# Patient Record
Sex: FEMALE | Race: WHITE | NOT HISPANIC OR LATINO | Employment: OTHER | URBAN - NONMETROPOLITAN AREA
[De-identification: names, ages, dates, MRNs, and addresses within clinical notes are randomized per-mention and may not be internally consistent; named-entity substitution may affect disease eponyms.]

---

## 2017-03-29 ENCOUNTER — APPOINTMENT (OUTPATIENT)
Dept: GENERAL RADIOLOGY | Facility: HOSPITAL | Age: 67
End: 2017-03-29

## 2017-03-29 ENCOUNTER — HOSPITAL ENCOUNTER (INPATIENT)
Facility: HOSPITAL | Age: 67
LOS: 9 days | Discharge: HOME | End: 2017-04-07
Attending: INTERNAL MEDICINE | Admitting: FAMILY MEDICINE

## 2017-03-29 ENCOUNTER — APPOINTMENT (OUTPATIENT)
Dept: CT IMAGING | Facility: HOSPITAL | Age: 67
End: 2017-03-29

## 2017-03-29 DIAGNOSIS — L02.91 ABSCESS: ICD-10-CM

## 2017-03-29 PROBLEM — IMO0002 ABDOMINAL ABSCESS: Status: ACTIVE | Noted: 2017-03-29

## 2017-03-29 LAB — CRP SERPL-MCNC: 14.42 MG/DL (ref 0–0.99)

## 2017-03-29 PROCEDURE — 74178 CT ABD&PLV WO CNTR FLWD CNTR: CPT

## 2017-03-29 PROCEDURE — 94799 UNLISTED PULMONARY SVC/PX: CPT

## 2017-03-29 PROCEDURE — 0 IOHEXOL 300 MG/ML SOLUTION: Performed by: SPECIALIST

## 2017-03-29 PROCEDURE — 02HV33Z INSERTION OF INFUSION DEVICE INTO SUPERIOR VENA CAVA, PERCUTANEOUS APPROACH: ICD-10-PCS | Performed by: SPECIALIST

## 2017-03-29 PROCEDURE — 0 IOPAMIDOL PER 1 ML: Performed by: INTERNAL MEDICINE

## 2017-03-29 PROCEDURE — 25010000002 MEROPENEM: Performed by: INTERNAL MEDICINE

## 2017-03-29 PROCEDURE — 71020 HC CHEST PA AND LATERAL: CPT

## 2017-03-29 PROCEDURE — 74000 HC ABDOMEN KUB: CPT

## 2017-03-29 PROCEDURE — 86140 C-REACTIVE PROTEIN: CPT | Performed by: SPECIALIST

## 2017-03-29 PROCEDURE — 25010000002 ENOXAPARIN PER 10 MG: Performed by: INTERNAL MEDICINE

## 2017-03-29 RX ORDER — SODIUM CHLORIDE 0.9 % (FLUSH) 0.9 %
1-10 SYRINGE (ML) INJECTION AS NEEDED
Status: DISCONTINUED | OUTPATIENT
Start: 2017-03-29 | End: 2017-04-07 | Stop reason: HOSPADM

## 2017-03-29 RX ORDER — SUCRALFATE ORAL 1 G/10ML
1 SUSPENSION ORAL EVERY 6 HOURS SCHEDULED
Status: DISCONTINUED | OUTPATIENT
Start: 2017-03-29 | End: 2017-04-07 | Stop reason: HOSPADM

## 2017-03-29 RX ORDER — NICOTINE 21 MG/24HR
1 PATCH, TRANSDERMAL 24 HOURS TRANSDERMAL EVERY 24 HOURS
Status: DISCONTINUED | OUTPATIENT
Start: 2017-03-29 | End: 2017-04-07 | Stop reason: HOSPADM

## 2017-03-29 RX ORDER — PANTOPRAZOLE SODIUM 40 MG/10ML
40 INJECTION, POWDER, LYOPHILIZED, FOR SOLUTION INTRAVENOUS
Status: DISCONTINUED | OUTPATIENT
Start: 2017-03-30 | End: 2017-04-05

## 2017-03-29 RX ORDER — LEVOTHYROXINE SODIUM 0.07 MG/1
75 TABLET ORAL DAILY
COMMUNITY

## 2017-03-29 RX ORDER — DOCUSATE SODIUM 100 MG/1
100 CAPSULE, LIQUID FILLED ORAL DAILY PRN
Status: ON HOLD | COMMUNITY
End: 2020-02-14

## 2017-03-29 RX ORDER — BUDESONIDE AND FORMOTEROL FUMARATE DIHYDRATE 160; 4.5 UG/1; UG/1
2 AEROSOL RESPIRATORY (INHALATION)
Status: ON HOLD | COMMUNITY
End: 2021-03-10

## 2017-03-29 RX ORDER — MECLIZINE HYDROCHLORIDE 25 MG/1
25 TABLET ORAL 2 TIMES DAILY PRN
COMMUNITY
End: 2020-02-19 | Stop reason: HOSPADM

## 2017-03-29 RX ORDER — IPRATROPIUM BROMIDE AND ALBUTEROL SULFATE 2.5; .5 MG/3ML; MG/3ML
3 SOLUTION RESPIRATORY (INHALATION)
Status: DISCONTINUED | OUTPATIENT
Start: 2017-03-29 | End: 2017-04-07 | Stop reason: HOSPADM

## 2017-03-29 RX ORDER — ALBUTEROL SULFATE 90 UG/1
2 AEROSOL, METERED RESPIRATORY (INHALATION) 3 TIMES DAILY PRN
COMMUNITY
End: 2018-04-13 | Stop reason: HOSPADM

## 2017-03-29 RX ORDER — POTASSIUM CHLORIDE 750 MG/1
10 TABLET, EXTENDED RELEASE ORAL 2 TIMES DAILY
COMMUNITY
End: 2020-02-19 | Stop reason: HOSPADM

## 2017-03-29 RX ORDER — ONDANSETRON 2 MG/ML
4 INJECTION INTRAMUSCULAR; INTRAVENOUS EVERY 6 HOURS PRN
Status: DISCONTINUED | OUTPATIENT
Start: 2017-03-29 | End: 2017-04-07 | Stop reason: HOSPADM

## 2017-03-29 RX ORDER — PANTOPRAZOLE SODIUM 40 MG/10ML
40 INJECTION, POWDER, LYOPHILIZED, FOR SOLUTION INTRAVENOUS
Status: DISCONTINUED | OUTPATIENT
Start: 2017-03-29 | End: 2017-03-29 | Stop reason: SDUPTHER

## 2017-03-29 RX ORDER — IPRATROPIUM BROMIDE AND ALBUTEROL SULFATE 2.5; .5 MG/3ML; MG/3ML
3 SOLUTION RESPIRATORY (INHALATION) EVERY 4 HOURS PRN
Status: DISCONTINUED | OUTPATIENT
Start: 2017-03-29 | End: 2017-03-29

## 2017-03-29 RX ORDER — FERROUS SULFATE TAB EC 324 MG (65 MG FE EQUIVALENT) 324 (65 FE) MG
324 TABLET DELAYED RESPONSE ORAL 2 TIMES DAILY WITH MEALS
Status: ON HOLD | COMMUNITY
End: 2017-04-07

## 2017-03-29 RX ORDER — HYDROCHLOROTHIAZIDE 25 MG/1
25 TABLET ORAL DAILY
COMMUNITY
End: 2017-04-21 | Stop reason: HOSPADM

## 2017-03-29 RX ADMIN — IOHEXOL 50 ML: 300 INJECTION, SOLUTION INTRAVENOUS at 10:02

## 2017-03-29 RX ADMIN — MEROPENEM 1 G: 1 INJECTION, POWDER, FOR SOLUTION INTRAVENOUS at 14:34

## 2017-03-29 RX ADMIN — MEROPENEM 1 G: 1 INJECTION, POWDER, FOR SOLUTION INTRAVENOUS at 21:10

## 2017-03-29 RX ADMIN — IPRATROPIUM BROMIDE AND ALBUTEROL SULFATE 3 ML: 2.5; .5 SOLUTION RESPIRATORY (INHALATION) at 14:36

## 2017-03-29 RX ADMIN — ENOXAPARIN SODIUM 40 MG: 40 INJECTION SUBCUTANEOUS at 10:05

## 2017-03-29 RX ADMIN — IOPAMIDOL 100 ML: 755 INJECTION, SOLUTION INTRAVENOUS at 12:45

## 2017-03-29 RX ADMIN — PANTOPRAZOLE SODIUM 40 MG: 40 INJECTION, POWDER, FOR SOLUTION INTRAVENOUS at 06:19

## 2017-03-29 RX ADMIN — MEROPENEM 1 G: 1 INJECTION, POWDER, FOR SOLUTION INTRAVENOUS at 06:13

## 2017-03-29 RX ADMIN — IPRATROPIUM BROMIDE AND ALBUTEROL SULFATE 3 ML: 2.5; .5 SOLUTION RESPIRATORY (INHALATION) at 19:20

## 2017-03-30 ENCOUNTER — APPOINTMENT (OUTPATIENT)
Dept: CT IMAGING | Facility: HOSPITAL | Age: 67
End: 2017-03-30

## 2017-03-30 LAB
ALBUMIN SERPL-MCNC: 2.8 G/DL (ref 3.5–5)
ALBUMIN/GLOB SERPL: 1 G/DL (ref 1.1–2.5)
ALP SERPL-CCNC: 89 U/L (ref 24–120)
ALT SERPL W P-5'-P-CCNC: 68 U/L (ref 0–54)
ANION GAP SERPL CALCULATED.3IONS-SCNC: 11 MMOL/L (ref 4–13)
APPEARANCE FLD: ABNORMAL
APPEARANCE FLD: ABNORMAL
APTT PPP: 21.3 SECONDS (ref 24.1–34.8)
AST SERPL-CCNC: 38 U/L (ref 7–45)
BILIRUB SERPL-MCNC: 0.4 MG/DL (ref 0.1–1)
BUN BLD-MCNC: 17 MG/DL (ref 5–21)
BUN/CREAT SERPL: 21.5 (ref 7–25)
CALCIUM SPEC-SCNC: 8.8 MG/DL (ref 8.4–10.4)
CHLORIDE SERPL-SCNC: 97 MMOL/L (ref 98–110)
CO2 SERPL-SCNC: 28 MMOL/L (ref 24–31)
COLOR FLD: ABNORMAL
COLOR FLD: ABNORMAL
CREAT BLD-MCNC: 0.79 MG/DL (ref 0.5–1.4)
CRP SERPL-MCNC: 14.8 MG/DL (ref 0–0.99)
DEPRECATED RDW RBC AUTO: 51.1 FL (ref 40–54)
ERYTHROCYTE [DISTWIDTH] IN BLOOD BY AUTOMATED COUNT: 16.4 % (ref 12–15)
ERYTHROCYTE [SEDIMENTATION RATE] IN BLOOD: 113 MM/HR (ref 0–20)
GFR SERPL CREATININE-BSD FRML MDRD: 73 ML/MIN/1.73
GLOBULIN UR ELPH-MCNC: 2.8 GM/DL
GLUCOSE BLD-MCNC: 83 MG/DL (ref 70–100)
HCT VFR BLD AUTO: 30 % (ref 37–47)
HGB BLD-MCNC: 9.5 G/DL (ref 12–16)
INR PPP: 1.21 (ref 0.91–1.09)
MCH RBC QN AUTO: 27.1 PG (ref 28–32)
MCHC RBC AUTO-ENTMCNC: 31.7 G/DL (ref 33–36)
MCV RBC AUTO: 85.7 FL (ref 82–98)
NUC CELL # FLD: ABNORMAL /MM3
NUC CELL # FLD: ABNORMAL /MM3
PLATELET # BLD AUTO: 333 10*3/MM3 (ref 130–400)
PMV BLD AUTO: 10 FL (ref 6–12)
POTASSIUM BLD-SCNC: 3.5 MMOL/L (ref 3.5–5.3)
PROT SERPL-MCNC: 5.6 G/DL (ref 6.3–8.7)
PROTHROMBIN TIME: 15.7 SECONDS (ref 11.9–14.6)
RBC # BLD AUTO: 3.5 10*6/MM3 (ref 4.2–5.4)
RBC # FLD AUTO: ABNORMAL /MM3
RBC # FLD AUTO: ABNORMAL /MM3
SODIUM BLD-SCNC: 136 MMOL/L (ref 135–145)
WBC NRBC COR # BLD: 15.19 10*3/MM3 (ref 4.8–10.8)

## 2017-03-30 PROCEDURE — C1751 CATH, INF, PER/CENT/MIDLINE: HCPCS

## 2017-03-30 PROCEDURE — 87205 SMEAR GRAM STAIN: CPT | Performed by: INTERNAL MEDICINE

## 2017-03-30 PROCEDURE — 85610 PROTHROMBIN TIME: CPT | Performed by: SPECIALIST

## 2017-03-30 PROCEDURE — 87102 FUNGUS ISOLATION CULTURE: CPT | Performed by: SPECIALIST

## 2017-03-30 PROCEDURE — 87186 SC STD MICRODIL/AGAR DIL: CPT | Performed by: SPECIALIST

## 2017-03-30 PROCEDURE — 85651 RBC SED RATE NONAUTOMATED: CPT | Performed by: SPECIALIST

## 2017-03-30 PROCEDURE — 87070 CULTURE OTHR SPECIMN AEROBIC: CPT | Performed by: SPECIALIST

## 2017-03-30 PROCEDURE — 87102 FUNGUS ISOLATION CULTURE: CPT | Performed by: INTERNAL MEDICINE

## 2017-03-30 PROCEDURE — 87185 SC STD ENZYME DETCJ PER NZM: CPT | Performed by: SPECIALIST

## 2017-03-30 PROCEDURE — 94799 UNLISTED PULMONARY SVC/PX: CPT

## 2017-03-30 PROCEDURE — 87075 CULTR BACTERIA EXCEPT BLOOD: CPT | Performed by: SPECIALIST

## 2017-03-30 PROCEDURE — 75989 ABSCESS DRAINAGE UNDER X-RAY: CPT

## 2017-03-30 PROCEDURE — 87116 MYCOBACTERIA CULTURE: CPT | Performed by: SPECIALIST

## 2017-03-30 PROCEDURE — 25010000002 MIDAZOLAM PER 1 MG: Performed by: RADIOLOGY

## 2017-03-30 PROCEDURE — 0W9F30Z DRAINAGE OF ABDOMINAL WALL WITH DRAINAGE DEVICE, PERCUTANEOUS APPROACH: ICD-10-PCS | Performed by: RADIOLOGY

## 2017-03-30 PROCEDURE — 87076 CULTURE ANAEROBE IDENT EACH: CPT | Performed by: SPECIALIST

## 2017-03-30 PROCEDURE — 87070 CULTURE OTHR SPECIMN AEROBIC: CPT | Performed by: INTERNAL MEDICINE

## 2017-03-30 PROCEDURE — 25010000002 FENTANYL CITRATE (PF) 100 MCG/2ML SOLUTION: Performed by: RADIOLOGY

## 2017-03-30 PROCEDURE — 85730 THROMBOPLASTIN TIME PARTIAL: CPT | Performed by: SPECIALIST

## 2017-03-30 PROCEDURE — 87206 SMEAR FLUORESCENT/ACID STAI: CPT | Performed by: SPECIALIST

## 2017-03-30 PROCEDURE — 80053 COMPREHEN METABOLIC PANEL: CPT | Performed by: SPECIALIST

## 2017-03-30 PROCEDURE — 25010000002 LEVOFLOXACIN PER 250 MG: Performed by: INTERNAL MEDICINE

## 2017-03-30 PROCEDURE — 86140 C-REACTIVE PROTEIN: CPT | Performed by: SPECIALIST

## 2017-03-30 PROCEDURE — 25010000002 MEROPENEM: Performed by: INTERNAL MEDICINE

## 2017-03-30 PROCEDURE — 87147 CULTURE TYPE IMMUNOLOGIC: CPT | Performed by: SPECIALIST

## 2017-03-30 PROCEDURE — 89050 BODY FLUID CELL COUNT: CPT | Performed by: SPECIALIST

## 2017-03-30 PROCEDURE — 87205 SMEAR GRAM STAIN: CPT | Performed by: SPECIALIST

## 2017-03-30 PROCEDURE — 85027 COMPLETE CBC AUTOMATED: CPT | Performed by: SPECIALIST

## 2017-03-30 PROCEDURE — 87015 SPECIMEN INFECT AGNT CONCNTJ: CPT | Performed by: SPECIALIST

## 2017-03-30 RX ORDER — MIDAZOLAM HYDROCHLORIDE 1 MG/ML
INJECTION INTRAMUSCULAR; INTRAVENOUS
Status: COMPLETED | OUTPATIENT
Start: 2017-03-30 | End: 2017-03-30

## 2017-03-30 RX ORDER — LEVOFLOXACIN 5 MG/ML
500 INJECTION, SOLUTION INTRAVENOUS EVERY 24 HOURS
Status: DISCONTINUED | OUTPATIENT
Start: 2017-03-30 | End: 2017-04-07 | Stop reason: HOSPADM

## 2017-03-30 RX ORDER — LIDOCAINE HYDROCHLORIDE 10 MG/ML
INJECTION, SOLUTION INFILTRATION; PERINEURAL
Status: COMPLETED | OUTPATIENT
Start: 2017-03-30 | End: 2017-03-30

## 2017-03-30 RX ORDER — LIDOCAINE HYDROCHLORIDE 10 MG/ML
2 INJECTION, SOLUTION INFILTRATION; PERINEURAL ONCE
Status: COMPLETED | OUTPATIENT
Start: 2017-03-30 | End: 2017-03-30

## 2017-03-30 RX ORDER — FENTANYL CITRATE 50 UG/ML
INJECTION, SOLUTION INTRAMUSCULAR; INTRAVENOUS
Status: COMPLETED | OUTPATIENT
Start: 2017-03-30 | End: 2017-03-30

## 2017-03-30 RX ADMIN — LIDOCAINE HYDROCHLORIDE 2 ML: 10 INJECTION, SOLUTION EPIDURAL; INFILTRATION; INTRACAUDAL; PERINEURAL at 09:47

## 2017-03-30 RX ADMIN — IPRATROPIUM BROMIDE AND ALBUTEROL SULFATE 3 ML: 2.5; .5 SOLUTION RESPIRATORY (INHALATION) at 19:33

## 2017-03-30 RX ADMIN — MEROPENEM 1 G: 1 INJECTION, POWDER, FOR SOLUTION INTRAVENOUS at 14:15

## 2017-03-30 RX ADMIN — IPRATROPIUM BROMIDE AND ALBUTEROL SULFATE 3 ML: 2.5; .5 SOLUTION RESPIRATORY (INHALATION) at 11:53

## 2017-03-30 RX ADMIN — IPRATROPIUM BROMIDE AND ALBUTEROL SULFATE 3 ML: 2.5; .5 SOLUTION RESPIRATORY (INHALATION) at 07:04

## 2017-03-30 RX ADMIN — LIDOCAINE HYDROCHLORIDE 15 ML: 10 INJECTION, SOLUTION INFILTRATION; PERINEURAL at 10:22

## 2017-03-30 RX ADMIN — PANTOPRAZOLE SODIUM 40 MG: 40 INJECTION, POWDER, FOR SOLUTION INTRAVENOUS at 05:27

## 2017-03-30 RX ADMIN — SUCRALFATE 1 G: 1 SUSPENSION ORAL at 12:14

## 2017-03-30 RX ADMIN — MIDAZOLAM 1 MG: 1 INJECTION INTRAMUSCULAR; INTRAVENOUS at 10:22

## 2017-03-30 RX ADMIN — LEVOFLOXACIN 500 MG: 500 INJECTION, SOLUTION INTRAVENOUS at 11:46

## 2017-03-30 RX ADMIN — IPRATROPIUM BROMIDE AND ALBUTEROL SULFATE 3 ML: 2.5; .5 SOLUTION RESPIRATORY (INHALATION) at 00:00

## 2017-03-30 RX ADMIN — FENTANYL CITRATE 25 MCG: 50 INJECTION, SOLUTION INTRAMUSCULAR; INTRAVENOUS at 10:21

## 2017-03-30 RX ADMIN — MEROPENEM 1 G: 1 INJECTION, POWDER, FOR SOLUTION INTRAVENOUS at 05:47

## 2017-03-30 RX ADMIN — MEROPENEM 1 G: 1 INJECTION, POWDER, FOR SOLUTION INTRAVENOUS at 22:13

## 2017-03-30 RX ADMIN — SUCRALFATE 1 G: 1 SUSPENSION ORAL at 17:58

## 2017-03-30 RX ADMIN — SUCRALFATE 1 G: 1 SUSPENSION ORAL at 23:25

## 2017-03-31 LAB
BASOPHILS # BLD AUTO: 0.02 10*3/MM3 (ref 0–0.2)
BASOPHILS NFR BLD AUTO: 0.2 % (ref 0–2)
DEPRECATED RDW RBC AUTO: 52.2 FL (ref 40–54)
EOSINOPHIL # BLD AUTO: 0.22 10*3/MM3 (ref 0–0.7)
EOSINOPHIL NFR BLD AUTO: 1.9 % (ref 0–4)
ERYTHROCYTE [DISTWIDTH] IN BLOOD BY AUTOMATED COUNT: 16.5 % (ref 12–15)
ERYTHROCYTE [SEDIMENTATION RATE] IN BLOOD: 72 MM/HR (ref 0–20)
HCT VFR BLD AUTO: 28.2 % (ref 37–47)
HGB BLD-MCNC: 8.9 G/DL (ref 12–16)
IMM GRANULOCYTES # BLD: 0.04 10*3/MM3 (ref 0–0.03)
IMM GRANULOCYTES NFR BLD: 0.3 % (ref 0–5)
LYMPHOCYTES # BLD AUTO: 1.79 10*3/MM3 (ref 0.72–4.86)
LYMPHOCYTES NFR BLD AUTO: 15.2 % (ref 15–45)
MCH RBC QN AUTO: 27.1 PG (ref 28–32)
MCHC RBC AUTO-ENTMCNC: 31.6 G/DL (ref 33–36)
MCV RBC AUTO: 86 FL (ref 82–98)
MONOCYTES # BLD AUTO: 0.63 10*3/MM3 (ref 0.19–1.3)
MONOCYTES NFR BLD AUTO: 5.4 % (ref 4–12)
NEUTROPHILS # BLD AUTO: 9.04 10*3/MM3 (ref 1.87–8.4)
NEUTROPHILS NFR BLD AUTO: 77 % (ref 39–78)
NRBC BLD MANUAL-RTO: 0 /100 WBC (ref 0–0)
PLATELET # BLD AUTO: 314 10*3/MM3 (ref 130–400)
PMV BLD AUTO: 9.9 FL (ref 6–12)
RBC # BLD AUTO: 3.28 10*6/MM3 (ref 4.2–5.4)
WBC NRBC COR # BLD: 11.74 10*3/MM3 (ref 4.8–10.8)

## 2017-03-31 PROCEDURE — 25010000002 LEVOFLOXACIN PER 250 MG: Performed by: INTERNAL MEDICINE

## 2017-03-31 PROCEDURE — 25010000002 MEROPENEM: Performed by: INTERNAL MEDICINE

## 2017-03-31 PROCEDURE — 85025 COMPLETE CBC W/AUTO DIFF WBC: CPT | Performed by: INTERNAL MEDICINE

## 2017-03-31 PROCEDURE — 94760 N-INVAS EAR/PLS OXIMETRY 1: CPT

## 2017-03-31 PROCEDURE — 94799 UNLISTED PULMONARY SVC/PX: CPT

## 2017-03-31 PROCEDURE — 85651 RBC SED RATE NONAUTOMATED: CPT | Performed by: SPECIALIST

## 2017-03-31 RX ADMIN — IPRATROPIUM BROMIDE AND ALBUTEROL SULFATE 3 ML: 2.5; .5 SOLUTION RESPIRATORY (INHALATION) at 19:26

## 2017-03-31 RX ADMIN — MEROPENEM 1 G: 1 INJECTION, POWDER, FOR SOLUTION INTRAVENOUS at 21:06

## 2017-03-31 RX ADMIN — MEROPENEM 1 G: 1 INJECTION, POWDER, FOR SOLUTION INTRAVENOUS at 12:31

## 2017-03-31 RX ADMIN — SUCRALFATE 1 G: 1 SUSPENSION ORAL at 17:18

## 2017-03-31 RX ADMIN — IPRATROPIUM BROMIDE AND ALBUTEROL SULFATE 3 ML: 2.5; .5 SOLUTION RESPIRATORY (INHALATION) at 00:02

## 2017-03-31 RX ADMIN — IPRATROPIUM BROMIDE AND ALBUTEROL SULFATE 3 ML: 2.5; .5 SOLUTION RESPIRATORY (INHALATION) at 12:04

## 2017-03-31 RX ADMIN — PANTOPRAZOLE SODIUM 40 MG: 40 INJECTION, POWDER, FOR SOLUTION INTRAVENOUS at 05:13

## 2017-03-31 RX ADMIN — SUCRALFATE 1 G: 1 SUSPENSION ORAL at 12:31

## 2017-03-31 RX ADMIN — IPRATROPIUM BROMIDE AND ALBUTEROL SULFATE 3 ML: 2.5; .5 SOLUTION RESPIRATORY (INHALATION) at 06:39

## 2017-03-31 RX ADMIN — LEVOFLOXACIN 500 MG: 500 INJECTION, SOLUTION INTRAVENOUS at 09:45

## 2017-03-31 RX ADMIN — MEROPENEM 1 G: 1 INJECTION, POWDER, FOR SOLUTION INTRAVENOUS at 05:13

## 2017-03-31 RX ADMIN — SUCRALFATE 1 G: 1 SUSPENSION ORAL at 05:13

## 2017-04-01 LAB
ALBUMIN SERPL-MCNC: 3 G/DL (ref 3.5–5)
ALBUMIN/GLOB SERPL: 0.9 G/DL (ref 1.1–2.5)
ALP SERPL-CCNC: 83 U/L (ref 24–120)
ALT SERPL W P-5'-P-CCNC: 60 U/L (ref 0–54)
ANION GAP SERPL CALCULATED.3IONS-SCNC: 8 MMOL/L (ref 4–13)
AST SERPL-CCNC: 52 U/L (ref 7–45)
BACTERIA SPEC RESP CULT: NORMAL
BACTERIA UR QL AUTO: ABNORMAL /HPF
BASOPHILS # BLD AUTO: 0.02 10*3/MM3 (ref 0–0.2)
BASOPHILS NFR BLD AUTO: 0.2 % (ref 0–2)
BILIRUB SERPL-MCNC: 0.5 MG/DL (ref 0.1–1)
BILIRUB UR QL STRIP: ABNORMAL
BUN BLD-MCNC: 11 MG/DL (ref 5–21)
BUN/CREAT SERPL: 13.9 (ref 7–25)
CALCIUM SPEC-SCNC: 8.5 MG/DL (ref 8.4–10.4)
CHLORIDE SERPL-SCNC: 97 MMOL/L (ref 98–110)
CLARITY UR: ABNORMAL
CO2 SERPL-SCNC: 31 MMOL/L (ref 24–31)
COLOR UR: ABNORMAL
CREAT BLD-MCNC: 0.79 MG/DL (ref 0.5–1.4)
DEPRECATED RDW RBC AUTO: 51.3 FL (ref 40–54)
EOSINOPHIL # BLD AUTO: 0.52 10*3/MM3 (ref 0–0.7)
EOSINOPHIL NFR BLD AUTO: 4.3 % (ref 0–4)
ERYTHROCYTE [DISTWIDTH] IN BLOOD BY AUTOMATED COUNT: 16.2 % (ref 12–15)
GFR SERPL CREATININE-BSD FRML MDRD: 73 ML/MIN/1.73
GLOBULIN UR ELPH-MCNC: 3.4 GM/DL
GLUCOSE BLD-MCNC: 99 MG/DL (ref 70–100)
GLUCOSE UR STRIP-MCNC: NEGATIVE MG/DL
GRAM STN SPEC: NORMAL
GRAN CASTS URNS QL MICRO: ABNORMAL /LPF
HCT VFR BLD AUTO: 30.7 % (ref 37–47)
HGB BLD-MCNC: 9.6 G/DL (ref 12–16)
HGB UR QL STRIP.AUTO: ABNORMAL
HYALINE CASTS UR QL AUTO: ABNORMAL /LPF
IMM GRANULOCYTES # BLD: 0.08 10*3/MM3 (ref 0–0.03)
IMM GRANULOCYTES NFR BLD: 0.7 % (ref 0–5)
KETONES UR QL STRIP: ABNORMAL
LEUKOCYTE ESTERASE UR QL STRIP.AUTO: ABNORMAL
LYMPHOCYTES # BLD AUTO: 2.3 10*3/MM3 (ref 0.72–4.86)
LYMPHOCYTES NFR BLD AUTO: 19.2 % (ref 15–45)
MCH RBC QN AUTO: 26.8 PG (ref 28–32)
MCHC RBC AUTO-ENTMCNC: 31.3 G/DL (ref 33–36)
MCV RBC AUTO: 85.8 FL (ref 82–98)
MONOCYTES # BLD AUTO: 0.69 10*3/MM3 (ref 0.19–1.3)
MONOCYTES NFR BLD AUTO: 5.7 % (ref 4–12)
MUCOUS THREADS URNS QL MICRO: ABNORMAL /HPF
NEUTROPHILS # BLD AUTO: 8.4 10*3/MM3 (ref 1.87–8.4)
NEUTROPHILS NFR BLD AUTO: 69.9 % (ref 39–78)
NITRITE UR QL STRIP: NEGATIVE
PH UR STRIP.AUTO: 5.5 [PH] (ref 5–8)
PLATELET # BLD AUTO: 330 10*3/MM3 (ref 130–400)
PMV BLD AUTO: 9.7 FL (ref 6–12)
POTASSIUM BLD-SCNC: 3.4 MMOL/L (ref 3.5–5.3)
PROT SERPL-MCNC: 6.4 G/DL (ref 6.3–8.7)
PROT UR QL STRIP: ABNORMAL
RBC # BLD AUTO: 3.58 10*6/MM3 (ref 4.2–5.4)
RBC # UR: ABNORMAL /HPF
REF LAB TEST METHOD: ABNORMAL
RENAL EPI CELLS #/AREA URNS HPF: ABNORMAL /HPF
SODIUM BLD-SCNC: 136 MMOL/L (ref 135–145)
SP GR UR STRIP: 1.02 (ref 1–1.03)
SQUAMOUS #/AREA URNS HPF: ABNORMAL /HPF
UROBILINOGEN UR QL STRIP: ABNORMAL
WBC NRBC COR # BLD: 12.01 10*3/MM3 (ref 4.8–10.8)
WBC UR QL AUTO: ABNORMAL /HPF

## 2017-04-01 PROCEDURE — 25010000002 LEVOFLOXACIN PER 250 MG: Performed by: INTERNAL MEDICINE

## 2017-04-01 PROCEDURE — 80053 COMPREHEN METABOLIC PANEL: CPT | Performed by: NURSE PRACTITIONER

## 2017-04-01 PROCEDURE — 94799 UNLISTED PULMONARY SVC/PX: CPT

## 2017-04-01 PROCEDURE — 25010000002 MEROPENEM: Performed by: INTERNAL MEDICINE

## 2017-04-01 PROCEDURE — 94760 N-INVAS EAR/PLS OXIMETRY 1: CPT

## 2017-04-01 PROCEDURE — 85025 COMPLETE CBC W/AUTO DIFF WBC: CPT | Performed by: NURSE PRACTITIONER

## 2017-04-01 PROCEDURE — 81001 URINALYSIS AUTO W/SCOPE: CPT | Performed by: SPECIALIST

## 2017-04-01 RX ORDER — POTASSIUM CHLORIDE 750 MG/1
40 CAPSULE, EXTENDED RELEASE ORAL ONCE
Status: COMPLETED | OUTPATIENT
Start: 2017-04-01 | End: 2017-04-01

## 2017-04-01 RX ADMIN — SUCRALFATE 1 G: 1 SUSPENSION ORAL at 00:30

## 2017-04-01 RX ADMIN — SUCRALFATE 1 G: 1 SUSPENSION ORAL at 05:39

## 2017-04-01 RX ADMIN — SUCRALFATE 1 G: 1 SUSPENSION ORAL at 17:18

## 2017-04-01 RX ADMIN — IPRATROPIUM BROMIDE AND ALBUTEROL SULFATE 3 ML: 2.5; .5 SOLUTION RESPIRATORY (INHALATION) at 06:31

## 2017-04-01 RX ADMIN — IPRATROPIUM BROMIDE AND ALBUTEROL SULFATE 3 ML: 2.5; .5 SOLUTION RESPIRATORY (INHALATION) at 00:12

## 2017-04-01 RX ADMIN — LEVOFLOXACIN 500 MG: 500 INJECTION, SOLUTION INTRAVENOUS at 10:38

## 2017-04-01 RX ADMIN — PANTOPRAZOLE SODIUM 40 MG: 40 INJECTION, POWDER, FOR SOLUTION INTRAVENOUS at 05:39

## 2017-04-01 RX ADMIN — MEROPENEM 1 G: 1 INJECTION, POWDER, FOR SOLUTION INTRAVENOUS at 21:23

## 2017-04-01 RX ADMIN — SUCRALFATE 1 G: 1 SUSPENSION ORAL at 12:28

## 2017-04-01 RX ADMIN — IPRATROPIUM BROMIDE AND ALBUTEROL SULFATE 3 ML: 2.5; .5 SOLUTION RESPIRATORY (INHALATION) at 12:20

## 2017-04-01 RX ADMIN — MEROPENEM 1 G: 1 INJECTION, POWDER, FOR SOLUTION INTRAVENOUS at 13:22

## 2017-04-01 RX ADMIN — IPRATROPIUM BROMIDE AND ALBUTEROL SULFATE 3 ML: 2.5; .5 SOLUTION RESPIRATORY (INHALATION) at 21:03

## 2017-04-01 RX ADMIN — POTASSIUM CHLORIDE 40 MEQ: 750 CAPSULE, EXTENDED RELEASE ORAL at 13:25

## 2017-04-01 RX ADMIN — MEROPENEM 1 G: 1 INJECTION, POWDER, FOR SOLUTION INTRAVENOUS at 05:39

## 2017-04-01 NOTE — PLAN OF CARE
Problem: Patient Care Overview (Adult)  Goal: Plan of Care Review  Outcome: Ongoing (interventions implemented as appropriate)    04/01/17 0402   Coping/Psychosocial Response Interventions   Plan Of Care Reviewed With patient   Patient Care Overview   Progress improving   Outcome Evaluation   Outcome Summary/Follow up Plan Patient tolerating clears; up with assist; small bm; no complaints of pain or nausea       Goal: Adult Individualization and Mutuality  Outcome: Ongoing (interventions implemented as appropriate)  Goal: Discharge Needs Assessment  Outcome: Ongoing (interventions implemented as appropriate)    Problem: Pneumonia (Adult)  Goal: Signs and Symptoms of Listed Potential Problems Will be Absent or Manageable (Pneumonia)  Outcome: Ongoing (interventions implemented as appropriate)    Problem: Pain, Acute (Adult)  Goal: Acceptable Pain Control/Comfort Level  Outcome: Ongoing (interventions implemented as appropriate)

## 2017-04-01 NOTE — PROGRESS NOTES
Lani Camarillo MD Progress Note     LOS: 3 days   Patient Care Team:  Tomas Palomino MD as PCP - General (Internal Medicine)        Subjective     Tolerating clears.  Bowels moving.    Objective     Vital Signs  Temp:  [97.6 °F (36.4 °C)-98.6 °F (37 °C)] 98.5 °F (36.9 °C)  Heart Rate:  [] 78  Resp:  [16-20] 20  BP: (104-124)/(50-61) 109/58    Intake & Output (last 3 days)       03/29 0701 - 03/30 0700 03/30 0701 - 03/31 0700 03/31 0701 - 04/01 0700 04/01 0701 - 04/02 0700    P.O.   360 120    IV Piggyback 100 100  100    Total Intake(mL/kg) 100 (1) 100 (1.1) 360 (3.9) 220 (2.4)    Urine (mL/kg/hr) 100 (0) 850 (0.4) 400 (0.2)     Other  90 (0)      Stool   0 (0)     Total Output 100 940 400      Net 0 -840 -40 +220            Unmeasured Urine Occurrence 1 x  1 x 1 x    Unmeasured Stool Occurrence   5 x 1 x          Physical Exam:     General Appearance:    Alert, cooperative, in no acute distress   Lungs:     respirations regular, even and unlabored    Heart:    Regular rhythm and normal rate, normal S1 and S2, no            murmur, no gallop, no rub   Chest Wall:    No abnormalities observed   Abdomen:      obese.  Minimal out of percutaneous drain.  Looks like old infected hematoma    Extremities: No edema, + SCDs   Results Review:     I reviewed the patient's new clinical results.    Lab Results (last 72 hours)     Procedure Component Value Units Date/Time    C-reactive Protein [[id number]]  (Abnormal) Collected:  03/29/17 1051    Specimen:  Blood Updated:  03/29/17 1217     C-Reactive Protein 14.42 (H) mg/dL     CBC (No Diff) [[id number]]  (Abnormal) Collected:  03/30/17 0722    Specimen:  Blood Updated:  03/30/17 0744     WBC 15.19 (H) 10*3/mm3      RBC 3.50 (L) 10*6/mm3      Hemoglobin 9.5 (L) g/dL      Hematocrit 30.0 (L) %      MCV 85.7 fL      MCH 27.1 (L) pg      MCHC 31.7 (L) g/dL      RDW 16.4 (H) %      RDW-SD 51.1 fl      MPV 10.0 fL      Platelets 333 10*3/mm3     Protime-INR [[id number]]  (Abnormal)  Collected:  03/30/17 0722    Specimen:  Blood Updated:  03/30/17 0754     Protime 15.7 (H) Seconds      INR 1.21 (H)    Sedimentation Rate [[id number]]  (Abnormal) Collected:  03/30/17 0722    Specimen:  Blood Updated:  03/30/17 0757     Sed Rate 113 (H) mm/hr     Comprehensive Metabolic Panel [[id number]]  (Abnormal) Collected:  03/30/17 0722    Specimen:  Blood Updated:  03/30/17 0802     Glucose 83 mg/dL      BUN 17 mg/dL      Creatinine 0.79 mg/dL      Sodium 136 mmol/L      Potassium 3.5 mmol/L      Chloride 97 (L) mmol/L      CO2 28.0 mmol/L      Calcium 8.8 mg/dL      Total Protein 5.6 (L) g/dL      Albumin 2.80 (L) g/dL      ALT (SGPT) 68 (H) U/L      AST (SGOT) 38 U/L      Alkaline Phosphatase 89 U/L      Total Bilirubin 0.4 mg/dL      eGFR Non African Amer 73 mL/min/1.73      Globulin 2.8 gm/dL      A/G Ratio 1.0 (L) g/dL      BUN/Creatinine Ratio 21.5     Anion Gap 11.0 mmol/L     C-reactive Protein [[id number]]  (Abnormal) Collected:  03/30/17 0722    Specimen:  Blood Updated:  03/30/17 0849     C-Reactive Protein 14.80 (H) mg/dL     aPTT [[id number]]  (Abnormal) Collected:  03/30/17 0722    Specimen:  Blood Updated:  03/30/17 0926     PTT 21.3 (L) seconds     Fungus Culture [[id number]] Collected:  03/30/17 1059    Specimen:  Body Fluid from Peritoneum Updated:  03/30/17 1126    Fungus Culture [[id number]] Collected:  03/30/17 1146    Specimen:  Body Fluid from Peritoneum Updated:  03/30/17 1147    Body Fluid Cell Count With Differential [[id number]] Collected:  03/30/17 1103    Specimen:  Body Fluid from Peritoneum Updated:  03/30/17 1307    Narrative:       The following orders were created for panel order Body Fluid Cell Count With Differential.  Procedure                               Abnormality         Status                     ---------                               -----------         ------                     Body fluid cell count[[id number]]         Abnormal            Final result                 Please  view results for these tests on the individual orders.    Body fluid cell count [[id number]]  (Abnormal) Collected:  03/30/17 1103    Specimen:  Body Fluid from Peritoneum Updated:  03/30/17 1307     Color, Fluid Brown     Appearance, Fluid Other (A)      Unable to perform count due to solidified specimen        RBC, Fluid -- /mm3       Unable to perform count due to solidified specimen.  RBC are present        Nucleated Cells, Fluid -- /mm3       Unable to perform count due to solidified specimen.  TNTC WBC present       Body Fluid Cell Count With Differential [[id number]] Collected:  03/30/17 1059    Specimen:  Body Fluid from Peritoneum Updated:  03/30/17 1308    Narrative:       The following orders were created for panel order Body Fluid Cell Count With Differential.  Procedure                               Abnormality         Status                     ---------                               -----------         ------                     Body fluid cell count[[id number]]         Abnormal            Final result                 Please view results for these tests on the individual orders.    Body fluid cell count [[id number]]  (Abnormal) Collected:  03/30/17 1059    Specimen:  Body Fluid from Peritoneum Updated:  03/30/17 1308     Color, Fluid Brown     Appearance, Fluid Other (A)      Unable to perform count due to solidified specimen        RBC, Fluid -- /mm3       Unable to perform count due to solidified specimen        Nucleated Cells, Fluid -- /mm3       Unable to perform count due to solidified specimen       Sedimentation Rate [[id number]]  (Abnormal) Collected:  03/31/17 0616    Specimen:  Blood Updated:  03/31/17 0642     Sed Rate 72 (H) mm/hr     CBC & Differential [[id number]] Collected:  03/31/17 0616    Specimen:  Blood Updated:  03/31/17 0708    Narrative:       The following orders were created for panel order CBC & Differential.  Procedure                               Abnormality         Status                      ---------                               -----------         ------                     CBC Auto Differential[[id number]]         Abnormal            Final result                 Please view results for these tests on the individual orders.    CBC Auto Differential [[id number]]  (Abnormal) Collected:  03/31/17 0616    Specimen:  Blood Updated:  03/31/17 0708     WBC 11.74 (H) 10*3/mm3      RBC 3.28 (L) 10*6/mm3      Hemoglobin 8.9 (L) g/dL      Hematocrit 28.2 (L) %      MCV 86.0 fL      MCH 27.1 (L) pg      MCHC 31.6 (L) g/dL      RDW 16.5 (H) %      RDW-SD 52.2 fl      MPV 9.9 fL      Platelets 314 10*3/mm3      Neutrophil % 77.0 %      Lymphocyte % 15.2 %      Monocyte % 5.4 %      Eosinophil % 1.9 %      Basophil % 0.2 %      Immature Grans % 0.3 %      Neutrophils, Absolute 9.04 (H) 10*3/mm3      Lymphocytes, Absolute 1.79 10*3/mm3      Monocytes, Absolute 0.63 10*3/mm3      Eosinophils, Absolute 0.22 10*3/mm3      Basophils, Absolute 0.02 10*3/mm3      Immature Grans, Absolute 0.04 (H) 10*3/mm3      nRBC 0.0 /100 WBC     AFB Culture [[id number]] Collected:  03/30/17 1059    Specimen:  Body Fluid from Peritoneum Updated:  03/31/17 1457     AFB Stain No acid fast bacilli seen on direct smear      No acid fast bacilli seen on concentrated smear    AFB Culture [[id number]] Collected:  03/30/17 1103    Specimen:  Body Fluid from Peritoneum Updated:  03/31/17 1458     AFB Stain No acid fast bacilli seen on direct smear      No acid fast bacilli seen on concentrated smear    Respiratory Culture [[id number]] Collected:  03/30/17 1219    Specimen:  Sputum from Cough Updated:  04/01/17 0704     Respiratory Culture Light growth (2+) Normal Respiratory Renny     Gram Stain Result Greater than 25 WBCs per low power field      Rare (1+) Epithelial cells per low power field      Few (2+) Mixed gram positive renny    CBC & Differential [[id number]] Collected:  04/01/17 0707    Specimen:  Blood Updated:  04/01/17 0754     Narrative:       The following orders were created for panel order CBC & Differential.  Procedure                               Abnormality         Status                     ---------                               -----------         ------                     CBC Auto Differential[[id number]]         Abnormal            Final result                 Please view results for these tests on the individual orders.    CBC Auto Differential [[id number]]  (Abnormal) Collected:  04/01/17 0707    Specimen:  Blood Updated:  04/01/17 0724     WBC 12.01 (H) 10*3/mm3      RBC 3.58 (L) 10*6/mm3      Hemoglobin 9.6 (L) g/dL      Hematocrit 30.7 (L) %      MCV 85.8 fL      MCH 26.8 (L) pg      MCHC 31.3 (L) g/dL      RDW 16.2 (H) %      RDW-SD 51.3 fl      MPV 9.7 fL      Platelets 330 10*3/mm3      Neutrophil % 69.9 %      Lymphocyte % 19.2 %      Monocyte % 5.7 %      Eosinophil % 4.3 (H) %      Basophil % 0.2 %      Immature Grans % 0.7 %      Neutrophils, Absolute 8.40 10*3/mm3      Lymphocytes, Absolute 2.30 10*3/mm3      Monocytes, Absolute 0.69 10*3/mm3      Eosinophils, Absolute 0.52 10*3/mm3      Basophils, Absolute 0.02 10*3/mm3      Immature Grans, Absolute 0.08 (H) 10*3/mm3     Body Fluid Culture [[id number]]  (Normal) Collected:  03/30/17 1103    Specimen:  Body Fluid from Peritoneum Updated:  04/01/17 0732     BF Culture No growth at 2 days     Gram Stain Result Many (4+) WBCs seen      Many (4+) Gram positive cocci      Moderate (3+) Gram positive bacilli    Comprehensive Metabolic Panel [[id number]]  (Abnormal) Collected:  04/01/17 0707    Specimen:  Blood Updated:  04/01/17 0735     Glucose 99 mg/dL      BUN 11 mg/dL      Creatinine 0.79 mg/dL      Sodium 136 mmol/L      Potassium 3.4 (L) mmol/L      Chloride 97 (L) mmol/L      CO2 31.0 mmol/L      Calcium 8.5 mg/dL      Total Protein 6.4 g/dL      Albumin 3.00 (L) g/dL      ALT (SGPT) 60 (H) U/L      AST (SGOT) 52 (H) U/L      Alkaline Phosphatase 83 U/L      Total  Bilirubin 0.5 mg/dL      eGFR Non African Amer 73 mL/min/1.73      Globulin 3.4 gm/dL      A/G Ratio 0.9 (L) g/dL      BUN/Creatinine Ratio 13.9     Anion Gap 8.0 mmol/L     Body Fluid Culture [[id number]]  (Abnormal) Collected:  03/30/17 1059    Specimen:  Body Fluid from Peritoneum Updated:  04/01/17 0918     BF Culture Scant growth (1+) Staphylococcus species (A)     Gram Stain Result Many (4+) WBCs seen      Many (4+) Gram positive bacilli      Many (4+) Gram positive cocci      Few (2+) Gram negative bacilli    Anaerobic Culture [[id number]]  (Normal) Collected:  03/30/17 1059    Specimen:  Body Fluid from Peritoneum Updated:  04/01/17 1044     Culture No anaerobes isolated at 2 days    Anaerobic Culture [[id number]]  (Normal) Collected:  03/30/17 1103    Specimen:  Body Fluid from Peritoneum Updated:  04/01/17 1044     Culture No anaerobes isolated at 2 days        Imaging Results (last 72 hours)     Procedure Component Value Units Date/Time    XR Abdomen KUB [[id number]] Collected:  03/29/17 1206     Updated:  03/29/17 1501    Narrative:       HISTORY: Abdominal pain.     Single view.     Supine position radiograph of the abdomen was obtained.     There is contrast material identified in the bowel as well as in the  urinary bladder, question recent CT scan.     There are no dilated bowel loops to indicate obstruction.     There is no organomegaly.     Phleboliths likely in the pelvis.     The bony structures are intact.       Impression:       1. Imaging contrast, suspect recent CT scan.  2. Otherwise unremarkable radiography of the abdomen.  This report was finalized on 03/29/2017 14:58 by Dr. Sahil Craig MD.    XR Chest PA & Lateral [[id number]] Collected:  03/29/17 1205     Updated:  03/29/17 1501    Narrative:       TWO-VIEW CHEST:      HISTORY: COPD      Frontal and lateral projection chest radiograph obtained.     COMPARISON:  None      FINDINGS:  There are patchy airspace opacities identified in the right  middle lobe  worrisome for acute infiltration/pneumonia. Correlate with patient  presentation.     Lungs are otherwise clear.     The heart is normal in size, without heart failure.     The bony structures are intact.                                                                                                                      Impression:       Patchy right middle lobe airspace opacities, acute infiltrate/pneumonia  considered. Correlate with patient presentation.        This report was finalized on 03/29/2017 14:58 by Dr. Sahil Craig MD.    CT Abdomen Pelvis With & Without Contrast [[id number]] Collected:  03/29/17 1508     Updated:  03/29/17 1659    Narrative:       HISTORY: Abdominal abscess.     CT evaluation of the abdomen and pelvis was performed with and without  intravenous contrast. Oral contrast was ingested. 5 mm sequential  transaxial images were obtained. Radiation dose equals DLP 1471 mGy-cm.   Automated exposure control dose reduction technique was implemented.     There are no pertinent imaging studies for comparison.     There is a moderate sized air containing fluid collection identified in  the anterior abdominal wall inferiorly below the umbilicus, suprapubic  region. An air-fluid level appreciated within the collection. There is  peripheral enhancement involving the wall compatible with an abscess.  The fluid collection is appreciated just to the left of midline  appreciated within the rectus sheath. The rectus sheath abscess measures  approximately 6.8 x 3.3 x 8.8 cm in dimension.     There is additional abscess/fluid with extraluminal gas within the  peritoneal cavity adjacent to the anterior abdominal wall collection, to  the left of midline just anterior to the urinary bladder. This  collection measures approximately 3 cm in dimension.     There is extensive diverticulosis of the colon with CT findings  compatible with acute diverticulitis in the sigmoid colon likely related  to the  aforementioned abscesses in the rectus sheath/abdominal wall and  adjacent intraperitoneal space.     There are diverticula throughout the colon. There is no CT evidence of  additional acute diverticulitis. There is no obstruction of bowel.     The small bowel is normal in caliber. The duodenal sweep imaged  appropriately. The stomach is not distended.     There is patchy airspace opacities in the right middle lobe, near the  heart, question atelectasis. Similar changes noted in the right lung  base and to a lesser degree the left lung base, again likely  atelectasis.     Calcified granuloma in the right lower lobe. The liver is homogeneous  without focal lesions. There is no CT evidence of gallstones. The  hepatic venous and portal venous structures imaged normally. There is no  biliary ductal dilatation.     There are several low-attenuation foci identified in the spleen. These  are nonspecific with differential considerations to include multiple  hemangiomas or cysts. Neoplasm such as lymphoma or leukemia can present  with splenic lesions. Splenic abscesses also considered. Follow-up  suggested.     There are no adrenal masses. There are no pancreatic lesions.     In the lower pole of the right kidney there are two approximately 15 mm  low-attenuation lesions anteriorly and posteriorly, suspect cyst.  Ultrasound could likely confirm.     There are no urinary tract calculi or evidence of obstruction.     Urinary bladder is minimally distended without focal bladder wall  thickening.     The GYN structures are unremarkable without adnexal masses.     There are small para-aortic and mesenteric lymph nodes. There are no  pathologically enlarged nodes.     There is mild atherosclerotic aortoiliac calcifications.     Spondylosis changes noted diffusely in the thoracolumbar spine with disc  degeneration.     These findings were reviewed with Dr. Frederick Tee.       Impression:       1. CT findings compatible with acute  diverticulitis involving the  proximal sigmoid colon with associated rectus sheath abscess and  adjacent intraperitoneal abscess, described above in detail.        This report was finalized on 03/29/2017 16:56 by Dr. Sahil Craig MD.    CT Guided Abscess Drain [[id number]] Collected:  03/30/17 1425     Updated:  03/30/17 1529    Narrative:       CT-GUIDED PERCUTANEOUS DRAINAGE CATHETER PLACEMENT FOR ABSCESS.     HISTORY: Anterior abdominal wall abscess.     Radiation dose equals DLP 1695 mGy-cm.     PROCEDURE:     The risk and procedure were explained and informed consent was obtained.     A timeout was executed per departmental policy.     Versed and fentanyl were used for conscious sedation.     Under sterile conditions and CT guidance, a needle was successfully  placed into the fluid collection in the anterior abdominal wall, rectus  sheath region, to the left of midline.     Pus was aspirated.     A guidewire was subsequently placed into the collection followed by skin  dilators, 6 [place], 8 [place], 10 [place], and 14 [place].     A 14 [place] pigtail all-purpose drainage catheter was then placed into  the fluid collection and observed in good position with aspiration of  the pus.     A second collection appreciated just deep to the anterior abdominal  wall, anterior to the urinary bladder, was localized with CT guidance,  with a 19-gauge needle placed into the collection and approximately 6 mL  of purulent material aspirated.     A total of approximately 60 mL of purulent material was removed.     Minimal blood loss observed, less than 1 mL.     The patient tolerated the procedure well without immediate  complications.       Impression:       1. Percutaneous placement of a 14 [place] all-purpose drain catheter into  the anterior abdominal wall abscess without immediate complications.  2. CT-guided aspiration of a second abscess in the peritoneal cavity,  adjacent to the abdominal wall. Approximately 6 mL of purulent  material  aspirated from this small focal collection.  This report was finalized on 03/30/2017 15:26 by Dr. Sahil Craig MD.              Assessment/Plan     Active Problems:    Abdominal abscess      Continue current treatment.  Eventual need repeat CT scan.      Lani Camarillo MD  04/01/17  11:58 AM

## 2017-04-01 NOTE — PLAN OF CARE
Problem: Patient Care Overview (Adult)  Goal: Plan of Care Review  Outcome: Ongoing (interventions implemented as appropriate)    04/01/17 1431   Coping/Psychosocial Response Interventions   Plan Of Care Reviewed With patient   Patient Care Overview   Progress progress toward functional goals as expected   Outcome Evaluation   Outcome Summary/Follow up Plan DIET ADVANCED TO REGULAR DIET TODAY. PT. TOLERATED WELL.       Goal: Adult Individualization and Mutuality  Outcome: Ongoing (interventions implemented as appropriate)  Goal: Discharge Needs Assessment  Outcome: Ongoing (interventions implemented as appropriate)    Problem: Pneumonia (Adult)  Goal: Signs and Symptoms of Listed Potential Problems Will be Absent or Manageable (Pneumonia)  Outcome: Ongoing (interventions implemented as appropriate)    Problem: Pain, Acute (Adult)  Goal: Acceptable Pain Control/Comfort Level  Outcome: Ongoing (interventions implemented as appropriate)

## 2017-04-01 NOTE — PROGRESS NOTES
PULMONARY AND CRITICAL CARE PROGRESS NOTE - [facility]    Patient: Emy Bermudez  1950   MR# [id number]   Acct# [id number]  04/01/17   9:05 AM  Referring Provider: Silvestre Montelongo DO    Patient Active Problem List   Diagnosis   • Abdominal abscess      Chief Complaint: Dyspnea    Interval history: Pt reports some post-operative soreness that seems appropriate and is actually improved some today. She is up on the bedside commode attempting to have a bowel movement. Doing well on her home O2 dose and reports no respiratory related issues. No other aggravating, alleviating factors or associated symptoms.      HPI  Meds:    ipratropium-albuterol 3 mL Nebulization Q6H - RT   levoFLOXacin 500 mg Intravenous Q24H   meropenem 1 g Intravenous Q8H   nicotine 1 patch Transdermal Q24H   pantoprazole 40 mg Intravenous Q AM   sucralfate 1 g Oral Q6H        Review of Systems:   Review of Systems:    Constitutional: Negative for appetite change, diaphoresis and fatigue.   HENT: Negative for congestion and trouble swallowing.   Eyes: Negative for visual disturbance.   Respiratory: Positive for cough and shortness of breath. Negative for choking, chest tightness and stridor.   Cardiovascular: Negative for leg swelling.   Gastrointestinal: Positive for abdominal pain. Negative for constipation, diarrhea, nausea and vomiting.   Endocrine: Negative for heat intolerance.   Genitourinary: Negative for hematuria.   Musculoskeletal: Negative for joint swelling.   Neurological: Negative for dizziness, seizures and syncope.   Hematological: Negative for adenopathy.   Psychiatric/Behavioral: Negative for agitation.     Physical Exam:  SpO2 Readings from Last 3 Encounters:   04/01/17 96%     Temp:  [97.6 °F (36.4 °C)-98.6 °F (37 °C)] 98 °F (36.7 °C)  Heart Rate:  [] 72  Resp:  [16-20] 18  BP: (104-124)/(50-61) 115/58    Intake/Output Summary (Last 24 hours) at 04/01/17 0905  Last data filed at 04/01/17 0822   Gross per  24 hour   Intake              480 ml   Output              400 ml   Net               80 ml     Physical Exam:    Constitutional: She is oriented to person, place, and time. She appears well-developed. No distress. She is not intubated. NC in place  HENT:   Head: Normocephalic and atraumatic.   Eyes: EOM are normal. No scleral icterus.   Neck: No JVD present. No tracheal deviation present.   Cardiovascular: Normal rate and regular rhythm.   Pulmonary/Chest: No accessory muscle usage. No bradypnea. She is not intubated. No respiratory distress. She has no wheezes. She has no rales.   Abdominal: Soft. There is generalized tenderness. There is no guarding.   Musculoskeletal: She exhibits no edema, tenderness or deformity.   Neurological: She is alert and oriented to person, place, and time. She exhibits normal muscle tone. Coordination normal.   Skin: Skin is warm and dry. She is not diaphoretic.   Psychiatric: She has a normal mood and affect. Her behavior is normal.       Results from last 7 days  Lab Units 04/01/17  0707 03/31/17  0616 03/30/17  0722   WBC 10*3/mm3 12.01* 11.74* 15.19*   HEMOGLOBIN g/dL 9.6* 8.9* 9.5*   PLATELETS 10*3/mm3 330 314 333       Results from last 7 days  Lab Units 04/01/17  0707 03/30/17  0722   SODIUM mmol/L 136 136   POTASSIUM mmol/L 3.4* 3.5   BUN mg/dL 11 17   CREATININE mg/dL 0.79 0.79   INR   --  1.21*            Recent films:  Imaging Results (last 24 hours)     Procedure Component Value Units Date/Time    CT Guided Abscess Drain [[id number]] Collected:  03/30/17 1425     Updated:  03/30/17 1529    Narrative:       CT-GUIDED PERCUTANEOUS DRAINAGE CATHETER PLACEMENT FOR ABSCESS.     HISTORY: Anterior abdominal wall abscess.     Radiation dose equals DLP 1695 mGy-cm.     PROCEDURE:     The risk and procedure were explained and informed consent was obtained.     A timeout was executed per departmental policy.     Versed and fentanyl were used for conscious sedation.     Under sterile  conditions and CT guidance, a needle was successfully  placed into the fluid collection in the anterior abdominal wall, rectus  sheath region, to the left of midline.     Pus was aspirated.     A guidewire was subsequently placed into the collection followed by skin  dilators, 6 [place], 8 [place], 10 [place], and 14 [place].     A 14 [place] pigtail all-purpose drainage catheter was then placed into  the fluid collection and observed in good position with aspiration of  the pus.     A second collection appreciated just deep to the anterior abdominal  wall, anterior to the urinary bladder, was localized with CT guidance,  with a 19-gauge needle placed into the collection and approximately 6 mL  of purulent material aspirated.     A total of approximately 60 mL of purulent material was removed.     Minimal blood loss observed, less than 1 mL.     The patient tolerated the procedure well without immediate  complications.       Impression:       1. Percutaneous placement of a 14 [place] all-purpose drain catheter into  the anterior abdominal wall abscess without immediate complications.  2. CT-guided aspiration of a second abscess in the peritoneal cavity,  adjacent to the abdominal wall. Approximately 6 mL of purulent material  aspirated from this small focal collection.  This report was finalized on 03/30/2017 15:26 by Dr. Sahil Craig MD.          Pulmonary Assessment:    1. Chronic obstructive pulmonary disease-baseline  2. Right middle lobe infiltrate and possible pneumonia-stable  3. Abdominal abscess of rectus sheath and peritoneum, requiring instrumentation-tolerated well  4. Chronic respiratory failure with hypoxia, stable with home oxygen 3 liters per minute - high risk  5. Diverticulitis, worsening with complications, requiring surgery, high risk for complications given underlying severe lung disease  6. Anemia, unsure whether stable or unstable, not requiring transfusion    Recommend:     · Patient needs to  remain on her home O2 dose of 3L   · Continue home bronchodilators as ordered  · Encouraged incentive spirometry use and mobilization as much as able  · Repeat CXR on Sunday  · Defer antibiotic coverage to surgery  · Signing off. Baseline from a pulmonary standpoint  · Call back if needed. F/U PRN    Electronically signed by JOAQUÍN Lynn, 04/01/17, 9:05 AM     Physician substantive contribution:  Pertinent symptoms/interval history include: She is less dyspneic today  Respiratory exam shows pertinent findings of reasonable air movement on exam  Plan includes: Continue pulmonary toilet measures.  We will sign off.  I have seen and examined patient personally, performing a face-to-face diagnostic evaluation with plan of care reviewed and developed with APRN and nursing staff. I have addended and/or modified the above history of present illness, physical examination, and assessment and plan to reflect my findings and impressions. Essential elements of the care plan were discussed with APRN above.  Agree with findings and assessment/plan as documented above.    Electronically signed by Konstantin Abreu MD, on 4/1/2017, 3:13 PM

## 2017-04-01 NOTE — PROGRESS NOTES
"    [facility] Medicine Services  INPATIENT PROGRESS NOTE    Length of Stay: 3  Date of Admission: 3/29/2017  Primary Care Physician: Tomas Palomino MD    Subjective   Chief Complaint: \"hungry\"  HPI   Pt states she is feeling better. No sputum production. Breathing is at baseline. Bowels are moving. Wants something better than clear liquids. No chest pain. Abdominal pain is improving.     Review of Systems   All pertinent negatives and positives are as above. All other systems have been reviewed and are negative unless otherwise stated.     Objective    Temp:  [97.6 °F (36.4 °C)-98.6 °F (37 °C)] 98.5 °F (36.9 °C)  Heart Rate:  [] 78  Resp:  [16-20] 20  BP: (104-124)/(50-61) 109/58  Physical Exam  Constitutional: She is oriented to person, place, and time. She appears well-developed and well-nourished.   HENT:   Head: Normocephalic and atraumatic.   Eyes: EOM are normal. Pupils are equal, round, and reactive to light.   Neck: Normal range of motion. Neck supple. No tracheal deviation present.   Cardiovascular: Normal rate, regular rhythm and normal heart sounds. Exam reveals no gallop and no friction rub.   No murmur heard.  Pulmonary/Chest: Effort normal. No respiratory distress. She has no wheezes. She has no rales.   Oxygen in place at 3L  Abdominal: Soft. Bowel sounds are normal. She exhibits no distension. There is tenderness (mild left abdominal tenderness).   Drain in place left abdomen, dark bloody drainage   Musculoskeletal: She exhibits no edema. ;chronic venous stasis changes.   Neurological: She is alert and oriented to person, place, and time.   Skin: Skin is warm and dry.   Psychiatric: She has a normal mood and affect.     Results Review:  I have reviewed the labs, radiology results, and diagnostic studies.    Laboratory Data:     Results from last 7 days  Lab Units 04/01/17  0707 03/31/17  0616 03/30/17  0722   WBC 10*3/mm3 12.01* 11.74* 15.19*   HEMOGLOBIN g/dL " 9.6* 8.9* 9.5*   HEMATOCRIT % 30.7* 28.2* 30.0*   PLATELETS 10*3/mm3 330 314 333       Results from last 7 days  Lab Units 04/01/17  0707 03/30/17  0722   SODIUM mmol/L 136 136   POTASSIUM mmol/L 3.4* 3.5   CHLORIDE mmol/L 97* 97*   TOTAL CO2 mmol/L 31.0 28.0   BUN mg/dL 11 17   CREATININE mg/dL 0.79 0.79   CALCIUM mg/dL 8.5 8.8   BILIRUBIN mg/dL 0.5 0.4   ALK PHOS U/L 83 89   ALT (SGPT) U/L 60* 68*   AST (SGOT) U/L 52* 38   GLUCOSE mg/dL 99 83     Culture Data:   Respiratory Culture   Date Value Ref Range Status   03/30/2017 Light growth (2+) Normal Respiratory Gaby  Final     Radiology Data:   Imaging Results (last 24 hours)     ** No results found for the last 24 hours. **        I have reviewed the patient current medications.     Assessment/Plan   Assessment:  1. Diverticular Abscess status post percutaneous drain placement-  2. Abdominal pain  3. Leukocytosis  4. Right middle lobe infiltrate/pneumonia  5. Chronic respiratory failure-oxygen at 3L at home.   6. Anemia, normocytic  7. Staph on body fluid culture  8. Hypokalemia    Plan:  1. Levaquin IV day 3, Merrem day 4  2. Full liquids per Dr. Camarillo  3. Encouraged activity.   4. Replace potassium.     Discharge Planning: I expect patient to be discharged to home in ? days.    JOAQUÍN Hannon   04/01/17   11:33 AM     Case discussed with JOAQUÍN Abdi and agree with the assessment, treatment plan, and disposition of the patient as recorded by her.     Regular diet ordered by Dr. Camarillo.     She will evenutally need a repeat CT.     I doubt very seriously that Staph is the actual pathogen with regards to her abdominal process. Follow culture and continue present anti-biotics.     Silvestre Montelongo,   04/01/17  4:08 PM

## 2017-04-02 ENCOUNTER — APPOINTMENT (OUTPATIENT)
Dept: GENERAL RADIOLOGY | Facility: HOSPITAL | Age: 67
End: 2017-04-02

## 2017-04-02 LAB
BACTERIA FLD CULT: ABNORMAL
BACTERIA FLD CULT: ABNORMAL
GRAM STN SPEC: ABNORMAL

## 2017-04-02 PROCEDURE — 25010000002 LEVOFLOXACIN PER 250 MG: Performed by: INTERNAL MEDICINE

## 2017-04-02 PROCEDURE — 94799 UNLISTED PULMONARY SVC/PX: CPT

## 2017-04-02 PROCEDURE — 94760 N-INVAS EAR/PLS OXIMETRY 1: CPT

## 2017-04-02 PROCEDURE — 25010000002 MEROPENEM: Performed by: INTERNAL MEDICINE

## 2017-04-02 PROCEDURE — 25010000002 FUROSEMIDE PER 20 MG: Performed by: NURSE PRACTITIONER

## 2017-04-02 PROCEDURE — 71020 HC CHEST PA AND LATERAL: CPT

## 2017-04-02 RX ORDER — POTASSIUM CHLORIDE 750 MG/1
40 CAPSULE, EXTENDED RELEASE ORAL ONCE
Status: COMPLETED | OUTPATIENT
Start: 2017-04-02 | End: 2017-04-02

## 2017-04-02 RX ORDER — FUROSEMIDE 10 MG/ML
40 INJECTION INTRAMUSCULAR; INTRAVENOUS ONCE
Status: COMPLETED | OUTPATIENT
Start: 2017-04-02 | End: 2017-04-02

## 2017-04-02 RX ADMIN — POTASSIUM CHLORIDE 40 MEQ: 750 CAPSULE, EXTENDED RELEASE ORAL at 17:27

## 2017-04-02 RX ADMIN — LEVOFLOXACIN 500 MG: 500 INJECTION, SOLUTION INTRAVENOUS at 10:55

## 2017-04-02 RX ADMIN — FUROSEMIDE 40 MG: 10 INJECTION, SOLUTION INTRAMUSCULAR; INTRAVENOUS at 14:35

## 2017-04-02 RX ADMIN — SUCRALFATE 1 G: 1 SUSPENSION ORAL at 10:55

## 2017-04-02 RX ADMIN — SUCRALFATE 1 G: 1 SUSPENSION ORAL at 05:43

## 2017-04-02 RX ADMIN — IPRATROPIUM BROMIDE AND ALBUTEROL SULFATE 3 ML: 2.5; .5 SOLUTION RESPIRATORY (INHALATION) at 19:18

## 2017-04-02 RX ADMIN — IPRATROPIUM BROMIDE AND ALBUTEROL SULFATE 3 ML: 2.5; .5 SOLUTION RESPIRATORY (INHALATION) at 00:09

## 2017-04-02 RX ADMIN — IPRATROPIUM BROMIDE AND ALBUTEROL SULFATE 3 ML: 2.5; .5 SOLUTION RESPIRATORY (INHALATION) at 14:18

## 2017-04-02 RX ADMIN — SUCRALFATE 1 G: 1 SUSPENSION ORAL at 17:27

## 2017-04-02 RX ADMIN — MEROPENEM 1 G: 1 INJECTION, POWDER, FOR SOLUTION INTRAVENOUS at 13:47

## 2017-04-02 RX ADMIN — PANTOPRAZOLE SODIUM 40 MG: 40 INJECTION, POWDER, FOR SOLUTION INTRAVENOUS at 05:43

## 2017-04-02 RX ADMIN — MEROPENEM 1 G: 1 INJECTION, POWDER, FOR SOLUTION INTRAVENOUS at 22:40

## 2017-04-02 RX ADMIN — MEROPENEM 1 G: 1 INJECTION, POWDER, FOR SOLUTION INTRAVENOUS at 05:43

## 2017-04-02 NOTE — PLAN OF CARE
Problem: Patient Care Overview (Adult)  Goal: Plan of Care Review  Outcome: Ongoing (interventions implemented as appropriate)    04/02/17 0319   Coping/Psychosocial Response Interventions   Plan Of Care Reviewed With patient   Patient Care Overview   Progress improving   Outcome Evaluation   Outcome Summary/Follow up Plan No c/o pain or nausea, tolerating regular diet. Left perc drain patent with dark brown output noted. O2@3L NC, NSR on telemetry, voiding per BSC. IV abx continues per orders. Will continue to monitor.        Goal: Adult Individualization and Mutuality  Outcome: Ongoing (interventions implemented as appropriate)    03/30/17 1514 04/02/17 0319   Individualization   Patient Specific Preferences Likes the door to be left open because she feels claustrophobic with it closed. --    Patient Specific Goals The patient would like to get better and be able to go home. --    Patient Specific Interventions --  Up in chair as tolerated, O2 @ 3L, IV abx per orders   Mutuality/Individual Preferences   What Anxieties, Fears or Concerns Do You Have About Your Health or Care? None at this time. --    What Questions Do You Have About Your Health or Care? None at this time. --    What Information Would Help Us Give You More Personalized Care? None at this time. --        Goal: Discharge Needs Assessment  Outcome: Ongoing (interventions implemented as appropriate)    03/29/17 0328 03/29/17 0424 04/01/17 0402   Discharge Needs Assessment   Concerns To Be Addressed --  --  --    Readmission Within The Last 30 Days --  --  --    Equipment Needed After Discharge --  none --    Discharge Facility/Level Of Care Needs --  --  --    Current Discharge Risk --  --  --    Discharge Disposition --  --  still a patient   Discharge Planning Comments --  --  --    Current Health   Outpatient/Agency/Support Group Needs --  --  --    Anticipated Changes Related to Illness --  none --    Living Environment   Transportation Available --   car;family or friend will provide --    Self-Care   Equipment Currently Used at Home oxygen --  --      04/01/17 1431 04/02/17 0319   Discharge Needs Assessment   Concerns To Be Addressed denies needs/concerns at this time --    Readmission Within The Last 30 Days no previous admission in last 30 days --    Equipment Needed After Discharge --  --    Discharge Facility/Level Of Care Needs --  (N/A)   Current Discharge Risk --  other (see comments)  (N/A)   Discharge Disposition --  --    Discharge Planning Comments --  Discharge planning continues.   Current Health   Outpatient/Agency/Support Group Needs --  (N/A)   Anticipated Changes Related to Illness --  --    Living Environment   Transportation Available --  --    Self-Care   Equipment Currently Used at Home --  --          Problem: Pneumonia (Adult)  Goal: Signs and Symptoms of Listed Potential Problems Will be Absent or Manageable (Pneumonia)  Outcome: Ongoing (interventions implemented as appropriate)    04/02/17 0319   Pneumonia   Problems Assessed (Pneumonia) all   Problems Present (Pneumonia) none         Problem: Pain, Acute (Adult)  Goal: Acceptable Pain Control/Comfort Level  Outcome: Ongoing (interventions implemented as appropriate)    04/02/17 0319   Pain, Acute (Adult)   Acceptable Pain Control/Comfort Level making progress toward outcome

## 2017-04-02 NOTE — PROGRESS NOTES
Lani Camarillo MD Progress Note     LOS: 4 days   Patient Care Team:  Tomas Palomino MD as PCP - General (Internal Medicine)        Subjective     Tolerating diet feeling better.    Objective     Vital Signs  Temp:  [97.5 °F (36.4 °C)-98.6 °F (37 °C)] 98.6 °F (37 °C)  Heart Rate:  [74-80] 76  Resp:  [18-20] 20  BP: (103-113)/(41-68) 103/41    Intake & Output (last 3 days)       03/30 0701 - 03/31 0700 03/31 0701 - 04/01 0700 04/01 0701 - 04/02 0700 04/02 0701 - 04/03 0700    P.O.  360 720     IV Piggyback 100  200     Total Intake(mL/kg) 100 (1.1) 360 (3.9) 920 (9.8)     Urine (mL/kg/hr) 850 (0.4) 400 (0.2) 500 (0.2) 100 (0.3)    Other 90 (0)  30 (0)     Stool  0 (0) 0 (0) 0 (0)    Total Output 940 400 530 100    Net -840 -40 +390 -100            Unmeasured Urine Occurrence  1 x 4 x     Unmeasured Stool Occurrence  5 x 4 x 1 x          Physical Exam:     General Appearance:    Alert, cooperative, in no acute distress   Lungs:     respirations regular, even and unlabored    Heart:    Regular rhythm and normal rate, normal S1 and S2, no            murmur, no gallop, no rub   Chest Wall:    No abnormalities observed   Abdomen:      obese.  Drain still with old bloody-appearing fluid.     Extremities: No edema, + SCDs   Results Review:     I reviewed the patient's new clinical results.    Lab Results (last 72 hours)     Procedure Component Value Units Date/Time    Fungus Culture [[id number]] Collected:  03/30/17 1059    Specimen:  Body Fluid from Peritoneum Updated:  03/30/17 1126    Fungus Culture [[id number]] Collected:  03/30/17 1146    Specimen:  Body Fluid from Peritoneum Updated:  03/30/17 1147    Body Fluid Cell Count With Differential [[id number]] Collected:  03/30/17 1103    Specimen:  Body Fluid from Peritoneum Updated:  03/30/17 1307    Narrative:       The following orders were created for panel order Body Fluid Cell Count With Differential.  Procedure                               Abnormality         Status                      ---------                               -----------         ------                     Body fluid cell count[[id number]]         Abnormal            Final result                 Please view results for these tests on the individual orders.    Body fluid cell count [[id number]]  (Abnormal) Collected:  03/30/17 1103    Specimen:  Body Fluid from Peritoneum Updated:  03/30/17 1307     Color, Fluid Brown     Appearance, Fluid Other (A)      Unable to perform count due to solidified specimen        RBC, Fluid -- /mm3       Unable to perform count due to solidified specimen.  RBC are present        Nucleated Cells, Fluid -- /mm3       Unable to perform count due to solidified specimen.  TNTC WBC present       Body Fluid Cell Count With Differential [[id number]] Collected:  03/30/17 1059    Specimen:  Body Fluid from Peritoneum Updated:  03/30/17 1308    Narrative:       The following orders were created for panel order Body Fluid Cell Count With Differential.  Procedure                               Abnormality         Status                     ---------                               -----------         ------                     Body fluid cell count[[id number]]         Abnormal            Final result                 Please view results for these tests on the individual orders.    Body fluid cell count [[id number]]  (Abnormal) Collected:  03/30/17 1059    Specimen:  Body Fluid from Peritoneum Updated:  03/30/17 1308     Color, Fluid Brown     Appearance, Fluid Other (A)      Unable to perform count due to solidified specimen        RBC, Fluid -- /mm3       Unable to perform count due to solidified specimen        Nucleated Cells, Fluid -- /mm3       Unable to perform count due to solidified specimen       Sedimentation Rate [[id number]]  (Abnormal) Collected:  03/31/17 0616    Specimen:  Blood Updated:  03/31/17 0642     Sed Rate 72 (H) mm/hr     CBC & Differential [[id number]] Collected:  03/31/17 0616    Specimen:   Blood Updated:  03/31/17 0708    Narrative:       The following orders were created for panel order CBC & Differential.  Procedure                               Abnormality         Status                     ---------                               -----------         ------                     CBC Auto Differential[[id number]]         Abnormal            Final result                 Please view results for these tests on the individual orders.    CBC Auto Differential [[id number]]  (Abnormal) Collected:  03/31/17 0616    Specimen:  Blood Updated:  03/31/17 0708     WBC 11.74 (H) 10*3/mm3      RBC 3.28 (L) 10*6/mm3      Hemoglobin 8.9 (L) g/dL      Hematocrit 28.2 (L) %      MCV 86.0 fL      MCH 27.1 (L) pg      MCHC 31.6 (L) g/dL      RDW 16.5 (H) %      RDW-SD 52.2 fl      MPV 9.9 fL      Platelets 314 10*3/mm3      Neutrophil % 77.0 %      Lymphocyte % 15.2 %      Monocyte % 5.4 %      Eosinophil % 1.9 %      Basophil % 0.2 %      Immature Grans % 0.3 %      Neutrophils, Absolute 9.04 (H) 10*3/mm3      Lymphocytes, Absolute 1.79 10*3/mm3      Monocytes, Absolute 0.63 10*3/mm3      Eosinophils, Absolute 0.22 10*3/mm3      Basophils, Absolute 0.02 10*3/mm3      Immature Grans, Absolute 0.04 (H) 10*3/mm3      nRBC 0.0 /100 WBC     AFB Culture [[id number]] Collected:  03/30/17 1103    Specimen:  Body Fluid from Peritoneum Updated:  03/31/17 1458     AFB Stain No acid fast bacilli seen on direct smear      No acid fast bacilli seen on concentrated smear    Respiratory Culture [[id number]] Collected:  03/30/17 1219    Specimen:  Sputum from Cough Updated:  04/01/17 0704     Respiratory Culture Light growth (2+) Normal Respiratory Renny     Gram Stain Result Greater than 25 WBCs per low power field      Rare (1+) Epithelial cells per low power field      Few (2+) Mixed gram positive renny    CBC & Differential [[id number]] Collected:  04/01/17 0707    Specimen:  Blood Updated:  04/01/17 0724    Narrative:       The following  orders were created for panel order CBC & Differential.  Procedure                               Abnormality         Status                     ---------                               -----------         ------                     CBC Auto Differential[[id number]]         Abnormal            Final result                 Please view results for these tests on the individual orders.    CBC Auto Differential [[id number]]  (Abnormal) Collected:  04/01/17 0707    Specimen:  Blood Updated:  04/01/17 0724     WBC 12.01 (H) 10*3/mm3      RBC 3.58 (L) 10*6/mm3      Hemoglobin 9.6 (L) g/dL      Hematocrit 30.7 (L) %      MCV 85.8 fL      MCH 26.8 (L) pg      MCHC 31.3 (L) g/dL      RDW 16.2 (H) %      RDW-SD 51.3 fl      MPV 9.7 fL      Platelets 330 10*3/mm3      Neutrophil % 69.9 %      Lymphocyte % 19.2 %      Monocyte % 5.7 %      Eosinophil % 4.3 (H) %      Basophil % 0.2 %      Immature Grans % 0.7 %      Neutrophils, Absolute 8.40 10*3/mm3      Lymphocytes, Absolute 2.30 10*3/mm3      Monocytes, Absolute 0.69 10*3/mm3      Eosinophils, Absolute 0.52 10*3/mm3      Basophils, Absolute 0.02 10*3/mm3      Immature Grans, Absolute 0.08 (H) 10*3/mm3     Comprehensive Metabolic Panel [[id number]]  (Abnormal) Collected:  04/01/17 0707    Specimen:  Blood Updated:  04/01/17 0735     Glucose 99 mg/dL      BUN 11 mg/dL      Creatinine 0.79 mg/dL      Sodium 136 mmol/L      Potassium 3.4 (L) mmol/L      Chloride 97 (L) mmol/L      CO2 31.0 mmol/L      Calcium 8.5 mg/dL      Total Protein 6.4 g/dL      Albumin 3.00 (L) g/dL      ALT (SGPT) 60 (H) U/L      AST (SGOT) 52 (H) U/L      Alkaline Phosphatase 83 U/L      Total Bilirubin 0.5 mg/dL      eGFR Non African Amer 73 mL/min/1.73      Globulin 3.4 gm/dL      A/G Ratio 0.9 (L) g/dL      BUN/Creatinine Ratio 13.9     Anion Gap 8.0 mmol/L     Urinalysis With / Microscopic If Indicated [[id number]]  (Abnormal) Collected:  04/01/17 2030    Specimen:  Urine from Urine, Clean Catch Updated:   04/01/17 2057     Color, UA Dark Yellow (A)     Appearance, UA Cloudy (A)     pH, UA 5.5     Specific Gravity, UA 1.025     Glucose, UA Negative     Ketones, UA Trace (A)     Bilirubin, UA Small (1+) (A)     Blood, UA Moderate (2+) (A)     Protein, UA Trace (A)     Leuk Esterase, UA Moderate (2+) (A)     Nitrite, UA Negative     Urobilinogen, UA 1.0 E.U./dL    Urinalysis, Microscopic Only [[id number]]  (Abnormal) Collected:  04/01/17 2030    Specimen:  Urine from Urine, Clean Catch Updated:  04/01/17 2140     RBC, UA 13-20 (A) /HPF      WBC, UA 13-20 (A) /HPF      Bacteria, UA 1+ (A) /HPF      Squamous Epithelial Cells, UA 7-12 (A) /HPF      Renal Epithelial Cells, UA 3-6 (A) /HPF      Hyaline Casts, UA 7-12 /LPF      Granular Casts, UA 3-6 /LPF      Mucus, UA Small/1+ (A) /HPF      Methodology Manual Light Microscopy    AFB Culture [[id number]] Collected:  03/30/17 1059    Specimen:  Body Fluid from Peritoneum Updated:  04/02/17 0621     AFB Stain No acid fast bacilli seen on direct smear      No acid fast bacilli seen on concentrated smear    Body Fluid Culture [[id number]]  (Abnormal)  (Susceptibility) Collected:  03/30/17 1059    Specimen:  Body Fluid from Peritoneum Updated:  04/02/17 0630     BF Culture Light growth (2+) Staphylococcus, coagulase negative (A)      Scant growth (1+) Mixed Gram Positive Gaby (A)      Probable Contaminant          Gram Stain Result Many (4+) WBCs seen      Many (4+) Gram positive bacilli      Many (4+) Gram positive cocci      Few (2+) Gram negative bacilli    Susceptibility      Staphylococcus, coagulase negative     NINA     Clindamycin <=0.25 ug/ml Susceptible     Erythromycin 0.5 ug/ml Susceptible     Gentamicin <=0.5 ug/ml Susceptible     Inducible Clindamycin Resistance NEG  Negative     Levofloxacin <=0.12 ug/ml Susceptible  [1]      Oxacillin <=0.25 ug/ml Susceptible     Penicillin G <=0.03 ug/ml Resistant     Tetracycline <=1 ug/ml Susceptible     Vancomycin <=0.5 ug/ml  Susceptible            [1]   Staphylococcus species may develop resistance during prolonged therapy with quinolones. Isolates that are initially susceptible may become resistant within three to four days after initiation of therapy. Testing of repeat isolates may be warranted.              Susceptibility Comments     Staphylococcus, coagulase negative      This isolate does not demonstrate inducible clindamycin resistance in vitro.    This isolate does not demonstrate inducible clindamycin resistance in vitro.               Body Fluid Culture [[id number]]  (Normal) Collected:  03/30/17 1103    Specimen:  Body Fluid from Peritoneum Updated:  04/02/17 0905     BF Culture No growth at 3 days     Gram Stain Result Many (4+) WBCs seen      Many (4+) Gram positive cocci      Moderate (3+) Gram positive bacilli    Anaerobic Culture [[id number]]  (Abnormal) Collected:  03/30/17 1059    Specimen:  Body Fluid from Peritoneum Updated:  04/02/17 1026     Culture Heavy growth (4+) Anaerobic Gram Negative Jeremy (A)      Presumptive        BETA LACTAMASE Positive    Anaerobic Culture [[id number]]  (Abnormal) Collected:  03/30/17 1103    Specimen:  Body Fluid from Peritoneum Updated:  04/02/17 1028     Culture Heavy growth (4+) Anaerobic Gram Negative Jeremy (A)        Imaging Results (last 72 hours)     Procedure Component Value Units Date/Time    CT Guided Abscess Drain [[id number]] Collected:  03/30/17 1425     Updated:  03/30/17 1529    Narrative:       CT-GUIDED PERCUTANEOUS DRAINAGE CATHETER PLACEMENT FOR ABSCESS.     HISTORY: Anterior abdominal wall abscess.     Radiation dose equals DLP 1695 mGy-cm.     PROCEDURE:     The risk and procedure were explained and informed consent was obtained.     A timeout was executed per departmental policy.     Versed and fentanyl were used for conscious sedation.     Under sterile conditions and CT guidance, a needle was successfully  placed into the fluid collection in the anterior abdominal wall,  rectus  sheath region, to the left of midline.     Pus was aspirated.     A guidewire was subsequently placed into the collection followed by skin  dilators, 6 Wallisian, 8 Wallisian, 10 Wallisian, and 14 Wallisian.     A 14 Wallisian pigtail all-purpose drainage catheter was then placed into  the fluid collection and observed in good position with aspiration of  the pus.     A second collection appreciated just deep to the anterior abdominal  wall, anterior to the urinary bladder, was localized with CT guidance,  with a 19-gauge needle placed into the collection and approximately 6 mL  of purulent material aspirated.     A total of approximately 60 mL of purulent material was removed.     Minimal blood loss observed, less than 1 mL.     The patient tolerated the procedure well without immediate  complications.       Impression:       1. Percutaneous placement of a 14 Wallisian all-purpose drain catheter into  the anterior abdominal wall abscess without immediate complications.  2. CT-guided aspiration of a second abscess in the peritoneal cavity,  adjacent to the abdominal wall. Approximately 6 mL of purulent material  aspirated from this small focal collection.  This report was finalized on 03/30/2017 15:26 by Dr. Sahil Craig MD.    XR Chest PA & Lateral [[id number]] Collected:  04/02/17 0752     Updated:  04/02/17 1026    Narrative:       TWO-VIEW CHEST:      HISTORY: Follow-up right middle lobe infiltrate      Frontal and lateral projection chest radiograph obtained.     COMPARISON:  03/29/2017      FINDINGS:     Airspace disease with consolidation again identified in the right middle  lobe with differential considerations to include pneumonia. Slight  increase in consolidation suspected particularly on the lateral view.     The left lung is grossly clear with some mild atelectasis likely in the  lung base.     The heart is normal in size without heart failure.     Right-sided PICC line observed.                                                                                                                       Impression:       Persistent right middle lobe infiltrate/pneumonia.        This report was finalized on 04/02/2017 10:23 by Dr. Sahil Craig MD.              Assessment/Plan     Active Problems:    Abdominal abscess      Continue drainage and antibiotics.  We will schedule repeat CT scan to evaluate abscess on Monday or Tuesday.      Lani Camarillo MD  04/02/17  10:33 AM

## 2017-04-02 NOTE — PLAN OF CARE
Problem: Patient Care Overview (Adult)  Goal: Plan of Care Review  Outcome: Ongoing (interventions implemented as appropriate)  Pt tolerating regular diet this shift no complaints of pain or nausea. Drain tube continues to drain dark green drainage. Lower extremities edematous encouraged to keep legs elevated  Goal: Adult Individualization and Mutuality  Outcome: Ongoing (interventions implemented as appropriate)  Goal: Discharge Needs Assessment  Outcome: Ongoing (interventions implemented as appropriate)    Problem: Pneumonia (Adult)  Goal: Signs and Symptoms of Listed Potential Problems Will be Absent or Manageable (Pneumonia)  Outcome: Ongoing (interventions implemented as appropriate)

## 2017-04-02 NOTE — PROGRESS NOTES
[facility] Medicine Services  INPATIENT PROGRESS NOTE    Length of Stay: 4  Date of Admission: 3/29/2017  Primary Care Physician: Tomas Palomino MD    Subjective   Chief Complaint: follow up abdominal abscess  HPI   Pt has been advanced to a regular diet. Tolerated well. States breathing is at baseline. Tells me that she has minimal activity at baseline because of her breathing. Has been sitting with her legs dependent. States her legs are always edematous, however, they are worse at present.     Review of Systems   All pertinent negatives and positives are as above. All other systems have been reviewed and are negative unless otherwise stated.     Objective    Temp:  [97.5 °F (36.4 °C)-98.6 °F (37 °C)] 98.3 °F (36.8 °C)  Heart Rate:  [74-80] 78  Resp:  [18-20] 20  BP: (103-142)/(41-93) 142/93  Physical Exam   Constitutional: She is oriented to person, place, and time. She appears well-developed and well-nourished.   HENT:   Head: Normocephalic and atraumatic.   Eyes: Conjunctivae and EOM are normal. Pupils are equal, round, and reactive to light.   Neck: Neck supple. No JVD present. No thyromegaly present.   Cardiovascular: Normal rate, regular rhythm, normal heart sounds and intact distal pulses.  Exam reveals no gallop and no friction rub.    No murmur heard.  Sinus 70-80   Pulmonary/Chest: Effort normal and breath sounds normal. No respiratory distress. She has no wheezes. She has no rales. She exhibits no tenderness.   Abdominal: Soft. Bowel sounds are normal. She exhibits no distension. There is no tenderness. There is no rebound and no guarding.   Musculoskeletal: Normal range of motion. She exhibits edema (2-3 + bilateral lower extremity. ). She exhibits no tenderness or deformity.   Lymphadenopathy:     She has no cervical adenopathy.   Neurological: She is alert and oriented to person, place, and time. She displays normal reflexes. No cranial nerve deficit. She exhibits  normal muscle tone.   Skin: Skin is warm and dry. No rash noted.   Psychiatric: She has a normal mood and affect. Her behavior is normal. Judgment and thought content normal.     Results Review:  I have reviewed the labs, radiology results, and diagnostic studies.    Laboratory Data:     Results from last 7 days  Lab Units 04/01/17  0707 03/31/17  0616 03/30/17  0722   WBC 10*3/mm3 12.01* 11.74* 15.19*   HEMOGLOBIN g/dL 9.6* 8.9* 9.5*   HEMATOCRIT % 30.7* 28.2* 30.0*   PLATELETS 10*3/mm3 330 314 333       Results from last 7 days  Lab Units 04/01/17  0707 03/30/17  0722   SODIUM mmol/L 136 136   POTASSIUM mmol/L 3.4* 3.5   CHLORIDE mmol/L 97* 97*   TOTAL CO2 mmol/L 31.0 28.0   BUN mg/dL 11 17   CREATININE mg/dL 0.79 0.79   CALCIUM mg/dL 8.5 8.8   BILIRUBIN mg/dL 0.5 0.4   ALK PHOS U/L 83 89   ALT (SGPT) U/L 60* 68*   AST (SGOT) U/L 52* 38   GLUCOSE mg/dL 99 83     Culture Data:   Respiratory Culture   Date Value Ref Range Status   03/30/2017 Light growth (2+) Normal Respiratory Gaby  Final     Radiology Data:   Imaging Results (last 24 hours)     Procedure Component Value Units Date/Time    XR Chest PA & Lateral [[id number]] Collected:  04/02/17 0752     Updated:  04/02/17 1026    Narrative:       TWO-VIEW CHEST:      HISTORY: Follow-up right middle lobe infiltrate      Frontal and lateral projection chest radiograph obtained.     COMPARISON:  03/29/2017      FINDINGS:     Airspace disease with consolidation again identified in the right middle  lobe with differential considerations to include pneumonia. Slight  increase in consolidation suspected particularly on the lateral view.     The left lung is grossly clear with some mild atelectasis likely in the  lung base.     The heart is normal in size without heart failure.     Right-sided PICC line observed.                                                                                                                      Impression:       Persistent right middle lobe  infiltrate/pneumonia.        This report was finalized on 04/02/2017 10:23 by Dr. Sahil Craig MD.        I have reviewed the patient current medications.     Assessment/Plan   Assessment:  1. Diverticular Abscess status post percutaneous drain placement-  2. Abdominal pain  3. Leukocytosis  4. Right middle lobe infiltrate/pneumonia  5. Chronic respiratory failure-oxygen at 3L at home.   6. Anemia, normocytic  7. Anaerobic gram negative ambar  on body fluid culture-staph was contaminant.   8. Hypokalemia    Plan:  1. Levaquin day 3, Merrem day 5.   2. Regular diet.  3. Await fluid cultures.  4. Anticipate repeat CT scan Monday or Tuesday per Dr. Camarillo.  5. CBC, BMP in am.   6. Discontinue telemetry.     Discharge Planning: I expect patient to be discharged to home in ? days.    JOAQUÍN Hannon   04/02/17   12:30 PM     I personally evaluated and examined the patient in conjunction with JOAQUÍN Abdi and agree with the assessment, treatment plan, and disposition of the patient as recorded by her. My history, exam, and further recommendations are:     She is up in a chair.  Playing games on her phone.  No distress.  Nontoxic.  Abdomen soft.  The effluent in her drainage tube appears unchanged in character and color.    Tolerating a regular diet.  Hemodynamically stable.  No fevers today.  Lab holiday today.    She remains on Merrem and Levaquin.  The anaerobic fluid cultures are growing gram-negative rods.  One of the aorta aerobic fluid cultures shows coagulase-negative Staphylococcus, which I do not believe to be a true pathogen.  It would also be susceptible to Levaquin.  I am not concerned about inducing resistance to this strain because again I do not believe it to be a significant pathogen with regards to her diverticular abscess.    Dr. Camarillo is going to repeat a CT scan of her abdomen and pelvis either tomorrow or Tuesday.  Hopefully, this area will be significantly improved and we can  discontinue the drain and allow her to go home with close outpatient follow-up.    Meghna gave her a dose of Lasix. I will replace again as she is getting the diuretic and had a potassium of 3.4 yesterday. BMP/CBC in the morning.     Silvestre Montelongo,   04/02/17  3:53 PM

## 2017-04-03 LAB
ANION GAP SERPL CALCULATED.3IONS-SCNC: 9 MMOL/L (ref 4–13)
B-LACTAMASE USUAL SUSC ISLT: POSITIVE
B-LACTAMASE USUAL SUSC ISLT: POSITIVE
BACTERIA SPEC ANAEROBE CULT: ABNORMAL
BACTERIA SPEC ANAEROBE CULT: ABNORMAL
BASOPHILS # BLD AUTO: 0.02 10*3/MM3 (ref 0–0.2)
BASOPHILS NFR BLD AUTO: 0.2 % (ref 0–2)
BUN BLD-MCNC: 13 MG/DL (ref 5–21)
BUN/CREAT SERPL: 16 (ref 7–25)
CALCIUM SPEC-SCNC: 8.2 MG/DL (ref 8.4–10.4)
CHLORIDE SERPL-SCNC: 95 MMOL/L (ref 98–110)
CO2 SERPL-SCNC: 32 MMOL/L (ref 24–31)
CREAT BLD-MCNC: 0.81 MG/DL (ref 0.5–1.4)
DEPRECATED RDW RBC AUTO: 52 FL (ref 40–54)
EOSINOPHIL # BLD AUTO: 0.72 10*3/MM3 (ref 0–0.7)
EOSINOPHIL NFR BLD AUTO: 6.1 % (ref 0–4)
ERYTHROCYTE [DISTWIDTH] IN BLOOD BY AUTOMATED COUNT: 16.1 % (ref 12–15)
GFR SERPL CREATININE-BSD FRML MDRD: 71 ML/MIN/1.73
GLUCOSE BLD-MCNC: 102 MG/DL (ref 70–100)
HCT VFR BLD AUTO: 31 % (ref 37–47)
HGB BLD-MCNC: 9.5 G/DL (ref 12–16)
IMM GRANULOCYTES # BLD: 0.1 10*3/MM3 (ref 0–0.03)
IMM GRANULOCYTES NFR BLD: 0.8 % (ref 0–5)
LYMPHOCYTES # BLD AUTO: 2.43 10*3/MM3 (ref 0.72–4.86)
LYMPHOCYTES NFR BLD AUTO: 20.5 % (ref 15–45)
MCH RBC QN AUTO: 26.6 PG (ref 28–32)
MCHC RBC AUTO-ENTMCNC: 30.6 G/DL (ref 33–36)
MCV RBC AUTO: 86.8 FL (ref 82–98)
MONOCYTES # BLD AUTO: 0.91 10*3/MM3 (ref 0.19–1.3)
MONOCYTES NFR BLD AUTO: 7.7 % (ref 4–12)
NEUTROPHILS # BLD AUTO: 7.7 10*3/MM3 (ref 1.87–8.4)
NEUTROPHILS NFR BLD AUTO: 64.7 % (ref 39–78)
PLATELET # BLD AUTO: 332 10*3/MM3 (ref 130–400)
PMV BLD AUTO: 9.8 FL (ref 6–12)
POTASSIUM BLD-SCNC: 3.9 MMOL/L (ref 3.5–5.3)
RBC # BLD AUTO: 3.57 10*6/MM3 (ref 4.2–5.4)
SODIUM BLD-SCNC: 136 MMOL/L (ref 135–145)
WBC NRBC COR # BLD: 11.88 10*3/MM3 (ref 4.8–10.8)

## 2017-04-03 PROCEDURE — 94799 UNLISTED PULMONARY SVC/PX: CPT

## 2017-04-03 PROCEDURE — 25010000002 LEVOFLOXACIN PER 250 MG: Performed by: INTERNAL MEDICINE

## 2017-04-03 PROCEDURE — 80048 BASIC METABOLIC PNL TOTAL CA: CPT | Performed by: NURSE PRACTITIONER

## 2017-04-03 PROCEDURE — 94640 AIRWAY INHALATION TREATMENT: CPT

## 2017-04-03 PROCEDURE — 25010000002 FUROSEMIDE PER 20 MG: Performed by: NURSE PRACTITIONER

## 2017-04-03 PROCEDURE — 25010000002 MEROPENEM: Performed by: INTERNAL MEDICINE

## 2017-04-03 PROCEDURE — 85025 COMPLETE CBC W/AUTO DIFF WBC: CPT | Performed by: NURSE PRACTITIONER

## 2017-04-03 RX ORDER — FUROSEMIDE 10 MG/ML
40 INJECTION INTRAMUSCULAR; INTRAVENOUS ONCE
Status: COMPLETED | OUTPATIENT
Start: 2017-04-03 | End: 2017-04-03

## 2017-04-03 RX ADMIN — MEROPENEM 1 G: 1 INJECTION, POWDER, FOR SOLUTION INTRAVENOUS at 21:42

## 2017-04-03 RX ADMIN — MEROPENEM 1 G: 1 INJECTION, POWDER, FOR SOLUTION INTRAVENOUS at 14:16

## 2017-04-03 RX ADMIN — PANTOPRAZOLE SODIUM 40 MG: 40 INJECTION, POWDER, FOR SOLUTION INTRAVENOUS at 06:07

## 2017-04-03 RX ADMIN — IPRATROPIUM BROMIDE AND ALBUTEROL SULFATE 3 ML: 2.5; .5 SOLUTION RESPIRATORY (INHALATION) at 12:18

## 2017-04-03 RX ADMIN — SUCRALFATE 1 G: 1 SUSPENSION ORAL at 11:00

## 2017-04-03 RX ADMIN — NICOTINE 1 PATCH: 14 PATCH, EXTENDED RELEASE TRANSDERMAL at 15:10

## 2017-04-03 RX ADMIN — LEVOFLOXACIN 500 MG: 500 INJECTION, SOLUTION INTRAVENOUS at 11:00

## 2017-04-03 RX ADMIN — IPRATROPIUM BROMIDE AND ALBUTEROL SULFATE 3 ML: 2.5; .5 SOLUTION RESPIRATORY (INHALATION) at 07:56

## 2017-04-03 RX ADMIN — SUCRALFATE 1 G: 1 SUSPENSION ORAL at 06:11

## 2017-04-03 RX ADMIN — SUCRALFATE 1 G: 1 SUSPENSION ORAL at 17:09

## 2017-04-03 RX ADMIN — FUROSEMIDE 40 MG: 10 INJECTION, SOLUTION INTRAMUSCULAR; INTRAVENOUS at 11:06

## 2017-04-03 RX ADMIN — MEROPENEM 1 G: 1 INJECTION, POWDER, FOR SOLUTION INTRAVENOUS at 06:12

## 2017-04-03 NOTE — PLAN OF CARE
Problem: Patient Care Overview (Adult)  Goal: Plan of Care Review  Outcome: Ongoing (interventions implemented as appropriate)    04/03/17 0325   Coping/Psychosocial Response Interventions   Plan Of Care Reviewed With patient   Patient Care Overview   Progress no change   Outcome Evaluation   Outcome Summary/Follow up Plan No complaints of pain or nausea as of 0327. Abdominal drain with dark brown, thin output. IV antibiotics continued. Will continue to monitor.        Goal: Adult Individualization and Mutuality  Outcome: Ongoing (interventions implemented as appropriate)    04/03/17 0325   Individualization   Patient Specific Preferences No specific preferences at this time.    Patient Specific Goals No specific goals at this time.    Patient Specific Interventions No specific interventions at this time.    Mutuality/Individual Preferences   What Anxieties, Fears or Concerns Do You Have About Your Health or Care? None at this time.    What Questions Do You Have About Your Health or Care? None at this time.    What Information Would Help Us Give You More Personalized Care? None at this time.        Goal: Discharge Needs Assessment  Outcome: Ongoing (interventions implemented as appropriate)    Problem: Pneumonia (Adult)  Goal: Signs and Symptoms of Listed Potential Problems Will be Absent or Manageable (Pneumonia)  Outcome: Ongoing (interventions implemented as appropriate)    04/03/17 0325   Pneumonia   Problems Assessed (Pneumonia) all   Problems Present (Pneumonia) none         Problem: Pain, Acute (Adult)  Goal: Acceptable Pain Control/Comfort Level  Outcome: Ongoing (interventions implemented as appropriate)    04/03/17 0325   Pain, Acute (Adult)   Acceptable Pain Control/Comfort Level making progress toward outcome

## 2017-04-03 NOTE — PLAN OF CARE
Problem: Patient Care Overview (Adult)  Goal: Plan of Care Review  Outcome: Ongoing (interventions implemented as appropriate)    04/03/17 0325 04/03/17 1540   Coping/Psychosocial Response Interventions   Plan Of Care Reviewed With patient --    Patient Care Overview   Progress --  progress toward functional goals as expected   Outcome Evaluation   Outcome Summary/Follow up Plan --  Pt has had no c/o pain, WBC 11.8, gave 40mg lasix IV, nicotine patch applied to L shoulder, dark brown output from abd drain, abd dsg changed, IV abx cont, IID otherwise, will cont current care.       Goal: Adult Individualization and Mutuality  Outcome: Ongoing (interventions implemented as appropriate)    04/03/17 0325   Individualization   Patient Specific Preferences No specific preferences at this time.    Patient Specific Goals No specific goals at this time.    Patient Specific Interventions No specific interventions at this time.    Mutuality/Individual Preferences   What Anxieties, Fears or Concerns Do You Have About Your Health or Care? None at this time.    What Questions Do You Have About Your Health or Care? None at this time.    What Information Would Help Us Give You More Personalized Care? None at this time.        Goal: Discharge Needs Assessment  Outcome: Ongoing (interventions implemented as appropriate)    04/02/17 0319 04/03/17 1025   Discharge Needs Assessment   Concerns To Be Addressed --  denies needs/concerns at this time   Readmission Within The Last 30 Days --  no previous admission in last 30 days   Equipment Needed After Discharge --  none   Discharge Facility/Level Of Care Needs (N/A) --    Current Discharge Risk other (see comments)  (N/A) --    Discharge Disposition --  home or self-care   Discharge Planning Comments --  PT LIVES WITH DAUGHTER AND [place]. PT HAS OXYGEN, WC AND WALKER AT HOME (DOES NOT USE WALKER OR WC). PT RECEIVES HOME SERVICES THRU HELP AT HOME TWICE A WEEK. PT REFUS[place]  HEALTH [place].    Current Health   Outpatient/Agency/Support Group Needs --  other (see comments)  (HELP AT HOME)   Anticipated Changes Related to Illness --  none   Living Environment   Transportation Available --  family or friend will provide   Self-Care   Equipment Currently Used at Home --  walker, standard;wheelchair;oxygen         Problem: Pneumonia (Adult)  Goal: Signs and Symptoms of Listed Potential Problems Will be Absent or Manageable (Pneumonia)  Outcome: Ongoing (interventions implemented as appropriate)    04/03/17 0325   Pneumonia   Problems Assessed (Pneumonia) all   Problems Present (Pneumonia) none         Problem: Pain, Acute (Adult)  Goal: Acceptable Pain Control/Comfort Level  Outcome: Ongoing (interventions implemented as appropriate)    04/03/17 1540   Pain, Acute (Adult)   Acceptable Pain Control/Comfort Level making progress toward outcome

## 2017-04-03 NOTE — PROGRESS NOTES
Discharge Planning Assessment   [place]     Patient Name: Emy Bermudez  MRN: [id number]  Today's Date: 4/3/2017    Admit Date: 3/29/2017          Discharge Needs Assessment       04/03/17 1025    Living Environment    Lives With child(irene), adult   DAUGHTER    Living Arrangements house    Provides Primary Care For no one    Quality Of Family Relationships supportive    Able to Return to Prior Living Arrangements yes    Discharge Needs Assessment    Concerns To Be Addressed denies needs/concerns at this time    Readmission Within The Last 30 Days no previous admission in last 30 days    Outpatient/Agency/Support Group Needs other (see comments)   HELP AT HOME    Anticipated Changes Related to Illness none    Equipment Currently Used at Home walker, standard;wheelchair;oxygen    Equipment Needed After Discharge none    Transportation Available family or friend will provide    Discharge Disposition home or self-care    Discharge Planning Comments PT LIVES WITH DAUGHTER AND [place]. PT HAS OXYGEN, WC AND WALKER AT HOME (DOES NOT USE WALKER OR WC). PT RECEIVES HOME SERVICES THRU HELP AT HOME TWICE A WEEK. PT REFUS[place] HEALTH [place].             Discharge Plan       04/03/17 1028    Case Management/Social Work Plan    Plan HOME WITH DAUGHTER        Discharge Placement     No information found        Expected Discharge Date and Time     Expected Discharge Date Expected Discharge Time    Apr 1, 2017               Demographic Summary     None            Functional Status     None            Psychosocial     None            Abuse/Neglect     None            Legal     None            Substance Abuse     None            Patient Forms     None          DEANDRE Pickard

## 2017-04-03 NOTE — PROGRESS NOTES
[facility] Medicine Services  INPATIENT PROGRESS NOTE    Length of Stay: 5  Date of Admission: 3/29/2017  Primary Care Physician: Tomas Palomino MD    Subjective   Chief Complaint: follow up abscess  HPI   Pt sitting up on side of bed with feet dependent. States she diuresed well with lasix. Her breathing is improved. Had some blood in her bowels yesterday which states is from her hemorrhoids. Milky brown drainage from percutaneous tube. Non-productive cough.     Review of Systems   All pertinent negatives and positives are as above. All other systems have been reviewed and are negative unless otherwise stated.     Objective    Temp:  [97.7 °F (36.5 °C)-98.8 °F (37.1 °C)] 98.6 °F (37 °C)  Heart Rate:  [74-98] 74  Resp:  [17-20] 18  BP: (101-142)/(60-93) 101/60  Physical Exam   Constitutional: She is oriented to person, place, and time. She appears well-developed and well-nourished.   HENT:   Head: Normocephalic and atraumatic.   Eyes: Conjunctivae and EOM are normal. Pupils are equal, round, and reactive to light.   Neck: Neck supple. No JVD present. No thyromegaly present.   Cardiovascular: Normal rate, regular rhythm, normal heart sounds and intact distal pulses.  Exam reveals no gallop and no friction rub.    No murmur heard.  Pulmonary/Chest: Effort normal. No respiratory distress. She has no wheezes. She has no rales. She exhibits no tenderness.   Diminished in the bases bilaterally. Less coarse today.    Abdominal: Soft. Bowel sounds are normal. She exhibits no distension. There is no tenderness. There is no rebound and no guarding.   Musculoskeletal: Normal range of motion. She exhibits edema (some wrinkling of skin in lower extremities. remains 1-2+ edema. ). She exhibits no tenderness or deformity.   Lymphadenopathy:     She has no cervical adenopathy.   Neurological: She is alert and oriented to person, place, and time. She displays normal reflexes. No cranial nerve  deficit. She exhibits normal muscle tone.   Skin: Skin is warm and dry. No rash noted.   Psychiatric: She has a normal mood and affect. Her behavior is normal. Judgment and thought content normal.       Results Review:  I have reviewed the labs, radiology results, and diagnostic studies.    Laboratory Data:     Results from last 7 days  Lab Units 04/03/17  0440 04/01/17  0707 03/31/17  0616   WBC 10*3/mm3 11.88* 12.01* 11.74*   HEMOGLOBIN g/dL 9.5* 9.6* 8.9*   HEMATOCRIT % 31.0* 30.7* 28.2*   PLATELETS 10*3/mm3 332 330 314          Results from last 7 days  Lab Units 04/03/17  0440 04/01/17  0707 03/30/17  0722   SODIUM mmol/L 136 136 136   POTASSIUM mmol/L 3.9 3.4* 3.5   CHLORIDE mmol/L 95* 97* 97*   TOTAL CO2 mmol/L 32.0* 31.0 28.0   BUN mg/dL 13 11 17   CREATININE mg/dL 0.81 0.79 0.79   CALCIUM mg/dL 8.2* 8.5 8.8   BILIRUBIN mg/dL  --  0.5 0.4   ALK PHOS U/L  --  83 89   ALT (SGPT) U/L  --  60* 68*   AST (SGOT) U/L  --  52* 38   GLUCOSE mg/dL 102* 99 83       Culture Data:   Respiratory Culture   Date Value Ref Range Status   03/30/2017 Light growth (2+) Normal Respiratory Gaby  Final       Radiology Data:   Imaging Results (last 24 hours)     Procedure Component Value Units Date/Time    XR Chest PA & Lateral [[id number]] Collected:  04/02/17 0752     Updated:  04/02/17 1026    Narrative:       TWO-VIEW CHEST:      HISTORY: Follow-up right middle lobe infiltrate      Frontal and lateral projection chest radiograph obtained.     COMPARISON:  03/29/2017      FINDINGS:     Airspace disease with consolidation again identified in the right middle  lobe with differential considerations to include pneumonia. Slight  increase in consolidation suspected particularly on the lateral view.     The left lung is grossly clear with some mild atelectasis likely in the  lung base.     The heart is normal in size without heart failure.     Right-sided PICC line observed.                                                                                                                       Impression:       Persistent right middle lobe infiltrate/pneumonia.        This report was finalized on 04/02/2017 10:23 by Dr. Sahil Craig MD.          I have reviewed the patient current medications.     Assessment/Plan     Hospital Problem List     Abdominal abscess      Assessment:  1. Diverticular Abscess status post percutaneous drain placement-  2. Abdominal pain  3. Leukocytosis-improving  4. Right middle lobe infiltrate/pneumonia  5. Chronic respiratory failure-oxygen at 3L at home.   6. Anemia, normocytic  7. Anaerobic gram negative ambar  on body fluid culture-staph was contaminant.   8. Hypokalemia    Plan:  1. Levaquin day 4, Merrem Day 6.   2. Await final cultures  3. ? Repeat CT scan-per surgery  4. Give additional lasix today.   5. Encouraged activity and ambulation again today. Pt states she is going to lie down now and will get up later.     Discharge Planning: I expect patient to be discharged to home in ?days.    Tonya Saini, JOAQUÍN   04/03/17   10:07 AM

## 2017-04-03 NOTE — PROGRESS NOTES
Lani Camarillo MD Progress Note     LOS: 5 days   Patient Care Team:  Tomas Palomino MD as PCP - General (Internal Medicine)        Subjective     No significant changes drain still serosanguineous and old blood in appearance    Objective     Vital Signs  Temp:  [97.7 °F (36.5 °C)-98.8 °F (37.1 °C)] 98.6 °F (37 °C)  Heart Rate:  [74-98] 74  Resp:  [17-20] 18  BP: (101-123)/(60-75) 101/60    Intake & Output (last 3 days)       03/31 0701 - 04/01 0700 04/01 0701 - 04/02 0700 04/02 0701 - 04/03 0700 04/03 0701 - 04/04 0700    P.O. 360 720 240 360    IV Piggyback  200  200    Total Intake(mL/kg) 360 (3.9) 920 (9.8) 240 (2.6) 560 (6)    Urine (mL/kg/hr) 400 (0.2) 500 (0.2) 1700 (0.8) 800 (1.6)    Other  30 (0)  70 (0.1)    Stool 0 (0) 0 (0) 0 (0)     Total Output  870    Net -40 +390 -1460 -310            Unmeasured Urine Occurrence 1 x 4 x 3 x     Unmeasured Stool Occurrence 5 x 4 x 5 x           Physical Exam:     General Appearance:    Alert, cooperative, in no acute distress   Lungs:     respirations regular, even and unlabored    Heart:    Regular rhythm and normal rate, normal S1 and S2, no            murmur, no gallop, no rub   Chest Wall:    No abnormalities observed   Abdomen:      obese    Extremities: No edema, + SCDs   Results Review:     I reviewed the patient's new clinical results.    Lab Results (last 72 hours)     Procedure Component Value Units Date/Time    AFB Culture [[id number]] Collected:  03/30/17 1103    Specimen:  Body Fluid from Peritoneum Updated:  03/31/17 1458     AFB Stain No acid fast bacilli seen on direct smear      No acid fast bacilli seen on concentrated smear    Respiratory Culture [[id number]] Collected:  03/30/17 1219    Specimen:  Sputum from Cough Updated:  04/01/17 0704     Respiratory Culture Light growth (2+) Normal Respiratory Gaby     Gram Stain Result Greater than 25 WBCs per low power field      Rare (1+) Epithelial cells per low power field      Few (2+) Mixed gram  positive renny    CBC & Differential [[id number]] Collected:  04/01/17 0707    Specimen:  Blood Updated:  04/01/17 0724    Narrative:       The following orders were created for panel order CBC & Differential.  Procedure                               Abnormality         Status                     ---------                               -----------         ------                     CBC Auto Differential[[id number]]         Abnormal            Final result                 Please view results for these tests on the individual orders.    CBC Auto Differential [[id number]]  (Abnormal) Collected:  04/01/17 0707    Specimen:  Blood Updated:  04/01/17 0724     WBC 12.01 (H) 10*3/mm3      RBC 3.58 (L) 10*6/mm3      Hemoglobin 9.6 (L) g/dL      Hematocrit 30.7 (L) %      MCV 85.8 fL      MCH 26.8 (L) pg      MCHC 31.3 (L) g/dL      RDW 16.2 (H) %      RDW-SD 51.3 fl      MPV 9.7 fL      Platelets 330 10*3/mm3      Neutrophil % 69.9 %      Lymphocyte % 19.2 %      Monocyte % 5.7 %      Eosinophil % 4.3 (H) %      Basophil % 0.2 %      Immature Grans % 0.7 %      Neutrophils, Absolute 8.40 10*3/mm3      Lymphocytes, Absolute 2.30 10*3/mm3      Monocytes, Absolute 0.69 10*3/mm3      Eosinophils, Absolute 0.52 10*3/mm3      Basophils, Absolute 0.02 10*3/mm3      Immature Grans, Absolute 0.08 (H) 10*3/mm3     Comprehensive Metabolic Panel [[id number]]  (Abnormal) Collected:  04/01/17 0707    Specimen:  Blood Updated:  04/01/17 0735     Glucose 99 mg/dL      BUN 11 mg/dL      Creatinine 0.79 mg/dL      Sodium 136 mmol/L      Potassium 3.4 (L) mmol/L      Chloride 97 (L) mmol/L      CO2 31.0 mmol/L      Calcium 8.5 mg/dL      Total Protein 6.4 g/dL      Albumin 3.00 (L) g/dL      ALT (SGPT) 60 (H) U/L      AST (SGOT) 52 (H) U/L      Alkaline Phosphatase 83 U/L      Total Bilirubin 0.5 mg/dL      eGFR Non African Amer 73 mL/min/1.73      Globulin 3.4 gm/dL      A/G Ratio 0.9 (L) g/dL      BUN/Creatinine Ratio 13.9     Anion Gap 8.0 mmol/L      Urinalysis With / Microscopic If Indicated [[id number]]  (Abnormal) Collected:  04/01/17 2030    Specimen:  Urine from Urine, Clean Catch Updated:  04/01/17 2057     Color, UA Dark Yellow (A)     Appearance, UA Cloudy (A)     pH, UA 5.5     Specific Gravity, UA 1.025     Glucose, UA Negative     Ketones, UA Trace (A)     Bilirubin, UA Small (1+) (A)     Blood, UA Moderate (2+) (A)     Protein, UA Trace (A)     Leuk Esterase, UA Moderate (2+) (A)     Nitrite, UA Negative     Urobilinogen, UA 1.0 E.U./dL    Urinalysis, Microscopic Only [[id number]]  (Abnormal) Collected:  04/01/17 2030    Specimen:  Urine from Urine, Clean Catch Updated:  04/01/17 2140     RBC, UA 13-20 (A) /HPF      WBC, UA 13-20 (A) /HPF      Bacteria, UA 1+ (A) /HPF      Squamous Epithelial Cells, UA 7-12 (A) /HPF      Renal Epithelial Cells, UA 3-6 (A) /HPF      Hyaline Casts, UA 7-12 /LPF      Granular Casts, UA 3-6 /LPF      Mucus, UA Small/1+ (A) /HPF      Methodology Manual Light Microscopy    AFB Culture [[id number]] Collected:  03/30/17 1059    Specimen:  Body Fluid from Peritoneum Updated:  04/02/17 0621     AFB Stain No acid fast bacilli seen on direct smear      No acid fast bacilli seen on concentrated smear    Body Fluid Culture [[id number]]  (Abnormal)  (Susceptibility) Collected:  03/30/17 1059    Specimen:  Body Fluid from Peritoneum Updated:  04/02/17 0630     BF Culture Light growth (2+) Staphylococcus, coagulase negative (A)      Scant growth (1+) Mixed Gram Positive Gaby (A)      Probable Contaminant          Gram Stain Result Many (4+) WBCs seen      Many (4+) Gram positive bacilli      Many (4+) Gram positive cocci      Few (2+) Gram negative bacilli    Susceptibility      Staphylococcus, coagulase negative     NINA     Clindamycin <=0.25 ug/ml Susceptible     Erythromycin 0.5 ug/ml Susceptible     Gentamicin <=0.5 ug/ml Susceptible     Inducible Clindamycin Resistance NEG  Negative     Levofloxacin <=0.12 ug/ml Susceptible  [1]       Oxacillin <=0.25 ug/ml Susceptible     Penicillin G <=0.03 ug/ml Resistant     Tetracycline <=1 ug/ml Susceptible     Vancomycin <=0.5 ug/ml Susceptible            [1]   Staphylococcus species may develop resistance during prolonged therapy with quinolones. Isolates that are initially susceptible may become resistant within three to four days after initiation of therapy. Testing of repeat isolates may be warranted.              Susceptibility Comments     Staphylococcus, coagulase negative      This isolate does not demonstrate inducible clindamycin resistance in vitro.    This isolate does not demonstrate inducible clindamycin resistance in vitro.               Anaerobic Culture [[id number]]  (Abnormal) Collected:  03/30/17 1059    Specimen:  Body Fluid from Peritoneum Updated:  04/02/17 1026     Culture Heavy growth (4+) Anaerobic Gram Negative Jeremy (A)      Presumptive        BETA LACTAMASE Positive    Anaerobic Culture [[id number]]  (Abnormal) Collected:  03/30/17 1103    Specimen:  Body Fluid from Peritoneum Updated:  04/02/17 1028     Culture Heavy growth (4+) Anaerobic Gram Negative Jeremy (A)    CBC & Differential [[id number]] Collected:  04/03/17 0440    Specimen:  Blood Updated:  04/03/17 0511    Narrative:       The following orders were created for panel order CBC & Differential.  Procedure                               Abnormality         Status                     ---------                               -----------         ------                     CBC Auto Differential[[id number]]         Abnormal            Final result                 Please view results for these tests on the individual orders.    CBC Auto Differential [[id number]]  (Abnormal) Collected:  04/03/17 0440    Specimen:  Blood Updated:  04/03/17 0511     WBC 11.88 (H) 10*3/mm3      RBC 3.57 (L) 10*6/mm3      Hemoglobin 9.5 (L) g/dL      Hematocrit 31.0 (L) %      MCV 86.8 fL      MCH 26.6 (L) pg      MCHC 30.6 (L) g/dL      RDW 16.1 (H) %       RDW-SD 52.0 fl      MPV 9.8 fL      Platelets 332 10*3/mm3      Neutrophil % 64.7 %      Lymphocyte % 20.5 %      Monocyte % 7.7 %      Eosinophil % 6.1 (H) %      Basophil % 0.2 %      Immature Grans % 0.8 %      Neutrophils, Absolute 7.70 10*3/mm3      Lymphocytes, Absolute 2.43 10*3/mm3      Monocytes, Absolute 0.91 10*3/mm3      Eosinophils, Absolute 0.72 (H) 10*3/mm3      Basophils, Absolute 0.02 10*3/mm3      Immature Grans, Absolute 0.10 (H) 10*3/mm3     Basic Metabolic Panel [[id number]]  (Abnormal) Collected:  04/03/17 0440    Specimen:  Blood Updated:  04/03/17 0521     Glucose 102 (H) mg/dL      BUN 13 mg/dL      Creatinine 0.81 mg/dL      Sodium 136 mmol/L      Potassium 3.9 mmol/L      Chloride 95 (L) mmol/L      CO2 32.0 (H) mmol/L      Calcium 8.2 (L) mg/dL      eGFR Non African Amer 71 mL/min/1.73      BUN/Creatinine Ratio 16.0     Anion Gap 9.0 mmol/L     Narrative:       GFR Normal >60  Chronic Kidney Disease <60  Kidney Failure <15    Body Fluid Culture [[id number]]  (Normal) Collected:  03/30/17 1103    Specimen:  Body Fluid from Peritoneum Updated:  04/03/17 1014     BF Culture Growth present, too young to evaluate     Gram Stain Result Many (4+) WBCs seen      Many (4+) Gram positive cocci      Moderate (3+) Gram positive bacilli        Imaging Results (last 72 hours)     Procedure Component Value Units Date/Time    XR Chest PA & Lateral [[id number]] Collected:  04/02/17 0752     Updated:  04/02/17 1026    Narrative:       TWO-VIEW CHEST:      HISTORY: Follow-up right middle lobe infiltrate      Frontal and lateral projection chest radiograph obtained.     COMPARISON:  03/29/2017      FINDINGS:     Airspace disease with consolidation again identified in the right middle  lobe with differential considerations to include pneumonia. Slight  increase in consolidation suspected particularly on the lateral view.     The left lung is grossly clear with some mild atelectasis likely in the  lung base.     The  heart is normal in size without heart failure.     Right-sided PICC line observed.                                                                                                                      Impression:       Persistent right middle lobe infiltrate/pneumonia.        This report was finalized on 04/02/2017 10:23 by Dr. Sahil Craig MD.              Assessment/Plan     Active Problems:    Abdominal abscess      Will repeat CT scan tomorrow      Lani Camarillo MD  04/03/17  12:30 PM

## 2017-04-04 ENCOUNTER — APPOINTMENT (OUTPATIENT)
Dept: CT IMAGING | Facility: HOSPITAL | Age: 67
End: 2017-04-04

## 2017-04-04 PROCEDURE — 94799 UNLISTED PULMONARY SVC/PX: CPT

## 2017-04-04 PROCEDURE — 0 IOPAMIDOL PER 1 ML: Performed by: FAMILY MEDICINE

## 2017-04-04 PROCEDURE — 25010000002 LEVOFLOXACIN PER 250 MG: Performed by: INTERNAL MEDICINE

## 2017-04-04 PROCEDURE — 25010000002 MEROPENEM: Performed by: INTERNAL MEDICINE

## 2017-04-04 PROCEDURE — 0 IOHEXOL 300 MG/ML SOLUTION: Performed by: FAMILY MEDICINE

## 2017-04-04 RX ADMIN — IPRATROPIUM BROMIDE AND ALBUTEROL SULFATE 3 ML: 2.5; .5 SOLUTION RESPIRATORY (INHALATION) at 11:07

## 2017-04-04 RX ADMIN — MEROPENEM 1 G: 1 INJECTION, POWDER, FOR SOLUTION INTRAVENOUS at 12:54

## 2017-04-04 RX ADMIN — MEROPENEM 1 G: 1 INJECTION, POWDER, FOR SOLUTION INTRAVENOUS at 06:29

## 2017-04-04 RX ADMIN — IPRATROPIUM BROMIDE AND ALBUTEROL SULFATE 3 ML: 2.5; .5 SOLUTION RESPIRATORY (INHALATION) at 00:18

## 2017-04-04 RX ADMIN — IOPAMIDOL 100 ML: 755 INJECTION, SOLUTION INTRAVENOUS at 09:45

## 2017-04-04 RX ADMIN — SUCRALFATE 1 G: 1 SUSPENSION ORAL at 17:33

## 2017-04-04 RX ADMIN — LEVOFLOXACIN 500 MG: 500 INJECTION, SOLUTION INTRAVENOUS at 10:39

## 2017-04-04 RX ADMIN — MEROPENEM 1 G: 1 INJECTION, POWDER, FOR SOLUTION INTRAVENOUS at 21:02

## 2017-04-04 RX ADMIN — SUCRALFATE 1 G: 1 SUSPENSION ORAL at 11:43

## 2017-04-04 RX ADMIN — SUCRALFATE 1 G: 1 SUSPENSION ORAL at 06:29

## 2017-04-04 RX ADMIN — IPRATROPIUM BROMIDE AND ALBUTEROL SULFATE 3 ML: 2.5; .5 SOLUTION RESPIRATORY (INHALATION) at 19:09

## 2017-04-04 RX ADMIN — NICOTINE 1 PATCH: 14 PATCH, EXTENDED RELEASE TRANSDERMAL at 15:33

## 2017-04-04 RX ADMIN — PANTOPRAZOLE SODIUM 40 MG: 40 INJECTION, POWDER, FOR SOLUTION INTRAVENOUS at 06:42

## 2017-04-04 RX ADMIN — IOHEXOL 50 ML: 300 INJECTION, SOLUTION INTRAVENOUS at 06:29

## 2017-04-04 RX ADMIN — IPRATROPIUM BROMIDE AND ALBUTEROL SULFATE 3 ML: 2.5; .5 SOLUTION RESPIRATORY (INHALATION) at 07:27

## 2017-04-04 NOTE — PROGRESS NOTES
Lani Camarillo MD Progress Note     LOS: 6 days   Patient Care Team:  Tomas Palomino MD as PCP - General (Internal Medicine)        Subjective     Still having bloody purulent drainage    Objective     Vital Signs  Temp:  [98.2 °F (36.8 °C)-98.4 °F (36.9 °C)] 98.4 °F (36.9 °C)  Heart Rate:  [78-90] 78  Resp:  [20] 20  BP: (105-121)/(48-66) 105/48    Intake & Output (last 3 days)       04/01 0701 - 04/02 0700 04/02 0701 - 04/03 0700 04/03 0701 - 04/04 0700 04/04 0701 - 04/05 0700    P.O. 720 240 840     IV Piggyback 200  200 200    Total Intake(mL/kg) 920 (9.8) 240 (2.6) 1040 (11.1) 200 (2.1)    Urine (mL/kg/hr) 500 (0.2) 1700 (0.8) 1850 (0.8) 0 (0)    Other 30 (0)  70 (0) 10 (0)    Stool 0 (0) 0 (0) 0 (0) 0 (0)    Total Output 530 1700 1920 10    Net +390 -1460 -880 +190            Unmeasured Urine Occurrence 4 x 3 x  2 x    Unmeasured Stool Occurrence 4 x 5 x 1 x 2 x          Physical Exam:     General Appearance:    Alert, cooperative, in no acute distress   Lungs:     respirations regular, even and unlabored    Heart:    Regular rhythm and normal rate, normal S1 and S2, no            murmur, no gallop, no rub   Chest Wall:    No abnormalities observed   Abdomen:      obese no significant tenderness other than around the drain puncture site    Extremities: No edema, + SCDs   Results Review:     I reviewed the patient's new clinical results.    Lab Results (last 72 hours)     Procedure Component Value Units Date/Time    Urinalysis With / Microscopic If Indicated [[id number]]  (Abnormal) Collected:  04/01/17 2030    Specimen:  Urine from Urine, Clean Catch Updated:  04/01/17 2057     Color, UA Dark Yellow (A)     Appearance, UA Cloudy (A)     pH, UA 5.5     Specific Gravity, UA 1.025     Glucose, UA Negative     Ketones, UA Trace (A)     Bilirubin, UA Small (1+) (A)     Blood, UA Moderate (2+) (A)     Protein, UA Trace (A)     Leuk Esterase, UA Moderate (2+) (A)     Nitrite, UA Negative     Urobilinogen, UA 1.0 E.U./dL     Urinalysis, Microscopic Only [[id number]]  (Abnormal) Collected:  04/01/17 2030    Specimen:  Urine from Urine, Clean Catch Updated:  04/01/17 2140     RBC, UA 13-20 (A) /HPF      WBC, UA 13-20 (A) /HPF      Bacteria, UA 1+ (A) /HPF      Squamous Epithelial Cells, UA 7-12 (A) /HPF      Renal Epithelial Cells, UA 3-6 (A) /HPF      Hyaline Casts, UA 7-12 /LPF      Granular Casts, UA 3-6 /LPF      Mucus, UA Small/1+ (A) /HPF      Methodology Manual Light Microscopy    Body Fluid Culture [[id number]]  (Abnormal)  (Susceptibility) Collected:  03/30/17 1059    Specimen:  Body Fluid from Peritoneum Updated:  04/02/17 0630     BF Culture Light growth (2+) Staphylococcus, coagulase negative (A)      Scant growth (1+) Mixed Gram Positive Gaby (A)      Probable Contaminant          Gram Stain Result Many (4+) WBCs seen      Many (4+) Gram positive bacilli      Many (4+) Gram positive cocci      Few (2+) Gram negative bacilli    Susceptibility      Staphylococcus, coagulase negative     NINA     Clindamycin <=0.25 ug/ml Susceptible     Erythromycin 0.5 ug/ml Susceptible     Gentamicin <=0.5 ug/ml Susceptible     Inducible Clindamycin Resistance NEG  Negative     Levofloxacin <=0.12 ug/ml Susceptible  [1]      Oxacillin <=0.25 ug/ml Susceptible     Penicillin G <=0.03 ug/ml Resistant     Tetracycline <=1 ug/ml Susceptible     Vancomycin <=0.5 ug/ml Susceptible            [1]   Staphylococcus species may develop resistance during prolonged therapy with quinolones. Isolates that are initially susceptible may become resistant within three to four days after initiation of therapy. Testing of repeat isolates may be warranted.              Susceptibility Comments     Staphylococcus, coagulase negative      This isolate does not demonstrate inducible clindamycin resistance in vitro.    This isolate does not demonstrate inducible clindamycin resistance in vitro.               CBC & Differential [[id number]] Collected:  04/03/17 0440     Specimen:  Blood Updated:  04/03/17 0511    Narrative:       The following orders were created for panel order CBC & Differential.  Procedure                               Abnormality         Status                     ---------                               -----------         ------                     CBC Auto Differential[[id number]]         Abnormal            Final result                 Please view results for these tests on the individual orders.    CBC Auto Differential [[id number]]  (Abnormal) Collected:  04/03/17 0440    Specimen:  Blood Updated:  04/03/17 0511     WBC 11.88 (H) 10*3/mm3      RBC 3.57 (L) 10*6/mm3      Hemoglobin 9.5 (L) g/dL      Hematocrit 31.0 (L) %      MCV 86.8 fL      MCH 26.6 (L) pg      MCHC 30.6 (L) g/dL      RDW 16.1 (H) %      RDW-SD 52.0 fl      MPV 9.8 fL      Platelets 332 10*3/mm3      Neutrophil % 64.7 %      Lymphocyte % 20.5 %      Monocyte % 7.7 %      Eosinophil % 6.1 (H) %      Basophil % 0.2 %      Immature Grans % 0.8 %      Neutrophils, Absolute 7.70 10*3/mm3      Lymphocytes, Absolute 2.43 10*3/mm3      Monocytes, Absolute 0.91 10*3/mm3      Eosinophils, Absolute 0.72 (H) 10*3/mm3      Basophils, Absolute 0.02 10*3/mm3      Immature Grans, Absolute 0.10 (H) 10*3/mm3     Basic Metabolic Panel [[id number]]  (Abnormal) Collected:  04/03/17 0440    Specimen:  Blood Updated:  04/03/17 0521     Glucose 102 (H) mg/dL      BUN 13 mg/dL      Creatinine 0.81 mg/dL      Sodium 136 mmol/L      Potassium 3.9 mmol/L      Chloride 95 (L) mmol/L      CO2 32.0 (H) mmol/L      Calcium 8.2 (L) mg/dL      eGFR Non African Amer 71 mL/min/1.73      BUN/Creatinine Ratio 16.0     Anion Gap 9.0 mmol/L     Narrative:       GFR Normal >60  Chronic Kidney Disease <60  Kidney Failure <15    Anaerobic Culture [[id number]]  (Abnormal) Collected:  03/30/17 1059    Specimen:  Body Fluid from Peritoneum Updated:  04/03/17 1238     Culture Heavy growth (4+) Bacteroides fragilis (A)     BETA LACTAMASE  Positive    Anaerobic Culture [[id number]]  (Abnormal) Collected:  03/30/17 1103    Specimen:  Body Fluid from Peritoneum Updated:  04/03/17 1241     Culture Heavy growth (4+) Bacteroides fragilis (A)     BETA LACTAMASE Positive    Body Fluid Culture [[id number]]  (Normal) Collected:  03/30/17 1103    Specimen:  Body Fluid from Peritoneum Updated:  04/04/17 0927     BF Culture Culture in progress     Gram Stain Result Many (4+) WBCs seen      Many (4+) Gram positive cocci      Moderate (3+) Gram positive bacilli    AFB Culture [[id number]]  (Normal) Collected:  03/30/17 1103    Specimen:  Body Fluid from Peritoneum Updated:  04/04/17 1201     AFB Culture No AFB isolated at less than 1 week     AFB Stain No acid fast bacilli seen on direct smear      No acid fast bacilli seen on concentrated smear    Fungus Culture [[id number]] Collected:  03/30/17 1059    Specimen:  Body Fluid from Peritoneum Updated:  04/04/17 1201     Fungus Culture No fungus isolated at less than 1 week    AFB Culture [[id number]]  (Normal) Collected:  03/30/17 1059    Specimen:  Body Fluid from Peritoneum Updated:  04/04/17 1201     AFB Culture No AFB isolated at less than 1 week     AFB Stain No acid fast bacilli seen on direct smear      No acid fast bacilli seen on concentrated smear    Fungus Culture [[id number]] Collected:  03/30/17 1146    Specimen:  Body Fluid from Peritoneum Updated:  04/04/17 1201     Fungus Culture No fungus isolated at less than 1 week        Imaging Results (last 72 hours)     Procedure Component Value Units Date/Time    XR Chest PA & Lateral [[id number]] Collected:  04/02/17 0752     Updated:  04/02/17 1026    Narrative:       TWO-VIEW CHEST:      HISTORY: Follow-up right middle lobe infiltrate      Frontal and lateral projection chest radiograph obtained.     COMPARISON:  03/29/2017      FINDINGS:     Airspace disease with consolidation again identified in the right middle  lobe with differential considerations to include  pneumonia. Slight  increase in consolidation suspected particularly on the lateral view.     The left lung is grossly clear with some mild atelectasis likely in the  lung base.     The heart is normal in size without heart failure.     Right-sided PICC line observed.                                                                                                                      Impression:       Persistent right middle lobe infiltrate/pneumonia.        This report was finalized on 04/02/2017 10:23 by Dr. Sahil Craig MD.    CT Abdomen Pelvis With Contrast [[id number]] Collected:  04/04/17 0949     Updated:  04/04/17 1154    Narrative:       EXAMINATION:  CT ABDOMEN PELVIS W CONTRAST-  4/4/2017 10:06 AM EDT     HISTORY: Follow-up on percutaneous drained abscess; L02.91-Cutaneous  abscess, unspecified.     TECHNIQUE: Spiral CT was performed of the abdomen and pelvis with  contrast. Multiplanar images were reconstructed.     DLP: 1124 mGy-cm. Automated dosage control was utilized.     COMPARISON: 03/29/2017.      LUNG BASES: There is mild atelectasis in both lung bases.     LIVER AND SPLEEN: The visualized liver and spleen are unremarkable. No  obvious gallstones.     PANCREAS: No pancreatic mass or inflammatory change.     KIDNEYS AND ADRENALS: The adrenal glands are unremarkable. There is a  1.5 cm cyst in the lower pole right kidney. There is another 1.4 cm  hypodense area within the mid to lower pole on the right that is  probably also a cyst but not as well-defined. The left kidney is  unremarkable. There is mild thickening of the anterior wall of the  urinary bladder. An abscess collection previously seen just above the  bladder in this area is smaller in size. It now measures about 1.5 cm in  diameter and previously measured about 3.3 cm.     BOWEL: There is continued wall thickening of the midsigmoid colon with  stranding of the adjacent fat. The fat stranding is definitely improved  compared to the prior  study. A small peritoneal abscess along the  anterior wall of the urinary bladder is smaller measuring 1.5 cm in  diameter and previously measuring 3.3 cm. An abscess within the anterior  abdominal wall is nearly completely resolved. A drainage catheter  extends down to this location. Small bowel loops are nondilated. There  is moderate stool in the colon.     OTHER: There is atheromatous disease of the aortoiliac vessels. A less  than 1 cm rounded density in the stomach is probably a food particle. A  gastric polyp is felt to be less likely.       Impression:       1. Mild atelectasis in both lung bases.  2. There is a 1.5 cm cyst in the lower pole right kidney. There is  another 1.4 cm hypodense area within the mid to lower pole right kidney  that is probably also a cyst but is not as well-defined. Ultrasound  would probably not be useful given the patient's body habitus. Consider  a follow-up study in 6 months.  3. There is continued diverticulitis involving the sigmoid colon.  However, inflammatory changes are improving. An abscess along the  anterior wall of the urinary bladder is smaller in size with size  measurements listed above. An abscess within the anterior abdominal wall  with a drainage catheter is nearly completely resolved.  4. Thickening of the wall of the bladder anteriorly consistent with  inflammation. The abscess directly adjacent to this is smaller in size.  5. A less than 1 cm rounded density in the stomach is probably a food  particle. A small gastric polyp is less likely.  This report was finalized on 04/04/2017 11:51 by Dr. Tunde Christopher MD.              Assessment/Plan     Active Problems:    Abdominal abscess      Repeat CT scan shows abdominal wall fluid collection completely collapsed.  Still some thickened colon appearance and inflammatory changes intraperitoneal.  Cultures growing Bacteroides.  We will continue Merrem for the time being.  Discussed with hospitalist service      Lani  KAT Camarillo MD  04/04/17  4:40 PM

## 2017-04-04 NOTE — PROGRESS NOTES
[facility] Medicine Services  INPATIENT PROGRESS NOTE    Length of Stay: 6  Date of Admission: 3/29/2017  Primary Care Physician: Tomas Palomino MD    Subjective   Chief Complaint: weakness  HPI   Pt on bedside commode. Legs overall look much less edematous. Has been off the floor large portion of the afternoon. Bowels are moving. No bloody stools. Eating without difficulty.     Review of Systems   All pertinent negatives and positives are as above. All other systems have been reviewed and are negative unless otherwise stated.     Objective    Temp:  [98.2 °F (36.8 °C)-98.4 °F (36.9 °C)] 98.4 °F (36.9 °C)  Heart Rate:  [78-90] 78  Resp:  [20] 20  BP: (105-121)/(48-66) 105/48  Physical Exam   Constitutional: She is oriented to person, place, and time. She appears well-developed and well-nourished.   HENT:   Head: Normocephalic and atraumatic.   Eyes: Conjunctivae and EOM are normal. Pupils are equal, round, and reactive to light.   Neck: Neck supple. No JVD present. No thyromegaly present.   Cardiovascular: Normal rate, regular rhythm, normal heart sounds and intact distal pulses. Exam reveals no gallop and no friction rub.   No murmur heard.  Pulmonary/Chest: Effort normal. No respiratory distress. She has no wheezes. She has no rales. She exhibits no tenderness.   Diminished in the bases bilaterally. Less coarse today.    Abdominal: Soft. Bowel sounds are normal. She exhibits no distension. There is no tenderness. There is no rebound and no guarding.   Musculoskeletal: Normal range of motion. She exhibits edema (some wrinkling of skin in lower extremities. remains 1-2+ edema. ). She exhibits no tenderness or deformity.   Lymphadenopathy:   She has no cervical adenopathy.   Neurological: She is alert and oriented to person, place, and time. She displays normal reflexes. No cranial nerve deficit. She exhibits normal muscle tone.   Skin: Skin is warm and dry. No rash noted.    Psychiatric: She has a normal mood and affect. Her behavior is normal. Judgment and thought content normal    Results Review:  I have reviewed the labs, radiology results, and diagnostic studies.    Laboratory Data:     Results from last 7 days  Lab Units 04/03/17  0440 04/01/17  0707 03/31/17  0616   WBC 10*3/mm3 11.88* 12.01* 11.74*   HEMOGLOBIN g/dL 9.5* 9.6* 8.9*   HEMATOCRIT % 31.0* 30.7* 28.2*   PLATELETS 10*3/mm3 332 330 314          Results from last 7 days  Lab Units 04/03/17  0440 04/01/17  0707 03/30/17  0722   SODIUM mmol/L 136 136 136   POTASSIUM mmol/L 3.9 3.4* 3.5   CHLORIDE mmol/L 95* 97* 97*   TOTAL CO2 mmol/L 32.0* 31.0 28.0   BUN mg/dL 13 11 17   CREATININE mg/dL 0.81 0.79 0.79   CALCIUM mg/dL 8.2* 8.5 8.8   BILIRUBIN mg/dL  --  0.5 0.4   ALK PHOS U/L  --  83 89   ALT (SGPT) U/L  --  60* 68*   AST (SGOT) U/L  --  52* 38   GLUCOSE mg/dL 102* 99 83       Culture Data:   Respiratory Culture   Date Value Ref Range Status   03/30/2017 Light growth (2+) Normal Respiratory Gaby  Final       Radiology Data:   Imaging Results (last 24 hours)     Procedure Component Value Units Date/Time    CT Abdomen Pelvis With Contrast [[id number]] Collected:  04/04/17 0949     Updated:  04/04/17 1154    Narrative:       EXAMINATION:  CT ABDOMEN PELVIS W CONTRAST-  4/4/2017 10:06 AM EDT     HISTORY: Follow-up on percutaneous drained abscess; L02.91-Cutaneous  abscess, unspecified.     TECHNIQUE: Spiral CT was performed of the abdomen and pelvis with  contrast. Multiplanar images were reconstructed.     DLP: 1124 mGy-cm. Automated dosage control was utilized.     COMPARISON: 03/29/2017.      LUNG BASES: There is mild atelectasis in both lung bases.     LIVER AND SPLEEN: The visualized liver and spleen are unremarkable. No  obvious gallstones.     PANCREAS: No pancreatic mass or inflammatory change.     KIDNEYS AND ADRENALS: The adrenal glands are unremarkable. There is a  1.5 cm cyst in the lower pole right kidney. There  is another 1.4 cm  hypodense area within the mid to lower pole on the right that is  probably also a cyst but not as well-defined. The left kidney is  unremarkable. There is mild thickening of the anterior wall of the  urinary bladder. An abscess collection previously seen just above the  bladder in this area is smaller in size. It now measures about 1.5 cm in  diameter and previously measured about 3.3 cm.     BOWEL: There is continued wall thickening of the midsigmoid colon with  stranding of the adjacent fat. The fat stranding is definitely improved  compared to the prior study. A small peritoneal abscess along the  anterior wall of the urinary bladder is smaller measuring 1.5 cm in  diameter and previously measuring 3.3 cm. An abscess within the anterior  abdominal wall is nearly completely resolved. A drainage catheter  extends down to this location. Small bowel loops are nondilated. There  is moderate stool in the colon.     OTHER: There is atheromatous disease of the aortoiliac vessels. A less  than 1 cm rounded density in the stomach is probably a food particle. A  gastric polyp is felt to be less likely.       Impression:       1. Mild atelectasis in both lung bases.  2. There is a 1.5 cm cyst in the lower pole right kidney. There is  another 1.4 cm hypodense area within the mid to lower pole right kidney  that is probably also a cyst but is not as well-defined. Ultrasound  would probably not be useful given the patient's body habitus. Consider  a follow-up study in 6 months.  3. There is continued diverticulitis involving the sigmoid colon.  However, inflammatory changes are improving. An abscess along the  anterior wall of the urinary bladder is smaller in size with size  measurements listed above. An abscess within the anterior abdominal wall  with a drainage catheter is nearly completely resolved.  4. Thickening of the wall of the bladder anteriorly consistent with  inflammation. The abscess directly  adjacent to this is smaller in size.  5. A less than 1 cm rounded density in the stomach is probably a food  particle. A small gastric polyp is less likely.  This report was finalized on 04/04/2017 11:51 by Dr. Tunde Christopher MD.          I have reviewed the patient current medications.     Assessment/Plan     Hospital Problem List     Abdominal abscess      Assessment:  1. Diverticular Abscess status post percutaneous drain placement-  2. Abdominal pain  3. Leukocytosis-improving  4. Right middle lobe infiltrate/pneumonia  5. Chronic respiratory failure-oxygen at 3L at home.   6. Anemia, normocytic  7. Anaerobic gram negative ambar on body fluid culture-staph was contaminant.-Bacteroides fragilis  8. Hypokalemia    Plan:  1. Levaquin day 5, Merrem day 7  2. Anticipate discharge Thursday or Friday  3. Will transition to oral flagyl at discharge as well as oral levaquin.     Discharge Planning: I expect patient to be discharged to [facility] in 2-3 days.    Tonya Saini, JOAQUÍN   04/04/17   4:43 PM

## 2017-04-04 NOTE — PLAN OF CARE
Problem: Patient Care Overview (Adult)  Goal: Plan of Care Review  Outcome: Ongoing (interventions implemented as appropriate)    04/04/17 0254   Coping/Psychosocial Response Interventions   Plan Of Care Reviewed With patient   Patient Care Overview   Progress progress toward functional goals as expected       Goal: Adult Individualization and Mutuality  Outcome: Ongoing (interventions implemented as appropriate)  Goal: Discharge Needs Assessment  Outcome: Ongoing (interventions implemented as appropriate)    Problem: Pneumonia (Adult)  Goal: Signs and Symptoms of Listed Potential Problems Will be Absent or Manageable (Pneumonia)  Outcome: Ongoing (interventions implemented as appropriate)    Problem: Pain, Acute (Adult)  Goal: Acceptable Pain Control/Comfort Level  Outcome: Ongoing (interventions implemented as appropriate)

## 2017-04-04 NOTE — PLAN OF CARE
Problem: Patient Care Overview (Adult)  Goal: Plan of Care Review  Outcome: Ongoing (interventions implemented as appropriate)    04/04/17 0254 04/04/17 1345   Coping/Psychosocial Response Interventions   Plan Of Care Reviewed With patient --    Patient Care Overview   Progress progress toward functional goals as expected --    Outcome Evaluation   Outcome Summary/Follow up Plan --  Pt had CT this morning, had BM, cont IV abx, 10 mL from drain, VSS.       Goal: Adult Individualization and Mutuality  Outcome: Ongoing (interventions implemented as appropriate)    04/04/17 0254   Individualization   Patient Specific Preferences None    Patient Specific Goals None   Patient Specific Interventions None   Mutuality/Individual Preferences   What Anxieties, Fears or Concerns Do You Have About Your Health or Care? None   What Questions Do You Have About Your Health or Care? None    What Information Would Help Us Give You More Personalized Care? None       Goal: Discharge Needs Assessment  Outcome: Ongoing (interventions implemented as appropriate)    04/02/17 0319 04/03/17 1025 04/04/17 0254   Discharge Needs Assessment   Concerns To Be Addressed --  --  denies needs/concerns at this time   Readmission Within The Last 30 Days --  no previous admission in last 30 days --    Equipment Needed After Discharge --  none --    Discharge Facility/Level Of Care Needs (N/A) --  --    Current Discharge Risk other (see comments)  (N/A) --  --    Discharge Disposition --  home or self-care --    Discharge Planning Comments --  PT LIVES WITH DAUGHTER AND [place] HOME [place]. PT HAS OXYGEN, WC AND WALKER AT HOME (DOES NOT USE WALKER OR WC). PT RECEIVES HOME SERVICES THRU [facility] AT HOME TWICE A WEEK. PT REFUSES HOME HEALTH AT [place].  --    Current Health   Outpatient/Agency/Support Group Needs --  other (see comments)  (HELP AT HOME) --    Anticipated Changes Related to Illness --  none --    Living Environment   Transportation Available --  family  or friend will provide --    Self-Care   Equipment Currently Used at Home --  walker, standard;wheelchair;oxygen --          Problem: Pneumonia (Adult)  Goal: Signs and Symptoms of Listed Potential Problems Will be Absent or Manageable (Pneumonia)  Outcome: Ongoing (interventions implemented as appropriate)    04/04/17 0254   Pneumonia   Problems Assessed (Pneumonia) all   Problems Present (Pneumonia) none         Problem: Pain, Acute (Adult)  Goal: Acceptable Pain Control/Comfort Level  Outcome: Ongoing (interventions implemented as appropriate)    04/04/17 1345   Pain, Acute (Adult)   Acceptable Pain Control/Comfort Level making progress toward outcome

## 2017-04-05 LAB
BACTERIA FLD CULT: ABNORMAL
BASOPHILS # BLD AUTO: 0.03 10*3/MM3 (ref 0–0.2)
BASOPHILS NFR BLD AUTO: 0.3 % (ref 0–2)
DEPRECATED RDW RBC AUTO: 52.6 FL (ref 40–54)
EOSINOPHIL # BLD AUTO: 0.82 10*3/MM3 (ref 0–0.7)
EOSINOPHIL NFR BLD AUTO: 7 % (ref 0–4)
ERYTHROCYTE [DISTWIDTH] IN BLOOD BY AUTOMATED COUNT: 16.5 % (ref 12–15)
GRAM STN SPEC: ABNORMAL
HCT VFR BLD AUTO: 32.1 % (ref 37–47)
HGB BLD-MCNC: 9.8 G/DL (ref 12–16)
IMM GRANULOCYTES # BLD: 0.07 10*3/MM3 (ref 0–0.03)
IMM GRANULOCYTES NFR BLD: 0.6 % (ref 0–5)
LYMPHOCYTES # BLD AUTO: 2.53 10*3/MM3 (ref 0.72–4.86)
LYMPHOCYTES NFR BLD AUTO: 21.5 % (ref 15–45)
MCH RBC QN AUTO: 26.6 PG (ref 28–32)
MCHC RBC AUTO-ENTMCNC: 30.5 G/DL (ref 33–36)
MCV RBC AUTO: 87.2 FL (ref 82–98)
MONOCYTES # BLD AUTO: 0.58 10*3/MM3 (ref 0.19–1.3)
MONOCYTES NFR BLD AUTO: 4.9 % (ref 4–12)
NEUTROPHILS # BLD AUTO: 7.75 10*3/MM3 (ref 1.87–8.4)
NEUTROPHILS NFR BLD AUTO: 65.7 % (ref 39–78)
PLATELET # BLD AUTO: 385 10*3/MM3 (ref 130–400)
PMV BLD AUTO: 9.7 FL (ref 6–12)
RBC # BLD AUTO: 3.68 10*6/MM3 (ref 4.2–5.4)
WBC NRBC COR # BLD: 11.78 10*3/MM3 (ref 4.8–10.8)

## 2017-04-05 PROCEDURE — 85025 COMPLETE CBC W/AUTO DIFF WBC: CPT | Performed by: NURSE PRACTITIONER

## 2017-04-05 PROCEDURE — 94799 UNLISTED PULMONARY SVC/PX: CPT

## 2017-04-05 PROCEDURE — 25010000002 MEROPENEM: Performed by: INTERNAL MEDICINE

## 2017-04-05 PROCEDURE — 25010000002 LEVOFLOXACIN PER 250 MG: Performed by: INTERNAL MEDICINE

## 2017-04-05 PROCEDURE — 94760 N-INVAS EAR/PLS OXIMETRY 1: CPT

## 2017-04-05 RX ORDER — PANTOPRAZOLE SODIUM 40 MG/1
40 TABLET, DELAYED RELEASE ORAL
Status: DISCONTINUED | OUTPATIENT
Start: 2017-04-05 | End: 2017-04-07 | Stop reason: HOSPADM

## 2017-04-05 RX ADMIN — IPRATROPIUM BROMIDE AND ALBUTEROL SULFATE 3 ML: 2.5; .5 SOLUTION RESPIRATORY (INHALATION) at 12:07

## 2017-04-05 RX ADMIN — PANTOPRAZOLE SODIUM 40 MG: 40 TABLET, DELAYED RELEASE ORAL at 05:08

## 2017-04-05 RX ADMIN — IPRATROPIUM BROMIDE AND ALBUTEROL SULFATE 3 ML: 2.5; .5 SOLUTION RESPIRATORY (INHALATION) at 00:00

## 2017-04-05 RX ADMIN — METRONIDAZOLE 500 MG: 500 INJECTION, SOLUTION INTRAVENOUS at 15:19

## 2017-04-05 RX ADMIN — SUCRALFATE 1 G: 1 SUSPENSION ORAL at 05:08

## 2017-04-05 RX ADMIN — MEROPENEM 1 G: 1 INJECTION, POWDER, FOR SOLUTION INTRAVENOUS at 13:16

## 2017-04-05 RX ADMIN — NICOTINE 1 PATCH: 14 PATCH, EXTENDED RELEASE TRANSDERMAL at 15:19

## 2017-04-05 RX ADMIN — SUCRALFATE 1 G: 1 SUSPENSION ORAL at 11:09

## 2017-04-05 RX ADMIN — IPRATROPIUM BROMIDE AND ALBUTEROL SULFATE 3 ML: 2.5; .5 SOLUTION RESPIRATORY (INHALATION) at 07:15

## 2017-04-05 RX ADMIN — SUCRALFATE 1 G: 1 SUSPENSION ORAL at 18:17

## 2017-04-05 RX ADMIN — MEROPENEM 1 G: 1 INJECTION, POWDER, FOR SOLUTION INTRAVENOUS at 05:08

## 2017-04-05 RX ADMIN — LEVOFLOXACIN 500 MG: 500 INJECTION, SOLUTION INTRAVENOUS at 11:07

## 2017-04-05 RX ADMIN — SUCRALFATE 1 G: 1 SUSPENSION ORAL at 00:05

## 2017-04-05 RX ADMIN — IPRATROPIUM BROMIDE AND ALBUTEROL SULFATE 3 ML: 2.5; .5 SOLUTION RESPIRATORY (INHALATION) at 21:08

## 2017-04-05 NOTE — PROGRESS NOTES
Lani Camarillo MD Progress Note     LOS: 7 days   Patient Care Team:  Tomas Palomino MD as PCP - General (Internal Medicine)        Subjective     Feeling good    Objective     Vital Signs  Temp:  [98.1 °F (36.7 °C)-98.9 °F (37.2 °C)] 98.5 °F (36.9 °C)  Heart Rate:  [77-95] 77  Resp:  [18-24] 24  BP: (111-130)/(56-68) 111/56    Intake & Output (last 3 days)       04/02 0701 - 04/03 0700 04/03 0701 - 04/04 0700 04/04 0701 - 04/05 0700 04/05 0701 - 04/06 0700    P.O. 240 840  600    IV Piggyback  200 400     Total Intake(mL/kg) 240 (2.6) 1040 (11.1) 400 (4.3) 600 (6.4)    Urine (mL/kg/hr) 1700 (0.8) 1850 (0.8) 175 (0.1) 250 (0.6)    Other  70 (0) 20 (0)     Stool 0 (0) 0 (0) 0 (0) 0 (0)    Total Output 1700 1920 195 250    Net -1460 -880 +205 +350            Unmeasured Urine Occurrence 3 x  4 x 1 x    Unmeasured Stool Occurrence 5 x 1 x 4 x 1 x          Physical Exam:     General Appearance:    Alert, cooperative, in no acute distress   Lungs:     respirations regular, even and unlabored    Heart:    Regular rhythm and normal rate, normal S1 and S2, no            murmur, no gallop, no rub   Chest Wall:    No abnormalities observed   Abdomen:      drain approximately 20 mL yesterday.     Extremities: No edema, + SCDs   Results Review:     I reviewed the patient's new clinical results.    Lab Results (last 72 hours)     Procedure Component Value Units Date/Time    CBC & Differential [[id number]] Collected:  04/03/17 0440    Specimen:  Blood Updated:  04/03/17 0511    Narrative:       The following orders were created for panel order CBC & Differential.  Procedure                               Abnormality         Status                     ---------                               -----------         ------                     CBC Auto Differential[[id number]]         Abnormal            Final result                 Please view results for these tests on the individual orders.    CBC Auto Differential [[id number]]  (Abnormal)  Collected:  04/03/17 0440    Specimen:  Blood Updated:  04/03/17 0511     WBC 11.88 (H) 10*3/mm3      RBC 3.57 (L) 10*6/mm3      Hemoglobin 9.5 (L) g/dL      Hematocrit 31.0 (L) %      MCV 86.8 fL      MCH 26.6 (L) pg      MCHC 30.6 (L) g/dL      RDW 16.1 (H) %      RDW-SD 52.0 fl      MPV 9.8 fL      Platelets 332 10*3/mm3      Neutrophil % 64.7 %      Lymphocyte % 20.5 %      Monocyte % 7.7 %      Eosinophil % 6.1 (H) %      Basophil % 0.2 %      Immature Grans % 0.8 %      Neutrophils, Absolute 7.70 10*3/mm3      Lymphocytes, Absolute 2.43 10*3/mm3      Monocytes, Absolute 0.91 10*3/mm3      Eosinophils, Absolute 0.72 (H) 10*3/mm3      Basophils, Absolute 0.02 10*3/mm3      Immature Grans, Absolute 0.10 (H) 10*3/mm3     Basic Metabolic Panel [[id number]]  (Abnormal) Collected:  04/03/17 0440    Specimen:  Blood Updated:  04/03/17 0521     Glucose 102 (H) mg/dL      BUN 13 mg/dL      Creatinine 0.81 mg/dL      Sodium 136 mmol/L      Potassium 3.9 mmol/L      Chloride 95 (L) mmol/L      CO2 32.0 (H) mmol/L      Calcium 8.2 (L) mg/dL      eGFR Non African Amer 71 mL/min/1.73      BUN/Creatinine Ratio 16.0     Anion Gap 9.0 mmol/L     Narrative:       GFR Normal >60  Chronic Kidney Disease <60  Kidney Failure <15    Anaerobic Culture [[id number]]  (Abnormal) Collected:  03/30/17 1059    Specimen:  Body Fluid from Peritoneum Updated:  04/03/17 1238     Culture Heavy growth (4+) Bacteroides fragilis (A)     BETA LACTAMASE Positive    Anaerobic Culture [[id number]]  (Abnormal) Collected:  03/30/17 1103    Specimen:  Body Fluid from Peritoneum Updated:  04/03/17 1241     Culture Heavy growth (4+) Bacteroides fragilis (A)     BETA LACTAMASE Positive    AFB Culture [[id number]]  (Normal) Collected:  03/30/17 1103    Specimen:  Body Fluid from Peritoneum Updated:  04/04/17 1201     AFB Culture No AFB isolated at less than 1 week     AFB Stain No acid fast bacilli seen on direct smear      No acid fast bacilli seen on concentrated  smear    Fungus Culture [[id number]] Collected:  03/30/17 1059    Specimen:  Body Fluid from Peritoneum Updated:  04/04/17 1201     Fungus Culture No fungus isolated at less than 1 week    AFB Culture [[id number]]  (Normal) Collected:  03/30/17 1059    Specimen:  Body Fluid from Peritoneum Updated:  04/04/17 1201     AFB Culture No AFB isolated at less than 1 week     AFB Stain No acid fast bacilli seen on direct smear      No acid fast bacilli seen on concentrated smear    Fungus Culture [[id number]] Collected:  03/30/17 1146    Specimen:  Body Fluid from Peritoneum Updated:  04/04/17 1201     Fungus Culture No fungus isolated at less than 1 week    CBC & Differential [[id number]] Collected:  04/05/17 0514    Specimen:  Blood Updated:  04/05/17 0533    Narrative:       The following orders were created for panel order CBC & Differential.  Procedure                               Abnormality         Status                     ---------                               -----------         ------                     CBC Auto Differential[[id number]]         Abnormal            Final result                 Please view results for these tests on the individual orders.    CBC Auto Differential [[id number]]  (Abnormal) Collected:  04/05/17 0514    Specimen:  Blood Updated:  04/05/17 0533     WBC 11.78 (H) 10*3/mm3      RBC 3.68 (L) 10*6/mm3      Hemoglobin 9.8 (L) g/dL      Hematocrit 32.1 (L) %      MCV 87.2 fL      MCH 26.6 (L) pg      MCHC 30.5 (L) g/dL      RDW 16.5 (H) %      RDW-SD 52.6 fl      MPV 9.7 fL      Platelets 385 10*3/mm3      Neutrophil % 65.7 %      Lymphocyte % 21.5 %      Monocyte % 4.9 %      Eosinophil % 7.0 (H) %      Basophil % 0.3 %      Immature Grans % 0.6 %      Neutrophils, Absolute 7.75 10*3/mm3      Lymphocytes, Absolute 2.53 10*3/mm3      Monocytes, Absolute 0.58 10*3/mm3      Eosinophils, Absolute 0.82 (H) 10*3/mm3      Basophils, Absolute 0.03 10*3/mm3      Immature Grans, Absolute 0.07 (H) 10*3/mm3      Body Fluid Culture [[id number]]  (Abnormal) Collected:  03/30/17 1103    Specimen:  Body Fluid from Peritoneum Updated:  04/05/17 1019     BF Culture Light growth (2+) Mixed Gram Positive Gaby (A)     Gram Stain Result Many (4+) WBCs seen      Many (4+) Gram positive cocci      Moderate (3+) Gram positive bacilli    Narrative:       Probable Contaminant        Imaging Results (last 72 hours)     Procedure Component Value Units Date/Time    CT Abdomen Pelvis With Contrast [[id number]] Collected:  04/04/17 0949     Updated:  04/04/17 1154    Narrative:       EXAMINATION:  CT ABDOMEN PELVIS W CONTRAST-  4/4/2017 10:06 AM EDT     HISTORY: Follow-up on percutaneous drained abscess; L02.91-Cutaneous  abscess, unspecified.     TECHNIQUE: Spiral CT was performed of the abdomen and pelvis with  contrast. Multiplanar images were reconstructed.     DLP: 1124 mGy-cm. Automated dosage control was utilized.     COMPARISON: 03/29/2017.      LUNG BASES: There is mild atelectasis in both lung bases.     LIVER AND SPLEEN: The visualized liver and spleen are unremarkable. No  obvious gallstones.     PANCREAS: No pancreatic mass or inflammatory change.     KIDNEYS AND ADRENALS: The adrenal glands are unremarkable. There is a  1.5 cm cyst in the lower pole right kidney. There is another 1.4 cm  hypodense area within the mid to lower pole on the right that is  probably also a cyst but not as well-defined. The left kidney is  unremarkable. There is mild thickening of the anterior wall of the  urinary bladder. An abscess collection previously seen just above the  bladder in this area is smaller in size. It now measures about 1.5 cm in  diameter and previously measured about 3.3 cm.     BOWEL: There is continued wall thickening of the midsigmoid colon with  stranding of the adjacent fat. The fat stranding is definitely improved  compared to the prior study. A small peritoneal abscess along the  anterior wall of the urinary bladder is smaller  measuring 1.5 cm in  diameter and previously measuring 3.3 cm. An abscess within the anterior  abdominal wall is nearly completely resolved. A drainage catheter  extends down to this location. Small bowel loops are nondilated. There  is moderate stool in the colon.     OTHER: There is atheromatous disease of the aortoiliac vessels. A less  than 1 cm rounded density in the stomach is probably a food particle. A  gastric polyp is felt to be less likely.       Impression:       1. Mild atelectasis in both lung bases.  2. There is a 1.5 cm cyst in the lower pole right kidney. There is  another 1.4 cm hypodense area within the mid to lower pole right kidney  that is probably also a cyst but is not as well-defined. Ultrasound  would probably not be useful given the patient's body habitus. Consider  a follow-up study in 6 months.  3. There is continued diverticulitis involving the sigmoid colon.  However, inflammatory changes are improving. An abscess along the  anterior wall of the urinary bladder is smaller in size with size  measurements listed above. An abscess within the anterior abdominal wall  with a drainage catheter is nearly completely resolved.  4. Thickening of the wall of the bladder anteriorly consistent with  inflammation. The abscess directly adjacent to this is smaller in size.  5. A less than 1 cm rounded density in the stomach is probably a food  particle. A small gastric polyp is less likely.  This report was finalized on 04/04/2017 11:51 by Dr. Tunde Christopher MD.              Assessment/Plan     Active Problems:    Abdominal abscess      Hopefully we can get this out tomorrow or the next day and then DC home on oral antibiotics to be followed up with Dr. Tee in the office.      Lani Camarillo MD  04/05/17  11:43 AM

## 2017-04-05 NOTE — PLAN OF CARE
Problem: Patient Care Overview (Adult)  Goal: Plan of Care Review  Outcome: Ongoing (interventions implemented as appropriate)    04/05/17 035   Outcome Evaluation   Outcome Summary/Follow up Plan No noted drainage this shift, patient is hopeful for drain removal today; no c/o pain; up to BSC with standby assistance frequently; IV abx continue.       Goal: Adult Individualization and Mutuality  Outcome: Ongoing (interventions implemented as appropriate)  Goal: Discharge Needs Assessment  Outcome: Ongoing (interventions implemented as appropriate)    Problem: Pneumonia (Adult)  Goal: Signs and Symptoms of Listed Potential Problems Will be Absent or Manageable (Pneumonia)  Outcome: Ongoing (interventions implemented as appropriate)    Problem: Pain, Acute (Adult)  Goal: Acceptable Pain Control/Comfort Level  Outcome: Ongoing (interventions implemented as appropriate)

## 2017-04-05 NOTE — PLAN OF CARE
Problem: Patient Care Overview (Adult)  Goal: Plan of Care Review  Outcome: Ongoing (interventions implemented as appropriate)    04/05/17 1423   Coping/Psychosocial Response Interventions   Plan Of Care Reviewed With patient   Patient Care Overview   Progress progress towards functional goals is fair   Pt currently denies any pain. IV ABX continues. Pt has had multiple BMs today.   Goal: Adult Individualization and Mutuality  Outcome: Ongoing (interventions implemented as appropriate)  Goal: Discharge Needs Assessment  Outcome: Ongoing (interventions implemented as appropriate)    Problem: Pneumonia (Adult)  Goal: Signs and Symptoms of Listed Potential Problems Will be Absent or Manageable (Pneumonia)  Outcome: Ongoing (interventions implemented as appropriate)    04/05/17 1423   Pneumonia   Problems Assessed (Pneumonia) all   Problems Present (Pneumonia) none         Problem: Pain, Acute (Adult)  Goal: Acceptable Pain Control/Comfort Level  Outcome: Ongoing (interventions implemented as appropriate)    04/05/17 1423   Pain, Acute (Adult)   Acceptable Pain Control/Comfort Level making progress toward outcome

## 2017-04-05 NOTE — PROGRESS NOTES
"    [facility]  INPATIENT PROGRESS NOTE    Length of Stay: 7  Date of Admission: 3/29/2017  Primary Care Physician: Tomas Palomino MD    Subjective   Chief Complaint: \"I feel good\"     HPI   Patient setting up in chair, states she is feeling really well today.  She also was hoping for drain to be removed and is disappointed Dr. Mullen did not discontinue it as she had hoped.  She has no complaints of shortness of breathing, abdominal pain, changes in bowel or bladder habits.  Her appetite is excellent.    Review of Systems   All pertinent negatives and positives are as above. All other systems have been reviewed and are negative unless otherwise stated.     Objective    Temp:  [98.1 °F (36.7 °C)-99 °F (37.2 °C)] 99 °F (37.2 °C)  Heart Rate:  [77-95] 85  Resp:  [18-24] 22  BP: (111-130)/(56-68) 121/64    Physical Exam   Constitutional: She is oriented to person, place, and time. She appears well-developed and well-nourished. No distress.   Grossly obese   HENT:   Head: Normocephalic and atraumatic.   Eyes: Conjunctivae and EOM are normal. Pupils are equal, round, and reactive to light. No scleral icterus.   Neck: Normal range of motion. Neck supple. No JVD present. No tracheal deviation present.   Cardiovascular: Normal rate, regular rhythm, normal heart sounds and intact distal pulses.  Exam reveals no gallop.    No murmur heard.  Pulmonary/Chest: Effort normal. No respiratory distress. She has wheezes (faint expiratory). She has no rales.   Abdominal: Soft. Bowel sounds are normal. She exhibits no distension. There is no tenderness. There is no guarding.   Musculoskeletal: Normal range of motion. She exhibits no edema.   Neurological: She is alert and oriented to person, place, and time.   No obvious deficits noted.   Skin: Skin is warm and dry. No rash noted. She is not diaphoretic. No erythema. No pallor.   Chronic venous stasis bilateral lower extremities "   Psychiatric: She has a normal mood and affect. Her behavior is normal.   Vitals reviewed.      Results Review:  Recent Results (from the past 12 hour(s))   CBC Auto Differential    Collection Time: 04/05/17  5:14 AM   Result Value Ref Range    WBC 11.78 (H) 4.80 - 10.80 10*3/mm3    RBC 3.68 (L) 4.20 - 5.40 10*6/mm3    Hemoglobin 9.8 (L) 12.0 - 16.0 g/dL    Hematocrit 32.1 (L) 37.0 - 47.0 %    MCV 87.2 82.0 - 98.0 fL    MCH 26.6 (L) 28.0 - 32.0 pg    MCHC 30.5 (L) 33.0 - 36.0 g/dL    RDW 16.5 (H) 12.0 - 15.0 %    RDW-SD 52.6 40.0 - 54.0 fl    MPV 9.7 6.0 - 12.0 fL    Platelets 385 130 - 400 10*3/mm3    Neutrophil % 65.7 39.0 - 78.0 %    Lymphocyte % 21.5 15.0 - 45.0 %    Monocyte % 4.9 4.0 - 12.0 %    Eosinophil % 7.0 (H) 0.0 - 4.0 %    Basophil % 0.3 0.0 - 2.0 %    Immature Grans % 0.6 0.0 - 5.0 %    Neutrophils, Absolute 7.75 1.87 - 8.40 10*3/mm3    Lymphocytes, Absolute 2.53 0.72 - 4.86 10*3/mm3    Monocytes, Absolute 0.58 0.19 - 1.30 10*3/mm3    Eosinophils, Absolute 0.82 (H) 0.00 - 0.70 10*3/mm3    Basophils, Absolute 0.03 0.00 - 0.20 10*3/mm3    Immature Grans, Absolute 0.07 (H) 0.00 - 0.03 10*3/mm3       Cultures:  Respiratory Culture   Date Value Ref Range Status   03/30/2017 Light growth (2+) Normal Respiratory Gaby  Final       Radiology Data:    Imaging Results (last 24 hours)     ** No results found for the last 24 hours. **            Intake/Output Summary (Last 24 hours) at 04/05/17 1429  Last data filed at 04/05/17 1250   Gross per 24 hour   Intake             1260 ml   Output              445 ml   Net              815 ml       No Known Allergies    Scheduled meds:     ipratropium-albuterol 3 mL Nebulization Q6H - RT   levoFLOXacin 500 mg Intravenous Q24H   meropenem 1 g Intravenous Q8H   nicotine 1 patch Transdermal Q24H   pantoprazole 40 mg Oral Q AM   sucralfate 1 g Oral Q6H       PRN meds:  •  albuterol  •  HYDROmorphone  •  ondansetron  •  pneumococcal polysaccharide 23-valent  •  sodium  chloride    Assessment/Plan     Active Problems:    Abdominal abscess    Assessment:  1. Diverticular abscess status post percutaneous drain placement-  2. Abdominal pain  3. Leukocytosis-improving  4. Right middle lobe infiltrate/pneumonia  5. Chronic respiratory failure-oxygen at 3L at home.   6. Anemia, normocytic  7. Anaerobic gram negative ambar on body fluid culture-staph was contaminant.-Bacteroides fragilis  8. Hypokalemia - resolved     Plan:  1. Levaquin day 7, Merrem day 8 - d/c, start Flagyl   2. Anticipate discharge Thursday or Friday  3. CBC and CMP in am  4. Will transition to oral flagyl at discharge as well as oral levaquin.      Discharge Planning: I expect patient to be discharged to [facility] in 1-2 days.       Lavinia Chou, JOAQUÍN   04/05/17   2:29 PM

## 2017-04-06 LAB
ALBUMIN SERPL-MCNC: 3 G/DL (ref 3.5–5)
ALBUMIN/GLOB SERPL: 0.9 G/DL (ref 1.1–2.5)
ALP SERPL-CCNC: 82 U/L (ref 24–120)
ALT SERPL W P-5'-P-CCNC: 36 U/L (ref 0–54)
ANION GAP SERPL CALCULATED.3IONS-SCNC: 10 MMOL/L (ref 4–13)
AST SERPL-CCNC: 39 U/L (ref 7–45)
BILIRUB SERPL-MCNC: 0.2 MG/DL (ref 0.1–1)
BUN BLD-MCNC: 16 MG/DL (ref 5–21)
BUN/CREAT SERPL: 21.1 (ref 7–25)
CALCIUM SPEC-SCNC: 9.2 MG/DL (ref 8.4–10.4)
CHLORIDE SERPL-SCNC: 96 MMOL/L (ref 98–110)
CO2 SERPL-SCNC: 33 MMOL/L (ref 24–31)
CREAT BLD-MCNC: 0.76 MG/DL (ref 0.5–1.4)
DEPRECATED RDW RBC AUTO: 53.5 FL (ref 40–54)
ERYTHROCYTE [DISTWIDTH] IN BLOOD BY AUTOMATED COUNT: 16.6 % (ref 12–15)
GFR SERPL CREATININE-BSD FRML MDRD: 76 ML/MIN/1.73
GLOBULIN UR ELPH-MCNC: 3.2 GM/DL
GLUCOSE BLD-MCNC: 119 MG/DL (ref 70–100)
HCT VFR BLD AUTO: 31.3 % (ref 37–47)
HGB BLD-MCNC: 9.6 G/DL (ref 12–16)
MCH RBC QN AUTO: 27 PG (ref 28–32)
MCHC RBC AUTO-ENTMCNC: 30.7 G/DL (ref 33–36)
MCV RBC AUTO: 87.9 FL (ref 82–98)
PLATELET # BLD AUTO: 383 10*3/MM3 (ref 130–400)
PMV BLD AUTO: 9.7 FL (ref 6–12)
POTASSIUM BLD-SCNC: 4 MMOL/L (ref 3.5–5.3)
PROT SERPL-MCNC: 6.2 G/DL (ref 6.3–8.7)
RBC # BLD AUTO: 3.56 10*6/MM3 (ref 4.2–5.4)
SODIUM BLD-SCNC: 139 MMOL/L (ref 135–145)
WBC NRBC COR # BLD: 9.23 10*3/MM3 (ref 4.8–10.8)

## 2017-04-06 PROCEDURE — 94760 N-INVAS EAR/PLS OXIMETRY 1: CPT

## 2017-04-06 PROCEDURE — 25010000002 LEVOFLOXACIN PER 250 MG: Performed by: INTERNAL MEDICINE

## 2017-04-06 PROCEDURE — 94799 UNLISTED PULMONARY SVC/PX: CPT

## 2017-04-06 PROCEDURE — 85027 COMPLETE CBC AUTOMATED: CPT | Performed by: NURSE PRACTITIONER

## 2017-04-06 PROCEDURE — 80053 COMPREHEN METABOLIC PANEL: CPT | Performed by: NURSE PRACTITIONER

## 2017-04-06 RX ADMIN — LEVOFLOXACIN 500 MG: 500 INJECTION, SOLUTION INTRAVENOUS at 10:36

## 2017-04-06 RX ADMIN — PANTOPRAZOLE SODIUM 40 MG: 40 TABLET, DELAYED RELEASE ORAL at 05:30

## 2017-04-06 RX ADMIN — IPRATROPIUM BROMIDE AND ALBUTEROL SULFATE 3 ML: 2.5; .5 SOLUTION RESPIRATORY (INHALATION) at 07:04

## 2017-04-06 RX ADMIN — METRONIDAZOLE 500 MG: 500 INJECTION, SOLUTION INTRAVENOUS at 08:55

## 2017-04-06 RX ADMIN — SUCRALFATE 1 G: 1 SUSPENSION ORAL at 00:14

## 2017-04-06 RX ADMIN — SUCRALFATE 1 G: 1 SUSPENSION ORAL at 18:12

## 2017-04-06 RX ADMIN — METRONIDAZOLE 500 MG: 500 INJECTION, SOLUTION INTRAVENOUS at 00:14

## 2017-04-06 RX ADMIN — SUCRALFATE 1 G: 1 SUSPENSION ORAL at 05:30

## 2017-04-06 RX ADMIN — METRONIDAZOLE 500 MG: 500 INJECTION, SOLUTION INTRAVENOUS at 14:22

## 2017-04-06 RX ADMIN — METRONIDAZOLE 500 MG: 500 INJECTION, SOLUTION INTRAVENOUS at 22:53

## 2017-04-06 RX ADMIN — IPRATROPIUM BROMIDE AND ALBUTEROL SULFATE 3 ML: 2.5; .5 SOLUTION RESPIRATORY (INHALATION) at 00:00

## 2017-04-06 RX ADMIN — SUCRALFATE 1 G: 1 SUSPENSION ORAL at 12:42

## 2017-04-06 RX ADMIN — IPRATROPIUM BROMIDE AND ALBUTEROL SULFATE 3 ML: 2.5; .5 SOLUTION RESPIRATORY (INHALATION) at 19:50

## 2017-04-06 RX ADMIN — NICOTINE 1 PATCH: 14 PATCH, EXTENDED RELEASE TRANSDERMAL at 14:22

## 2017-04-06 RX ADMIN — IPRATROPIUM BROMIDE AND ALBUTEROL SULFATE 3 ML: 2.5; .5 SOLUTION RESPIRATORY (INHALATION) at 13:09

## 2017-04-06 NOTE — PROGRESS NOTES
Continued Stay Note  FLASH Dao     Patient Name: Emy Bermudez  MRN: [id number]  Today's Date: 4/6/2017    Admit Date: 3/29/2017          Discharge Plan       04/06/17 0922    Case Management/Social Work Plan    Plan HOME WITH DAUGHTER    Patient/Family In Agreement With Plan yes    Additional Comments REVIEWED CHART; NOTED PT'S IMPROVEMENT AND ANTICIPATED D/C IN 1-2 DAYS.  WILL CONT. TO FOLLOW FOR ANY D/C PLANNING NEEDS THAT MAY ARISE.  PLEASE ADVISE.  THANKS.               Discharge Codes     None        Expected Discharge Date and Time     Expected Discharge Date Expected Discharge Time    Apr 1, 2017             ALANA Emmanuel

## 2017-04-06 NOTE — PROGRESS NOTES
Lani Camarillo MD Progress Note     LOS: 8 days   Patient Care Team:  Tomas Palomino MD as PCP - General (Internal Medicine)        Subjective     Percutaneous drain had about 10 mL last shift.  It is still with the appearance of an infected hematoma.  White count yesterday had normalized.  Today is pending    Objective     Vital Signs  Temp:  [98.4 °F (36.9 °C)-99 °F (37.2 °C)] 98.6 °F (37 °C)  Heart Rate:  [73-85] 75  Resp:  [16-22] 16  BP: (108-132)/(52-73) 118/62    Intake & Output (last 3 days)       04/03 0701 - 04/04 0700 04/04 0701 - 04/05 0700 04/05 0701 - 04/06 0700 04/06 0701 - 04/07 0700    P.O. 840  960     IV Piggyback 200 400      Total Intake(mL/kg) 1040 (11.1) 400 (4.3) 960 (10.3)     Urine (mL/kg/hr) 1850 (0.8) 175 (0.1) 250 (0.1)     Other 70 (0) 20 (0) 10 (0)     Stool 0 (0) 0 (0) 0 (0)     Total Output 1920 195 260      Net -880 +205 +700              Unmeasured Urine Occurrence  4 x 2 x     Unmeasured Stool Occurrence 1 x 4 x 2 x           Physical Exam:     General Appearance:    Alert, cooperative, in no acute distress   Lungs:     respirations regular, even and unlabored    Heart:    Regular rhythm and normal rate, normal S1 and S2, no            murmur, no gallop, no rub   Chest Wall:    No abnormalities observed   Abdomen:      obese no tenderness    Extremities: No edema, + SCDs   Results Review:     I reviewed the patient's new clinical results.    Lab Results (last 72 hours)     Procedure Component Value Units Date/Time    Anaerobic Culture [[id number]]  (Abnormal) Collected:  03/30/17 1059    Specimen:  Body Fluid from Peritoneum Updated:  04/03/17 1238     Culture Heavy growth (4+) Bacteroides fragilis (A)     BETA LACTAMASE Positive    Anaerobic Culture [[id number]]  (Abnormal) Collected:  03/30/17 1103    Specimen:  Body Fluid from Peritoneum Updated:  04/03/17 1241     Culture Heavy growth (4+) Bacteroides fragilis (A)     BETA LACTAMASE Positive    AFB Culture [[id number]]  (Normal)  Collected:  03/30/17 1103    Specimen:  Body Fluid from Peritoneum Updated:  04/04/17 1201     AFB Culture No AFB isolated at less than 1 week     AFB Stain No acid fast bacilli seen on direct smear      No acid fast bacilli seen on concentrated smear    Fungus Culture [[id number]] Collected:  03/30/17 1059    Specimen:  Body Fluid from Peritoneum Updated:  04/04/17 1201     Fungus Culture No fungus isolated at less than 1 week    AFB Culture [[id number]]  (Normal) Collected:  03/30/17 1059    Specimen:  Body Fluid from Peritoneum Updated:  04/04/17 1201     AFB Culture No AFB isolated at less than 1 week     AFB Stain No acid fast bacilli seen on direct smear      No acid fast bacilli seen on concentrated smear    Fungus Culture [[id number]] Collected:  03/30/17 1146    Specimen:  Body Fluid from Peritoneum Updated:  04/04/17 1201     Fungus Culture No fungus isolated at less than 1 week    CBC & Differential [[id number]] Collected:  04/05/17 0514    Specimen:  Blood Updated:  04/05/17 0533    Narrative:       The following orders were created for panel order CBC & Differential.  Procedure                               Abnormality         Status                     ---------                               -----------         ------                     CBC Auto Differential[[id number]]         Abnormal            Final result                 Please view results for these tests on the individual orders.    CBC Auto Differential [[id number]]  (Abnormal) Collected:  04/05/17 0514    Specimen:  Blood Updated:  04/05/17 0533     WBC 11.78 (H) 10*3/mm3      RBC 3.68 (L) 10*6/mm3      Hemoglobin 9.8 (L) g/dL      Hematocrit 32.1 (L) %      MCV 87.2 fL      MCH 26.6 (L) pg      MCHC 30.5 (L) g/dL      RDW 16.5 (H) %      RDW-SD 52.6 fl      MPV 9.7 fL      Platelets 385 10*3/mm3      Neutrophil % 65.7 %      Lymphocyte % 21.5 %      Monocyte % 4.9 %      Eosinophil % 7.0 (H) %      Basophil % 0.3 %      Immature Grans % 0.6 %       Neutrophils, Absolute 7.75 10*3/mm3      Lymphocytes, Absolute 2.53 10*3/mm3      Monocytes, Absolute 0.58 10*3/mm3      Eosinophils, Absolute 0.82 (H) 10*3/mm3      Basophils, Absolute 0.03 10*3/mm3      Immature Grans, Absolute 0.07 (H) 10*3/mm3     Body Fluid Culture [[id number]]  (Abnormal) Collected:  03/30/17 1103    Specimen:  Body Fluid from Peritoneum Updated:  04/05/17 1019     BF Culture Light growth (2+) Mixed Gram Positive Gaby (A)     Gram Stain Result Many (4+) WBCs seen      Many (4+) Gram positive cocci      Moderate (3+) Gram positive bacilli    Narrative:       Probable Contaminant    CBC (No Diff) [[id number]]  (Abnormal) Collected:  04/06/17 0616    Specimen:  Blood Updated:  04/06/17 0658     WBC 9.23 10*3/mm3      RBC 3.56 (L) 10*6/mm3      Hemoglobin 9.6 (L) g/dL      Hematocrit 31.3 (L) %      MCV 87.9 fL      MCH 27.0 (L) pg      MCHC 30.7 (L) g/dL      RDW 16.6 (H) %      RDW-SD 53.5 fl      MPV 9.7 fL      Platelets 383 10*3/mm3     Comprehensive Metabolic Panel [[id number]]  (Abnormal) Collected:  04/06/17 0616    Specimen:  Blood Updated:  04/06/17 0706     Glucose 119 (H) mg/dL      BUN 16 mg/dL      Creatinine 0.76 mg/dL      Sodium 139 mmol/L      Potassium 4.0 mmol/L      Chloride 96 (L) mmol/L      CO2 33.0 (H) mmol/L      Calcium 9.2 mg/dL      Total Protein 6.2 (L) g/dL      Albumin 3.00 (L) g/dL      ALT (SGPT) 36 U/L      AST (SGOT) 39 U/L      Alkaline Phosphatase 82 U/L      Total Bilirubin 0.2 mg/dL      eGFR Non African Amer 76 mL/min/1.73      Globulin 3.2 gm/dL      A/G Ratio 0.9 (L) g/dL      BUN/Creatinine Ratio 21.1     Anion Gap 10.0 mmol/L         Imaging Results (last 72 hours)     Procedure Component Value Units Date/Time    CT Abdomen Pelvis With Contrast [[id number]] Collected:  04/04/17 0949     Updated:  04/04/17 1154    Narrative:       EXAMINATION:  CT ABDOMEN PELVIS W CONTRAST-  4/4/2017 10:06 AM EDT     HISTORY: Follow-up on percutaneous drained abscess;  L02.91-Cutaneous  abscess, unspecified.     TECHNIQUE: Spiral CT was performed of the abdomen and pelvis with  contrast. Multiplanar images were reconstructed.     DLP: 1124 mGy-cm. Automated dosage control was utilized.     COMPARISON: 03/29/2017.      LUNG BASES: There is mild atelectasis in both lung bases.     LIVER AND SPLEEN: The visualized liver and spleen are unremarkable. No  obvious gallstones.     PANCREAS: No pancreatic mass or inflammatory change.     KIDNEYS AND ADRENALS: The adrenal glands are unremarkable. There is a  1.5 cm cyst in the lower pole right kidney. There is another 1.4 cm  hypodense area within the mid to lower pole on the right that is  probably also a cyst but not as well-defined. The left kidney is  unremarkable. There is mild thickening of the anterior wall of the  urinary bladder. An abscess collection previously seen just above the  bladder in this area is smaller in size. It now measures about 1.5 cm in  diameter and previously measured about 3.3 cm.     BOWEL: There is continued wall thickening of the midsigmoid colon with  stranding of the adjacent fat. The fat stranding is definitely improved  compared to the prior study. A small peritoneal abscess along the  anterior wall of the urinary bladder is smaller measuring 1.5 cm in  diameter and previously measuring 3.3 cm. An abscess within the anterior  abdominal wall is nearly completely resolved. A drainage catheter  extends down to this location. Small bowel loops are nondilated. There  is moderate stool in the colon.     OTHER: There is atheromatous disease of the aortoiliac vessels. A less  than 1 cm rounded density in the stomach is probably a food particle. A  gastric polyp is felt to be less likely.       Impression:       1. Mild atelectasis in both lung bases.  2. There is a 1.5 cm cyst in the lower pole right kidney. There is  another 1.4 cm hypodense area within the mid to lower pole right kidney  that is probably also  a cyst but is not as well-defined. Ultrasound  would probably not be useful given the patient's body habitus. Consider  a follow-up study in 6 months.  3. There is continued diverticulitis involving the sigmoid colon.  However, inflammatory changes are improving. An abscess along the  anterior wall of the urinary bladder is smaller in size with size  measurements listed above. An abscess within the anterior abdominal wall  with a drainage catheter is nearly completely resolved.  4. Thickening of the wall of the bladder anteriorly consistent with  inflammation. The abscess directly adjacent to this is smaller in size.  5. A less than 1 cm rounded density in the stomach is probably a food  particle. A small gastric polyp is less likely.  This report was finalized on 04/04/2017 11:51 by Dr. Tunde Christopher MD.              Assessment/Plan     Active Problems:    Abdominal abscess      We will plan to keep the drain in today with hopeful removal tomorrow and discharge home on oral antibiotics      Lani Camarillo MD  04/06/17  8:00 AM

## 2017-04-06 NOTE — PLAN OF CARE
Problem: Patient Care Overview (Adult)  Goal: Plan of Care Review  Outcome: Ongoing (interventions implemented as appropriate)    04/06/17 0330   Coping/Psychosocial Response Interventions   Plan Of Care Reviewed With patient   Patient Care Overview   Progress no change   Outcome Evaluation   Outcome Summary/Follow up Plan Patient has had no complaints of pain as of 0331. Drain with light brown output. IV abx. continued. Will continue to monitor.        Goal: Adult Individualization and Mutuality  Outcome: Ongoing (interventions implemented as appropriate)    04/06/17 0330   Individualization   Patient Specific Preferences No specific preferences at this time.    Patient Specific Goals No specific goals at this time.    Patient Specific Interventions No specific interventions at this time.    Mutuality/Individual Preferences   What Anxieties, Fears or Concerns Do You Have About Your Health or Care? None at this time.    What Questions Do You Have About Your Health or Care? None at this time.    What Information Would Help Us Give You More Personalized Care? None at this time.        Goal: Discharge Needs Assessment  Outcome: Outcome(s) achieved Date Met:  04/06/17    Problem: Pneumonia (Adult)  Goal: Signs and Symptoms of Listed Potential Problems Will be Absent or Manageable (Pneumonia)  Outcome: Ongoing (interventions implemented as appropriate)    04/06/17 0330   Pneumonia   Problems Assessed (Pneumonia) all   Problems Present (Pneumonia) none         Problem: Pain, Acute (Adult)  Goal: Acceptable Pain Control/Comfort Level  Outcome: Ongoing (interventions implemented as appropriate)    04/06/17 0330   Pain, Acute (Adult)   Acceptable Pain Control/Comfort Level making progress toward outcome

## 2017-04-06 NOTE — CONSULTS
"Vascular Access-This morning, pt wanted to wait on dressing change for PICC line because she thought she may be discharged. After two notes from physicians stating discharge would not be today, she requested to put off dressing change until after visiting with family. After that, she respectfully refused to allow dressing change because \"it will come out tomorrow.\" Dressing currently well adhered and clean, will recheck in the morning.  "

## 2017-04-06 NOTE — PROGRESS NOTES
[facility] Medicine Services  INPATIENT PROGRESS NOTE    Length of Stay: 8  Date of Admission: 3/29/2017  Primary Care Physician: Tomas Palomino MD    Subjective   Chief Complaint: Disappointed, wanted to be discharged home today    HPI   Sitting up in bedside chair with feet elevated.  She is distressed that she has not going to be discharged today.  She states that drain is not draining anything and is unsure of why it has not been removed.  I told her Dr. Camarillo hopes to remove tomorrow and possibly discharge on oral antibiotics.  She is agreeable to stay stating she has no other choice.  She has no complaints of chest pain, abdominal pain, shortness of breathing.  No surgical site pain.  She is ambulating minimally in the room.  She does have chronic lower extremity edema.    Review of Systems   Cardiovascular: Positive for leg swelling (chronic).   Musculoskeletal: Positive for gait problem.        All pertinent negatives and positives are as above. All other systems have been reviewed and are negative unless otherwise stated.     Objective    Temp:  [98 °F (36.7 °C)-99 °F (37.2 °C)] 98 °F (36.7 °C)  Heart Rate:  [73-85] 75  Resp:  [16-22] 16  BP: (108-132)/(52-73) 112/57    Physical Exam   Constitutional: She is oriented to person, place, and time. She appears well-developed and well-nourished. No distress.   Grossly obese   HENT:   Head: Normocephalic and atraumatic.   Eyes: Conjunctivae and EOM are normal. Pupils are equal, round, and reactive to light. No scleral icterus.   Neck: Normal range of motion. Neck supple. No JVD present. No tracheal deviation present.   Cardiovascular: Normal rate, regular rhythm, normal heart sounds and intact distal pulses.  Exam reveals no gallop.    No murmur heard.  Pulmonary/Chest: Effort normal and breath sounds normal. No respiratory distress. She has no wheezes. She has no rales.   Abdominal: Soft. Bowel sounds are normal. She  exhibits no distension. There is no tenderness. There is no guarding.   Musculoskeletal: She exhibits edema (bilateral lower extremity lymphedema with symptoms of chronic venous stasis).   Neurological: She is alert and oriented to person, place, and time.   No obvious deficits noted.   Skin: Skin is warm and dry. No rash noted. She is not diaphoretic. No erythema. No pallor.   Psychiatric: She has a normal mood and affect. Her behavior is normal.   Vitals reviewed.      Results Review:  Recent Results (from the past 12 hour(s))   CBC (No Diff)    Collection Time: 04/06/17  6:16 AM   Result Value Ref Range    WBC 9.23 4.80 - 10.80 10*3/mm3    RBC 3.56 (L) 4.20 - 5.40 10*6/mm3    Hemoglobin 9.6 (L) 12.0 - 16.0 g/dL    Hematocrit 31.3 (L) 37.0 - 47.0 %    MCV 87.9 82.0 - 98.0 fL    MCH 27.0 (L) 28.0 - 32.0 pg    MCHC 30.7 (L) 33.0 - 36.0 g/dL    RDW 16.6 (H) 12.0 - 15.0 %    RDW-SD 53.5 40.0 - 54.0 fl    MPV 9.7 6.0 - 12.0 fL    Platelets 383 130 - 400 10*3/mm3   Comprehensive Metabolic Panel    Collection Time: 04/06/17  6:16 AM   Result Value Ref Range    Glucose 119 (H) 70 - 100 mg/dL    BUN 16 5 - 21 mg/dL    Creatinine 0.76 0.50 - 1.40 mg/dL    Sodium 139 135 - 145 mmol/L    Potassium 4.0 3.5 - 5.3 mmol/L    Chloride 96 (L) 98 - 110 mmol/L    CO2 33.0 (H) 24.0 - 31.0 mmol/L    Calcium 9.2 8.4 - 10.4 mg/dL    Total Protein 6.2 (L) 6.3 - 8.7 g/dL    Albumin 3.00 (L) 3.50 - 5.00 g/dL    ALT (SGPT) 36 0 - 54 U/L    AST (SGOT) 39 7 - 45 U/L    Alkaline Phosphatase 82 24 - 120 U/L    Total Bilirubin 0.2 0.1 - 1.0 mg/dL    eGFR Non African Amer 76 >60 mL/min/1.73    Globulin 3.2 gm/dL    A/G Ratio 0.9 (L) 1.1 - 2.5 g/dL    BUN/Creatinine Ratio 21.1 7.0 - 25.0    Anion Gap 10.0 4.0 - 13.0 mmol/L       Cultures:  Respiratory Culture   Date Value Ref Range Status   03/30/2017 Light growth (2+) Normal Respiratory Gaby  Final       Radiology Data:    Imaging Results (last 24 hours)     ** No results found for the last 24  hours. **            Intake/Output Summary (Last 24 hours) at 04/06/17 0903  Last data filed at 04/06/17 0834   Gross per 24 hour   Intake              720 ml   Output              210 ml   Net              510 ml       No Known Allergies    Scheduled meds:     ipratropium-albuterol 3 mL Nebulization Q6H - RT   levoFLOXacin 500 mg Intravenous Q24H   metroNIDAZOLE 500 mg Intravenous Q8H   nicotine 1 patch Transdermal Q24H   pantoprazole 40 mg Oral Q AM   sucralfate 1 g Oral Q6H       PRN meds:  •  albuterol  •  HYDROmorphone  •  ondansetron  •  pneumococcal polysaccharide 23-valent  •  sodium chloride    Assessment/Plan     Active Problems:    Abdominal abscess    Assessment:  1. Diverticular abscess status post percutaneous drain placement-  2. Abdominal pain  3. Leukocytosis-improving  4. Right middle lobe infiltrate/pneumonia  5. Chronic respiratory failure-oxygen at 3L at home.   6. Anemia, normocytic  7. Anaerobic gram negative ambar on body fluid culture-staph was contaminant.-Bacteroides fragilis  8. Hypokalemia - resolved      Plan:  1. Levaquin day 8, Merrem day 8 - d/c, Day 2 Flagyl   2. Per Dr Mullen, possibly d/c [facility] tomorrow and d/c home on oral medications  3. Lab holiday tomorrow  4. Will transition to oral flagyl at discharge as well as oral levaquin.       Discharge Planning: I expect patient to be discharged to [facility] in 1-2 days.    Lavinia Chou, APRN   04/06/17   9:03 AM

## 2017-04-06 NOTE — PLAN OF CARE
Problem: Patient Care Overview (Adult)  Goal: Plan of Care Review  Outcome: Ongoing (interventions implemented as appropriate)    04/06/17 1534   Coping/Psychosocial Response Interventions   Plan Of Care Reviewed With patient   Patient Care Overview   Progress no change   Outcome Evaluation   Outcome Summary/Follow up Plan No c/o pain. Up with assist x 1. Drain in place with scant amount of light brown output. IV antibiotics continue. Dressing dry and intact.       Goal: Adult Individualization and Mutuality  Outcome: Ongoing (interventions implemented as appropriate)  Goal: Discharge Needs Assessment  Outcome: Ongoing (interventions implemented as appropriate)    Problem: Pneumonia (Adult)  Goal: Signs and Symptoms of Listed Potential Problems Will be Absent or Manageable (Pneumonia)  Outcome: Ongoing (interventions implemented as appropriate)    Problem: Pain, Acute (Adult)  Goal: Acceptable Pain Control/Comfort Level  Outcome: Ongoing (interventions implemented as appropriate)

## 2017-04-07 VITALS
DIASTOLIC BLOOD PRESSURE: 71 MMHG | TEMPERATURE: 97.9 F | SYSTOLIC BLOOD PRESSURE: 142 MMHG | RESPIRATION RATE: 20 BRPM | WEIGHT: 221.8 LBS | HEIGHT: 63 IN | OXYGEN SATURATION: 96 % | BODY MASS INDEX: 39.3 KG/M2 | HEART RATE: 86 BPM

## 2017-04-07 PROCEDURE — 94799 UNLISTED PULMONARY SVC/PX: CPT

## 2017-04-07 PROCEDURE — 25010000002 LEVOFLOXACIN PER 250 MG: Performed by: INTERNAL MEDICINE

## 2017-04-07 RX ORDER — FERROUS SULFATE TAB EC 324 MG (65 MG FE EQUIVALENT) 324 (65 FE) MG
324 TABLET DELAYED RESPONSE ORAL 2 TIMES DAILY WITH MEALS
Qty: 30 TABLET | Status: ON HOLD
Start: 2017-04-07 | End: 2017-04-19 | Stop reason: DRUGHIGH

## 2017-04-07 RX ORDER — METRONIDAZOLE 500 MG/1
500 TABLET ORAL 3 TIMES DAILY
Qty: 17 TABLET | Refills: 0 | Status: SHIPPED | OUTPATIENT
Start: 2017-04-07 | End: 2017-04-13

## 2017-04-07 RX ORDER — PANTOPRAZOLE SODIUM 40 MG/1
40 TABLET, DELAYED RELEASE ORAL DAILY
Qty: 30 TABLET | Refills: 0 | Status: SHIPPED | OUTPATIENT
Start: 2017-04-07 | End: 2017-04-21 | Stop reason: HOSPADM

## 2017-04-07 RX ORDER — FUROSEMIDE 40 MG/1
40 TABLET ORAL ONCE
Status: COMPLETED | OUTPATIENT
Start: 2017-04-07 | End: 2017-04-07

## 2017-04-07 RX ORDER — LEVOFLOXACIN 500 MG/1
500 TABLET, FILM COATED ORAL DAILY
Qty: 5 TABLET | Refills: 0 | Status: SHIPPED | OUTPATIENT
Start: 2017-04-08 | End: 2017-04-13

## 2017-04-07 RX ADMIN — SUCRALFATE 1 G: 1 SUSPENSION ORAL at 00:00

## 2017-04-07 RX ADMIN — FUROSEMIDE 40 MG: 40 TABLET ORAL at 13:02

## 2017-04-07 RX ADMIN — IPRATROPIUM BROMIDE AND ALBUTEROL SULFATE 3 ML: 2.5; .5 SOLUTION RESPIRATORY (INHALATION) at 07:52

## 2017-04-07 RX ADMIN — PANTOPRAZOLE SODIUM 40 MG: 40 TABLET, DELAYED RELEASE ORAL at 06:56

## 2017-04-07 RX ADMIN — IPRATROPIUM BROMIDE AND ALBUTEROL SULFATE 3 ML: 2.5; .5 SOLUTION RESPIRATORY (INHALATION) at 11:33

## 2017-04-07 RX ADMIN — METRONIDAZOLE 500 MG: 500 INJECTION, SOLUTION INTRAVENOUS at 06:58

## 2017-04-07 RX ADMIN — IPRATROPIUM BROMIDE AND ALBUTEROL SULFATE 3 ML: 2.5; .5 SOLUTION RESPIRATORY (INHALATION) at 00:12

## 2017-04-07 RX ADMIN — SUCRALFATE 1 G: 1 SUSPENSION ORAL at 06:56

## 2017-04-07 RX ADMIN — LEVOFLOXACIN 500 MG: 500 INJECTION, SOLUTION INTRAVENOUS at 10:54

## 2017-04-07 NOTE — DISCHARGE SUMMARY
[facility]  DISCHARGE SUMMARY       Date of Admission: 3/29/2017  Date of Discharge:  4/7/2017  Primary Care Physician: Tomas Palomino MD    Discharge Diagnoses:  Hospital Problem List     Abdominal abscess      Assessment:  1. Diverticular abscess status post percutaneous drain placement-  2. Abdominal pain  3. Leukocytosis-improving  4. Right middle lobe infiltrate/pneumonia  5. Chronic respiratory failure-oxygen at 3L at home.   6. Anemia, normocytic  7. Anaerobic gram negative ambar on body fluid culture-staph was contaminant.-Bacteroides fragilis  8. Hypokalemia - resolved        Procedures Performed: CT scan directed aspiration and drain placement of this rectus sheath abscess and hematoma 3/29/2017    Pertinent Test Results:   Lab Results (all)     Procedure Component Value Units Date/Time    C-reactive Protein [[id number]]  (Abnormal) Collected:  03/29/17 1051    Specimen:  Blood Updated:  03/29/17 1217     C-Reactive Protein 14.42 (H) mg/dL     CBC (No Diff) [[id number]]  (Abnormal) Collected:  03/30/17 0722    Specimen:  Blood Updated:  03/30/17 0744     WBC 15.19 (H) 10*3/mm3      RBC 3.50 (L) 10*6/mm3      Hemoglobin 9.5 (L) g/dL      Hematocrit 30.0 (L) %      MCV 85.7 fL      MCH 27.1 (L) pg      MCHC 31.7 (L) g/dL      RDW 16.4 (H) %      RDW-SD 51.1 fl      MPV 10.0 fL      Platelets 333 10*3/mm3     Protime-INR [[id number]]  (Abnormal) Collected:  03/30/17 0722    Specimen:  Blood Updated:  03/30/17 0754     Protime 15.7 (H) Seconds      INR 1.21 (H)    Sedimentation Rate [[id number]]  (Abnormal) Collected:  03/30/17 0722    Specimen:  Blood Updated:  03/30/17 0757     Sed Rate 113 (H) mm/hr     Comprehensive Metabolic Panel [[id number]]  (Abnormal) Collected:  03/30/17 0722    Specimen:  Blood Updated:  03/30/17 0802     Glucose 83 mg/dL      BUN 17 mg/dL      Creatinine 0.79 mg/dL      Sodium 136 mmol/L      Potassium 3.5 mmol/L      Chloride 97 (L) mmol/L       CO2 28.0 mmol/L      Calcium 8.8 mg/dL      Total Protein 5.6 (L) g/dL      Albumin 2.80 (L) g/dL      ALT (SGPT) 68 (H) U/L      AST (SGOT) 38 U/L      Alkaline Phosphatase 89 U/L      Total Bilirubin 0.4 mg/dL      eGFR Non African Amer 73 mL/min/1.73      Globulin 2.8 gm/dL      A/G Ratio 1.0 (L) g/dL      BUN/Creatinine Ratio 21.5     Anion Gap 11.0 mmol/L     C-reactive Protein [[id number]]  (Abnormal) Collected:  03/30/17 0722    Specimen:  Blood Updated:  03/30/17 0849     C-Reactive Protein 14.80 (H) mg/dL     aPTT [[id number]]  (Abnormal) Collected:  03/30/17 0722    Specimen:  Blood Updated:  03/30/17 0926     PTT 21.3 (L) seconds     Body Fluid Cell Count With Differential [[id number]] Collected:  03/30/17 1103    Specimen:  Body Fluid from Peritoneum Updated:  03/30/17 1307    Narrative:       The following orders were created for panel order Body Fluid Cell Count With Differential.  Procedure                               Abnormality         Status                     ---------                               -----------         ------                     Body fluid cell count[[id number]]         Abnormal            Final result                 Please view results for these tests on the individual orders.    Body fluid cell count [[id number]]  (Abnormal) Collected:  03/30/17 1103    Specimen:  Body Fluid from Peritoneum Updated:  03/30/17 1307     Color, Fluid Brown     Appearance, Fluid Other (A)      Unable to perform count due to solidified specimen        RBC, Fluid -- /mm3       Unable to perform count due to solidified specimen.  RBC are present        Nucleated Cells, Fluid -- /mm3       Unable to perform count due to solidified specimen.  TNTC WBC present       Body fluid cell count [[id number]]  (Abnormal) Collected:  03/30/17 1059    Specimen:  Body Fluid from Peritoneum Updated:  03/30/17 1308     Color, Fluid Brown     Appearance, Fluid Other (A)      Unable to perform count due to solidified  specimen        RBC, Fluid -- /mm3       Unable to perform count due to solidified specimen        Nucleated Cells, Fluid -- /mm3       Unable to perform count due to solidified specimen       Sedimentation Rate [[id number]]  (Abnormal) Collected:  03/31/17 0616    Specimen:  Blood Updated:  03/31/17 0642     Sed Rate 72 (H) mm/hr     Respiratory Culture [[id number]] Collected:  03/30/17 1219    Specimen:  Sputum from Cough Updated:  04/01/17 0704     Respiratory Culture Light growth (2+) Normal Respiratory Renny     Gram Stain Result Greater than 25 WBCs per low power field      Rare (1+) Epithelial cells per low power field      Few (2+) Mixed gram positive renny    Urinalysis With / Microscopic If Indicated [[id number]]  (Abnormal) Collected:  04/01/17 2030    Specimen:  Urine from Urine, Clean Catch Updated:  04/01/17 2057     Color, UA Dark Yellow (A)     Appearance, UA Cloudy (A)     pH, UA 5.5     Specific Gravity, UA 1.025     Glucose, UA Negative     Ketones, UA Trace (A)     Bilirubin, UA Small (1+) (A)     Blood, UA Moderate (2+) (A)     Protein, UA Trace (A)     Leuk Esterase, UA Moderate (2+) (A)     Nitrite, UA Negative     Urobilinogen, UA 1.0 E.U./dL    Urinalysis, Microscopic Only [[id number]]  (Abnormal) Collected:  04/01/17 2030    Specimen:  Urine from Urine, Clean Catch Updated:  04/01/17 2140     RBC, UA 13-20 (A) /HPF      WBC, UA 13-20 (A) /HPF      Bacteria, UA 1+ (A) /HPF      Squamous Epithelial Cells, UA 7-12 (A) /HPF      Renal Epithelial Cells, UA 3-6 (A) /HPF      Hyaline Casts, UA 7-12 /LPF      Granular Casts, UA 3-6 /LPF      Mucus, UA Small/1+ (A) /HPF      Methodology Manual Light Microscopy    Body Fluid Culture [[id number]]  (Abnormal)  (Susceptibility) Collected:  03/30/17 1059    Specimen:  Body Fluid from Peritoneum Updated:  04/02/17 0630     BF Culture Light growth (2+) Staphylococcus, coagulase negative (A)      Scant growth (1+) Mixed Gram Positive Renny (A)      Probable  Contaminant          Gram Stain Result Many (4+) WBCs seen      Many (4+) Gram positive bacilli      Many (4+) Gram positive cocci      Few (2+) Gram negative bacilli    Susceptibility      Staphylococcus, coagulase negative     NINA     Clindamycin <=0.25 ug/ml Susceptible     Erythromycin 0.5 ug/ml Susceptible     Gentamicin <=0.5 ug/ml Susceptible     Inducible Clindamycin Resistance NEG  Negative     Levofloxacin <=0.12 ug/ml Susceptible  [1]      Oxacillin <=0.25 ug/ml Susceptible     Penicillin G <=0.03 ug/ml Resistant     Tetracycline <=1 ug/ml Susceptible     Vancomycin <=0.5 ug/ml Susceptible            [1]   Staphylococcus species may develop resistance during prolonged therapy with quinolones. Isolates that are initially susceptible may become resistant within three to four days after initiation of therapy. Testing of repeat isolates may be warranted.              Susceptibility Comments     Staphylococcus, coagulase negative      This isolate does not demonstrate inducible clindamycin resistance in vitro.    This isolate does not demonstrate inducible clindamycin resistance in vitro.               Anaerobic Culture [[id number]]  (Abnormal) Collected:  03/30/17 1059    Specimen:  Body Fluid from Peritoneum Updated:  04/03/17 1238     Culture Heavy growth (4+) Bacteroides fragilis (A)     BETA LACTAMASE Positive    Anaerobic Culture [[id number]]  (Abnormal) Collected:  03/30/17 1103    Specimen:  Body Fluid from Peritoneum Updated:  04/03/17 1241     Culture Heavy growth (4+) Bacteroides fragilis (A)     BETA LACTAMASE Positive    Body Fluid Culture [[id number]]  (Abnormal) Collected:  03/30/17 1103    Specimen:  Body Fluid from Peritoneum Updated:  04/05/17 1019     BF Culture Light growth (2+) Mixed Gram Positive Gaby (A)     Gram Stain Result Many (4+) WBCs seen      Many (4+) Gram positive cocci      Moderate (3+) Gram positive bacilli    Narrative:       Probable Contaminant    CBC (No Diff) [[id number]]   (Abnormal) Collected:  04/06/17 0616    Specimen:  Blood Updated:  04/06/17 0658     WBC 9.23 10*3/mm3      RBC 3.56 (L) 10*6/mm3      Hemoglobin 9.6 (L) g/dL      Hematocrit 31.3 (L) %      MCV 87.9 fL      MCH 27.0 (L) pg      MCHC 30.7 (L) g/dL      RDW 16.6 (H) %      RDW-SD 53.5 fl      MPV 9.7 fL      Platelets 383 10*3/mm3     Comprehensive Metabolic Panel [[id number]]  (Abnormal) Collected:  04/06/17 0616    Specimen:  Blood Updated:  04/06/17 0706     Glucose 119 (H) mg/dL      BUN 16 mg/dL      Creatinine 0.76 mg/dL      Sodium 139 mmol/L      Potassium 4.0 mmol/L      Chloride 96 (L) mmol/L      CO2 33.0 (H) mmol/L      Calcium 9.2 mg/dL      Total Protein 6.2 (L) g/dL      Albumin 3.00 (L) g/dL      ALT (SGPT) 36 U/L      AST (SGOT) 39 U/L      Alkaline Phosphatase 82 U/L      Total Bilirubin 0.2 mg/dL      eGFR Non African Amer 76 mL/min/1.73      Globulin 3.2 gm/dL      A/G Ratio 0.9 (L) g/dL      BUN/Creatinine Ratio 21.1     Anion Gap 10.0 mmol/L     Fungus Culture [[id number]] Collected:  03/30/17 1059    Specimen:  Body Fluid from Peritoneum Updated:  04/06/17 1201     Fungus Culture No fungus isolated at 1 week    AFB Culture [[id number]]  (Normal) Collected:  03/30/17 1059    Specimen:  Body Fluid from Peritoneum Updated:  04/06/17 1201     AFB Culture No AFB isolated at 1 week     AFB Stain No acid fast bacilli seen on direct smear      No acid fast bacilli seen on concentrated smear    AFB Culture [[id number]]  (Normal) Collected:  03/30/17 1103    Specimen:  Body Fluid from Peritoneum Updated:  04/06/17 1201     AFB Culture No AFB isolated at 1 week     AFB Stain No acid fast bacilli seen on direct smear      No acid fast bacilli seen on concentrated smear    Fungus Culture [[id number]] Collected:  03/30/17 1146    Specimen:  Body Fluid from Peritoneum Updated:  04/06/17 1201     Fungus Culture No fungus isolated at 1 week          Consults: Frederick Tee M.D. general surgeon                   [place]  "[facility]    Chief Complaint on Day of Discharge: She is to go home    Hospital Course  Patient is a 66 y.o. female presented in transfer from [facility] with complaints of vague abdominal pain for which a CT study of the abdomen and pelvis revealed 7.9 x 3.7 fluid and gas filled collection, diverticula are abscess.  Patient had initially been hospitalized at that facility for exacerbation of COPD.  Gen. surgery was consulted and a drain was placed on date of admission and she has been undergoing IV antibiotic therapy for positive culture Bacteroides fragilis.  Patient has progressed well.  COPD is stable and she will be discharged on her previous home meds.  She does have significant lower extremity edema which is chronic however is somewhat worse at this time, her high diarrhea was never resumed during her stay but she has been receiving intermittent Lasix and will have a another dose 40 mg prior to discharge today.  Urinalysis on 42 2017 was positive for RBCs and WBCs however had 7-12 epithelial cells therefore after completion of antibiotic regimen she will repeat urinalysis prior to appointment with her primary care provider.  She has been chronically anemic but stable.  [place] drain was removed this morning by Dr. Bang and she has been cleared by surgery for discharge.  Therefore, she will be discharged home via private vehicle in stable condition.  She will follow-up with both Dr. Tee and her primary care provider next week.    Condition on Discharge:  Stable    Physical Exam on Discharge:  /71 (BP Location: Left arm, Patient Position: Lying)  Pulse 86  Temp 97.9 °F (36.6 °C) (Oral)   Resp 20  Ht 63\" (160 cm)  Wt 221 lb 12.8 oz (101 kg) Comment: bed zero'd  SpO2 96%  BMI 39.29 kg/m2     Physical Exam  Constitutional: She is oriented to person, place, and time. She appears well-developed and well-nourished. No distress.   Grossly obese   HENT:   Head: " Normocephalic and atraumatic.   Eyes: Conjunctivae and EOM are normal. Pupils are equal, round, and reactive to light. No scleral icterus.   Neck: Normal range of motion. Neck supple. No JVD present. No tracheal deviation present.   Cardiovascular: Normal rate, regular rhythm, normal heart sounds and intact distal pulses. Exam reveals no gallop.   No murmur heard.  Pulmonary/Chest: Effort normal and breath sounds normal. No respiratory distress. She has no wheezes. She has no rales.   Abdominal: Soft. Bowel sounds are normal. She exhibits no distension. There is no tenderness. There is no guarding.   Musculoskeletal: She exhibits edema (bilateral lower extremity lymphedema with symptoms of chronic venous stasis).   Neurological: She is alert and oriented to person, place, and time.   No obvious deficits noted.   Skin: Skin is warm and dry. No rash noted. She is not diaphoretic. No erythema. No pallor.   Psychiatric: She has a normal mood and affect. Her behavior is normal.   Vitals reviewed.    Discharge Disposition:  Home or Self Care    Discharge Medications:   Emy Bermudez   Home Medication Instructions TIMOTHY:[id number]    Printed on:04/07/17 3382   Medication Information                      albuterol (PROVENTIL HFA;VENTOLIN HFA) 108 (90 BASE) MCG/ACT inhaler  Inhale 2 puffs Every 4 (Four) Hours As Needed for Wheezing.             budesonide-formoterol (SYMBICORT) 160-4.5 MCG/ACT inhaler  Inhale 2 puffs 2 (Two) Times a Day.             docusate sodium (COLACE) 100 MG capsule  Take 300 mg by mouth Daily As Needed for Constipation.             ferrous sulfate 324 (65 FE) MG tablet delayed-release EC tablet  Take 1 tablet by mouth 2 (Two) Times a Day With Meals.             hydrochlorothiazide (HYDRODIURIL) 25 MG tablet  Take 25 mg by mouth Daily.             levoFLOXacin (LEVAQUIN) 500 MG tablet  Take 1 tablet by mouth Daily for 5 days.             levothyroxine (SYNTHROID, LEVOTHROID) 75 MCG tablet  Take 75 mcg by  mouth Daily.             meclizine (ANTIVERT) 25 MG tablet  Take 25 mg by mouth 2 (Two) Times a Day As Needed for dizziness.             metroNIDAZOLE (FLAGYL) 500 MG tablet  Take 1 tablet by mouth 3 (Three) Times a Day for 17 doses.             pantoprazole (PROTONIX) 40 MG EC tablet  Take 1 tablet by mouth Daily.             pneumococcal polysaccharide 23-valent (PNEUMOVAX-23) 25 MCG/0.5ML vaccine  Inject 0.5 mL into the shoulder, thigh, or buttocks During Hospitalization (if appropriate) for up to 1 dose.             potassium chloride (K-DUR,KLOR-CON) 10 MEQ CR tablet  Take 10 mEq by mouth 2 (Two) Times a Day.                 Discharge Diet:   Diet Instructions     Diet: Regular, Specialty Diet; Thin Liquids, No Restrictions; Low Sodium       Discharge Diet:   Regular  Specialty Diet      Fluid Consistency:  Thin Liquids, No Restrictions   Specialty Diets:  Low Sodium                 Discharge Care Plan / Instructions:   1.  Unless minimal drainage noted at which point apply dry dressing.  If drainage increases notify surgeon.  2.  Repeat urinalysis with culture if needed prior to appointment with primary care provider    Activity at Discharge:   Activity Instructions     Activity as Tolerated                     Follow-up Appointments: 1 week with general surgeon, Dr. Frederick Tee      Next week with primary care provider    Test Results Pending at Discharge: None     Plan discussed with Dr. Eleno Tolentino.     Time spent in face-to-face evaluation, chart review, planning and education 35 minutes.    Lavinia Chou, JOAQUÍN  04/07/17  11:18 AM

## 2017-04-07 NOTE — PROGRESS NOTES
Lani Camarillo MD Progress Note     LOS: 9 days   Patient Care Team:  Tomas Palomino MD as PCP - General (Internal Medicine)        Subjective     Doing well.  Minimal out of drain    Objective     Vital Signs  Temp:  [97.8 °F (36.6 °C)-98.3 °F (36.8 °C)] 97.9 °F (36.6 °C)  Heart Rate:  [70-86] 86  Resp:  [16-20] 20  BP: ()/(49-71) 142/71    Intake & Output (last 3 days)       04/04 0701 - 04/05 0700 04/05 0701 - 04/06 0700 04/06 0701 - 04/07 0700 04/07 0701 - 04/08 0700    P.O.  960 840     IV Piggyback 400       Total Intake(mL/kg) 400 (4.3) 960 (10.3) 840 (8.3)     Urine (mL/kg/hr) 175 (0.1) 450 (0.2) 875 (0.4)     Other 20 (0) 10 (0) 20 (0)     Stool 0 (0) 0 (0) 0 (0)     Total Output 195 460 895      Net +205 +500 -55              Unmeasured Urine Occurrence 4 x 2 x 2 x     Unmeasured Stool Occurrence 4 x 2 x 3 x           Physical Exam:     General Appearance:    Alert, cooperative, in no acute distress   Lungs:     respirations regular, even and unlabored    Heart:    Regular rhythm and normal rate, normal S1 and S2, no            murmur, no gallop, no rub   Chest Wall:    No abnormalities observed   Abdomen:      unchanged    Extremities: No edema, + SCDs   Results Review:     I reviewed the patient's new clinical results.    Lab Results (last 72 hours)     Procedure Component Value Units Date/Time    CBC & Differential [[id number]] Collected:  04/05/17 0514    Specimen:  Blood Updated:  04/05/17 0533    Narrative:       The following orders were created for panel order CBC & Differential.  Procedure                               Abnormality         Status                     ---------                               -----------         ------                     CBC Auto Differential[[id number]]         Abnormal            Final result                 Please view results for these tests on the individual orders.    CBC Auto Differential [[id number]]  (Abnormal) Collected:  04/05/17 0514    Specimen:  Blood  Updated:  04/05/17 0533     WBC 11.78 (H) 10*3/mm3      RBC 3.68 (L) 10*6/mm3      Hemoglobin 9.8 (L) g/dL      Hematocrit 32.1 (L) %      MCV 87.2 fL      MCH 26.6 (L) pg      MCHC 30.5 (L) g/dL      RDW 16.5 (H) %      RDW-SD 52.6 fl      MPV 9.7 fL      Platelets 385 10*3/mm3      Neutrophil % 65.7 %      Lymphocyte % 21.5 %      Monocyte % 4.9 %      Eosinophil % 7.0 (H) %      Basophil % 0.3 %      Immature Grans % 0.6 %      Neutrophils, Absolute 7.75 10*3/mm3      Lymphocytes, Absolute 2.53 10*3/mm3      Monocytes, Absolute 0.58 10*3/mm3      Eosinophils, Absolute 0.82 (H) 10*3/mm3      Basophils, Absolute 0.03 10*3/mm3      Immature Grans, Absolute 0.07 (H) 10*3/mm3     Body Fluid Culture [[id number]]  (Abnormal) Collected:  03/30/17 1103    Specimen:  Body Fluid from Peritoneum Updated:  04/05/17 1019     BF Culture Light growth (2+) Mixed Gram Positive Gaby (A)     Gram Stain Result Many (4+) WBCs seen      Many (4+) Gram positive cocci      Moderate (3+) Gram positive bacilli    Narrative:       Probable Contaminant    CBC (No Diff) [[id number]]  (Abnormal) Collected:  04/06/17 0616    Specimen:  Blood Updated:  04/06/17 0658     WBC 9.23 10*3/mm3      RBC 3.56 (L) 10*6/mm3      Hemoglobin 9.6 (L) g/dL      Hematocrit 31.3 (L) %      MCV 87.9 fL      MCH 27.0 (L) pg      MCHC 30.7 (L) g/dL      RDW 16.6 (H) %      RDW-SD 53.5 fl      MPV 9.7 fL      Platelets 383 10*3/mm3     Comprehensive Metabolic Panel [[id number]]  (Abnormal) Collected:  04/06/17 0616    Specimen:  Blood Updated:  04/06/17 0706     Glucose 119 (H) mg/dL      BUN 16 mg/dL      Creatinine 0.76 mg/dL      Sodium 139 mmol/L      Potassium 4.0 mmol/L      Chloride 96 (L) mmol/L      CO2 33.0 (H) mmol/L      Calcium 9.2 mg/dL      Total Protein 6.2 (L) g/dL      Albumin 3.00 (L) g/dL      ALT (SGPT) 36 U/L      AST (SGOT) 39 U/L      Alkaline Phosphatase 82 U/L      Total Bilirubin 0.2 mg/dL      eGFR Non African Amer 76 mL/min/1.73       Globulin 3.2 gm/dL      A/G Ratio 0.9 (L) g/dL      BUN/Creatinine Ratio 21.1     Anion Gap 10.0 mmol/L     Fungus Culture [[id number]] Collected:  03/30/17 1059    Specimen:  Body Fluid from Peritoneum Updated:  04/06/17 1201     Fungus Culture No fungus isolated at 1 week    AFB Culture [[id number]]  (Normal) Collected:  03/30/17 1059    Specimen:  Body Fluid from Peritoneum Updated:  04/06/17 1201     AFB Culture No AFB isolated at 1 week     AFB Stain No acid fast bacilli seen on direct smear      No acid fast bacilli seen on concentrated smear    AFB Culture [[id number]]  (Normal) Collected:  03/30/17 1103    Specimen:  Body Fluid from Peritoneum Updated:  04/06/17 1201     AFB Culture No AFB isolated at 1 week     AFB Stain No acid fast bacilli seen on direct smear      No acid fast bacilli seen on concentrated smear    Fungus Culture [[id number]] Collected:  03/30/17 1146    Specimen:  Body Fluid from Peritoneum Updated:  04/06/17 1201     Fungus Culture No fungus isolated at 1 week        Imaging Results (last 72 hours)     Procedure Component Value Units Date/Time    CT Abdomen Pelvis With Contrast [[id number]] Collected:  04/04/17 0949     Updated:  04/04/17 1154    Narrative:       EXAMINATION:  CT ABDOMEN PELVIS W CONTRAST-  4/4/2017 10:06 AM EDT     HISTORY: Follow-up on percutaneous drained abscess; L02.91-Cutaneous  abscess, unspecified.     TECHNIQUE: Spiral CT was performed of the abdomen and pelvis with  contrast. Multiplanar images were reconstructed.     DLP: 1124 mGy-cm. Automated dosage control was utilized.     COMPARISON: 03/29/2017.      LUNG BASES: There is mild atelectasis in both lung bases.     LIVER AND SPLEEN: The visualized liver and spleen are unremarkable. No  obvious gallstones.     PANCREAS: No pancreatic mass or inflammatory change.     KIDNEYS AND ADRENALS: The adrenal glands are unremarkable. There is a  1.5 cm cyst in the lower pole right kidney. There is another 1.4 cm  hypodense area  within the mid to lower pole on the right that is  probably also a cyst but not as well-defined. The left kidney is  unremarkable. There is mild thickening of the anterior wall of the  urinary bladder. An abscess collection previously seen just above the  bladder in this area is smaller in size. It now measures about 1.5 cm in  diameter and previously measured about 3.3 cm.     BOWEL: There is continued wall thickening of the midsigmoid colon with  stranding of the adjacent fat. The fat stranding is definitely improved  compared to the prior study. A small peritoneal abscess along the  anterior wall of the urinary bladder is smaller measuring 1.5 cm in  diameter and previously measuring 3.3 cm. An abscess within the anterior  abdominal wall is nearly completely resolved. A drainage catheter  extends down to this location. Small bowel loops are nondilated. There  is moderate stool in the colon.     OTHER: There is atheromatous disease of the aortoiliac vessels. A less  than 1 cm rounded density in the stomach is probably a food particle. A  gastric polyp is felt to be less likely.       Impression:       1. Mild atelectasis in both lung bases.  2. There is a 1.5 cm cyst in the lower pole right kidney. There is  another 1.4 cm hypodense area within the mid to lower pole right kidney  that is probably also a cyst but is not as well-defined. Ultrasound  would probably not be useful given the patient's body habitus. Consider  a follow-up study in 6 months.  3. There is continued diverticulitis involving the sigmoid colon.  However, inflammatory changes are improving. An abscess along the  anterior wall of the urinary bladder is smaller in size with size  measurements listed above. An abscess within the anterior abdominal wall  with a drainage catheter is nearly completely resolved.  4. Thickening of the wall of the bladder anteriorly consistent with  inflammation. The abscess directly adjacent to this is smaller in  size.  5. A less than 1 cm rounded density in the stomach is probably a food  particle. A small gastric polyp is less likely.  This report was finalized on 04/04/2017 11:51 by Dr. Tunde Christopher MD.              Assessment/Plan     Active Problems:    Abdominal abscess      DC drain.  Okay to DC home on oral antibiotics from my standpoint.  Follow up with Dr. Tee in 1 week      Lani Camarillo MD  04/07/17  7:28 AM

## 2017-04-07 NOTE — PLAN OF CARE
Problem: Patient Care Overview (Adult)  Goal: Plan of Care Review  Outcome: Ongoing (interventions implemented as appropriate)  Iv abx cont. o2 per nc at 3liters as used at home. No resp distress. Edema noted bilat lower legs with dry/ scaly skin. Drain noted lower abd- scant drainage, drk tea color. + bm - soft / brn. kevin po intake.     04/07/17 0240   Coping/Psychosocial Response Interventions   Plan Of Care Reviewed With patient   Patient Care Overview   Progress no change       Goal: Adult Individualization and Mutuality  Outcome: Ongoing (interventions implemented as appropriate)  Goal: Discharge Needs Assessment  Outcome: Ongoing (interventions implemented as appropriate)    Problem: Pneumonia (Adult)  Goal: Signs and Symptoms of Listed Potential Problems Will be Absent or Manageable (Pneumonia)  Outcome: Ongoing (interventions implemented as appropriate)    Problem: Pain, Acute (Adult)  Goal: Acceptable Pain Control/Comfort Level  Outcome: Ongoing (interventions implemented as appropriate)

## 2017-04-19 ENCOUNTER — HOSPITAL ENCOUNTER (INPATIENT)
Facility: HOSPITAL | Age: 67
LOS: 2 days | Discharge: ANOTHER HOSPITAL | End: 2017-04-21
Attending: INTERNAL MEDICINE | Admitting: FAMILY MEDICINE

## 2017-04-19 ENCOUNTER — APPOINTMENT (OUTPATIENT)
Dept: CT IMAGING | Facility: HOSPITAL | Age: 67
End: 2017-04-19

## 2017-04-19 DIAGNOSIS — L02.211 ABSCESS OF ABDOMINAL WALL: ICD-10-CM

## 2017-04-19 DIAGNOSIS — Z74.09 IMPAIRED FUNCTIONAL MOBILITY, BALANCE, GAIT, AND ENDURANCE: ICD-10-CM

## 2017-04-19 LAB — CRP SERPL-MCNC: 31.87 MG/DL (ref 0–0.99)

## 2017-04-19 PROCEDURE — 74178 CT ABD&PLV WO CNTR FLWD CNTR: CPT

## 2017-04-19 PROCEDURE — 25010000002 ONDANSETRON PER 1 MG: Performed by: INTERNAL MEDICINE

## 2017-04-19 PROCEDURE — 86140 C-REACTIVE PROTEIN: CPT | Performed by: SPECIALIST

## 2017-04-19 PROCEDURE — 0 IOHEXOL 300 MG/ML SOLUTION: Performed by: SPECIALIST

## 2017-04-19 PROCEDURE — 25010000002 MEROPENEM: Performed by: INTERNAL MEDICINE

## 2017-04-19 PROCEDURE — 02HV33Z INSERTION OF INFUSION DEVICE INTO SUPERIOR VENA CAVA, PERCUTANEOUS APPROACH: ICD-10-PCS | Performed by: FAMILY MEDICINE

## 2017-04-19 PROCEDURE — 0 IOPAMIDOL 61 % SOLUTION: Performed by: FAMILY MEDICINE

## 2017-04-19 PROCEDURE — 25010000002 ENOXAPARIN PER 10 MG: Performed by: INTERNAL MEDICINE

## 2017-04-19 PROCEDURE — 94799 UNLISTED PULMONARY SVC/PX: CPT

## 2017-04-19 PROCEDURE — C1751 CATH, INF, PER/CENT/MIDLINE: HCPCS

## 2017-04-19 PROCEDURE — 25010000002 MORPHINE SULFATE (PF) 2 MG/ML SOLUTION: Performed by: INTERNAL MEDICINE

## 2017-04-19 RX ORDER — LEVOTHYROXINE SODIUM ANHYDROUS 100 UG/5ML
50 INJECTION, POWDER, LYOPHILIZED, FOR SOLUTION INTRAVENOUS
Status: DISCONTINUED | OUTPATIENT
Start: 2017-04-19 | End: 2017-04-20

## 2017-04-19 RX ORDER — DEXTROSE, SODIUM CHLORIDE, AND POTASSIUM CHLORIDE 5; .45; .15 G/100ML; G/100ML; G/100ML
20 INJECTION INTRAVENOUS CONTINUOUS
Status: DISCONTINUED | OUTPATIENT
Start: 2017-04-19 | End: 2017-04-21 | Stop reason: HOSPADM

## 2017-04-19 RX ORDER — FERROUS SULFATE 325(65) MG
650 TABLET ORAL
COMMUNITY
End: 2017-04-21 | Stop reason: HOSPADM

## 2017-04-19 RX ORDER — MORPHINE SULFATE 2 MG/ML
2 INJECTION, SOLUTION INTRAMUSCULAR; INTRAVENOUS EVERY 4 HOURS PRN
Status: DISCONTINUED | OUTPATIENT
Start: 2017-04-19 | End: 2017-04-21 | Stop reason: HOSPADM

## 2017-04-19 RX ORDER — ONDANSETRON 2 MG/ML
4 INJECTION INTRAMUSCULAR; INTRAVENOUS EVERY 6 HOURS PRN
Status: DISCONTINUED | OUTPATIENT
Start: 2017-04-19 | End: 2017-04-21 | Stop reason: HOSPADM

## 2017-04-19 RX ORDER — SODIUM CHLORIDE 0.9 % (FLUSH) 0.9 %
1-10 SYRINGE (ML) INJECTION AS NEEDED
Status: DISCONTINUED | OUTPATIENT
Start: 2017-04-19 | End: 2017-04-21 | Stop reason: HOSPADM

## 2017-04-19 RX ORDER — IPRATROPIUM BROMIDE AND ALBUTEROL SULFATE 2.5; .5 MG/3ML; MG/3ML
3 SOLUTION RESPIRATORY (INHALATION) EVERY 4 HOURS PRN
Status: DISCONTINUED | OUTPATIENT
Start: 2017-04-19 | End: 2017-04-21 | Stop reason: HOSPADM

## 2017-04-19 RX ORDER — LIDOCAINE HYDROCHLORIDE 10 MG/ML
1 INJECTION, SOLUTION INFILTRATION; PERINEURAL ONCE
Status: COMPLETED | OUTPATIENT
Start: 2017-04-19 | End: 2017-04-19

## 2017-04-19 RX ORDER — PANTOPRAZOLE SODIUM 40 MG/10ML
40 INJECTION, POWDER, LYOPHILIZED, FOR SOLUTION INTRAVENOUS
Status: DISCONTINUED | OUTPATIENT
Start: 2017-04-19 | End: 2017-04-21 | Stop reason: HOSPADM

## 2017-04-19 RX ORDER — SODIUM CHLORIDE 9 MG/ML
100 INJECTION, SOLUTION INTRAVENOUS CONTINUOUS
Status: DISCONTINUED | OUTPATIENT
Start: 2017-04-19 | End: 2017-04-19

## 2017-04-19 RX ADMIN — ONDANSETRON 4 MG: 2 INJECTION INTRAMUSCULAR; INTRAVENOUS at 08:41

## 2017-04-19 RX ADMIN — ENOXAPARIN SODIUM 40 MG: 40 INJECTION SUBCUTANEOUS at 08:41

## 2017-04-19 RX ADMIN — MORPHINE SULFATE 2 MG: 2 INJECTION, SOLUTION INTRAMUSCULAR; INTRAVENOUS at 08:26

## 2017-04-19 RX ADMIN — MORPHINE SULFATE 2 MG: 2 INJECTION, SOLUTION INTRAMUSCULAR; INTRAVENOUS at 21:28

## 2017-04-19 RX ADMIN — LIDOCAINE HYDROCHLORIDE 1 ML: 10 INJECTION, SOLUTION INFILTRATION; PERINEURAL at 19:18

## 2017-04-19 RX ADMIN — IOPAMIDOL 100 ML: 612 INJECTION, SOLUTION INTRAVENOUS at 22:30

## 2017-04-19 RX ADMIN — SODIUM CHLORIDE 100 ML/HR: 9 INJECTION, SOLUTION INTRAVENOUS at 15:39

## 2017-04-19 RX ADMIN — IOHEXOL 50 ML: 300 INJECTION, SOLUTION INTRAVENOUS at 19:46

## 2017-04-19 RX ADMIN — MEROPENEM 1 G: 1 INJECTION, POWDER, FOR SOLUTION INTRAVENOUS at 08:41

## 2017-04-19 RX ADMIN — MEROPENEM 1 G: 1 INJECTION, POWDER, FOR SOLUTION INTRAVENOUS at 23:53

## 2017-04-19 RX ADMIN — MORPHINE SULFATE 2 MG: 2 INJECTION, SOLUTION INTRAMUSCULAR; INTRAVENOUS at 13:21

## 2017-04-19 RX ADMIN — PANTOPRAZOLE SODIUM 40 MG: 40 INJECTION, POWDER, FOR SOLUTION INTRAVENOUS at 08:33

## 2017-04-19 RX ADMIN — MEROPENEM 1 G: 1 INJECTION, POWDER, FOR SOLUTION INTRAVENOUS at 15:33

## 2017-04-19 RX ADMIN — LEVOTHYROXINE SODIUM ANHYDROUS 50 MCG: 100 INJECTION, POWDER, LYOPHILIZED, FOR SOLUTION INTRAVENOUS at 10:23

## 2017-04-20 ENCOUNTER — APPOINTMENT (OUTPATIENT)
Dept: CT IMAGING | Facility: HOSPITAL | Age: 67
End: 2017-04-20

## 2017-04-20 LAB
ALBUMIN SERPL-MCNC: 3 G/DL (ref 3.5–5)
ALBUMIN/GLOB SERPL: 0.9 G/DL (ref 1.1–2.5)
ALP SERPL-CCNC: 83 U/L (ref 24–120)
ALT SERPL W P-5'-P-CCNC: 16 U/L (ref 0–54)
ANION GAP SERPL CALCULATED.3IONS-SCNC: 11 MMOL/L (ref 4–13)
APTT PPP: 37.4 SECONDS (ref 24.1–34.8)
AST SERPL-CCNC: 25 U/L (ref 7–45)
BASOPHILS # BLD AUTO: 0.02 10*3/MM3 (ref 0–0.2)
BASOPHILS NFR BLD AUTO: 0.2 % (ref 0–2)
BILIRUB SERPL-MCNC: 0.7 MG/DL (ref 0.1–1)
BUN BLD-MCNC: 11 MG/DL (ref 5–21)
BUN/CREAT SERPL: 12.8 (ref 7–25)
CALCIUM SPEC-SCNC: 8.5 MG/DL (ref 8.4–10.4)
CHLORIDE SERPL-SCNC: 94 MMOL/L (ref 98–110)
CO2 SERPL-SCNC: 29 MMOL/L (ref 24–31)
CREAT BLD-MCNC: 0.86 MG/DL (ref 0.5–1.4)
CRP SERPL-MCNC: 36.06 MG/DL (ref 0–0.99)
DEPRECATED RDW RBC AUTO: 54.3 FL (ref 40–54)
EOSINOPHIL # BLD AUTO: 0.18 10*3/MM3 (ref 0–0.7)
EOSINOPHIL NFR BLD AUTO: 1.7 % (ref 0–4)
ERYTHROCYTE [DISTWIDTH] IN BLOOD BY AUTOMATED COUNT: 17.1 % (ref 12–15)
ERYTHROCYTE [SEDIMENTATION RATE] IN BLOOD: 70 MM/HR (ref 0–20)
GFR SERPL CREATININE-BSD FRML MDRD: 66 ML/MIN/1.73
GLOBULIN UR ELPH-MCNC: 3.3 GM/DL
GLUCOSE BLD-MCNC: 93 MG/DL (ref 70–100)
HCT VFR BLD AUTO: 27.8 % (ref 37–47)
HGB BLD-MCNC: 8.7 G/DL (ref 12–16)
IMM GRANULOCYTES # BLD: 0.04 10*3/MM3 (ref 0–0.03)
IMM GRANULOCYTES NFR BLD: 0.4 % (ref 0–5)
INR PPP: 1.24 (ref 0.91–1.09)
LYMPHOCYTES # BLD AUTO: 0.91 10*3/MM3 (ref 0.72–4.86)
LYMPHOCYTES NFR BLD AUTO: 8.5 % (ref 15–45)
MCH RBC QN AUTO: 27.2 PG (ref 28–32)
MCHC RBC AUTO-ENTMCNC: 31.3 G/DL (ref 33–36)
MCV RBC AUTO: 86.9 FL (ref 82–98)
MONOCYTES # BLD AUTO: 1.39 10*3/MM3 (ref 0.19–1.3)
MONOCYTES NFR BLD AUTO: 13.1 % (ref 4–12)
NEUTROPHILS # BLD AUTO: 8.11 10*3/MM3 (ref 1.87–8.4)
NEUTROPHILS NFR BLD AUTO: 76.1 % (ref 39–78)
PLATELET # BLD AUTO: 282 10*3/MM3 (ref 130–400)
PMV BLD AUTO: 9.6 FL (ref 6–12)
POTASSIUM BLD-SCNC: 3.6 MMOL/L (ref 3.5–5.3)
PROT SERPL-MCNC: 6.3 G/DL (ref 6.3–8.7)
PROTHROMBIN TIME: 16 SECONDS (ref 11.9–14.6)
RBC # BLD AUTO: 3.2 10*6/MM3 (ref 4.2–5.4)
SODIUM BLD-SCNC: 134 MMOL/L (ref 135–145)
WBC NRBC COR # BLD: 10.65 10*3/MM3 (ref 4.8–10.8)

## 2017-04-20 PROCEDURE — 94799 UNLISTED PULMONARY SVC/PX: CPT

## 2017-04-20 PROCEDURE — 85025 COMPLETE CBC W/AUTO DIFF WBC: CPT | Performed by: NURSE PRACTITIONER

## 2017-04-20 PROCEDURE — 25010000002 MORPHINE SULFATE (PF) 2 MG/ML SOLUTION: Performed by: INTERNAL MEDICINE

## 2017-04-20 PROCEDURE — 97161 PT EVAL LOW COMPLEX 20 MIN: CPT

## 2017-04-20 PROCEDURE — 25010000002 MEROPENEM: Performed by: INTERNAL MEDICINE

## 2017-04-20 PROCEDURE — 25010000002 ENOXAPARIN PER 10 MG: Performed by: INTERNAL MEDICINE

## 2017-04-20 PROCEDURE — 80053 COMPREHEN METABOLIC PANEL: CPT | Performed by: SPECIALIST

## 2017-04-20 PROCEDURE — 94760 N-INVAS EAR/PLS OXIMETRY 1: CPT

## 2017-04-20 PROCEDURE — 0W9G3ZX DRAINAGE OF PERITONEAL CAVITY, PERCUTANEOUS APPROACH, DIAGNOSTIC: ICD-10-PCS | Performed by: RADIOLOGY

## 2017-04-20 PROCEDURE — 85610 PROTHROMBIN TIME: CPT | Performed by: FAMILY MEDICINE

## 2017-04-20 PROCEDURE — 86140 C-REACTIVE PROTEIN: CPT | Performed by: NURSE PRACTITIONER

## 2017-04-20 PROCEDURE — G8978 MOBILITY CURRENT STATUS: HCPCS

## 2017-04-20 PROCEDURE — 25010000002 MEROPENEM: Performed by: NURSE PRACTITIONER

## 2017-04-20 PROCEDURE — G8979 MOBILITY GOAL STATUS: HCPCS

## 2017-04-20 PROCEDURE — 85651 RBC SED RATE NONAUTOMATED: CPT | Performed by: SPECIALIST

## 2017-04-20 PROCEDURE — 85730 THROMBOPLASTIN TIME PARTIAL: CPT | Performed by: FAMILY MEDICINE

## 2017-04-20 RX ORDER — LEVOTHYROXINE SODIUM 0.07 MG/1
75 TABLET ORAL
Status: DISCONTINUED | OUTPATIENT
Start: 2017-04-21 | End: 2017-04-21 | Stop reason: HOSPADM

## 2017-04-20 RX ORDER — BUDESONIDE AND FORMOTEROL FUMARATE DIHYDRATE 160; 4.5 UG/1; UG/1
2 AEROSOL RESPIRATORY (INHALATION)
Status: DISCONTINUED | OUTPATIENT
Start: 2017-04-20 | End: 2017-04-21 | Stop reason: HOSPADM

## 2017-04-20 RX ORDER — SODIUM CHLORIDE 0.9 % (FLUSH) 0.9 %
1-10 SYRINGE (ML) INJECTION AS NEEDED
Status: DISCONTINUED | OUTPATIENT
Start: 2017-04-20 | End: 2017-04-21

## 2017-04-20 RX ORDER — LEVOTHYROXINE SODIUM 0.07 MG/1
75 TABLET ORAL
Status: DISCONTINUED | OUTPATIENT
Start: 2017-04-20 | End: 2017-04-20 | Stop reason: SDUPTHER

## 2017-04-20 RX ADMIN — ENOXAPARIN SODIUM 40 MG: 40 INJECTION SUBCUTANEOUS at 08:46

## 2017-04-20 RX ADMIN — LEVOTHYROXINE SODIUM ANHYDROUS 50 MCG: 100 INJECTION, POWDER, LYOPHILIZED, FOR SOLUTION INTRAVENOUS at 11:20

## 2017-04-20 RX ADMIN — MEROPENEM 1 G: 1 INJECTION, POWDER, FOR SOLUTION INTRAVENOUS at 15:34

## 2017-04-20 RX ADMIN — PANTOPRAZOLE SODIUM 40 MG: 40 INJECTION, POWDER, FOR SOLUTION INTRAVENOUS at 05:59

## 2017-04-20 RX ADMIN — MEROPENEM 1 G: 1 INJECTION, POWDER, FOR SOLUTION INTRAVENOUS at 06:32

## 2017-04-20 RX ADMIN — BUDESONIDE AND FORMOTEROL FUMARATE DIHYDRATE 2 PUFF: 160; 4.5 AEROSOL RESPIRATORY (INHALATION) at 21:25

## 2017-04-20 RX ADMIN — DEXTROSE MONOHYDRATE, SODIUM CHLORIDE, AND POTASSIUM CHLORIDE 125 ML/HR: 50; 4.5; 1.49 INJECTION, SOLUTION INTRAVENOUS at 14:05

## 2017-04-20 RX ADMIN — MORPHINE SULFATE 2 MG: 2 INJECTION, SOLUTION INTRAMUSCULAR; INTRAVENOUS at 20:33

## 2017-04-20 RX ADMIN — MEROPENEM 1 G: 1 INJECTION, POWDER, FOR SOLUTION INTRAVENOUS at 22:49

## 2017-04-20 RX ADMIN — MORPHINE SULFATE 2 MG: 2 INJECTION, SOLUTION INTRAMUSCULAR; INTRAVENOUS at 14:05

## 2017-04-20 RX ADMIN — DEXTROSE MONOHYDRATE, SODIUM CHLORIDE, AND POTASSIUM CHLORIDE 125 ML/HR: 50; 4.5; 1.49 INJECTION, SOLUTION INTRAVENOUS at 05:59

## 2017-04-21 ENCOUNTER — APPOINTMENT (OUTPATIENT)
Dept: CT IMAGING | Facility: HOSPITAL | Age: 67
End: 2017-04-21

## 2017-04-21 ENCOUNTER — APPOINTMENT (OUTPATIENT)
Dept: GENERAL RADIOLOGY | Facility: HOSPITAL | Age: 67
End: 2017-04-21

## 2017-04-21 VITALS
HEIGHT: 63 IN | OXYGEN SATURATION: 98 % | DIASTOLIC BLOOD PRESSURE: 58 MMHG | TEMPERATURE: 98.9 F | HEART RATE: 97 BPM | BODY MASS INDEX: 37.86 KG/M2 | RESPIRATION RATE: 16 BRPM | SYSTOLIC BLOOD PRESSURE: 114 MMHG | WEIGHT: 213.7 LBS

## 2017-04-21 LAB
ALBUMIN SERPL-MCNC: 2.9 G/DL (ref 3.5–5)
ALBUMIN/GLOB SERPL: 0.9 G/DL (ref 1.1–2.5)
ALP SERPL-CCNC: 91 U/L (ref 24–120)
ALT SERPL W P-5'-P-CCNC: 24 U/L (ref 0–54)
ANION GAP SERPL CALCULATED.3IONS-SCNC: 10 MMOL/L (ref 4–13)
APTT PPP: 35.7 SECONDS (ref 24.1–34.8)
AST SERPL-CCNC: 43 U/L (ref 7–45)
BILIRUB SERPL-MCNC: 0.3 MG/DL (ref 0.1–1)
BUN BLD-MCNC: 10 MG/DL (ref 5–21)
BUN/CREAT SERPL: 12.5 (ref 7–25)
CALCIUM SPEC-SCNC: 8.3 MG/DL (ref 8.4–10.4)
CHLORIDE SERPL-SCNC: 95 MMOL/L (ref 98–110)
CO2 SERPL-SCNC: 29 MMOL/L (ref 24–31)
CREAT BLD-MCNC: 0.8 MG/DL (ref 0.5–1.4)
CRP SERPL-MCNC: 29.79 MG/DL (ref 0–0.99)
DEPRECATED RDW RBC AUTO: 54.5 FL (ref 40–54)
ERYTHROCYTE [DISTWIDTH] IN BLOOD BY AUTOMATED COUNT: 17.2 % (ref 12–15)
ERYTHROCYTE [SEDIMENTATION RATE] IN BLOOD: 85 MM/HR (ref 0–20)
GFR SERPL CREATININE-BSD FRML MDRD: 72 ML/MIN/1.73
GLOBULIN UR ELPH-MCNC: 3.3 GM/DL
GLUCOSE BLD-MCNC: 126 MG/DL (ref 70–100)
HCT VFR BLD AUTO: 27.1 % (ref 37–47)
HGB BLD-MCNC: 8.5 G/DL (ref 12–16)
INR PPP: 1.16 (ref 0.91–1.09)
KOH PREP NAIL: NORMAL
MCH RBC QN AUTO: 27.1 PG (ref 28–32)
MCHC RBC AUTO-ENTMCNC: 31.4 G/DL (ref 33–36)
MCV RBC AUTO: 86.3 FL (ref 82–98)
PLATELET # BLD AUTO: 282 10*3/MM3 (ref 130–400)
PMV BLD AUTO: 9.6 FL (ref 6–12)
POTASSIUM BLD-SCNC: 4 MMOL/L (ref 3.5–5.3)
PROT SERPL-MCNC: 6.2 G/DL (ref 6.3–8.7)
PROTHROMBIN TIME: 15.2 SECONDS (ref 11.9–14.6)
RBC # BLD AUTO: 3.14 10*6/MM3 (ref 4.2–5.4)
SODIUM BLD-SCNC: 134 MMOL/L (ref 135–145)
WBC NRBC COR # BLD: 11.91 10*3/MM3 (ref 4.8–10.8)

## 2017-04-21 PROCEDURE — 87070 CULTURE OTHR SPECIMN AEROBIC: CPT | Performed by: SPECIALIST

## 2017-04-21 PROCEDURE — 94799 UNLISTED PULMONARY SVC/PX: CPT

## 2017-04-21 PROCEDURE — 25010000002 FENTANYL CITRATE (PF) 100 MCG/2ML SOLUTION: Performed by: RADIOLOGY

## 2017-04-21 PROCEDURE — 71020 HC CHEST PA AND LATERAL: CPT

## 2017-04-21 PROCEDURE — 85730 THROMBOPLASTIN TIME PARTIAL: CPT | Performed by: NURSE PRACTITIONER

## 2017-04-21 PROCEDURE — 25010000002 MORPHINE SULFATE (PF) 2 MG/ML SOLUTION: Performed by: INTERNAL MEDICINE

## 2017-04-21 PROCEDURE — 87076 CULTURE ANAEROBE IDENT EACH: CPT | Performed by: SPECIALIST

## 2017-04-21 PROCEDURE — 85651 RBC SED RATE NONAUTOMATED: CPT | Performed by: SPECIALIST

## 2017-04-21 PROCEDURE — 87206 SMEAR FLUORESCENT/ACID STAI: CPT | Performed by: SPECIALIST

## 2017-04-21 PROCEDURE — 87077 CULTURE AEROBIC IDENTIFY: CPT | Performed by: SPECIALIST

## 2017-04-21 PROCEDURE — 85610 PROTHROMBIN TIME: CPT | Performed by: NURSE PRACTITIONER

## 2017-04-21 PROCEDURE — 80053 COMPREHEN METABOLIC PANEL: CPT | Performed by: SPECIALIST

## 2017-04-21 PROCEDURE — 25010000002 ENOXAPARIN PER 10 MG: Performed by: NURSE PRACTITIONER

## 2017-04-21 PROCEDURE — 87186 SC STD MICRODIL/AGAR DIL: CPT | Performed by: SPECIALIST

## 2017-04-21 PROCEDURE — 87116 MYCOBACTERIA CULTURE: CPT | Performed by: SPECIALIST

## 2017-04-21 PROCEDURE — 87106 FUNGI IDENTIFICATION YEAST: CPT | Performed by: SPECIALIST

## 2017-04-21 PROCEDURE — 75989 ABSCESS DRAINAGE UNDER X-RAY: CPT

## 2017-04-21 PROCEDURE — 87102 FUNGUS ISOLATION CULTURE: CPT | Performed by: SPECIALIST

## 2017-04-21 PROCEDURE — 25010000002 MIDAZOLAM PER 1 MG: Performed by: RADIOLOGY

## 2017-04-21 PROCEDURE — 25010000002 MEROPENEM: Performed by: NURSE PRACTITIONER

## 2017-04-21 PROCEDURE — 86140 C-REACTIVE PROTEIN: CPT | Performed by: SPECIALIST

## 2017-04-21 PROCEDURE — 87220 TISSUE EXAM FOR FUNGI: CPT | Performed by: FAMILY MEDICINE

## 2017-04-21 PROCEDURE — 87075 CULTR BACTERIA EXCEPT BLOOD: CPT | Performed by: SPECIALIST

## 2017-04-21 PROCEDURE — 87205 SMEAR GRAM STAIN: CPT | Performed by: SPECIALIST

## 2017-04-21 PROCEDURE — 87015 SPECIMEN INFECT AGNT CONCNTJ: CPT | Performed by: SPECIALIST

## 2017-04-21 PROCEDURE — 85027 COMPLETE CBC AUTOMATED: CPT | Performed by: SPECIALIST

## 2017-04-21 PROCEDURE — 87185 SC STD ENZYME DETCJ PER NZM: CPT | Performed by: SPECIALIST

## 2017-04-21 RX ORDER — ONDANSETRON 2 MG/ML
4 INJECTION INTRAMUSCULAR; INTRAVENOUS EVERY 6 HOURS PRN
Status: ON HOLD
Start: 2017-04-21 | End: 2018-04-04

## 2017-04-21 RX ORDER — MIDAZOLAM HYDROCHLORIDE 1 MG/ML
INJECTION INTRAMUSCULAR; INTRAVENOUS
Status: COMPLETED | OUTPATIENT
Start: 2017-04-21 | End: 2017-04-21

## 2017-04-21 RX ORDER — PANTOPRAZOLE SODIUM 40 MG/10ML
40 INJECTION, POWDER, LYOPHILIZED, FOR SOLUTION INTRAVENOUS
Status: ON HOLD
Start: 2017-04-21 | End: 2018-04-04

## 2017-04-21 RX ORDER — FENTANYL CITRATE 50 UG/ML
INJECTION, SOLUTION INTRAMUSCULAR; INTRAVENOUS
Status: COMPLETED | OUTPATIENT
Start: 2017-04-21 | End: 2017-04-21

## 2017-04-21 RX ADMIN — MEROPENEM 1 G: 1 INJECTION, POWDER, FOR SOLUTION INTRAVENOUS at 16:03

## 2017-04-21 RX ADMIN — MORPHINE SULFATE 2 MG: 2 INJECTION, SOLUTION INTRAMUSCULAR; INTRAVENOUS at 04:23

## 2017-04-21 RX ADMIN — FENTANYL CITRATE 25 MCG: 50 INJECTION, SOLUTION INTRAMUSCULAR; INTRAVENOUS at 09:37

## 2017-04-21 RX ADMIN — MIDAZOLAM 1 MG: 1 INJECTION INTRAMUSCULAR; INTRAVENOUS at 09:37

## 2017-04-21 RX ADMIN — MEROPENEM 1 G: 1 INJECTION, POWDER, FOR SOLUTION INTRAVENOUS at 08:10

## 2017-04-21 RX ADMIN — PANTOPRAZOLE SODIUM 40 MG: 40 INJECTION, POWDER, FOR SOLUTION INTRAVENOUS at 06:11

## 2017-04-21 RX ADMIN — BUDESONIDE AND FORMOTEROL FUMARATE DIHYDRATE 2 PUFF: 160; 4.5 AEROSOL RESPIRATORY (INHALATION) at 08:01

## 2017-04-21 RX ADMIN — ENOXAPARIN SODIUM 40 MG: 40 INJECTION SUBCUTANEOUS at 12:26

## 2017-04-21 RX ADMIN — MORPHINE SULFATE 2 MG: 2 INJECTION, SOLUTION INTRAMUSCULAR; INTRAVENOUS at 00:39

## 2017-04-24 LAB
BACTERIA FLD CULT: ABNORMAL
BACTERIA FLD CULT: ABNORMAL
GRAM STN SPEC: ABNORMAL

## 2017-04-28 LAB
B-LACTAMASE USUAL SUSC ISLT: POSITIVE
BACTERIA SPEC ANAEROBE CULT: ABNORMAL

## 2017-05-10 ENCOUNTER — HOSPITAL ENCOUNTER (EMERGENCY)
Facility: HOSPITAL | Age: 67
Discharge: HOME | End: 2017-05-10
Attending: EMERGENCY MEDICINE | Admitting: EMERGENCY MEDICINE

## 2017-05-10 VITALS
BODY MASS INDEX: 38.27 KG/M2 | WEIGHT: 216 LBS | DIASTOLIC BLOOD PRESSURE: 70 MMHG | HEIGHT: 63 IN | TEMPERATURE: 98.1 F | HEART RATE: 86 BPM | RESPIRATION RATE: 18 BRPM | SYSTOLIC BLOOD PRESSURE: 140 MMHG | OXYGEN SATURATION: 100 %

## 2017-05-10 DIAGNOSIS — Z48.03 ENCOUNTER FOR CHANGE OR REMOVAL OF DRAINS: Primary | ICD-10-CM

## 2017-05-10 PROCEDURE — 99284 EMERGENCY DEPT VISIT MOD MDM: CPT

## 2017-05-10 RX ORDER — HYDROCHLOROTHIAZIDE 25 MG/1
25 TABLET ORAL NIGHTLY
COMMUNITY
End: 2018-04-13 | Stop reason: HOSPADM

## 2017-05-11 LAB
FUNGUS WND CULT: NORMAL
FUNGUS WND CULT: NORMAL
MYCOBACTERIUM SPEC CULT: NORMAL
MYCOBACTERIUM SPEC CULT: NORMAL
NIGHT BLUE STAIN TISS: NORMAL

## 2017-06-02 LAB
FUNGUS WND CULT: ABNORMAL
MYCOBACTERIUM SPEC CULT: NORMAL
NIGHT BLUE STAIN TISS: NORMAL
NIGHT BLUE STAIN TISS: NORMAL

## 2018-04-03 ENCOUNTER — HOSPITAL ENCOUNTER (INPATIENT)
Facility: HOSPITAL | Age: 68
LOS: 10 days | Discharge: NURSING, REHAB OR LONG-TERM CARE | End: 2018-04-13
Attending: FAMILY MEDICINE | Admitting: INTERNAL MEDICINE

## 2018-04-03 ENCOUNTER — APPOINTMENT (OUTPATIENT)
Dept: CT IMAGING | Facility: HOSPITAL | Age: 68
End: 2018-04-03

## 2018-04-03 ENCOUNTER — APPOINTMENT (OUTPATIENT)
Dept: GENERAL RADIOLOGY | Facility: HOSPITAL | Age: 68
End: 2018-04-03

## 2018-04-03 DIAGNOSIS — Z74.09 IMPAIRED FUNCTIONAL MOBILITY, BALANCE, GAIT, AND ENDURANCE: ICD-10-CM

## 2018-04-03 DIAGNOSIS — K56.609 COLONIC OBSTRUCTION (HCC): Primary | ICD-10-CM

## 2018-04-03 DIAGNOSIS — IMO0002 ABDOMINAL ABSCESS: ICD-10-CM

## 2018-04-03 LAB
ABO GROUP BLD: NORMAL
ALBUMIN SERPL-MCNC: 3.2 G/DL (ref 3.5–5)
ALBUMIN/GLOB SERPL: 1 G/DL (ref 1.1–2.5)
ALP SERPL-CCNC: 91 U/L (ref 24–120)
ALT SERPL W P-5'-P-CCNC: 30 U/L (ref 0–54)
ANION GAP SERPL CALCULATED.3IONS-SCNC: 5 MMOL/L (ref 4–13)
AST SERPL-CCNC: 29 U/L (ref 7–45)
BASOPHILS # BLD AUTO: 0.03 10*3/MM3 (ref 0–0.2)
BASOPHILS NFR BLD AUTO: 0.3 % (ref 0–2)
BILIRUB SERPL-MCNC: 0.3 MG/DL (ref 0.1–1)
BLD GP AB SCN SERPL QL: NEGATIVE
BUN BLD-MCNC: 11 MG/DL (ref 5–21)
BUN/CREAT SERPL: 14.5 (ref 7–25)
CALCIUM SPEC-SCNC: 9.2 MG/DL (ref 8.4–10.4)
CHLORIDE SERPL-SCNC: 94 MMOL/L (ref 98–110)
CO2 SERPL-SCNC: 35 MMOL/L (ref 24–31)
CREAT BLD-MCNC: 0.76 MG/DL (ref 0.5–1.4)
DEPRECATED RDW RBC AUTO: 49.5 FL (ref 40–54)
EOSINOPHIL # BLD AUTO: 0.2 10*3/MM3 (ref 0–0.7)
EOSINOPHIL NFR BLD AUTO: 1.8 % (ref 0–4)
ERYTHROCYTE [DISTWIDTH] IN BLOOD BY AUTOMATED COUNT: 14.7 % (ref 12–15)
GFR SERPL CREATININE-BSD FRML MDRD: 76 ML/MIN/1.73
GLOBULIN UR ELPH-MCNC: 3.3 GM/DL
GLUCOSE BLD-MCNC: 91 MG/DL (ref 70–100)
HCT VFR BLD AUTO: 32.3 % (ref 37–47)
HGB BLD-MCNC: 10.7 G/DL (ref 12–16)
IMM GRANULOCYTES # BLD: 0.07 10*3/MM3 (ref 0–0.03)
IMM GRANULOCYTES NFR BLD: 0.6 % (ref 0–5)
LYMPHOCYTES # BLD AUTO: 1.85 10*3/MM3 (ref 0.72–4.86)
LYMPHOCYTES NFR BLD AUTO: 16.6 % (ref 15–45)
MCH RBC QN AUTO: 30.1 PG (ref 28–32)
MCHC RBC AUTO-ENTMCNC: 33.1 G/DL (ref 33–36)
MCV RBC AUTO: 91 FL (ref 82–98)
MONOCYTES # BLD AUTO: 0.73 10*3/MM3 (ref 0.19–1.3)
MONOCYTES NFR BLD AUTO: 6.5 % (ref 4–12)
NEUTROPHILS # BLD AUTO: 8.27 10*3/MM3 (ref 1.87–8.4)
NEUTROPHILS NFR BLD AUTO: 74.2 % (ref 39–78)
NRBC BLD MANUAL-RTO: 0 /100 WBC (ref 0–0)
PLATELET # BLD AUTO: 356 10*3/MM3 (ref 130–400)
PMV BLD AUTO: 8.7 FL (ref 6–12)
POTASSIUM BLD-SCNC: 3.5 MMOL/L (ref 3.5–5.3)
PROT SERPL-MCNC: 6.5 G/DL (ref 6.3–8.7)
RBC # BLD AUTO: 3.55 10*6/MM3 (ref 4.2–5.4)
RH BLD: POSITIVE
SODIUM BLD-SCNC: 134 MMOL/L (ref 135–145)
T&S EXPIRATION DATE: NORMAL
TSH SERPL DL<=0.05 MIU/L-ACNC: 1.41 MIU/ML (ref 0.47–4.68)
WBC NRBC COR # BLD: 11.15 10*3/MM3 (ref 4.8–10.8)

## 2018-04-03 PROCEDURE — 86900 BLOOD TYPING SEROLOGIC ABO: CPT | Performed by: SPECIALIST

## 2018-04-03 PROCEDURE — 71045 X-RAY EXAM CHEST 1 VIEW: CPT

## 2018-04-03 PROCEDURE — 86920 COMPATIBILITY TEST SPIN: CPT

## 2018-04-03 PROCEDURE — 86901 BLOOD TYPING SEROLOGIC RH(D): CPT | Performed by: SPECIALIST

## 2018-04-03 PROCEDURE — 25010000002 MEROPENEM PER 100 MG: Performed by: SPECIALIST

## 2018-04-03 PROCEDURE — 84443 ASSAY THYROID STIM HORMONE: CPT | Performed by: FAMILY MEDICINE

## 2018-04-03 PROCEDURE — 94799 UNLISTED PULMONARY SVC/PX: CPT

## 2018-04-03 PROCEDURE — 85025 COMPLETE CBC W/AUTO DIFF WBC: CPT | Performed by: FAMILY MEDICINE

## 2018-04-03 PROCEDURE — 80053 COMPREHEN METABOLIC PANEL: CPT | Performed by: FAMILY MEDICINE

## 2018-04-03 PROCEDURE — 86850 RBC ANTIBODY SCREEN: CPT | Performed by: SPECIALIST

## 2018-04-03 PROCEDURE — 25010000002 MEROPENEM PER 100 MG: Performed by: FAMILY MEDICINE

## 2018-04-03 PROCEDURE — 25010000002 ONDANSETRON PER 1 MG: Performed by: FAMILY MEDICINE

## 2018-04-03 PROCEDURE — 74176 CT ABD & PELVIS W/O CONTRAST: CPT

## 2018-04-03 RX ORDER — ALBUTEROL SULFATE 2.5 MG/3ML
2.5 SOLUTION RESPIRATORY (INHALATION) EVERY 6 HOURS PRN
Status: DISCONTINUED | OUTPATIENT
Start: 2018-04-03 | End: 2018-04-13 | Stop reason: HOSPADM

## 2018-04-03 RX ORDER — SODIUM CHLORIDE 9 MG/ML
100 INJECTION, SOLUTION INTRAVENOUS CONTINUOUS
Status: DISCONTINUED | OUTPATIENT
Start: 2018-04-03 | End: 2018-04-04

## 2018-04-03 RX ORDER — ALUMINA, MAGNESIA, AND SIMETHICONE 2400; 2400; 240 MG/30ML; MG/30ML; MG/30ML
15 SUSPENSION ORAL EVERY 6 HOURS PRN
Status: DISCONTINUED | OUTPATIENT
Start: 2018-04-03 | End: 2018-04-03

## 2018-04-03 RX ORDER — LEVOTHYROXINE SODIUM 0.07 MG/1
75 TABLET ORAL
Status: DISCONTINUED | OUTPATIENT
Start: 2018-04-03 | End: 2018-04-04

## 2018-04-03 RX ORDER — LACTULOSE 20 G/30ML
20 SOLUTION ORAL DAILY
Status: DISCONTINUED | OUTPATIENT
Start: 2018-04-03 | End: 2018-04-03

## 2018-04-03 RX ORDER — ONDANSETRON 2 MG/ML
4 INJECTION INTRAMUSCULAR; INTRAVENOUS EVERY 6 HOURS PRN
Status: DISCONTINUED | OUTPATIENT
Start: 2018-04-03 | End: 2018-04-04

## 2018-04-03 RX ORDER — SODIUM CHLORIDE 0.9 % (FLUSH) 0.9 %
1-10 SYRINGE (ML) INJECTION AS NEEDED
Status: DISCONTINUED | OUTPATIENT
Start: 2018-04-03 | End: 2018-04-04

## 2018-04-03 RX ORDER — BUDESONIDE AND FORMOTEROL FUMARATE DIHYDRATE 160; 4.5 UG/1; UG/1
2 AEROSOL RESPIRATORY (INHALATION)
Status: DISCONTINUED | OUTPATIENT
Start: 2018-04-03 | End: 2018-04-13 | Stop reason: HOSPADM

## 2018-04-03 RX ORDER — DOCUSATE SODIUM 100 MG/1
300 CAPSULE, LIQUID FILLED ORAL DAILY PRN
Status: DISCONTINUED | OUTPATIENT
Start: 2018-04-03 | End: 2018-04-03

## 2018-04-03 RX ORDER — POTASSIUM CHLORIDE 750 MG/1
10 CAPSULE, EXTENDED RELEASE ORAL 2 TIMES DAILY WITH MEALS
Status: DISCONTINUED | OUTPATIENT
Start: 2018-04-03 | End: 2018-04-04

## 2018-04-03 RX ORDER — ONDANSETRON 2 MG/ML
4 INJECTION INTRAMUSCULAR; INTRAVENOUS EVERY 6 HOURS PRN
Status: DISCONTINUED | OUTPATIENT
Start: 2018-04-03 | End: 2018-04-03 | Stop reason: SDUPTHER

## 2018-04-03 RX ADMIN — LACTULOSE 20 G: 20 SOLUTION ORAL at 14:05

## 2018-04-03 RX ADMIN — POTASSIUM CHLORIDE 10 MEQ: 750 CAPSULE, EXTENDED RELEASE ORAL at 18:01

## 2018-04-03 RX ADMIN — LEVOTHYROXINE SODIUM 75 MCG: 75 TABLET ORAL at 14:26

## 2018-04-03 RX ADMIN — ONDANSETRON 4 MG: 2 INJECTION INTRAMUSCULAR; INTRAVENOUS at 19:26

## 2018-04-03 RX ADMIN — MEROPENEM 1 G: 1 INJECTION, POWDER, FOR SOLUTION INTRAVENOUS at 22:43

## 2018-04-03 RX ADMIN — BUDESONIDE AND FORMOTEROL FUMARATE DIHYDRATE 2 PUFF: 160; 4.5 AEROSOL RESPIRATORY (INHALATION) at 22:21

## 2018-04-03 RX ADMIN — MEROPENEM 1 G: 1 INJECTION, POWDER, FOR SOLUTION INTRAVENOUS at 14:05

## 2018-04-03 RX ADMIN — SODIUM CHLORIDE 100 ML/HR: 9 INJECTION, SOLUTION INTRAVENOUS at 16:15

## 2018-04-04 ENCOUNTER — APPOINTMENT (OUTPATIENT)
Dept: GENERAL RADIOLOGY | Facility: HOSPITAL | Age: 68
End: 2018-04-04

## 2018-04-04 ENCOUNTER — ANESTHESIA EVENT (OUTPATIENT)
Dept: PERIOP | Facility: HOSPITAL | Age: 68
End: 2018-04-04

## 2018-04-04 ENCOUNTER — ANESTHESIA (OUTPATIENT)
Dept: PERIOP | Facility: HOSPITAL | Age: 68
End: 2018-04-04

## 2018-04-04 PROBLEM — E03.9 HYPOTHYROID: Status: ACTIVE | Noted: 2018-04-04

## 2018-04-04 PROBLEM — J44.9 COPD (CHRONIC OBSTRUCTIVE PULMONARY DISEASE) (HCC): Status: ACTIVE | Noted: 2018-04-04

## 2018-04-04 PROBLEM — K63.2 ABDOMINAL WALL FISTULA: Status: ACTIVE | Noted: 2018-04-04

## 2018-04-04 LAB
ANION GAP SERPL CALCULATED.3IONS-SCNC: 3 MMOL/L (ref 4–13)
ANION GAP SERPL CALCULATED.3IONS-SCNC: 6 MMOL/L (ref 4–13)
ARTERIAL PATENCY WRIST A: ABNORMAL
ATMOSPHERIC PRESS: 757 MMHG
BASE EXCESS BLDA CALC-SCNC: 6.4 MMOL/L (ref 0–2)
BASOPHILS # BLD AUTO: 0.03 10*3/MM3 (ref 0–0.2)
BASOPHILS NFR BLD AUTO: 0.3 % (ref 0–2)
BDY SITE: ABNORMAL
BILIRUB UR QL STRIP: NEGATIVE
BODY TEMPERATURE: 37 C
BUN BLD-MCNC: 11 MG/DL (ref 5–21)
BUN BLD-MCNC: 13 MG/DL (ref 5–21)
BUN/CREAT SERPL: 16.9 (ref 7–25)
BUN/CREAT SERPL: 17.1 (ref 7–25)
CALCIUM SPEC-SCNC: 8.1 MG/DL (ref 8.4–10.4)
CALCIUM SPEC-SCNC: 8.7 MG/DL (ref 8.4–10.4)
CHLORIDE SERPL-SCNC: 98 MMOL/L (ref 98–110)
CHLORIDE SERPL-SCNC: 99 MMOL/L (ref 98–110)
CLARITY UR: ABNORMAL
CO2 SERPL-SCNC: 32 MMOL/L (ref 24–31)
CO2 SERPL-SCNC: 32 MMOL/L (ref 24–31)
COLOR UR: YELLOW
CREAT BLD-MCNC: 0.65 MG/DL (ref 0.5–1.4)
CREAT BLD-MCNC: 0.76 MG/DL (ref 0.5–1.4)
DEPRECATED RDW RBC AUTO: 48.4 FL (ref 40–54)
EOSINOPHIL # BLD AUTO: 0.06 10*3/MM3 (ref 0–0.7)
EOSINOPHIL NFR BLD AUTO: 0.5 % (ref 0–4)
ERYTHROCYTE [DISTWIDTH] IN BLOOD BY AUTOMATED COUNT: 14.6 % (ref 12–15)
GFR SERPL CREATININE-BSD FRML MDRD: 76 ML/MIN/1.73
GFR SERPL CREATININE-BSD FRML MDRD: 91 ML/MIN/1.73
GLUCOSE BLD-MCNC: 113 MG/DL (ref 70–100)
GLUCOSE BLD-MCNC: 132 MG/DL (ref 70–100)
GLUCOSE UR STRIP-MCNC: NEGATIVE MG/DL
HCO3 BLDA-SCNC: 30.9 MMOL/L (ref 20–26)
HCT VFR BLD AUTO: 30.2 % (ref 37–47)
HGB BLD-MCNC: 9.9 G/DL (ref 12–16)
HGB UR QL STRIP.AUTO: NEGATIVE
IMM GRANULOCYTES # BLD: 0.07 10*3/MM3 (ref 0–0.03)
IMM GRANULOCYTES NFR BLD: 0.6 % (ref 0–5)
INR PPP: 1.07 (ref 0.91–1.09)
KETONES UR QL STRIP: ABNORMAL
LEUKOCYTE ESTERASE UR QL STRIP.AUTO: NEGATIVE
LYMPHOCYTES # BLD AUTO: 1.31 10*3/MM3 (ref 0.72–4.86)
LYMPHOCYTES NFR BLD AUTO: 11.4 % (ref 15–45)
MCH RBC QN AUTO: 29.6 PG (ref 28–32)
MCHC RBC AUTO-ENTMCNC: 32.8 G/DL (ref 33–36)
MCV RBC AUTO: 90.4 FL (ref 82–98)
MODALITY: ABNORMAL
MONOCYTES # BLD AUTO: 0.64 10*3/MM3 (ref 0.19–1.3)
MONOCYTES NFR BLD AUTO: 5.6 % (ref 4–12)
NEUTROPHILS # BLD AUTO: 9.38 10*3/MM3 (ref 1.87–8.4)
NEUTROPHILS NFR BLD AUTO: 81.6 % (ref 39–78)
NITRITE UR QL STRIP: NEGATIVE
NRBC BLD MANUAL-RTO: 0 /100 WBC (ref 0–0)
PCO2 BLDA: 43.6 MM HG (ref 35–45)
PH BLDA: 7.46 PH UNITS (ref 7.35–7.45)
PH UR STRIP.AUTO: 7.5 [PH] (ref 5–8)
PLATELET # BLD AUTO: 356 10*3/MM3 (ref 130–400)
PMV BLD AUTO: 9 FL (ref 6–12)
PO2 BLDA: 81.5 MM HG (ref 83–108)
POTASSIUM BLD-SCNC: 3 MMOL/L (ref 3.5–5.3)
POTASSIUM BLD-SCNC: 4.3 MMOL/L (ref 3.5–5.3)
PROT UR QL STRIP: NEGATIVE
PROTHROMBIN TIME: 14.2 SECONDS (ref 11.9–14.6)
RBC # BLD AUTO: 3.34 10*6/MM3 (ref 4.2–5.4)
SAO2 % BLDCOA: 96.6 % (ref 94–99)
SODIUM BLD-SCNC: 134 MMOL/L (ref 135–145)
SODIUM BLD-SCNC: 136 MMOL/L (ref 135–145)
SP GR UR STRIP: 1.02 (ref 1–1.03)
UROBILINOGEN UR QL STRIP: ABNORMAL
VENTILATOR MODE: ABNORMAL
WBC NRBC COR # BLD: 11.49 10*3/MM3 (ref 4.8–10.8)

## 2018-04-04 PROCEDURE — 71045 X-RAY EXAM CHEST 1 VIEW: CPT

## 2018-04-04 PROCEDURE — 80048 BASIC METABOLIC PNL TOTAL CA: CPT | Performed by: FAMILY MEDICINE

## 2018-04-04 PROCEDURE — C1751 CATH, INF, PER/CENT/MIDLINE: HCPCS | Performed by: NURSE ANESTHETIST, CERTIFIED REGISTERED

## 2018-04-04 PROCEDURE — 82375 ASSAY CARBOXYHB QUANT: CPT | Performed by: ANESTHESIOLOGY

## 2018-04-04 PROCEDURE — 25010000002 ALBUMIN HUMAN 5% PER 50 ML: Performed by: NURSE ANESTHETIST, CERTIFIED REGISTERED

## 2018-04-04 PROCEDURE — 25010000002 ERTAPENEM: Performed by: SPECIALIST

## 2018-04-04 PROCEDURE — 0DTL0ZZ RESECTION OF TRANSVERSE COLON, OPEN APPROACH: ICD-10-PCS | Performed by: SPECIALIST

## 2018-04-04 PROCEDURE — 82803 BLOOD GASES ANY COMBINATION: CPT

## 2018-04-04 PROCEDURE — 88304 TISSUE EXAM BY PATHOLOGIST: CPT | Performed by: SPECIALIST

## 2018-04-04 PROCEDURE — 85610 PROTHROMBIN TIME: CPT | Performed by: SPECIALIST

## 2018-04-04 PROCEDURE — 25010000002 CEFOXITIN PER 1 G: Performed by: SPECIALIST

## 2018-04-04 PROCEDURE — 25010000002 MEROPENEM PER 100 MG: Performed by: FAMILY MEDICINE

## 2018-04-04 PROCEDURE — 25010000002 SUCCINYLCHOLINE PER 20 MG: Performed by: NURSE ANESTHETIST, CERTIFIED REGISTERED

## 2018-04-04 PROCEDURE — 25010000002 ONDANSETRON PER 1 MG: Performed by: NURSE ANESTHETIST, CERTIFIED REGISTERED

## 2018-04-04 PROCEDURE — 81003 URINALYSIS AUTO W/O SCOPE: CPT | Performed by: NURSE PRACTITIONER

## 2018-04-04 PROCEDURE — 25010000002 MEROPENEM PER 100 MG: Performed by: SPECIALIST

## 2018-04-04 PROCEDURE — 25010000002 PROPOFOL 10 MG/ML EMULSION: Performed by: NURSE ANESTHETIST, CERTIFIED REGISTERED

## 2018-04-04 PROCEDURE — P9041 ALBUMIN (HUMAN),5%, 50ML: HCPCS | Performed by: NURSE ANESTHETIST, CERTIFIED REGISTERED

## 2018-04-04 PROCEDURE — 25010000002 FENTANYL CITRATE (PF) 100 MCG/2ML SOLUTION: Performed by: NURSE ANESTHETIST, CERTIFIED REGISTERED

## 2018-04-04 PROCEDURE — 85025 COMPLETE CBC W/AUTO DIFF WBC: CPT | Performed by: FAMILY MEDICINE

## 2018-04-04 PROCEDURE — 0D1L0Z4 BYPASS TRANSVERSE COLON TO CUTANEOUS, OPEN APPROACH: ICD-10-PCS | Performed by: SPECIALIST

## 2018-04-04 PROCEDURE — 94799 UNLISTED PULMONARY SVC/PX: CPT

## 2018-04-04 PROCEDURE — 83050 HGB METHEMOGLOBIN QUAN: CPT | Performed by: ANESTHESIOLOGY

## 2018-04-04 PROCEDURE — 25010000002 DEXAMETHASONE PER 1 MG: Performed by: NURSE ANESTHETIST, CERTIFIED REGISTERED

## 2018-04-04 PROCEDURE — 82805 BLOOD GASES W/O2 SATURATION: CPT | Performed by: ANESTHESIOLOGY

## 2018-04-04 PROCEDURE — 88307 TISSUE EXAM BY PATHOLOGIST: CPT | Performed by: SPECIALIST

## 2018-04-04 PROCEDURE — 25010000002 FENTANYL CITRATE (PF) 250 MCG/5ML SOLUTION: Performed by: NURSE ANESTHETIST, CERTIFIED REGISTERED

## 2018-04-04 PROCEDURE — 36600 WITHDRAWAL OF ARTERIAL BLOOD: CPT | Performed by: ANESTHESIOLOGY

## 2018-04-04 PROCEDURE — 25010000002 HYDROMORPHONE PER 4 MG: Performed by: NURSE ANESTHETIST, CERTIFIED REGISTERED

## 2018-04-04 PROCEDURE — 25010000002 ONDANSETRON PER 1 MG: Performed by: FAMILY MEDICINE

## 2018-04-04 PROCEDURE — C1765 ADHESION BARRIER: HCPCS | Performed by: SPECIALIST

## 2018-04-04 PROCEDURE — 25010000002 HYDROMORPHONE PER 4 MG: Performed by: SPECIALIST

## 2018-04-04 RX ORDER — HYDROMORPHONE HCL IN 0.9% NACL 50 MG/50ML
PATIENT CONTROLLED ANALGESIA SYRINGE INTRAVENOUS CONTINUOUS
Status: DISCONTINUED | OUTPATIENT
Start: 2018-04-04 | End: 2018-04-11

## 2018-04-04 RX ORDER — VASOPRESSIN 20 U/ML
INJECTION PARENTERAL AS NEEDED
Status: DISCONTINUED | OUTPATIENT
Start: 2018-04-04 | End: 2018-04-04 | Stop reason: SURG

## 2018-04-04 RX ORDER — SODIUM CHLORIDE, SODIUM LACTATE, POTASSIUM CHLORIDE, CALCIUM CHLORIDE 600; 310; 30; 20 MG/100ML; MG/100ML; MG/100ML; MG/100ML
100 INJECTION, SOLUTION INTRAVENOUS CONTINUOUS
Status: DISCONTINUED | OUTPATIENT
Start: 2018-04-04 | End: 2018-04-04

## 2018-04-04 RX ORDER — SUCCINYLCHOLINE CHLORIDE 20 MG/ML
INJECTION INTRAMUSCULAR; INTRAVENOUS AS NEEDED
Status: DISCONTINUED | OUTPATIENT
Start: 2018-04-04 | End: 2018-04-04 | Stop reason: SURG

## 2018-04-04 RX ORDER — HYDROMORPHONE HCL 110MG/55ML
PATIENT CONTROLLED ANALGESIA SYRINGE INTRAVENOUS AS NEEDED
Status: DISCONTINUED | OUTPATIENT
Start: 2018-04-04 | End: 2018-04-04 | Stop reason: SURG

## 2018-04-04 RX ORDER — ONDANSETRON 2 MG/ML
INJECTION INTRAMUSCULAR; INTRAVENOUS AS NEEDED
Status: DISCONTINUED | OUTPATIENT
Start: 2018-04-04 | End: 2018-04-04 | Stop reason: SURG

## 2018-04-04 RX ORDER — IPRATROPIUM BROMIDE AND ALBUTEROL SULFATE 2.5; .5 MG/3ML; MG/3ML
3 SOLUTION RESPIRATORY (INHALATION) ONCE
Status: COMPLETED | OUTPATIENT
Start: 2018-04-04 | End: 2018-04-04

## 2018-04-04 RX ORDER — HYDRALAZINE HYDROCHLORIDE 20 MG/ML
5 INJECTION INTRAMUSCULAR; INTRAVENOUS
Status: DISCONTINUED | OUTPATIENT
Start: 2018-04-04 | End: 2018-04-04 | Stop reason: HOSPADM

## 2018-04-04 RX ORDER — LORAZEPAM 0.5 MG/1
0.5 TABLET ORAL 4 TIMES DAILY PRN
Status: DISCONTINUED | OUTPATIENT
Start: 2018-04-04 | End: 2018-04-05

## 2018-04-04 RX ORDER — DEXAMETHASONE SODIUM PHOSPHATE 4 MG/ML
INJECTION, SOLUTION INTRA-ARTICULAR; INTRALESIONAL; INTRAMUSCULAR; INTRAVENOUS; SOFT TISSUE AS NEEDED
Status: DISCONTINUED | OUTPATIENT
Start: 2018-04-04 | End: 2018-04-04 | Stop reason: SURG

## 2018-04-04 RX ORDER — LORAZEPAM 0.5 MG/1
0.5 TABLET ORAL 4 TIMES DAILY PRN
Status: ON HOLD | COMMUNITY
End: 2018-04-13

## 2018-04-04 RX ORDER — MORPHINE SULFATE 2 MG/ML
2 INJECTION, SOLUTION INTRAMUSCULAR; INTRAVENOUS AS NEEDED
Status: DISCONTINUED | OUTPATIENT
Start: 2018-04-04 | End: 2018-04-04 | Stop reason: HOSPADM

## 2018-04-04 RX ORDER — SODIUM CHLORIDE, SODIUM LACTATE, POTASSIUM CHLORIDE, CALCIUM CHLORIDE 600; 310; 30; 20 MG/100ML; MG/100ML; MG/100ML; MG/100ML
INJECTION, SOLUTION INTRAVENOUS CONTINUOUS PRN
Status: DISCONTINUED | OUTPATIENT
Start: 2018-04-04 | End: 2018-04-04 | Stop reason: SURG

## 2018-04-04 RX ORDER — LIDOCAINE HYDROCHLORIDE 20 MG/ML
INJECTION, SOLUTION INFILTRATION; PERINEURAL AS NEEDED
Status: DISCONTINUED | OUTPATIENT
Start: 2018-04-04 | End: 2018-04-04 | Stop reason: SURG

## 2018-04-04 RX ORDER — FLUMAZENIL 0.1 MG/ML
0.2 INJECTION INTRAVENOUS AS NEEDED
Status: DISCONTINUED | OUTPATIENT
Start: 2018-04-04 | End: 2018-04-04 | Stop reason: HOSPADM

## 2018-04-04 RX ORDER — SODIUM CHLORIDE 0.9 % (FLUSH) 0.9 %
1-10 SYRINGE (ML) INJECTION AS NEEDED
Status: DISCONTINUED | OUTPATIENT
Start: 2018-04-04 | End: 2018-04-04 | Stop reason: HOSPADM

## 2018-04-04 RX ORDER — POTASSIUM CHLORIDE 14.9 MG/ML
20 INJECTION INTRAVENOUS
Status: DISCONTINUED | OUTPATIENT
Start: 2018-04-04 | End: 2018-04-04 | Stop reason: SDUPTHER

## 2018-04-04 RX ORDER — ALBUTEROL SULFATE 2.5 MG/3ML
2.5 SOLUTION RESPIRATORY (INHALATION) EVERY 4 HOURS PRN
Status: ON HOLD | COMMUNITY
End: 2020-02-14

## 2018-04-04 RX ORDER — ONDANSETRON 2 MG/ML
4 INJECTION INTRAMUSCULAR; INTRAVENOUS AS NEEDED
Status: DISCONTINUED | OUTPATIENT
Start: 2018-04-04 | End: 2018-04-04 | Stop reason: HOSPADM

## 2018-04-04 RX ORDER — ONDANSETRON 2 MG/ML
4 INJECTION INTRAMUSCULAR; INTRAVENOUS EVERY 4 HOURS PRN
Status: DISCONTINUED | OUTPATIENT
Start: 2018-04-04 | End: 2018-04-13 | Stop reason: HOSPADM

## 2018-04-04 RX ORDER — FAMOTIDINE 10 MG/ML
20 INJECTION, SOLUTION INTRAVENOUS EVERY 12 HOURS SCHEDULED
Status: DISCONTINUED | OUTPATIENT
Start: 2018-04-04 | End: 2018-04-11

## 2018-04-04 RX ORDER — LABETALOL HYDROCHLORIDE 5 MG/ML
5 INJECTION, SOLUTION INTRAVENOUS
Status: DISCONTINUED | OUTPATIENT
Start: 2018-04-04 | End: 2018-04-04 | Stop reason: HOSPADM

## 2018-04-04 RX ORDER — IPRATROPIUM BROMIDE AND ALBUTEROL SULFATE 2.5; .5 MG/3ML; MG/3ML
3 SOLUTION RESPIRATORY (INHALATION) ONCE AS NEEDED
Status: DISCONTINUED | OUTPATIENT
Start: 2018-04-04 | End: 2018-04-04 | Stop reason: HOSPADM

## 2018-04-04 RX ORDER — MAGNESIUM HYDROXIDE 1200 MG/15ML
LIQUID ORAL AS NEEDED
Status: DISCONTINUED | OUTPATIENT
Start: 2018-04-04 | End: 2018-04-04 | Stop reason: HOSPADM

## 2018-04-04 RX ORDER — ALBUMIN, HUMAN INJ 5% 5 %
SOLUTION INTRAVENOUS CONTINUOUS PRN
Status: DISCONTINUED | OUTPATIENT
Start: 2018-04-04 | End: 2018-04-04 | Stop reason: SURG

## 2018-04-04 RX ORDER — PROPOFOL 10 MG/ML
VIAL (ML) INTRAVENOUS AS NEEDED
Status: DISCONTINUED | OUTPATIENT
Start: 2018-04-04 | End: 2018-04-04 | Stop reason: SURG

## 2018-04-04 RX ORDER — LIDOCAINE HYDROCHLORIDE 40 MG/ML
SOLUTION TOPICAL AS NEEDED
Status: DISCONTINUED | OUTPATIENT
Start: 2018-04-04 | End: 2018-04-04 | Stop reason: SURG

## 2018-04-04 RX ORDER — FENTANYL CITRATE 50 UG/ML
INJECTION, SOLUTION INTRAMUSCULAR; INTRAVENOUS AS NEEDED
Status: DISCONTINUED | OUTPATIENT
Start: 2018-04-04 | End: 2018-04-04 | Stop reason: SURG

## 2018-04-04 RX ORDER — POTASSIUM CHLORIDE 14.9 MG/ML
20 INJECTION INTRAVENOUS
Status: ACTIVE | OUTPATIENT
Start: 2018-04-04 | End: 2018-04-04

## 2018-04-04 RX ORDER — HYDROMORPHONE HCL 110MG/55ML
0.5 PATIENT CONTROLLED ANALGESIA SYRINGE INTRAVENOUS EVERY 4 HOURS PRN
Status: DISCONTINUED | OUTPATIENT
Start: 2018-04-04 | End: 2018-04-12

## 2018-04-04 RX ORDER — SODIUM CHLORIDE, SODIUM LACTATE, POTASSIUM CHLORIDE, CALCIUM CHLORIDE 600; 310; 30; 20 MG/100ML; MG/100ML; MG/100ML; MG/100ML
125 INJECTION, SOLUTION INTRAVENOUS CONTINUOUS
Status: DISCONTINUED | OUTPATIENT
Start: 2018-04-04 | End: 2018-04-09

## 2018-04-04 RX ORDER — NALOXONE HCL 0.4 MG/ML
0.1 VIAL (ML) INJECTION
Status: DISCONTINUED | OUTPATIENT
Start: 2018-04-04 | End: 2018-04-13 | Stop reason: HOSPADM

## 2018-04-04 RX ORDER — ESCITALOPRAM OXALATE 20 MG/1
10 TABLET ORAL DAILY
Status: ON HOLD | COMMUNITY
End: 2022-10-06

## 2018-04-04 RX ORDER — NALOXONE HCL 0.4 MG/ML
0.04 VIAL (ML) INJECTION AS NEEDED
Status: DISCONTINUED | OUTPATIENT
Start: 2018-04-04 | End: 2018-04-04 | Stop reason: HOSPADM

## 2018-04-04 RX ORDER — ROCURONIUM BROMIDE 10 MG/ML
INJECTION, SOLUTION INTRAVENOUS AS NEEDED
Status: DISCONTINUED | OUTPATIENT
Start: 2018-04-04 | End: 2018-04-04 | Stop reason: SURG

## 2018-04-04 RX ORDER — PHENYLEPHRINE HCL IN 0.9% NACL 0.8MG/10ML
SYRINGE (ML) INTRAVENOUS AS NEEDED
Status: DISCONTINUED | OUTPATIENT
Start: 2018-04-04 | End: 2018-04-04 | Stop reason: SURG

## 2018-04-04 RX ORDER — METOCLOPRAMIDE HYDROCHLORIDE 5 MG/ML
5 INJECTION INTRAMUSCULAR; INTRAVENOUS
Status: DISCONTINUED | OUTPATIENT
Start: 2018-04-04 | End: 2018-04-04 | Stop reason: HOSPADM

## 2018-04-04 RX ORDER — MEPERIDINE HYDROCHLORIDE 25 MG/ML
12.5 INJECTION INTRAMUSCULAR; INTRAVENOUS; SUBCUTANEOUS
Status: DISCONTINUED | OUTPATIENT
Start: 2018-04-04 | End: 2018-04-04 | Stop reason: HOSPADM

## 2018-04-04 RX ORDER — ESCITALOPRAM OXALATE 10 MG/1
10 TABLET ORAL DAILY
Status: DISCONTINUED | OUTPATIENT
Start: 2018-04-04 | End: 2018-04-13 | Stop reason: HOSPADM

## 2018-04-04 RX ADMIN — SUCCINYLCHOLINE CHLORIDE 140 MG: 20 INJECTION, SOLUTION INTRAMUSCULAR; INTRAVENOUS at 09:50

## 2018-04-04 RX ADMIN — VASOPRESSIN 0.5 UNITS: 20 INJECTION INTRAVENOUS at 11:07

## 2018-04-04 RX ADMIN — SODIUM CHLORIDE, POTASSIUM CHLORIDE, SODIUM LACTATE AND CALCIUM CHLORIDE: 600; 310; 30; 20 INJECTION, SOLUTION INTRAVENOUS at 12:32

## 2018-04-04 RX ADMIN — ONDANSETRON HYDROCHLORIDE 4 MG: 2 SOLUTION INTRAMUSCULAR; INTRAVENOUS at 12:02

## 2018-04-04 RX ADMIN — Medication 80 MCG: at 09:53

## 2018-04-04 RX ADMIN — VASOPRESSIN 0.25 UNITS: 20 INJECTION INTRAVENOUS at 10:18

## 2018-04-04 RX ADMIN — Medication 160 MCG: at 09:56

## 2018-04-04 RX ADMIN — SUGAMMADEX 200 MG: 100 INJECTION, SOLUTION INTRAVENOUS at 12:25

## 2018-04-04 RX ADMIN — MEROPENEM 1 G: 1 INJECTION, POWDER, FOR SOLUTION INTRAVENOUS at 06:57

## 2018-04-04 RX ADMIN — ROCURONIUM BROMIDE 5 MG: 10 INJECTION INTRAVENOUS at 09:50

## 2018-04-04 RX ADMIN — SODIUM CHLORIDE, POTASSIUM CHLORIDE, SODIUM LACTATE AND CALCIUM CHLORIDE 1000 ML: 600; 310; 30; 20 INJECTION, SOLUTION INTRAVENOUS at 18:13

## 2018-04-04 RX ADMIN — MEROPENEM 1 G: 1 INJECTION, POWDER, FOR SOLUTION INTRAVENOUS at 22:02

## 2018-04-04 RX ADMIN — HYDROMORPHONE HYDROCHLORIDE 0.5 MG: 2 INJECTION, SOLUTION INTRAMUSCULAR; INTRAVENOUS; SUBCUTANEOUS at 22:02

## 2018-04-04 RX ADMIN — SODIUM CHLORIDE, POTASSIUM CHLORIDE, SODIUM LACTATE AND CALCIUM CHLORIDE 100 ML/HR: 600; 310; 30; 20 INJECTION, SOLUTION INTRAVENOUS at 08:48

## 2018-04-04 RX ADMIN — LIDOCAINE HYDROCHLORIDE 1 EACH: 40 SOLUTION TOPICAL at 09:50

## 2018-04-04 RX ADMIN — Medication 80 MCG: at 10:43

## 2018-04-04 RX ADMIN — BUDESONIDE AND FORMOTEROL FUMARATE DIHYDRATE 2 PUFF: 160; 4.5 AEROSOL RESPIRATORY (INHALATION) at 22:56

## 2018-04-04 RX ADMIN — VASOPRESSIN 0.25 UNITS: 20 INJECTION INTRAVENOUS at 10:11

## 2018-04-04 RX ADMIN — HYDROMORPHONE HYDROCHLORIDE 0.5 MG: 2 INJECTION, SOLUTION INTRAMUSCULAR; INTRAVENOUS; SUBCUTANEOUS at 17:48

## 2018-04-04 RX ADMIN — LIDOCAINE HYDROCHLORIDE 80 MG: 20 INJECTION, SOLUTION INFILTRATION; PERINEURAL at 09:50

## 2018-04-04 RX ADMIN — DEXAMETHASONE SODIUM PHOSPHATE 4 MG: 4 INJECTION, SOLUTION INTRAMUSCULAR; INTRAVENOUS at 10:30

## 2018-04-04 RX ADMIN — SODIUM CHLORIDE, POTASSIUM CHLORIDE, SODIUM LACTATE AND CALCIUM CHLORIDE: 600; 310; 30; 20 INJECTION, SOLUTION INTRAVENOUS at 10:21

## 2018-04-04 RX ADMIN — Medication 160 MCG: at 11:48

## 2018-04-04 RX ADMIN — FAMOTIDINE 20 MG: 10 INJECTION INTRAVENOUS at 20:06

## 2018-04-04 RX ADMIN — MEROPENEM 1 G: 1 INJECTION, POWDER, FOR SOLUTION INTRAVENOUS at 14:22

## 2018-04-04 RX ADMIN — FENTANYL CITRATE 100 MCG: 50 INJECTION INTRAMUSCULAR; INTRAVENOUS at 10:26

## 2018-04-04 RX ADMIN — ROCURONIUM BROMIDE 15 MG: 10 INJECTION INTRAVENOUS at 10:45

## 2018-04-04 RX ADMIN — SODIUM CHLORIDE, POTASSIUM CHLORIDE, SODIUM LACTATE AND CALCIUM CHLORIDE 125 ML/HR: 600; 310; 30; 20 INJECTION, SOLUTION INTRAVENOUS at 14:25

## 2018-04-04 RX ADMIN — ONDANSETRON 4 MG: 2 INJECTION INTRAMUSCULAR; INTRAVENOUS at 03:14

## 2018-04-04 RX ADMIN — IPRATROPIUM BROMIDE AND ALBUTEROL SULFATE 3 ML: 2.5; .5 SOLUTION RESPIRATORY (INHALATION) at 08:58

## 2018-04-04 RX ADMIN — ROCURONIUM BROMIDE 15 MG: 10 INJECTION INTRAVENOUS at 11:46

## 2018-04-04 RX ADMIN — ROCURONIUM BROMIDE 10 MG: 10 INJECTION INTRAVENOUS at 10:32

## 2018-04-04 RX ADMIN — Medication 160 MCG: at 10:02

## 2018-04-04 RX ADMIN — SODIUM CHLORIDE 1000 ML: 9 INJECTION, SOLUTION INTRAVENOUS at 19:30

## 2018-04-04 RX ADMIN — FENTANYL CITRATE 100 MCG: 50 INJECTION, SOLUTION INTRAMUSCULAR; INTRAVENOUS at 09:50

## 2018-04-04 RX ADMIN — FAMOTIDINE 20 MG: 10 INJECTION INTRAVENOUS at 14:22

## 2018-04-04 RX ADMIN — ALBUMIN (HUMAN): 2.5 SOLUTION INTRAVENOUS at 11:03

## 2018-04-04 RX ADMIN — Medication 160 MCG: at 11:03

## 2018-04-04 RX ADMIN — SODIUM CHLORIDE, POTASSIUM CHLORIDE, SODIUM LACTATE AND CALCIUM CHLORIDE 125 ML/HR: 600; 310; 30; 20 INJECTION, SOLUTION INTRAVENOUS at 22:36

## 2018-04-04 RX ADMIN — PROPOFOL 150 MG: 10 INJECTION, EMULSION INTRAVENOUS at 09:50

## 2018-04-04 RX ADMIN — VASOPRESSIN 0.25 UNITS: 20 INJECTION INTRAVENOUS at 10:05

## 2018-04-04 RX ADMIN — ERTAPENEM SODIUM 1 G: 1 INJECTION, POWDER, LYOPHILIZED, FOR SOLUTION INTRAMUSCULAR; INTRAVENOUS at 10:00

## 2018-04-04 RX ADMIN — Medication 80 MCG: at 10:22

## 2018-04-04 RX ADMIN — VASOPRESSIN 0.5 UNITS: 20 INJECTION INTRAVENOUS at 11:56

## 2018-04-04 RX ADMIN — Medication 40 MCG: at 10:33

## 2018-04-04 RX ADMIN — Medication 160 MCG: at 11:01

## 2018-04-04 RX ADMIN — ROCURONIUM BROMIDE 45 MG: 10 INJECTION INTRAVENOUS at 10:02

## 2018-04-04 RX ADMIN — BUDESONIDE AND FORMOTEROL FUMARATE DIHYDRATE 2 PUFF: 160; 4.5 AEROSOL RESPIRATORY (INHALATION) at 06:29

## 2018-04-04 RX ADMIN — VASOPRESSIN 0.5 UNITS: 20 INJECTION INTRAVENOUS at 11:04

## 2018-04-04 RX ADMIN — HYDROMORPHONE HYDROCHLORIDE 1 MG: 2 INJECTION, SOLUTION INTRAMUSCULAR; INTRAVENOUS; SUBCUTANEOUS at 12:27

## 2018-04-04 RX ADMIN — SODIUM CHLORIDE 100 ML/HR: 9 INJECTION, SOLUTION INTRAVENOUS at 02:30

## 2018-04-04 RX ADMIN — FENTANYL CITRATE 100 MCG: 50 INJECTION, SOLUTION INTRAMUSCULAR; INTRAVENOUS at 10:20

## 2018-04-04 RX ADMIN — ROCURONIUM BROMIDE 10 MG: 10 INJECTION INTRAVENOUS at 11:08

## 2018-04-04 RX ADMIN — HYDROMORPHONE HYDROCHLORIDE 1 MG: 2 INJECTION, SOLUTION INTRAMUSCULAR; INTRAVENOUS; SUBCUTANEOUS at 12:38

## 2018-04-04 NOTE — ANESTHESIA PROCEDURE NOTES
Central Line    Patient location during procedure: OR  Start time: 4/4/2018 9:59 AM  Stop Time:4/4/2018 10:07 AM  Indications: MD/Surgeon request  Staff  Anesthesiologist: ELKIN CEE  Preanesthetic Checklist  Completed: patient identified, site marked, surgical consent, pre-op evaluation, timeout performed, IV checked, risks and benefits discussed and monitors and equipment checked  Central Line Prep  Sterile Tech:gloves  Prep: chloraprep  Patient monitoring: continuous pulse oximetry, blood pressure monitoring and EKG  Central Line Procedure  Laterality:right  Location:internal jugular  Catheter Type:triple lumen  Guidance:ultrasound guided  PROCEDURE NOTE/ULTRASOUND INTERPRETATION.  Using ultrasound guidance the potential vascular sites for insertion of the catheter were visualized to determine the patency of the vessel to be used for vascular access.  After selecting the appropriate site for insertion, the needle was visualized under ultrasound being inserted into the internal jugular vein, followed by ultrasound confirmation of wire and catheter placement. There were no abnormalities seen on ultrasound; an image was taken; and the patient tolerated the procedure with no complications.   Assessment  Post procedure:biopatch applied, line sutured and secured with tape  Assessement:blood return through all ports and free fluid flow (Wire visualized in the Internal Jugular vein)  Complications:no  Patient Tolerance:patient tolerated the procedure well with no apparent complications

## 2018-04-04 NOTE — ANESTHESIA PROCEDURE NOTES
Airway  Urgency: elective    Date/Time: 4/4/2018 9:52 AM  Airway not difficult    General Information and Staff    Patient location during procedure: OR  CRNA: HOSEA SIMONS    Indications and Patient Condition  Indications for airway management: airway protection    Preoxygenated: yes  Mask difficulty assessment: 1 - vent by mask    Final Airway Details  Final airway type: endotracheal airway      Successful airway: ETT    Successful intubation technique: awake intubation  Endotracheal tube insertion site: oral  Blade: Rosalva  Blade size: #3  ETT size: 7.5 mm  Placement verified by: chest auscultation and capnometry   Measured from: lips  ETT to lips (cm): 22  Number of attempts at approach: 1

## 2018-04-04 NOTE — ANESTHESIA PROCEDURE NOTES
Arterial Line    Start time: 4/4/2018 8:43 AM  Stop Time:4/4/2018 8:44 AM       Line placed for hemodynamic monitoring, ABGs/Labs/ISTAT and MD/Surgeon request.  Performed By   Anesthesiologist: ELKIN CEE  Preanesthetic Checklist  Completed: patient identified, site marked, surgical consent, pre-op evaluation, timeout performed, IV checked, risks and benefits discussed and monitors and equipment checked  Arterial Line Prep   Sterile Tech: gloves  Prep: ChloraPrep  Patient monitoring: continuous pulse oximetry  Arterial Line Procedure   Laterality:left  Location:  radial artery  Catheter size: 20 G   Guidance: ultrasound guided  PROCEDURE NOTE/ULTRASOUND INTERPRETATION.  Using ultrasound guidance the potential vascular sites for insertion of the catheter were visualized to determine the patency of the vessel to be used for vascular access.  After selecting the appropriate site for insertion, the needle was visualized under ultrasound being inserted into the radial artery, followed by ultrasound confirmation of wire and catheter placement. There were no abnormalities seen on ultrasound; an image was taken; and the patient tolerated the procedure with no complications.   Number of attempts: 1  Successful placement: yes          Post Assessment   Dressing Type: secured with tape and wrist guard applied.   Complications no  Circ/Move/Sens Assessment: normal.   Patient Tolerance: patient tolerated the procedure well with no apparent complications

## 2018-04-04 NOTE — ANESTHESIA PREPROCEDURE EVALUATION
Anesthesia Evaluation     Patient summary reviewed and Nursing notes reviewed   no history of anesthetic complications:  NPO Solid Status: > 8 hours  NPO Liquid Status: > 8 hours           Airway   Mallampati: III  TM distance: >3 FB  Neck ROM: full  Dental    (+) edentulous    Pulmonary - normal exam   (+) a smoker (1 ppd) Current, COPD, wheezes,   (-) asthma, recent URI, sleep apnea    ROS comment: Wears 2L NCO2 at home  Cardiovascular - normal exam  Exercise tolerance: poor (<4 METS) (Limited by SOB and leg pain, denies chest pain)    ECG reviewed  Rhythm: regular  Rate: normal    (-) pacemaker, hypertension, past MI, angina, cardiac stents, CABG      Neuro/Psych  (-) seizures, TIA, CVA  GI/Hepatic/Renal/Endo    (+)  GERD well controlled,  hypothyroidism,   (-) liver disease, no renal disease, diabetes, hyperthyroidism    Musculoskeletal     Abdominal    Substance History      OB/GYN          Other        ROS/Med Hx Other: Active nausea and vomiting this morning                  Anesthesia Plan    ASA 3     general     intravenous induction   Anesthetic plan and risks discussed with patient.

## 2018-04-05 LAB
ANION GAP SERPL CALCULATED.3IONS-SCNC: 3 MMOL/L (ref 4–13)
BUN BLD-MCNC: 13 MG/DL (ref 5–21)
BUN/CREAT SERPL: 17.6 (ref 7–25)
CALCIUM SPEC-SCNC: 8.2 MG/DL (ref 8.4–10.4)
CHLORIDE SERPL-SCNC: 103 MMOL/L (ref 98–110)
CLUMPED PLATELETS: PRESENT
CO2 SERPL-SCNC: 30 MMOL/L (ref 24–31)
CREAT BLD-MCNC: 0.74 MG/DL (ref 0.5–1.4)
DEPRECATED RDW RBC AUTO: 50.4 FL (ref 40–54)
ERYTHROCYTE [DISTWIDTH] IN BLOOD BY AUTOMATED COUNT: 14.8 % (ref 12–15)
GFR SERPL CREATININE-BSD FRML MDRD: 78 ML/MIN/1.73
GLUCOSE BLD-MCNC: 123 MG/DL (ref 70–100)
HCT VFR BLD AUTO: 28.9 % (ref 37–47)
HGB BLD-MCNC: 9.3 G/DL (ref 12–16)
LYMPHOCYTES # BLD MANUAL: 0.92 10*3/MM3 (ref 0.72–4.86)
LYMPHOCYTES NFR BLD MANUAL: 2 % (ref 4–12)
LYMPHOCYTES NFR BLD MANUAL: 4 % (ref 15–45)
MAGNESIUM SERPL-MCNC: 1.8 MG/DL (ref 1.4–2.2)
MCH RBC QN AUTO: 29.6 PG (ref 28–32)
MCHC RBC AUTO-ENTMCNC: 32.2 G/DL (ref 33–36)
MCV RBC AUTO: 92 FL (ref 82–98)
MONOCYTES # BLD AUTO: 0.46 10*3/MM3 (ref 0.19–1.3)
NEUTROPHILS # BLD AUTO: 21.67 10*3/MM3 (ref 1.87–8.4)
NEUTROPHILS NFR BLD MANUAL: 80 % (ref 39–78)
NEUTS BAND NFR BLD MANUAL: 14 % (ref 0–10)
PHOSPHATE SERPL-MCNC: 3.5 MG/DL (ref 2.5–4.5)
PLATELET # BLD AUTO: 325 10*3/MM3 (ref 130–400)
PMV BLD AUTO: 9 FL (ref 6–12)
POLYCHROMASIA BLD QL SMEAR: ABNORMAL
POTASSIUM BLD-SCNC: 4.3 MMOL/L (ref 3.5–5.3)
RBC # BLD AUTO: 3.14 10*6/MM3 (ref 4.2–5.4)
SODIUM BLD-SCNC: 136 MMOL/L (ref 135–145)
WBC MORPH BLD: NORMAL
WBC NRBC COR # BLD: 23.05 10*3/MM3 (ref 4.8–10.8)

## 2018-04-05 PROCEDURE — 94760 N-INVAS EAR/PLS OXIMETRY 1: CPT

## 2018-04-05 PROCEDURE — 94640 AIRWAY INHALATION TREATMENT: CPT

## 2018-04-05 PROCEDURE — 25010000002 MEROPENEM PER 100 MG: Performed by: FAMILY MEDICINE

## 2018-04-05 PROCEDURE — 85007 BL SMEAR W/DIFF WBC COUNT: CPT | Performed by: FAMILY MEDICINE

## 2018-04-05 PROCEDURE — 80048 BASIC METABOLIC PNL TOTAL CA: CPT | Performed by: SPECIALIST

## 2018-04-05 PROCEDURE — 25010000002 HYDROMORPHONE PER 4 MG: Performed by: SPECIALIST

## 2018-04-05 PROCEDURE — 94799 UNLISTED PULMONARY SVC/PX: CPT

## 2018-04-05 PROCEDURE — 25010000002 ENOXAPARIN PER 10 MG: Performed by: SPECIALIST

## 2018-04-05 PROCEDURE — 83735 ASSAY OF MAGNESIUM: CPT | Performed by: SPECIALIST

## 2018-04-05 PROCEDURE — 25010000002 MEROPENEM PER 100 MG: Performed by: SPECIALIST

## 2018-04-05 PROCEDURE — 84100 ASSAY OF PHOSPHORUS: CPT | Performed by: SPECIALIST

## 2018-04-05 PROCEDURE — 85025 COMPLETE CBC W/AUTO DIFF WBC: CPT | Performed by: FAMILY MEDICINE

## 2018-04-05 RX ORDER — IPRATROPIUM BROMIDE AND ALBUTEROL SULFATE 2.5; .5 MG/3ML; MG/3ML
3 SOLUTION RESPIRATORY (INHALATION)
Status: DISCONTINUED | OUTPATIENT
Start: 2018-04-05 | End: 2018-04-13 | Stop reason: HOSPADM

## 2018-04-05 RX ORDER — LORAZEPAM 2 MG/ML
0.5 INJECTION INTRAMUSCULAR EVERY 6 HOURS PRN
Status: DISCONTINUED | OUTPATIENT
Start: 2018-04-05 | End: 2018-04-12

## 2018-04-05 RX ADMIN — MEROPENEM 1 G: 1 INJECTION, POWDER, FOR SOLUTION INTRAVENOUS at 13:06

## 2018-04-05 RX ADMIN — HYDROMORPHONE HYDROCHLORIDE 0.5 MG: 2 INJECTION, SOLUTION INTRAMUSCULAR; INTRAVENOUS; SUBCUTANEOUS at 08:20

## 2018-04-05 RX ADMIN — FAMOTIDINE 20 MG: 10 INJECTION INTRAVENOUS at 20:20

## 2018-04-05 RX ADMIN — IPRATROPIUM BROMIDE AND ALBUTEROL SULFATE 3 ML: 2.5; .5 SOLUTION RESPIRATORY (INHALATION) at 14:53

## 2018-04-05 RX ADMIN — BUDESONIDE AND FORMOTEROL FUMARATE DIHYDRATE 2 PUFF: 160; 4.5 AEROSOL RESPIRATORY (INHALATION) at 19:23

## 2018-04-05 RX ADMIN — HYDROMORPHONE HYDROCHLORIDE 0.5 MG: 2 INJECTION, SOLUTION INTRAMUSCULAR; INTRAVENOUS; SUBCUTANEOUS at 20:20

## 2018-04-05 RX ADMIN — FAMOTIDINE 20 MG: 10 INJECTION INTRAVENOUS at 08:20

## 2018-04-05 RX ADMIN — MEROPENEM 1 G: 1 INJECTION, POWDER, FOR SOLUTION INTRAVENOUS at 05:56

## 2018-04-05 RX ADMIN — SODIUM CHLORIDE, POTASSIUM CHLORIDE, SODIUM LACTATE AND CALCIUM CHLORIDE 1000 ML: 600; 310; 30; 20 INJECTION, SOLUTION INTRAVENOUS at 08:21

## 2018-04-05 RX ADMIN — ENOXAPARIN SODIUM 40 MG: 40 INJECTION SUBCUTANEOUS at 08:20

## 2018-04-05 RX ADMIN — SODIUM CHLORIDE, POTASSIUM CHLORIDE, SODIUM LACTATE AND CALCIUM CHLORIDE 1000 ML: 600; 310; 30; 20 INJECTION, SOLUTION INTRAVENOUS at 01:30

## 2018-04-05 RX ADMIN — IPRATROPIUM BROMIDE AND ALBUTEROL SULFATE 3 ML: 2.5; .5 SOLUTION RESPIRATORY (INHALATION) at 10:11

## 2018-04-05 RX ADMIN — HYDROMORPHONE HYDROCHLORIDE 0.5 MG: 2 INJECTION, SOLUTION INTRAMUSCULAR; INTRAVENOUS; SUBCUTANEOUS at 15:19

## 2018-04-05 RX ADMIN — MEROPENEM 1 G: 1 INJECTION, POWDER, FOR SOLUTION INTRAVENOUS at 22:23

## 2018-04-05 RX ADMIN — IPRATROPIUM BROMIDE AND ALBUTEROL SULFATE 3 ML: 2.5; .5 SOLUTION RESPIRATORY (INHALATION) at 19:22

## 2018-04-05 RX ADMIN — BUDESONIDE AND FORMOTEROL FUMARATE DIHYDRATE 2 PUFF: 160; 4.5 AEROSOL RESPIRATORY (INHALATION) at 06:10

## 2018-04-05 RX ADMIN — HYDROMORPHONE HYDROCHLORIDE 0.5 MG: 2 INJECTION, SOLUTION INTRAMUSCULAR; INTRAVENOUS; SUBCUTANEOUS at 02:45

## 2018-04-05 RX ADMIN — SODIUM CHLORIDE, POTASSIUM CHLORIDE, SODIUM LACTATE AND CALCIUM CHLORIDE 125 ML/HR: 600; 310; 30; 20 INJECTION, SOLUTION INTRAVENOUS at 13:12

## 2018-04-06 LAB
ALBUMIN SERPL-MCNC: 2.4 G/DL (ref 3.5–5)
ALBUMIN/GLOB SERPL: 1 G/DL (ref 1.1–2.5)
ALP SERPL-CCNC: 75 U/L (ref 24–120)
ALT SERPL W P-5'-P-CCNC: 26 U/L (ref 0–54)
ANION GAP SERPL CALCULATED.3IONS-SCNC: 3 MMOL/L (ref 4–13)
ANION GAP SERPL CALCULATED.3IONS-SCNC: 4 MMOL/L (ref 4–13)
AST SERPL-CCNC: 27 U/L (ref 7–45)
BILIRUB SERPL-MCNC: 0.7 MG/DL (ref 0.1–1)
BUN BLD-MCNC: 16 MG/DL (ref 5–21)
BUN BLD-MCNC: 18 MG/DL (ref 5–21)
BUN/CREAT SERPL: 21.3 (ref 7–25)
BUN/CREAT SERPL: 27.7 (ref 7–25)
CA-I BLD-MCNC: 4.77 MG/DL (ref 4.6–5.4)
CALCIUM SPEC-SCNC: 8.3 MG/DL (ref 8.4–10.4)
CALCIUM SPEC-SCNC: 8.5 MG/DL (ref 8.4–10.4)
CHLORIDE SERPL-SCNC: 101 MMOL/L (ref 98–110)
CHLORIDE SERPL-SCNC: 99 MMOL/L (ref 98–110)
CHOLEST SERPL-MCNC: 79 MG/DL (ref 130–200)
CO2 SERPL-SCNC: 32 MMOL/L (ref 24–31)
CO2 SERPL-SCNC: 34 MMOL/L (ref 24–31)
CREAT BLD-MCNC: 0.65 MG/DL (ref 0.5–1.4)
CREAT BLD-MCNC: 0.75 MG/DL (ref 0.5–1.4)
CRP SERPL-MCNC: 41.46 MG/DL (ref 0–0.99)
CYTO UR: NORMAL
DEPRECATED RDW RBC AUTO: 51.5 FL (ref 40–54)
ERYTHROCYTE [DISTWIDTH] IN BLOOD BY AUTOMATED COUNT: 15.1 % (ref 12–15)
GFR SERPL CREATININE-BSD FRML MDRD: 77 ML/MIN/1.73
GFR SERPL CREATININE-BSD FRML MDRD: 91 ML/MIN/1.73
GLOBULIN UR ELPH-MCNC: 2.4 GM/DL
GLUCOSE BLD-MCNC: 114 MG/DL (ref 70–100)
GLUCOSE BLD-MCNC: 118 MG/DL (ref 70–100)
GLUCOSE BLDC GLUCOMTR-MCNC: 106 MG/DL (ref 70–130)
HCT VFR BLD AUTO: 23.9 % (ref 37–47)
HGB BLD-MCNC: 7.7 G/DL (ref 12–16)
LAB AP CASE REPORT: NORMAL
LYMPHOCYTES # BLD MANUAL: 1.66 10*3/MM3 (ref 0.72–4.86)
LYMPHOCYTES NFR BLD MANUAL: 1 % (ref 4–12)
LYMPHOCYTES NFR BLD MANUAL: 8 % (ref 15–45)
Lab: NORMAL
Lab: NORMAL
MAGNESIUM SERPL-MCNC: 2 MG/DL (ref 1.4–2.2)
MAGNESIUM SERPL-MCNC: 2.1 MG/DL (ref 1.4–2.2)
MCH RBC QN AUTO: 30 PG (ref 28–32)
MCHC RBC AUTO-ENTMCNC: 32.2 G/DL (ref 33–36)
MCV RBC AUTO: 93 FL (ref 82–98)
MONOCYTES # BLD AUTO: 0.21 10*3/MM3 (ref 0.19–1.3)
NEUTROPHILS # BLD AUTO: 18.86 10*3/MM3 (ref 1.87–8.4)
NEUTROPHILS NFR BLD MANUAL: 83 % (ref 39–78)
NEUTS BAND NFR BLD MANUAL: 8 % (ref 0–10)
PATH REPORT.FINAL DX SPEC: NORMAL
PATH REPORT.GROSS SPEC: NORMAL
PHOSPHATE SERPL-MCNC: 2.2 MG/DL (ref 2.5–4.5)
PHOSPHATE SERPL-MCNC: 2.4 MG/DL (ref 2.5–4.5)
PLAT MORPH BLD: NORMAL
PLATELET # BLD AUTO: 275 10*3/MM3 (ref 130–400)
PMV BLD AUTO: 8.9 FL (ref 6–12)
POTASSIUM BLD-SCNC: 3.3 MMOL/L (ref 3.5–5.3)
POTASSIUM BLD-SCNC: 3.9 MMOL/L (ref 3.5–5.3)
PREALB SERPL-MCNC: <3 MG/DL (ref 18–36)
PROT SERPL-MCNC: 4.8 G/DL (ref 6.3–8.7)
RBC # BLD AUTO: 2.57 10*6/MM3 (ref 4.2–5.4)
RBC MORPH BLD: NORMAL
SODIUM BLD-SCNC: 136 MMOL/L (ref 135–145)
SODIUM BLD-SCNC: 137 MMOL/L (ref 135–145)
TRIGL SERPL-MCNC: 107 MG/DL (ref 0–149)
WBC MORPH BLD: NORMAL
WBC NRBC COR # BLD: 20.73 10*3/MM3 (ref 4.8–10.8)

## 2018-04-06 PROCEDURE — 82962 GLUCOSE BLOOD TEST: CPT

## 2018-04-06 PROCEDURE — 83735 ASSAY OF MAGNESIUM: CPT | Performed by: SPECIALIST

## 2018-04-06 PROCEDURE — 84134 ASSAY OF PREALBUMIN: CPT | Performed by: SPECIALIST

## 2018-04-06 PROCEDURE — 30233N1 TRANSFUSION OF NONAUTOLOGOUS RED BLOOD CELLS INTO PERIPHERAL VEIN, PERCUTANEOUS APPROACH: ICD-10-PCS | Performed by: SPECIALIST

## 2018-04-06 PROCEDURE — G8979 MOBILITY GOAL STATUS: HCPCS

## 2018-04-06 PROCEDURE — 25010000002 ENOXAPARIN PER 10 MG: Performed by: SPECIALIST

## 2018-04-06 PROCEDURE — P9016 RBC LEUKOCYTES REDUCED: HCPCS

## 2018-04-06 PROCEDURE — G8978 MOBILITY CURRENT STATUS: HCPCS

## 2018-04-06 PROCEDURE — 84478 ASSAY OF TRIGLYCERIDES: CPT | Performed by: SPECIALIST

## 2018-04-06 PROCEDURE — 25010000002 MEROPENEM PER 100 MG: Performed by: SPECIALIST

## 2018-04-06 PROCEDURE — 94799 UNLISTED PULMONARY SVC/PX: CPT

## 2018-04-06 PROCEDURE — 82465 ASSAY BLD/SERUM CHOLESTEROL: CPT | Performed by: SPECIALIST

## 2018-04-06 PROCEDURE — 85025 COMPLETE CBC W/AUTO DIFF WBC: CPT | Performed by: FAMILY MEDICINE

## 2018-04-06 PROCEDURE — 82330 ASSAY OF CALCIUM: CPT

## 2018-04-06 PROCEDURE — 84100 ASSAY OF PHOSPHORUS: CPT | Performed by: SPECIALIST

## 2018-04-06 PROCEDURE — 97162 PT EVAL MOD COMPLEX 30 MIN: CPT

## 2018-04-06 PROCEDURE — 25010000002 HYDROMORPHONE PER 4 MG: Performed by: SPECIALIST

## 2018-04-06 PROCEDURE — 86140 C-REACTIVE PROTEIN: CPT | Performed by: SPECIALIST

## 2018-04-06 PROCEDURE — 36430 TRANSFUSION BLD/BLD COMPNT: CPT

## 2018-04-06 PROCEDURE — 85007 BL SMEAR W/DIFF WBC COUNT: CPT | Performed by: FAMILY MEDICINE

## 2018-04-06 PROCEDURE — 86900 BLOOD TYPING SEROLOGIC ABO: CPT

## 2018-04-06 PROCEDURE — 97530 THERAPEUTIC ACTIVITIES: CPT

## 2018-04-06 PROCEDURE — 80053 COMPREHEN METABOLIC PANEL: CPT | Performed by: SPECIALIST

## 2018-04-06 RX ADMIN — HYDROMORPHONE HYDROCHLORIDE 0.5 MG: 2 INJECTION, SOLUTION INTRAMUSCULAR; INTRAVENOUS; SUBCUTANEOUS at 00:40

## 2018-04-06 RX ADMIN — ENOXAPARIN SODIUM 40 MG: 40 INJECTION SUBCUTANEOUS at 08:49

## 2018-04-06 RX ADMIN — HYDROMORPHONE HYDROCHLORIDE 0.5 MG: 2 INJECTION, SOLUTION INTRAMUSCULAR; INTRAVENOUS; SUBCUTANEOUS at 04:49

## 2018-04-06 RX ADMIN — IPRATROPIUM BROMIDE AND ALBUTEROL SULFATE 3 ML: 2.5; .5 SOLUTION RESPIRATORY (INHALATION) at 06:11

## 2018-04-06 RX ADMIN — HYDROMORPHONE HYDROCHLORIDE 0.5 MG: 2 INJECTION, SOLUTION INTRAMUSCULAR; INTRAVENOUS; SUBCUTANEOUS at 15:55

## 2018-04-06 RX ADMIN — FAMOTIDINE 20 MG: 10 INJECTION INTRAVENOUS at 08:49

## 2018-04-06 RX ADMIN — IPRATROPIUM BROMIDE AND ALBUTEROL SULFATE 3 ML: 2.5; .5 SOLUTION RESPIRATORY (INHALATION) at 21:42

## 2018-04-06 RX ADMIN — ASCORBIC ACID, VITAMIN A PALMITATE, CHOLECALCIFEROL, THIAMINE HYDROCHLORIDE, RIBOFLAVIN-5 PHOSPHATE SODIUM, PYRIDOXINE HYDROCHLORIDE, NIACINAMIDE, DEXPANTHENOL, ALPHA-TOCOPHEROL ACETATE, VITAMIN K1, FOLIC ACID, BIOTIN, CYANOCOBALAMIN: 200; 3300; 200; 6; 3.6; 6; 40; 15; 10; 150; 600; 60; 5 INJECTION, SOLUTION INTRAVENOUS at 17:53

## 2018-04-06 RX ADMIN — IPRATROPIUM BROMIDE AND ALBUTEROL SULFATE 3 ML: 2.5; .5 SOLUTION RESPIRATORY (INHALATION) at 14:34

## 2018-04-06 RX ADMIN — MEROPENEM 1 G: 1 INJECTION, POWDER, FOR SOLUTION INTRAVENOUS at 21:57

## 2018-04-06 RX ADMIN — HYDROMORPHONE HYDROCHLORIDE 0.5 MG: 2 INJECTION, SOLUTION INTRAMUSCULAR; INTRAVENOUS; SUBCUTANEOUS at 20:04

## 2018-04-06 RX ADMIN — I.V. FAT EMULSION 50 G: 20 EMULSION INTRAVENOUS at 17:53

## 2018-04-06 RX ADMIN — MEROPENEM 1 G: 1 INJECTION, POWDER, FOR SOLUTION INTRAVENOUS at 12:42

## 2018-04-06 RX ADMIN — MEROPENEM 1 G: 1 INJECTION, POWDER, FOR SOLUTION INTRAVENOUS at 05:33

## 2018-04-06 RX ADMIN — BUDESONIDE AND FORMOTEROL FUMARATE DIHYDRATE 2 PUFF: 160; 4.5 AEROSOL RESPIRATORY (INHALATION) at 06:11

## 2018-04-06 RX ADMIN — IPRATROPIUM BROMIDE AND ALBUTEROL SULFATE 3 ML: 2.5; .5 SOLUTION RESPIRATORY (INHALATION) at 10:45

## 2018-04-06 RX ADMIN — SODIUM CHLORIDE, POTASSIUM CHLORIDE, SODIUM LACTATE AND CALCIUM CHLORIDE 125 ML/HR: 600; 310; 30; 20 INJECTION, SOLUTION INTRAVENOUS at 17:53

## 2018-04-06 RX ADMIN — HYDROMORPHONE HYDROCHLORIDE 0.5 MG: 2 INJECTION, SOLUTION INTRAMUSCULAR; INTRAVENOUS; SUBCUTANEOUS at 08:49

## 2018-04-06 RX ADMIN — FAMOTIDINE 20 MG: 10 INJECTION INTRAVENOUS at 21:57

## 2018-04-06 RX ADMIN — BUDESONIDE AND FORMOTEROL FUMARATE DIHYDRATE 2 PUFF: 160; 4.5 AEROSOL RESPIRATORY (INHALATION) at 21:42

## 2018-04-07 LAB
ABO + RH BLD: NORMAL
ABO + RH BLD: NORMAL
ALBUMIN SERPL-MCNC: 2.1 G/DL (ref 3.5–5)
ALBUMIN/GLOB SERPL: 0.9 G/DL (ref 1.1–2.5)
ALP SERPL-CCNC: 77 U/L (ref 24–120)
ALT SERPL W P-5'-P-CCNC: 33 U/L (ref 0–54)
ANION GAP SERPL CALCULATED.3IONS-SCNC: 3 MMOL/L (ref 4–13)
AST SERPL-CCNC: 27 U/L (ref 7–45)
BASOPHILS # BLD AUTO: 0.02 10*3/MM3 (ref 0–0.2)
BASOPHILS NFR BLD AUTO: 0.1 % (ref 0–2)
BH BB BLOOD EXPIRATION DATE: NORMAL
BH BB BLOOD EXPIRATION DATE: NORMAL
BH BB BLOOD TYPE BARCODE: 6200
BH BB BLOOD TYPE BARCODE: 6200
BH BB DISPENSE STATUS: NORMAL
BH BB DISPENSE STATUS: NORMAL
BH BB PRODUCT CODE: NORMAL
BH BB PRODUCT CODE: NORMAL
BH BB UNIT NUMBER: NORMAL
BH BB UNIT NUMBER: NORMAL
BILIRUB SERPL-MCNC: 0.2 MG/DL (ref 0.1–1)
BUN BLD-MCNC: 15 MG/DL (ref 5–21)
BUN/CREAT SERPL: 26.8 (ref 7–25)
CA-I BLD-MCNC: 4.58 MG/DL (ref 4.6–5.4)
CALCIUM SPEC-SCNC: 8.3 MG/DL (ref 8.4–10.4)
CHLORIDE SERPL-SCNC: 100 MMOL/L (ref 98–110)
CO2 SERPL-SCNC: 34 MMOL/L (ref 24–31)
CREAT BLD-MCNC: 0.56 MG/DL (ref 0.5–1.4)
CROSSMATCH INTERPRETATION: NORMAL
CROSSMATCH INTERPRETATION: NORMAL
DEPRECATED RDW RBC AUTO: 59.7 FL (ref 40–54)
EOSINOPHIL # BLD AUTO: 0.17 10*3/MM3 (ref 0–0.7)
EOSINOPHIL NFR BLD AUTO: 0.8 % (ref 0–4)
ERYTHROCYTE [DISTWIDTH] IN BLOOD BY AUTOMATED COUNT: 18.5 % (ref 12–15)
GFR SERPL CREATININE-BSD FRML MDRD: 108 ML/MIN/1.73
GLOBULIN UR ELPH-MCNC: 2.3 GM/DL
GLUCOSE BLD-MCNC: 123 MG/DL (ref 70–100)
GLUCOSE BLDC GLUCOMTR-MCNC: 121 MG/DL (ref 70–130)
GLUCOSE BLDC GLUCOMTR-MCNC: 121 MG/DL (ref 70–130)
GLUCOSE BLDC GLUCOMTR-MCNC: 126 MG/DL (ref 70–130)
HCT VFR BLD AUTO: 28.8 % (ref 37–47)
HGB BLD-MCNC: 9.4 G/DL (ref 12–16)
IMM GRANULOCYTES # BLD: 0.28 10*3/MM3 (ref 0–0.03)
IMM GRANULOCYTES NFR BLD: 1.3 % (ref 0–5)
LYMPHOCYTES # BLD AUTO: 1.05 10*3/MM3 (ref 0.72–4.86)
LYMPHOCYTES NFR BLD AUTO: 4.7 % (ref 15–45)
Lab: ABNORMAL
MAGNESIUM SERPL-MCNC: 2.1 MG/DL (ref 1.4–2.2)
MCH RBC QN AUTO: 28.9 PG (ref 28–32)
MCHC RBC AUTO-ENTMCNC: 32.6 G/DL (ref 33–36)
MCV RBC AUTO: 88.6 FL (ref 82–98)
MONOCYTES # BLD AUTO: 0.26 10*3/MM3 (ref 0.19–1.3)
MONOCYTES NFR BLD AUTO: 1.2 % (ref 4–12)
NEUTROPHILS # BLD AUTO: 20.54 10*3/MM3 (ref 1.87–8.4)
NEUTROPHILS NFR BLD AUTO: 91.9 % (ref 39–78)
NRBC BLD MANUAL-RTO: 0 /100 WBC (ref 0–0)
PHOSPHATE SERPL-MCNC: 2.7 MG/DL (ref 2.5–4.5)
PLATELET # BLD AUTO: 228 10*3/MM3 (ref 130–400)
PMV BLD AUTO: 8.9 FL (ref 6–12)
POTASSIUM BLD-SCNC: 3.2 MMOL/L (ref 3.5–5.3)
PROT SERPL-MCNC: 4.4 G/DL (ref 6.3–8.7)
RBC # BLD AUTO: 3.25 10*6/MM3 (ref 4.2–5.4)
SODIUM BLD-SCNC: 137 MMOL/L (ref 135–145)
UNIT  ABO: NORMAL
UNIT  ABO: NORMAL
UNIT  RH: NORMAL
UNIT  RH: NORMAL
WBC NRBC COR # BLD: 22.32 10*3/MM3 (ref 4.8–10.8)

## 2018-04-07 PROCEDURE — 63710000001 INSULIN REGULAR HUMAN PER 5 UNITS: Performed by: SPECIALIST

## 2018-04-07 PROCEDURE — 25010000002 ENOXAPARIN PER 10 MG: Performed by: SPECIALIST

## 2018-04-07 PROCEDURE — 83735 ASSAY OF MAGNESIUM: CPT | Performed by: SPECIALIST

## 2018-04-07 PROCEDURE — 25010000002 MEROPENEM PER 100 MG: Performed by: SPECIALIST

## 2018-04-07 PROCEDURE — 25010000002 MEROPENEM 1 G/20ML IV PUSH SYRINGE KIT (PAD): Performed by: SPECIALIST

## 2018-04-07 PROCEDURE — 82962 GLUCOSE BLOOD TEST: CPT

## 2018-04-07 PROCEDURE — 25010000002 LORAZEPAM PER 2 MG: Performed by: SPECIALIST

## 2018-04-07 PROCEDURE — 94799 UNLISTED PULMONARY SVC/PX: CPT

## 2018-04-07 PROCEDURE — 82330 ASSAY OF CALCIUM: CPT

## 2018-04-07 PROCEDURE — 97530 THERAPEUTIC ACTIVITIES: CPT

## 2018-04-07 PROCEDURE — 25010000002 POTASSIUM CHLORIDE PER 2 MEQ: Performed by: SPECIALIST

## 2018-04-07 PROCEDURE — 80053 COMPREHEN METABOLIC PANEL: CPT | Performed by: SPECIALIST

## 2018-04-07 PROCEDURE — 25010000003 POTASSIUM CHLORIDE PER 2 MEQ: Performed by: FAMILY MEDICINE

## 2018-04-07 PROCEDURE — 84100 ASSAY OF PHOSPHORUS: CPT | Performed by: SPECIALIST

## 2018-04-07 PROCEDURE — 85025 COMPLETE CBC W/AUTO DIFF WBC: CPT | Performed by: FAMILY MEDICINE

## 2018-04-07 PROCEDURE — 25010000002 HYDROMORPHONE PER 4 MG: Performed by: SPECIALIST

## 2018-04-07 RX ORDER — POTASSIUM CHLORIDE 29.8 MG/ML
20 INJECTION INTRAVENOUS
Status: COMPLETED | OUTPATIENT
Start: 2018-04-07 | End: 2018-04-07

## 2018-04-07 RX ORDER — POTASSIUM CHLORIDE 14.9 MG/ML
20 INJECTION INTRAVENOUS
Status: DISPENSED | OUTPATIENT
Start: 2018-04-07 | End: 2018-04-07

## 2018-04-07 RX ADMIN — POTASSIUM CHLORIDE 20 MEQ: 200 INJECTION, SOLUTION INTRAVENOUS at 15:05

## 2018-04-07 RX ADMIN — FAMOTIDINE 20 MG: 10 INJECTION INTRAVENOUS at 20:57

## 2018-04-07 RX ADMIN — POTASSIUM CHLORIDE 20 MEQ: 400 INJECTION, SOLUTION INTRAVENOUS at 12:14

## 2018-04-07 RX ADMIN — IPRATROPIUM BROMIDE AND ALBUTEROL SULFATE 3 ML: 2.5; .5 SOLUTION RESPIRATORY (INHALATION) at 07:02

## 2018-04-07 RX ADMIN — HYDROMORPHONE HYDROCHLORIDE 0.5 MG: 2 INJECTION, SOLUTION INTRAMUSCULAR; INTRAVENOUS; SUBCUTANEOUS at 02:15

## 2018-04-07 RX ADMIN — ENOXAPARIN SODIUM 40 MG: 40 INJECTION SUBCUTANEOUS at 09:05

## 2018-04-07 RX ADMIN — MEROPENEM 1 G: 1 INJECTION, POWDER, FOR SOLUTION INTRAVENOUS at 05:47

## 2018-04-07 RX ADMIN — LORAZEPAM 0.5 MG: 2 INJECTION INTRAMUSCULAR; INTRAVENOUS at 22:18

## 2018-04-07 RX ADMIN — IPRATROPIUM BROMIDE AND ALBUTEROL SULFATE 3 ML: 2.5; .5 SOLUTION RESPIRATORY (INHALATION) at 20:45

## 2018-04-07 RX ADMIN — HYDROMORPHONE HYDROCHLORIDE 0.5 MG: 2 INJECTION, SOLUTION INTRAMUSCULAR; INTRAVENOUS; SUBCUTANEOUS at 15:52

## 2018-04-07 RX ADMIN — BUDESONIDE AND FORMOTEROL FUMARATE DIHYDRATE 2 PUFF: 160; 4.5 AEROSOL RESPIRATORY (INHALATION) at 07:02

## 2018-04-07 RX ADMIN — IPRATROPIUM BROMIDE AND ALBUTEROL SULFATE 3 ML: 2.5; .5 SOLUTION RESPIRATORY (INHALATION) at 14:49

## 2018-04-07 RX ADMIN — MEROPENEM 1 G: 1 INJECTION, POWDER, FOR SOLUTION INTRAVENOUS at 12:14

## 2018-04-07 RX ADMIN — MEROPENEM 1 G: 1 INJECTION, POWDER, FOR SOLUTION INTRAVENOUS at 20:57

## 2018-04-07 RX ADMIN — SODIUM CHLORIDE, POTASSIUM CHLORIDE, SODIUM LACTATE AND CALCIUM CHLORIDE 125 ML/HR: 600; 310; 30; 20 INJECTION, SOLUTION INTRAVENOUS at 02:27

## 2018-04-07 RX ADMIN — BUDESONIDE AND FORMOTEROL FUMARATE DIHYDRATE 2 PUFF: 160; 4.5 AEROSOL RESPIRATORY (INHALATION) at 20:45

## 2018-04-07 RX ADMIN — FAMOTIDINE 20 MG: 10 INJECTION INTRAVENOUS at 09:05

## 2018-04-07 RX ADMIN — SODIUM CHLORIDE, POTASSIUM CHLORIDE, SODIUM LACTATE AND CALCIUM CHLORIDE 125 ML/HR: 600; 310; 30; 20 INJECTION, SOLUTION INTRAVENOUS at 12:19

## 2018-04-07 RX ADMIN — SODIUM CHLORIDE, POTASSIUM CHLORIDE, SODIUM LACTATE AND CALCIUM CHLORIDE 1000 ML: 600; 310; 30; 20 INJECTION, SOLUTION INTRAVENOUS at 15:04

## 2018-04-07 RX ADMIN — IPRATROPIUM BROMIDE AND ALBUTEROL SULFATE 3 ML: 2.5; .5 SOLUTION RESPIRATORY (INHALATION) at 10:22

## 2018-04-07 RX ADMIN — POTASSIUM CHLORIDE 20 MEQ: 400 INJECTION, SOLUTION INTRAVENOUS at 13:03

## 2018-04-07 RX ADMIN — ASCORBIC ACID, VITAMIN A PALMITATE, CHOLECALCIFEROL, THIAMINE HYDROCHLORIDE, RIBOFLAVIN-5 PHOSPHATE SODIUM, PYRIDOXINE HYDROCHLORIDE, NIACINAMIDE, DEXPANTHENOL, ALPHA-TOCOPHEROL ACETATE, VITAMIN K1, FOLIC ACID, BIOTIN, CYANOCOBALAMIN: 200; 3300; 200; 6; 3.6; 6; 40; 15; 10; 150; 600; 60; 5 INJECTION, SOLUTION INTRAVENOUS at 21:00

## 2018-04-08 LAB
ALBUMIN SERPL-MCNC: 2.2 G/DL (ref 3.5–5)
ALBUMIN/GLOB SERPL: 0.9 G/DL (ref 1.1–2.5)
ALP SERPL-CCNC: 94 U/L (ref 24–120)
ALT SERPL W P-5'-P-CCNC: 46 U/L (ref 0–54)
ANION GAP SERPL CALCULATED.3IONS-SCNC: 3 MMOL/L (ref 4–13)
AST SERPL-CCNC: 60 U/L (ref 7–45)
BASOPHILS # BLD AUTO: 0.02 10*3/MM3 (ref 0–0.2)
BASOPHILS NFR BLD AUTO: 0.1 % (ref 0–2)
BILIRUB SERPL-MCNC: 0.3 MG/DL (ref 0.1–1)
BUN BLD-MCNC: 12 MG/DL (ref 5–21)
BUN/CREAT SERPL: 20.7 (ref 7–25)
CALCIUM SPEC-SCNC: 8.3 MG/DL (ref 8.4–10.4)
CHLORIDE SERPL-SCNC: 100 MMOL/L (ref 98–110)
CO2 SERPL-SCNC: 35 MMOL/L (ref 24–31)
CREAT BLD-MCNC: 0.58 MG/DL (ref 0.5–1.4)
DEPRECATED RDW RBC AUTO: 55.3 FL (ref 40–54)
EOSINOPHIL # BLD AUTO: 0.17 10*3/MM3 (ref 0–0.7)
EOSINOPHIL NFR BLD AUTO: 1.1 % (ref 0–4)
ERYTHROCYTE [DISTWIDTH] IN BLOOD BY AUTOMATED COUNT: 17.3 % (ref 12–15)
ERYTHROCYTE [SEDIMENTATION RATE] IN BLOOD: 57 MM/HR (ref 0–20)
GFR SERPL CREATININE-BSD FRML MDRD: 103 ML/MIN/1.73
GLOBULIN UR ELPH-MCNC: 2.5 GM/DL
GLUCOSE BLD-MCNC: 109 MG/DL (ref 70–100)
GLUCOSE BLDC GLUCOMTR-MCNC: 103 MG/DL (ref 70–130)
GLUCOSE BLDC GLUCOMTR-MCNC: 123 MG/DL (ref 70–130)
GLUCOSE BLDC GLUCOMTR-MCNC: 128 MG/DL (ref 70–130)
HCT VFR BLD AUTO: 28.9 % (ref 37–47)
HGB BLD-MCNC: 9.4 G/DL (ref 12–16)
IMM GRANULOCYTES # BLD: 0.18 10*3/MM3 (ref 0–0.03)
IMM GRANULOCYTES NFR BLD: 1.2 % (ref 0–5)
LYMPHOCYTES # BLD AUTO: 1.19 10*3/MM3 (ref 0.72–4.86)
LYMPHOCYTES NFR BLD AUTO: 7.8 % (ref 15–45)
MAGNESIUM SERPL-MCNC: 2.1 MG/DL (ref 1.4–2.2)
MCH RBC QN AUTO: 28.2 PG (ref 28–32)
MCHC RBC AUTO-ENTMCNC: 32.5 G/DL (ref 33–36)
MCV RBC AUTO: 86.8 FL (ref 82–98)
MONOCYTES # BLD AUTO: 0.43 10*3/MM3 (ref 0.19–1.3)
MONOCYTES NFR BLD AUTO: 2.8 % (ref 4–12)
NEUTROPHILS # BLD AUTO: 13.32 10*3/MM3 (ref 1.87–8.4)
NEUTROPHILS NFR BLD AUTO: 87 % (ref 39–78)
NRBC BLD MANUAL-RTO: 0 /100 WBC (ref 0–0)
PHOSPHATE SERPL-MCNC: 1.8 MG/DL (ref 2.5–4.5)
PLATELET # BLD AUTO: 242 10*3/MM3 (ref 130–400)
PMV BLD AUTO: 9.2 FL (ref 6–12)
POTASSIUM BLD-SCNC: 3.3 MMOL/L (ref 3.5–5.3)
PROT SERPL-MCNC: 4.7 G/DL (ref 6.3–8.7)
RBC # BLD AUTO: 3.33 10*6/MM3 (ref 4.2–5.4)
SODIUM BLD-SCNC: 138 MMOL/L (ref 135–145)
WBC NRBC COR # BLD: 15.31 10*3/MM3 (ref 4.8–10.8)

## 2018-04-08 PROCEDURE — 25010000002 HYDROMORPHONE PER 4 MG: Performed by: SPECIALIST

## 2018-04-08 PROCEDURE — 63710000001 INSULIN REGULAR HUMAN PER 5 UNITS: Performed by: SPECIALIST

## 2018-04-08 PROCEDURE — 25010000002 FUROSEMIDE PER 20 MG: Performed by: SPECIALIST

## 2018-04-08 PROCEDURE — 84100 ASSAY OF PHOSPHORUS: CPT | Performed by: SPECIALIST

## 2018-04-08 PROCEDURE — 82962 GLUCOSE BLOOD TEST: CPT

## 2018-04-08 PROCEDURE — 83735 ASSAY OF MAGNESIUM: CPT | Performed by: SPECIALIST

## 2018-04-08 PROCEDURE — 94799 UNLISTED PULMONARY SVC/PX: CPT

## 2018-04-08 PROCEDURE — 25010000002 LORAZEPAM PER 2 MG: Performed by: SPECIALIST

## 2018-04-08 PROCEDURE — 85025 COMPLETE CBC W/AUTO DIFF WBC: CPT | Performed by: FAMILY MEDICINE

## 2018-04-08 PROCEDURE — 97530 THERAPEUTIC ACTIVITIES: CPT

## 2018-04-08 PROCEDURE — 25010000002 ENOXAPARIN PER 10 MG: Performed by: SPECIALIST

## 2018-04-08 PROCEDURE — 80053 COMPREHEN METABOLIC PANEL: CPT | Performed by: SPECIALIST

## 2018-04-08 PROCEDURE — 85651 RBC SED RATE NONAUTOMATED: CPT | Performed by: SPECIALIST

## 2018-04-08 PROCEDURE — 25010000002 POTASSIUM CHLORIDE PER 2 MEQ: Performed by: SPECIALIST

## 2018-04-08 PROCEDURE — 25010000002 MEROPENEM PER 100 MG: Performed by: SPECIALIST

## 2018-04-08 RX ORDER — POTASSIUM CHLORIDE 14.9 MG/ML
20 INJECTION INTRAVENOUS
Status: COMPLETED | OUTPATIENT
Start: 2018-04-08 | End: 2018-04-08

## 2018-04-08 RX ORDER — FUROSEMIDE 10 MG/ML
20 INJECTION INTRAMUSCULAR; INTRAVENOUS ONCE
Status: COMPLETED | OUTPATIENT
Start: 2018-04-08 | End: 2018-04-08

## 2018-04-08 RX ADMIN — HYDROMORPHONE HYDROCHLORIDE 0.5 MG: 2 INJECTION, SOLUTION INTRAMUSCULAR; INTRAVENOUS; SUBCUTANEOUS at 02:02

## 2018-04-08 RX ADMIN — IPRATROPIUM BROMIDE AND ALBUTEROL SULFATE 3 ML: 2.5; .5 SOLUTION RESPIRATORY (INHALATION) at 10:45

## 2018-04-08 RX ADMIN — MEROPENEM 1 G: 1 INJECTION, POWDER, FOR SOLUTION INTRAVENOUS at 04:21

## 2018-04-08 RX ADMIN — FAMOTIDINE 20 MG: 10 INJECTION INTRAVENOUS at 08:17

## 2018-04-08 RX ADMIN — IPRATROPIUM BROMIDE AND ALBUTEROL SULFATE 3 ML: 2.5; .5 SOLUTION RESPIRATORY (INHALATION) at 20:51

## 2018-04-08 RX ADMIN — POTASSIUM CHLORIDE 20 MEQ: 200 INJECTION, SOLUTION INTRAVENOUS at 12:27

## 2018-04-08 RX ADMIN — FAMOTIDINE 20 MG: 10 INJECTION INTRAVENOUS at 21:18

## 2018-04-08 RX ADMIN — POTASSIUM CHLORIDE 20 MEQ: 200 INJECTION, SOLUTION INTRAVENOUS at 09:30

## 2018-04-08 RX ADMIN — BUDESONIDE AND FORMOTEROL FUMARATE DIHYDRATE 2 PUFF: 160; 4.5 AEROSOL RESPIRATORY (INHALATION) at 06:42

## 2018-04-08 RX ADMIN — POTASSIUM CHLORIDE 20 MEQ: 200 INJECTION, SOLUTION INTRAVENOUS at 10:27

## 2018-04-08 RX ADMIN — IPRATROPIUM BROMIDE AND ALBUTEROL SULFATE 3 ML: 2.5; .5 SOLUTION RESPIRATORY (INHALATION) at 06:42

## 2018-04-08 RX ADMIN — HYDROMORPHONE HYDROCHLORIDE 0.5 MG: 2 INJECTION, SOLUTION INTRAMUSCULAR; INTRAVENOUS; SUBCUTANEOUS at 09:30

## 2018-04-08 RX ADMIN — BUDESONIDE AND FORMOTEROL FUMARATE DIHYDRATE 2 PUFF: 160; 4.5 AEROSOL RESPIRATORY (INHALATION) at 20:51

## 2018-04-08 RX ADMIN — MEROPENEM 1 G: 1 INJECTION, POWDER, FOR SOLUTION INTRAVENOUS at 11:50

## 2018-04-08 RX ADMIN — LORAZEPAM 0.5 MG: 2 INJECTION INTRAMUSCULAR; INTRAVENOUS at 09:30

## 2018-04-08 RX ADMIN — ASCORBIC ACID, VITAMIN A PALMITATE, CHOLECALCIFEROL, THIAMINE HYDROCHLORIDE, RIBOFLAVIN-5 PHOSPHATE SODIUM, PYRIDOXINE HYDROCHLORIDE, NIACINAMIDE, DEXPANTHENOL, ALPHA-TOCOPHEROL ACETATE, VITAMIN K1, FOLIC ACID, BIOTIN, CYANOCOBALAMIN: 200; 3300; 200; 6; 3.6; 6; 40; 15; 10; 150; 600; 60; 5 INJECTION, SOLUTION INTRAVENOUS at 17:53

## 2018-04-08 RX ADMIN — HYDROMORPHONE HYDROCHLORIDE 0.5 MG: 2 INJECTION, SOLUTION INTRAMUSCULAR; INTRAVENOUS; SUBCUTANEOUS at 23:19

## 2018-04-08 RX ADMIN — ENOXAPARIN SODIUM 40 MG: 40 INJECTION SUBCUTANEOUS at 08:17

## 2018-04-08 RX ADMIN — HYDROMORPHONE HYDROCHLORIDE 0.5 MG: 2 INJECTION, SOLUTION INTRAMUSCULAR; INTRAVENOUS; SUBCUTANEOUS at 15:59

## 2018-04-08 RX ADMIN — IPRATROPIUM BROMIDE AND ALBUTEROL SULFATE 3 ML: 2.5; .5 SOLUTION RESPIRATORY (INHALATION) at 14:43

## 2018-04-08 RX ADMIN — POTASSIUM CHLORIDE 20 MEQ: 200 INJECTION, SOLUTION INTRAVENOUS at 08:18

## 2018-04-08 RX ADMIN — I.V. FAT EMULSION 50 G: 20 EMULSION INTRAVENOUS at 17:52

## 2018-04-08 RX ADMIN — FUROSEMIDE 20 MG: 10 INJECTION, SOLUTION INTRAVENOUS at 08:17

## 2018-04-08 RX ADMIN — MEROPENEM 1 G: 1 INJECTION, POWDER, FOR SOLUTION INTRAVENOUS at 21:17

## 2018-04-08 RX ADMIN — POTASSIUM CHLORIDE 20 MEQ: 200 INJECTION, SOLUTION INTRAVENOUS at 11:31

## 2018-04-09 LAB
ALBUMIN SERPL-MCNC: 2.1 G/DL (ref 3.5–5)
ALBUMIN SERPL-MCNC: 2.4 G/DL (ref 3.5–5)
ALBUMIN/GLOB SERPL: 0.8 G/DL (ref 1.1–2.5)
ALBUMIN/GLOB SERPL: 0.9 G/DL (ref 1.1–2.5)
ALP SERPL-CCNC: 100 U/L (ref 24–120)
ALP SERPL-CCNC: 86 U/L (ref 24–120)
ALT SERPL W P-5'-P-CCNC: 37 U/L (ref 0–54)
ALT SERPL W P-5'-P-CCNC: 39 U/L (ref 0–54)
ANION GAP SERPL CALCULATED.3IONS-SCNC: 1 MMOL/L (ref 4–13)
ANION GAP SERPL CALCULATED.3IONS-SCNC: 1 MMOL/L (ref 4–13)
AST SERPL-CCNC: 28 U/L (ref 7–45)
AST SERPL-CCNC: 39 U/L (ref 7–45)
BASOPHILS # BLD AUTO: 0.03 10*3/MM3 (ref 0–0.2)
BASOPHILS NFR BLD AUTO: 0.2 % (ref 0–2)
BILIRUB SERPL-MCNC: 0.2 MG/DL (ref 0.1–1)
BILIRUB SERPL-MCNC: 0.5 MG/DL (ref 0.1–1)
BUN BLD-MCNC: 14 MG/DL (ref 5–21)
BUN BLD-MCNC: 15 MG/DL (ref 5–21)
BUN/CREAT SERPL: 27.5 (ref 7–25)
BUN/CREAT SERPL: 31.9 (ref 7–25)
CALCIUM SPEC-SCNC: 8.1 MG/DL (ref 8.4–10.4)
CALCIUM SPEC-SCNC: 8.2 MG/DL (ref 8.4–10.4)
CHLORIDE SERPL-SCNC: 95 MMOL/L (ref 98–110)
CHLORIDE SERPL-SCNC: 96 MMOL/L (ref 98–110)
CO2 SERPL-SCNC: 32 MMOL/L (ref 24–31)
CO2 SERPL-SCNC: 35 MMOL/L (ref 24–31)
CREAT BLD-MCNC: 0.47 MG/DL (ref 0.5–1.4)
CREAT BLD-MCNC: 0.51 MG/DL (ref 0.5–1.4)
CRP SERPL-MCNC: 18.33 MG/DL (ref 0–0.99)
DEPRECATED RDW RBC AUTO: 54.1 FL (ref 40–54)
EOSINOPHIL # BLD AUTO: 0.45 10*3/MM3 (ref 0–0.7)
EOSINOPHIL NFR BLD AUTO: 3.3 % (ref 0–4)
ERYTHROCYTE [DISTWIDTH] IN BLOOD BY AUTOMATED COUNT: 16.6 % (ref 12–15)
GFR SERPL CREATININE-BSD FRML MDRD: 120 ML/MIN/1.73
GFR SERPL CREATININE-BSD FRML MDRD: 132 ML/MIN/1.73
GLOBULIN UR ELPH-MCNC: 2.5 GM/DL
GLOBULIN UR ELPH-MCNC: 2.7 GM/DL
GLUCOSE BLD-MCNC: 100 MG/DL (ref 70–100)
GLUCOSE BLD-MCNC: 111 MG/DL (ref 70–100)
GLUCOSE BLDC GLUCOMTR-MCNC: 118 MG/DL (ref 70–130)
GLUCOSE BLDC GLUCOMTR-MCNC: 120 MG/DL (ref 70–130)
GLUCOSE BLDC GLUCOMTR-MCNC: 120 MG/DL (ref 70–130)
HCT VFR BLD AUTO: 27.9 % (ref 37–47)
HGB BLD-MCNC: 9 G/DL (ref 12–16)
IMM GRANULOCYTES # BLD: 0.16 10*3/MM3 (ref 0–0.03)
IMM GRANULOCYTES NFR BLD: 1.2 % (ref 0–5)
LYMPHOCYTES # BLD AUTO: 1.4 10*3/MM3 (ref 0.72–4.86)
LYMPHOCYTES NFR BLD AUTO: 10.1 % (ref 15–45)
MAGNESIUM SERPL-MCNC: 2.1 MG/DL (ref 1.4–2.2)
MAGNESIUM SERPL-MCNC: 2.1 MG/DL (ref 1.4–2.2)
MCH RBC QN AUTO: 28.3 PG (ref 28–32)
MCHC RBC AUTO-ENTMCNC: 32.3 G/DL (ref 33–36)
MCV RBC AUTO: 87.7 FL (ref 82–98)
MONOCYTES # BLD AUTO: 0.68 10*3/MM3 (ref 0.19–1.3)
MONOCYTES NFR BLD AUTO: 4.9 % (ref 4–12)
NEUTROPHILS # BLD AUTO: 11.09 10*3/MM3 (ref 1.87–8.4)
NEUTROPHILS NFR BLD AUTO: 80.3 % (ref 39–78)
NRBC BLD MANUAL-RTO: 0 /100 WBC (ref 0–0)
PHOSPHATE SERPL-MCNC: 2.6 MG/DL (ref 2.5–4.5)
PHOSPHATE SERPL-MCNC: 2.7 MG/DL (ref 2.5–4.5)
PLATELET # BLD AUTO: 238 10*3/MM3 (ref 130–400)
PMV BLD AUTO: 9.2 FL (ref 6–12)
POTASSIUM BLD-SCNC: 3.8 MMOL/L (ref 3.5–5.3)
POTASSIUM BLD-SCNC: 4.1 MMOL/L (ref 3.5–5.3)
PREALB SERPL-MCNC: 4.6 MG/DL (ref 18–36)
PROT SERPL-MCNC: 4.6 G/DL (ref 6.3–8.7)
PROT SERPL-MCNC: 5.1 G/DL (ref 6.3–8.7)
RBC # BLD AUTO: 3.18 10*6/MM3 (ref 4.2–5.4)
SODIUM BLD-SCNC: 128 MMOL/L (ref 135–145)
SODIUM BLD-SCNC: 132 MMOL/L (ref 135–145)
WBC NRBC COR # BLD: 13.81 10*3/MM3 (ref 4.8–10.8)

## 2018-04-09 PROCEDURE — 25010000002 MEROPENEM PER 100 MG: Performed by: SPECIALIST

## 2018-04-09 PROCEDURE — 85025 COMPLETE CBC W/AUTO DIFF WBC: CPT | Performed by: FAMILY MEDICINE

## 2018-04-09 PROCEDURE — 82962 GLUCOSE BLOOD TEST: CPT

## 2018-04-09 PROCEDURE — 63710000001 INSULIN REGULAR HUMAN PER 5 UNITS: Performed by: SPECIALIST

## 2018-04-09 PROCEDURE — 25010000002 HYDROMORPHONE PER 4 MG: Performed by: SPECIALIST

## 2018-04-09 PROCEDURE — 25010000002 ENOXAPARIN PER 10 MG: Performed by: SPECIALIST

## 2018-04-09 PROCEDURE — 94760 N-INVAS EAR/PLS OXIMETRY 1: CPT

## 2018-04-09 PROCEDURE — 94799 UNLISTED PULMONARY SVC/PX: CPT

## 2018-04-09 PROCEDURE — 84134 ASSAY OF PREALBUMIN: CPT | Performed by: SPECIALIST

## 2018-04-09 PROCEDURE — 80053 COMPREHEN METABOLIC PANEL: CPT | Performed by: SPECIALIST

## 2018-04-09 PROCEDURE — 97530 THERAPEUTIC ACTIVITIES: CPT

## 2018-04-09 PROCEDURE — 86140 C-REACTIVE PROTEIN: CPT | Performed by: SPECIALIST

## 2018-04-09 PROCEDURE — 84100 ASSAY OF PHOSPHORUS: CPT | Performed by: SPECIALIST

## 2018-04-09 PROCEDURE — 83735 ASSAY OF MAGNESIUM: CPT | Performed by: SPECIALIST

## 2018-04-09 RX ADMIN — ENOXAPARIN SODIUM 40 MG: 40 INJECTION SUBCUTANEOUS at 09:49

## 2018-04-09 RX ADMIN — BUDESONIDE AND FORMOTEROL FUMARATE DIHYDRATE 2 PUFF: 160; 4.5 AEROSOL RESPIRATORY (INHALATION) at 19:54

## 2018-04-09 RX ADMIN — MEROPENEM 1 G: 1 INJECTION, POWDER, FOR SOLUTION INTRAVENOUS at 22:00

## 2018-04-09 RX ADMIN — MEROPENEM 1 G: 1 INJECTION, POWDER, FOR SOLUTION INTRAVENOUS at 04:56

## 2018-04-09 RX ADMIN — HYDROMORPHONE HYDROCHLORIDE 0.5 MG: 2 INJECTION, SOLUTION INTRAMUSCULAR; INTRAVENOUS; SUBCUTANEOUS at 17:41

## 2018-04-09 RX ADMIN — HYDROMORPHONE HYDROCHLORIDE 0.5 MG: 2 INJECTION, SOLUTION INTRAMUSCULAR; INTRAVENOUS; SUBCUTANEOUS at 04:57

## 2018-04-09 RX ADMIN — IPRATROPIUM BROMIDE AND ALBUTEROL SULFATE 3 ML: 2.5; .5 SOLUTION RESPIRATORY (INHALATION) at 10:23

## 2018-04-09 RX ADMIN — MEROPENEM 1 G: 1 INJECTION, POWDER, FOR SOLUTION INTRAVENOUS at 13:13

## 2018-04-09 RX ADMIN — FAMOTIDINE 20 MG: 10 INJECTION INTRAVENOUS at 22:00

## 2018-04-09 RX ADMIN — IPRATROPIUM BROMIDE AND ALBUTEROL SULFATE 3 ML: 2.5; .5 SOLUTION RESPIRATORY (INHALATION) at 06:37

## 2018-04-09 RX ADMIN — IPRATROPIUM BROMIDE AND ALBUTEROL SULFATE 3 ML: 2.5; .5 SOLUTION RESPIRATORY (INHALATION) at 14:30

## 2018-04-09 RX ADMIN — BUDESONIDE AND FORMOTEROL FUMARATE DIHYDRATE 2 PUFF: 160; 4.5 AEROSOL RESPIRATORY (INHALATION) at 06:44

## 2018-04-09 RX ADMIN — ASCORBIC ACID, VITAMIN A PALMITATE, CHOLECALCIFEROL, THIAMINE HYDROCHLORIDE, RIBOFLAVIN-5 PHOSPHATE SODIUM, PYRIDOXINE HYDROCHLORIDE, NIACINAMIDE, DEXPANTHENOL, ALPHA-TOCOPHEROL ACETATE, VITAMIN K1, FOLIC ACID, BIOTIN, CYANOCOBALAMIN: 200; 3300; 200; 6; 3.6; 6; 40; 15; 10; 150; 600; 60; 5 INJECTION, SOLUTION INTRAVENOUS at 17:13

## 2018-04-09 RX ADMIN — FAMOTIDINE 20 MG: 10 INJECTION INTRAVENOUS at 09:49

## 2018-04-09 RX ADMIN — IPRATROPIUM BROMIDE AND ALBUTEROL SULFATE 3 ML: 2.5; .5 SOLUTION RESPIRATORY (INHALATION) at 19:54

## 2018-04-10 LAB
ANION GAP SERPL CALCULATED.3IONS-SCNC: 6 MMOL/L (ref 4–13)
BASOPHILS # BLD AUTO: 0.03 10*3/MM3 (ref 0–0.2)
BASOPHILS NFR BLD AUTO: 0.2 % (ref 0–2)
BUN BLD-MCNC: 14 MG/DL (ref 5–21)
BUN/CREAT SERPL: 26.9 (ref 7–25)
CALCIUM SPEC-SCNC: 8.3 MG/DL (ref 8.4–10.4)
CHLORIDE SERPL-SCNC: 99 MMOL/L (ref 98–110)
CO2 SERPL-SCNC: 32 MMOL/L (ref 24–31)
CREAT BLD-MCNC: 0.52 MG/DL (ref 0.5–1.4)
DEPRECATED RDW RBC AUTO: 52.2 FL (ref 40–54)
EOSINOPHIL # BLD AUTO: 0.38 10*3/MM3 (ref 0–0.7)
EOSINOPHIL NFR BLD AUTO: 2.5 % (ref 0–4)
ERYTHROCYTE [DISTWIDTH] IN BLOOD BY AUTOMATED COUNT: 16.3 % (ref 12–15)
GFR SERPL CREATININE-BSD FRML MDRD: 117 ML/MIN/1.73
GLUCOSE BLD-MCNC: 117 MG/DL (ref 70–100)
GLUCOSE BLDC GLUCOMTR-MCNC: 109 MG/DL (ref 70–130)
HCT VFR BLD AUTO: 29.4 % (ref 37–47)
HGB BLD-MCNC: 9.5 G/DL (ref 12–16)
IMM GRANULOCYTES # BLD: 0.16 10*3/MM3 (ref 0–0.03)
IMM GRANULOCYTES NFR BLD: 1.1 % (ref 0–5)
LYMPHOCYTES # BLD AUTO: 1.45 10*3/MM3 (ref 0.72–4.86)
LYMPHOCYTES NFR BLD AUTO: 9.6 % (ref 15–45)
MAGNESIUM SERPL-MCNC: 2.3 MG/DL (ref 1.4–2.2)
MCH RBC QN AUTO: 28 PG (ref 28–32)
MCHC RBC AUTO-ENTMCNC: 32.3 G/DL (ref 33–36)
MCV RBC AUTO: 86.7 FL (ref 82–98)
MONOCYTES # BLD AUTO: 0.79 10*3/MM3 (ref 0.19–1.3)
MONOCYTES NFR BLD AUTO: 5.2 % (ref 4–12)
NEUTROPHILS # BLD AUTO: 12.37 10*3/MM3 (ref 1.87–8.4)
NEUTROPHILS NFR BLD AUTO: 81.4 % (ref 39–78)
NRBC BLD MANUAL-RTO: 0 /100 WBC (ref 0–0)
PHOSPHATE SERPL-MCNC: 3.2 MG/DL (ref 2.5–4.5)
PLATELET # BLD AUTO: 257 10*3/MM3 (ref 130–400)
PMV BLD AUTO: 9.6 FL (ref 6–12)
POTASSIUM BLD-SCNC: 3.4 MMOL/L (ref 3.5–5.3)
RBC # BLD AUTO: 3.39 10*6/MM3 (ref 4.2–5.4)
SODIUM BLD-SCNC: 137 MMOL/L (ref 135–145)
WBC NRBC COR # BLD: 15.18 10*3/MM3 (ref 4.8–10.8)

## 2018-04-10 PROCEDURE — 25010000002 HYDROMORPHONE PER 4 MG: Performed by: SPECIALIST

## 2018-04-10 PROCEDURE — 94799 UNLISTED PULMONARY SVC/PX: CPT

## 2018-04-10 PROCEDURE — 84100 ASSAY OF PHOSPHORUS: CPT | Performed by: SPECIALIST

## 2018-04-10 PROCEDURE — 85025 COMPLETE CBC W/AUTO DIFF WBC: CPT | Performed by: FAMILY MEDICINE

## 2018-04-10 PROCEDURE — 63710000001 INSULIN REGULAR HUMAN PER 5 UNITS: Performed by: SPECIALIST

## 2018-04-10 PROCEDURE — 97110 THERAPEUTIC EXERCISES: CPT

## 2018-04-10 PROCEDURE — 83735 ASSAY OF MAGNESIUM: CPT | Performed by: SPECIALIST

## 2018-04-10 PROCEDURE — 97116 GAIT TRAINING THERAPY: CPT

## 2018-04-10 PROCEDURE — 80048 BASIC METABOLIC PNL TOTAL CA: CPT | Performed by: SPECIALIST

## 2018-04-10 PROCEDURE — 25010000002 ENOXAPARIN PER 10 MG: Performed by: SPECIALIST

## 2018-04-10 PROCEDURE — 25010000002 MEROPENEM PER 100 MG: Performed by: SPECIALIST

## 2018-04-10 PROCEDURE — 82962 GLUCOSE BLOOD TEST: CPT

## 2018-04-10 RX ADMIN — ESCITSLOPRAM 10 MG: 10 TABLET ORAL at 08:59

## 2018-04-10 RX ADMIN — FAMOTIDINE 20 MG: 10 INJECTION INTRAVENOUS at 08:59

## 2018-04-10 RX ADMIN — IPRATROPIUM BROMIDE AND ALBUTEROL SULFATE 3 ML: 2.5; .5 SOLUTION RESPIRATORY (INHALATION) at 14:23

## 2018-04-10 RX ADMIN — IPRATROPIUM BROMIDE AND ALBUTEROL SULFATE 3 ML: 2.5; .5 SOLUTION RESPIRATORY (INHALATION) at 06:30

## 2018-04-10 RX ADMIN — ENOXAPARIN SODIUM 40 MG: 40 INJECTION SUBCUTANEOUS at 08:59

## 2018-04-10 RX ADMIN — HYDROMORPHONE HYDROCHLORIDE 0.5 MG: 2 INJECTION, SOLUTION INTRAMUSCULAR; INTRAVENOUS; SUBCUTANEOUS at 17:53

## 2018-04-10 RX ADMIN — IPRATROPIUM BROMIDE AND ALBUTEROL SULFATE 3 ML: 2.5; .5 SOLUTION RESPIRATORY (INHALATION) at 21:52

## 2018-04-10 RX ADMIN — HYDROMORPHONE HYDROCHLORIDE 0.5 MG: 2 INJECTION, SOLUTION INTRAMUSCULAR; INTRAVENOUS; SUBCUTANEOUS at 21:55

## 2018-04-10 RX ADMIN — IPRATROPIUM BROMIDE AND ALBUTEROL SULFATE 3 ML: 2.5; .5 SOLUTION RESPIRATORY (INHALATION) at 10:21

## 2018-04-10 RX ADMIN — BUDESONIDE AND FORMOTEROL FUMARATE DIHYDRATE 2 PUFF: 160; 4.5 AEROSOL RESPIRATORY (INHALATION) at 06:30

## 2018-04-10 RX ADMIN — MEROPENEM 1 G: 1 INJECTION, POWDER, FOR SOLUTION INTRAVENOUS at 20:59

## 2018-04-10 RX ADMIN — HYDROMORPHONE HYDROCHLORIDE 0.5 MG: 2 INJECTION, SOLUTION INTRAMUSCULAR; INTRAVENOUS; SUBCUTANEOUS at 05:46

## 2018-04-10 RX ADMIN — FAMOTIDINE 20 MG: 10 INJECTION INTRAVENOUS at 20:59

## 2018-04-10 RX ADMIN — I.V. FAT EMULSION 50 G: 20 EMULSION INTRAVENOUS at 17:48

## 2018-04-10 RX ADMIN — MEROPENEM 1 G: 1 INJECTION, POWDER, FOR SOLUTION INTRAVENOUS at 13:24

## 2018-04-10 RX ADMIN — MEROPENEM 1 G: 1 INJECTION, POWDER, FOR SOLUTION INTRAVENOUS at 05:50

## 2018-04-10 RX ADMIN — ASCORBIC ACID, VITAMIN A PALMITATE, CHOLECALCIFEROL, THIAMINE HYDROCHLORIDE, RIBOFLAVIN-5 PHOSPHATE SODIUM, PYRIDOXINE HYDROCHLORIDE, NIACINAMIDE, DEXPANTHENOL, ALPHA-TOCOPHEROL ACETATE, VITAMIN K1, FOLIC ACID, BIOTIN, CYANOCOBALAMIN: 200; 3300; 200; 6; 3.6; 6; 40; 15; 10; 150; 600; 60; 5 INJECTION, SOLUTION INTRAVENOUS at 17:48

## 2018-04-10 RX ADMIN — BUDESONIDE AND FORMOTEROL FUMARATE DIHYDRATE 2 PUFF: 160; 4.5 AEROSOL RESPIRATORY (INHALATION) at 21:52

## 2018-04-11 LAB
ANION GAP SERPL CALCULATED.3IONS-SCNC: 5 MMOL/L (ref 4–13)
BASOPHILS # BLD AUTO: 0.06 10*3/MM3 (ref 0–0.2)
BASOPHILS NFR BLD AUTO: 0.5 % (ref 0–2)
BUN BLD-MCNC: 15 MG/DL (ref 5–21)
BUN/CREAT SERPL: 30 (ref 7–25)
CALCIUM SPEC-SCNC: 8.2 MG/DL (ref 8.4–10.4)
CHLORIDE SERPL-SCNC: 97 MMOL/L (ref 98–110)
CO2 SERPL-SCNC: 36 MMOL/L (ref 24–31)
CREAT BLD-MCNC: 0.5 MG/DL (ref 0.5–1.4)
DEPRECATED RDW RBC AUTO: 52.3 FL (ref 40–54)
EOSINOPHIL # BLD AUTO: 0.52 10*3/MM3 (ref 0–0.7)
EOSINOPHIL NFR BLD AUTO: 4.1 % (ref 0–4)
ERYTHROCYTE [DISTWIDTH] IN BLOOD BY AUTOMATED COUNT: 16.3 % (ref 12–15)
GFR SERPL CREATININE-BSD FRML MDRD: 123 ML/MIN/1.73
GLUCOSE BLD-MCNC: 98 MG/DL (ref 70–100)
GLUCOSE BLDC GLUCOMTR-MCNC: 108 MG/DL (ref 70–130)
GLUCOSE BLDC GLUCOMTR-MCNC: 84 MG/DL (ref 70–130)
HCT VFR BLD AUTO: 31.4 % (ref 37–47)
HGB BLD-MCNC: 10.2 G/DL (ref 12–16)
IMM GRANULOCYTES # BLD: 0.31 10*3/MM3 (ref 0–0.03)
IMM GRANULOCYTES NFR BLD: 2.5 % (ref 0–5)
LYMPHOCYTES # BLD AUTO: 1.42 10*3/MM3 (ref 0.72–4.86)
LYMPHOCYTES NFR BLD AUTO: 11.3 % (ref 15–45)
MAGNESIUM SERPL-MCNC: 2.4 MG/DL (ref 1.4–2.2)
MCH RBC QN AUTO: 28.5 PG (ref 28–32)
MCHC RBC AUTO-ENTMCNC: 32.5 G/DL (ref 33–36)
MCV RBC AUTO: 87.7 FL (ref 82–98)
MONOCYTES # BLD AUTO: 0.71 10*3/MM3 (ref 0.19–1.3)
MONOCYTES NFR BLD AUTO: 5.6 % (ref 4–12)
NEUTROPHILS # BLD AUTO: 9.55 10*3/MM3 (ref 1.87–8.4)
NEUTROPHILS NFR BLD AUTO: 76 % (ref 39–78)
NRBC BLD MANUAL-RTO: 0 /100 WBC (ref 0–0)
PHOSPHATE SERPL-MCNC: 3.6 MG/DL (ref 2.5–4.5)
PLATELET # BLD AUTO: 317 10*3/MM3 (ref 130–400)
PMV BLD AUTO: 9.7 FL (ref 6–12)
POTASSIUM BLD-SCNC: 3.4 MMOL/L (ref 3.5–5.3)
RBC # BLD AUTO: 3.58 10*6/MM3 (ref 4.2–5.4)
SODIUM BLD-SCNC: 138 MMOL/L (ref 135–145)
WBC NRBC COR # BLD: 12.57 10*3/MM3 (ref 4.8–10.8)

## 2018-04-11 PROCEDURE — 25010000002 MEROPENEM PER 100 MG: Performed by: SPECIALIST

## 2018-04-11 PROCEDURE — 94760 N-INVAS EAR/PLS OXIMETRY 1: CPT

## 2018-04-11 PROCEDURE — 25010000002 ENOXAPARIN PER 10 MG: Performed by: SPECIALIST

## 2018-04-11 PROCEDURE — 25010000002 HYDROMORPHONE PER 4 MG: Performed by: SPECIALIST

## 2018-04-11 PROCEDURE — 97116 GAIT TRAINING THERAPY: CPT

## 2018-04-11 PROCEDURE — 94799 UNLISTED PULMONARY SVC/PX: CPT

## 2018-04-11 PROCEDURE — 83735 ASSAY OF MAGNESIUM: CPT | Performed by: SPECIALIST

## 2018-04-11 PROCEDURE — 97530 THERAPEUTIC ACTIVITIES: CPT

## 2018-04-11 PROCEDURE — 82962 GLUCOSE BLOOD TEST: CPT

## 2018-04-11 PROCEDURE — 97110 THERAPEUTIC EXERCISES: CPT

## 2018-04-11 PROCEDURE — 25010000002 POTASSIUM CHLORIDE PER 2 MEQ: Performed by: FAMILY MEDICINE

## 2018-04-11 PROCEDURE — 84100 ASSAY OF PHOSPHORUS: CPT | Performed by: SPECIALIST

## 2018-04-11 PROCEDURE — 85025 COMPLETE CBC W/AUTO DIFF WBC: CPT | Performed by: FAMILY MEDICINE

## 2018-04-11 PROCEDURE — 80048 BASIC METABOLIC PNL TOTAL CA: CPT | Performed by: SPECIALIST

## 2018-04-11 RX ORDER — HYDROCODONE BITARTRATE AND ACETAMINOPHEN 5; 325 MG/1; MG/1
1 TABLET ORAL EVERY 4 HOURS PRN
Status: DISCONTINUED | OUTPATIENT
Start: 2018-04-11 | End: 2018-04-13 | Stop reason: HOSPADM

## 2018-04-11 RX ORDER — PANTOPRAZOLE SODIUM 40 MG/1
40 TABLET, DELAYED RELEASE ORAL
Status: DISCONTINUED | OUTPATIENT
Start: 2018-04-11 | End: 2018-04-13 | Stop reason: HOSPADM

## 2018-04-11 RX ORDER — LEVOTHYROXINE SODIUM 0.07 MG/1
75 TABLET ORAL
Status: DISCONTINUED | OUTPATIENT
Start: 2018-04-11 | End: 2018-04-13 | Stop reason: HOSPADM

## 2018-04-11 RX ORDER — HYDROCHLOROTHIAZIDE 25 MG/1
25 TABLET ORAL NIGHTLY
Status: DISCONTINUED | OUTPATIENT
Start: 2018-04-11 | End: 2018-04-12

## 2018-04-11 RX ORDER — POTASSIUM CHLORIDE 14.9 MG/ML
20 INJECTION INTRAVENOUS ONCE
Status: COMPLETED | OUTPATIENT
Start: 2018-04-11 | End: 2018-04-11

## 2018-04-11 RX ADMIN — BUDESONIDE AND FORMOTEROL FUMARATE DIHYDRATE 2 PUFF: 160; 4.5 AEROSOL RESPIRATORY (INHALATION) at 06:19

## 2018-04-11 RX ADMIN — ENOXAPARIN SODIUM 40 MG: 40 INJECTION SUBCUTANEOUS at 10:53

## 2018-04-11 RX ADMIN — POTASSIUM CHLORIDE 20 MEQ: 200 INJECTION, SOLUTION INTRAVENOUS at 15:18

## 2018-04-11 RX ADMIN — HYDROMORPHONE HYDROCHLORIDE 0.5 MG: 2 INJECTION, SOLUTION INTRAMUSCULAR; INTRAVENOUS; SUBCUTANEOUS at 18:28

## 2018-04-11 RX ADMIN — MEROPENEM 1 G: 1 INJECTION, POWDER, FOR SOLUTION INTRAVENOUS at 04:48

## 2018-04-11 RX ADMIN — IPRATROPIUM BROMIDE AND ALBUTEROL SULFATE 3 ML: 2.5; .5 SOLUTION RESPIRATORY (INHALATION) at 23:49

## 2018-04-11 RX ADMIN — HYDROMORPHONE HYDROCHLORIDE 0.5 MG: 2 INJECTION, SOLUTION INTRAMUSCULAR; INTRAVENOUS; SUBCUTANEOUS at 04:48

## 2018-04-11 RX ADMIN — ESCITSLOPRAM 10 MG: 10 TABLET ORAL at 10:52

## 2018-04-11 RX ADMIN — MEROPENEM 1 G: 1 INJECTION, POWDER, FOR SOLUTION INTRAVENOUS at 20:10

## 2018-04-11 RX ADMIN — IPRATROPIUM BROMIDE AND ALBUTEROL SULFATE 3 ML: 2.5; .5 SOLUTION RESPIRATORY (INHALATION) at 06:19

## 2018-04-11 RX ADMIN — BUDESONIDE AND FORMOTEROL FUMARATE DIHYDRATE 2 PUFF: 160; 4.5 AEROSOL RESPIRATORY (INHALATION) at 23:49

## 2018-04-11 RX ADMIN — IPRATROPIUM BROMIDE AND ALBUTEROL SULFATE 3 ML: 2.5; .5 SOLUTION RESPIRATORY (INHALATION) at 14:55

## 2018-04-11 RX ADMIN — LEVOTHYROXINE SODIUM 75 MCG: 75 TABLET ORAL at 14:43

## 2018-04-11 RX ADMIN — PANTOPRAZOLE SODIUM 40 MG: 40 TABLET, DELAYED RELEASE ORAL at 10:52

## 2018-04-11 RX ADMIN — MEROPENEM 1 G: 1 INJECTION, POWDER, FOR SOLUTION INTRAVENOUS at 14:00

## 2018-04-11 RX ADMIN — HYDROCODONE BITARTRATE AND ACETAMINOPHEN 1 TABLET: 5; 325 TABLET ORAL at 14:43

## 2018-04-11 RX ADMIN — IPRATROPIUM BROMIDE AND ALBUTEROL SULFATE 3 ML: 2.5; .5 SOLUTION RESPIRATORY (INHALATION) at 10:40

## 2018-04-11 RX ADMIN — HYDROCHLOROTHIAZIDE 25 MG: 25 TABLET ORAL at 20:10

## 2018-04-12 LAB
ANION GAP SERPL CALCULATED.3IONS-SCNC: 6 MMOL/L (ref 4–13)
BASOPHILS # BLD AUTO: 0.04 10*3/MM3 (ref 0–0.2)
BASOPHILS NFR BLD AUTO: 0.3 % (ref 0–2)
BUN BLD-MCNC: 12 MG/DL (ref 5–21)
BUN/CREAT SERPL: 21.1 (ref 7–25)
CALCIUM SPEC-SCNC: 8.4 MG/DL (ref 8.4–10.4)
CHLORIDE SERPL-SCNC: 98 MMOL/L (ref 98–110)
CO2 SERPL-SCNC: 32 MMOL/L (ref 24–31)
CREAT BLD-MCNC: 0.57 MG/DL (ref 0.5–1.4)
DEPRECATED RDW RBC AUTO: 53 FL (ref 40–54)
EOSINOPHIL # BLD AUTO: 0.42 10*3/MM3 (ref 0–0.7)
EOSINOPHIL NFR BLD AUTO: 3.5 % (ref 0–4)
ERYTHROCYTE [DISTWIDTH] IN BLOOD BY AUTOMATED COUNT: 16.5 % (ref 12–15)
GFR SERPL CREATININE-BSD FRML MDRD: 105 ML/MIN/1.73
GLUCOSE BLD-MCNC: 77 MG/DL (ref 70–100)
GLUCOSE BLDC GLUCOMTR-MCNC: 76 MG/DL (ref 70–130)
GLUCOSE BLDC GLUCOMTR-MCNC: 78 MG/DL (ref 70–130)
HCT VFR BLD AUTO: 28.3 % (ref 37–47)
HGB BLD-MCNC: 9 G/DL (ref 12–16)
IMM GRANULOCYTES # BLD: 0.27 10*3/MM3 (ref 0–0.03)
IMM GRANULOCYTES NFR BLD: 2.2 % (ref 0–5)
LYMPHOCYTES # BLD AUTO: 1.4 10*3/MM3 (ref 0.72–4.86)
LYMPHOCYTES NFR BLD AUTO: 11.6 % (ref 15–45)
MAGNESIUM SERPL-MCNC: 2.4 MG/DL (ref 1.4–2.2)
MCH RBC QN AUTO: 28 PG (ref 28–32)
MCHC RBC AUTO-ENTMCNC: 31.8 G/DL (ref 33–36)
MCV RBC AUTO: 88.2 FL (ref 82–98)
MONOCYTES # BLD AUTO: 0.73 10*3/MM3 (ref 0.19–1.3)
MONOCYTES NFR BLD AUTO: 6 % (ref 4–12)
NEUTROPHILS # BLD AUTO: 9.22 10*3/MM3 (ref 1.87–8.4)
NEUTROPHILS NFR BLD AUTO: 76.4 % (ref 39–78)
NRBC BLD MANUAL-RTO: 0 /100 WBC (ref 0–0)
PHOSPHATE SERPL-MCNC: 3.7 MG/DL (ref 2.5–4.5)
PLATELET # BLD AUTO: 366 10*3/MM3 (ref 130–400)
PMV BLD AUTO: 10 FL (ref 6–12)
POTASSIUM BLD-SCNC: 3.8 MMOL/L (ref 3.5–5.3)
RBC # BLD AUTO: 3.21 10*6/MM3 (ref 4.2–5.4)
SODIUM BLD-SCNC: 136 MMOL/L (ref 135–145)
WBC NRBC COR # BLD: 12.08 10*3/MM3 (ref 4.8–10.8)

## 2018-04-12 PROCEDURE — 94799 UNLISTED PULMONARY SVC/PX: CPT

## 2018-04-12 PROCEDURE — 97530 THERAPEUTIC ACTIVITIES: CPT

## 2018-04-12 PROCEDURE — 82962 GLUCOSE BLOOD TEST: CPT

## 2018-04-12 PROCEDURE — 84100 ASSAY OF PHOSPHORUS: CPT | Performed by: SPECIALIST

## 2018-04-12 PROCEDURE — 97116 GAIT TRAINING THERAPY: CPT

## 2018-04-12 PROCEDURE — 85025 COMPLETE CBC W/AUTO DIFF WBC: CPT | Performed by: FAMILY MEDICINE

## 2018-04-12 PROCEDURE — 25010000002 MEROPENEM PER 100 MG: Performed by: SPECIALIST

## 2018-04-12 PROCEDURE — 94760 N-INVAS EAR/PLS OXIMETRY 1: CPT

## 2018-04-12 PROCEDURE — 80048 BASIC METABOLIC PNL TOTAL CA: CPT | Performed by: SPECIALIST

## 2018-04-12 PROCEDURE — 25010000002 ENOXAPARIN PER 10 MG: Performed by: SPECIALIST

## 2018-04-12 PROCEDURE — 83735 ASSAY OF MAGNESIUM: CPT | Performed by: SPECIALIST

## 2018-04-12 RX ORDER — ACETAMINOPHEN 325 MG/1
650 TABLET ORAL EVERY 6 HOURS PRN
Status: DISCONTINUED | OUTPATIENT
Start: 2018-04-12 | End: 2018-04-13 | Stop reason: HOSPADM

## 2018-04-12 RX ORDER — LORAZEPAM 0.5 MG/1
0.5 TABLET ORAL 4 TIMES DAILY PRN
Status: DISCONTINUED | OUTPATIENT
Start: 2018-04-12 | End: 2018-04-13 | Stop reason: HOSPADM

## 2018-04-12 RX ORDER — ESCITALOPRAM OXALATE 10 MG/1
10 TABLET ORAL DAILY
Status: DISCONTINUED | OUTPATIENT
Start: 2018-04-12 | End: 2018-04-12

## 2018-04-12 RX ORDER — ALBUTEROL SULFATE 2.5 MG/3ML
2.5 SOLUTION RESPIRATORY (INHALATION) EVERY 4 HOURS PRN
Status: DISCONTINUED | OUTPATIENT
Start: 2018-04-12 | End: 2018-04-12

## 2018-04-12 RX ADMIN — IPRATROPIUM BROMIDE AND ALBUTEROL SULFATE 3 ML: 2.5; .5 SOLUTION RESPIRATORY (INHALATION) at 06:23

## 2018-04-12 RX ADMIN — MEROPENEM 1 G: 1 INJECTION, POWDER, FOR SOLUTION INTRAVENOUS at 12:55

## 2018-04-12 RX ADMIN — HYDROCODONE BITARTRATE AND ACETAMINOPHEN 1 TABLET: 5; 325 TABLET ORAL at 12:53

## 2018-04-12 RX ADMIN — ENOXAPARIN SODIUM 40 MG: 40 INJECTION SUBCUTANEOUS at 09:02

## 2018-04-12 RX ADMIN — IPRATROPIUM BROMIDE AND ALBUTEROL SULFATE 3 ML: 2.5; .5 SOLUTION RESPIRATORY (INHALATION) at 21:09

## 2018-04-12 RX ADMIN — HYDROCODONE BITARTRATE AND ACETAMINOPHEN 1 TABLET: 5; 325 TABLET ORAL at 05:04

## 2018-04-12 RX ADMIN — IPRATROPIUM BROMIDE AND ALBUTEROL SULFATE 3 ML: 2.5; .5 SOLUTION RESPIRATORY (INHALATION) at 14:20

## 2018-04-12 RX ADMIN — MEROPENEM 1 G: 1 INJECTION, POWDER, FOR SOLUTION INTRAVENOUS at 20:20

## 2018-04-12 RX ADMIN — MEROPENEM 1 G: 1 INJECTION, POWDER, FOR SOLUTION INTRAVENOUS at 04:48

## 2018-04-12 RX ADMIN — PANTOPRAZOLE SODIUM 40 MG: 40 TABLET, DELAYED RELEASE ORAL at 05:00

## 2018-04-12 RX ADMIN — BUDESONIDE AND FORMOTEROL FUMARATE DIHYDRATE 2 PUFF: 160; 4.5 AEROSOL RESPIRATORY (INHALATION) at 21:09

## 2018-04-12 RX ADMIN — ESCITSLOPRAM 10 MG: 10 TABLET ORAL at 09:01

## 2018-04-12 RX ADMIN — IPRATROPIUM BROMIDE AND ALBUTEROL SULFATE 3 ML: 2.5; .5 SOLUTION RESPIRATORY (INHALATION) at 10:27

## 2018-04-12 RX ADMIN — HYDROCODONE BITARTRATE AND ACETAMINOPHEN 1 TABLET: 5; 325 TABLET ORAL at 17:03

## 2018-04-12 RX ADMIN — LEVOTHYROXINE SODIUM 75 MCG: 75 TABLET ORAL at 05:00

## 2018-04-12 RX ADMIN — BUDESONIDE AND FORMOTEROL FUMARATE DIHYDRATE 2 PUFF: 160; 4.5 AEROSOL RESPIRATORY (INHALATION) at 06:23

## 2018-04-13 VITALS
WEIGHT: 183.8 LBS | HEART RATE: 74 BPM | RESPIRATION RATE: 16 BRPM | DIASTOLIC BLOOD PRESSURE: 50 MMHG | OXYGEN SATURATION: 96 % | HEIGHT: 63 IN | BODY MASS INDEX: 32.57 KG/M2 | SYSTOLIC BLOOD PRESSURE: 100 MMHG | TEMPERATURE: 97.5 F

## 2018-04-13 LAB
ANION GAP SERPL CALCULATED.3IONS-SCNC: 6 MMOL/L (ref 4–13)
BUN BLD-MCNC: 12 MG/DL (ref 5–21)
BUN/CREAT SERPL: 19 (ref 7–25)
CALCIUM SPEC-SCNC: 7.9 MG/DL (ref 8.4–10.4)
CHLORIDE SERPL-SCNC: 98 MMOL/L (ref 98–110)
CO2 SERPL-SCNC: 33 MMOL/L (ref 24–31)
CREAT BLD-MCNC: 0.63 MG/DL (ref 0.5–1.4)
DEPRECATED RDW RBC AUTO: 53.9 FL (ref 40–54)
ERYTHROCYTE [DISTWIDTH] IN BLOOD BY AUTOMATED COUNT: 16.6 % (ref 12–15)
GFR SERPL CREATININE-BSD FRML MDRD: 94 ML/MIN/1.73
GLUCOSE BLD-MCNC: 81 MG/DL (ref 70–100)
HCT VFR BLD AUTO: 27.8 % (ref 37–47)
HGB BLD-MCNC: 8.9 G/DL (ref 12–16)
MCH RBC QN AUTO: 28.3 PG (ref 28–32)
MCHC RBC AUTO-ENTMCNC: 32 G/DL (ref 33–36)
MCV RBC AUTO: 88.5 FL (ref 82–98)
PLATELET # BLD AUTO: 404 10*3/MM3 (ref 130–400)
PMV BLD AUTO: 9.7 FL (ref 6–12)
POTASSIUM BLD-SCNC: 4.3 MMOL/L (ref 3.5–5.3)
RBC # BLD AUTO: 3.14 10*6/MM3 (ref 4.2–5.4)
SODIUM BLD-SCNC: 137 MMOL/L (ref 135–145)
WBC NRBC COR # BLD: 11.68 10*3/MM3 (ref 4.8–10.8)

## 2018-04-13 PROCEDURE — 85027 COMPLETE CBC AUTOMATED: CPT | Performed by: INTERNAL MEDICINE

## 2018-04-13 PROCEDURE — 94799 UNLISTED PULMONARY SVC/PX: CPT

## 2018-04-13 PROCEDURE — 80048 BASIC METABOLIC PNL TOTAL CA: CPT | Performed by: SPECIALIST

## 2018-04-13 PROCEDURE — 25010000002 MEROPENEM PER 100 MG: Performed by: SPECIALIST

## 2018-04-13 PROCEDURE — 97116 GAIT TRAINING THERAPY: CPT

## 2018-04-13 PROCEDURE — 25010000002 ENOXAPARIN PER 10 MG: Performed by: SPECIALIST

## 2018-04-13 RX ORDER — ACETAMINOPHEN 325 MG/1
650 TABLET ORAL EVERY 6 HOURS PRN
Status: ON HOLD
Start: 2018-04-13 | End: 2020-02-14

## 2018-04-13 RX ORDER — HYDROCODONE BITARTRATE AND ACETAMINOPHEN 5; 325 MG/1; MG/1
1 TABLET ORAL EVERY 4 HOURS PRN
Qty: 30 TABLET | Refills: 0 | Status: SHIPPED | OUTPATIENT
Start: 2018-04-13 | End: 2018-04-21

## 2018-04-13 RX ORDER — LORAZEPAM 0.5 MG/1
0.5 TABLET ORAL 4 TIMES DAILY PRN
Qty: 12 TABLET | Refills: 0 | Status: ON HOLD | OUTPATIENT
Start: 2018-04-13 | End: 2020-02-14

## 2018-04-13 RX ADMIN — IPRATROPIUM BROMIDE AND ALBUTEROL SULFATE 3 ML: 2.5; .5 SOLUTION RESPIRATORY (INHALATION) at 06:23

## 2018-04-13 RX ADMIN — HYDROCODONE BITARTRATE AND ACETAMINOPHEN 1 TABLET: 5; 325 TABLET ORAL at 05:48

## 2018-04-13 RX ADMIN — ESCITSLOPRAM 10 MG: 10 TABLET ORAL at 09:13

## 2018-04-13 RX ADMIN — BUDESONIDE AND FORMOTEROL FUMARATE DIHYDRATE 2 PUFF: 160; 4.5 AEROSOL RESPIRATORY (INHALATION) at 06:29

## 2018-04-13 RX ADMIN — HYDROCODONE BITARTRATE AND ACETAMINOPHEN 1 TABLET: 5; 325 TABLET ORAL at 13:16

## 2018-04-13 RX ADMIN — IPRATROPIUM BROMIDE AND ALBUTEROL SULFATE 3 ML: 2.5; .5 SOLUTION RESPIRATORY (INHALATION) at 10:06

## 2018-04-13 RX ADMIN — ENOXAPARIN SODIUM 40 MG: 40 INJECTION SUBCUTANEOUS at 08:51

## 2018-04-13 RX ADMIN — MEROPENEM 1 G: 1 INJECTION, POWDER, FOR SOLUTION INTRAVENOUS at 05:48

## 2018-04-13 RX ADMIN — PANTOPRAZOLE SODIUM 40 MG: 40 TABLET, DELAYED RELEASE ORAL at 05:48

## 2018-04-13 RX ADMIN — LEVOTHYROXINE SODIUM 75 MCG: 75 TABLET ORAL at 06:56

## 2018-04-13 RX ADMIN — IPRATROPIUM BROMIDE AND ALBUTEROL SULFATE 3 ML: 2.5; .5 SOLUTION RESPIRATORY (INHALATION) at 14:00

## 2018-05-22 LAB
BASE EXCESS BLDA CALC-SCNC: NORMAL MMOL/L
COHGB MFR BLD: NORMAL %
HCO3 BLDA-SCNC: NORMAL MMOL/L
METHGB BLD QL: NORMAL %
OXYHGB MFR BLDV: NORMAL % (ref 94–99)
PCO2 BLDA: NORMAL MM HG
PH BLDA: NORMAL PH UNITS
PO2 BLDA: NORMAL MM HG

## 2020-02-13 ENCOUNTER — APPOINTMENT (OUTPATIENT)
Dept: GENERAL RADIOLOGY | Facility: HOSPITAL | Age: 70
End: 2020-02-13

## 2020-02-13 ENCOUNTER — APPOINTMENT (OUTPATIENT)
Dept: CT IMAGING | Facility: HOSPITAL | Age: 70
End: 2020-02-13

## 2020-02-13 ENCOUNTER — HOSPITAL ENCOUNTER (INPATIENT)
Facility: HOSPITAL | Age: 70
LOS: 6 days | Discharge: NURSING, REHAB OR LONG-TERM CARE | End: 2020-02-19
Attending: FAMILY MEDICINE | Admitting: INTERNAL MEDICINE

## 2020-02-13 DIAGNOSIS — R60.9 SWELLING: ICD-10-CM

## 2020-02-13 DIAGNOSIS — Z78.9 DECREASED ACTIVITIES OF DAILY LIVING (ADL): ICD-10-CM

## 2020-02-13 DIAGNOSIS — Z74.09 IMPAIRED MOBILITY: ICD-10-CM

## 2020-02-13 DIAGNOSIS — R13.10 DYSPHAGIA, UNSPECIFIED TYPE: ICD-10-CM

## 2020-02-13 DIAGNOSIS — J44.1 COPD EXACERBATION (HCC): Primary | ICD-10-CM

## 2020-02-13 PROBLEM — J18.9 LEFT LOWER LOBE PNEUMONIA: Status: ACTIVE | Noted: 2020-02-13

## 2020-02-13 PROBLEM — D64.9 ANEMIA: Status: ACTIVE | Noted: 2020-02-13

## 2020-02-13 PROBLEM — J96.21 ACUTE ON CHRONIC RESPIRATORY FAILURE WITH HYPOXIA: Status: ACTIVE | Noted: 2020-02-13

## 2020-02-13 PROBLEM — E66.9 OBESITY (BMI 30-39.9): Status: ACTIVE | Noted: 2020-02-13

## 2020-02-13 PROBLEM — I87.2 VENOUS INSUFFICIENCY: Status: ACTIVE | Noted: 2020-02-13

## 2020-02-13 PROBLEM — D72.829 LEUKOCYTOSIS: Status: ACTIVE | Noted: 2020-02-13

## 2020-02-13 LAB
A-A DO2: 9.9 MMHG
ALBUMIN SERPL-MCNC: 4 G/DL (ref 3.5–5.2)
ALBUMIN/GLOB SERPL: 1.1 G/DL
ALP SERPL-CCNC: 77 U/L (ref 39–117)
ALT SERPL W P-5'-P-CCNC: 12 U/L (ref 1–33)
ANION GAP SERPL CALCULATED.3IONS-SCNC: 15 MMOL/L (ref 5–15)
ARTERIAL PATENCY WRIST A: POSITIVE
AST SERPL-CCNC: 17 U/L (ref 1–32)
ATMOSPHERIC PRESS: 760 MMHG
B PARAPERT DNA SPEC QL NAA+PROBE: NOT DETECTED
B PERT DNA SPEC QL NAA+PROBE: NOT DETECTED
BASE EXCESS BLDA CALC-SCNC: 10.2 MMOL/L (ref 0–2)
BASOPHILS # BLD AUTO: 0.03 10*3/MM3 (ref 0–0.2)
BASOPHILS NFR BLD AUTO: 0.3 % (ref 0–1.5)
BDY SITE: ABNORMAL
BILIRUB SERPL-MCNC: 0.6 MG/DL (ref 0.2–1.2)
BODY TEMPERATURE: 37 C
BUN BLD-MCNC: 14 MG/DL (ref 8–23)
BUN/CREAT SERPL: 15.4 (ref 7–25)
C PNEUM DNA NPH QL NAA+NON-PROBE: NOT DETECTED
CALCIUM SPEC-SCNC: 8.9 MG/DL (ref 8.6–10.5)
CHLORIDE SERPL-SCNC: 90 MMOL/L (ref 98–107)
CO2 SERPL-SCNC: 33 MMOL/L (ref 22–29)
COHGB MFR BLD: 1.1 % (ref 0–5)
CREAT BLD-MCNC: 0.91 MG/DL (ref 0.57–1)
D-LACTATE SERPL-SCNC: 1.2 MMOL/L (ref 0.5–2)
DEPRECATED RDW RBC AUTO: 55.6 FL (ref 37–54)
EOSINOPHIL # BLD AUTO: 0.19 10*3/MM3 (ref 0–0.4)
EOSINOPHIL NFR BLD AUTO: 1.7 % (ref 0.3–6.2)
ERYTHROCYTE [DISTWIDTH] IN BLOOD BY AUTOMATED COUNT: 16.8 % (ref 12.3–15.4)
FLUAV H1 2009 PAND RNA NPH QL NAA+PROBE: NOT DETECTED
FLUAV H1 HA GENE NPH QL NAA+PROBE: NOT DETECTED
FLUAV H3 RNA NPH QL NAA+PROBE: NOT DETECTED
FLUAV SUBTYP SPEC NAA+PROBE: NOT DETECTED
FLUBV RNA ISLT QL NAA+PROBE: NOT DETECTED
GAS FLOW AIRWAY: 3 LPM
GFR SERPL CREATININE-BSD FRML MDRD: 61 ML/MIN/1.73
GLOBULIN UR ELPH-MCNC: 3.8 GM/DL
GLUCOSE BLD-MCNC: 119 MG/DL (ref 65–99)
HADV DNA SPEC NAA+PROBE: NOT DETECTED
HBA1C MFR BLD: 5.7 % (ref 4.8–5.6)
HCO3 BLDA-SCNC: 37.5 MMOL/L (ref 20–26)
HCOV 229E RNA SPEC QL NAA+PROBE: NOT DETECTED
HCOV HKU1 RNA SPEC QL NAA+PROBE: NOT DETECTED
HCOV NL63 RNA SPEC QL NAA+PROBE: NOT DETECTED
HCOV OC43 RNA SPEC QL NAA+PROBE: NOT DETECTED
HCT VFR BLD AUTO: 35.6 % (ref 34–46.6)
HCT VFR BLD CALC: 35.4 % (ref 38–51)
HGB BLD-MCNC: 11.2 G/DL (ref 12–15.9)
HGB BLDA-MCNC: 11.5 G/DL (ref 12–16)
HMPV RNA NPH QL NAA+NON-PROBE: NOT DETECTED
HOLD SPECIMEN: NORMAL
HPIV1 RNA SPEC QL NAA+PROBE: NOT DETECTED
HPIV2 RNA SPEC QL NAA+PROBE: NOT DETECTED
HPIV3 RNA NPH QL NAA+PROBE: NOT DETECTED
HPIV4 P GENE NPH QL NAA+PROBE: NOT DETECTED
IMM GRANULOCYTES # BLD AUTO: 0.07 10*3/MM3 (ref 0–0.05)
IMM GRANULOCYTES NFR BLD AUTO: 0.6 % (ref 0–0.5)
L PNEUMO1 AG UR QL IA: NEGATIVE
LYMPHOCYTES # BLD AUTO: 1.79 10*3/MM3 (ref 0.7–3.1)
LYMPHOCYTES NFR BLD AUTO: 16 % (ref 19.6–45.3)
Lab: ABNORMAL
M PNEUMO IGG SER IA-ACNC: NOT DETECTED
MCH RBC QN AUTO: 28.4 PG (ref 26.6–33)
MCHC RBC AUTO-ENTMCNC: 31.5 G/DL (ref 31.5–35.7)
MCV RBC AUTO: 90.4 FL (ref 79–97)
METHGB BLD QL: 0.9 % (ref 0–3)
MODALITY: ABNORMAL
MONOCYTES # BLD AUTO: 0.91 10*3/MM3 (ref 0.1–0.9)
MONOCYTES NFR BLD AUTO: 8.2 % (ref 5–12)
NEUTROPHILS # BLD AUTO: 8.17 10*3/MM3 (ref 1.7–7)
NEUTROPHILS NFR BLD AUTO: 73.2 % (ref 42.7–76)
NOTE: ABNORMAL
NRBC BLD AUTO-RTO: 0 /100 WBC (ref 0–0.2)
OXYHGB MFR BLDV: 90.9 % (ref 94–99)
PCO2 BLDA: 63.6 MM HG (ref 35–45)
PCO2 TEMP ADJ BLD: ABNORMAL MM[HG]
PH BLDA: 7.38 PH UNITS (ref 7.35–7.45)
PH, TEMP CORRECTED: ABNORMAL
PLATELET # BLD AUTO: 282 10*3/MM3 (ref 140–450)
PMV BLD AUTO: 9 FL (ref 6–12)
PO2 BLDA: 68 MM HG (ref 83–108)
PO2 TEMP ADJ BLD: ABNORMAL MM[HG]
POTASSIUM BLD-SCNC: 4.1 MMOL/L (ref 3.5–5.2)
POTASSIUM BLDA-SCNC: 3.9 MMOL/L (ref 3.5–5.2)
PROCALCITONIN SERPL-MCNC: 0.23 NG/ML (ref 0.1–0.25)
PROT SERPL-MCNC: 7.8 G/DL (ref 6–8.5)
RBC # BLD AUTO: 3.94 10*6/MM3 (ref 3.77–5.28)
RHINOVIRUS RNA SPEC NAA+PROBE: NOT DETECTED
RSV RNA NPH QL NAA+NON-PROBE: DETECTED
S PNEUM AG SPEC QL LA: NEGATIVE
SAO2 % BLDCOA: 92.8 % (ref 94–99)
SODIUM BLD-SCNC: 138 MMOL/L (ref 136–145)
SODIUM BLDA-SCNC: 137 MMOL/L (ref 136–145)
VENTILATOR MODE: ABNORMAL
WBC NRBC COR # BLD: 11.16 10*3/MM3 (ref 3.4–10.8)
WHOLE BLOOD HOLD SPECIMEN: NORMAL
WHOLE BLOOD HOLD SPECIMEN: NORMAL

## 2020-02-13 PROCEDURE — 87899 AGENT NOS ASSAY W/OPTIC: CPT | Performed by: INTERNAL MEDICINE

## 2020-02-13 PROCEDURE — 94640 AIRWAY INHALATION TREATMENT: CPT

## 2020-02-13 PROCEDURE — 93010 ELECTROCARDIOGRAM REPORT: CPT | Performed by: INTERNAL MEDICINE

## 2020-02-13 PROCEDURE — 99284 EMERGENCY DEPT VISIT MOD MDM: CPT

## 2020-02-13 PROCEDURE — 25010000002 PIPERACILLIN SOD-TAZOBACTAM PER 1 G: Performed by: FAMILY MEDICINE

## 2020-02-13 PROCEDURE — 71045 X-RAY EXAM CHEST 1 VIEW: CPT

## 2020-02-13 PROCEDURE — 94799 UNLISTED PULMONARY SVC/PX: CPT

## 2020-02-13 PROCEDURE — 25010000002 METHYLPREDNISOLONE PER 125 MG: Performed by: FAMILY MEDICINE

## 2020-02-13 PROCEDURE — 93005 ELECTROCARDIOGRAM TRACING: CPT | Performed by: FAMILY MEDICINE

## 2020-02-13 PROCEDURE — 83050 HGB METHEMOGLOBIN QUAN: CPT

## 2020-02-13 PROCEDURE — 84145 PROCALCITONIN (PCT): CPT | Performed by: INTERNAL MEDICINE

## 2020-02-13 PROCEDURE — 85025 COMPLETE CBC W/AUTO DIFF WBC: CPT | Performed by: FAMILY MEDICINE

## 2020-02-13 PROCEDURE — 83036 HEMOGLOBIN GLYCOSYLATED A1C: CPT | Performed by: INTERNAL MEDICINE

## 2020-02-13 PROCEDURE — 71250 CT THORAX DX C-: CPT

## 2020-02-13 PROCEDURE — 36600 WITHDRAWAL OF ARTERIAL BLOOD: CPT

## 2020-02-13 PROCEDURE — 82375 ASSAY CARBOXYHB QUANT: CPT

## 2020-02-13 PROCEDURE — 83605 ASSAY OF LACTIC ACID: CPT | Performed by: INTERNAL MEDICINE

## 2020-02-13 PROCEDURE — 87040 BLOOD CULTURE FOR BACTERIA: CPT | Performed by: FAMILY MEDICINE

## 2020-02-13 PROCEDURE — 94644 CONT INHLJ TX 1ST HOUR: CPT

## 2020-02-13 PROCEDURE — 82805 BLOOD GASES W/O2 SATURATION: CPT

## 2020-02-13 PROCEDURE — 80053 COMPREHEN METABOLIC PANEL: CPT | Performed by: FAMILY MEDICINE

## 2020-02-13 PROCEDURE — 0100U HC BIOFIRE FILMARRAY RESP PANEL 2: CPT | Performed by: FAMILY MEDICINE

## 2020-02-13 RX ORDER — ALBUTEROL SULFATE 2.5 MG/3ML
10 SOLUTION RESPIRATORY (INHALATION) CONTINUOUS
Status: ACTIVE | OUTPATIENT
Start: 2020-02-13 | End: 2020-02-13

## 2020-02-13 RX ORDER — IPRATROPIUM BROMIDE AND ALBUTEROL SULFATE 2.5; .5 MG/3ML; MG/3ML
3 SOLUTION RESPIRATORY (INHALATION)
Status: DISCONTINUED | OUTPATIENT
Start: 2020-02-13 | End: 2020-02-18

## 2020-02-13 RX ORDER — ONDANSETRON 4 MG/1
4 TABLET, FILM COATED ORAL EVERY 6 HOURS PRN
Status: DISCONTINUED | OUTPATIENT
Start: 2020-02-13 | End: 2020-02-19 | Stop reason: HOSPADM

## 2020-02-13 RX ORDER — ACETAMINOPHEN 650 MG/1
650 SUPPOSITORY RECTAL EVERY 4 HOURS PRN
Status: DISCONTINUED | OUTPATIENT
Start: 2020-02-13 | End: 2020-02-19 | Stop reason: HOSPADM

## 2020-02-13 RX ORDER — ACETAMINOPHEN 325 MG/1
650 TABLET ORAL EVERY 4 HOURS PRN
Status: DISCONTINUED | OUTPATIENT
Start: 2020-02-13 | End: 2020-02-19 | Stop reason: HOSPADM

## 2020-02-13 RX ORDER — ACETAMINOPHEN 160 MG/5ML
650 SOLUTION ORAL EVERY 4 HOURS PRN
Status: DISCONTINUED | OUTPATIENT
Start: 2020-02-13 | End: 2020-02-19 | Stop reason: HOSPADM

## 2020-02-13 RX ORDER — NITROGLYCERIN 0.4 MG/1
0.4 TABLET SUBLINGUAL
Status: DISCONTINUED | OUTPATIENT
Start: 2020-02-13 | End: 2020-02-19 | Stop reason: HOSPADM

## 2020-02-13 RX ORDER — HYDROCODONE BITARTRATE AND ACETAMINOPHEN 5; 325 MG/1; MG/1
1 TABLET ORAL EVERY 6 HOURS PRN
Status: DISCONTINUED | OUTPATIENT
Start: 2020-02-13 | End: 2020-02-19 | Stop reason: HOSPADM

## 2020-02-13 RX ORDER — FUROSEMIDE 20 MG/1
20 TABLET ORAL DAILY
COMMUNITY
End: 2020-05-02 | Stop reason: HOSPADM

## 2020-02-13 RX ORDER — METHYLPREDNISOLONE SODIUM SUCCINATE 125 MG/2ML
125 INJECTION, POWDER, LYOPHILIZED, FOR SOLUTION INTRAMUSCULAR; INTRAVENOUS ONCE
Status: COMPLETED | OUTPATIENT
Start: 2020-02-13 | End: 2020-02-13

## 2020-02-13 RX ORDER — IPRATROPIUM BROMIDE AND ALBUTEROL SULFATE 2.5; .5 MG/3ML; MG/3ML
3 SOLUTION RESPIRATORY (INHALATION) ONCE
Status: COMPLETED | OUTPATIENT
Start: 2020-02-13 | End: 2020-02-13

## 2020-02-13 RX ORDER — METHYLPREDNISOLONE SODIUM SUCCINATE 125 MG/2ML
60 INJECTION, POWDER, LYOPHILIZED, FOR SOLUTION INTRAMUSCULAR; INTRAVENOUS EVERY 8 HOURS
Status: DISCONTINUED | OUTPATIENT
Start: 2020-02-14 | End: 2020-02-15

## 2020-02-13 RX ORDER — SODIUM CHLORIDE 0.9 % (FLUSH) 0.9 %
10 SYRINGE (ML) INJECTION AS NEEDED
Status: DISCONTINUED | OUTPATIENT
Start: 2020-02-13 | End: 2020-02-19 | Stop reason: HOSPADM

## 2020-02-13 RX ORDER — SODIUM CHLORIDE, SODIUM LACTATE, POTASSIUM CHLORIDE, CALCIUM CHLORIDE 600; 310; 30; 20 MG/100ML; MG/100ML; MG/100ML; MG/100ML
75 INJECTION, SOLUTION INTRAVENOUS CONTINUOUS
Status: DISCONTINUED | OUTPATIENT
Start: 2020-02-13 | End: 2020-02-14

## 2020-02-13 RX ORDER — SODIUM CHLORIDE 0.9 % (FLUSH) 0.9 %
10 SYRINGE (ML) INJECTION EVERY 12 HOURS SCHEDULED
Status: DISCONTINUED | OUTPATIENT
Start: 2020-02-13 | End: 2020-02-19 | Stop reason: HOSPADM

## 2020-02-13 RX ORDER — ONDANSETRON 2 MG/ML
4 INJECTION INTRAMUSCULAR; INTRAVENOUS EVERY 6 HOURS PRN
Status: DISCONTINUED | OUTPATIENT
Start: 2020-02-13 | End: 2020-02-19 | Stop reason: HOSPADM

## 2020-02-13 RX ADMIN — PIPERACILLIN AND TAZOBACTAM 4.5 G: 4; .5 INJECTION, POWDER, LYOPHILIZED, FOR SOLUTION INTRAVENOUS; PARENTERAL at 19:13

## 2020-02-13 RX ADMIN — IPRATROPIUM BROMIDE AND ALBUTEROL SULFATE 3 ML: 2.5; .5 SOLUTION RESPIRATORY (INHALATION) at 17:37

## 2020-02-13 RX ADMIN — ALBUTEROL SULFATE 10 MG: 2.5 SOLUTION RESPIRATORY (INHALATION) at 19:18

## 2020-02-13 RX ADMIN — METHYLPREDNISOLONE SODIUM SUCCINATE 125 MG: 125 INJECTION, POWDER, FOR SOLUTION INTRAMUSCULAR; INTRAVENOUS at 17:26

## 2020-02-14 ENCOUNTER — TRANSCRIBE ORDERS (OUTPATIENT)
Dept: ADMINISTRATIVE | Facility: HOSPITAL | Age: 70
End: 2020-02-14

## 2020-02-14 DIAGNOSIS — J44.1 COPD WITH ACUTE EXACERBATION (HCC): Primary | ICD-10-CM

## 2020-02-14 PROBLEM — J18.9 RIGHT MIDDLE LOBE PNEUMONIA: Status: ACTIVE | Noted: 2020-02-14

## 2020-02-14 PROBLEM — B33.8 RSV (RESPIRATORY SYNCYTIAL VIRUS INFECTION): Status: ACTIVE | Noted: 2020-02-14

## 2020-02-14 LAB
ANION GAP SERPL CALCULATED.3IONS-SCNC: 11 MMOL/L (ref 5–15)
ARTERIAL PATENCY WRIST A: POSITIVE
ATMOSPHERIC PRESS: 765 MMHG
BASE EXCESS BLDA CALC-SCNC: 13.6 MMOL/L (ref 0–2)
BDY SITE: ABNORMAL
BODY TEMPERATURE: 37 C
BUN BLD-MCNC: 17 MG/DL (ref 8–23)
BUN/CREAT SERPL: 19.1 (ref 7–25)
CALCIUM SPEC-SCNC: 8.6 MG/DL (ref 8.6–10.5)
CHLORIDE SERPL-SCNC: 89 MMOL/L (ref 98–107)
CO2 SERPL-SCNC: 36 MMOL/L (ref 22–29)
CREAT BLD-MCNC: 0.89 MG/DL (ref 0.57–1)
DEPRECATED RDW RBC AUTO: 56 FL (ref 37–54)
ERYTHROCYTE [DISTWIDTH] IN BLOOD BY AUTOMATED COUNT: 16.6 % (ref 12.3–15.4)
GAS FLOW AIRWAY: 3 LPM
GFR SERPL CREATININE-BSD FRML MDRD: 63 ML/MIN/1.73
GLUCOSE BLD-MCNC: 150 MG/DL (ref 65–99)
HCO3 BLDA-SCNC: 40.5 MMOL/L (ref 20–26)
HCT VFR BLD AUTO: 35.2 % (ref 34–46.6)
HGB BLD-MCNC: 11 G/DL (ref 12–15.9)
Lab: ABNORMAL
MCH RBC QN AUTO: 28.7 PG (ref 26.6–33)
MCHC RBC AUTO-ENTMCNC: 31.3 G/DL (ref 31.5–35.7)
MCV RBC AUTO: 91.9 FL (ref 79–97)
MODALITY: ABNORMAL
MRSA DNA SPEC QL NAA+PROBE: NORMAL
PCO2 BLDA: 63 MM HG (ref 35–45)
PH BLDA: 7.42 PH UNITS (ref 7.35–7.45)
PLATELET # BLD AUTO: 269 10*3/MM3 (ref 140–450)
PMV BLD AUTO: 9.3 FL (ref 6–12)
PO2 BLDA: 58.8 MM HG (ref 83–108)
POTASSIUM BLD-SCNC: 3.9 MMOL/L (ref 3.5–5.2)
RBC # BLD AUTO: 3.83 10*6/MM3 (ref 3.77–5.28)
SAO2 % BLDCOA: 89.8 % (ref 94–99)
SODIUM BLD-SCNC: 136 MMOL/L (ref 136–145)
TSH SERPL DL<=0.05 MIU/L-ACNC: 0.36 UIU/ML (ref 0.27–4.2)
VENTILATOR MODE: ABNORMAL
WBC NRBC COR # BLD: 10.14 10*3/MM3 (ref 3.4–10.8)

## 2020-02-14 PROCEDURE — 94799 UNLISTED PULMONARY SVC/PX: CPT

## 2020-02-14 PROCEDURE — 25010000002 METHYLPREDNISOLONE PER 125 MG: Performed by: INTERNAL MEDICINE

## 2020-02-14 PROCEDURE — 36600 WITHDRAWAL OF ARTERIAL BLOOD: CPT

## 2020-02-14 PROCEDURE — 82803 BLOOD GASES ANY COMBINATION: CPT

## 2020-02-14 PROCEDURE — 87641 MR-STAPH DNA AMP PROBE: CPT | Performed by: INTERNAL MEDICINE

## 2020-02-14 PROCEDURE — 25010000002 PIPERACILLIN SOD-TAZOBACTAM PER 1 G: Performed by: INTERNAL MEDICINE

## 2020-02-14 PROCEDURE — 85027 COMPLETE CBC AUTOMATED: CPT | Performed by: INTERNAL MEDICINE

## 2020-02-14 PROCEDURE — 92610 EVALUATE SWALLOWING FUNCTION: CPT | Performed by: SPEECH-LANGUAGE PATHOLOGIST

## 2020-02-14 PROCEDURE — 80048 BASIC METABOLIC PNL TOTAL CA: CPT | Performed by: INTERNAL MEDICINE

## 2020-02-14 PROCEDURE — 84443 ASSAY THYROID STIM HORMONE: CPT | Performed by: INTERNAL MEDICINE

## 2020-02-14 PROCEDURE — 94760 N-INVAS EAR/PLS OXIMETRY 1: CPT

## 2020-02-14 PROCEDURE — 25010000002 ENOXAPARIN PER 10 MG: Performed by: INTERNAL MEDICINE

## 2020-02-14 RX ORDER — BUDESONIDE 0.5 MG/2ML
0.5 INHALANT ORAL
Status: DISCONTINUED | OUTPATIENT
Start: 2020-02-14 | End: 2020-02-18

## 2020-02-14 RX ORDER — GUAIFENESIN 600 MG/1
1200 TABLET, EXTENDED RELEASE ORAL EVERY 12 HOURS SCHEDULED
Status: DISCONTINUED | OUTPATIENT
Start: 2020-02-14 | End: 2020-02-14

## 2020-02-14 RX ORDER — ALBUTEROL SULFATE 2.5 MG/3ML
2.5 SOLUTION RESPIRATORY (INHALATION) EVERY 4 HOURS PRN
COMMUNITY
End: 2020-02-19 | Stop reason: HOSPADM

## 2020-02-14 RX ORDER — NYSTATIN 100000 [USP'U]/G
1 POWDER TOPICAL 3 TIMES DAILY
Status: ON HOLD | COMMUNITY
End: 2020-04-29

## 2020-02-14 RX ORDER — FAMOTIDINE 10 MG/ML
20 INJECTION, SOLUTION INTRAVENOUS EVERY 12 HOURS SCHEDULED
Status: DISCONTINUED | OUTPATIENT
Start: 2020-02-14 | End: 2020-02-16 | Stop reason: CLARIF

## 2020-02-14 RX ORDER — ACETAMINOPHEN 325 MG/1
650 TABLET ORAL EVERY 4 HOURS PRN
COMMUNITY
End: 2020-02-19 | Stop reason: HOSPADM

## 2020-02-14 RX ORDER — BISACODYL 10 MG
10 SUPPOSITORY, RECTAL RECTAL DAILY PRN
COMMUNITY
End: 2020-04-28

## 2020-02-14 RX ORDER — TRIAMCINOLONE ACETONIDE 1 MG/G
1 CREAM TOPICAL 3 TIMES DAILY
COMMUNITY
End: 2020-04-28

## 2020-02-14 RX ORDER — ALBUTEROL SULFATE 90 UG/1
2 AEROSOL, METERED RESPIRATORY (INHALATION) EVERY 4 HOURS PRN
Status: ON HOLD | COMMUNITY
End: 2020-04-29

## 2020-02-14 RX ORDER — HYDROXYZINE HYDROCHLORIDE 25 MG/1
25 TABLET, FILM COATED ORAL 3 TIMES DAILY PRN
Status: DISCONTINUED | OUTPATIENT
Start: 2020-02-14 | End: 2020-02-17

## 2020-02-14 RX ORDER — CHOLECALCIFEROL (VITAMIN D3) 125 MCG
10 CAPSULE ORAL NIGHTLY PRN
COMMUNITY
End: 2020-02-19 | Stop reason: HOSPADM

## 2020-02-14 RX ORDER — LORAZEPAM 0.5 MG/1
0.5 TABLET ORAL NIGHTLY PRN
COMMUNITY
End: 2020-02-19 | Stop reason: HOSPADM

## 2020-02-14 RX ORDER — CLINDAMYCIN HYDROCHLORIDE 300 MG/1
300 CAPSULE ORAL 4 TIMES DAILY
COMMUNITY
Start: 2020-02-07 | End: 2020-02-19 | Stop reason: HOSPADM

## 2020-02-14 RX ADMIN — SODIUM CHLORIDE, POTASSIUM CHLORIDE, SODIUM LACTATE AND CALCIUM CHLORIDE 75 ML/HR: 600; 310; 30; 20 INJECTION, SOLUTION INTRAVENOUS at 12:13

## 2020-02-14 RX ADMIN — IPRATROPIUM BROMIDE AND ALBUTEROL SULFATE 3 ML: 2.5; .5 SOLUTION RESPIRATORY (INHALATION) at 19:03

## 2020-02-14 RX ADMIN — SODIUM CHLORIDE, PRESERVATIVE FREE 10 ML: 5 INJECTION INTRAVENOUS at 00:02

## 2020-02-14 RX ADMIN — ENOXAPARIN SODIUM 40 MG: 40 INJECTION SUBCUTANEOUS at 22:01

## 2020-02-14 RX ADMIN — METHYLPREDNISOLONE SODIUM SUCCINATE 60 MG: 125 INJECTION, POWDER, FOR SOLUTION INTRAMUSCULAR; INTRAVENOUS at 08:36

## 2020-02-14 RX ADMIN — BUDESONIDE 0.5 MG: 0.5 INHALANT RESPIRATORY (INHALATION) at 19:09

## 2020-02-14 RX ADMIN — TAZOBACTAM SODIUM AND PIPERACILLIN SODIUM 4.5 G: 500; 4 INJECTION, SOLUTION INTRAVENOUS at 18:03

## 2020-02-14 RX ADMIN — HYDROXYZINE HYDROCHLORIDE 25 MG: 25 TABLET ORAL at 13:26

## 2020-02-14 RX ADMIN — IPRATROPIUM BROMIDE AND ALBUTEROL SULFATE 3 ML: 2.5; .5 SOLUTION RESPIRATORY (INHALATION) at 07:45

## 2020-02-14 RX ADMIN — METHYLPREDNISOLONE SODIUM SUCCINATE 60 MG: 125 INJECTION, POWDER, FOR SOLUTION INTRAMUSCULAR; INTRAVENOUS at 18:03

## 2020-02-14 RX ADMIN — TAZOBACTAM SODIUM AND PIPERACILLIN SODIUM 4.5 G: 500; 4 INJECTION, SOLUTION INTRAVENOUS at 08:36

## 2020-02-14 RX ADMIN — METHYLPREDNISOLONE SODIUM SUCCINATE 60 MG: 125 INJECTION, POWDER, FOR SOLUTION INTRAMUSCULAR; INTRAVENOUS at 02:55

## 2020-02-14 RX ADMIN — IPRATROPIUM BROMIDE AND ALBUTEROL SULFATE 3 ML: 2.5; .5 SOLUTION RESPIRATORY (INHALATION) at 11:34

## 2020-02-14 RX ADMIN — IPRATROPIUM BROMIDE AND ALBUTEROL SULFATE 3 ML: 2.5; .5 SOLUTION RESPIRATORY (INHALATION) at 01:09

## 2020-02-14 RX ADMIN — HYDROXYZINE HYDROCHLORIDE 25 MG: 25 TABLET ORAL at 20:08

## 2020-02-14 RX ADMIN — FAMOTIDINE 20 MG: 10 INJECTION, SOLUTION INTRAVENOUS at 22:01

## 2020-02-14 RX ADMIN — SODIUM CHLORIDE, PRESERVATIVE FREE 10 ML: 5 INJECTION INTRAVENOUS at 08:36

## 2020-02-14 RX ADMIN — ENOXAPARIN SODIUM 40 MG: 40 INJECTION SUBCUTANEOUS at 00:02

## 2020-02-14 RX ADMIN — SODIUM CHLORIDE, POTASSIUM CHLORIDE, SODIUM LACTATE AND CALCIUM CHLORIDE 75 ML/HR: 600; 310; 30; 20 INJECTION, SOLUTION INTRAVENOUS at 00:02

## 2020-02-14 RX ADMIN — TAZOBACTAM SODIUM AND PIPERACILLIN SODIUM 4.5 G: 500; 4 INJECTION, SOLUTION INTRAVENOUS at 02:55

## 2020-02-15 LAB
ANION GAP SERPL CALCULATED.3IONS-SCNC: 14 MMOL/L (ref 5–15)
BUN BLD-MCNC: 20 MG/DL (ref 8–23)
BUN/CREAT SERPL: 23.5 (ref 7–25)
CALCIUM SPEC-SCNC: 8.9 MG/DL (ref 8.6–10.5)
CHLORIDE SERPL-SCNC: 91 MMOL/L (ref 98–107)
CO2 SERPL-SCNC: 34 MMOL/L (ref 22–29)
CREAT BLD-MCNC: 0.85 MG/DL (ref 0.57–1)
DEPRECATED RDW RBC AUTO: 56.3 FL (ref 37–54)
ERYTHROCYTE [DISTWIDTH] IN BLOOD BY AUTOMATED COUNT: 16.6 % (ref 12.3–15.4)
GFR SERPL CREATININE-BSD FRML MDRD: 66 ML/MIN/1.73
GLUCOSE BLD-MCNC: 122 MG/DL (ref 65–99)
HCT VFR BLD AUTO: 35.3 % (ref 34–46.6)
HGB BLD-MCNC: 10.9 G/DL (ref 12–15.9)
MCH RBC QN AUTO: 28.5 PG (ref 26.6–33)
MCHC RBC AUTO-ENTMCNC: 30.9 G/DL (ref 31.5–35.7)
MCV RBC AUTO: 92.4 FL (ref 79–97)
PLATELET # BLD AUTO: 277 10*3/MM3 (ref 140–450)
PMV BLD AUTO: 9.5 FL (ref 6–12)
POTASSIUM BLD-SCNC: 3.9 MMOL/L (ref 3.5–5.2)
RBC # BLD AUTO: 3.82 10*6/MM3 (ref 3.77–5.28)
SODIUM BLD-SCNC: 139 MMOL/L (ref 136–145)
WBC NRBC COR # BLD: 17.8 10*3/MM3 (ref 3.4–10.8)

## 2020-02-15 PROCEDURE — 80048 BASIC METABOLIC PNL TOTAL CA: CPT | Performed by: INTERNAL MEDICINE

## 2020-02-15 PROCEDURE — 25010000002 FUROSEMIDE PER 20 MG: Performed by: INTERNAL MEDICINE

## 2020-02-15 PROCEDURE — 25010000002 METHYLPREDNISOLONE PER 40 MG: Performed by: INTERNAL MEDICINE

## 2020-02-15 PROCEDURE — 97161 PT EVAL LOW COMPLEX 20 MIN: CPT | Performed by: PHYSICAL THERAPIST

## 2020-02-15 PROCEDURE — 94799 UNLISTED PULMONARY SVC/PX: CPT

## 2020-02-15 PROCEDURE — 25010000002 PIPERACILLIN SOD-TAZOBACTAM PER 1 G: Performed by: INTERNAL MEDICINE

## 2020-02-15 PROCEDURE — 29580 STRAPPING UNNA BOOT: CPT | Performed by: PHYSICAL THERAPIST

## 2020-02-15 PROCEDURE — 85027 COMPLETE CBC AUTOMATED: CPT | Performed by: INTERNAL MEDICINE

## 2020-02-15 PROCEDURE — 25010000002 METHYLPREDNISOLONE PER 125 MG: Performed by: INTERNAL MEDICINE

## 2020-02-15 PROCEDURE — 25010000002 AZITHROMYCIN PER 500 MG: Performed by: INTERNAL MEDICINE

## 2020-02-15 PROCEDURE — 25010000002 ENOXAPARIN PER 10 MG: Performed by: INTERNAL MEDICINE

## 2020-02-15 RX ORDER — NYSTATIN 100000 [USP'U]/G
POWDER TOPICAL EVERY 12 HOURS SCHEDULED
Status: DISCONTINUED | OUTPATIENT
Start: 2020-02-15 | End: 2020-02-19 | Stop reason: HOSPADM

## 2020-02-15 RX ORDER — METHYLPREDNISOLONE SODIUM SUCCINATE 40 MG/ML
40 INJECTION, POWDER, LYOPHILIZED, FOR SOLUTION INTRAMUSCULAR; INTRAVENOUS EVERY 8 HOURS
Status: DISCONTINUED | OUTPATIENT
Start: 2020-02-15 | End: 2020-02-17

## 2020-02-15 RX ORDER — FUROSEMIDE 10 MG/ML
40 INJECTION INTRAMUSCULAR; INTRAVENOUS ONCE
Status: COMPLETED | OUTPATIENT
Start: 2020-02-15 | End: 2020-02-15

## 2020-02-15 RX ADMIN — SODIUM CHLORIDE, PRESERVATIVE FREE 10 ML: 5 INJECTION INTRAVENOUS at 21:47

## 2020-02-15 RX ADMIN — METHYLPREDNISOLONE SODIUM SUCCINATE 60 MG: 125 INJECTION, POWDER, FOR SOLUTION INTRAMUSCULAR; INTRAVENOUS at 00:42

## 2020-02-15 RX ADMIN — METHYLPREDNISOLONE SODIUM SUCCINATE 40 MG: 40 INJECTION, POWDER, FOR SOLUTION INTRAMUSCULAR; INTRAVENOUS at 18:42

## 2020-02-15 RX ADMIN — TAZOBACTAM SODIUM AND PIPERACILLIN SODIUM 4.5 G: 500; 4 INJECTION, SOLUTION INTRAVENOUS at 18:36

## 2020-02-15 RX ADMIN — IPRATROPIUM BROMIDE AND ALBUTEROL SULFATE 3 ML: 2.5; .5 SOLUTION RESPIRATORY (INHALATION) at 15:28

## 2020-02-15 RX ADMIN — IPRATROPIUM BROMIDE AND ALBUTEROL SULFATE 3 ML: 2.5; .5 SOLUTION RESPIRATORY (INHALATION) at 07:41

## 2020-02-15 RX ADMIN — IPRATROPIUM BROMIDE AND ALBUTEROL SULFATE 3 ML: 2.5; .5 SOLUTION RESPIRATORY (INHALATION) at 10:54

## 2020-02-15 RX ADMIN — NYSTATIN: 100000 POWDER TOPICAL at 13:00

## 2020-02-15 RX ADMIN — FUROSEMIDE 40 MG: 10 INJECTION, SOLUTION INTRAMUSCULAR; INTRAVENOUS at 13:01

## 2020-02-15 RX ADMIN — HYDROXYZINE HYDROCHLORIDE 25 MG: 25 TABLET ORAL at 12:51

## 2020-02-15 RX ADMIN — ENOXAPARIN SODIUM 40 MG: 40 INJECTION SUBCUTANEOUS at 21:46

## 2020-02-15 RX ADMIN — BUDESONIDE 0.5 MG: 0.5 INHALANT RESPIRATORY (INHALATION) at 07:44

## 2020-02-15 RX ADMIN — TAZOBACTAM SODIUM AND PIPERACILLIN SODIUM 4.5 G: 500; 4 INJECTION, SOLUTION INTRAVENOUS at 00:43

## 2020-02-15 RX ADMIN — SODIUM CHLORIDE, PRESERVATIVE FREE 10 ML: 5 INJECTION INTRAVENOUS at 00:45

## 2020-02-15 RX ADMIN — AZITHROMYCIN MONOHYDRATE 500 MG: 500 INJECTION, POWDER, LYOPHILIZED, FOR SOLUTION INTRAVENOUS at 13:00

## 2020-02-15 RX ADMIN — BUDESONIDE 0.5 MG: 0.5 INHALANT RESPIRATORY (INHALATION) at 21:26

## 2020-02-15 RX ADMIN — FAMOTIDINE 20 MG: 10 INJECTION, SOLUTION INTRAVENOUS at 08:59

## 2020-02-15 RX ADMIN — NYSTATIN: 100000 POWDER TOPICAL at 21:47

## 2020-02-15 RX ADMIN — GUAIFENESIN 400 MG: 200 SOLUTION ORAL at 07:54

## 2020-02-15 RX ADMIN — SODIUM CHLORIDE, PRESERVATIVE FREE 10 ML: 5 INJECTION INTRAVENOUS at 08:59

## 2020-02-15 RX ADMIN — IPRATROPIUM BROMIDE AND ALBUTEROL SULFATE 3 ML: 2.5; .5 SOLUTION RESPIRATORY (INHALATION) at 19:24

## 2020-02-15 RX ADMIN — METHYLPREDNISOLONE SODIUM SUCCINATE 60 MG: 125 INJECTION, POWDER, FOR SOLUTION INTRAMUSCULAR; INTRAVENOUS at 08:59

## 2020-02-15 RX ADMIN — TAZOBACTAM SODIUM AND PIPERACILLIN SODIUM 4.5 G: 500; 4 INJECTION, SOLUTION INTRAVENOUS at 08:59

## 2020-02-15 RX ADMIN — HYDROXYZINE HYDROCHLORIDE 25 MG: 25 TABLET ORAL at 04:42

## 2020-02-15 RX ADMIN — HYDROXYZINE HYDROCHLORIDE 25 MG: 25 TABLET ORAL at 21:47

## 2020-02-15 RX ADMIN — FAMOTIDINE 20 MG: 10 INJECTION, SOLUTION INTRAVENOUS at 21:47

## 2020-02-16 LAB
ANION GAP SERPL CALCULATED.3IONS-SCNC: 12 MMOL/L (ref 5–15)
BUN BLD-MCNC: 28 MG/DL (ref 8–23)
BUN/CREAT SERPL: 33.7 (ref 7–25)
CALCIUM SPEC-SCNC: 8.9 MG/DL (ref 8.6–10.5)
CHLORIDE SERPL-SCNC: 92 MMOL/L (ref 98–107)
CO2 SERPL-SCNC: 37 MMOL/L (ref 22–29)
CREAT BLD-MCNC: 0.83 MG/DL (ref 0.57–1)
DEPRECATED RDW RBC AUTO: 54.7 FL (ref 37–54)
ERYTHROCYTE [DISTWIDTH] IN BLOOD BY AUTOMATED COUNT: 16.7 % (ref 12.3–15.4)
GFR SERPL CREATININE-BSD FRML MDRD: 68 ML/MIN/1.73
GLUCOSE BLD-MCNC: 105 MG/DL (ref 65–99)
HCT VFR BLD AUTO: 33.7 % (ref 34–46.6)
HGB BLD-MCNC: 10.8 G/DL (ref 12–15.9)
MAGNESIUM SERPL-MCNC: 2.7 MG/DL (ref 1.6–2.4)
MCH RBC QN AUTO: 28.8 PG (ref 26.6–33)
MCHC RBC AUTO-ENTMCNC: 32 G/DL (ref 31.5–35.7)
MCV RBC AUTO: 89.9 FL (ref 79–97)
PHOSPHATE SERPL-MCNC: 3.5 MG/DL (ref 2.5–4.5)
PLATELET # BLD AUTO: 301 10*3/MM3 (ref 140–450)
PMV BLD AUTO: 9.7 FL (ref 6–12)
POTASSIUM BLD-SCNC: 3.9 MMOL/L (ref 3.5–5.2)
RBC # BLD AUTO: 3.75 10*6/MM3 (ref 3.77–5.28)
SODIUM BLD-SCNC: 141 MMOL/L (ref 136–145)
WBC NRBC COR # BLD: 15.85 10*3/MM3 (ref 3.4–10.8)

## 2020-02-16 PROCEDURE — 83735 ASSAY OF MAGNESIUM: CPT | Performed by: INTERNAL MEDICINE

## 2020-02-16 PROCEDURE — 80048 BASIC METABOLIC PNL TOTAL CA: CPT | Performed by: INTERNAL MEDICINE

## 2020-02-16 PROCEDURE — 84100 ASSAY OF PHOSPHORUS: CPT | Performed by: INTERNAL MEDICINE

## 2020-02-16 PROCEDURE — 94799 UNLISTED PULMONARY SVC/PX: CPT

## 2020-02-16 PROCEDURE — 25010000002 METHYLPREDNISOLONE PER 40 MG: Performed by: INTERNAL MEDICINE

## 2020-02-16 PROCEDURE — 25010000002 AZITHROMYCIN PER 500 MG: Performed by: INTERNAL MEDICINE

## 2020-02-16 PROCEDURE — 25010000002 LORAZEPAM PER 2 MG: Performed by: INTERNAL MEDICINE

## 2020-02-16 PROCEDURE — 25010000002 PIPERACILLIN SOD-TAZOBACTAM PER 1 G: Performed by: INTERNAL MEDICINE

## 2020-02-16 PROCEDURE — 25010000002 ENOXAPARIN PER 10 MG: Performed by: INTERNAL MEDICINE

## 2020-02-16 PROCEDURE — 85027 COMPLETE CBC AUTOMATED: CPT | Performed by: INTERNAL MEDICINE

## 2020-02-16 RX ORDER — FAMOTIDINE 20 MG/1
20 TABLET, FILM COATED ORAL
Status: DISCONTINUED | OUTPATIENT
Start: 2020-02-16 | End: 2020-02-19 | Stop reason: HOSPADM

## 2020-02-16 RX ORDER — LORAZEPAM 2 MG/ML
0.5 INJECTION INTRAMUSCULAR ONCE
Status: COMPLETED | OUTPATIENT
Start: 2020-02-16 | End: 2020-02-16

## 2020-02-16 RX ADMIN — NYSTATIN: 100000 POWDER TOPICAL at 08:17

## 2020-02-16 RX ADMIN — IPRATROPIUM BROMIDE AND ALBUTEROL SULFATE 3 ML: 2.5; .5 SOLUTION RESPIRATORY (INHALATION) at 11:29

## 2020-02-16 RX ADMIN — ENOXAPARIN SODIUM 40 MG: 40 INJECTION SUBCUTANEOUS at 21:55

## 2020-02-16 RX ADMIN — IPRATROPIUM BROMIDE AND ALBUTEROL SULFATE 3 ML: 2.5; .5 SOLUTION RESPIRATORY (INHALATION) at 03:23

## 2020-02-16 RX ADMIN — IPRATROPIUM BROMIDE AND ALBUTEROL SULFATE 3 ML: 2.5; .5 SOLUTION RESPIRATORY (INHALATION) at 15:55

## 2020-02-16 RX ADMIN — SODIUM CHLORIDE, PRESERVATIVE FREE 10 ML: 5 INJECTION INTRAVENOUS at 08:17

## 2020-02-16 RX ADMIN — GUAIFENESIN 400 MG: 200 SOLUTION ORAL at 17:02

## 2020-02-16 RX ADMIN — HYDROXYZINE HYDROCHLORIDE 25 MG: 25 TABLET ORAL at 21:55

## 2020-02-16 RX ADMIN — GUAIFENESIN 400 MG: 200 SOLUTION ORAL at 00:08

## 2020-02-16 RX ADMIN — METHYLPREDNISOLONE SODIUM SUCCINATE 40 MG: 40 INJECTION, POWDER, FOR SOLUTION INTRAMUSCULAR; INTRAVENOUS at 16:02

## 2020-02-16 RX ADMIN — NYSTATIN: 100000 POWDER TOPICAL at 21:56

## 2020-02-16 RX ADMIN — AZITHROMYCIN MONOHYDRATE 500 MG: 500 INJECTION, POWDER, LYOPHILIZED, FOR SOLUTION INTRAVENOUS at 12:21

## 2020-02-16 RX ADMIN — BUDESONIDE 0.5 MG: 0.5 INHALANT RESPIRATORY (INHALATION) at 07:47

## 2020-02-16 RX ADMIN — HYDROXYZINE HYDROCHLORIDE 25 MG: 25 TABLET ORAL at 06:09

## 2020-02-16 RX ADMIN — SODIUM CHLORIDE, PRESERVATIVE FREE 10 ML: 5 INJECTION INTRAVENOUS at 21:57

## 2020-02-16 RX ADMIN — IPRATROPIUM BROMIDE AND ALBUTEROL SULFATE 3 ML: 2.5; .5 SOLUTION RESPIRATORY (INHALATION) at 20:16

## 2020-02-16 RX ADMIN — FAMOTIDINE 20 MG: 20 TABLET, FILM COATED ORAL at 17:02

## 2020-02-16 RX ADMIN — LORAZEPAM 0.5 MG: 2 INJECTION INTRAMUSCULAR; INTRAVENOUS at 16:02

## 2020-02-16 RX ADMIN — FAMOTIDINE 20 MG: 10 INJECTION, SOLUTION INTRAVENOUS at 08:16

## 2020-02-16 RX ADMIN — BUDESONIDE 0.5 MG: 0.5 INHALANT RESPIRATORY (INHALATION) at 20:21

## 2020-02-16 RX ADMIN — IPRATROPIUM BROMIDE AND ALBUTEROL SULFATE 3 ML: 2.5; .5 SOLUTION RESPIRATORY (INHALATION) at 07:47

## 2020-02-16 RX ADMIN — HYDROXYZINE HYDROCHLORIDE 25 MG: 25 TABLET ORAL at 13:58

## 2020-02-16 RX ADMIN — GUAIFENESIN 400 MG: 200 SOLUTION ORAL at 12:26

## 2020-02-16 RX ADMIN — TAZOBACTAM SODIUM AND PIPERACILLIN SODIUM 4.5 G: 500; 4 INJECTION, SOLUTION INTRAVENOUS at 16:03

## 2020-02-16 RX ADMIN — METHYLPREDNISOLONE SODIUM SUCCINATE 40 MG: 40 INJECTION, POWDER, FOR SOLUTION INTRAMUSCULAR; INTRAVENOUS at 00:08

## 2020-02-16 RX ADMIN — METHYLPREDNISOLONE SODIUM SUCCINATE 40 MG: 40 INJECTION, POWDER, FOR SOLUTION INTRAMUSCULAR; INTRAVENOUS at 08:16

## 2020-02-16 RX ADMIN — GUAIFENESIN 400 MG: 200 SOLUTION ORAL at 06:09

## 2020-02-16 RX ADMIN — TAZOBACTAM SODIUM AND PIPERACILLIN SODIUM 4.5 G: 500; 4 INJECTION, SOLUTION INTRAVENOUS at 00:08

## 2020-02-16 RX ADMIN — TAZOBACTAM SODIUM AND PIPERACILLIN SODIUM 4.5 G: 500; 4 INJECTION, SOLUTION INTRAVENOUS at 08:16

## 2020-02-17 ENCOUNTER — APPOINTMENT (OUTPATIENT)
Dept: CARDIOLOGY | Facility: HOSPITAL | Age: 70
End: 2020-02-17

## 2020-02-17 PROBLEM — F17.200 TOBACCO DEPENDENCE: Status: ACTIVE | Noted: 2020-02-17

## 2020-02-17 PROCEDURE — 25010000002 METHYLPREDNISOLONE PER 40 MG: Performed by: INTERNAL MEDICINE

## 2020-02-17 PROCEDURE — 97166 OT EVAL MOD COMPLEX 45 MIN: CPT | Performed by: OCCUPATIONAL THERAPIST

## 2020-02-17 PROCEDURE — 25010000002 PIPERACILLIN SOD-TAZOBACTAM PER 1 G: Performed by: INTERNAL MEDICINE

## 2020-02-17 PROCEDURE — 94664 DEMO&/EVAL PT USE INHALER: CPT

## 2020-02-17 PROCEDURE — 25010000002 FUROSEMIDE PER 20 MG: Performed by: INTERNAL MEDICINE

## 2020-02-17 PROCEDURE — 99406 BEHAV CHNG SMOKING 3-10 MIN: CPT

## 2020-02-17 PROCEDURE — 94799 UNLISTED PULMONARY SVC/PX: CPT

## 2020-02-17 PROCEDURE — 25010000002 PERFLUTREN 6.52 MG/ML SUSPENSION: Performed by: INTERNAL MEDICINE

## 2020-02-17 PROCEDURE — 25010000002 ENOXAPARIN PER 10 MG: Performed by: INTERNAL MEDICINE

## 2020-02-17 PROCEDURE — 92526 ORAL FUNCTION THERAPY: CPT

## 2020-02-17 PROCEDURE — 93306 TTE W/DOPPLER COMPLETE: CPT | Performed by: INTERNAL MEDICINE

## 2020-02-17 PROCEDURE — 93306 TTE W/DOPPLER COMPLETE: CPT

## 2020-02-17 PROCEDURE — 25010000002 AZITHROMYCIN PER 500 MG: Performed by: INTERNAL MEDICINE

## 2020-02-17 RX ORDER — METHYLPREDNISOLONE SODIUM SUCCINATE 40 MG/ML
40 INJECTION, POWDER, LYOPHILIZED, FOR SOLUTION INTRAMUSCULAR; INTRAVENOUS EVERY 12 HOURS
Status: DISCONTINUED | OUTPATIENT
Start: 2020-02-17 | End: 2020-02-18

## 2020-02-17 RX ORDER — HYDROXYZINE HYDROCHLORIDE 25 MG/1
50 TABLET, FILM COATED ORAL 3 TIMES DAILY PRN
Status: DISCONTINUED | OUTPATIENT
Start: 2020-02-17 | End: 2020-02-19 | Stop reason: HOSPADM

## 2020-02-17 RX ORDER — LEVOTHYROXINE SODIUM 0.07 MG/1
75 TABLET ORAL
Status: DISCONTINUED | OUTPATIENT
Start: 2020-02-17 | End: 2020-02-19 | Stop reason: HOSPADM

## 2020-02-17 RX ORDER — FUROSEMIDE 10 MG/ML
20 INJECTION INTRAMUSCULAR; INTRAVENOUS ONCE
Status: COMPLETED | OUTPATIENT
Start: 2020-02-17 | End: 2020-02-17

## 2020-02-17 RX ORDER — ESCITALOPRAM OXALATE 10 MG/1
10 TABLET ORAL DAILY
Status: DISCONTINUED | OUTPATIENT
Start: 2020-02-17 | End: 2020-02-19 | Stop reason: HOSPADM

## 2020-02-17 RX ADMIN — ENOXAPARIN SODIUM 40 MG: 40 INJECTION SUBCUTANEOUS at 23:18

## 2020-02-17 RX ADMIN — TAZOBACTAM SODIUM AND PIPERACILLIN SODIUM 4.5 G: 500; 4 INJECTION, SOLUTION INTRAVENOUS at 17:58

## 2020-02-17 RX ADMIN — GUAIFENESIN 400 MG: 200 SOLUTION ORAL at 06:19

## 2020-02-17 RX ADMIN — METHYLPREDNISOLONE SODIUM SUCCINATE 40 MG: 40 INJECTION, POWDER, FOR SOLUTION INTRAMUSCULAR; INTRAVENOUS at 23:18

## 2020-02-17 RX ADMIN — FUROSEMIDE 20 MG: 10 INJECTION, SOLUTION INTRAMUSCULAR; INTRAVENOUS at 12:21

## 2020-02-17 RX ADMIN — IPRATROPIUM BROMIDE AND ALBUTEROL SULFATE 3 ML: 2.5; .5 SOLUTION RESPIRATORY (INHALATION) at 14:51

## 2020-02-17 RX ADMIN — BUDESONIDE 0.5 MG: 0.5 INHALANT RESPIRATORY (INHALATION) at 22:56

## 2020-02-17 RX ADMIN — GUAIFENESIN 400 MG: 200 SOLUTION ORAL at 00:30

## 2020-02-17 RX ADMIN — IPRATROPIUM BROMIDE AND ALBUTEROL SULFATE 3 ML: 2.5; .5 SOLUTION RESPIRATORY (INHALATION) at 22:55

## 2020-02-17 RX ADMIN — ESCITALOPRAM 10 MG: 10 TABLET, FILM COATED ORAL at 12:21

## 2020-02-17 RX ADMIN — IPRATROPIUM BROMIDE AND ALBUTEROL SULFATE 3 ML: 2.5; .5 SOLUTION RESPIRATORY (INHALATION) at 11:00

## 2020-02-17 RX ADMIN — LEVOTHYROXINE SODIUM 75 MCG: 75 TABLET ORAL at 12:21

## 2020-02-17 RX ADMIN — AZITHROMYCIN MONOHYDRATE 500 MG: 500 INJECTION, POWDER, LYOPHILIZED, FOR SOLUTION INTRAVENOUS at 13:35

## 2020-02-17 RX ADMIN — TAZOBACTAM SODIUM AND PIPERACILLIN SODIUM 4.5 G: 500; 4 INJECTION, SOLUTION INTRAVENOUS at 08:17

## 2020-02-17 RX ADMIN — METHYLPREDNISOLONE SODIUM SUCCINATE 40 MG: 40 INJECTION, POWDER, FOR SOLUTION INTRAMUSCULAR; INTRAVENOUS at 08:19

## 2020-02-17 RX ADMIN — TAZOBACTAM SODIUM AND PIPERACILLIN SODIUM 4.5 G: 500; 4 INJECTION, SOLUTION INTRAVENOUS at 00:30

## 2020-02-17 RX ADMIN — GUAIFENESIN 400 MG: 200 SOLUTION ORAL at 17:58

## 2020-02-17 RX ADMIN — HYDROXYZINE HYDROCHLORIDE 50 MG: 25 TABLET ORAL at 23:17

## 2020-02-17 RX ADMIN — NYSTATIN: 100000 POWDER TOPICAL at 23:17

## 2020-02-17 RX ADMIN — SODIUM CHLORIDE, PRESERVATIVE FREE 10 ML: 5 INJECTION INTRAVENOUS at 23:18

## 2020-02-17 RX ADMIN — METHYLPREDNISOLONE SODIUM SUCCINATE 40 MG: 40 INJECTION, POWDER, FOR SOLUTION INTRAMUSCULAR; INTRAVENOUS at 00:30

## 2020-02-17 RX ADMIN — PERFLUTREN 8.48 MG: 6.52 INJECTION, SUSPENSION INTRAVENOUS at 14:06

## 2020-02-17 RX ADMIN — HYDROXYZINE HYDROCHLORIDE 25 MG: 25 TABLET ORAL at 08:24

## 2020-02-17 RX ADMIN — HYDROXYZINE HYDROCHLORIDE 50 MG: 25 TABLET ORAL at 13:29

## 2020-02-17 RX ADMIN — FAMOTIDINE 20 MG: 20 TABLET, FILM COATED ORAL at 17:58

## 2020-02-17 RX ADMIN — GUAIFENESIN 400 MG: 200 SOLUTION ORAL at 12:21

## 2020-02-17 RX ADMIN — NYSTATIN: 100000 POWDER TOPICAL at 11:18

## 2020-02-18 LAB
BACTERIA SPEC AEROBE CULT: NORMAL
BACTERIA SPEC AEROBE CULT: NORMAL
BH CV ECHO MEAS - AO MAX PG (FULL): 1.7 MMHG
BH CV ECHO MEAS - AO MAX PG: 10.6 MMHG
BH CV ECHO MEAS - AO MEAN PG (FULL): 1 MMHG
BH CV ECHO MEAS - AO MEAN PG: 5 MMHG
BH CV ECHO MEAS - AO ROOT AREA (BSA CORRECTED): 1.3
BH CV ECHO MEAS - AO ROOT AREA: 5.7 CM^2
BH CV ECHO MEAS - AO ROOT DIAM: 2.7 CM
BH CV ECHO MEAS - AO V2 MAX: 163 CM/SEC
BH CV ECHO MEAS - AO V2 MEAN: 100 CM/SEC
BH CV ECHO MEAS - AO V2 VTI: 29.6 CM
BH CV ECHO MEAS - AVA(I,A): 3.2 CM^2
BH CV ECHO MEAS - AVA(I,D): 3.2 CM^2
BH CV ECHO MEAS - AVA(V,A): 2.9 CM^2
BH CV ECHO MEAS - AVA(V,D): 2.9 CM^2
BH CV ECHO MEAS - BSA(HAYCOCK): 2.2 M^2
BH CV ECHO MEAS - BSA: 2 M^2
BH CV ECHO MEAS - BZI_BMI: 39.8 KILOGRAMS/M^2
BH CV ECHO MEAS - BZI_METRIC_HEIGHT: 160 CM
BH CV ECHO MEAS - BZI_METRIC_WEIGHT: 102 KG
BH CV ECHO MEAS - EDV(CUBED): 42.1 ML
BH CV ECHO MEAS - EDV(MOD-SP4): 145 ML
BH CV ECHO MEAS - EDV(TEICH): 50.2 ML
BH CV ECHO MEAS - EF(CUBED): 77.1 %
BH CV ECHO MEAS - EF(MOD-SP4): 76.4 %
BH CV ECHO MEAS - EF(TEICH): 70.2 %
BH CV ECHO MEAS - ESV(CUBED): 9.7 ML
BH CV ECHO MEAS - ESV(MOD-SP4): 34.2 ML
BH CV ECHO MEAS - ESV(TEICH): 14.9 ML
BH CV ECHO MEAS - FS: 38.8 %
BH CV ECHO MEAS - IVS/LVPW: 1.5
BH CV ECHO MEAS - IVSD: 1.2 CM
BH CV ECHO MEAS - LA DIMENSION: 2.9 CM
BH CV ECHO MEAS - LA/AO: 1.1
BH CV ECHO MEAS - LAT PEAK E' VEL: 6.2 CM/SEC
BH CV ECHO MEAS - LV DIASTOLIC VOL/BSA (35-75): 71.3 ML/M^2
BH CV ECHO MEAS - LV MASS(C)D: 108.7 GRAMS
BH CV ECHO MEAS - LV MASS(C)DI: 53.5 GRAMS/M^2
BH CV ECHO MEAS - LV MAX PG: 8.9 MMHG
BH CV ECHO MEAS - LV MEAN PG: 4 MMHG
BH CV ECHO MEAS - LV SYSTOLIC VOL/BSA (12-30): 16.8 ML/M^2
BH CV ECHO MEAS - LV V1 MAX: 149 CM/SEC
BH CV ECHO MEAS - LV V1 MEAN: 92.7 CM/SEC
BH CV ECHO MEAS - LV V1 VTI: 29.9 CM
BH CV ECHO MEAS - LVIDD: 3.5 CM
BH CV ECHO MEAS - LVIDS: 2.1 CM
BH CV ECHO MEAS - LVLD AP4: 8.2 CM
BH CV ECHO MEAS - LVLS AP4: 6.9 CM
BH CV ECHO MEAS - LVOT AREA (M): 3.1 CM^2
BH CV ECHO MEAS - LVOT AREA: 3.1 CM^2
BH CV ECHO MEAS - LVOT DIAM: 2 CM
BH CV ECHO MEAS - LVPWD: 0.84 CM
BH CV ECHO MEAS - MED PEAK E' VEL: 7.62 CM/SEC
BH CV ECHO MEAS - MV A MAX VEL: 132 CM/SEC
BH CV ECHO MEAS - MV DEC SLOPE: 419 CM/SEC^2
BH CV ECHO MEAS - MV DEC TIME: 0.24 SEC
BH CV ECHO MEAS - MV E MAX VEL: 100 CM/SEC
BH CV ECHO MEAS - MV E/A: 0.76
BH CV ECHO MEAS - SI(AO): 83.4 ML/M^2
BH CV ECHO MEAS - SI(CUBED): 16 ML/M^2
BH CV ECHO MEAS - SI(LVOT): 46.2 ML/M^2
BH CV ECHO MEAS - SI(MOD-SP4): 54.5 ML/M^2
BH CV ECHO MEAS - SI(TEICH): 17.3 ML/M^2
BH CV ECHO MEAS - SV(AO): 169.5 ML
BH CV ECHO MEAS - SV(CUBED): 32.5 ML
BH CV ECHO MEAS - SV(LVOT): 93.9 ML
BH CV ECHO MEAS - SV(MOD-SP4): 110.8 ML
BH CV ECHO MEAS - SV(TEICH): 35.2 ML
BH CV ECHO MEASUREMENTS AVERAGE E/E' RATIO: 14.47
GLUCOSE BLDC GLUCOMTR-MCNC: 177 MG/DL (ref 70–130)
LEFT ATRIUM VOLUME INDEX: 26.3 ML/M2
LEFT ATRIUM VOLUME: 53.3 CM3
MAXIMAL PREDICTED HEART RATE: 150 BPM
STRESS TARGET HR: 128 BPM

## 2020-02-18 PROCEDURE — 97164 PT RE-EVAL EST PLAN CARE: CPT | Performed by: PHYSICAL THERAPIST

## 2020-02-18 PROCEDURE — 25010000002 FUROSEMIDE PER 20 MG: Performed by: INTERNAL MEDICINE

## 2020-02-18 PROCEDURE — 25010000002 PIPERACILLIN SOD-TAZOBACTAM PER 1 G: Performed by: INTERNAL MEDICINE

## 2020-02-18 PROCEDURE — 82962 GLUCOSE BLOOD TEST: CPT

## 2020-02-18 PROCEDURE — 94640 AIRWAY INHALATION TREATMENT: CPT

## 2020-02-18 PROCEDURE — 94799 UNLISTED PULMONARY SVC/PX: CPT

## 2020-02-18 PROCEDURE — 92526 ORAL FUNCTION THERAPY: CPT

## 2020-02-18 PROCEDURE — 25010000002 ENOXAPARIN PER 10 MG: Performed by: INTERNAL MEDICINE

## 2020-02-18 PROCEDURE — 63710000001 PREDNISONE PER 1 MG: Performed by: INTERNAL MEDICINE

## 2020-02-18 RX ORDER — IPRATROPIUM BROMIDE AND ALBUTEROL SULFATE 2.5; .5 MG/3ML; MG/3ML
3 SOLUTION RESPIRATORY (INHALATION)
Status: DISCONTINUED | OUTPATIENT
Start: 2020-02-18 | End: 2020-02-19 | Stop reason: HOSPADM

## 2020-02-18 RX ORDER — POTASSIUM CHLORIDE 750 MG/1
40 CAPSULE, EXTENDED RELEASE ORAL ONCE
Status: COMPLETED | OUTPATIENT
Start: 2020-02-18 | End: 2020-02-18

## 2020-02-18 RX ORDER — FUROSEMIDE 10 MG/ML
20 INJECTION INTRAMUSCULAR; INTRAVENOUS ONCE
Status: COMPLETED | OUTPATIENT
Start: 2020-02-18 | End: 2020-02-18

## 2020-02-18 RX ORDER — BUDESONIDE AND FORMOTEROL FUMARATE DIHYDRATE 160; 4.5 UG/1; UG/1
2 AEROSOL RESPIRATORY (INHALATION)
Status: DISCONTINUED | OUTPATIENT
Start: 2020-02-18 | End: 2020-02-19 | Stop reason: HOSPADM

## 2020-02-18 RX ORDER — AMOXICILLIN AND CLAVULANATE POTASSIUM 875; 125 MG/1; MG/1
1 TABLET, FILM COATED ORAL EVERY 12 HOURS SCHEDULED
Status: DISCONTINUED | OUTPATIENT
Start: 2020-02-18 | End: 2020-02-19 | Stop reason: HOSPADM

## 2020-02-18 RX ORDER — PREDNISONE 20 MG/1
40 TABLET ORAL
Status: DISCONTINUED | OUTPATIENT
Start: 2020-02-18 | End: 2020-02-19 | Stop reason: HOSPADM

## 2020-02-18 RX ADMIN — FUROSEMIDE 20 MG: 10 INJECTION, SOLUTION INTRAMUSCULAR; INTRAVENOUS at 12:10

## 2020-02-18 RX ADMIN — IPRATROPIUM BROMIDE AND ALBUTEROL SULFATE 3 ML: 2.5; .5 SOLUTION RESPIRATORY (INHALATION) at 11:07

## 2020-02-18 RX ADMIN — LEVOTHYROXINE SODIUM 75 MCG: 75 TABLET ORAL at 06:35

## 2020-02-18 RX ADMIN — TAZOBACTAM SODIUM AND PIPERACILLIN SODIUM 4.5 G: 500; 4 INJECTION, SOLUTION INTRAVENOUS at 00:47

## 2020-02-18 RX ADMIN — AMOXICILLIN AND CLAVULANATE POTASSIUM 1 TABLET: 875; 125 TABLET, FILM COATED ORAL at 12:11

## 2020-02-18 RX ADMIN — NYSTATIN: 100000 POWDER TOPICAL at 10:36

## 2020-02-18 RX ADMIN — SODIUM CHLORIDE, PRESERVATIVE FREE 10 ML: 5 INJECTION INTRAVENOUS at 22:02

## 2020-02-18 RX ADMIN — HYDROXYZINE HYDROCHLORIDE 50 MG: 25 TABLET ORAL at 10:24

## 2020-02-18 RX ADMIN — GUAIFENESIN 400 MG: 200 SOLUTION ORAL at 00:47

## 2020-02-18 RX ADMIN — BUDESONIDE AND FORMOTEROL FUMARATE DIHYDRATE 2 PUFF: 160; 4.5 AEROSOL RESPIRATORY (INHALATION) at 21:00

## 2020-02-18 RX ADMIN — NYSTATIN: 100000 POWDER TOPICAL at 21:13

## 2020-02-18 RX ADMIN — HYDROXYZINE HYDROCHLORIDE 50 MG: 25 TABLET ORAL at 21:12

## 2020-02-18 RX ADMIN — GUAIFENESIN 400 MG: 200 SOLUTION ORAL at 17:59

## 2020-02-18 RX ADMIN — FAMOTIDINE 20 MG: 20 TABLET, FILM COATED ORAL at 17:59

## 2020-02-18 RX ADMIN — IPRATROPIUM BROMIDE AND ALBUTEROL SULFATE 3 ML: 2.5; .5 SOLUTION RESPIRATORY (INHALATION) at 07:41

## 2020-02-18 RX ADMIN — GUAIFENESIN 400 MG: 200 SOLUTION ORAL at 06:35

## 2020-02-18 RX ADMIN — BUDESONIDE 0.5 MG: 0.5 INHALANT RESPIRATORY (INHALATION) at 07:41

## 2020-02-18 RX ADMIN — IPRATROPIUM BROMIDE AND ALBUTEROL SULFATE 3 ML: 2.5; .5 SOLUTION RESPIRATORY (INHALATION) at 21:00

## 2020-02-18 RX ADMIN — FAMOTIDINE 20 MG: 20 TABLET, FILM COATED ORAL at 10:24

## 2020-02-18 RX ADMIN — POTASSIUM CHLORIDE 40 MEQ: 750 CAPSULE, EXTENDED RELEASE ORAL at 12:10

## 2020-02-18 RX ADMIN — AMOXICILLIN AND CLAVULANATE POTASSIUM 1 TABLET: 875; 125 TABLET, FILM COATED ORAL at 22:02

## 2020-02-18 RX ADMIN — PREDNISONE 40 MG: 20 TABLET ORAL at 12:11

## 2020-02-18 RX ADMIN — ENOXAPARIN SODIUM 40 MG: 40 INJECTION SUBCUTANEOUS at 21:12

## 2020-02-18 RX ADMIN — ESCITALOPRAM 10 MG: 10 TABLET, FILM COATED ORAL at 10:24

## 2020-02-19 VITALS
BODY MASS INDEX: 39.84 KG/M2 | WEIGHT: 224.87 LBS | TEMPERATURE: 98 F | RESPIRATION RATE: 18 BRPM | SYSTOLIC BLOOD PRESSURE: 137 MMHG | HEIGHT: 63 IN | DIASTOLIC BLOOD PRESSURE: 70 MMHG | HEART RATE: 73 BPM | OXYGEN SATURATION: 96 %

## 2020-02-19 LAB
ANION GAP SERPL CALCULATED.3IONS-SCNC: 14 MMOL/L (ref 5–15)
BUN BLD-MCNC: 29 MG/DL (ref 8–23)
BUN/CREAT SERPL: 41.4 (ref 7–25)
CALCIUM SPEC-SCNC: 8.9 MG/DL (ref 8.6–10.5)
CHLORIDE SERPL-SCNC: 94 MMOL/L (ref 98–107)
CO2 SERPL-SCNC: 32 MMOL/L (ref 22–29)
CREAT BLD-MCNC: 0.7 MG/DL (ref 0.57–1)
GFR SERPL CREATININE-BSD FRML MDRD: 83 ML/MIN/1.73
GLUCOSE BLD-MCNC: 123 MG/DL (ref 65–99)
POTASSIUM BLD-SCNC: 4 MMOL/L (ref 3.5–5.2)
SODIUM BLD-SCNC: 140 MMOL/L (ref 136–145)

## 2020-02-19 PROCEDURE — 29580 STRAPPING UNNA BOOT: CPT

## 2020-02-19 PROCEDURE — 97530 THERAPEUTIC ACTIVITIES: CPT

## 2020-02-19 PROCEDURE — 80048 BASIC METABOLIC PNL TOTAL CA: CPT | Performed by: INTERNAL MEDICINE

## 2020-02-19 PROCEDURE — 63710000001 PREDNISONE PER 1 MG: Performed by: INTERNAL MEDICINE

## 2020-02-19 PROCEDURE — 97535 SELF CARE MNGMENT TRAINING: CPT

## 2020-02-19 PROCEDURE — 94799 UNLISTED PULMONARY SVC/PX: CPT

## 2020-02-19 PROCEDURE — 97110 THERAPEUTIC EXERCISES: CPT

## 2020-02-19 PROCEDURE — 92526 ORAL FUNCTION THERAPY: CPT

## 2020-02-19 RX ORDER — IPRATROPIUM BROMIDE AND ALBUTEROL SULFATE 2.5; .5 MG/3ML; MG/3ML
3 SOLUTION RESPIRATORY (INHALATION)
Qty: 360 ML | Status: ON HOLD
Start: 2020-02-19 | End: 2020-04-29

## 2020-02-19 RX ORDER — PREDNISONE 10 MG/1
10 TABLET ORAL DAILY
Start: 2020-02-19 | End: 2020-04-28

## 2020-02-19 RX ORDER — HYDROXYZINE 50 MG/1
50 TABLET, FILM COATED ORAL 3 TIMES DAILY PRN
Qty: 10 TABLET | Refills: 0 | Status: ON HOLD | OUTPATIENT
Start: 2020-02-19 | End: 2020-07-04

## 2020-02-19 RX ORDER — FAMOTIDINE 20 MG/1
20 TABLET, FILM COATED ORAL DAILY
Start: 2020-02-19 | End: 2020-04-28

## 2020-02-19 RX ORDER — NITROGLYCERIN 0.4 MG/1
0.4 TABLET SUBLINGUAL
Refills: 12 | Status: ON HOLD
Start: 2020-02-19 | End: 2021-03-10

## 2020-02-19 RX ORDER — ACETAMINOPHEN 325 MG/1
650 TABLET ORAL EVERY 4 HOURS PRN
Start: 2020-02-19 | End: 2020-04-28

## 2020-02-19 RX ORDER — AMOXICILLIN AND CLAVULANATE POTASSIUM 875; 125 MG/1; MG/1
1 TABLET, FILM COATED ORAL EVERY 12 HOURS SCHEDULED
Qty: 3 TABLET | Refills: 0 | Status: SHIPPED | OUTPATIENT
Start: 2020-02-19 | End: 2020-02-21

## 2020-02-19 RX ADMIN — LEVOTHYROXINE SODIUM 75 MCG: 75 TABLET ORAL at 06:25

## 2020-02-19 RX ADMIN — FAMOTIDINE 20 MG: 20 TABLET, FILM COATED ORAL at 08:30

## 2020-02-19 RX ADMIN — GUAIFENESIN 400 MG: 200 SOLUTION ORAL at 06:25

## 2020-02-19 RX ADMIN — IPRATROPIUM BROMIDE AND ALBUTEROL SULFATE 3 ML: 2.5; .5 SOLUTION RESPIRATORY (INHALATION) at 08:34

## 2020-02-19 RX ADMIN — BUDESONIDE AND FORMOTEROL FUMARATE DIHYDRATE 2 PUFF: 160; 4.5 AEROSOL RESPIRATORY (INHALATION) at 08:34

## 2020-02-19 RX ADMIN — NYSTATIN: 100000 POWDER TOPICAL at 08:33

## 2020-02-19 RX ADMIN — ESCITALOPRAM 10 MG: 10 TABLET, FILM COATED ORAL at 08:30

## 2020-02-19 RX ADMIN — PREDNISONE 40 MG: 20 TABLET ORAL at 08:30

## 2020-02-19 RX ADMIN — SODIUM CHLORIDE, PRESERVATIVE FREE 10 ML: 5 INJECTION INTRAVENOUS at 08:30

## 2020-02-19 RX ADMIN — GUAIFENESIN 400 MG: 200 SOLUTION ORAL at 00:19

## 2020-02-19 RX ADMIN — AMOXICILLIN AND CLAVULANATE POTASSIUM 1 TABLET: 875; 125 TABLET, FILM COATED ORAL at 08:30

## 2020-03-20 ENCOUNTER — APPOINTMENT (OUTPATIENT)
Dept: PULMONOLOGY | Facility: HOSPITAL | Age: 70
End: 2020-03-20

## 2020-04-28 ENCOUNTER — APPOINTMENT (OUTPATIENT)
Dept: GENERAL RADIOLOGY | Facility: HOSPITAL | Age: 70
End: 2020-04-28

## 2020-04-28 ENCOUNTER — HOSPITAL ENCOUNTER (INPATIENT)
Facility: HOSPITAL | Age: 70
LOS: 4 days | Discharge: NURSING, REHAB OR LONG-TERM CARE | End: 2020-05-02
Attending: FAMILY MEDICINE | Admitting: FAMILY MEDICINE

## 2020-04-28 DIAGNOSIS — Z74.09 IMPAIRED MOBILITY: ICD-10-CM

## 2020-04-28 DIAGNOSIS — J18.9 PNEUMONIA OF LEFT LOWER LOBE DUE TO INFECTIOUS ORGANISM: ICD-10-CM

## 2020-04-28 DIAGNOSIS — N17.9 AKI (ACUTE KIDNEY INJURY) (HCC): ICD-10-CM

## 2020-04-28 DIAGNOSIS — Z78.9 DECREASED ACTIVITIES OF DAILY LIVING (ADL): ICD-10-CM

## 2020-04-28 DIAGNOSIS — L03.90 CELLULITIS, UNSPECIFIED CELLULITIS SITE: ICD-10-CM

## 2020-04-28 DIAGNOSIS — J44.1 COPD EXACERBATION (HCC): Primary | ICD-10-CM

## 2020-04-28 PROBLEM — J96.11 CHRONIC RESPIRATORY FAILURE WITH HYPOXIA (HCC): Status: ACTIVE | Noted: 2020-02-13

## 2020-04-28 PROBLEM — A41.9 SEPSIS (HCC): Status: ACTIVE | Noted: 2020-04-28

## 2020-04-28 LAB
ALBUMIN SERPL-MCNC: 3.3 G/DL (ref 3.5–5.2)
ALBUMIN/GLOB SERPL: 0.7 G/DL
ALP SERPL-CCNC: 87 U/L (ref 39–117)
ALT SERPL W P-5'-P-CCNC: 9 U/L (ref 1–33)
ANION GAP SERPL CALCULATED.3IONS-SCNC: 15 MMOL/L (ref 5–15)
ARTERIAL PATENCY WRIST A: POSITIVE
AST SERPL-CCNC: 13 U/L (ref 1–32)
ATMOSPHERIC PRESS: 747 MMHG
BACTERIA UR QL AUTO: ABNORMAL /HPF
BASE EXCESS BLDA CALC-SCNC: 4.7 MMOL/L (ref 0–2)
BASOPHILS # BLD AUTO: 0.06 10*3/MM3 (ref 0–0.2)
BASOPHILS NFR BLD AUTO: 0.3 % (ref 0–1.5)
BDY SITE: ABNORMAL
BILIRUB SERPL-MCNC: 0.6 MG/DL (ref 0.2–1.2)
BILIRUB UR QL STRIP: NEGATIVE
BODY TEMPERATURE: 37 C
BUN BLD-MCNC: 32 MG/DL (ref 8–23)
BUN/CREAT SERPL: 24.8 (ref 7–25)
CALCIUM SPEC-SCNC: 9.8 MG/DL (ref 8.6–10.5)
CHLORIDE SERPL-SCNC: 92 MMOL/L (ref 98–107)
CLARITY UR: ABNORMAL
CO2 SERPL-SCNC: 27 MMOL/L (ref 22–29)
COARSE GRAN CASTS URNS QL MICRO: ABNORMAL /LPF
COLOR UR: ABNORMAL
CREAT BLD-MCNC: 1.29 MG/DL (ref 0.57–1)
D-LACTATE SERPL-SCNC: 2.1 MMOL/L (ref 0.5–2)
D-LACTATE SERPL-SCNC: 2.3 MMOL/L (ref 0.5–2)
DEPRECATED RDW RBC AUTO: 57.4 FL (ref 37–54)
EOSINOPHIL # BLD AUTO: 0.2 10*3/MM3 (ref 0–0.4)
EOSINOPHIL NFR BLD AUTO: 1.1 % (ref 0.3–6.2)
ERYTHROCYTE [DISTWIDTH] IN BLOOD BY AUTOMATED COUNT: 17.9 % (ref 12.3–15.4)
FINE GRAN CASTS URNS QL MICRO: ABNORMAL /LPF
GAS FLOW AIRWAY: 3 LPM
GFR SERPL CREATININE-BSD FRML MDRD: 41 ML/MIN/1.73
GLOBULIN UR ELPH-MCNC: 4.6 GM/DL
GLUCOSE BLD-MCNC: 151 MG/DL (ref 65–99)
GLUCOSE UR STRIP-MCNC: NEGATIVE MG/DL
HCO3 BLDA-SCNC: 29.2 MMOL/L (ref 20–26)
HCT VFR BLD AUTO: 45.8 % (ref 34–46.6)
HGB BLD-MCNC: 14.3 G/DL (ref 12–15.9)
HGB UR QL STRIP.AUTO: ABNORMAL
HOLD SPECIMEN: NORMAL
HYALINE CASTS UR QL AUTO: ABNORMAL /LPF
IMM GRANULOCYTES # BLD AUTO: 0.16 10*3/MM3 (ref 0–0.05)
IMM GRANULOCYTES NFR BLD AUTO: 0.9 % (ref 0–0.5)
KETONES UR QL STRIP: NEGATIVE
LACTATE HOLD SPECIMEN: NORMAL
LEUKOCYTE ESTERASE UR QL STRIP.AUTO: ABNORMAL
LYMPHOCYTES # BLD AUTO: 1.25 10*3/MM3 (ref 0.7–3.1)
LYMPHOCYTES NFR BLD AUTO: 6.7 % (ref 19.6–45.3)
Lab: ABNORMAL
MCH RBC QN AUTO: 27.6 PG (ref 26.6–33)
MCHC RBC AUTO-ENTMCNC: 31.2 G/DL (ref 31.5–35.7)
MCV RBC AUTO: 88.4 FL (ref 79–97)
MODALITY: ABNORMAL
MONOCYTES # BLD AUTO: 1 10*3/MM3 (ref 0.1–0.9)
MONOCYTES NFR BLD AUTO: 5.4 % (ref 5–12)
NEUTROPHILS # BLD AUTO: 15.88 10*3/MM3 (ref 1.7–7)
NEUTROPHILS NFR BLD AUTO: 85.6 % (ref 42.7–76)
NITRITE UR QL STRIP: NEGATIVE
NRBC BLD AUTO-RTO: 0.1 /100 WBC (ref 0–0.2)
NT-PROBNP SERPL-MCNC: 50.7 PG/ML (ref 5–900)
PCO2 BLDA: 42.1 MM HG (ref 35–45)
PH BLDA: 7.45 PH UNITS (ref 7.35–7.45)
PH UR STRIP.AUTO: <=5 [PH] (ref 5–8)
PLATELET # BLD AUTO: 291 10*3/MM3 (ref 140–450)
PMV BLD AUTO: 9.2 FL (ref 6–12)
PO2 BLDA: 137 MM HG (ref 83–108)
POTASSIUM BLD-SCNC: 5.3 MMOL/L (ref 3.5–5.2)
PROCALCITONIN SERPL-MCNC: 1.77 NG/ML (ref 0.1–0.25)
PROT SERPL-MCNC: 7.9 G/DL (ref 6–8.5)
PROT UR QL STRIP: ABNORMAL
RBC # BLD AUTO: 5.18 10*6/MM3 (ref 3.77–5.28)
RBC # UR: ABNORMAL /HPF
REF LAB TEST METHOD: ABNORMAL
SAO2 % BLDCOA: 99.6 % (ref 94–99)
SODIUM BLD-SCNC: 134 MMOL/L (ref 136–145)
SP GR UR STRIP: 1.03 (ref 1–1.03)
SQUAMOUS #/AREA URNS HPF: ABNORMAL /HPF
TROPONIN T SERPL-MCNC: 0.01 NG/ML (ref 0–0.03)
UROBILINOGEN UR QL STRIP: ABNORMAL
VENTILATOR MODE: ABNORMAL
WBC NRBC COR # BLD: 18.55 10*3/MM3 (ref 3.4–10.8)
WBC UR QL AUTO: ABNORMAL /HPF
WHOLE BLOOD HOLD SPECIMEN: NORMAL
WHOLE BLOOD HOLD SPECIMEN: NORMAL

## 2020-04-28 PROCEDURE — 93010 ELECTROCARDIOGRAM REPORT: CPT | Performed by: INTERNAL MEDICINE

## 2020-04-28 PROCEDURE — 94799 UNLISTED PULMONARY SVC/PX: CPT

## 2020-04-28 PROCEDURE — 25010000002 ENOXAPARIN PER 10 MG: Performed by: NURSE PRACTITIONER

## 2020-04-28 PROCEDURE — 99284 EMERGENCY DEPT VISIT MOD MDM: CPT

## 2020-04-28 PROCEDURE — 83605 ASSAY OF LACTIC ACID: CPT | Performed by: NURSE PRACTITIONER

## 2020-04-28 PROCEDURE — 85025 COMPLETE CBC W/AUTO DIFF WBC: CPT | Performed by: FAMILY MEDICINE

## 2020-04-28 PROCEDURE — 84145 PROCALCITONIN (PCT): CPT | Performed by: NURSE PRACTITIONER

## 2020-04-28 PROCEDURE — 82803 BLOOD GASES ANY COMBINATION: CPT

## 2020-04-28 PROCEDURE — 25010000002 PIPERACILLIN SOD-TAZOBACTAM PER 1 G: Performed by: FAMILY MEDICINE

## 2020-04-28 PROCEDURE — 87040 BLOOD CULTURE FOR BACTERIA: CPT | Performed by: FAMILY MEDICINE

## 2020-04-28 PROCEDURE — 84484 ASSAY OF TROPONIN QUANT: CPT | Performed by: FAMILY MEDICINE

## 2020-04-28 PROCEDURE — 25010000002 METHYLPREDNISOLONE PER 125 MG: Performed by: NURSE PRACTITIONER

## 2020-04-28 PROCEDURE — 94640 AIRWAY INHALATION TREATMENT: CPT

## 2020-04-28 PROCEDURE — 83880 ASSAY OF NATRIURETIC PEPTIDE: CPT | Performed by: FAMILY MEDICINE

## 2020-04-28 PROCEDURE — 81001 URINALYSIS AUTO W/SCOPE: CPT | Performed by: NURSE PRACTITIONER

## 2020-04-28 PROCEDURE — 25010000002 CEFTRIAXONE PER 250 MG: Performed by: NURSE PRACTITIONER

## 2020-04-28 PROCEDURE — 80053 COMPREHEN METABOLIC PANEL: CPT | Performed by: FAMILY MEDICINE

## 2020-04-28 PROCEDURE — 93005 ELECTROCARDIOGRAM TRACING: CPT | Performed by: FAMILY MEDICINE

## 2020-04-28 PROCEDURE — 71045 X-RAY EXAM CHEST 1 VIEW: CPT

## 2020-04-28 PROCEDURE — 36600 WITHDRAWAL OF ARTERIAL BLOOD: CPT

## 2020-04-28 RX ORDER — ONDANSETRON 2 MG/ML
4 INJECTION INTRAMUSCULAR; INTRAVENOUS EVERY 6 HOURS PRN
Status: DISCONTINUED | OUTPATIENT
Start: 2020-04-28 | End: 2020-05-02 | Stop reason: HOSPADM

## 2020-04-28 RX ORDER — IPRATROPIUM BROMIDE AND ALBUTEROL SULFATE 2.5; .5 MG/3ML; MG/3ML
3 SOLUTION RESPIRATORY (INHALATION)
Status: DISCONTINUED | OUTPATIENT
Start: 2020-04-28 | End: 2020-05-02

## 2020-04-28 RX ORDER — SODIUM CHLORIDE 0.9 % (FLUSH) 0.9 %
10 SYRINGE (ML) INJECTION AS NEEDED
Status: DISCONTINUED | OUTPATIENT
Start: 2020-04-28 | End: 2020-05-02 | Stop reason: HOSPADM

## 2020-04-28 RX ORDER — SODIUM CHLORIDE 9 MG/ML
75 INJECTION, SOLUTION INTRAVENOUS CONTINUOUS
Status: DISCONTINUED | OUTPATIENT
Start: 2020-04-28 | End: 2020-04-30

## 2020-04-28 RX ORDER — SODIUM CHLORIDE 0.9 % (FLUSH) 0.9 %
10 SYRINGE (ML) INJECTION EVERY 12 HOURS SCHEDULED
Status: DISCONTINUED | OUTPATIENT
Start: 2020-04-28 | End: 2020-05-02 | Stop reason: HOSPADM

## 2020-04-28 RX ORDER — BUDESONIDE AND FORMOTEROL FUMARATE DIHYDRATE 160; 4.5 UG/1; UG/1
2 AEROSOL RESPIRATORY (INHALATION)
Status: DISCONTINUED | OUTPATIENT
Start: 2020-04-28 | End: 2020-05-02 | Stop reason: HOSPADM

## 2020-04-28 RX ORDER — HYDROXYZINE HYDROCHLORIDE 25 MG/1
50 TABLET, FILM COATED ORAL 3 TIMES DAILY PRN
Status: DISCONTINUED | OUTPATIENT
Start: 2020-04-28 | End: 2020-05-02 | Stop reason: HOSPADM

## 2020-04-28 RX ORDER — NYSTATIN 100000 [USP'U]/G
1 POWDER TOPICAL 3 TIMES DAILY
Status: DISCONTINUED | OUTPATIENT
Start: 2020-04-28 | End: 2020-05-02 | Stop reason: HOSPADM

## 2020-04-28 RX ORDER — METHYLPREDNISOLONE SODIUM SUCCINATE 125 MG/2ML
125 INJECTION, POWDER, LYOPHILIZED, FOR SOLUTION INTRAMUSCULAR; INTRAVENOUS ONCE
Status: COMPLETED | OUTPATIENT
Start: 2020-04-28 | End: 2020-04-28

## 2020-04-28 RX ORDER — ESCITALOPRAM OXALATE 10 MG/1
10 TABLET ORAL DAILY
Status: DISCONTINUED | OUTPATIENT
Start: 2020-04-29 | End: 2020-05-02 | Stop reason: HOSPADM

## 2020-04-28 RX ORDER — MECLIZINE HYDROCHLORIDE 25 MG/1
25 TABLET ORAL 2 TIMES DAILY PRN
Status: DISCONTINUED | OUTPATIENT
Start: 2020-04-28 | End: 2020-05-02 | Stop reason: HOSPADM

## 2020-04-28 RX ORDER — NITROGLYCERIN 0.4 MG/1
0.4 TABLET SUBLINGUAL
Status: DISCONTINUED | OUTPATIENT
Start: 2020-04-28 | End: 2020-05-02 | Stop reason: HOSPADM

## 2020-04-28 RX ORDER — METHYLPREDNISOLONE SODIUM SUCCINATE 125 MG/2ML
60 INJECTION, POWDER, LYOPHILIZED, FOR SOLUTION INTRAMUSCULAR; INTRAVENOUS EVERY 6 HOURS
Status: DISCONTINUED | OUTPATIENT
Start: 2020-04-29 | End: 2020-04-29

## 2020-04-28 RX ORDER — LEVOTHYROXINE SODIUM 0.07 MG/1
75 TABLET ORAL
Status: DISCONTINUED | OUTPATIENT
Start: 2020-04-29 | End: 2020-05-02 | Stop reason: HOSPADM

## 2020-04-28 RX ORDER — ONDANSETRON 4 MG/1
4 TABLET, FILM COATED ORAL EVERY 6 HOURS PRN
Status: DISCONTINUED | OUTPATIENT
Start: 2020-04-28 | End: 2020-05-02 | Stop reason: HOSPADM

## 2020-04-28 RX ORDER — MECLIZINE HYDROCHLORIDE 25 MG/1
25 TABLET ORAL 2 TIMES DAILY
Status: ON HOLD | COMMUNITY
End: 2022-10-06

## 2020-04-28 RX ADMIN — METHYLPREDNISOLONE SODIUM SUCCINATE 125 MG: 125 INJECTION, POWDER, FOR SOLUTION INTRAMUSCULAR; INTRAVENOUS at 18:51

## 2020-04-28 RX ADMIN — PIPERACILLIN AND TAZOBACTAM 3.38 G: 3; .375 INJECTION, POWDER, FOR SOLUTION INTRAVENOUS at 17:05

## 2020-04-28 RX ADMIN — SODIUM CHLORIDE, PRESERVATIVE FREE 10 ML: 5 INJECTION INTRAVENOUS at 22:11

## 2020-04-28 RX ADMIN — SODIUM CHLORIDE 75 ML/HR: 9 INJECTION, SOLUTION INTRAVENOUS at 22:10

## 2020-04-28 RX ADMIN — NYSTATIN 1 APPLICATION: 100000 POWDER TOPICAL at 23:06

## 2020-04-28 RX ADMIN — IPRATROPIUM BROMIDE AND ALBUTEROL SULFATE 3 ML: 2.5; .5 SOLUTION RESPIRATORY (INHALATION) at 21:35

## 2020-04-28 RX ADMIN — BUDESONIDE AND FORMOTEROL FUMARATE DIHYDRATE 2 PUFF: 160; 4.5 AEROSOL RESPIRATORY (INHALATION) at 23:18

## 2020-04-28 RX ADMIN — CEFTRIAXONE SODIUM 1 G: 1 INJECTION, POWDER, FOR SOLUTION INTRAMUSCULAR; INTRAVENOUS at 23:58

## 2020-04-28 RX ADMIN — ENOXAPARIN SODIUM 30 MG: 30 INJECTION SUBCUTANEOUS at 23:06

## 2020-04-29 PROBLEM — N17.9 AKI (ACUTE KIDNEY INJURY) (HCC): Status: ACTIVE | Noted: 2020-04-29

## 2020-04-29 PROBLEM — L03.119 LOWER EXTREMITY CELLULITIS: Status: ACTIVE | Noted: 2020-04-28

## 2020-04-29 PROBLEM — E66.01 OBESITY, CLASS III, BMI 40-49.9 (MORBID OBESITY) (HCC): Status: ACTIVE | Noted: 2020-04-29

## 2020-04-29 LAB
ALBUMIN SERPL-MCNC: 3.4 G/DL (ref 3.5–5.2)
ALBUMIN/GLOB SERPL: 0.8 G/DL
ALP SERPL-CCNC: 86 U/L (ref 39–117)
ALT SERPL W P-5'-P-CCNC: 10 U/L (ref 1–33)
ANION GAP SERPL CALCULATED.3IONS-SCNC: 18 MMOL/L (ref 5–15)
AST SERPL-CCNC: 16 U/L (ref 1–32)
BASOPHILS # BLD AUTO: 0.04 10*3/MM3 (ref 0–0.2)
BASOPHILS # BLD AUTO: 0.06 10*3/MM3 (ref 0–0.2)
BASOPHILS NFR BLD AUTO: 0.2 % (ref 0–1.5)
BASOPHILS NFR BLD AUTO: 0.3 % (ref 0–1.5)
BILIRUB SERPL-MCNC: 0.6 MG/DL (ref 0.2–1.2)
BUN BLD-MCNC: 36 MG/DL (ref 8–23)
BUN/CREAT SERPL: 28.1 (ref 7–25)
CALCIUM SPEC-SCNC: 9.7 MG/DL (ref 8.6–10.5)
CHLORIDE SERPL-SCNC: 89 MMOL/L (ref 98–107)
CHOLEST SERPL-MCNC: 184 MG/DL (ref 0–200)
CO2 SERPL-SCNC: 28 MMOL/L (ref 22–29)
CREAT BLD-MCNC: 1.28 MG/DL (ref 0.57–1)
DEPRECATED RDW RBC AUTO: 54.8 FL (ref 37–54)
DEPRECATED RDW RBC AUTO: 60.3 FL (ref 37–54)
EOSINOPHIL # BLD AUTO: 0.01 10*3/MM3 (ref 0–0.4)
EOSINOPHIL # BLD AUTO: 0.07 10*3/MM3 (ref 0–0.4)
EOSINOPHIL NFR BLD AUTO: 0.1 % (ref 0.3–6.2)
EOSINOPHIL NFR BLD AUTO: 0.4 % (ref 0.3–6.2)
ERYTHROCYTE [DISTWIDTH] IN BLOOD BY AUTOMATED COUNT: 17.1 % (ref 12.3–15.4)
ERYTHROCYTE [DISTWIDTH] IN BLOOD BY AUTOMATED COUNT: 17.8 % (ref 12.3–15.4)
GFR SERPL CREATININE-BSD FRML MDRD: 41 ML/MIN/1.73
GLOBULIN UR ELPH-MCNC: 4.1 GM/DL
GLUCOSE BLD-MCNC: 78 MG/DL (ref 65–99)
HBA1C MFR BLD: 5.9 % (ref 4.8–5.6)
HCT VFR BLD AUTO: 31.5 % (ref 34–46.6)
HCT VFR BLD AUTO: 44 % (ref 34–46.6)
HDLC SERPL-MCNC: 40 MG/DL (ref 40–60)
HGB BLD-MCNC: 10.1 G/DL (ref 12–15.9)
HGB BLD-MCNC: 13.3 G/DL (ref 12–15.9)
IMM GRANULOCYTES # BLD AUTO: 0.12 10*3/MM3 (ref 0–0.05)
IMM GRANULOCYTES # BLD AUTO: 0.22 10*3/MM3 (ref 0–0.05)
IMM GRANULOCYTES NFR BLD AUTO: 0.6 % (ref 0–0.5)
IMM GRANULOCYTES NFR BLD AUTO: 1.3 % (ref 0–0.5)
LDLC SERPL CALC-MCNC: 108 MG/DL (ref 0–100)
LDLC/HDLC SERPL: 2.71 {RATIO}
LYMPHOCYTES # BLD AUTO: 0.85 10*3/MM3 (ref 0.7–3.1)
LYMPHOCYTES # BLD AUTO: 0.87 10*3/MM3 (ref 0.7–3.1)
LYMPHOCYTES NFR BLD AUTO: 4.5 % (ref 19.6–45.3)
LYMPHOCYTES NFR BLD AUTO: 5.1 % (ref 19.6–45.3)
MCH RBC QN AUTO: 27.5 PG (ref 26.6–33)
MCH RBC QN AUTO: 28.1 PG (ref 26.6–33)
MCHC RBC AUTO-ENTMCNC: 30.2 G/DL (ref 31.5–35.7)
MCHC RBC AUTO-ENTMCNC: 32.1 G/DL (ref 31.5–35.7)
MCV RBC AUTO: 87.5 FL (ref 79–97)
MCV RBC AUTO: 91.1 FL (ref 79–97)
MONOCYTES # BLD AUTO: 0.52 10*3/MM3 (ref 0.1–0.9)
MONOCYTES # BLD AUTO: 0.65 10*3/MM3 (ref 0.1–0.9)
MONOCYTES NFR BLD AUTO: 2.7 % (ref 5–12)
MONOCYTES NFR BLD AUTO: 3.9 % (ref 5–12)
NEUTROPHILS # BLD AUTO: 15 10*3/MM3 (ref 1.7–7)
NEUTROPHILS # BLD AUTO: 17.54 10*3/MM3 (ref 1.7–7)
NEUTROPHILS NFR BLD AUTO: 89.4 % (ref 42.7–76)
NEUTROPHILS NFR BLD AUTO: 91.5 % (ref 42.7–76)
NRBC BLD AUTO-RTO: 0 /100 WBC (ref 0–0.2)
NRBC BLD AUTO-RTO: 0 /100 WBC (ref 0–0.2)
PLATELET # BLD AUTO: 218 10*3/MM3 (ref 140–450)
PLATELET # BLD AUTO: 294 10*3/MM3 (ref 140–450)
PMV BLD AUTO: 9.8 FL (ref 6–12)
PMV BLD AUTO: 9.9 FL (ref 6–12)
POTASSIUM BLD-SCNC: 4.8 MMOL/L (ref 3.5–5.2)
PROT SERPL-MCNC: 7.5 G/DL (ref 6–8.5)
RBC # BLD AUTO: 3.6 10*6/MM3 (ref 3.77–5.28)
RBC # BLD AUTO: 4.83 10*6/MM3 (ref 3.77–5.28)
SODIUM BLD-SCNC: 135 MMOL/L (ref 136–145)
TRIGL SERPL-MCNC: 179 MG/DL (ref 0–150)
TSH SERPL DL<=0.05 MIU/L-ACNC: 2.49 UIU/ML (ref 0.27–4.2)
VLDLC SERPL-MCNC: 35.8 MG/DL
WBC NRBC COR # BLD: 16.77 10*3/MM3 (ref 3.4–10.8)
WBC NRBC COR # BLD: 19.18 10*3/MM3 (ref 3.4–10.8)

## 2020-04-29 PROCEDURE — 84443 ASSAY THYROID STIM HORMONE: CPT | Performed by: NURSE PRACTITIONER

## 2020-04-29 PROCEDURE — 85025 COMPLETE CBC W/AUTO DIFF WBC: CPT | Performed by: FAMILY MEDICINE

## 2020-04-29 PROCEDURE — 80053 COMPREHEN METABOLIC PANEL: CPT | Performed by: NURSE PRACTITIONER

## 2020-04-29 PROCEDURE — 80061 LIPID PANEL: CPT | Performed by: NURSE PRACTITIONER

## 2020-04-29 PROCEDURE — 25010000002 ENOXAPARIN PER 10 MG: Performed by: FAMILY MEDICINE

## 2020-04-29 PROCEDURE — 25010000002 METHYLPREDNISOLONE PER 125 MG: Performed by: NURSE PRACTITIONER

## 2020-04-29 PROCEDURE — 25010000002 CEFTRIAXONE PER 250 MG: Performed by: NURSE PRACTITIONER

## 2020-04-29 PROCEDURE — 97166 OT EVAL MOD COMPLEX 45 MIN: CPT

## 2020-04-29 PROCEDURE — 97162 PT EVAL MOD COMPLEX 30 MIN: CPT

## 2020-04-29 PROCEDURE — 85025 COMPLETE CBC W/AUTO DIFF WBC: CPT | Performed by: NURSE PRACTITIONER

## 2020-04-29 PROCEDURE — 94799 UNLISTED PULMONARY SVC/PX: CPT

## 2020-04-29 PROCEDURE — 36415 COLL VENOUS BLD VENIPUNCTURE: CPT | Performed by: NURSE PRACTITIONER

## 2020-04-29 PROCEDURE — 83036 HEMOGLOBIN GLYCOSYLATED A1C: CPT | Performed by: NURSE PRACTITIONER

## 2020-04-29 RX ORDER — TRAMADOL HYDROCHLORIDE 50 MG/1
50 TABLET ORAL EVERY 6 HOURS PRN
Status: DISCONTINUED | OUTPATIENT
Start: 2020-04-29 | End: 2020-05-02 | Stop reason: HOSPADM

## 2020-04-29 RX ORDER — METHYLPREDNISOLONE SODIUM SUCCINATE 40 MG/ML
40 INJECTION, POWDER, LYOPHILIZED, FOR SOLUTION INTRAMUSCULAR; INTRAVENOUS EVERY 8 HOURS
Status: DISCONTINUED | OUTPATIENT
Start: 2020-04-29 | End: 2020-04-29

## 2020-04-29 RX ORDER — LORAZEPAM 0.5 MG/1
0.5 TABLET ORAL DAILY PRN
COMMUNITY
End: 2020-05-02 | Stop reason: HOSPADM

## 2020-04-29 RX ORDER — ALBUTEROL SULFATE 2.5 MG/3ML
2.5 SOLUTION RESPIRATORY (INHALATION) EVERY 4 HOURS PRN
Status: ON HOLD | COMMUNITY
End: 2021-03-10

## 2020-04-29 RX ADMIN — TRAMADOL HYDROCHLORIDE 50 MG: 50 TABLET, FILM COATED ORAL at 17:56

## 2020-04-29 RX ADMIN — IPRATROPIUM BROMIDE AND ALBUTEROL SULFATE 3 ML: 2.5; .5 SOLUTION RESPIRATORY (INHALATION) at 14:38

## 2020-04-29 RX ADMIN — ENOXAPARIN SODIUM 30 MG: 30 INJECTION SUBCUTANEOUS at 21:02

## 2020-04-29 RX ADMIN — LEVOTHYROXINE SODIUM 75 MCG: 75 TABLET ORAL at 05:12

## 2020-04-29 RX ADMIN — IPRATROPIUM BROMIDE AND ALBUTEROL SULFATE 3 ML: 2.5; .5 SOLUTION RESPIRATORY (INHALATION) at 10:43

## 2020-04-29 RX ADMIN — CEFTRIAXONE SODIUM 1 G: 1 INJECTION, POWDER, FOR SOLUTION INTRAMUSCULAR; INTRAVENOUS at 21:56

## 2020-04-29 RX ADMIN — IPRATROPIUM BROMIDE AND ALBUTEROL SULFATE 3 ML: 2.5; .5 SOLUTION RESPIRATORY (INHALATION) at 18:16

## 2020-04-29 RX ADMIN — NYSTATIN 1 APPLICATION: 100000 POWDER TOPICAL at 21:02

## 2020-04-29 RX ADMIN — SODIUM CHLORIDE, PRESERVATIVE FREE 10 ML: 5 INJECTION INTRAVENOUS at 21:02

## 2020-04-29 RX ADMIN — IPRATROPIUM BROMIDE AND ALBUTEROL SULFATE 3 ML: 2.5; .5 SOLUTION RESPIRATORY (INHALATION) at 07:24

## 2020-04-29 RX ADMIN — METHYLPREDNISOLONE SODIUM SUCCINATE 60 MG: 125 INJECTION, POWDER, FOR SOLUTION INTRAMUSCULAR; INTRAVENOUS at 07:54

## 2020-04-29 RX ADMIN — BUDESONIDE AND FORMOTEROL FUMARATE DIHYDRATE 2 PUFF: 160; 4.5 AEROSOL RESPIRATORY (INHALATION) at 18:16

## 2020-04-29 RX ADMIN — SODIUM CHLORIDE 75 ML/HR: 9 INJECTION, SOLUTION INTRAVENOUS at 10:50

## 2020-04-29 RX ADMIN — SODIUM CHLORIDE, PRESERVATIVE FREE 10 ML: 5 INJECTION INTRAVENOUS at 21:05

## 2020-04-29 RX ADMIN — NYSTATIN 1 APPLICATION: 100000 POWDER TOPICAL at 15:14

## 2020-04-29 RX ADMIN — METHYLPREDNISOLONE SODIUM SUCCINATE 60 MG: 125 INJECTION, POWDER, FOR SOLUTION INTRAMUSCULAR; INTRAVENOUS at 01:28

## 2020-04-29 RX ADMIN — BUDESONIDE AND FORMOTEROL FUMARATE DIHYDRATE 2 PUFF: 160; 4.5 AEROSOL RESPIRATORY (INHALATION) at 07:24

## 2020-04-29 RX ADMIN — HYDROXYZINE HYDROCHLORIDE 50 MG: 25 TABLET, FILM COATED ORAL at 05:12

## 2020-04-29 RX ADMIN — ESCITALOPRAM 10 MG: 10 TABLET, FILM COATED ORAL at 07:53

## 2020-04-29 RX ADMIN — NYSTATIN 1 APPLICATION: 100000 POWDER TOPICAL at 07:54

## 2020-04-29 RX ADMIN — HYDROXYZINE HYDROCHLORIDE 50 MG: 25 TABLET, FILM COATED ORAL at 21:02

## 2020-04-30 LAB
ANION GAP SERPL CALCULATED.3IONS-SCNC: 11 MMOL/L (ref 5–15)
BUN BLD-MCNC: 22 MG/DL (ref 8–23)
BUN/CREAT SERPL: 26.8 (ref 7–25)
CALCIUM SPEC-SCNC: 8.4 MG/DL (ref 8.6–10.5)
CHLORIDE SERPL-SCNC: 99 MMOL/L (ref 98–107)
CO2 SERPL-SCNC: 28 MMOL/L (ref 22–29)
CREAT BLD-MCNC: 0.82 MG/DL (ref 0.57–1)
GFR SERPL CREATININE-BSD FRML MDRD: 69 ML/MIN/1.73
GLUCOSE BLD-MCNC: 137 MG/DL (ref 65–99)
POTASSIUM BLD-SCNC: 4.3 MMOL/L (ref 3.5–5.2)
SODIUM BLD-SCNC: 138 MMOL/L (ref 136–145)

## 2020-04-30 PROCEDURE — 97535 SELF CARE MNGMENT TRAINING: CPT

## 2020-04-30 PROCEDURE — 25010000002 ENOXAPARIN PER 10 MG: Performed by: FAMILY MEDICINE

## 2020-04-30 PROCEDURE — 94799 UNLISTED PULMONARY SVC/PX: CPT

## 2020-04-30 PROCEDURE — 80048 BASIC METABOLIC PNL TOTAL CA: CPT | Performed by: FAMILY MEDICINE

## 2020-04-30 PROCEDURE — 25010000002 CEFTRIAXONE PER 250 MG: Performed by: NURSE PRACTITIONER

## 2020-04-30 RX ADMIN — HYDROXYZINE HYDROCHLORIDE 50 MG: 25 TABLET, FILM COATED ORAL at 22:16

## 2020-04-30 RX ADMIN — TRAMADOL HYDROCHLORIDE 50 MG: 50 TABLET, FILM COATED ORAL at 19:35

## 2020-04-30 RX ADMIN — CEFTRIAXONE SODIUM 1 G: 1 INJECTION, POWDER, FOR SOLUTION INTRAMUSCULAR; INTRAVENOUS at 21:38

## 2020-04-30 RX ADMIN — NYSTATIN 1 APPLICATION: 100000 POWDER TOPICAL at 16:31

## 2020-04-30 RX ADMIN — ENOXAPARIN SODIUM 40 MG: 40 INJECTION SUBCUTANEOUS at 21:37

## 2020-04-30 RX ADMIN — SODIUM CHLORIDE, PRESERVATIVE FREE 10 ML: 5 INJECTION INTRAVENOUS at 08:55

## 2020-04-30 RX ADMIN — NYSTATIN 1 APPLICATION: 100000 POWDER TOPICAL at 21:37

## 2020-04-30 RX ADMIN — TRAMADOL HYDROCHLORIDE 50 MG: 50 TABLET, FILM COATED ORAL at 08:55

## 2020-04-30 RX ADMIN — NYSTATIN 1 APPLICATION: 100000 POWDER TOPICAL at 08:56

## 2020-04-30 RX ADMIN — BUDESONIDE AND FORMOTEROL FUMARATE DIHYDRATE 2 PUFF: 160; 4.5 AEROSOL RESPIRATORY (INHALATION) at 19:11

## 2020-04-30 RX ADMIN — IPRATROPIUM BROMIDE AND ALBUTEROL SULFATE 3 ML: 2.5; .5 SOLUTION RESPIRATORY (INHALATION) at 19:11

## 2020-04-30 RX ADMIN — SODIUM CHLORIDE, PRESERVATIVE FREE 10 ML: 5 INJECTION INTRAVENOUS at 21:37

## 2020-04-30 RX ADMIN — ESCITALOPRAM 10 MG: 10 TABLET, FILM COATED ORAL at 08:55

## 2020-04-30 RX ADMIN — IPRATROPIUM BROMIDE AND ALBUTEROL SULFATE 3 ML: 2.5; .5 SOLUTION RESPIRATORY (INHALATION) at 07:11

## 2020-04-30 RX ADMIN — LEVOTHYROXINE SODIUM 75 MCG: 75 TABLET ORAL at 05:09

## 2020-04-30 RX ADMIN — HYDROXYZINE HYDROCHLORIDE 50 MG: 25 TABLET, FILM COATED ORAL at 05:09

## 2020-04-30 RX ADMIN — BUDESONIDE AND FORMOTEROL FUMARATE DIHYDRATE 2 PUFF: 160; 4.5 AEROSOL RESPIRATORY (INHALATION) at 07:12

## 2020-04-30 RX ADMIN — IPRATROPIUM BROMIDE AND ALBUTEROL SULFATE 3 ML: 2.5; .5 SOLUTION RESPIRATORY (INHALATION) at 10:26

## 2020-05-01 PROCEDURE — 97530 THERAPEUTIC ACTIVITIES: CPT

## 2020-05-01 PROCEDURE — 94799 UNLISTED PULMONARY SVC/PX: CPT

## 2020-05-01 PROCEDURE — 97110 THERAPEUTIC EXERCISES: CPT

## 2020-05-01 PROCEDURE — 25010000002 ENOXAPARIN PER 10 MG: Performed by: FAMILY MEDICINE

## 2020-05-01 RX ORDER — CEFDINIR 300 MG/1
300 CAPSULE ORAL EVERY 12 HOURS SCHEDULED
Status: DISCONTINUED | OUTPATIENT
Start: 2020-05-01 | End: 2020-05-02 | Stop reason: HOSPADM

## 2020-05-01 RX ADMIN — NYSTATIN 1 APPLICATION: 100000 POWDER TOPICAL at 16:30

## 2020-05-01 RX ADMIN — LEVOTHYROXINE SODIUM 75 MCG: 75 TABLET ORAL at 05:57

## 2020-05-01 RX ADMIN — HYDROXYZINE HYDROCHLORIDE 50 MG: 25 TABLET, FILM COATED ORAL at 10:59

## 2020-05-01 RX ADMIN — BUDESONIDE AND FORMOTEROL FUMARATE DIHYDRATE 2 PUFF: 160; 4.5 AEROSOL RESPIRATORY (INHALATION) at 06:50

## 2020-05-01 RX ADMIN — SODIUM CHLORIDE, PRESERVATIVE FREE 10 ML: 5 INJECTION INTRAVENOUS at 09:33

## 2020-05-01 RX ADMIN — NYSTATIN 1 APPLICATION: 100000 POWDER TOPICAL at 20:04

## 2020-05-01 RX ADMIN — CEFDINIR 300 MG: 300 CAPSULE ORAL at 19:56

## 2020-05-01 RX ADMIN — ESCITALOPRAM 10 MG: 10 TABLET, FILM COATED ORAL at 09:33

## 2020-05-01 RX ADMIN — TRAMADOL HYDROCHLORIDE 50 MG: 50 TABLET, FILM COATED ORAL at 10:59

## 2020-05-01 RX ADMIN — CEFDINIR 300 MG: 300 CAPSULE ORAL at 15:42

## 2020-05-01 RX ADMIN — IPRATROPIUM BROMIDE AND ALBUTEROL SULFATE 3 ML: 2.5; .5 SOLUTION RESPIRATORY (INHALATION) at 19:38

## 2020-05-01 RX ADMIN — BUDESONIDE AND FORMOTEROL FUMARATE DIHYDRATE 2 PUFF: 160; 4.5 AEROSOL RESPIRATORY (INHALATION) at 19:39

## 2020-05-01 RX ADMIN — ENOXAPARIN SODIUM 40 MG: 40 INJECTION SUBCUTANEOUS at 19:57

## 2020-05-01 RX ADMIN — HYDROXYZINE HYDROCHLORIDE 50 MG: 25 TABLET, FILM COATED ORAL at 19:56

## 2020-05-01 RX ADMIN — IPRATROPIUM BROMIDE AND ALBUTEROL SULFATE 3 ML: 2.5; .5 SOLUTION RESPIRATORY (INHALATION) at 06:50

## 2020-05-01 RX ADMIN — SODIUM CHLORIDE, PRESERVATIVE FREE 10 ML: 5 INJECTION INTRAVENOUS at 19:57

## 2020-05-01 RX ADMIN — TRAMADOL HYDROCHLORIDE 50 MG: 50 TABLET, FILM COATED ORAL at 20:00

## 2020-05-01 RX ADMIN — IPRATROPIUM BROMIDE AND ALBUTEROL SULFATE 3 ML: 2.5; .5 SOLUTION RESPIRATORY (INHALATION) at 14:28

## 2020-05-01 RX ADMIN — NYSTATIN 1 APPLICATION: 100000 POWDER TOPICAL at 09:33

## 2020-05-01 NOTE — PROGRESS NOTES
Continued Stay Note   [facility]     Patient Name: Emy Bermudez  MRN: [id number]  Today's Date: 5/1/2020    Admit Date: 4/28/2020    Discharge Plan     Row Name 05/01/20 0841       Plan    Plan  [facility] on Saturday, May 2nd, pending discharge of another resident.      Patient/Family in Agreement with Plan  yes    Plan Comments  Patient can discharge to [facility] [phone] on Saturday, May 2nd.  Patient can discharge to facility once facility has a discharge of another resident.        Discharge Codes    No documentation.             KARTHIK PegueroW

## 2020-05-01 NOTE — THERAPY TREATMENT NOTE
Acute Care - Physical Therapy Treatment Note  [facility]     Patient Name: Emy Bermudez  : 1950  MRN: [id number]  Today's Date: 2020  Onset of Illness/Injury or Date of Surgery: 20     Referring Physician: Lavinia YANES    Admit Date: 2020    Visit Dx:    ICD-10-CM ICD-9-CM   1. COPD exacerbation (CMS/Formerly [facility]) J44.1 491.21   2. Pneumonia of left lower lobe due to infectious organism (CMS/Formerly [facility]) J18.1 486   3. NOAH (acute kidney injury) (CMS/Formerly [facility]) N17.9 584.9   4. Cellulitis, unspecified cellulitis site L03.90 682.9   5. Decreased activities of daily living (ADL) Z78.9 V49.89   6. Impaired mobility Z74.09 799.89     Patient Active Problem List   Diagnosis   • COPD exacerbation (CMS/Formerly [facility])   • Chronic respiratory failure with hypoxia (CMS/Formerly [facility])   • Tobacco dependence   • Lower extremity cellulitis   • Obesity, Class III, BMI 40-49.9 (morbid obesity) (CMS/Formerly [facility])   • NOAH (acute kidney injury) (CMS/Formerly [facility])       Therapy Treatment    Rehabilitation Treatment Summary     Row Name 20 0851             Treatment Time/Intention    Discipline  physical therapy assistant  -TYLOR      Document Type  therapy note (daily note)  -TYLOR      Subjective Information  complains of;weakness;fatigue;pain  -TYLOR      Existing Precautions/Restrictions  fall;oxygen therapy device and L/min colostomy  -TYLOR      Recorded by [TYLOR] León Altamirano PTA 20 0916      Row Name 20 0851             Bed Mobility Assessment/Treatment    Supine-Sit [place] (Bed Mobility)  -- pt sitting EOB  -TYLOR      Sit-Supine [place] (Bed Mobility)  verbal cues;minimum assist (75% patient effort)  -TYLOR      Recorded by [TYLOR] León Altamirano PTA 20 0916      Row Name 20 0851             Sit-Stand Transfer    Sit-Stand [place] (Transfers)  verbal cues;minimum assist (75% patient effort)  -TYLOR      Assistive Device (Sit-Stand Transfers)  walker, front-wheeled  -TYLOR      Recorded by [TYLOR] León Altamirano PTA 20 0924       Row Name 05/01/20 0851             Stand-Sit Transfer    Stand-Sit [place] (Transfers)  verbal cues;contact guard;minimum assist (75% patient effort)  -TYLOR      Assistive Device (Stand-Sit Transfers)  walker, front-wheeled  -TYLOR      Recorded by [TYLOR] León Altamirano, PTA 05/01/20 0916      Row Name 05/01/20 0851             Gait/Stairs Assessment/Training    [place] Level (Gait)  verbal cues;contact guard;minimum assist (75% patient effort)  -TYLOR      Assistive Device (Gait)  walker, front-wheeled  -TYLOR      Distance in Feet (Gait)  side steps towards HOB  -TYLOR      Comment (Gait/Stairs)  pt stated she was tired and did not want to try and  do more today.   -TYLOR      Recorded by [TYLOR] León Altamirano, PTA 05/01/20 0916      Row Name 05/01/20 0851             Motor Skills Assessment/Interventions    Additional Documentation  Therapeutic Exercise (Group)  -TYLOR      Recorded by [TYLOR] León Altamirano, PTA 05/01/20 0916      Row Name 05/01/20 0851             Therapeutic Exercise    87414 - PT Therapeutic Exercise Minutes  15  -TYLOR      59704 - PT Therapeutic Activity Minutes  11  -TYLOR      Recorded by [TYLOR] León Altamirano, PTA 05/01/20 0916      Row Name 05/01/20 0851             Therapeutic Exercise    Comment (Therapeutic Exercise)  At EOB, performed AROM BLE X 20  -TYLOR      Recorded by [TYLOR] León Altamirano, PTA 05/01/20 0916      Row Name 05/01/20 0851             Positioning and Restraints    Pre-Treatment Position  in bed  -TYLOR      Post Treatment Position  bed  -TYLOR      In Bed  fowlers;call light within reach;encouraged to call for assist;side rails up x2  -TYLOR      Recorded by [TYLOR] León Altamirano, PTA 05/01/20 0916      Row Name 05/01/20 0851             Pain Scale: Numbers Pre/Post-Treatment    Pain Scale: Numbers, Pretreatment  2/10  -TYLOR      Pain Scale: Numbers, Post-Treatment  2/10  -TYLOR      Pain Location - Side  Bilateral  -TYLOR      Pain Location - Orientation  lower  -TYLOR      Pain Location   extremity  -TYLOR      Pain Intervention(s)  Repositioned  -TYLOR      Recorded by [TYLOR] León Altamirano, PTA 05/01/20 0916      Row Name                Wound 04/28/20 2041 Right calf     Wound - Properties Group Date first assessed: 04/28/20 [MS] Time first assessed: 2041 [MS] Present on Hospital Admission: Y [MS] Side: Right [MS] Location: calf [MS] Primary Wound Type: -- [MS], Blister and skin tear, oozing   Recorded by:  [MS] Aleah Suarez, RNA 04/29/20 0036    Row Name                Wound 04/28/20 2041 Right anterior thigh Blisters    Wound - Properties Group Date first assessed: 04/28/20 [MS] Time first assessed: 2041 [MS] Present on Hospital Admission: Y [MS2] Side: Right [MS] Orientation: anterior [MS] Location: thigh [MS] Primary Wound Type: Blisters [MS] Recorded by:  [MS] Aleah Suarez, RNA 04/29/20 0037 [MS2] Aleah Suarez, RNA 04/29/20 0038    Row Name                Wound 04/28/20 2041 groin MASD (Moisture associated skin damage)    Wound - Properties Group Date first assessed: 04/28/20 [MS] Time first assessed: 2041 [MS] Present on Hospital Admission: Y [MS] Location: groin [MS2], and abdominal folds  Primary Wound Type: MASD [MS] Recorded by:  [MS] Aleah Suarez, RNA 04/29/20 0037 [MS2] Aleah Suarez, RNA 04/29/20 0042    Row Name                Wound 04/28/20 2041 Right anterior;lower leg    Wound - Properties Group Date first assessed: 04/28/20 [MS] Time first assessed: 2041 [MS] Side: Right [MS] Orientation: anterior;lower [MS] Location: leg [MS] Recorded by:  [MS] Aleah Suarez, RNA 04/29/20 0053    Row Name                Wound 04/28/20 2041 Left anterior;lower leg    Wound - Properties Group Date first assessed: 04/28/20 [MS] Time first assessed: 2041 [MS] Side: Left [MS] Orientation: anterior;lower [MS] Location: leg [MS] Recorded by:  [MS] Aleah Suarez, RNA 04/29/20 0053      User Key  (r) = Recorded By, (t) = Taken By, (c) = Cosigned By    Initials Name Effective  Dates Discipline    TYLOR León Altamirano L, PTA 12/08/16 -  PT    Aleah Shaffer L, RNA 05/23/19 -  Nurse          Wound 04/28/20 2041 Right calf  (Active)   Dressing Appearance open to air 5/1/2020  8:00 AM   Closure Open to air 5/1/2020  8:00 AM   Base non-blanchable;red;granulating;moist 5/1/2020  8:00 AM   Periwound moist;swelling 5/1/2020  8:00 AM   Periwound Temperature warm 5/1/2020  8:00 AM   Periwound Skin Turgor soft 5/1/2020  8:00 AM   Edges irregular 5/1/2020  8:00 AM   Drainage Characteristics/Odor yellow 5/1/2020  8:00 AM   Drainage Amount small 5/1/2020  8:00 AM   Care, Wound cleansed with;soap and water 5/1/2020  8:00 AM   Dressing Care, Wound open to air 5/1/2020  8:00 AM   Periwound Care, Wound dry periwound area maintained 5/1/2020  8:00 AM       Wound 04/28/20 2041 Right anterior thigh Blisters (Active)   Dressing Appearance open to air 5/1/2020  8:00 AM   Closure Open to air 5/1/2020  8:00 AM   Base non-blanchable;red 5/1/2020  8:00 AM   Periwound blistered 5/1/2020  8:00 AM   Periwound Temperature warm 5/1/2020  8:00 AM   Periwound Skin Turgor firm 5/1/2020  8:00 AM   Edges irregular 5/1/2020  8:00 AM   Drainage Amount none 5/1/2020  8:00 AM   Care, Wound cleansed with;soap and water 5/1/2020  8:00 AM   Dressing Care, Wound open to air 5/1/2020  8:00 AM   Periwound Care, Wound dry periwound area maintained 5/1/2020  8:00 AM       Wound 04/28/20 2041 groin MASD (Moisture associated skin damage) (Active)   Dressing Appearance open to air 5/1/2020  8:00 AM   Closure Open to air 5/1/2020  8:00 AM   Base non-blanchable;moist;red 5/1/2020  8:00 AM   Periwound excoriated;redness 5/1/2020  8:00 AM   Periwound Temperature warm 5/1/2020  8:00 AM   Periwound Skin Turgor soft 5/1/2020  8:00 AM   Drainage Amount none 5/1/2020  8:00 AM   Care, Wound cleansed with;soap and water;other (see comments) 5/1/2020  8:00 AM   Dressing Care, Wound open to air 5/1/2020  8:00 AM   Periwound Care, Wound dry periwound  area maintained 5/1/2020  8:00 AM       Wound 04/28/20 2041 Right anterior;lower leg (Active)   Dressing Appearance open to air 5/1/2020  8:00 AM   Closure Open to air 5/1/2020  8:00 AM   Base non-blanchable;red 5/1/2020  8:00 AM   Periwound swelling;redness 5/1/2020  8:00 AM   Periwound Temperature warm 5/1/2020  8:00 AM   Periwound Skin Turgor firm 5/1/2020  8:00 AM   Drainage Characteristics/Odor yellow 5/1/2020  8:00 AM   Drainage Amount small 5/1/2020  8:00 AM   Care, Wound cleansed with;soap and water 5/1/2020  8:00 AM   Dressing Care, Wound open to air 5/1/2020  8:00 AM   Periwound Care, Wound dry periwound area maintained 5/1/2020  8:00 AM       Wound 04/28/20 2041 Left anterior;lower leg (Active)   Dressing Appearance open to air 5/1/2020  8:00 AM   Closure Open to air 5/1/2020  8:00 AM   Base non-blanchable;red;moist 5/1/2020  8:00 AM   Periwound redness;swelling 5/1/2020  8:00 AM   Periwound Temperature warm 5/1/2020  8:00 AM   Periwound Skin Turgor firm 5/1/2020  8:00 AM   Drainage Amount none 5/1/2020  8:00 AM   Care, Wound cleansed with;soap and water 5/1/2020  8:00 AM   Dressing Care, Wound open to air 5/1/2020  8:00 AM   Periwound Care, Wound dry periwound area maintained 5/1/2020  8:00 AM               PT Recommendation and Plan     Plan of Care Reviewed With: patient  Progress: improving  Outcome Summary: Pt was sitting EOB.  She was able to transfer sit to stand with min assist.  Pt was able to take a few side steps with RWX and CGA/min assist.  Will continue to work with pt to increase strength and progress with amb as pt is able.  Outcome Measures     Row Name 05/01/20 0851 04/30/20 0815 04/29/20 1017       How much help from another person do you currently need...    Turning from your back to your side while in flat bed without using bedrails?  3  -TYLOR  --  --    Moving from lying on back to sitting on the side of a flat bed without bedrails?  3  -TYLOR  --  --    Moving to and from a bed to a  chair (including a wheelchair)?  3  -TYLOR  --  --    Standing up from a chair using your arms (e.g., wheelchair, bedside chair)?  3  -TYLOR  --  --    Climbing 3-5 steps with a railing?  1  -TYLOR  --  --    To walk in hospital room?  3  -TYLOR  --  --    AM-PAC 6 Clicks Score (PT)  16  -TYLOR  --  --       How much help from another is currently needed...    Putting on and taking off regular lower body clothing?  --  2  -AC  2  -AC    Bathing (including washing, rinsing, and drying)  --  2  -AC  2  -AC    Toileting (which includes using toilet bed pan or urinal)  --  2  -AC  2  -AC    Putting on and taking off regular upper body clothing  --  4  -AC  4  -AC    Taking care of personal grooming (such as brushing teeth)  --  4  -AC  4  -AC    Eating meals  --  4  -AC  4  -AC    AM-PAC 6 Clicks Score (OT)  --  18  -AC  18  -AC       Functional Assessment    Outcome Measure Options  AM-PAC 6 Clicks Basic Mobility (PT)  -TYLOR  AM-PAC 6 Clicks Daily Activity (OT)  -AC  AM-PAC 6 Clicks Daily Activity (OT)  -AC      User Key  (r) = Recorded By, (t) = Taken By, (c) = Cosigned By    Initials Name Provider Type    Rayshawn Espino, OTR/L, CNT Occupational Therapist    León Smith, MADHAV Physical Therapy Assistant         Time Calculation:   PT Charges     Row Name 05/01/20 0851             Time Calculation    Start Time  0851  -      Stop Time  0917  -      Time Calculation (min)  26 min  -      PT Received On  05/01/20  -         Time Calculation- PT    Total Timed Code Minutes- PT  26 minute(s)  -TYLOR         Timed Charges    51938 - PT Therapeutic Exercise Minutes  15  -TYLOR      81602 - PT Therapeutic Activity Minutes  11  -TYLOR        User Key  (r) = Recorded By, (t) = Taken By, (c) = Cosigned By    Initials Name Provider Type    León Smith, MADHAV Physical Therapy Assistant        Therapy Charges for Today     Code Description Service Date Service Provider Modifiers Qty    33094260705 HC PT THER PROC EA 15 MIN  5/1/2020 León Altamirano, PTA GP 1    13992681568 HC PT THERAPEUTIC ACT EA 15 MIN 5/1/2020 León Altamirano, PTA GP 1          PT G-Codes  Outcome Measure Options: AM-PAC 6 Clicks Basic Mobility (PT)  AM-PAC 6 Clicks Score (PT): 16  AM-PAC 6 Clicks Score (OT): 18    León Altamirano PTA  5/1/2020

## 2020-05-01 NOTE — PLAN OF CARE
Problem: Skin and Soft Tissue Infection (Adult)  Goal: Signs and Symptoms of Listed Potential Problems Will be Absent, Minimized or Managed (Skin and Soft Tissue Infection)  5/1/2020 1857 by Rayshawn Wan, RN  Outcome: Ongoing (interventions implemented as appropriate)  5/1/2020 1850 by Rayshawn Wan, RN  Outcome: Ongoing (interventions implemented as appropriate)  Flowsheets (Taken 5/1/2020 1825)  Problems Assessed (Skin and Soft Tissue Infection): all  Problems Present (Skin/Soft Tissue Inf): pain

## 2020-05-01 NOTE — PLAN OF CARE
Problem: Patient Care Overview  Goal: Plan of Care Review  Outcome: Ongoing (interventions implemented as appropriate)  Flowsheets (Taken 5/1/2020 4822)  Progress: improving  Plan of Care Reviewed With: patient  Outcome Summary: Pt was sitting EOB.  She was able to transfer sit to stand with min assist.  Pt was able to take a few side steps with RWX and CGA/min assist.  Will continue to work with pt to increase strength and progress with amb as pt is able.

## 2020-05-01 NOTE — PLAN OF CARE
"  Problem: Patient Care Overview  Goal: Plan of Care Review  Outcome: Ongoing (interventions implemented as appropriate)  Flowsheets (Taken 5/1/2020 9677)  Progress: no change  Plan of Care Reviewed With: patient  Outcome Summary: Pt up in chair most of previous shift.  PRN pain medication given for c/o right leg pain with good results.  Legs edematous and red; pt is keeping elevated.  Nystatin applied to bilateral groin and abd fold.  Pt refuses turns while in bed stating \"it's my butt and I'll do with it what I want\".  VSS.  Safety maintained.  Dc to [facility] on Saturday.  Will continue to monitor.     "

## 2020-05-01 NOTE — PROGRESS NOTES
[facility] Medicine Services  INPATIENT PROGRESS NOTE    Length of Stay: 3  Date of Admission: 4/28/2020  Primary Care Physician: Jose E Kirkland MD    Subjective     Chief Complaint:     Redness BLE    HPI     Patient continues to have no dyspnea on her typical home oxygen requirement of 3 L.  Lower extremities are still reddened but are non-blanchable indicating a chronic inflammatory process rather than continuation of an acute process.  The bleb draining on the posterior aspect of her right leg is draining significantly more slowly.  I will ask physical therapy to apply Kerlix to her right lower extremity with an overlay of Ace wrap for compression.  I will ask PT to apply an Unna boot to the left lower extremity.  Blood cultures remain negative.  IV antibiotics will be discontinued in favor of oral Omnicef.  She remains well-hydrated.  She has been accepted to [facility] and rehab tomorrow and can be discharged to that facility at that time.      Review of Systems     All pertinent negatives and positives are as above. All other systems have been reviewed and are negative unless otherwise stated.     Objective    Temp:  [97.6 °F (36.4 °C)-98.3 °F (36.8 °C)] 98.3 °F (36.8 °C)  Heart Rate:  [66-77] 70  Resp:  [17-20] 20  BP: (118-148)/(49-61) 142/53    Lab Results (last 24 hours)     Procedure Component Value Units Date/Time    Blood Culture - Blood, Arm, Left [[id number]] Collected:  04/28/20 1554    Specimen:  Blood from Arm, Left Updated:  04/30/20 1730     Blood Culture No growth at 2 days    Blood Culture - Blood, Arm, Left [[id number]] Collected:  04/28/20 1705    Specimen:  Blood from Arm, Left Updated:  04/30/20 1730     Blood Culture No growth at 2 days          Imaging Results (Last 24 Hours)     ** No results found for the last 24 hours. **             Intake/Output Summary (Last 24 hours) at 5/1/2020 1341  Last data filed at 5/1/2020 1300  Gross per 24  hour   Intake 1560 ml   Output 1050 ml   Net 510 ml       Physical Exam    Constitutional: She is oriented to person, place, and time. She appears well-developed and well-nourished. She is cooperative. No distress. Nasal cannula in place.   Morbidly obese   HENT:   Head: Normocephalic and atraumatic.   Nose: Nose normal.   Mouth/Throat: Oropharynx is clear and moist.   Eyes: Conjunctivae are normal. No scleral icterus.   Neck: Normal range of motion. Neck supple. No JVD present. No tracheal deviation present.   Cardiovascular: Normal rate, regular rhythm and normal heart sounds.   No murmur heard.  Pulmonary/Chest: Effort normal and breath sounds normal.   Abdominal: Soft. Bowel sounds are normal. She exhibits no mass. There is no tenderness.   Musculoskeletal: Normal range of motion. She exhibits edema (1/4 BLE) and mild tenderness (RLE).   Neurological: She is alert and oriented to person, place, and time. No cranial nerve deficit. Coordination normal.   Skin: Skin is warm and dry. No pallor. Non-blanchable rubor noted bilaterally inferior to the nose knees.  22 x 14 cm bleb draining serous fluid noted posterior right calf.     Significant reduction in edema noted.   Psychiatric: She has a normal mood and affect.     Results Review:  I have reviewed the labs, radiology results, and diagnostic studies since my last progress note and made treatment changes reflective of the results.   I have reviewed the current medications.    Assessment/Plan     Active Hospital Problems    Diagnosis   • **Lower extremity cellulitis   • Obesity, Class III, BMI 40-49.9 (morbid obesity) (CMS/[facility])   • NOAH (acute kidney injury) (CMS/[facility])   • Tobacco dependence   • COPD exacerbation (CMS/[facility])   • Chronic respiratory failure with hypoxia (CMS/[facility])       PLAN:  Discontinue Rocephin  Omnicef 300 mg p.o. twice daily  PT to do Kerlix wrap on right lower extremity followed by an overlay of Ace wraps for compression  PT to apply Unna boot to  left lower extremity  Transfer to SNF tomorrow    Eleno Tolentino,    05/01/20   13:41

## 2020-05-02 VITALS
HEART RATE: 79 BPM | SYSTOLIC BLOOD PRESSURE: 136 MMHG | BODY MASS INDEX: 43.73 KG/M2 | DIASTOLIC BLOOD PRESSURE: 63 MMHG | HEIGHT: 63 IN | WEIGHT: 246.8 LBS | OXYGEN SATURATION: 95 % | RESPIRATION RATE: 18 BRPM | TEMPERATURE: 98.6 F

## 2020-05-02 PROCEDURE — 29580 STRAPPING UNNA BOOT: CPT

## 2020-05-02 PROCEDURE — 97164 PT RE-EVAL EST PLAN CARE: CPT

## 2020-05-02 PROCEDURE — 94799 UNLISTED PULMONARY SVC/PX: CPT

## 2020-05-02 PROCEDURE — 97110 THERAPEUTIC EXERCISES: CPT

## 2020-05-02 PROCEDURE — 97530 THERAPEUTIC ACTIVITIES: CPT

## 2020-05-02 RX ORDER — IPRATROPIUM BROMIDE AND ALBUTEROL SULFATE 2.5; .5 MG/3ML; MG/3ML
3 SOLUTION RESPIRATORY (INHALATION)
Status: DISCONTINUED | OUTPATIENT
Start: 2020-05-02 | End: 2020-05-02 | Stop reason: HOSPADM

## 2020-05-02 RX ORDER — CEFDINIR 300 MG/1
300 CAPSULE ORAL EVERY 12 HOURS SCHEDULED
Qty: 11 CAPSULE | Refills: 0
Start: 2020-05-02 | End: 2020-05-08

## 2020-05-02 RX ORDER — NICOTINE 21 MG/24HR
1 PATCH, TRANSDERMAL 24 HOURS TRANSDERMAL EVERY 24 HOURS
Qty: 30 PATCH | Refills: 1 | Status: ON HOLD
Start: 2020-05-02 | End: 2021-03-08

## 2020-05-02 RX ORDER — NYSTATIN 100000 [USP'U]/G
1 POWDER TOPICAL 3 TIMES DAILY
Status: ON HOLD
Start: 2020-05-02 | End: 2021-01-05

## 2020-05-02 RX ADMIN — TRAMADOL HYDROCHLORIDE 50 MG: 50 TABLET, FILM COATED ORAL at 04:17

## 2020-05-02 RX ADMIN — CEFDINIR 300 MG: 300 CAPSULE ORAL at 08:50

## 2020-05-02 RX ADMIN — IPRATROPIUM BROMIDE AND ALBUTEROL SULFATE 3 ML: 2.5; .5 SOLUTION RESPIRATORY (INHALATION) at 06:52

## 2020-05-02 RX ADMIN — LEVOTHYROXINE SODIUM 75 MCG: 75 TABLET ORAL at 06:04

## 2020-05-02 RX ADMIN — ESCITALOPRAM 10 MG: 10 TABLET, FILM COATED ORAL at 08:50

## 2020-05-02 RX ADMIN — HYDROXYZINE HYDROCHLORIDE 50 MG: 25 TABLET, FILM COATED ORAL at 06:36

## 2020-05-02 RX ADMIN — IPRATROPIUM BROMIDE AND ALBUTEROL SULFATE 3 ML: 2.5; .5 SOLUTION RESPIRATORY (INHALATION) at 10:30

## 2020-05-02 RX ADMIN — BUDESONIDE AND FORMOTEROL FUMARATE DIHYDRATE 2 PUFF: 160; 4.5 AEROSOL RESPIRATORY (INHALATION) at 06:52

## 2020-05-02 RX ADMIN — IPRATROPIUM BROMIDE AND ALBUTEROL SULFATE 3 ML: 2.5; .5 SOLUTION RESPIRATORY (INHALATION) at 14:19

## 2020-05-02 RX ADMIN — SODIUM CHLORIDE, PRESERVATIVE FREE 10 ML: 5 INJECTION INTRAVENOUS at 08:50

## 2020-05-02 RX ADMIN — NYSTATIN 1 APPLICATION: 100000 POWDER TOPICAL at 08:50

## 2020-05-02 NOTE — PLAN OF CARE
Problem: Patient Care Overview  Goal: Plan of Care Review  Outcome: Ongoing (interventions implemented as appropriate)  Flowsheets (Taken 5/2/2020 4392)  Progress: no change  Plan of Care Reviewed With: patient  Outcome Summary: Wounds continue to weep.  New orders for Unna boots and ace wraps to BLE placed late in the day on previous shift; PT to address.  PRN Ultram given for c/o right leg pain with good results.  Patient still refusing to allow staff to turn her while in bed; again stressed the importance of this intervention.  Patient has been picking at her legs saying she needs to get the dead skin off; discussed with patient the risk of infection.  O2 at 3L NC.  VSS.  Expected dc to [facility] today.

## 2020-05-02 NOTE — PLAN OF CARE
Problem: Patient Care Overview  Goal: Plan of Care Review  Outcome: Ongoing (interventions implemented as appropriate)  Flowsheets  Taken 5/2/2020 1115 by Melquiades Craig, VERENA/L  Plan of Care Reviewed With: patient  Taken 5/2/2020 1035 by Jeffrey Reddy, PT  Outcome Summary: Kerlex and ace wrap applied to RLE per MD order.  Unna boot applied to LLE per MD order.  Good cap. refill on each.  Unna boot LLE to be changed by 5.9.20.  Kerlex/ace wrap to be changed as needed.  Thank you for referral.

## 2020-05-02 NOTE — THERAPY TREATMENT NOTE
Acute Care - Occupational Therapy Treatment Note  [facility]     Patient Name: Emy Bermudez  : 1950  MRN: [id number]  Today's Date: 2020  Onset of Illness/Injury or Date of Surgery: 20  Date of Referral to OT: 20  Referring Physician: Lavinia YANES    Admit Date: 2020       ICD-10-CM ICD-9-CM   1. COPD exacerbation (CMS/[facility]) J44.1 491.21   2. Pneumonia of left lower lobe due to infectious organism (CMS/[facility]) J18.1 486   3. NOAH (acute kidney injury) (CMS/[facility]) N17.9 584.9   4. Cellulitis, unspecified cellulitis site L03.90 682.9   5. Decreased activities of daily living (ADL) Z78.9 V49.89   6. Impaired mobility Z74.09 799.89     Patient Active Problem List   Diagnosis   • COPD exacerbation (CMS/[facility])   • Chronic respiratory failure with hypoxia (CMS/[facility])   • Tobacco dependence   • Lower extremity cellulitis   • Obesity, Class III, BMI 40-49.9 (morbid obesity) (CMS/[facility])   • NOAH (acute kidney injury) (CMS/[facility])     Past Medical History:   Diagnosis Date   • Abdominal wall abscess    • Abdominal wall fistula 2018   • Cellulitis    • Colonic obstruction (CMS/[facility]) 4/3/2018   • COPD (chronic obstructive pulmonary disease) (CMS/[facility])    • Depression    • Diverticul disease small and large intestine, no perforati or abscess    • Edema    • GERD (gastroesophageal reflux disease)    • Hyperlipidemia    • Hypertension    • Hypocalcemia    • Hypothyroid    • Obesity, Class III, BMI 40-49.9 (morbid obesity) (CMS/[facility]) 2020   • Respiratory failure (CMS/[facility])    • Tobacco dependence 2020   • Venous insufficiency    • Vertigo      Past Surgical History:   Procedure Laterality Date   • ABDOMINAL SURGERY     •  SECTION     • COLOSTOMY     • EXPLORATORY LAPAROTOMY N/A 2018    Procedure: LAPAROTOMY EXPLORATORY, partial colectomy, creation colostomy, incision and drainage abdominal wall abscess;  Surgeon: Elda Velazquez MD;  Location: [facility];  Service: General       Therapy  Treatment    Rehabilitation Treatment Summary     Row Name 05/02/20 0728             Treatment Time/Intention    Discipline  occupational therapy assistant  -      Document Type  therapy note (daily note)  -      Subjective Information  complains of;weakness;fatigue;pain  -      Mode of Treatment  occupational therapy  -      Patient Effort  adequate  -      Existing Precautions/Restrictions  fall;oxygen therapy device and L/min  -CJ      Recorded by [CJ] Melquiades Craig COTA/L 05/02/20 1115      Row Name 05/02/20 0728             Bed Mobility Assessment/Treatment    Comment (Bed Mobility)  sitting eob!  -CJ      Recorded by [CJ] Melquiades Craig COTA/L 05/02/20 1115      Row Name 05/02/20 0728             Motor Skills Assessment/Interventions    Additional Documentation  Therapeutic Exercise (Group)  -CJ      Recorded by [CJ] Melquiades Craig COTA/L 05/02/20 1115      Row Name 05/02/20 0728             Therapeutic Exercise    Upper Extremity (Therapeutic Exercise)  bicep curl, bilateral;wand exercises  -      Upper Extremity Range of Motion (Therapeutic Exercise)  elbow flexion/extension, bilateral;wrist flexion/extension, bilateral  -      Weight/Resistance (Therapeutic Exercise)  2 pounds;3 pounds  -      Exercise Type (Therapeutic Exercise)  AROM (active range of motion)  -      Position (Therapeutic Exercise)  seated  -      Sets/Reps (Therapeutic Exercise)  2 sets x 20 reps!  -      Equipment (Therapeutic Exercise)  dowel ambar/wand;free weight, barbell  -      Expected Outcome (Therapeutic Exercise)  facilitate normal movement patterns;improve functional stability;improve functional tolerance, self-care activity;improve motor control;improve neuromuscular control;improve performance, BADLs;improve performance, fine motor coordination skills;improve performance, gait skills;improve performance, transfer skills;improve postural control;increase active range of motion  -       Recorded by [CJ] Melquiades Craig ALBARADO/L 05/02/20 1115      Row Name 05/02/20 0728             Positioning and Restraints    Pre-Treatment Position  in bed  -CJ      Post Treatment Position  bed  -CJ      In Bed  sitting EOB;call light within reach;encouraged to call for assist;side rails up x1  -CJ      Recorded by [CJ] Melquiades Craig ALBARADO/L 05/02/20 1115      Row Name 05/02/20 0728             Pain Assessment    Additional Documentation  Pain Scale 2: Word Pre/Post-Treatment (Group)  -CJ      Recorded by [CJ] Melquiades Craig ALBARADO/L 05/02/20 1115      Row Name 05/02/20 0728             Pain Scale: Numbers Pre/Post-Treatment    Pain Scale: Numbers, Pretreatment  3/10  -CJ      Pain Scale: Numbers, Post-Treatment  3/10  -CJ      Pain Location - Side  Bilateral  -CJ      Pain Location - Orientation  lower  -CJ      Pain Location  extremity  -CJ      Pain Intervention(s)  Rest  -CJ      Recorded by [CJ] Melquiades Craig ALBARADO/L 05/02/20 1115      Row Name                Wound 04/28/20 2041 Right calf     Wound - Properties Group Date first assessed: 04/28/20 [MS] Time first assessed: 2041 [MS] Present on Hospital Admission: Y [MS] Side: Right [MS] Location: calf [MS] Primary Wound Type: -- [MS], Blister and skin tear, oozing   Recorded by:  [MS] Aleah Suarez, RNA 04/29/20 0036    Row Name                Wound 04/28/20 2041 Right anterior thigh Blisters    Wound - Properties Group Date first assessed: 04/28/20 [MS] Time first assessed: 2041 [MS] Present on Hospital Admission: Y [MS2] Side: Right [MS] Orientation: anterior [MS] Location: thigh [MS] Primary Wound Type: Blisters [MS] Recorded by:  [MS] Aleah Suarez, RNA 04/29/20 0037 [MS2] Aleah Suarez, RNA 04/29/20 0038    Row Name                Wound 04/28/20 2041 groin MASD (Moisture associated skin damage)    Wound - Properties Group Date first assessed: 04/28/20 [MS] Time first assessed: 2041 [MS] Present on Hospital Admission: Y [MS]  Location: groin [MS2], and abdominal folds  Primary Wound Type: MASD [MS] Recorded by:  [MS] Aleah Suarez, RNA 04/29/20 0037 [MS2] Daniela Aleah DODSON, RNA 04/29/20 0042    Row Name                Wound 04/28/20 2041 Right anterior;lower leg    Wound - Properties Group Date first assessed: 04/28/20 [MS] Time first assessed: 2041 [MS] Side: Right [MS] Orientation: anterior;lower [MS] Location: leg [MS] Recorded by:  [MS] Aleah Suarez, RNA 04/29/20 0053    Row Name                Wound 04/28/20 2041 Left anterior;lower leg    Wound - Properties Group Date first assessed: 04/28/20 [MS] Time first assessed: 2041 [MS] Side: Left [MS] Orientation: anterior;lower [MS] Location: leg [MS] Recorded by:  [MS] Aleah Suarez, RNA 04/29/20 0053    Row Name 05/02/20 0728             Outcome Summary/Treatment Plan (OT)    Daily Summary of Progress (OT)  progress towards functional goals is fair  -      Barriers to Overall Progress (OT)  Fall/o2!  -      Plan for Continued Treatment (OT)  Plan d/c!  -      Recorded by [CJ] Melquiades Craig COTA/WEST 05/02/20 1115        User Key  (r) = Recorded By, (t) = Taken By, (c) = Cosigned By    Initials Name Effective Dates Discipline     Melquiades Craig ALBARADO/L 08/02/16 -  OT    MS Daniela Aleah DODSON, RNA 05/23/19 -  Nurse        Wound 04/28/20 2041 Right calf  (Active)   Dressing Appearance open to air 5/2/2020  7:17 AM   Closure Open to air 5/2/2020  7:17 AM   Base non-blanchable;red;moist 5/2/2020  7:17 AM   Periwound moist;redness;swelling 5/2/2020  7:17 AM   Periwound Temperature warm 5/2/2020  7:17 AM   Periwound Skin Turgor firm 5/2/2020  7:17 AM   Edges irregular 5/2/2020  7:17 AM   Drainage Characteristics/Odor yellow 5/2/2020  7:17 AM   Drainage Amount small 5/2/2020  7:17 AM   Care, Wound cleansed with;soap and water 5/2/2020  7:17 AM   Dressing Care, Wound open to air 5/2/2020  7:17 AM   Periwound Care, Wound dry periwound area maintained 5/2/2020  7:17 AM        Wound 04/28/20 2041 Right anterior thigh Blisters (Active)   Dressing Appearance open to air 5/2/2020  7:17 AM   Closure Open to air 5/2/2020  7:17 AM   Base red;non-blanchable;maroon/purple 5/2/2020  7:17 AM   Periwound redness;swelling 5/2/2020  7:17 AM   Periwound Temperature warm 5/2/2020  7:17 AM   Periwound Skin Turgor firm 5/2/2020  7:17 AM   Edges irregular 5/2/2020  7:17 AM   Drainage Amount none 5/2/2020  7:17 AM   Care, Wound cleansed with;soap and water 5/2/2020  7:17 AM   Dressing Care, Wound open to air 5/2/2020  7:17 AM   Periwound Care, Wound dry periwound area maintained 5/2/2020  7:17 AM       Wound 04/28/20 2041 groin MASD (Moisture associated skin damage) (Active)   Dressing Appearance open to air 5/2/2020  7:17 AM   Closure Open to air 5/2/2020  7:17 AM   Base non-blanchable;moist;red 5/2/2020  7:17 AM   Periwound excoriated;redness 5/2/2020  7:17 AM   Periwound Temperature warm 5/2/2020  7:17 AM   Periwound Skin Turgor soft 5/2/2020  7:17 AM   Drainage Amount none 5/2/2020  7:17 AM   Care, Wound other (see comments) 5/1/2020  8:00 PM   Dressing Care, Wound open to air 5/2/2020  7:17 AM   Periwound Care, Wound dry periwound area maintained 5/2/2020  7:17 AM       Wound 04/28/20 2041 Right anterior;lower leg (Active)   Dressing Appearance open to air 5/2/2020  7:17 AM   Closure Open to air 5/2/2020  7:17 AM   Base non-blanchable;red 5/2/2020  7:17 AM   Periwound redness;swelling 5/2/2020  7:17 AM   Periwound Temperature warm 5/2/2020  7:17 AM   Periwound Skin Turgor firm 5/2/2020  7:17 AM   Drainage Characteristics/Odor yellow 5/2/2020  7:17 AM   Drainage Amount small 5/2/2020  7:17 AM   Care, Wound cleansed with;soap and water 5/2/2020  7:17 AM   Dressing Care, Wound open to air 5/2/2020  7:17 AM   Periwound Care, Wound dry periwound area maintained 5/2/2020  7:17 AM       Wound 04/28/20 2041 Left anterior;lower leg (Active)   Dressing Appearance open to air 5/2/2020  7:17 AM   Closure  Open to air 5/2/2020  7:17 AM   Base non-blanchable;moist;red 5/2/2020  7:17 AM   Periwound redness;swelling 5/2/2020  7:17 AM   Periwound Temperature warm 5/2/2020  7:17 AM   Periwound Skin Turgor firm 5/2/2020  7:17 AM   Drainage Amount none 5/2/2020  7:17 AM   Care, Wound cleansed with;soap and water 5/2/2020  7:17 AM   Dressing Care, Wound open to air 5/2/2020  7:17 AM   Periwound Care, Wound dry periwound area maintained 5/2/2020  7:17 AM           OT Recommendation and Plan  Outcome Summary/Treatment Plan (OT)  Daily Summary of Progress (OT): progress towards functional goals is fair  Barriers to Overall Progress (OT): Fall/o2!  Plan for Continued Treatment (OT): Plan d/c!  Daily Summary of Progress (OT): progress towards functional goals is fair  Plan of Care Review  Plan of Care Reviewed With: patient  Plan of Care Reviewed With: patient  Outcome Measures     Row Name 05/02/20 0728 05/01/20 0851 04/30/20 0815       How much help from another person do you currently need...    Turning from your back to your side while in flat bed without using bedrails?  --  3  -TYLOR  --    Moving from lying on back to sitting on the side of a flat bed without bedrails?  --  3  -TYLOR  --    Moving to and from a bed to a chair (including a wheelchair)?  --  3  -TYLOR  --    Standing up from a chair using your arms (e.g., wheelchair, bedside chair)?  --  3  -TYLOR  --    Climbing 3-5 steps with a railing?  --  1  -TYLOR  --    To walk in hospital room?  --  3  -TYLOR  --    AM-PAC 6 Clicks Score (PT)  --  16  -TYLOR  --       How much help from another is currently needed...    Putting on and taking off regular lower body clothing?  2  -CJ  --  2  -AC    Bathing (including washing, rinsing, and drying)  2  -CJ  --  2  -AC    Toileting (which includes using toilet bed pan or urinal)  2  -CJ  --  2  -AC    Putting on and taking off regular upper body clothing  4  -CJ  --  4  -AC    Taking care of personal grooming (such as brushing teeth)  4  -CJ   --  4  -AC    Eating meals  4  -CJ  --  4  -AC    AM-PAC 6 Clicks Score (OT)  18  -CJ  --  18  -AC       Functional Assessment    Outcome Measure Options  AM-PAC 6 Clicks Daily Activity (OT)  -CJ  AM-PAC 6 Clicks Basic Mobility (PT)  -TYLOR  AM-PAC 6 Clicks Daily Activity (OT)  -AC      User Key  (r) = Recorded By, (t) = Taken By, (c) = Cosigned By    Initials Name Provider Type    AC Rayshawn Devlin, OTR/L, CNT Occupational Therapist    Melquiades Chavarria, ALBARADO/L Occupational Therapy Assistant    TYLOR León Altamirano, PTA Physical Therapy Assistant           Time Calculation:   Time Calculation- OT     Row Name 05/02/20 0728             Time Calculation- OT    OT Start Time  0728  -      OT Stop Time  0755  -      OT Time Calculation (min)  27 min  -      Total Timed Code Minutes- OT  27 minute(s)  -      TCU Minutes- OT  27 min  -      OT Received On  05/02/20  -      OT Goal Re-Cert Due Date  05/09/20  -        User Key  (r) = Recorded By, (t) = Taken By, (c) = Cosigned By    Initials Name Provider Type     Melquiades Craig COTA/L Occupational Therapy Assistant        Therapy Charges for Today     Code Description Service Date Service Provider Modifiers Qty    [id number] HC OT THER PROC EA 15 MIN 5/2/2020 Melquiades Cragi COTA/L GO 2               KVNG Salguero  5/2/2020

## 2020-05-02 NOTE — DISCHARGE SUMMARY
[facility] Emergency Room Medicine Services  DISCHARGE SUMMARY       Date of Admission: 4/28/2020  Date of Discharge:  5/2/2020  Primary Care Physician: Jose E Kirkland MD    Discharge Diagnoses:  Active Hospital Problems    Diagnosis   • **Lower extremity cellulitis   • Obesity, Class III, BMI 40-49.9 (morbid obesity) (CMS/[facility])   • NOAH (acute kidney injury) (CMS/[facility])   • Tobacco dependence   • COPD exacerbation (CMS/[facility])   • Chronic respiratory failure with hypoxia (CMS/[facility])         Presenting Problem/History of Present Illness:  COPD exacerbation (CMS/[facility]) [J44.1]     Chief Complaint on Day of Discharge:   Redness BLE    History of Present Illness on Day of Discharge:   Patient continues to have no dyspnea on her typical home oxygen requirement of 3 L.  Lower extremities are still reddened but are non-blanchable indicating a chronic inflammatory process rather than continuation of an acute process.  The bleb draining on the posterior aspect of her right leg is draining significantly more slowly.  I will ask physical therapy to apply Kerlix to her right lower extremity with an overlay of Ace wrap for compression.  I will ask PT to apply an Unna boot to the left lower extremity.  Blood cultures remain negative.  IV antibiotics will be discontinued in favor of oral Omnicef.  She remains well-hydrated.  She is appropriate for discharge to [facility] and rehab today.    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  Patient's most recent admission 2/13-2/19, 2020 with RSV, right middle lobe pneumonia, COPD exacerbation, obesity, acute on chronic respiratory failure with  hypoxia.  Patient was discharged at that time to [facility] and rehab.  It is documented that patient is not an active participant in her self-care.  Currently she is living at home alone.  She states she occasionally gets help from her 14-year-old granddaughter who comes to visit.  She states she is unable to walk.  She is disheveled, in need of a good bath.  She has areas of excoriation under pannus, groin region.  Bilateral lower extremities appear to be lymphedema not fluid overload.  Patient presented to [facility] with complaints as stated above.  Work-up has revealed acute kidney injury, leukocytosis with a left shift.  Slightly elevated potassium and mild hyponatremia.  She reports poor oral oral intake.  Patient is morbidly obese.  She has had a 6 pound weight gain since 2/17/2020.  Right lower extremity appears cellulitic in nature.  It is warm to touch.  There is no drainage however posteriorly on the right lower extremity there is a large blister.  Patient states she has had Unna boots but due to pain she cut them off.  Patient does meet criteria for mild sepsis.  She is without acute distress.  She is admitted for further evaluation treatment.    Plan:   1.  Admit as inpatient  2.  Start Rocephin  3.  Gentle hydration normal saline 75 mL/hour  4.  Consult PT/OT/wound care and case management/  5.  Home medications reviewed and restarted as appropriate  6.  DVT prophylaxis with Lovenox  7.  Labs in a.m.  8.  Continue supplemental oxygen, RT to initiate breathing treatment protocol, incentive spirometry, pulmonary toileting  9.  Solu-Medrol 125 mg IV now followed by 60 mg IV every 6 hours  10.  Urinalysis with culture if indicated-in and out catheterization    On the first hospital day the patient's shortness of breath had resolved and she was back to her typical home O2 requirement of 3 L.  Her lower extremities remain quite reddened and she continued on IV  antibiotics.  There was no clinical evidence of sepsis.  Elevated white blood cell count secondary to steroid administration and initially elevated lactic acid level quickly returned to normal limits.  Elevated respiratory rate was secondary to COPD.  Solu-Medrol was discontinued.  Rocephin was continued.  Bilateral lower extremities were ordered to be elevated at all times.  During her hospital stay the lower extremity edema resolved significantly.  She continued to have lateral lower extremity rubor which was nonblanching and chronic in nature.  There was a 22 x 14 cm bleb draining serous fluid posterior to the right calf.  The patient was wrapped with Clarisa wrap and an ace wrap was applied to the right lower extremity.  An Unna boot will be applied to the left lower extremity.  The patient is appropriate today for discharge to [facility] and rehab where she will continue with rehabilitation and wound care.    Pertinent Test Results:   Lab Results (last 7 days)     Procedure Component Value Units Date/Time    Blood Culture - Blood, Arm, Left [[id number]] Collected:  04/28/20 1554    Specimen:  Blood from Arm, Left Updated:  05/01/20 1730     Blood Culture No growth at 3 days    Blood Culture - Blood, Arm, Left [[id number]] Collected:  04/28/20 1705    Specimen:  Blood from Arm, Left Updated:  05/01/20 1730     Blood Culture No growth at 3 days    Basic Metabolic Panel [[id number]]  (Abnormal) Collected:  04/30/20 0439    Specimen:  Blood Updated:  04/30/20 0632     Glucose 137 mg/dL      BUN 22 mg/dL      Creatinine 0.82 mg/dL      Sodium 138 mmol/L      Potassium 4.3 mmol/L      Chloride 99 mmol/L      CO2 28.0 mmol/L      Calcium 8.4 mg/dL      eGFR Non African Amer 69 mL/min/1.73      BUN/Creatinine Ratio 26.8     Anion Gap 11.0 mmol/L     CBC Auto Differential [[id number]]  (Abnormal) Collected:  04/29/20 1437    Specimen:  Blood Updated:  04/29/20 1514     WBC 16.77 10*3/mm3      RBC 3.60 10*6/mm3       Hemoglobin 10.1 g/dL      Hematocrit 31.5 %      MCV 87.5 fL      MCH 28.1 pg      MCHC 32.1 g/dL      RDW 17.1 %      RDW-SD 54.8 fl      MPV 9.8 fL      Platelets 218 10*3/mm3      Neutrophil % 89.4 %      Lymphocyte % 5.1 %      Monocyte % 3.9 %      Eosinophil % 0.1 %      Basophil % 0.2 %      Immature Grans % 1.3 %      Neutrophils, Absolute 15.00 10*3/mm3      Lymphocytes, Absolute 0.85 10*3/mm3      Monocytes, Absolute 0.65 10*3/mm3      Eosinophils, Absolute 0.01 10*3/mm3      Basophils, Absolute 0.04 10*3/mm3      Immature Grans, Absolute 0.22 10*3/mm3      nRBC 0.0 /100 WBC     Narrative:       ckd    Comprehensive Metabolic Panel [[id number]]  (Abnormal) Collected:  04/29/20 0436    Specimen:  Blood Updated:  04/29/20 0506     Glucose 78 mg/dL      BUN 36 mg/dL      Creatinine 1.28 mg/dL      Sodium 135 mmol/L      Potassium 4.8 mmol/L      Chloride 89 mmol/L      CO2 28.0 mmol/L      Calcium 9.7 mg/dL      Total Protein 7.5 g/dL      Albumin 3.40 g/dL      ALT (SGPT) 10 U/L      AST (SGOT) 16 U/L      Alkaline Phosphatase 86 U/L      Total Bilirubin 0.6 mg/dL      eGFR Non African Amer 41 mL/min/1.73      Globulin 4.1 gm/dL      A/G Ratio 0.8 g/dL      BUN/Creatinine Ratio 28.1     Anion Gap 18.0 mmol/L     Lipid Panel [[id number]]  (Abnormal) Collected:  04/29/20 0436    Specimen:  Blood Updated:  04/29/20 0506     Total Cholesterol 184 mg/dL      Triglycerides 179 mg/dL      HDL Cholesterol 40 mg/dL      LDL Cholesterol  108 mg/dL      VLDL Cholesterol 35.8 mg/dL      LDL/HDL Ratio 2.71    TSH [[id number]]  (Normal) Collected:  04/29/20 0436    Specimen:  Blood Updated:  04/29/20 0506     TSH 2.490 uIU/mL      Comment: TSH results may be falsely decreased if patient taking Biotin.       Hemoglobin A1c [[id number]]  (Abnormal) Collected:  04/29/20 0436    Specimen:  Blood Updated:  04/29/20 0501     Hemoglobin A1C 5.90 %     CBC Auto Differential [[id number]]  (Abnormal) Collected:  04/29/20 0437     Specimen:  Blood Updated:  04/29/20 0453     WBC 19.18 10*3/mm3      RBC 4.83 10*6/mm3      Hemoglobin 13.3 g/dL      Hematocrit 44.0 %      MCV 91.1 fL      MCH 27.5 pg      MCHC 30.2 g/dL      RDW 17.8 %      RDW-SD 60.3 fl      MPV 9.9 fL      Platelets 294 10*3/mm3      Neutrophil % 91.5 %      Lymphocyte % 4.5 %      Monocyte % 2.7 %      Eosinophil % 0.4 %      Basophil % 0.3 %      Immature Grans % 0.6 %      Neutrophils, Absolute 17.54 10*3/mm3      Lymphocytes, Absolute 0.87 10*3/mm3      Monocytes, Absolute 0.52 10*3/mm3      Eosinophils, Absolute 0.07 10*3/mm3      Basophils, Absolute 0.06 10*3/mm3      Immature Grans, Absolute 0.12 10*3/mm3      nRBC 0.0 /100 WBC     Lactic Acid, Reflex [[id number]]  (Abnormal) Collected:  04/28/20 2120    Specimen:  Blood Updated:  04/28/20 2152     Lactate 2.1 mmol/L     Urinalysis With Culture If Indicated - Urine, Catheter In/Out [[id number]]  (Abnormal) Collected:  04/28/20 2053    Specimen:  Urine, Catheter In/Out Updated:  04/28/20 2127     Color, UA Dark Yellow     Appearance, UA Turbid     pH, UA <=5.0     Specific Gravity, UA 1.029     Glucose, UA Negative     Ketones, UA Negative     Bilirubin, UA Negative     Blood, UA Large (3+)     Protein, UA 30 mg/dL (1+)     Leuk Esterase, UA Trace     Nitrite, UA Negative     Urobilinogen, UA 0.2 E.U./dL    Urinalysis, Microscopic Only - Urine, Catheter In/Out [[id number]]  (Abnormal) Collected:  04/28/20 2053    Specimen:  Urine, Catheter In/Out Updated:  04/28/20 2127     RBC, UA 3-5 /HPF      WBC, UA 0-2 /HPF      Bacteria, UA 3+ /HPF      Squamous Epithelial Cells, UA 3-6 /HPF      Hyaline Casts, UA 0-2 /LPF      Coarse Granular Casts, UA 0-2 /LPF      Fine Granular Casts, UA 0-2 /LPF      Methodology Manual Light Microscopy    Procalcitonin [[id number]]  (Abnormal) Collected:  04/28/20 1508    Specimen:  Blood Updated:  04/28/20 1802     Procalcitonin 1.77 ng/mL     Lactic Acid, Plasma [[id number]]  (Abnormal)  Collected:  04/28/20 1508    Specimen:  Blood Updated:  04/28/20 1759     Lactate 2.3 mmol/L     Blood Gas, Arterial [[id number]]  (Abnormal) Collected:  04/28/20 1553    Specimen:  Arterial Blood Updated:  04/28/20 1603     Site Right Radial     Beto's Test Positive     pH, Arterial 7.450 pH units      Comment: 83 Value above reference range        pCO2, Arterial 42.1 mm Hg      pO2, Arterial 137.0 mm Hg      Comment: 83 Value above reference range        HCO3, Arterial 29.2 mmol/L      Comment: 83 Value above reference range        Base Excess, Arterial 4.7 mmol/L      Comment: 83 Value above reference range        O2 Saturation, Arterial 99.6 %      Comment: 83 Value above reference range        Temperature 37.0 C      Barometric Pressure for Blood Gas 747 mmHg      Modality Nasal Cannula     Flow Rate 3.0 lpm      Ventilator Mode NA     Collected by [id number]     Comment: Meter: [id number]     :  [id number]       Comprehensive Metabolic Panel [[id number]]  (Abnormal) Collected:  04/28/20 1508    Specimen:  Blood Updated:  04/28/20 1556     Glucose 151 mg/dL      BUN 32 mg/dL      Creatinine 1.29 mg/dL      Sodium 134 mmol/L      Potassium 5.3 mmol/L      Chloride 92 mmol/L      CO2 27.0 mmol/L      Calcium 9.8 mg/dL      Total Protein 7.9 g/dL      Albumin 3.30 g/dL      ALT (SGPT) 9 U/L      AST (SGOT) 13 U/L      Alkaline Phosphatase 87 U/L      Total Bilirubin 0.6 mg/dL      eGFR Non African Amer 41 mL/min/1.73      Globulin 4.6 gm/dL      A/G Ratio 0.7 g/dL      BUN/Creatinine Ratio 24.8     Anion Gap 15.0 mmol/L     BNP [[id number]]  (Normal) Collected:  04/28/20 1508    Specimen:  Blood Updated:  04/28/20 1553     proBNP 50.7 pg/mL     CBC Auto Differential [[id number]]  (Abnormal) Collected:  04/28/20 1508    Specimen:  Blood Updated:  04/28/20 1539     WBC 18.55 10*3/mm3      RBC 5.18 10*6/mm3      Hemoglobin 14.3 g/dL      Hematocrit 45.8 %      MCV 88.4 fL      MCH 27.6 pg      MCHC 31.2 g/dL      " RDW 17.9 %      RDW-SD 57.4 fl      MPV 9.2 fL      Platelets 291 10*3/mm3      Neutrophil % 85.6 %      Lymphocyte % 6.7 %      Monocyte % 5.4 %      Eosinophil % 1.1 %      Basophil % 0.3 %      Immature Grans % 0.9 %      Neutrophils, Absolute 15.88 10*3/mm3      Lymphocytes, Absolute 1.25 10*3/mm3      Monocytes, Absolute 1.00 10*3/mm3      Eosinophils, Absolute 0.20 10*3/mm3      Basophils, Absolute 0.06 10*3/mm3      Immature Grans, Absolute 0.16 10*3/mm3      nRBC 0.1 /100 WBC          Imaging Results (Last 7 Days)     Procedure Component Value Units Date/Time    XR Chest 1 View [[id number]] Collected:  04/28/20 1558     Updated:  04/28/20 1605    Narrative:       XR CHEST 1 VW-     Indication: CHF/COPD Protocol     Comparison: 02/13/2020     Findings:     LEFT mid/lower lung zone airspace opacity, similar to prior radiographs.  RIGHT lung and pleural space are clear.   No visible pneumothorax.   Cardiac silhouette is normal in size and stable.   No acute osseous findings.       Impression:       Impression:     Persistent lingular/LEFT lower lobe opacity, which likely represents  atelectasis. However consolidation/pneumonia is also possible.  This report was finalized on 04/28/2020 16:02 by Dr. Willie Ramsey MD.            Condition on Discharge:    Stable    Physical Exam on Discharge:  /63 (BP Location: Left arm, Patient Position: Lying)   Pulse 77   Temp 98.6 °F (37 °C) (Axillary)   Resp 18   Ht 160 cm (63\")   Wt 112 kg (246 lb 12.8 oz)   SpO2 96%   BMI 43.72 kg/m²   Physical Exam     Constitutional: She is oriented to person, place, and time. She appears well-developed and well-nourished. She is cooperative. No distress. Nasal cannula in place.   Morbidly obese   HENT:   Head: Normocephalic and atraumatic.   Nose: Nose normal.   Mouth/Throat: Oropharynx is clear and moist.   Eyes: Conjunctivae are normal. No scleral icterus.   Neck: Normal range of motion. Neck supple. No JVD present. No " tracheal deviation present.   Cardiovascular: Normal rate, regular rhythm and normal heart sounds.   No murmur heard.  Pulmonary/Chest: Effort normal and breath sounds normal.   Abdominal: Soft. Bowel sounds are normal. She exhibits no mass. There is no tenderness.   Musculoskeletal: Normal range of motion. She exhibits edema (1/4 BLE) and mild tenderness (RLE).   Neurological: She is alert and oriented to person, place, and time. No cranial nerve deficit. Coordination normal.   Skin: Skin is warm and dry. No pallor. Non-blanchable rubor noted bilaterally inferior to the nose knees.  22 x 14 cm bleb draining serous fluid noted posterior right calf.     Significant reduction in edema noted.   Psychiatric: She has a normal mood and affect.       Discharge Disposition:  [facility]    Discharge Medications:     Discharge Medications      New Medications      Instructions Start Date   cefdinir 300 MG capsule  Commonly known as:  OMNICEF   300 mg, Oral, Every 12 Hours Scheduled         Changes to Medications      Instructions Start Date   nystatin 865342 UNIT/GM powder  Commonly known as:  MYCOSTATIN  What changed:  additional instructions   1 application, Topical, 3 Times Daily         Continue These Medications      Instructions Start Date   albuterol (2.5 MG/3ML) 0.083% nebulizer solution  Commonly known as:  PROVENTIL   2.5 mg, Nebulization, Every 4 Hours PRN      budesonide-formoterol 160-4.5 MCG/ACT inhaler  Commonly known as:  SYMBICORT   2 puffs, Inhalation, 2 Times Daily - RT      escitalopram 10 MG tablet  Commonly known as:  LEXAPRO   10 mg, Oral, Daily      hydrOXYzine 50 MG tablet  Commonly known as:  ATARAX   50 mg, Oral, 3 Times Daily PRN      levothyroxine 75 MCG tablet  Commonly known as:  SYNTHROID, LEVOTHROID   75 mcg, Oral, Daily      meclizine 25 MG tablet  Commonly known as:  ANTIVERT   25 mg, Oral, 2 Times Daily      nitroglycerin 0.4 MG SL tablet  Commonly known as:   NITROSTAT   0.4 mg, Sublingual, Every 5 Minutes PRN, Take no more than 3 doses in 15 minutes.         Stop These Medications    furosemide 20 MG tablet  Commonly known as:  LASIX     LORazepam 0.5 MG tablet  Commonly known as:  ATIVAN            Discharge Diet:   Diet Instructions     Diet: Regular; Thin      Discharge Diet:  Regular    Fluid Consistency:  Thin          Discharge Care Plan / Instructions:   Discharge to [facility] and rehab    Activity at Discharge:   Activity Instructions     Activity as Tolerated               Eleno Tolentino DO  05/02/20  14:02    Time: Discharge over 30 min    Please note that part of this note may be an electronic transcription/translation of spoken language to printed text using the Dragon Dictation System. Efforts were made to edit the dictations, but occasionally words are mistranscribed.

## 2020-05-02 NOTE — PLAN OF CARE
Problem: Patient Care Overview  Goal: Plan of Care Review  Outcome: Ongoing (interventions implemented as appropriate)  Flowsheets (Taken 5/2/2020 1115)  Progress: improving  Plan of Care Reviewed With: patient  Note:   Pt. Sitting eob, Le's weeping onto floor, Pt. Refuses shower from this ku/l and NSG, states that she is going to rehab and they will bathe her there! Ue exs performed sitting eob to increase pt's transfers and sit-stand ability with act. Kaleb!

## 2020-05-02 NOTE — THERAPY RE-EVALUATION
Acute Care - Physical Therapy Re-Evaluation/Wound  [facility]     Patient Name: Emy Bermudez  : 1950  MRN: [id number]  Today's Date: 2020   Onset of Illness/Injury or Date of Surgery: 20     Referring Physician: Lavinia YANES      Admit Date: 2020    Visit Dx:     ICD-10-CM ICD-9-CM   1. COPD exacerbation (CMS/[facility]) J44.1 491.21   2. Pneumonia of left lower lobe due to infectious organism (CMS/[facility]) J18.1 486   3. NOAH (acute kidney injury) (CMS/[facility]) N17.9 584.9   4. Cellulitis, unspecified cellulitis site L03.90 682.9   5. Decreased activities of daily living (ADL) Z78.9 V49.89   6. Impaired mobility Z74.09 799.89     Patient Active Problem List   Diagnosis   • COPD exacerbation (CMS/[facility])   • Chronic respiratory failure with hypoxia (CMS/[facility])   • Tobacco dependence   • Lower extremity cellulitis   • Obesity, Class III, BMI 40-49.9 (morbid obesity) (CMS/[facility])   • NOAH (acute kidney injury) (CMS/[facility])     Past Medical History:   Diagnosis Date   • Abdominal wall abscess    • Abdominal wall fistula 2018   • Cellulitis    • Colonic obstruction (CMS/[facility]) 4/3/2018   • COPD (chronic obstructive pulmonary disease) (CMS/[facility])    • Depression    • Diverticul disease small and large intestine, no perforati or abscess    • Edema    • GERD (gastroesophageal reflux disease)    • Hyperlipidemia    • Hypertension    • Hypocalcemia    • Hypothyroid    • Obesity, Class III, BMI 40-49.9 (morbid obesity) (CMS/[facility]) 2020   • Respiratory failure (CMS/[facility])    • Tobacco dependence 2020   • Venous insufficiency    • Vertigo      Past Surgical History:   Procedure Laterality Date   • ABDOMINAL SURGERY     •  SECTION     • COLOSTOMY     • EXPLORATORY LAPAROTOMY N/A 2018    Procedure: LAPAROTOMY EXPLORATORY, partial colectomy, creation colostomy, incision and drainage abdominal wall abscess;  Surgeon: Elda Velazquez MD;  Location: [facility];  Service: General        PT ASSESSMENT (last 12 hours)       Physical Therapy Evaluation     [facility] Name 05/02/20 1035          PT Evaluation Time/Intention    Subjective Information  no complaints  -     Document Type  re-evaluation;wound care  -     Mode of Treatment  physical therapy  -[facility] Name 05/02/20 1035          Bed Mobility Assessment/Treatment    Bed Mobility Assessment/Treatment  scooting/bridging  -     Scooting/Bridging [place] (Bed Mobility)  verbal cues;supervision  -LH     Row Name 05/02/20 1035          Therapeutic Exercise    35987 - PT Therapeutic Activity Minutes  10  -LH     Row Name 05/02/20 1035          Therapeutic Exercise    Exercise Type (Therapeutic Exercise)  AROM (active range of motion) BLE AP, hip ab/ad, SLR, SAQ  -LH     Row Name 05/02/20 1035          Pain Scale: Numbers Pre/Post-Treatment    Pain Scale: Numbers, Pretreatment  0/10 - no pain  -     Pain Intervention(s)  Medication (See MAR)  -LH     Row Name 05/02/20 1035          Wound 04/28/20 2041 Right calf     Wound - Properties Group Date first assessed: 04/28/20  -MS Time first assessed: 2041 -MS Present on Hospital Admission: Y  -MS Side: Right  -MS Location: calf  -MS Primary Wound Type: --  -MS, Blister and skin tear, oozing      Dressing Care, Wound  dressing applied kerlex primary, ace wraps secondary w/ ~50% tension  -LH     Row Name             Wound 04/28/20 2041 Right anterior thigh Blisters    Wound - Properties Group Date first assessed: 04/28/20  -MS Time first assessed: 2041  -MS Present on Hospital Admission: Y  -MS Side: Right  -MS Orientation: anterior  -MS Location: thigh  -MS Primary Wound Type: Blisters  -MS    Row Name             Wound 04/28/20 2041 groin MASD (Moisture associated skin damage)    Wound - Properties Group Date first assessed: 04/28/20  -MS Time first assessed: 2041  -MS Present on Hospital Admission: Y  -MS Location: groin  -MS, and abdominal folds  Primary Wound Type: MASD  -MS    Row Name             Wound 04/28/20 2041  Right anterior;lower leg    Wound - Properties Group Date first assessed: 04/28/20  -MS Time first assessed: 2041  -MS Side: Right  -MS Orientation: anterior;lower  -MS Location: leg  -MS    Row Name 05/02/20 1035          Wound 04/28/20 2041 Left anterior;lower leg    Wound - Properties Group Date first assessed: 04/28/20  -MS Time first assessed: 2041  -MS Side: Left  -MS Orientation: anterior;lower  -MS Location: leg  -MS    Care, Wound  sarah boot w/ coban as secondary  -     Row Name 05/02/20 1035          Plan of Care Review    Plan of Care Reviewed With  patient  -     Outcome Summary  Kerlex and ace wrap applied to RLE per MD order.  Unna boot applied to LLE per MD order.  Good cap. refill on each.  Unna boot LLE to be changed by 5.9.20.  Kerlex/ace wrap to be changed as needed.  Thank you for referral.  -     Row Name 05/02/20 1035          Physical Therapy Goals    Wound Care Goal Selection (PT)  wound care, PT goal 1  -     Additional Documentation  Wound Care Goal Selection (PT) (Row)  -     Row Name 05/02/20 1035          Wound Care Goal 1 (PT)    Wound Care Goal 1 (PT)  LLE to be assessed by [facility] on 5.9.20 and unna boot reapplied if appropriate  -     Time Frame (Wound Care Goal 1, PT)  by discharge  -     Progress/Outcome (Wound Care Goal 1, PT)  goal ongoing  -     Row Name 05/02/20 1035          Positioning and Restraints    Pre-Treatment Position  in bed  -     Post Treatment Position  bed  -     In Bed  fowlers;call light within reach;encouraged to call for assist;side rails up x2  -       User Key  (r) = Recorded By, (t) = Taken By, (c) = Cosigned By    Initials Name Provider Type     Jeffrey Reddy, PT Physical Therapist    Aleah Shaffer, RNA Registered Nurse          PT Recommendation and Plan  Anticipated Discharge Disposition (PT): skilled nursing facility  Planned Therapy Interventions (PT Eval): balance training, bed mobility training, gait training, home exercise  program, patient/family education, strengthening, transfer training, wound care(RLE kerlex/ace wrap, LLE unna boot)  Therapy Frequency (PT Clinical Impression): 2 times/day  Outcome Summary/Treatment Plan (PT)  Anticipated Discharge Disposition (PT): skilled nursing facility  Plan of Care Reviewed With: patient  Outcome Summary: Kerlex and ace wrap applied to RLE per MD order.  Unna boot applied to LLE per MD order.  Good cap. refill on each.  Unna boot LLE to be changed by 5.9.20.  Kerlex/ace wrap to be changed as needed.  Thank you for referral.  Outcome Measures     Row Name 05/02/20 0728 05/01/20 0851 04/30/20 0815       How much help from another person do you currently need...    Turning from your back to your side while in flat bed without using bedrails?  --  3  -TYLOR  --    Moving from lying on back to sitting on the side of a flat bed without bedrails?  --  3  -TYLOR  --    Moving to and from a bed to a chair (including a wheelchair)?  --  3  -TYLOR  --    Standing up from a chair using your arms (e.g., wheelchair, bedside chair)?  --  3  -TYLOR  --    Climbing 3-5 steps with a railing?  --  1  -TYLOR  --    To walk in hospital room?  --  3  -TYLOR  --    AM-PAC 6 Clicks Score (PT)  --  16  -TYLOR  --       How much help from another is currently needed...    Putting on and taking off regular lower body clothing?  2  -CJ  --  2  -AC    Bathing (including washing, rinsing, and drying)  2  -CJ  --  2  -AC    Toileting (which includes using toilet bed pan or urinal)  2  -CJ  --  2  -AC    Putting on and taking off regular upper body clothing  4  -CJ  --  4  -AC    Taking care of personal grooming (such as brushing teeth)  4  -CJ  --  4  -AC    Eating meals  4  -CJ  --  4  -AC    AM-PAC 6 Clicks Score (OT)  18  -CJ  --  18  -AC       Functional Assessment    Outcome Measure Options  AM-PAC 6 Clicks Daily Activity (OT)  -CJ  AM-PAC 6 Clicks Basic Mobility (PT)  -TYLOR  AM-PAC 6 Clicks Daily Activity (OT)  -AC      User Key  (r) =  Recorded By, (t) = Taken By, (c) = Cosigned By    Initials Name Provider Type    AC Rayshawn Devlin, OTR/L, CNT Occupational Therapist    Melquiades Chavarria, ALBARADO/L Occupational Therapy Assistant    León Smith, PTA Physical Therapy Assistant         Time Calculation:   PT Charges     Row Name 05/02/20 1135 05/02/20 1035          Time Calculation    Start Time  1035  -LH  --     Stop Time  1130  -LH  --     Time Calculation (min)  55 min  -LH  --     PT Received On  05/02/20  -  --        Time Calculation- PT    Total Timed Code Minutes- PT  10 minute(s)  -  --        Timed Charges    96011 - PT Therapeutic Activity Minutes  --  10  -LH       User Key  (r) = Recorded By, (t) = Taken By, (c) = Cosigned By    Initials Name Provider Type     Jeffrey Reddy, PT Physical Therapist        Therapy Charges for Today     Code Description Service Date Service Provider Modifiers Qty    71498900802 HC PT THERAPEUTIC ACT EA 15 MIN 5/2/2020 Jeffrey Reddy, PT GP 1    50737232083 HC PT RE-EVAL ESTABLISHED PLAN 2 5/2/2020 Jeffrey Reddy, PT GP 1    50154867249 HC PT STAPPING UNNA BOOT 5/2/2020 Jeffrey Reddy, PT GP 1          PT G-Codes  Outcome Measure Options: AM-PAC 6 Clicks Daily Activity (OT)  AM-PAC 6 Clicks Score (PT): 16  AM-PAC 6 Clicks Score (OT): 18      Jeffrey Reddy PT  5/2/2020

## 2020-05-02 NOTE — PROGRESS NOTES
RT Nebulizer Protocol    Assessment tool to be used for patients with existing breathing treatments ordered by hospitalists                                                                  0  1  2  3  4      Respiratory History   No Smoking      Smoking History      1 Pack/Day   x   Pulmonary Disease x   Exacerbation      Respiratory Rate   Normal   x   20-25      Dyspneic      Accessory Muscles    Severe Dyspnea      Breath Sounds   Clear-dim.      Crackles      Crackles/ Rhonchi      Wheezing      Absent/ Severe Wheezing        Chest   X-ray   Clear/ not x-ray recently      1 Lobe Infiltration/ Consolidation/ PE      2 Lobe Same Lung Infiltration/ Consolidation/ PE       2 Lobe Infiltration/ Both Lungs/ Consolidation/ PE      Both Lungs/ More Than 1 Lobe/ Atelectasis/ Consolidation/  PE        Cough   Strong Non- Productive      Excessive Secretions/ Strong Cough      Excessive Secretions/ Weak Cough      Thick Bronchial Secretions/ Weak Cough    Thick Bronchial Secretions/ No Cough        Total Patient Score =  5  0-4=Q4 PRN  5-9=TID and Q4 PRN  10-14=QID and Q3 PRN  15-19=Q4 and Q2 PRN  20=Q3 and Q2 PRN    Bronchopulmonary Hygiene (CPT)   Q4 ATC Copious secretions, dyspnea, unable to sleep, mucus plug    QID & Q4 PRN Moderate secretions    TID Small amounts of secretions w/ poor cough and history of secretions    BID Unable to deep breathe and cough spontaneously       Lung Expansion Therapy (PEP)   Q4 & PRN at night Severe atelectasis, poor oxygenation    QID  High risk for persistent atelectasis, existence of same    TID At risk for developing atelectasis    BID Unable to deep breathe and cough spontaneously     Instruct, 1 follow up Patients able to perform well on their own      Cont.DuoNeb qid                   RT Nebulizer Protocol    Assessment tool to be used for patients with existing breathing treatments ordered by hospitalists                                                                   0  1  2  3  4      Respiratory History   No Smoking      Smoking History      1 Pack/Day      Pulmonary Disease    Exacerbation      Respiratory Rate   Normal    20-25      Dyspneic      Accessory Muscles      Severe Dyspnea      Breath Sounds   Clear      Crackles    Crackles/ Rhonchi      Wheezing      Absent/ Severe Wheezing      Chest   X-ray   Clear      1 Lobe Infiltration/ Consolidation/ PE      2 Lobe Same Lung Infiltration/ Consolidation/ PE       2 Lobe Infiltration/ Both Lungs/ Consolidation/ PE      Both Lungs/ More Than 1 Lobe/ Atelectasis/ Consolidation/  PE      Cough   Strong Non- Productive      Excessive Secretions/ Strong Cough      Excessive Secretions/ Weak Cough      Thick Bronchial Secretions/ Weak Cough    Thick Bronchial Secretions/ No Cough      Total Patient Score =    0-4=Q4 PRN  5-9=TID and Q4 PRN  10-14=QID and Q3 PRN  15-19=Q4 and Q2 PRN  20=Q3 and Q2 PRN    Bronchopulmonary Hygiene (CPT)   Q4 ATC Copious secretions, dyspnea, unable to sleep, mucus plug    QID & Q4 PRN Moderate secretions    TID Small amounts of secretions w/ poor cough and history of secretions    BID Unable to deep breathe and cough spontaneously       Lung Expansion Therapy (PEP)   Q4 & PRN at night Severe atelectasis, poor oxygenation    QID  High risk for persistent atelectasis, existence of same    TID At risk for developing atelectasis    BID Unable to deep breathe and cough spontaneously     Instruct, 1 follow up Patients able to perform well on their own

## 2020-05-03 LAB
BACTERIA SPEC AEROBE CULT: NORMAL
BACTERIA SPEC AEROBE CULT: NORMAL

## 2020-05-03 NOTE — THERAPY DISCHARGE NOTE
Acute Care - Occupational Therapy Discharge Summary  [facility]     Patient Name: Emy Bermudez  : 1950  MRN: [id number]    Today's Date: 5/3/2020  Onset of Illness/Injury or Date of Surgery: 20    Date of Referral to OT: 20  Referring Physician: Lavinia YANES      Admit Date: 2020        OT Recommendation and Plan    Visit Dx:    ICD-10-CM ICD-9-CM   1. COPD exacerbation (CMS/[facility]) J44.1 491.21   2. Pneumonia of left lower lobe due to infectious organism (CMS/[facility]) J18.1 486   3. NOAH (acute kidney injury) (CMS/[facility]) N17.9 584.9   4. Cellulitis, unspecified cellulitis site L03.90 682.9   5. Decreased activities of daily living (ADL) Z78.9 V49.89   6. Impaired mobility Z74.09 799.89               Rehab Goal Summary     Row Name 20 0700             Transfer Goal 1 (OT)    Activity/Assistive Device (Transfer Goal 1, OT)  bed-to-chair/chair-to-bed;toilet;walker, rolling  -CS      [place] Level/Cues Needed (Transfer Goal 1, OT)  standby assist  -CS      Time Frame (Transfer Goal 1, OT)  long term goal (LTG);10 days  -CS      Progress/Outcome (Transfer Goal 1, OT)  goal not met  -CS         Bathing Goal 1 (OT)    Activity/Assistive Device (Bathing Goal 1, OT)  bathing skills, all;shower chair  -CS      [place] Level/Cues Needed (Bathing Goal 1, OT)  minimum assist (75% or more patient effort)  -CS      Time Frame (Bathing Goal 1, OT)  long term goal (LTG);10 days  -CS      Progress/Outcomes (Bathing Goal 1, OT)  goal not met  -CS         Dressing Goal 1 (OT)    Activity/Assistive Device (Dressing Goal 1, OT)  dressing skills, all  -CS      [place]/Cues Needed (Dressing Goal 1, OT)  minimum assist (75% or more patient effort)  -CS      Time Frame (Dressing Goal 1, OT)  long term goal (LTG);10 days  -CS      Progress/Outcome (Dressing Goal 1, OT)  goal not met  -CS         Strength Goal 1 (OT)    Strength Goal 1 (OT)  Pt will complete 10x3 reps BUE ther ex to increase strength  and endurance for ADL  -CS      Time Frame (Strength Goal 1, OT)  long term goal (LTG);10 days  -CS      Progress/Outcome (Strength Goal 1, OT)  goal not met  -CS        User Key  (r) = Recorded By, (t) = Taken By, (c) = Cosigned By    Initials Name Provider Type Discipline    CS Latanya Vargas, OTR/L, CNT Occupational Therapist OT          Outcome Measures     Row Name 05/02/20 0728 05/01/20 0851 04/30/20 0815       How much help from another person do you currently need...    Turning from your back to your side while in flat bed without using bedrails?  --  3  -TYLOR  --    Moving from lying on back to sitting on the side of a flat bed without bedrails?  --  3  -TYLOR  --    Moving to and from a bed to a chair (including a wheelchair)?  --  3  -TYLOR  --    Standing up from a chair using your arms (e.g., wheelchair, bedside chair)?  --  3  -TYLOR  --    Climbing 3-5 steps with a railing?  --  1  -TYLOR  --    To walk in hospital room?  --  3  -TYLOR  --    AM-PAC 6 Clicks Score (PT)  --  16  -TYLOR  --       How much help from another is currently needed...    Putting on and taking off regular lower body clothing?  2  -CJ  --  2  -AC    Bathing (including washing, rinsing, and drying)  2  -CJ  --  2  -AC    Toileting (which includes using toilet bed pan or urinal)  2  -CJ  --  2  -AC    Putting on and taking off regular upper body clothing  4  -CJ  --  4  -AC    Taking care of personal grooming (such as brushing teeth)  4  -CJ  --  4  -AC    Eating meals  4  -CJ  --  4  -AC    AM-PAC 6 Clicks Score (OT)  18  -CJ  --  18  -AC       Functional Assessment    Outcome Measure Options  AM-PAC 6 Clicks Daily Activity (OT)  -CJ  AM-PAC 6 Clicks Basic Mobility (PT)  -TYLOR  AM-PAC 6 Clicks Daily Activity (OT)  -AC      User Key  (r) = Recorded By, (t) = Taken By, (c) = Cosigned By    Initials Name Provider Type    AC Rayshawn Devlin, OTR/L, CNT Occupational Therapist    CJ Melquiades Craig COTA/L Occupational Therapy Assistant    TYLOR  León Atlamirano, PTA Physical Therapy Assistant          Therapy Suggested Charges     Code   Minutes Charges    None                 OT Discharge Summary  Anticipated Discharge Disposition (OT): skilled nursing facility  Reason for Discharge: Discharge from facility  Outcomes Achieved: Refer to plan of care for updates on goals achieved  Discharge Destination: SNF      Latanya Vargas, OTR/L, CNT  5/3/2020

## 2020-05-03 NOTE — THERAPY DISCHARGE NOTE
Acute Care - Physical Therapy Discharge Summary  [facility]       Patient Name: Emy Bermudez  : 1950  MRN: [id number]    Today's Date: 5/3/2020  Onset of Illness/Injury or Date of Surgery: 20       Referring Physician: Lavinia YANES      Admit Date: 2020      PT Recommendation and Plan    Visit Dx:    ICD-10-CM ICD-9-CM   1. COPD exacerbation (CMS/[facility]) J44.1 491.21   2. Pneumonia of left lower lobe due to infectious organism (CMS/[facility]) J18.1 486   3. NOAH (acute kidney injury) (CMS/[facility]) N17.9 584.9   4. Cellulitis, unspecified cellulitis site L03.90 682.9   5. Decreased activities of daily living (ADL) Z78.9 V49.89   6. Impaired mobility Z74.09 799.89       Outcome Measures     Row Name 20 0728 20 0851 20 0815       How much help from another person do you currently need...    Turning from your back to your side while in flat bed without using bedrails?  --  3  -TYLOR  --    Moving from lying on back to sitting on the side of a flat bed without bedrails?  --  3  -TYLOR  --    Moving to and from a bed to a chair (including a wheelchair)?  --  3  -TYLOR  --    Standing up from a chair using your arms (e.g., wheelchair, bedside chair)?  --  3  -TYLOR  --    Climbing 3-5 steps with a railing?  --  1  -TYLOR  --    To walk in hospital room?  --  3  -TYLOR  --    AM-PAC 6 Clicks Score (PT)  --  16  -TYLOR  --       How much help from another is currently needed...    Putting on and taking off regular lower body clothing?  2  -CJ  --  2  -AC    Bathing (including washing, rinsing, and drying)  2  -CJ  --  2  -AC    Toileting (which includes using toilet bed pan or urinal)  2  -CJ  --  2  -AC    Putting on and taking off regular upper body clothing  4  -CJ  --  4  -AC    Taking care of personal grooming (such as brushing teeth)  4  -CJ  --  4  -AC    Eating meals  4  -CJ  --  4  -AC    AM-PAC 6 Clicks Score (OT)  18  -CJ  --  18  -AC       Functional Assessment    Outcome Measure Options  AM-PAC 6 Clicks  Daily Activity (OT)  -SUSANA  AM-PAC 6 Clicks Basic Mobility (PT)  -TYLOR  AM-PAC 6 Clicks Daily Activity (OT)  -AC      User Key  (r) = Recorded By, (t) = Taken By, (c) = Cosigned By    Initials Name Provider Type    AC Rayshawn Devlin N, OTR/L, CNT Occupational Therapist    CJ Melquiades Craig, ALBARADO/L Occupational Therapy Assistant    TYLOR León Altamirano, PTA Physical Therapy Assistant              Rehab Goal Summary     Row Name 05/03/20 0719 05/03/20 0700          Physical Therapy Goals    Wound Care Goal Selection (PT)  wound care, PT goal 1  -AB  --        Bed Mobility Goal 1 (PT)    Activity/Assistive Device (Bed Mobility Goal 1, PT)  bed mobility activities, all  -AB  --     [place] Level/Cues Needed (Bed Mobility Goal 1, PT)  standby assist  -AB  --     Time Frame (Bed Mobility Goal 1, PT)  by discharge  -AB  --     Progress/Outcomes (Bed Mobility Goal 1, PT)  goal not met  -AB  --        Transfer Goal 1 (PT)    Activity/Assistive Device (Transfer Goal 1, PT)  sit-to-stand/stand-to-sit  -AB  --     [place] Level/Cues Needed (Transfer Goal 1, PT)  standby assist  -AB  --     Time Frame (Transfer Goal 1, PT)  by discharge  -AB  --     Progress/Outcome (Transfer Goal 1, PT)  goal not met  -AB  --        Gait Training Goal 1 (PT)    Activity/Assistive Device (Gait Training Goal 1, PT)  gait (walking locomotion);walker, rolling;decrease fall risk  -AB  --     [place] Level (Gait Training Goal 1, PT)  contact guard assist  -AB  --     Distance (Gait Goal 1, PT)  30ft  -AB  --     Time Frame (Gait Training Goal 1, PT)  by discharge  -AB  --     Progress/Outcome (Gait Training Goal 1, PT)  goal not met  -AB  --        Wound Care Goal 1 (PT)    Wound Care Goal 1 (PT)  LLE to be assessed by [facility] on 5.9.20 and unna boot reapplied if appropriate  -AB  --     Time Frame (Wound Care Goal 1, PT)  by discharge  -AB  --     Progress/Outcome (Wound Care Goal 1, PT)  goal not met  -AB  --        Transfer Goal 1 (OT)     Activity/Assistive Device (Transfer Goal 1, OT)  --  bed-to-chair/chair-to-bed;toilet;walker, rolling  -CS     [place] Level/Cues Needed (Transfer Goal 1, OT)  --  standby assist  -CS     Time Frame (Transfer Goal 1, OT)  --  long term goal (LTG);10 days  -CS     Progress/Outcome (Transfer Goal 1, OT)  --  goal not met  -CS        Bathing Goal 1 (OT)    Activity/Assistive Device (Bathing Goal 1, OT)  --  bathing skills, all;shower chair  -CS     [place] Level/Cues Needed (Bathing Goal 1, OT)  --  minimum assist (75% or more patient effort)  -CS     Time Frame (Bathing Goal 1, OT)  --  long term goal (LTG);10 days  -CS     Progress/Outcomes (Bathing Goal 1, OT)  --  goal not met  -CS        Dressing Goal 1 (OT)    Activity/Assistive Device (Dressing Goal 1, OT)  --  dressing skills, all  -CS     [place]/Cues Needed (Dressing Goal 1, OT)  --  minimum assist (75% or more patient effort)  -CS     Time Frame (Dressing Goal 1, OT)  --  long term goal (LTG);10 days  -CS     Progress/Outcome (Dressing Goal 1, OT)  --  goal not met  -CS        Strength Goal 1 (OT)    Strength Goal 1 (OT)  --  Pt will complete 10x3 reps BUE ther ex to increase strength and endurance for ADL  -CS     Time Frame (Strength Goal 1, OT)  --  long term goal (LTG);10 days  -CS     Progress/Outcome (Strength Goal 1, OT)  --  goal not met  -CS       User Key  (r) = Recorded By, (t) = Taken By, (c) = Cosigned By    Initials Name Provider Type Discipline    AB Ainsley Madison, PTA Physical Therapy Assistant PT    CS Latanya Vargas, OTR/L, CNT Occupational Therapist OT              PT Discharge Summary  Anticipated Discharge Disposition (PT): skilled nursing facility  Reason for Discharge: Discharge from facility  Outcomes Achieved: Refer to plan of care for updates on goals achieved  Discharge Destination: SNF      Ainsley Madison PTA   5/3/2020

## 2020-07-04 ENCOUNTER — HOSPITAL ENCOUNTER (INPATIENT)
Facility: HOSPITAL | Age: 70
LOS: 4 days | Discharge: HOME HEALTH | End: 2020-07-08
Attending: FAMILY MEDICINE | Admitting: FAMILY MEDICINE

## 2020-07-04 ENCOUNTER — APPOINTMENT (OUTPATIENT)
Dept: GENERAL RADIOLOGY | Facility: HOSPITAL | Age: 70
End: 2020-07-04

## 2020-07-04 DIAGNOSIS — Z74.09 IMPAIRED FUNCTIONAL MOBILITY AND ACTIVITY TOLERANCE: ICD-10-CM

## 2020-07-04 DIAGNOSIS — I87.2 STASIS DERMATITIS OF BOTH LEGS: ICD-10-CM

## 2020-07-04 DIAGNOSIS — J44.1 COPD EXACERBATION (HCC): Primary | ICD-10-CM

## 2020-07-04 DIAGNOSIS — Z78.9 DECREASED ACTIVITIES OF DAILY LIVING (ADL): ICD-10-CM

## 2020-07-04 DIAGNOSIS — I87.2 VENOUS INSUFFICIENCY OF BOTH LOWER EXTREMITIES: ICD-10-CM

## 2020-07-04 PROBLEM — J96.22 ACUTE ON CHRONIC RESPIRATORY FAILURE WITH HYPERCAPNIA (HCC): Status: ACTIVE | Noted: 2020-07-04

## 2020-07-04 PROBLEM — J96.22 ACUTE ON CHRONIC RESPIRATORY FAILURE WITH HYPOXIA AND HYPERCAPNIA (HCC): Status: ACTIVE | Noted: 2020-07-04

## 2020-07-04 PROBLEM — J96.21 ACUTE ON CHRONIC RESPIRATORY FAILURE WITH HYPOXIA AND HYPERCAPNIA (HCC): Status: ACTIVE | Noted: 2020-07-04

## 2020-07-04 LAB
A-A DO2: ABNORMAL
ALBUMIN SERPL-MCNC: 3.7 G/DL (ref 3.5–5.2)
ALBUMIN/GLOB SERPL: 1.1 G/DL
ALP SERPL-CCNC: 77 U/L (ref 39–117)
ALT SERPL W P-5'-P-CCNC: 12 U/L (ref 1–33)
ANION GAP SERPL CALCULATED.3IONS-SCNC: 10 MMOL/L (ref 5–15)
APTT PPP: 30.6 SECONDS (ref 24.1–35)
ARTERIAL PATENCY WRIST A: POSITIVE
ARTERIAL PATENCY WRIST A: POSITIVE
AST SERPL-CCNC: 19 U/L (ref 1–32)
ATMOSPHERIC PRESS: 748 MMHG
ATMOSPHERIC PRESS: 748 MMHG
BASE EXCESS BLDA CALC-SCNC: 10.2 MMOL/L (ref 0–2)
BASE EXCESS BLDA CALC-SCNC: 10.7 MMOL/L (ref 0–2)
BASOPHILS # BLD AUTO: 0.04 10*3/MM3 (ref 0–0.2)
BASOPHILS NFR BLD AUTO: 0.4 % (ref 0–1.5)
BDY SITE: ABNORMAL
BDY SITE: ABNORMAL
BILIRUB SERPL-MCNC: 0.2 MG/DL (ref 0.2–1.2)
BODY TEMPERATURE: 37 C
BODY TEMPERATURE: 37 C
BUN SERPL-MCNC: 10 MG/DL (ref 8–23)
BUN/CREAT SERPL: 10.9 (ref 7–25)
CALCIUM SPEC-SCNC: 9.1 MG/DL (ref 8.6–10.5)
CHLORIDE SERPL-SCNC: 94 MMOL/L (ref 98–107)
CO2 SERPL-SCNC: 35 MMOL/L (ref 22–29)
COHGB MFR BLD: 1.6 % (ref 0–5)
CREAT SERPL-MCNC: 0.92 MG/DL (ref 0.57–1)
D-LACTATE SERPL-SCNC: 1.5 MMOL/L (ref 0.5–2)
DEPRECATED RDW RBC AUTO: 60.5 FL (ref 37–54)
EOSINOPHIL # BLD AUTO: 0.66 10*3/MM3 (ref 0–0.4)
EOSINOPHIL NFR BLD AUTO: 6 % (ref 0.3–6.2)
EPAP: 6
ERYTHROCYTE [DISTWIDTH] IN BLOOD BY AUTOMATED COUNT: 17.9 % (ref 12.3–15.4)
GAS FLOW AIRWAY: 4 LPM
GFR SERPL CREATININE-BSD FRML MDRD: 60 ML/MIN/1.73
GLOBULIN UR ELPH-MCNC: 3.4 GM/DL
GLUCOSE SERPL-MCNC: 105 MG/DL (ref 65–99)
HCO3 BLDA-SCNC: 37.7 MMOL/L (ref 20–26)
HCO3 BLDA-SCNC: 39 MMOL/L (ref 20–26)
HCT VFR BLD AUTO: 33.9 % (ref 34–46.6)
HCT VFR BLD CALC: 32.7 % (ref 38–51)
HGB BLD-MCNC: 10.4 G/DL (ref 12–15.9)
HGB BLDA-MCNC: 10.7 G/DL (ref 12–16)
IMM GRANULOCYTES # BLD AUTO: 0.03 10*3/MM3 (ref 0–0.05)
IMM GRANULOCYTES NFR BLD AUTO: 0.3 % (ref 0–0.5)
INHALED O2 CONCENTRATION: 35 %
INR PPP: 1.15 (ref 0.91–1.09)
IPAP: 14
LYMPHOCYTES # BLD AUTO: 0.86 10*3/MM3 (ref 0.7–3.1)
LYMPHOCYTES NFR BLD AUTO: 7.8 % (ref 19.6–45.3)
Lab: ABNORMAL
MCH RBC QN AUTO: 28 PG (ref 26.6–33)
MCHC RBC AUTO-ENTMCNC: 30.7 G/DL (ref 31.5–35.7)
MCV RBC AUTO: 91.4 FL (ref 79–97)
METHGB BLD QL: 0.7 % (ref 0–3)
MODALITY: ABNORMAL
MODALITY: ABNORMAL
MONOCYTES # BLD AUTO: 0.8 10*3/MM3 (ref 0.1–0.9)
MONOCYTES NFR BLD AUTO: 7.3 % (ref 5–12)
NEUTROPHILS NFR BLD AUTO: 78.2 % (ref 42.7–76)
NEUTROPHILS NFR BLD AUTO: 8.59 10*3/MM3 (ref 1.7–7)
NOTE: ABNORMAL
NOTIFIED BY: ABNORMAL
NOTIFIED WHO: ABNORMAL
NRBC BLD AUTO-RTO: 0 /100 WBC (ref 0–0.2)
NT-PROBNP SERPL-MCNC: 38.4 PG/ML (ref 5–900)
OXYHGB MFR BLDV: 95.4 % (ref 94–99)
PCO2 BLDA: 65.9 MM HG (ref 35–45)
PCO2 BLDA: 73.9 MM HG (ref 35–45)
PCO2 TEMP ADJ BLD: 65.9 MM HG (ref 35–45)
PCO2 TEMP ADJ BLD: 73.9 MM HG (ref 35–45)
PH BLDA: 7.33 PH UNITS (ref 7.35–7.45)
PH BLDA: 7.37 PH UNITS (ref 7.35–7.45)
PH, TEMP CORRECTED: 7.33 PH UNITS (ref 7.35–7.45)
PH, TEMP CORRECTED: 7.37 PH UNITS (ref 7.35–7.45)
PLATELET # BLD AUTO: 269 10*3/MM3 (ref 140–450)
PMV BLD AUTO: 9.6 FL (ref 6–12)
PO2 BLDA: 72.3 MM HG (ref 83–108)
PO2 BLDA: 99.5 MM HG (ref 83–108)
PO2 TEMP ADJ BLD: 72.3 MM HG (ref 83–108)
PO2 TEMP ADJ BLD: 99.5 MM HG (ref 83–108)
POTASSIUM BLDA-SCNC: 4 MMOL/L (ref 3.5–5.2)
POTASSIUM SERPL-SCNC: 4.6 MMOL/L (ref 3.5–5.2)
PROT SERPL-MCNC: 7.1 G/DL (ref 6–8.5)
PROTHROMBIN TIME: 14.3 SECONDS (ref 11.9–14.6)
RBC # BLD AUTO: 3.71 10*6/MM3 (ref 3.77–5.28)
SAO2 % BLDCOA: 94.5 % (ref 94–99)
SAO2 % BLDCOA: 97.7 % (ref 94–99)
SARS-COV-2 RDRP RESP QL NAA+PROBE: NOT DETECTED
SET MECH RESP RATE: 18
SODIUM BLDA-SCNC: 140 MMOL/L (ref 136–145)
SODIUM SERPL-SCNC: 139 MMOL/L (ref 136–145)
TROPONIN T SERPL-MCNC: <0.01 NG/ML (ref 0–0.03)
VENTILATOR MODE: ABNORMAL
VENTILATOR MODE: ABNORMAL
WBC # BLD AUTO: 10.98 10*3/MM3 (ref 3.4–10.8)

## 2020-07-04 PROCEDURE — 36600 WITHDRAWAL OF ARTERIAL BLOOD: CPT

## 2020-07-04 PROCEDURE — 94799 UNLISTED PULMONARY SVC/PX: CPT

## 2020-07-04 PROCEDURE — 85730 THROMBOPLASTIN TIME PARTIAL: CPT | Performed by: NURSE PRACTITIONER

## 2020-07-04 PROCEDURE — 87635 SARS-COV-2 COVID-19 AMP PRB: CPT | Performed by: NURSE PRACTITIONER

## 2020-07-04 PROCEDURE — 87040 BLOOD CULTURE FOR BACTERIA: CPT | Performed by: NURSE PRACTITIONER

## 2020-07-04 PROCEDURE — 83050 HGB METHEMOGLOBIN QUAN: CPT

## 2020-07-04 PROCEDURE — 25010000002 ENOXAPARIN PER 10 MG: Performed by: FAMILY MEDICINE

## 2020-07-04 PROCEDURE — 93005 ELECTROCARDIOGRAM TRACING: CPT | Performed by: FAMILY MEDICINE

## 2020-07-04 PROCEDURE — 82805 BLOOD GASES W/O2 SATURATION: CPT

## 2020-07-04 PROCEDURE — 94660 CPAP INITIATION&MGMT: CPT

## 2020-07-04 PROCEDURE — 25010000002 CEFTRIAXONE PER 250 MG: Performed by: NURSE PRACTITIONER

## 2020-07-04 PROCEDURE — 93010 ELECTROCARDIOGRAM REPORT: CPT | Performed by: INTERNAL MEDICINE

## 2020-07-04 PROCEDURE — 71045 X-RAY EXAM CHEST 1 VIEW: CPT

## 2020-07-04 PROCEDURE — 25010000002 LEVOFLOXACIN PER 250 MG: Performed by: FAMILY MEDICINE

## 2020-07-04 PROCEDURE — 83880 ASSAY OF NATRIURETIC PEPTIDE: CPT | Performed by: NURSE PRACTITIONER

## 2020-07-04 PROCEDURE — 85610 PROTHROMBIN TIME: CPT | Performed by: NURSE PRACTITIONER

## 2020-07-04 PROCEDURE — 94640 AIRWAY INHALATION TREATMENT: CPT

## 2020-07-04 PROCEDURE — 93005 ELECTROCARDIOGRAM TRACING: CPT | Performed by: NURSE PRACTITIONER

## 2020-07-04 PROCEDURE — 25010000002 METHYLPREDNISOLONE PER 125 MG: Performed by: NURSE PRACTITIONER

## 2020-07-04 PROCEDURE — 85025 COMPLETE CBC W/AUTO DIFF WBC: CPT | Performed by: NURSE PRACTITIONER

## 2020-07-04 PROCEDURE — 99285 EMERGENCY DEPT VISIT HI MDM: CPT

## 2020-07-04 PROCEDURE — 83605 ASSAY OF LACTIC ACID: CPT | Performed by: NURSE PRACTITIONER

## 2020-07-04 PROCEDURE — 82375 ASSAY CARBOXYHB QUANT: CPT

## 2020-07-04 PROCEDURE — 84484 ASSAY OF TROPONIN QUANT: CPT | Performed by: NURSE PRACTITIONER

## 2020-07-04 PROCEDURE — 93005 ELECTROCARDIOGRAM TRACING: CPT

## 2020-07-04 PROCEDURE — 82803 BLOOD GASES ANY COMBINATION: CPT

## 2020-07-04 PROCEDURE — 80053 COMPREHEN METABOLIC PANEL: CPT | Performed by: NURSE PRACTITIONER

## 2020-07-04 RX ORDER — IPRATROPIUM BROMIDE AND ALBUTEROL SULFATE 2.5; .5 MG/3ML; MG/3ML
3 SOLUTION RESPIRATORY (INHALATION)
Status: DISCONTINUED | OUTPATIENT
Start: 2020-07-04 | End: 2020-07-06

## 2020-07-04 RX ORDER — FAMOTIDINE 20 MG/1
40 TABLET, FILM COATED ORAL DAILY
Status: DISCONTINUED | OUTPATIENT
Start: 2020-07-05 | End: 2020-07-08 | Stop reason: HOSPADM

## 2020-07-04 RX ORDER — GUAIFENESIN 600 MG/1
600 TABLET, EXTENDED RELEASE ORAL EVERY 12 HOURS SCHEDULED
Status: DISCONTINUED | OUTPATIENT
Start: 2020-07-04 | End: 2020-07-05

## 2020-07-04 RX ORDER — NICOTINE 21 MG/24HR
1 PATCH, TRANSDERMAL 24 HOURS TRANSDERMAL EVERY 24 HOURS
Status: DISCONTINUED | OUTPATIENT
Start: 2020-07-04 | End: 2020-07-08 | Stop reason: HOSPADM

## 2020-07-04 RX ORDER — ALBUTEROL SULFATE 2.5 MG/3ML
2.5 SOLUTION RESPIRATORY (INHALATION) EVERY 4 HOURS PRN
Status: DISCONTINUED | OUTPATIENT
Start: 2020-07-04 | End: 2020-07-08 | Stop reason: HOSPADM

## 2020-07-04 RX ORDER — NYSTATIN 100000 [USP'U]/G
1 POWDER TOPICAL 3 TIMES DAILY
Status: DISCONTINUED | OUTPATIENT
Start: 2020-07-04 | End: 2020-07-08 | Stop reason: HOSPADM

## 2020-07-04 RX ORDER — SODIUM CHLORIDE 0.9 % (FLUSH) 0.9 %
10 SYRINGE (ML) INJECTION AS NEEDED
Status: DISCONTINUED | OUTPATIENT
Start: 2020-07-04 | End: 2020-07-08 | Stop reason: HOSPADM

## 2020-07-04 RX ORDER — METHYLPREDNISOLONE SODIUM SUCCINATE 125 MG/2ML
125 INJECTION, POWDER, LYOPHILIZED, FOR SOLUTION INTRAMUSCULAR; INTRAVENOUS ONCE
Status: COMPLETED | OUTPATIENT
Start: 2020-07-04 | End: 2020-07-04

## 2020-07-04 RX ORDER — IPRATROPIUM BROMIDE AND ALBUTEROL SULFATE 2.5; .5 MG/3ML; MG/3ML
3 SOLUTION RESPIRATORY (INHALATION) ONCE
Status: COMPLETED | OUTPATIENT
Start: 2020-07-04 | End: 2020-07-04

## 2020-07-04 RX ORDER — MECLIZINE HYDROCHLORIDE 25 MG/1
25 TABLET ORAL 2 TIMES DAILY
Status: DISCONTINUED | OUTPATIENT
Start: 2020-07-04 | End: 2020-07-08 | Stop reason: HOSPADM

## 2020-07-04 RX ORDER — METHYLPREDNISOLONE SODIUM SUCCINATE 125 MG/2ML
60 INJECTION, POWDER, LYOPHILIZED, FOR SOLUTION INTRAMUSCULAR; INTRAVENOUS 2 TIMES DAILY
Status: DISCONTINUED | OUTPATIENT
Start: 2020-07-05 | End: 2020-07-05

## 2020-07-04 RX ORDER — ESCITALOPRAM OXALATE 10 MG/1
10 TABLET ORAL DAILY
Status: DISCONTINUED | OUTPATIENT
Start: 2020-07-05 | End: 2020-07-08 | Stop reason: HOSPADM

## 2020-07-04 RX ORDER — SODIUM CHLORIDE 0.9 % (FLUSH) 0.9 %
10 SYRINGE (ML) INJECTION EVERY 12 HOURS SCHEDULED
Status: DISCONTINUED | OUTPATIENT
Start: 2020-07-04 | End: 2020-07-08 | Stop reason: HOSPADM

## 2020-07-04 RX ORDER — LEVOTHYROXINE SODIUM 0.07 MG/1
75 TABLET ORAL
Status: DISCONTINUED | OUTPATIENT
Start: 2020-07-05 | End: 2020-07-08 | Stop reason: HOSPADM

## 2020-07-04 RX ORDER — LEVOFLOXACIN 5 MG/ML
500 INJECTION, SOLUTION INTRAVENOUS EVERY 24 HOURS
Status: DISCONTINUED | OUTPATIENT
Start: 2020-07-04 | End: 2020-07-05

## 2020-07-04 RX ORDER — ACETAMINOPHEN 325 MG/1
650 TABLET ORAL EVERY 4 HOURS PRN
Status: DISCONTINUED | OUTPATIENT
Start: 2020-07-04 | End: 2020-07-08 | Stop reason: HOSPADM

## 2020-07-04 RX ORDER — ONDANSETRON 4 MG/1
4 TABLET, FILM COATED ORAL EVERY 6 HOURS PRN
Status: DISCONTINUED | OUTPATIENT
Start: 2020-07-04 | End: 2020-07-08 | Stop reason: HOSPADM

## 2020-07-04 RX ORDER — ALPRAZOLAM 0.5 MG/1
0.5 TABLET ORAL EVERY 8 HOURS PRN
Status: DISCONTINUED | OUTPATIENT
Start: 2020-07-04 | End: 2020-07-08 | Stop reason: HOSPADM

## 2020-07-04 RX ORDER — HYDROXYZINE HYDROCHLORIDE 25 MG/1
50 TABLET, FILM COATED ORAL 3 TIMES DAILY PRN
Status: DISCONTINUED | OUTPATIENT
Start: 2020-07-04 | End: 2020-07-08 | Stop reason: HOSPADM

## 2020-07-04 RX ORDER — DIPHENHYDRAMINE HCL 25 MG
25 CAPSULE ORAL NIGHTLY PRN
Status: DISCONTINUED | OUTPATIENT
Start: 2020-07-04 | End: 2020-07-08 | Stop reason: HOSPADM

## 2020-07-04 RX ADMIN — METHYLPREDNISOLONE SODIUM SUCCINATE 125 MG: 125 INJECTION, POWDER, FOR SOLUTION INTRAMUSCULAR; INTRAVENOUS at 18:48

## 2020-07-04 RX ADMIN — IPRATROPIUM BROMIDE AND ALBUTEROL SULFATE 3 ML: 2.5; .5 SOLUTION RESPIRATORY (INHALATION) at 23:34

## 2020-07-04 RX ADMIN — CEFTRIAXONE SODIUM 1 G: 1 INJECTION, POWDER, FOR SOLUTION INTRAMUSCULAR; INTRAVENOUS at 19:41

## 2020-07-04 RX ADMIN — NYSTATIN 1 APPLICATION: 100000 POWDER TOPICAL at 22:02

## 2020-07-04 RX ADMIN — MECLIZINE HYDROCHLORIDE 25 MG: 25 TABLET ORAL at 22:02

## 2020-07-04 RX ADMIN — LEVOFLOXACIN 500 MG: 5 INJECTION, SOLUTION INTRAVENOUS at 22:07

## 2020-07-04 RX ADMIN — SODIUM CHLORIDE, PRESERVATIVE FREE 10 ML: 5 INJECTION INTRAVENOUS at 22:10

## 2020-07-04 RX ADMIN — NICOTINE 1 PATCH: 21 PATCH, EXTENDED RELEASE TRANSDERMAL at 22:02

## 2020-07-04 RX ADMIN — ENOXAPARIN SODIUM 40 MG: 40 INJECTION SUBCUTANEOUS at 22:07

## 2020-07-04 RX ADMIN — IPRATROPIUM BROMIDE AND ALBUTEROL SULFATE 3 ML: 2.5; .5 SOLUTION RESPIRATORY (INHALATION) at 18:53

## 2020-07-04 RX ADMIN — GUAIFENESIN 600 MG: 600 TABLET, EXTENDED RELEASE ORAL at 22:02
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
## 2020-07-04 NOTE — ED NOTES
Pt incontinent of urine. PTs linens changed. Pure wick applied at this time.      Yokasta Villanueva RN  07/04/20 7132

## 2020-07-05 PROBLEM — I10 ESSENTIAL HYPERTENSION: Status: ACTIVE | Noted: 2020-07-05

## 2020-07-05 PROBLEM — I87.2 VENOUS INSUFFICIENCY OF BOTH LOWER EXTREMITIES: Status: ACTIVE | Noted: 2020-07-05

## 2020-07-05 PROBLEM — I87.2 STASIS DERMATITIS OF BOTH LEGS: Status: ACTIVE | Noted: 2020-07-05

## 2020-07-05 PROBLEM — E03.9 HYPOTHYROIDISM (ACQUIRED): Status: ACTIVE | Noted: 2020-07-05

## 2020-07-05 PROBLEM — I50.33 ACUTE ON CHRONIC DIASTOLIC HEART FAILURE (HCC): Status: ACTIVE | Noted: 2020-07-05

## 2020-07-05 PROBLEM — D64.9 NORMOCYTIC ANEMIA: Status: ACTIVE | Noted: 2020-07-05

## 2020-07-05 LAB
ANION GAP SERPL CALCULATED.3IONS-SCNC: 9 MMOL/L (ref 5–15)
BASOPHILS # BLD AUTO: 0.02 10*3/MM3 (ref 0–0.2)
BASOPHILS NFR BLD AUTO: 0.2 % (ref 0–1.5)
BUN SERPL-MCNC: 15 MG/DL (ref 8–23)
BUN/CREAT SERPL: 16 (ref 7–25)
CALCIUM SPEC-SCNC: 9.4 MG/DL (ref 8.6–10.5)
CHLORIDE SERPL-SCNC: 95 MMOL/L (ref 98–107)
CO2 SERPL-SCNC: 34 MMOL/L (ref 22–29)
CREAT SERPL-MCNC: 0.94 MG/DL (ref 0.57–1)
DEPRECATED RDW RBC AUTO: 58.6 FL (ref 37–54)
EOSINOPHIL # BLD AUTO: 0.01 10*3/MM3 (ref 0–0.4)
EOSINOPHIL NFR BLD AUTO: 0.1 % (ref 0.3–6.2)
ERYTHROCYTE [DISTWIDTH] IN BLOOD BY AUTOMATED COUNT: 17.7 % (ref 12.3–15.4)
GFR SERPL CREATININE-BSD FRML MDRD: 59 ML/MIN/1.73
GLUCOSE SERPL-MCNC: 191 MG/DL (ref 65–99)
HCT VFR BLD AUTO: 35.4 % (ref 34–46.6)
HGB BLD-MCNC: 10.7 G/DL (ref 12–15.9)
IMM GRANULOCYTES # BLD AUTO: 0.06 10*3/MM3 (ref 0–0.05)
IMM GRANULOCYTES NFR BLD AUTO: 0.6 % (ref 0–0.5)
LYMPHOCYTES # BLD AUTO: 0.35 10*3/MM3 (ref 0.7–3.1)
LYMPHOCYTES NFR BLD AUTO: 3.4 % (ref 19.6–45.3)
MCH RBC QN AUTO: 27.4 PG (ref 26.6–33)
MCHC RBC AUTO-ENTMCNC: 30.2 G/DL (ref 31.5–35.7)
MCV RBC AUTO: 90.5 FL (ref 79–97)
MONOCYTES # BLD AUTO: 0.1 10*3/MM3 (ref 0.1–0.9)
MONOCYTES NFR BLD AUTO: 1 % (ref 5–12)
NEUTROPHILS NFR BLD AUTO: 9.9 10*3/MM3 (ref 1.7–7)
NEUTROPHILS NFR BLD AUTO: 94.7 % (ref 42.7–76)
NRBC BLD AUTO-RTO: 0 /100 WBC (ref 0–0.2)
PLATELET # BLD AUTO: 274 10*3/MM3 (ref 140–450)
PMV BLD AUTO: 9.8 FL (ref 6–12)
POTASSIUM SERPL-SCNC: 4.2 MMOL/L (ref 3.5–5.2)
RBC # BLD AUTO: 3.91 10*6/MM3 (ref 3.77–5.28)
SODIUM SERPL-SCNC: 138 MMOL/L (ref 136–145)
WBC # BLD AUTO: 10.44 10*3/MM3 (ref 3.4–10.8)

## 2020-07-05 PROCEDURE — 94799 UNLISTED PULMONARY SVC/PX: CPT

## 2020-07-05 PROCEDURE — 85025 COMPLETE CBC W/AUTO DIFF WBC: CPT | Performed by: FAMILY MEDICINE

## 2020-07-05 PROCEDURE — 25010000002 METHYLPREDNISOLONE PER 40 MG: Performed by: INTERNAL MEDICINE

## 2020-07-05 PROCEDURE — 94640 AIRWAY INHALATION TREATMENT: CPT

## 2020-07-05 PROCEDURE — 25010000002 METHYLPREDNISOLONE PER 125 MG: Performed by: FAMILY MEDICINE

## 2020-07-05 PROCEDURE — 80048 BASIC METABOLIC PNL TOTAL CA: CPT | Performed by: FAMILY MEDICINE

## 2020-07-05 PROCEDURE — 94660 CPAP INITIATION&MGMT: CPT

## 2020-07-05 PROCEDURE — 25010000002 ENOXAPARIN PER 10 MG: Performed by: FAMILY MEDICINE

## 2020-07-05 PROCEDURE — 63710000001 DIPHENHYDRAMINE PER 50 MG: Performed by: FAMILY MEDICINE

## 2020-07-05 RX ORDER — DOXYCYCLINE 100 MG/1
100 TABLET ORAL EVERY 12 HOURS SCHEDULED
Status: DISCONTINUED | OUTPATIENT
Start: 2020-07-05 | End: 2020-07-08 | Stop reason: HOSPADM

## 2020-07-05 RX ORDER — METHYLPREDNISOLONE SODIUM SUCCINATE 40 MG/ML
40 INJECTION, POWDER, LYOPHILIZED, FOR SOLUTION INTRAMUSCULAR; INTRAVENOUS EVERY 8 HOURS
Status: DISCONTINUED | OUTPATIENT
Start: 2020-07-05 | End: 2020-07-06

## 2020-07-05 RX ORDER — ALBUTEROL SULFATE 90 UG/1
1-2 AEROSOL, METERED RESPIRATORY (INHALATION) EVERY 4 HOURS PRN
Status: ON HOLD | COMMUNITY
End: 2021-03-10

## 2020-07-05 RX ORDER — FUROSEMIDE 20 MG/1
20 TABLET ORAL EVERY MORNING
COMMUNITY

## 2020-07-05 RX ORDER — LORAZEPAM 0.5 MG/1
0.5 TABLET ORAL DAILY PRN
Status: ON HOLD | COMMUNITY
End: 2022-10-06 | Stop reason: ALTCHOICE

## 2020-07-05 RX ORDER — GUAIFENESIN 600 MG/1
1200 TABLET, EXTENDED RELEASE ORAL EVERY 12 HOURS SCHEDULED
Status: DISCONTINUED | OUTPATIENT
Start: 2020-07-05 | End: 2020-07-08 | Stop reason: HOSPADM

## 2020-07-05 RX ORDER — SULFAMETHOXAZOLE AND TRIMETHOPRIM 800; 160 MG/1; MG/1
1 TABLET ORAL 2 TIMES DAILY
COMMUNITY
Start: 2020-07-01 | End: 2020-07-08 | Stop reason: HOSPADM

## 2020-07-05 RX ORDER — BUDESONIDE AND FORMOTEROL FUMARATE DIHYDRATE 160; 4.5 UG/1; UG/1
2 AEROSOL RESPIRATORY (INHALATION)
Status: DISCONTINUED | OUTPATIENT
Start: 2020-07-05 | End: 2020-07-08 | Stop reason: HOSPADM

## 2020-07-05 RX ORDER — FUROSEMIDE 40 MG/1
40 TABLET ORAL DAILY
Status: DISCONTINUED | OUTPATIENT
Start: 2020-07-05 | End: 2020-07-07

## 2020-07-05 RX ORDER — LOSARTAN POTASSIUM 50 MG/1
25 TABLET ORAL
Status: DISCONTINUED | OUTPATIENT
Start: 2020-07-05 | End: 2020-07-08 | Stop reason: HOSPADM

## 2020-07-05 RX ADMIN — NYSTATIN 1 APPLICATION: 100000 POWDER TOPICAL at 08:12

## 2020-07-05 RX ADMIN — LOSARTAN POTASSIUM 25 MG: 50 TABLET, FILM COATED ORAL at 11:20

## 2020-07-05 RX ADMIN — GUAIFENESIN 1200 MG: 600 TABLET, EXTENDED RELEASE ORAL at 20:18

## 2020-07-05 RX ADMIN — FUROSEMIDE 40 MG: 40 TABLET ORAL at 11:21

## 2020-07-05 RX ADMIN — METHYLPREDNISOLONE SODIUM SUCCINATE 60 MG: 125 INJECTION, POWDER, FOR SOLUTION INTRAMUSCULAR; INTRAVENOUS at 08:11

## 2020-07-05 RX ADMIN — ENOXAPARIN SODIUM 40 MG: 40 INJECTION SUBCUTANEOUS at 20:31

## 2020-07-05 RX ADMIN — SODIUM CHLORIDE, PRESERVATIVE FREE 10 ML: 5 INJECTION INTRAVENOUS at 08:12

## 2020-07-05 RX ADMIN — DOXYCYCLINE 100 MG: 100 TABLET, FILM COATED ORAL at 11:21

## 2020-07-05 RX ADMIN — MECLIZINE HYDROCHLORIDE 25 MG: 25 TABLET ORAL at 08:11

## 2020-07-05 RX ADMIN — IPRATROPIUM BROMIDE AND ALBUTEROL SULFATE 3 ML: 2.5; .5 SOLUTION RESPIRATORY (INHALATION) at 06:34

## 2020-07-05 RX ADMIN — NICOTINE 1 PATCH: 21 PATCH, EXTENDED RELEASE TRANSDERMAL at 20:31

## 2020-07-05 RX ADMIN — DIPHENHYDRAMINE HYDROCHLORIDE 25 MG: 25 CAPSULE ORAL at 02:28

## 2020-07-05 RX ADMIN — IPRATROPIUM BROMIDE AND ALBUTEROL SULFATE 3 ML: 2.5; .5 SOLUTION RESPIRATORY (INHALATION) at 19:07

## 2020-07-05 RX ADMIN — GUAIFENESIN 600 MG: 600 TABLET, EXTENDED RELEASE ORAL at 08:11

## 2020-07-05 RX ADMIN — SODIUM CHLORIDE, PRESERVATIVE FREE 10 ML: 5 INJECTION INTRAVENOUS at 20:18

## 2020-07-05 RX ADMIN — DOXYCYCLINE 100 MG: 100 TABLET, FILM COATED ORAL at 20:18

## 2020-07-05 RX ADMIN — METHYLPREDNISOLONE SODIUM SUCCINATE 40 MG: 40 INJECTION, POWDER, FOR SOLUTION INTRAMUSCULAR; INTRAVENOUS at 16:13

## 2020-07-05 RX ADMIN — FAMOTIDINE 40 MG: 20 TABLET, FILM COATED ORAL at 08:11

## 2020-07-05 RX ADMIN — BUDESONIDE AND FORMOTEROL FUMARATE DIHYDRATE 2 PUFF: 160; 4.5 AEROSOL RESPIRATORY (INHALATION) at 19:14

## 2020-07-05 RX ADMIN — HYDROXYZINE HYDROCHLORIDE 50 MG: 25 TABLET ORAL at 22:15

## 2020-07-05 RX ADMIN — NYSTATIN 1 APPLICATION: 100000 POWDER TOPICAL at 16:13

## 2020-07-05 RX ADMIN — LEVOTHYROXINE SODIUM 75 MCG: 75 TABLET ORAL at 06:25

## 2020-07-05 RX ADMIN — MECLIZINE HYDROCHLORIDE 25 MG: 25 TABLET ORAL at 20:18

## 2020-07-05 RX ADMIN — NYSTATIN 1 APPLICATION: 100000 POWDER TOPICAL at 20:18

## 2020-07-05 RX ADMIN — ESCITALOPRAM 10 MG: 10 TABLET, FILM COATED ORAL at 08:11

## 2020-07-05 RX ADMIN — IPRATROPIUM BROMIDE AND ALBUTEROL SULFATE 3 ML: 2.5; .5 SOLUTION RESPIRATORY (INHALATION) at 13:14

## 2020-07-05 RX ADMIN — BUDESONIDE AND FORMOTEROL FUMARATE DIHYDRATE 2 PUFF: 160; 4.5 AEROSOL RESPIRATORY (INHALATION) at 10:38

## 2020-07-05 NOTE — PLAN OF CARE
Problem: Patient Care Overview  Goal: Plan of Care Review  Outcome: Ongoing (interventions implemented as appropriate)  Flowsheets  Taken 7/5/2020 1425  Progress: improving  Outcome Summary: Patient sitting up in chair. VSS. NSR on tele. Patient on 3L NC which is her home dose. C/O SOA with exertion.  Taken 7/5/2020 0800  Plan of Care Reviewed With: patient  Goal: Individualization and Mutuality  Outcome: Ongoing (interventions implemented as appropriate)  Flowsheets (Taken 7/5/2020 0546 by Alana Martínez RN)  Patient Specific Preferences: Pt prefers to have personal fan with her  Goal: Discharge Needs Assessment  Outcome: Ongoing (interventions implemented as appropriate)  Goal: Interprofessional Rounds/Family Conf  Outcome: Ongoing (interventions implemented as appropriate)     Problem: Fall Risk (Adult)  Goal: Absence of Fall  Outcome: Ongoing (interventions implemented as appropriate)  Flowsheets (Taken 7/5/2020 1425)  Absence of Fall: making progress toward outcome     Problem: Skin Injury Risk (Adult)  Goal: Skin Health and Integrity  Outcome: Ongoing (interventions implemented as appropriate)  Flowsheets (Taken 7/5/2020 1425)  Skin Health and Integrity: making progress toward outcome     Problem: Breathing Pattern Ineffective (Adult)  Goal: Effective Oxygenation/Ventilation  Outcome: Ongoing (interventions implemented as appropriate)  Flowsheets (Taken 7/5/2020 1425)  Effective Oxygenation/Ventilation: making progress toward outcome  Goal: Anxiety/Fear Reduction  Outcome: Ongoing (interventions implemented as appropriate)  Flowsheets (Taken 7/5/2020 1425)  Anxiety/Fear Reduction: making progress toward outcome

## 2020-07-05 NOTE — PLAN OF CARE
"  Problem: Patient Care Overview  Goal: Plan of Care Review  Outcome: Ongoing (interventions implemented as appropriate)  Flowsheets  Taken 7/5/2020 0546  Progress: no change  Outcome Summary: Pt admitted to 405 from ER. Pt SOA upon admission on 4L NC, put on BiPAP shortly after transfer. Pt requested to remove BiPAP to eat/drink. Sat up in bed for most of shift last night, then refused to wear BiPAP to sleep because she felt like it would \"gag her\". Educated and encouraged use, however pt continues to refuse. Unna boots/dressings to BLE present on admission, removed for skin assessment. MASD to abd folds. Wounds documented in chart. Pt on 4L NC, sats WNL. Pt complains of being very SOA with any activity. Safety maintained. NSR/ST  on tele. Will continue to monitor.  Taken 7/4/2020 2030  Plan of Care Reviewed With: patient     "

## 2020-07-05 NOTE — PROGRESS NOTES
[facility] Medicine Services  INPATIENT PROGRESS NOTE    Patient Name: Emy Bermudez  Date of Admission: 7/4/2020  Today's Date: 07/05/20  Length of Stay: 1  Primary Care Physician: Dorie Segura APRN    Subjective   Chief Complaint: Shortness of breath.   HPI   She states that she feels better since coming in.  She does not wish to have her but the BiPAP back on again.  She wants it taken out of her room.  It bothered her very much.    She typically wears 3 L nasal cannula at home.    She has been getting Unna boots put on twice weekly by home health.  Tuesdays and Fridays.  They are currently off.  She has chronic lymphedema and stasis dermatitis. No active cellulitis that I can see.  She is asking when someone might put these back on.    Review of Systems   All pertinent negatives and positives are as above. All other systems have been reviewed and are negative unless otherwise stated.     Objective    Temp:  [97.8 °F (36.6 °C)-98.9 °F (37.2 °C)] 97.8 °F (36.6 °C)  Heart Rate:  [] 78  Resp:  [18-25] 18  BP: (136-174)/() 136/72  Physical Exam   Constitutional: She is oriented to person, place, and time.   Up on the side of the bed.  Playing a game on her phone.  No distress.  Nontoxic.  No family present.  Discussed with her nurse, Renee.   HENT:   Head: Normocephalic and atraumatic.   Eyes: Pupils are equal, round, and reactive to light. Conjunctivae and EOM are normal.   Neck: Neck supple. No JVD present.   Cardiovascular: Normal rate, regular rhythm, normal heart sounds and intact distal pulses. Exam reveals no gallop and no friction rub.   No murmur heard.  Pulmonary/Chest: Effort normal. No respiratory distress. She has wheezes. She has rales. She exhibits no tenderness.   On 3 L nasal cannula.   Abdominal: Soft. Bowel sounds are normal. She exhibits no distension. There is no tenderness. There is no rebound and no guarding.   Musculoskeletal: Normal range of  motion. She exhibits edema. She exhibits no tenderness or deformity.   Morbidly obese body habitus.   Neurological: She is alert and oriented to person, place, and time. She displays normal reflexes. No cranial nerve deficit. She exhibits normal muscle tone.   Skin: Skin is warm and dry. No rash noted.   Chronic stasis dermatitis of the bilateral lower extremities.  Unna boots currently off.  No active cellulitis.  Onychomycosis of her toenails.   Psychiatric: She has a normal mood and affect. Her behavior is normal. Judgment and thought content normal.     Results Review:  I have reviewed the labs, radiology results, and diagnostic studies.    Laboratory Data:   Results from last 7 days   Lab Units 07/05/20  0408 07/04/20  1714   WBC 10*3/mm3 10.44 10.98*   HEMOGLOBIN g/dL 10.7* 10.4*   HEMATOCRIT % 35.4 33.9*   PLATELETS 10*3/mm3 274 269     Results from last 7 days   Lab Units 07/05/20  0408 07/04/20  1735 07/04/20  1714   SODIUM mmol/L 138  --  139   SODIUM, ARTERIAL mmol/L  --  140  --    POTASSIUM mmol/L 4.2  --  4.6   CHLORIDE mmol/L 95*  --  94*   CO2 mmol/L 34.0*  --  35.0*   BUN mg/dL 15  --  10   CREATININE mg/dL 0.94  --  0.92   CALCIUM mg/dL 9.4  --  9.1   BILIRUBIN mg/dL  --   --  0.2   ALK PHOS U/L  --   --  77   ALT (SGPT) U/L  --   --  12   AST (SGOT) U/L  --   --  19   GLUCOSE mg/dL 191*  --  105*     I have reviewed the patient's current medications.     Assessment/Plan     Active Hospital Problems    Diagnosis   • **Acute on chronic respiratory failure with hypoxia and hypercapnia (CMS/HCC)   • Acute on chronic diastolic heart failure (CMS/HCC)   • COPD exacerbation (CMS/HCC)   • Essential hypertension   • Venous insufficiency of both lower extremities   • Stasis dermatitis of both legs   • Normocytic anemia   • Hypothyroidism (acquired)   • Obesity, Class III, BMI 40-49.9 (morbid obesity) (CMS/HCC)     Plan:  She was admitted on 7/4 by Dr. Juarez.  She presented with complaints of dyspnea on  exertion.  It is felt at this point in time that she is having problems with exacerbation of diastolic heart failure and COPD.    She had an echocardiogram in February 2020.  This showed diastolic dysfunction.  Clinically she has acute on chronic diastolic heart failure at this time.  Will diurese with oral Lasix.  Will start losartan as she has been hypertensive.  She is not on any diuretic or antihypertensive therapy as an outpatient.    Continue Symbicort.  Continue inhaled bronchodilators.  Mucinex and incentive spirometry.  IV Solu-Medrol.  Discontinue Levaquin in favor of doxycycline.    Repeat PA lateral chest x-ray tomorrow morning.    Hyperglycemia secondary to steroids.  Normal hemoglobin A1c in April 2020.    TSH in April was 2.490.  Continue current dose of Synthroid. Repeat TSH.     PT consult for therapy and also to replace Unna boots.  The skin nurse has also been requested.    Lovenox for DVT prophylaxis.    Discharge Planning: She has been at home with her daughter with the assistance of home health.  This is what she plans at discharge.    Silvestre Montelongo,    07/05/20   08:36
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
## 2020-07-06 ENCOUNTER — APPOINTMENT (OUTPATIENT)
Dept: GENERAL RADIOLOGY | Facility: HOSPITAL | Age: 70
End: 2020-07-06

## 2020-07-06 PROBLEM — L03.119 LOWER EXTREMITY CELLULITIS: Status: RESOLVED | Noted: 2020-04-28 | Resolved: 2020-07-06

## 2020-07-06 LAB
ANION GAP SERPL CALCULATED.3IONS-SCNC: 12 MMOL/L (ref 5–15)
BUN SERPL-MCNC: 20 MG/DL (ref 8–23)
BUN/CREAT SERPL: 18.7 (ref 7–25)
CALCIUM SPEC-SCNC: 8.9 MG/DL (ref 8.6–10.5)
CHLORIDE SERPL-SCNC: 94 MMOL/L (ref 98–107)
CO2 SERPL-SCNC: 32 MMOL/L (ref 22–29)
CREAT SERPL-MCNC: 1.07 MG/DL (ref 0.57–1)
GFR SERPL CREATININE-BSD FRML MDRD: 51 ML/MIN/1.73
GLUCOSE SERPL-MCNC: 137 MG/DL (ref 65–99)
POTASSIUM SERPL-SCNC: 4.5 MMOL/L (ref 3.5–5.2)
SODIUM SERPL-SCNC: 138 MMOL/L (ref 136–145)
TSH SERPL DL<=0.05 MIU/L-ACNC: 0.28 UIU/ML (ref 0.27–4.2)

## 2020-07-06 PROCEDURE — 84443 ASSAY THYROID STIM HORMONE: CPT | Performed by: INTERNAL MEDICINE

## 2020-07-06 PROCEDURE — 97165 OT EVAL LOW COMPLEX 30 MIN: CPT | Performed by: OCCUPATIONAL THERAPIST

## 2020-07-06 PROCEDURE — 94799 UNLISTED PULMONARY SVC/PX: CPT

## 2020-07-06 PROCEDURE — 25010000002 METHYLPREDNISOLONE PER 40 MG: Performed by: INTERNAL MEDICINE

## 2020-07-06 PROCEDURE — 29580 STRAPPING UNNA BOOT: CPT

## 2020-07-06 PROCEDURE — 25010000002 ENOXAPARIN PER 10 MG: Performed by: FAMILY MEDICINE

## 2020-07-06 PROCEDURE — 97162 PT EVAL MOD COMPLEX 30 MIN: CPT

## 2020-07-06 PROCEDURE — 80048 BASIC METABOLIC PNL TOTAL CA: CPT | Performed by: INTERNAL MEDICINE

## 2020-07-06 PROCEDURE — 25010000002 METHYLPREDNISOLONE PER 40 MG: Performed by: FAMILY MEDICINE

## 2020-07-06 PROCEDURE — 63710000001 DIPHENHYDRAMINE PER 50 MG: Performed by: FAMILY MEDICINE

## 2020-07-06 PROCEDURE — 71046 X-RAY EXAM CHEST 2 VIEWS: CPT

## 2020-07-06 RX ORDER — METHYLPREDNISOLONE SODIUM SUCCINATE 40 MG/ML
40 INJECTION, POWDER, LYOPHILIZED, FOR SOLUTION INTRAMUSCULAR; INTRAVENOUS EVERY 12 HOURS
Status: DISCONTINUED | OUTPATIENT
Start: 2020-07-06 | End: 2020-07-07

## 2020-07-06 RX ORDER — IPRATROPIUM BROMIDE AND ALBUTEROL SULFATE 2.5; .5 MG/3ML; MG/3ML
3 SOLUTION RESPIRATORY (INHALATION)
Status: DISCONTINUED | OUTPATIENT
Start: 2020-07-06 | End: 2020-07-08 | Stop reason: HOSPADM

## 2020-07-06 RX ADMIN — ESCITALOPRAM 10 MG: 10 TABLET, FILM COATED ORAL at 09:50

## 2020-07-06 RX ADMIN — ENOXAPARIN SODIUM 40 MG: 40 INJECTION SUBCUTANEOUS at 20:57

## 2020-07-06 RX ADMIN — LEVOTHYROXINE SODIUM 75 MCG: 75 TABLET ORAL at 05:21

## 2020-07-06 RX ADMIN — IPRATROPIUM BROMIDE AND ALBUTEROL SULFATE 3 ML: 2.5; .5 SOLUTION RESPIRATORY (INHALATION) at 20:02

## 2020-07-06 RX ADMIN — IPRATROPIUM BROMIDE AND ALBUTEROL SULFATE 3 ML: 2.5; .5 SOLUTION RESPIRATORY (INHALATION) at 14:32

## 2020-07-06 RX ADMIN — MECLIZINE HYDROCHLORIDE 25 MG: 25 TABLET ORAL at 09:50

## 2020-07-06 RX ADMIN — SODIUM CHLORIDE, PRESERVATIVE FREE 10 ML: 5 INJECTION INTRAVENOUS at 20:57

## 2020-07-06 RX ADMIN — SODIUM CHLORIDE, PRESERVATIVE FREE 10 ML: 5 INJECTION INTRAVENOUS at 09:51

## 2020-07-06 RX ADMIN — GUAIFENESIN 1200 MG: 600 TABLET, EXTENDED RELEASE ORAL at 09:50

## 2020-07-06 RX ADMIN — IPRATROPIUM BROMIDE AND ALBUTEROL SULFATE 3 ML: 2.5; .5 SOLUTION RESPIRATORY (INHALATION) at 00:00

## 2020-07-06 RX ADMIN — BUDESONIDE AND FORMOTEROL FUMARATE DIHYDRATE 2 PUFF: 160; 4.5 AEROSOL RESPIRATORY (INHALATION) at 20:02

## 2020-07-06 RX ADMIN — NYSTATIN 1 APPLICATION: 100000 POWDER TOPICAL at 09:51

## 2020-07-06 RX ADMIN — NYSTATIN 1 APPLICATION: 100000 POWDER TOPICAL at 20:55

## 2020-07-06 RX ADMIN — IPRATROPIUM BROMIDE AND ALBUTEROL SULFATE 3 ML: 2.5; .5 SOLUTION RESPIRATORY (INHALATION) at 06:48

## 2020-07-06 RX ADMIN — FUROSEMIDE 40 MG: 40 TABLET ORAL at 09:50

## 2020-07-06 RX ADMIN — FAMOTIDINE 40 MG: 20 TABLET, FILM COATED ORAL at 09:50

## 2020-07-06 RX ADMIN — DIPHENHYDRAMINE HYDROCHLORIDE 25 MG: 25 CAPSULE ORAL at 23:35

## 2020-07-06 RX ADMIN — DOXYCYCLINE 100 MG: 100 TABLET, FILM COATED ORAL at 09:50

## 2020-07-06 RX ADMIN — NYSTATIN 1 APPLICATION: 100000 POWDER TOPICAL at 17:09

## 2020-07-06 RX ADMIN — DOXYCYCLINE 100 MG: 100 TABLET, FILM COATED ORAL at 20:58

## 2020-07-06 RX ADMIN — MECLIZINE HYDROCHLORIDE 25 MG: 25 TABLET ORAL at 20:57

## 2020-07-06 RX ADMIN — METHYLPREDNISOLONE SODIUM SUCCINATE 40 MG: 40 INJECTION, POWDER, FOR SOLUTION INTRAMUSCULAR; INTRAVENOUS at 00:14

## 2020-07-06 RX ADMIN — METHYLPREDNISOLONE SODIUM SUCCINATE 40 MG: 40 INJECTION, POWDER, FOR SOLUTION INTRAMUSCULAR; INTRAVENOUS at 20:56

## 2020-07-06 RX ADMIN — LOSARTAN POTASSIUM 25 MG: 50 TABLET, FILM COATED ORAL at 09:50

## 2020-07-06 RX ADMIN — GUAIFENESIN 1200 MG: 600 TABLET, EXTENDED RELEASE ORAL at 20:57

## 2020-07-06 RX ADMIN — BUDESONIDE AND FORMOTEROL FUMARATE DIHYDRATE 2 PUFF: 160; 4.5 AEROSOL RESPIRATORY (INHALATION) at 06:49

## 2020-07-06 RX ADMIN — METHYLPREDNISOLONE SODIUM SUCCINATE 40 MG: 40 INJECTION, POWDER, FOR SOLUTION INTRAMUSCULAR; INTRAVENOUS at 09:50

## 2020-07-06 RX ADMIN — NICOTINE 1 PATCH: 21 PATCH, EXTENDED RELEASE TRANSDERMAL at 20:56

## 2020-07-06 NOTE — PLAN OF CARE
Problem: Patient Care Overview  Goal: Plan of Care Review  7/6/2020 1609 by Renee Carty RN  Outcome: Ongoing (interventions implemented as appropriate)  Flowsheets  Taken 7/6/2020 1550 by Renee Carty RN  Progress: improving  Outcome Summary: VSSJuan beltran/merissa telemetry monitoring today. Unna boots placed per PT. Skin care, nystatin powder to folds. Colostomy bag changed today. Receiving solumedrol IV and antibiotics PO. Possible d/c tomorrow.  Taken 7/6/2020 1425 by Tonya Parks, OTR/L  Plan of Care Reviewed With: patient  7/6/2020 1555 by Renee Carty RN  Outcome: Ongoing (interventions implemented as appropriate)  Flowsheets  Taken 7/6/2020 1550 by Renee Carty RN  Progress: improving  Outcome Summary: VSBERNABE vazquez telemetry monitoring today. Unna boots placed per PT. Skin care, nystatin powder to folds. Colostomy bag changed today. Receiving solumedrol IV and antibiotics PO. Possible d/c tomorrow.  Taken 7/6/2020 1425 by Tonya Parks, OTR/L  Plan of Care Reviewed With: patient  Goal: Individualization and Mutuality  7/6/2020 1609 by Renee Carty RN  Outcome: Ongoing (interventions implemented as appropriate)  Flowsheets (Taken 7/6/2020 1550)  Patient Specific Preferences: likes to sit up in chair during the day, keep her personal fan close to her  7/6/2020 1555 by Renee Carty RN  Outcome: Ongoing (interventions implemented as appropriate)  Flowsheets (Taken 7/6/2020 1550)  Patient Specific Preferences: likes to sit up in chair during the day, keep her personal fan close to her  Goal: Discharge Needs Assessment  7/6/2020 1609 by Renee Carty RN  Outcome: Ongoing (interventions implemented as appropriate)  7/6/2020 1555 by Renee Carty RN  Outcome: Ongoing (interventions implemented as appropriate)  Goal: Interprofessional Rounds/Family Conf  7/6/2020 1609 by Renee Carty, RN  Outcome: Ongoing (interventions implemented as appropriate)  7/6/2020 1555 by Renee Carty  GRUPO DODSON  Outcome: Ongoing (interventions implemented as appropriate)     Problem: Fall Risk (Adult)  Goal: Absence of Fall  7/6/2020 1609 by Renee Carty RN  Outcome: Ongoing (interventions implemented as appropriate)  Flowsheets (Taken 7/6/2020 1609)  Absence of Fall: making progress toward outcome  7/6/2020 1555 by Renee Carty RN  Outcome: Ongoing (interventions implemented as appropriate)  Flowsheets (Taken 7/6/2020 1550)  Absence of Fall: making progress toward outcome     Problem: Skin Injury Risk (Adult)  Goal: Skin Health and Integrity  7/6/2020 1609 by Renee Carty RN  Outcome: Ongoing (interventions implemented as appropriate)  Flowsheets (Taken 7/6/2020 1609)  Skin Health and Integrity: making progress toward outcome  7/6/2020 1555 by Renee Carty RN  Outcome: Ongoing (interventions implemented as appropriate)  Flowsheets (Taken 7/6/2020 1550)  Skin Health and Integrity: making progress toward outcome     Problem: Breathing Pattern Ineffective (Adult)  Goal: Effective Oxygenation/Ventilation  7/6/2020 1609 by Renee Carty RN  Outcome: Ongoing (interventions implemented as appropriate)  Flowsheets (Taken 7/6/2020 1609)  Effective Oxygenation/Ventilation: making progress toward outcome  7/6/2020 1555 by Renee Carty RN  Outcome: Ongoing (interventions implemented as appropriate)  Flowsheets (Taken 7/6/2020 1550)  Effective Oxygenation/Ventilation: making progress toward outcome  Goal: Anxiety/Fear Reduction  7/6/2020 1609 by Renee Carty RN  Outcome: Ongoing (interventions implemented as appropriate)  Flowsheets (Taken 7/6/2020 1609)  Anxiety/Fear Reduction: making progress toward outcome  7/6/2020 1555 by Renee Carty RN  Outcome: Ongoing (interventions implemented as appropriate)  Flowsheets (Taken 7/6/2020 1550)  Anxiety/Fear Reduction: making progress toward outcome

## 2020-07-06 NOTE — PROGRESS NOTES
[facility] Medicine Services  INPATIENT PROGRESS NOTE    Length of Stay: 2  Date of Admission: 7/4/2020  Primary Care Physician: Dorie Segura APRN    Subjective     Chief Complaint:     Shortness of breath    HPI     The patient states that she feels better today.  Oxygen requirement is down to 4 L per nasal cannula.  She had earlier been decreased to 3 L but states that she feels better on 4 L today and is asking if she could increase her oxygen to 4 L at home.  Her typical oxygen requirement at home is 3 L.  Unna boots have been reapplied and the patient has tolerated that well.  There is no evidence of cellulitis.  She is sitting in a chair at bedside watching television.      Review of Systems     All pertinent negatives and positives are as above. All other systems have been reviewed and are negative unless otherwise stated.     Objective    Temp:  [97.8 °F (36.6 °C)-98.4 °F (36.9 °C)] 97.8 °F (36.6 °C)  Heart Rate:  [67-95] 93  Resp:  [18-20] 18  BP: (122-149)/(53-81) 140/81    Lab Results (last 24 hours)     Procedure Component Value Units Date/Time    Basic Metabolic Panel [[id number]]  (Abnormal) Collected:  07/06/20 0316    Specimen:  Blood Updated:  07/06/20 0432     Glucose 137 mg/dL      BUN 20 mg/dL      Creatinine 1.07 mg/dL      Sodium 138 mmol/L      Potassium 4.5 mmol/L      Comment: Specimen hemolyzed.  Results may be affected.        Chloride 94 mmol/L      CO2 32.0 mmol/L      Calcium 8.9 mg/dL      eGFR Non African Amer 51 mL/min/1.73      BUN/Creatinine Ratio 18.7     Anion Gap 12.0 mmol/L     Narrative:       GFR Normal >60  Chronic Kidney Disease <60  Kidney Failure <15      TSH [[id number]]  (Normal) Collected:  07/06/20 0316    Specimen:  Blood Updated:  07/06/20 0432     TSH 0.283 uIU/mL     Blood Culture - Blood, Blood, Venous Line [[id number]] Collected:  07/04/20 1808    Specimen:  Blood, Venous Line Updated:  07/05/20 1900     Blood Culture No  growth at 24 hours    Blood Culture - Blood, Blood, Venous Line [[id number]] Collected:  07/04/20 1714    Specimen:  Blood, Venous Line Updated:  07/05/20 1745     Blood Culture No growth at 24 hours          Imaging Results (Last 24 Hours)     Procedure Component Value Units Date/Time    XR Chest PA & Lateral [[id number]] Collected:  07/06/20 0933     Updated:  07/06/20 0938    Narrative:       EXAMINATION: Chest 2 view 7/6/2020     HISTORY: Shortness of breath     FINDINGS: Upright frontal and lateral projection of the chest  demonstrates evidence of remote granulomatous disease. There are  increased interstitial markings of the lungs, particularly within the  lung bases stable from the previous study. There is bronchial wall  thickening also chronic in nature suggesting chronic bronchitis. No  evidence of lobar pneumonia or effusion.       Impression:       1.. Increased interstitial markings and bronchial wall thickening  suggesting chronic bronchitis. This constellation of findings is  commonly associated with chronic tobacco abuse. No acute infiltrate or  effusion.  This report was finalized on 07/06/2020 09:35 by Dr. Matthew Lewis MD.             Intake/Output Summary (Last 24 hours) at 7/6/2020 1258  Last data filed at 7/6/2020 1200  Gross per 24 hour   Intake 720 ml   Output 1100 ml   Net -380 ml       Physical Exam  Constitutional: She is oriented to person, place, and time. Sitting in a chair at bedside. No distress.  No family present.   HENT:   Head: Normocephalic and atraumatic.   Eyes: Conjunctivae and EOM are normal.   Neck: Neck supple. No JVD present.   Cardiovascular: Normal rate, regular rhythm, normal heart sounds and intact distal pulses. No murmur heard.  Pulmonary/Chest: Effort normal. No respiratory distress. She has wheezes. She exhibits no tenderness.   On 4 L nasal cannula.   Abdominal: Soft. Bowel sounds are normal. She exhibits no distension. There is no tenderness.   Musculoskeletal:  Normal range of motion. She exhibits edema. She exhibits no tenderness or deformity.   Morbidly obese.   Neurological: She is alert and oriented to person, place, and time. She exhibits normal muscle tone.   Skin: Skin is warm and dry. No rash noted. Unna boots present bilaterally No active cellulitis.  Onychomycosis of toenails.   Psychiatric: She has a normal mood and affect.    Results Review:  I have reviewed the labs, radiology results, and diagnostic studies since my last progress note and made treatment changes reflective of the results.   I have reviewed the current medications.    Assessment/Plan     Active Hospital Problems    Diagnosis   • **Acute on chronic respiratory failure with hypoxia and hypercapnia (CMS/HCC)   • Normocytic anemia   • Hypothyroidism (acquired)   • Essential hypertension   • Venous insufficiency of both lower extremities   • Stasis dermatitis of both legs   • Obesity, Class III, BMI 40-49.9 (morbid obesity) (CMS/HCC)   • COPD exacerbation (CMS/HCC)       PLAN:  Decrease Solu-Medrol to every 12 hours  Change DuoNeb treatments to 4 times daily  Possible discharge tomorrow    Eleno Tolentino DO   07/06/20   12:58

## 2020-07-06 NOTE — PLAN OF CARE
Problem: Patient Care Overview  Goal: Plan of Care Review  Flowsheets (Taken 7/6/2020 9939)  Plan of Care Reviewed With: patient  Outcome Summary: OT eval completed. Pt is A&Ox4. Demo's decreased strength, balance, activity tolerance, and increased SOB. Max A for donning socks. CGA for sit <> stand t/f from chair and able to take small steps forward and backwards. Pt would benefit from skilled OT services to address these deficits. Recommend home with assist and HH vs. TCU.

## 2020-07-06 NOTE — PLAN OF CARE
Problem: Patient Care Overview  Goal: Plan of Care Review  Flowsheets (Taken 7/6/2020 1025)  Outcome Summary: PT barrett completed. She presents alert and oriented x 4. She needed CGA to stand and ambulate with a RW. She was able to tolerate ambulating a few steps forward and backward. She demos SOB and decreased activity tolerance/endurance at this time. PT will continue to progress her functional mobility and activity tolerance. She plans to dc home with her daughter and continued HH for unna boot management.

## 2020-07-06 NOTE — THERAPY EVALUATION
Patient Name: Emy Bermudez  : 1950    MRN: [id number]                              Today's Date: 2020       Admit Date: 2020    Visit Dx:     ICD-10-CM ICD-9-CM   1. COPD exacerbation (CMS/[facility]) J44.1 491.21   2. Impaired functional mobility and activity tolerance Z74.09 V49.89     Patient Active Problem List   Diagnosis   • COPD exacerbation (CMS/[facility])   • Chronic respiratory failure with hypoxia (CMS/[facility])   • Tobacco dependence   • Lower extremity cellulitis   • Obesity, Class III, BMI 40-49.9 (morbid obesity) (CMS/[facility])   • NOAH (acute kidney injury) (CMS/[facility])   • Acute on chronic respiratory failure with hypoxia and hypercapnia (CMS/[facility])   • Normocytic anemia   • Hypothyroidism (acquired)   • Acute on chronic diastolic heart failure (CMS/[facility])   • Essential hypertension   • Venous insufficiency of both lower extremities   • Stasis dermatitis of both legs     Past Medical History:   Diagnosis Date   • Abdominal wall abscess    • Abdominal wall fistula 2018   • Cellulitis    • Colonic obstruction (CMS/[facility]) 4/3/2018   • COPD (chronic obstructive pulmonary disease) (CMS/[facility])    • Depression    • Diverticul disease small and large intestine, no perforati or abscess    • Edema    • GERD (gastroesophageal reflux disease)    • Hyperlipidemia    • Hypertension    • Hypocalcemia    • Hypothyroid    • Obesity, Class III, BMI 40-49.9 (morbid obesity) (CMS/[facility]) 2020   • Respiratory failure (CMS/[facility])    • Tobacco dependence 2020   • Venous insufficiency    • Vertigo      Past Surgical History:   Procedure Laterality Date   • ABDOMINAL SURGERY     •  SECTION     • COLOSTOMY     • EXPLORATORY LAPAROTOMY N/A 2018    Procedure: LAPAROTOMY EXPLORATORY, partial colectomy, creation colostomy, incision and drainage abdominal wall abscess;  Surgeon: Elda Velazquez MD;  Location: [facility];  Service: General     General Information     Row Name 20 1025          PT Evaluation Time/Intention     Document Type  evaluation SOB, cough. Dx: Acute on chronic resp failure w hypoxia & hypercapnia. Hx chronic lymphedema & stasis dermatitis.   -JUD     Mode of Treatment  physical therapy  -JUD     Row Name 07/06/20 1025          General Information    Patient Profile Reviewed?  yes  -JUD     Prior Level of Function  min assist:;dressing;independent:;transfer;ADL's;bed mobility;mod assist:;max assist:;bathing  -JUD     Existing Precautions/Restrictions  fall B unna boots  -JUD     Barriers to Rehab  medically complex  -JUD     Row Name 07/06/20 1025          Relationship/Environment    Lives With  child(irene), adult  -JUD     Row Name 07/06/20 1025          Resource/Environmental Concerns    Current Living Arrangements  home/apartment/condo  -JUD     Row Name 07/06/20 1025          Cognitive Assessment/Intervention- PT/OT    Orientation Status (Cognition)  oriented x 4  -JUD     Personal Safety Interventions  fall prevention program maintained;gait belt;muscle strengthening facilitated;nonskid shoes/slippers when out of bed  -JUD     Row Name 07/06/20 1025          Safety Issues, Functional Mobility    Impairments Affecting Function (Mobility)  endurance/activity tolerance;shortness of breath;strength;pain  -JUD       User Key  (r) = Recorded By, (t) = Taken By, (c) = Cosigned By    Initials Name Provider Type    JUD Dre Tate, PT DPT Physical Therapist        Mobility     Row Name 07/06/20 1025          Bed Mobility Assessment/Treatment    Comment (Bed Mobility)  In chair  -JUD     Row Name 07/06/20 1025          Sit-Stand Transfer    Sit-Stand [place] (Transfers)  contact guard  -JUD     Assistive Device (Sit-Stand Transfers)  walker, front-wheeled  -JUD     Row Name 07/06/20 1025          Gait/Stairs Assessment/Training    [place] Level (Gait)  contact guard  -JUD     Assistive Device (Gait)  walker, front-wheeled  -JUD     Distance in Feet (Gait)  10 ft  -JUD     Pattern (Gait)  step-to  -JUD      Deviations/Abnormal Patterns (Gait)  [place] decreased;gait speed decreased  -JUD     Comment (Gait/Stairs)  SOB with activity, decreased endurance and activity tolerance  -JUD       User Key  (r) = Recorded By, (t) = Taken By, (c) = Cosigned By    Initials Name Provider Type    Dre Adan PT DPT Physical Therapist        Obj/Interventions     Row Name 07/06/20 1025          General ROM    GENERAL ROM COMMENTS  B LE AROM WFL  -JUD     Row Name 07/06/20 1025          MMT (Manual Muscle Testing)    General MMT Comments  B LE functionally 4-/5  -JUD     Row Name 07/06/20 1025          Static Sitting Balance    Level of [place] (Unsupported Sitting, Static Balance)  independent  -JUD     Sitting Position (Unsupported Sitting, Static Balance)  sitting in chair  -JUD     Row Name 07/06/20 1025          Static Standing Balance    Level of [place] (Supported Standing, Static Balance)  contact guard assist  -JUD     Assistive Device Utilized (Supported Standing, Static Balance)  walker, rolling  -JUD     Row Name 07/06/20 1025          Dynamic Standing Balance    Level of [place], Reaches Outside Midline (Standing, Dynamic Balance)  contact guard assist  -JUD     Assistive Device Utilized (Supported Standing, Dynamic Balance)  walker, rolling  -JUD     Row Name 07/06/20 1025          Sensory Assessment/Intervention    Sensory General Assessment  no sensation deficits identified Can detect light touch sensation in B LE  -JUD       User Key  (r) = Recorded By, (t) = Taken By, (c) = Cosigned By    Initials Name Provider Type    Dre Adan PT DPT Physical Therapist        Goals/Plan     Row Name 07/06/20 1025          Bed Mobility Goal 1 (PT)    Activity/Assistive Device (Bed Mobility Goal 1, PT)  sit to supine/supine to sit  -JUD     [place] Level/Cues Needed (Bed Mobility Goal 1, PT)  supervision required  -JUD     Time Frame (Bed Mobility Goal 1, PT)  long term goal (LTG);10 days  -JUD      Progress/Outcomes (Bed Mobility Goal 1, PT)  goal ongoing  -[facility] Name 07/06/20 1025          Transfer Goal 1 (PT)    Activity/Assistive Device (Transfer Goal 1, PT)  sit-to-stand/stand-to-sit;bed-to-chair/chair-to-bed;walker, rolling  -JUD     [place] Level/Cues Needed (Transfer Goal 1, PT)  supervision required  -JUD     Time Frame (Transfer Goal 1, PT)  long term goal (LTG);10 days  -JUD     Progress/Outcome (Transfer Goal 1, PT)  goal ongoing  -[facility] Name 07/06/20 1025          Gait Training Goal 1 (PT)    Activity/Assistive Device (Gait Training Goal 1, PT)  gait (walking locomotion);assistive device use;decrease fall risk;improve balance and speed;increase endurance/gait distance  -JUD     [place] Level (Gait Training Goal 1, PT)  supervision required  -JUD     Distance (Gait Goal 1, PT)  30  -JUD     Time Frame (Gait Training Goal 1, PT)  long term goal (LTG);10 days  -JUD     Progress/Outcome (Gait Training Goal 1, PT)  goal ongoing  -JUD       User Key  (r) = Recorded By, (t) = Taken By, (c) = Cosigned By    Initials Name Provider Type    Dre Adan, PT DPT Physical Therapist        Clinical Impression     Row Name 07/06/20 1025          Pain Assessment    Additional Documentation  Pain Scale: FACES Pre/Post-Treatment (Group)  -JUD     Row Name 07/06/20 1025          Pain Scale: Numbers Pre/Post-Treatment    Pain Location - Side  Left  -JUD     Pain Location - Orientation  lower  -JUD     Pain Location  extremity  -JUD     Row Name 07/06/20 1025          Pain Scale: FACES Pre/Post-Treatment    Pain: FACES Scale, Pretreatment  2-->hurts little bit  -JUD     Pain: FACES Scale, Post-Treatment  2-->hurts little bit  -JUD     Pre/Post Treatment Pain Comment  c/o SOB, decreased endurance/activity tolerance  -JUD     Row Name 07/06/20 1025          Plan of Care Review    Plan of Care Reviewed With  patient  -JUD     Row Name 07/06/20 1025          Physical Therapy Clinical Impression     Patient/Family Goals Statement (PT Clinical Impression)  go home with HH  -JUD     Criteria for Skilled Interventions Met (PT Clinical Impression)  yes;treatment indicated  -JUD     Rehab Potential (PT Clinical Summary)  good, to achieve stated therapy goals  -JUD     Predicted Duration of Therapy (PT)  until d/c  -JUD     Row Name 07/06/20 1025          Positioning and Restraints    Pre-Treatment Position  sitting in chair/recliner  -JUD     Post Treatment Position  chair  -JUD     In Chair  sitting;call light within reach;encouraged to call for assist  -JUD       User Key  (r) = Recorded By, (t) = Taken By, (c) = Cosigned By    Initials Name Provider Type    Dre Adan, PT DPT Physical Therapist        Outcome Measures     Row Name 07/06/20 1156          How much help from another person do you currently need...    Turning from your back to your side while in flat bed without using bedrails?  3  -JUD     Moving from lying on back to sitting on the side of a flat bed without bedrails?  3  -JUD     Moving to and from a bed to a chair (including a wheelchair)?  3  -JUD     Standing up from a chair using your arms (e.g., wheelchair, bedside chair)?  3  -JUD     Climbing 3-5 steps with a railing?  2  -JUD     To walk in hospital room?  3  -JUD     AM-PAC 6 Clicks Score (PT)  17  -JUD     Row Name 07/06/20 1156          Functional Assessment    Outcome Measure Options  AM-PAC 6 Clicks Basic Mobility (PT)  -JUD       User Key  (r) = Recorded By, (t) = Taken By, (c) = Cosigned By    Initials Name Provider Type    Dre Adan, PT DPT Physical Therapist        Physical Therapy Education                 Title: PT OT SLP Therapies (In Progress)     Topic: Physical Therapy (In Progress)     Point: Mobility training (Done)     Description:   Instruct learner(s) on safety and technique for assisting patient out of bed, chair or wheelchair.  Instruct in the proper use of assistive devices, such as walker, crutches, cane or  brace.              Patient Friendly Description:   It's important to get you on your feet again, but we need to do so in a way that is safe for you. Falling has serious consequences, and your personal safety is the most important thing of all.        When it's time to get out of bed, one of us or a family member will sit next to you on the bed to give you support.     If your doctor or nurse tells you to use a walker, crutches, a cane, or a brace, be sure you use it every time you get out of bed, even if you think you don't need it.    Learning Progress Summary           Patient Acceptance, E, SUJEY,DU by JUD at 7/6/2020 8691    Comment:  Educated pt on progression of PT POC and benefits of activity                   Point: Home exercise program (Not Started)     Description:   Instruct learner(s) on appropriate technique for monitoring, assisting and/or progressing patient with therapeutic exercises and activities.              Learner Progress:   Not documented in this visit.          Point: Body mechanics (Not Started)     Description:   Instruct learner(s) on proper positioning and spine alignment for patient and/or caregiver during mobility tasks and/or exercises.              Learner Progress:   Not documented in this visit.          Point: Precautions (Not Started)     Description:   Instruct learner(s) on prescribed precautions during mobility and gait tasks              Learner Progress:   Not documented in this visit.                      User Key     Initials Effective Dates Name Provider Type Discipline    JUD 08/02/16 -  Dre Tate, PT DPT Physical Therapist PT              PT Recommendation and Plan  Planned Therapy Interventions (PT Eval): bed mobility training, transfer training, gait training, balance training, home exercise program, patient/family education, postural re-education, strengthening, wound care  Outcome Summary/Treatment Plan (PT)  Anticipated Discharge Disposition (PT): home with  assist, home with 24/7 care, home with home health  Plan of Care Reviewed With: patient  Outcome Summary: PT barrett completed. She presents alert and oriented x 4. She needed CGA to stand and ambulate with a RW. She was able to tolerate ambulating a few steps forward and backward. She demos SOB and decreased activity tolerance/endurance at this time. PT will continue to progress her functional mobility and activity tolerance. She plans to dc home with her daughter and continued HH for unna boot management.     Time Calculation:   PT Charges     Row Name 07/06/20 1158             Time Calculation    Start Time  1025  -JUD      Stop Time  1120  -JUD      Time Calculation (min)  55 min  -JUD      PT Received On  07/06/20  -JUD      PT Goal Re-Cert Due Date  07/16/20  -JUD        User Key  (r) = Recorded By, (t) = Taken By, (c) = Cosigned By    Initials Name Provider Type    Dre Adan, PT DPT Physical Therapist        Therapy Charges for Today     Code Description Service Date Service Provider Modifiers Qty    [id number]  PT BARRETT MOD COMPLEXITY 4 7/6/2020 Dre Tate PT DPT GP 1          PT G-Codes  Outcome Measure Options: AM-PAC 6 Clicks Basic Mobility (PT)  AM-PAC 6 Clicks Score (PT): 17  AM-PAC 6 Clicks Score (OT): 17    Dre Tate PT DPT  7/6/2020

## 2020-07-06 NOTE — PLAN OF CARE
Problem: Patient Care Overview  Goal: Plan of Care Review  7/6/2020 1247 by Dre Tate, PT DPT  Flowsheets (Taken 7/6/2020 1120)  Outcome Summary: PT placed B unna boots per MD order. PT placed 1 adaptic over left post calf wound followed by unna layer, kerlix for drainage and coban. PT wrapped R LE with unna layer and coban as she had no visible open wounds or sores. capillary refill ~ 3-4 seconds pre and post wrapping. PT will continue to check B unna boots daily and will change in 5-7 days/PRN. She plans to continue with HH for unna boot management.

## 2020-07-06 NOTE — THERAPY WOUND CARE TREATMENT
Acute Care - Wound/Debridement Unna Boot Wrapping  [facility]     Patient Name: Emy Bermudez  : 1950  MRN: [id number]  Today's Date: 2020  Onset of Illness/Injury or Date of Surgery: 20      Referring Physician: Dr Tolentino(Unna boots)      Admit Date: 2020    Visit Dx:    ICD-10-CM ICD-9-CM   1. COPD exacerbation (CMS/[facility]) J44.1 491.21   2. Impaired functional mobility and activity tolerance Z74.09 V49.89       Patient Active Problem List   Diagnosis   • COPD exacerbation (CMS/HCC)   • Obesity, Class III, BMI 40-49.9 (morbid obesity) (CMS/[facility])   • Acute on chronic respiratory failure with hypoxia and hypercapnia (CMS/HCC)   • Normocytic anemia   • Hypothyroidism (acquired)   • Essential hypertension   • Venous insufficiency of both lower extremities   • Stasis dermatitis of both legs        Past Medical History:   Diagnosis Date   • Abdominal wall abscess    • Abdominal wall fistula 2018   • Cellulitis    • Chronic respiratory failure with hypoxia (CMS/HCC) 2020   • Colonic obstruction (CMS/HCC) 4/3/2018   • COPD (chronic obstructive pulmonary disease) (CMS/HCC)    • Depression    • Diverticul disease small and large intestine, no perforati or abscess    • Edema    • GERD (gastroesophageal reflux disease)    • Hyperlipidemia    • Hypertension    • Hypocalcemia    • Hypothyroid    • Obesity, Class III, BMI 40-49.9 (morbid obesity) (CMS/HCC) 2020   • Respiratory failure (CMS/[facility])    • Tobacco dependence 2020   • Venous insufficiency    • Vertigo         Past Surgical History:   Procedure Laterality Date   • ABDOMINAL SURGERY     •  SECTION     • COLOSTOMY     • EXPLORATORY LAPAROTOMY N/A 2018    Procedure: LAPAROTOMY EXPLORATORY, partial colectomy, creation colostomy, incision and drainage abdominal wall abscess;  Surgeon: Elda Velazquez MD;  Location: [facility];  Service: General           Wound 20 groin MASD (Moisture associated skin damage) (Active)    Closure Open to air 7/6/2020  8:00 AM   Base moist;pink;red 7/6/2020  8:00 AM   Periwound moist 7/6/2020  8:00 AM   Periwound Temperature warm 7/6/2020  8:00 AM   Periwound Skin Turgor soft 7/6/2020  8:00 AM   Edges irregular 7/6/2020  8:00 AM   Care, Wound cleansed with;soap and water 7/6/2020  8:00 AM   Dressing Care, Wound other (see comments) 7/5/2020  8:00 PM   Periwound Care, Wound dry periwound area maintained 7/5/2020  8:00 PM       Wound 04/28/20 2041 Right anterior;lower leg (Active)   Closure Open to air 7/6/2020  8:00 AM   Periwound dry;pink;edematous;swelling 7/6/2020 11:20 AM   Periwound Temperature cool 7/6/2020  8:00 AM   Periwound Skin Turgor firm 7/6/2020  8:00 AM   Drainage Amount none 7/6/2020 11:20 AM   Care, Wound sarah boot 7/6/2020 11:20 AM   Dressing Care, Wound open to air 7/6/2020  8:00 AM       Wound 07/05/20 0005 Left posterior calf (Active)   Wound Image   7/6/2020 11:20 AM   Dressing Appearance intact;dressing loose 7/6/2020 11:20 AM   Base moist;red;pink 7/6/2020 11:20 AM   Periwound dry;pink;edematous;swelling 7/6/2020 11:20 AM   Periwound Temperature cool 7/5/2020  8:00 PM   Periwound Skin Turgor firm 7/5/2020  8:00 PM   Edges irregular 7/6/2020 11:20 AM   Drainage Characteristics/Odor serosanguineous 7/6/2020 11:20 AM   Drainage Amount scant 7/6/2020 11:20 AM   Care, Wound sarah boot 7/6/2020 11:20 AM   Periwound Care, Wound dry periwound area maintained 7/5/2020  8:00 PM         WOUND DEBRIDEMENT                        PT ASSESSMENT (last 12 hours)      Physical Therapy Evaluation     Row Name 07/06/20 1120          PT Evaluation Time/Intention    Document Type  wound care  -JUD     Mode of Treatment  physical therapy  -JUD     Row Name 07/06/20 1120          General Information    Patient Profile Reviewed?  yes  -JUD     Referring Physician  Dr Rajan patterson  -JUD     Patient Observations  alert;cooperative;agree to therapy  -JUD     Patient/Family Observations  no family present   -JUD     General Observations of Patient  sitting in chair  -JUD     Existing Precautions/Restrictions  fall;oxygen therapy device and L/min B unna boots  -JUD     Risks Reviewed  patient:;increased discomfort;increased drainage  -JUD     Benefits Reviewed  patient:;improve skin integrity;increase knowledge  -JUD     Barriers to Rehab  medically complex;previous functional deficit  -JUD     Row Name 07/06/20 1120          Pain Assessment    Additional Documentation  Pain Scale: FACES Pre/Post-Treatment (Group)  -JUD     Row Name 07/06/20 1120          Pain Scale: Numbers Pre/Post-Treatment    Pain Location - Side  Left  -JUD     Pain Location - Orientation  lower  -JUD     Pain Location  extremity  -JUD     Pain Intervention(s)  -- wound care  -JUD     Row Name 07/06/20 1120          Pain Scale: FACES Pre/Post-Treatment    Pain: FACES Scale, Pretreatment  2-->hurts little bit  -JUD     Pain: FACES Scale, Post-Treatment  2-->hurts little bit  -JUD     Row Name 07/06/20 1120          Edema Assessment & Management    Additional Documentation  -- B LE edema  -JUD     Row Name             Wound 04/28/20 2041 groin MASD (Moisture associated skin damage)    Wound - Properties Group Date first assessed: 04/28/20  -MS Time first assessed: 2041 -MS Present on Hospital Admission: Y  -MS Location: groin  -MS, and abdominal folds  Primary Wound Type: MASD  -MS    Row Name 07/06/20 1120          Wound 04/28/20 2041 Right anterior;lower leg    Wound - Properties Group Date first assessed: 04/28/20  -MS Time first assessed: 2041  -MS Side: Right  -MS Orientation: anterior;lower  -MS Location: leg  -MS    Dressing Appearance  -- none, no drainage  -JUD     Base  -- no visible open wounds  -JUD     Periwound  dry;pink;edematous;swelling scaly  -JUD     Drainage Amount  none  -JUD     Care, Wound  sarah boot unna layer, coban  -JUD     Dressing Care, Wound  -- capillary refill ~ 3-4 seconds pre and post wrapping  -JUD     Row Name 07/06/20 1120           Wound 07/05/20 0005 Left posterior calf    Wound - Properties Group Date first assessed: 07/05/20  - Time first assessed: 0005  - Present on Hospital Admission: Y  - Side: Left  - Orientation: posterior  - Location: calf  -    Wound Image  Images linked: 1  -JUD     Dressing Appearance  intact;dressing loose adaptic over L post calf wound and wrapped w kerlix  -JUD     Base  moist;red;pink L post calf region wound. L: 6.5 cm, W: 5.5 cm, pink/red....  -JUD     Black (%), Wound Tissue Color  -- ...few smaller pink/red wounds around post calf wound  -JUD     Periwound  dry;pink;edematous;swelling scaly  -JUD     Edges  irregular irregular post calf wound  -JUD     Drainage Characteristics/Odor  serosanguineous  -JUD     Drainage Amount  scant  -JUD     Care, Wound  sarah boot 1 adaptic over open wounds, unna layer, kerlix, coban  -JUD     Dressing Care, Wound  -- capillary refill ~ 3-4 seconds pre and post wrapping  -JUD     Row Name 07/06/20 1120          Coping    Observed Emotional State  accepting;cooperative  -JUD     Row Name 07/06/20 1120          Plan of Care Review    Plan of Care Reviewed With  patient  -JUD     Row Name 07/06/20 1120          Physical Therapy Clinical Impression    Patient/Family Goals Statement (PT Clinical Impression)  wound healing  -JUD     Criteria for Skilled Interventions Met (PT Clinical Impression)  yes;treatment indicated  -JUD     Impairments Found (describe specific impairments)  integumentary integrity  -JUD     Rehab Potential (PT Clinical Summary)  good, to achieve stated therapy goals  -JUD     Predicted Duration of Therapy (PT)  until d.c  -JUD     Care Plan Review (PT)  evaluation/treatment results reviewed;risks/benefits reviewed;current/potential barriers reviewed;patient/other agree to care plan  -JUD     Row Name 07/06/20 1120          Physical Therapy Goals    Wound Care Goal Selection (PT)  wound care, PT goal 1  -JUD     Additional Documentation  Wound Care Goal Selection  (PT) (Row)  -JUD     Row Name 07/06/20 1120          Wound Care Goal 1 (PT)    Wound Care Goal 1 (PT)  B unna boots will stay intact for 5-7 days with no adverse reactions.   -JUD     Time Frame (Wound Care Goal 1, PT)  long term goal (LTG);10 days  -JUD     Progress/Outcome (Wound Care Goal 1, PT)  goal ongoing  -JUD     Row Name 07/06/20 1120          Positioning and Restraints    Pre-Treatment Position  sitting in chair/recliner  -JUD     Post Treatment Position  chair  -UJD     In Chair  sitting;call light within reach;encouraged to call for assist  -JUD       User Key  (r) = Recorded By, (t) = Taken By, (c) = Cosigned By    Initials Name Provider Type    Dre Adan, PT DPT Physical Therapist    Aleah Shaffer, RNA Registered Nurse    Alana Roberson RN Registered Nurse        Physical Therapy Education                 Title: PT OT SLP Therapies (In Progress)     Topic: Physical Therapy (In Progress)     Point: Mobility training (Done)     Description:   Instruct learner(s) on safety and technique for assisting patient out of bed, chair or wheelchair.  Instruct in the proper use of assistive devices, such as walker, crutches, cane or brace.              Patient Friendly Description:   It's important to get you on your feet again, but we need to do so in a way that is safe for you. Falling has serious consequences, and your personal safety is the most important thing of all.        When it's time to get out of bed, one of us or a family member will sit next to you on the bed to give you support.     If your doctor or nurse tells you to use a walker, crutches, a cane, or a brace, be sure you use it every time you get out of bed, even if you think you don't need it.    Learning Progress Summary           Patient Acceptance, ESUJEY DU by JUD at 7/6/2020 1025    Comment:  Educated pt on progression of PT POC and benefits of activity                   Point: Home exercise program (Not Started)      Description:   Instruct learner(s) on appropriate technique for monitoring, assisting and/or progressing patient with therapeutic exercises and activities.              Learner Progress:   Not documented in this visit.          Point: Body mechanics (Not Started)     Description:   Instruct learner(s) on proper positioning and spine alignment for patient and/or caregiver during mobility tasks and/or exercises.              Learner Progress:   Not documented in this visit.          Point: Precautions (Not Started)     Description:   Instruct learner(s) on prescribed precautions during mobility and gait tasks              Learner Progress:   Not documented in this visit.                      User Key     Initials Effective Dates Name Provider Type Discipline    JUD 08/02/16 -  Dre Tate, PT DPT Physical Therapist PT                Recommendation and Plan  Anticipated Discharge Disposition (PT): home with assist, home with 24/7 care, home with home health  Planned Therapy Interventions (PT Eval): bed mobility training, transfer training, gait training, balance training, home exercise program, patient/family education, postural re-education, strengthening, wound care  Therapy Frequency (PT Clinical Impression): 2 times/day(Check unna boots daily, change in 5-7 days/PRN)  Plan of Care Reviewed With: patient       Outcome Summary/Treatment Plan (PT)  Anticipated Discharge Disposition (PT): home with assist, home with 24/7 care, home with home health   Outcome Summary: PT placed B unna boots per MD order. PT placed 1 adaptic over left post calf wound followed by unna layer, kerlix for drainage and coban. PT wrapped R LE with unna layer and coban as she had no visible open wounds or sores. capillary refill ~ 3-4 seconds pre and post wrapping. PT will continue to check B unna boots daily and will change in 5-7 days/PRN. She plans to continue with  for unna boot management.  Plan of Care Reviewed With:  patient      Outcome Measures     Row Name 07/06/20 1100             How much help from another is currently needed...    Putting on and taking off regular lower body clothing?  1  -JJ      Bathing (including washing, rinsing, and drying)  2  -JJ      Toileting (which includes using toilet bed pan or urinal)  3  -JJ      Putting on and taking off regular upper body clothing  3  -JJ      Taking care of personal grooming (such as brushing teeth)  4  -JJ      Eating meals  4  -JJ      AM-PAC 6 Clicks Score (OT)  17  -JJ         Functional Assessment    Outcome Measure Options  AM-PAC 6 Clicks Daily Activity (OT)  -JJ        User Key  (r) = Recorded By, (t) = Taken By, (c) = Cosigned By    Initials Name Provider Type    Tonya Poole OTR/L Occupational Therapist            Time Calculation  PT Charges     Row Name 07/06/20 1248 07/06/20 1158          Time Calculation    Start Time  1120  -JUD  1025  -JUD     Stop Time  1145  -JUD  1120  -JUD     Time Calculation (min)  25 min  -JUD  55 min  -JUD     PT Received On  07/06/20  -JUD  07/06/20  -JUD     PT Goal Re-Cert Due Date  07/16/20  -JUD  07/16/20  -JUD       User Key  (r) = Recorded By, (t) = Taken By, (c) = Cosigned By    Initials Name Provider Type    Dre Adan, PT DPT Physical Therapist            Therapy Charges for Today     Code Description Service Date Service Provider Modifiers Qty    17066166967 HC PT EVAL MOD COMPLEXITY 4 7/6/2020 Dre Tate, PT DPT GP 1    88978315782 HC PT STAPPING UNNA BOOT 7/6/2020 Dre Ttae, PT DPT GP 2            PT G-Codes  Outcome Measure Options: AM-PAC 6 Clicks Basic Mobility (PT)  AM-PAC 6 Clicks Score (PT): 17  AM-PAC 6 Clicks Score (OT): 17       Dre Tate, PT DPT  7/6/2020

## 2020-07-06 NOTE — PROGRESS NOTES
Discharge Planning Assessment  [facility]     Patient Name: Emy Bermudez  MRN: [id number]  Today's Date: 7/6/2020    Admit Date: 7/4/2020    Discharge Needs Assessment     Row Name 07/06/20 1036       Living Environment    Lives With  child(irene), adult    Name(s) of Who Lives With Patient  DaughterJesús Rubio    Current Living Arrangements  home/apartment/condo    Primary Care Provided by  child(irene)    Provides Primary Care For  no one    Family Caregiver if Needed  child(irene), adult    Quality of Family Relationships  supportive;involved;helpful    Able to Return to Prior Arrangements  yes       Resource/Environmental Concerns    Resource/Environmental Concerns  none    Transportation Concerns  car, none       Transition Planning    Patient/Family Anticipates Transition to  home with family;home with help/services    Patient/Family Anticipated Services at Transition  home health care    Transportation Anticipated  family or friend will provide       Discharge Needs Assessment    Readmission Within the Last 30 Days  no previous admission in last 30 days    Concerns to be Addressed  denies needs/concerns at this time    Equipment Currently Used at Home  hospital bed;wheelchair;rollator;shower chair;nebulizer;oxygen;colostomy/ostomy supplies;commode    Anticipated Changes Related to Illness  none    Equipment Needed After Discharge  none    Outpatient/Agency/Support Group Needs  homecare agency    Discharge Facility/Level of Care Needs  home with home health    Current Discharge Risk  chronically ill        Discharge Plan     Row Name 07/06/20 1038       Plan    Plan  [facility]    Patient/Family in Agreement with Plan  yes    Plan Comments  Spoke with pt to assess for home needs. Pt lives at home with her daughter and plans same.  Pt is followed by [facility] and wishes to continue. Pt has needed DME to include home O2 via [employer], keeps at 3L.  Pt has RX coverage/PCP. Will follow.         Destination       Coordination has not been started for this encounter.      Durable Medical Equipment      Coordination has not been started for this encounter.      Dialysis/Infusion      Coordination has not been started for this encounter.      Home Medical Care      Coordination has not been started for this encounter.      Therapy      Coordination has not been started for this encounter.      Community Resources      Coordination has not been started for this encounter.          Demographic Summary    No documentation.       Functional Status    No documentation.       Psychosocial    No documentation.       Abuse/Neglect    No documentation.       Legal    No documentation.       Substance Abuse    No documentation.       Patient Forms    No documentation.           ALANA Antonio

## 2020-07-06 NOTE — THERAPY EVALUATION
Acute Care - Occupational Therapy Initial Evaluation  [facility]     Patient Name: Emy Bermudez  : 1950  MRN: [id number]  Today's Date: 2020  Onset of Illness/Injury or Date of Surgery: 20  Date of Referral to OT: 20  Referring Physician: Dr Tolentino([facility])    Admit Date: 2020       ICD-10-CM ICD-9-CM   1. COPD exacerbation (CMS/HCC) J44.1 491.21   2. Impaired functional mobility and activity tolerance Z74.09 V49.89   3. Decreased activities of daily living (ADL) Z78.9 V49.89     Patient Active Problem List   Diagnosis   • COPD exacerbation (CMS/HCC)   • Obesity, Class III, BMI 40-49.9 (morbid obesity) (CMS/HCC)   • Acute on chronic respiratory failure with hypoxia and hypercapnia (CMS/HCC)   • Normocytic anemia   • Hypothyroidism (acquired)   • Essential hypertension   • Venous insufficiency of both lower extremities   • Stasis dermatitis of both legs     Past Medical History:   Diagnosis Date   • Abdominal wall abscess    • Abdominal wall fistula 2018   • Cellulitis    • Chronic respiratory failure with hypoxia (CMS/HCC) 2020   • Colonic obstruction (CMS/HCC) 4/3/2018   • COPD (chronic obstructive pulmonary disease) (CMS/HCC)    • Depression    • Diverticul disease small and large intestine, no perforati or abscess    • Edema    • GERD (gastroesophageal reflux disease)    • Hyperlipidemia    • Hypertension    • Hypocalcemia    • Hypothyroid    • Obesity, Class III, BMI 40-49.9 (morbid obesity) (CMS/HCC) 2020   • Respiratory failure (CMS/HCC)    • Tobacco dependence 2020   • Venous insufficiency    • Vertigo      Past Surgical History:   Procedure Laterality Date   • ABDOMINAL SURGERY     •  SECTION     • COLOSTOMY     • EXPLORATORY LAPAROTOMY N/A 2018    Procedure: LAPAROTOMY EXPLORATORY, partial colectomy, creation colostomy, incision and drainage abdominal wall abscess;  Surgeon: Elda Velazquez MD;  Location: [facility];  Service: General          OT  ASSESSMENT FLOWSHEET (last 12 hours)      Occupational Therapy Evaluation     Row Name 07/06/20 1030                   OT Evaluation Time/Intention    Subjective Information  complains of;dyspnea;weakness;fatigue;swelling  -JJ        Document Type  evaluation see MAR  -JJ        Mode of Treatment  occupational therapy  -JJ        Patient Effort  good  -JJ           General Information    Patient Profile Reviewed?  yes  -JJ        Onset of Illness/Injury or Date of Surgery  07/05/20  -JJ        Referring Physician  Dr. Tolentino  -JJ        Patient Observations  cooperative;alert;agree to therapy  -JJ        Patient/Family Observations  no family present in room   -JJ        General Observations of Patient  sitting up in chair, alert  -JJ        Prior Level of Function  min assist:;dressing;independent:;transfer;ADL's;bed mobility;all household mobility;mod assist:;max assist:;bathing rollator for mobility, short distances only   -JJ        Equipment Currently Used at Home  oxygen;rollator;wheelchair;hospital bed 3L/O2 24/7  -JJ        Existing Precautions/Restrictions  fall;oxygen therapy device and L/min  -JJ        Risks Reviewed  patient:;LOB;nausea/vomiting;dizziness;increased discomfort;change in vital signs;increased drainage;lines disloged  -JJ        Benefits Reviewed  patient:;improve function;increase independence;increase strength;increase balance;decrease pain;decrease risk of DVT;improve skin integrity;increase knowledge  -JJ           Relationship/Environment    Lives With  child(riene), adult  -JJ           Resource/Environmental Concerns    Current Living Arrangements  home/apartment/condo  -JJ        Resource/Environmental Concerns  none  -JJ           Cognitive Assessment/Interventions    Additional Documentation  Cognitive Assessment/Intervention (Group)  -JJ           Cognitive Assessment/Intervention- PT/OT    Affect/Mental Status (Cognitive)  WNL  -JJ        Orientation Status (Cognition)   oriented x 4  -J        Follows Commands (Cognition)  WNL  -        Cognitive Function (Cognitive)  WNL  -        Personal Safety Interventions  fall prevention program maintained;gait belt;muscle strengthening facilitated;nonskid shoes/slippers when out of bed;supervised activity  -           Safety Issues, Functional Mobility    Impairments Affecting Function (Mobility)  endurance/activity tolerance;shortness of breath;strength  -           Bed Mobility Assessment/Treatment    Bed Mobility Assessment/Treatment  --  -        Comment (Bed Mobility)  up in chair upon entry to room   -           Functional Mobility    Functional Mobility- Comment  further mobility n/a d/t SOB  -           Transfer Assessment/Treatment    Transfer Assessment/Treatment  sit-stand transfer;stand-sit transfer  -        Comment (Transfers)  able to take small step forward and backwards from chair.   -           Sit-Stand Transfer    Sit-Stand [place] (Transfers)  contact guard  -           Stand-Sit Transfer    Stand-Sit [place] (Transfers)  contact guard  -           ADL Assessment/Intervention    BADL Assessment/Intervention  lower body dressing  -           Lower Body Dressing Assessment/Training    Lower Body Dressing [place] Level  don;socks;maximum assist (25% patient effort)  -        Lower Body Dressing Position  edge of bed sitting  -           BAD Safety/Performance    Impairments, [facility] Safety/Performance  balance;endurance/activity tolerance;shortness of breath;strength  -        Skilled BADL Treatment/Intervention  [facility] process/adaptation training  -           General ROM    GENERAL ROM COMMENTS  BUE AROM WFL   -           MMT (Manual Muscle Testing)    General MMT Comments  BUE strength 4+/5  -           Motor Assessment/Interventions    Additional Documentation  Balance (Group)  -           Balance    Balance  static sitting balance;static standing balance  -            Static Sitting Balance    Level of [place] (Unsupported Sitting, Static Balance)  independent  -JJ        Sitting Position (Unsupported Sitting, Static Balance)  sitting in chair  -JJ           Static Standing Balance    Level of [place] (Supported Standing, Static Balance)  contact guard assist  -JJ        Time Able to Maintain Position (Supported Standing, Static Balance)  15 to 30 seconds  -JJ           Sensory Assessment/Intervention    Sensory General Assessment  no sensation deficits identified in BUEs  -JJ           Positioning and Restraints    Pre-Treatment Position  sitting in chair/recliner  -JJ        Post Treatment Position  chair  -JJ        In Chair  sitting;notified nsg;call light within reach;encouraged to call for assist;with PT  -JJ           Pain Assessment    Additional Documentation  Pain Scale: Numbers Pre/Post-Treatment (Group)  -JJ           Pain Scale: Numbers Pre/Post-Treatment    Pain Scale: Numbers, Pretreatment  0/10 - no pain  -JJ        Pain Scale: Numbers, Post-Treatment  0/10 - no pain  -JJ           Wound 04/28/20 2041 groin MASD (Moisture associated skin damage)    Wound - Properties Group Date first assessed: 04/28/20  -MS Time first assessed: 2041  -MS Present on Hospital Admission: Y  -MS Location: groin  -MS, and abdominal folds  Primary Wound Type: MASD  -MS       Wound 04/28/20 2041 Right anterior;lower leg    Wound - Properties Group Date first assessed: 04/28/20  -MS Time first assessed: 2041 -MS Side: Right  -MS Orientation: anterior;lower  -MS Location: leg  -MS       Wound 07/05/20 0005 Left posterior calf    Wound - Properties Group Date first assessed: 07/05/20  -MC Time first assessed: 0005  -MC Present on Hospital Admission: Y  -MC Side: Left  -MC Orientation: posterior  -MC Location: calf  -MC       Plan of Care Review    Plan of Care Reviewed With  patient  -JJ        Outcome Summary  OT eval completed. Pt is A&Ox4. Demo's decreased strength, balance,  activity tolerance, and increased SOB. Max A for donning socks. CGA for sit <> stand t/f from chair and able to take small steps forward and backwards. Pt would benefit from skilled OT services to address these deficits. Recommend home with assist and HH vs. TCU.   -J           Clinical Impression (OT)    Date of Referral to OT  07/05/20  -        OT Diagnosis  decreased adls  -JJ        Prognosis (OT Eval)  good  -JJ        Patient/Family Goals Statement (OT Eval)  return home  -        Criteria for Skilled Therapeutic Interventions Met (OT Eval)  yes;treatment indicated  -        Rehab Potential (OT Eval)  good, to achieve stated therapy goals  -        Therapy Frequency (OT Eval)  5 times/wk  -        Predicted Duration of Therapy Intervention (Therapy Eval)  10 days  -        Care Plan Review (OT)  evaluation/treatment results reviewed;care plan/treatment goals reviewed;risks/benefits reviewed;current/potential barriers reviewed;patient/other agree to care plan  -        Anticipated Discharge Disposition (OT)  home with assist;home with home health;transitional care  -           Planned OT Interventions    Planned Therapy Interventions (OT Eval)  activity tolerance training;BADL retraining;functional balance retraining;adaptive equipment training;occupation/activity based interventions;patient/caregiver education/training;strengthening exercise;transfer/mobility retraining  -           OT Goals    Dressing Goal Selection (OT)  dressing, OT goal 1  -JJ        Toileting Goal Selection (OT)  toileting, OT goal 1  -JJ        Activity Tolerance Goal Selection (OT)  activity tolerance, OT goal 1  -J        Additional Documentation  Activity Tolerance Goal Selection (OT) (Row)  -           Dressing Goal 1 (OT)    Activity/Assistive Device (Dressing Goal 1, OT)  dressing skills, all  -J        [place]/Cues Needed (Dressing Goal 1, OT)  minimum assist (75% or more patient effort) AE PRN  -J         Time Frame (Dressing Goal 1, OT)  long term goal (LTG);by discharge  -J        Progress/Outcome (Dressing Goal 1, OT)  goal ongoing  -           Toileting Goal 1 (OT)    Activity/Device (Toileting Goal 1, OT)  toileting skills, all;commode  -        [place] Level/Cues Needed (Toileting Goal 1, OT)  independent  -JJ        Time Frame (Toileting Goal 1, OT)  long term goal (LTG);by discharge  -J        Progress/Outcome (Toileting Goal 1, OT)  goal ongoing  -            Activity Tolerance Goal 1 (OT)    Activity Tolerance Goal 1 (OT)  Pt will participate in functional task in sitting for 8 minutes with no recovery periods to address decreased activity tolerance.   -        Activity Level (Endurance Goal 1, OT)  O2 sat >/ equal to 88% 8 minutes  -J        Time Frame (Activity Tolerance Goal 1, OT)  long term goal (LTG);by discharge  -        Progress/Outcome (Activity Tolerance Goal 1, OT)  goal ongoing  -           Living Environment    Home Accessibility  tub/shower is not walk in  -          User Key  (r) = Recorded By, (t) = Taken By, (c) = Cosigned By    Initials Name Effective Dates    Tonya Poole OTR/L 07/05/20 -     Aleah Shaffer RNA 05/23/19 -     Alana Robersno RN 02/03/20 -          Occupational Therapy Education                 Title: PT OT SLP Therapies (In Progress)     Topic: Occupational Therapy (In Progress)     Point: ADL training (Done)     Description:   Instruct learner(s) on proper safety adaptation and remediation techniques during self care or transfers.   Instruct in proper use of assistive devices.              Learning Progress Summary           Patient Acceptance, E, VU by  at 7/6/2020 1425    Comment:  OT POC, benefits and roles of OT                   Point: Home exercise program (Not Started)     Description:   Instruct learner(s) on appropriate technique for monitoring, assisting and/or progressing therapeutic exercises/activities.               Learner Progress:   Not documented in this visit.          Point: Precautions (Not Started)     Description:   Instruct learner(s) on prescribed precautions during self-care and functional transfers.              Learner Progress:   Not documented in this visit.          Point: Body mechanics (Not Started)     Description:   Instruct learner(s) on proper positioning and spine alignment during self-care, functional mobility activities and/or exercises.              Learner Progress:   Not documented in this visit.                      User Key     Initials Effective Dates Name Provider Type Discipline     07/05/20 -  oTnya Parks OTR/L Occupational Therapist OT                  OT Recommendation and Plan  Outcome Summary/Treatment Plan (OT)  Anticipated Discharge Disposition (OT): home with assist, home with home health, transitional care  Planned Therapy Interventions (OT Eval): activity tolerance training, BADL retraining, functional balance retraining, adaptive equipment training, occupation/activity based interventions, patient/caregiver education/training, strengthening exercise, transfer/mobility retraining  Therapy Frequency (OT Eval): 5 times/wk  Plan of Care Review  Plan of Care Reviewed With: patient  Plan of Care Reviewed With: patient  Outcome Summary: OT eval completed. Pt is A&Ox4. Demo's decreased strength, balance, activity tolerance, and increased SOB. Max A for donning socks. CGA for sit <> stand t/f from chair and able to take small steps forward and backwards. Pt would benefit from skilled OT services to address these deficits. Recommend home with assist and HH vs. TCU.    Outcome Measures     Row Name 07/06/20 1100             How much help from another is currently needed...    Putting on and taking off regular lower body clothing?  1  -JJ      Bathing (including washing, rinsing, and drying)  2  -JJ      Toileting (which includes using toilet bed pan or urinal)  3  -JJ       Putting on and taking off regular upper body clothing  3  -JJ      Taking care of personal grooming (such as brushing teeth)  4  -JJ      Eating meals  4  -JJ      AM-PAC 6 Clicks Score (OT)  17  -         Functional Assessment    Outcome Measure Options  AM-PAC 6 Clicks Daily Activity (OT)  -        User Key  (r) = Recorded By, (t) = Taken By, (c) = Cosigned By    Initials Name Provider Type    Tonya Poole OTR/L Occupational Therapist          Time Calculation:   Time Calculation- OT     Row Name 07/06/20 1424             Time Calculation- OT    OT Start Time  1030  -      OT Stop Time  1109  -      OT Time Calculation (min)  39 min  -      OT Received On  07/06/20  -      OT Goal Re-Cert Due Date  07/16/20  -        User Key  (r) = Recorded By, (t) = Taken By, (c) = Cosigned By    Initials Name Provider Type    Tonya Poole OTR/L Occupational Therapist        Therapy Charges for Today     Code Description Service Date Service Provider Modifiers Qty    [id number] HC OT EVAL LOW COMPLEXITY 3 7/6/2020 Tonya Parks OTR/L GO 1               ARLEN Graham/WEST  7/6/2020

## 2020-07-06 NOTE — PLAN OF CARE
Problem: Patient Care Overview  Goal: Plan of Care Review  Outcome: Ongoing (interventions implemented as appropriate)  Flowsheets (Taken 7/6/2020 5318)  Progress: no change  Plan of Care Reviewed With: patient  Outcome Summary: Pt anxious at times.  PRN meds available.  No c/o pain.  O2  @ 3L/nc.  Pure wick in place. PT to see pt today for placement of ZAN boots.  Pt gets SOA with exertion.   Nystatin to abd folds.  Tele-SR/ST  .  Cont to monitor

## 2020-07-07 PROCEDURE — 97110 THERAPEUTIC EXERCISES: CPT

## 2020-07-07 PROCEDURE — 25010000002 METHYLPREDNISOLONE PER 40 MG: Performed by: FAMILY MEDICINE

## 2020-07-07 PROCEDURE — 25010000002 ENOXAPARIN PER 10 MG: Performed by: FAMILY MEDICINE

## 2020-07-07 PROCEDURE — 94799 UNLISTED PULMONARY SVC/PX: CPT

## 2020-07-07 RX ORDER — PREDNISONE 20 MG/1
60 TABLET ORAL
Status: DISCONTINUED | OUTPATIENT
Start: 2020-07-08 | End: 2020-07-08 | Stop reason: HOSPADM

## 2020-07-07 RX ORDER — FUROSEMIDE 20 MG/1
20 TABLET ORAL DAILY
Status: DISCONTINUED | OUTPATIENT
Start: 2020-07-08 | End: 2020-07-08 | Stop reason: HOSPADM

## 2020-07-07 RX ADMIN — NYSTATIN 1 APPLICATION: 100000 POWDER TOPICAL at 10:22

## 2020-07-07 RX ADMIN — IPRATROPIUM BROMIDE AND ALBUTEROL SULFATE 3 ML: 2.5; .5 SOLUTION RESPIRATORY (INHALATION) at 10:29

## 2020-07-07 RX ADMIN — GUAIFENESIN 1200 MG: 600 TABLET, EXTENDED RELEASE ORAL at 20:27

## 2020-07-07 RX ADMIN — MECLIZINE HYDROCHLORIDE 25 MG: 25 TABLET ORAL at 10:20

## 2020-07-07 RX ADMIN — NYSTATIN 1 APPLICATION: 100000 POWDER TOPICAL at 17:30

## 2020-07-07 RX ADMIN — DOXYCYCLINE 100 MG: 100 TABLET, FILM COATED ORAL at 20:27

## 2020-07-07 RX ADMIN — NICOTINE 1 PATCH: 21 PATCH, EXTENDED RELEASE TRANSDERMAL at 20:29

## 2020-07-07 RX ADMIN — MECLIZINE HYDROCHLORIDE 25 MG: 25 TABLET ORAL at 20:27

## 2020-07-07 RX ADMIN — LEVOTHYROXINE SODIUM 75 MCG: 75 TABLET ORAL at 05:52

## 2020-07-07 RX ADMIN — IPRATROPIUM BROMIDE AND ALBUTEROL SULFATE 3 ML: 2.5; .5 SOLUTION RESPIRATORY (INHALATION) at 06:33

## 2020-07-07 RX ADMIN — FUROSEMIDE 40 MG: 40 TABLET ORAL at 10:20

## 2020-07-07 RX ADMIN — IPRATROPIUM BROMIDE AND ALBUTEROL SULFATE 3 ML: 2.5; .5 SOLUTION RESPIRATORY (INHALATION) at 20:11

## 2020-07-07 RX ADMIN — FAMOTIDINE 40 MG: 20 TABLET, FILM COATED ORAL at 10:20

## 2020-07-07 RX ADMIN — NYSTATIN 1 APPLICATION: 100000 POWDER TOPICAL at 20:29

## 2020-07-07 RX ADMIN — BUDESONIDE AND FORMOTEROL FUMARATE DIHYDRATE 2 PUFF: 160; 4.5 AEROSOL RESPIRATORY (INHALATION) at 06:33

## 2020-07-07 RX ADMIN — IPRATROPIUM BROMIDE AND ALBUTEROL SULFATE 3 ML: 2.5; .5 SOLUTION RESPIRATORY (INHALATION) at 14:33

## 2020-07-07 RX ADMIN — ENOXAPARIN SODIUM 40 MG: 40 INJECTION SUBCUTANEOUS at 20:27

## 2020-07-07 RX ADMIN — LOSARTAN POTASSIUM 25 MG: 50 TABLET, FILM COATED ORAL at 10:20

## 2020-07-07 RX ADMIN — ESCITALOPRAM 10 MG: 10 TABLET, FILM COATED ORAL at 10:20

## 2020-07-07 RX ADMIN — METHYLPREDNISOLONE SODIUM SUCCINATE 40 MG: 40 INJECTION, POWDER, FOR SOLUTION INTRAMUSCULAR; INTRAVENOUS at 10:19

## 2020-07-07 RX ADMIN — BUDESONIDE AND FORMOTEROL FUMARATE DIHYDRATE 2 PUFF: 160; 4.5 AEROSOL RESPIRATORY (INHALATION) at 20:12

## 2020-07-07 RX ADMIN — SODIUM CHLORIDE, PRESERVATIVE FREE 10 ML: 5 INJECTION INTRAVENOUS at 10:21

## 2020-07-07 RX ADMIN — GUAIFENESIN 1200 MG: 600 TABLET, EXTENDED RELEASE ORAL at 10:20

## 2020-07-07 RX ADMIN — DOXYCYCLINE 100 MG: 100 TABLET, FILM COATED ORAL at 10:20

## 2020-07-07 RX ADMIN — SODIUM CHLORIDE, PRESERVATIVE FREE 10 ML: 5 INJECTION INTRAVENOUS at 20:27

## 2020-07-07 NOTE — PLAN OF CARE
Problem: Patient Care Overview  Goal: Plan of Care Review  Outcome: Ongoing (interventions implemented as appropriate)  Flowsheets (Taken 7/7/2020 4574)  Plan of Care Reviewed With: patient  Note:   V/S STABLE UP CHAIR NO ACUTE RESP DIFFICULTY , NO NEW AREA SKIN BREAK DOWN , NO FALLS OR INJURIES , POSS D/C HOME IN AM .

## 2020-07-07 NOTE — PLAN OF CARE
Problem: Patient Care Overview  Goal: Plan of Care Review  Outcome: Ongoing (interventions implemented as appropriate)  Flowsheets (Taken 7/7/2020 0402)  Progress: no change  Plan of Care Reviewed With: patient  Outcome Summary: Pt performed AROM 10 reps x 2 of BLE exercises. Pt required increased time for rest due to SOA. Megan boot was intact with no concers.

## 2020-07-07 NOTE — PLAN OF CARE
Problem: Patient Care Overview  Goal: Plan of Care Review  Outcome: Ongoing (interventions implemented as appropriate)  Flowsheets (Taken 7/7/2020 1112)  Progress: improving  Plan of Care Reviewed With: patient  Note:   Pt. Performed ue exs to increase her ability and act. Kaleb with adl tasks, pt. States that she gets sob just amb'ing to toilet! Pt. Refuses shower or dressing adl's!

## 2020-07-07 NOTE — THERAPY TREATMENT NOTE
Acute Care - Occupational Therapy Treatment Note  [facility]     Patient Name: Emy Bermudez  : 1950  MRN: [id number]  Today's Date: 2020  Onset of Illness/Injury or Date of Surgery: 20  Date of Referral to OT: 20  Referring Physician: Dr Tolentino([facility])    Admit Date: 2020       ICD-10-CM ICD-9-CM   1. COPD exacerbation (CMS/[facility]) J44.1 491.21   2. Impaired functional mobility and activity tolerance Z74.09 V49.89   3. Decreased activities of daily living (ADL) Z78.9 V49.89     Patient Active Problem List   Diagnosis   • COPD exacerbation (CMS/HCC)   • Obesity, Class III, BMI 40-49.9 (morbid obesity) (CMS/HCC)   • Acute on chronic respiratory failure with hypoxia and hypercapnia (CMS/HCC)   • Normocytic anemia   • Hypothyroidism (acquired)   • Essential hypertension   • Venous insufficiency of both lower extremities   • Stasis dermatitis of both legs     Past Medical History:   Diagnosis Date   • Abdominal wall abscess    • Abdominal wall fistula 2018   • Cellulitis    • Chronic respiratory failure with hypoxia (CMS/HCC) 2020   • Colonic obstruction (CMS/HCC) 4/3/2018   • COPD (chronic obstructive pulmonary disease) (CMS/HCC)    • Depression    • Diverticul disease small and large intestine, no perforati or abscess    • Edema    • GERD (gastroesophageal reflux disease)    • Hyperlipidemia    • Hypertension    • Hypocalcemia    • Hypothyroid    • Obesity, Class III, BMI 40-49.9 (morbid obesity) (CMS/HCC) 2020   • Respiratory failure (CMS/HCC)    • Tobacco dependence 2020   • Venous insufficiency    • Vertigo      Past Surgical History:   Procedure Laterality Date   • ABDOMINAL SURGERY     •  SECTION     • COLOSTOMY     • EXPLORATORY LAPAROTOMY N/A 2018    Procedure: LAPAROTOMY EXPLORATORY, partial colectomy, creation colostomy, incision and drainage abdominal wall abscess;  Surgeon: Elda Velazquez MD;  Location: [facility];  Service: General       Therapy  Treatment    Rehabilitation Treatment Summary     Row Name 07/07/20 0917 07/07/20 0830          Treatment Time/Intention    Discipline  physical therapy assistant  -WK  occupational therapy assistant  -CJ     Document Type  therapy note (daily note)  -WK  therapy note (daily note)  -CJ     Subjective Information  complains of;pain back 4/10  -WK  complains of;weakness;fatigue  -CJ     Mode of Treatment  --  occupational therapy  -CJ     Patient Effort  fair  -WK  adequate  -CJ     Existing Precautions/Restrictions  fall;oxygen therapy device and L/min  -WK  fall;oxygen therapy device and L/min  -CJ     Recorded by [WK] Shruti Monroy, PTA 07/07/20 0946 [CJ] Melquiades Craig COTA/L 07/07/20 1111     Row Name 07/07/20 0917 07/07/20 0830          Bed Mobility Assessment/Treatment    Comment (Bed Mobility)  up in chair   -WK  up in chair!  -CJ     Recorded by [WK] Shruti Monroy, PTA 07/07/20 0946 [CJ] Melquiades Craig COTA/L 07/07/20 1111     Row Name 07/07/20 0917             Sit-Stand Transfer    Sit-Stand [place] (Transfers)  -- refused and stated she just wasn't going to stand right now   -WK      Recorded by [WK] Shruti Mornoy, PTA 07/07/20 0946      Row Name 07/07/20 0917 07/07/20 0830          Motor Skills Assessment/Interventions    Additional Documentation  Therapeutic Exercise (Group)  -WK  Therapeutic Exercise (Group)  -CJ     Recorded by [WK] Shruti Monroy, PTA 07/07/20 0946 [CJ] Melquiades Craig ALBARADO/L 07/07/20 1111     Row Name 07/07/20 0917 07/07/20 0830          Therapeutic Exercise    Upper Extremity (Therapeutic Exercise)  --  bicep curl, bilateral;wand exercises  -     Upper Extremity Range of Motion (Therapeutic Exercise)  --  elbow flexion/extension, bilateral;wrist flexion/extension, bilateral  -     Weight/Resistance (Therapeutic Exercise)  --  2 pounds;3 pounds  -     Exercise Type (Therapeutic Exercise)  --  AROM (active range of motion)  -     Position  (Therapeutic Exercise)  --  seated  -CJ     Sets/Reps (Therapeutic Exercise)  --  3 sets x 10 reps!  -CJ     Equipment (Therapeutic Exercise)  --  dowel ambar/wand;free weight, barbell  -CJ     Expected Outcome (Therapeutic Exercise)  --  facilitate normal movement patterns;improve functional stability;improve functional tolerance, self-care activity;improve motor control;improve neuromuscular control;improve performance, BADLs;improve performance, fine motor coordination skills;improve performance, gait skills;improve performance, transfer skills;improve postural control;increase active range of motion  -CJ     Comment (Therapeutic Exercise)  AROM 10 reps x2 hip flex/ext, hip abd/add, LAQ, ankle pumps   -WK  --     Recorded by [WK] Shruti Monroy, PTA 07/07/20 0946 [CJ] Melquiades Craig COTA/L 07/07/20 1111     Row Name 07/07/20 0917 07/07/20 0830          Positioning and Restraints    Pre-Treatment Position  sitting in chair/recliner  -WK  sitting in chair/recliner  -CJ     Post Treatment Position  chair  -WK  chair  -CJ     In Chair  sitting;call light within reach;encouraged to call for assist  -WK  sitting;call light within reach;encouraged to call for assist  -CJ     Recorded by [WK] Shruti Monroy, PTA 07/07/20 0946 [CJ] Melquiades Craig COTA/L 07/07/20 1111     Row Name 07/07/20 0830             Pain Assessment    Additional Documentation  Pain Scale 2: Word Pre/Post-Treatment (Group)  -CJ      Recorded by [CJ] Melquiades Craig COTA/L 07/07/20 1111      Row Name 07/07/20 0917 07/07/20 0830          Pain Scale: Numbers Pre/Post-Treatment    Pain Scale: Numbers, Pretreatment  4/10  -WK  0/10 - no pain  -CJ     Pain Scale: Numbers, Post-Treatment  2/10  -WK  0/10 - no pain  -CJ     Pain Location - Orientation  lower  -WK  --     Pain Location  back  -WK  --     Pain Intervention(s)  Repositioned  -WK  Rest  -CJ     Recorded by [WK] Shruti Monroy, PTA 07/07/20 0946 [CJ] Melquiades Craig COTA/WEST  07/07/20 1111     Row Name                Wound 04/28/20 2041 groin MASD (Moisture associated skin damage)    Wound - Properties Group Date first assessed: 04/28/20 [MS] Time first assessed: 2041 [MS] Present on Hospital Admission: Y [MS] Location: groin [MS2], and abdominal folds  Primary Wound Type: MASD [MS] Recorded by:  [MS] Aleah Suarez, RNA 04/29/20 0037 [MS2] Aleah Suarez, RNA 04/29/20 0042    Row Name                Wound 04/28/20 2041 Right anterior;lower leg    Wound - Properties Group Date first assessed: 04/28/20 [MS] Time first assessed: 2041 [MS] Side: Right [MS] Orientation: anterior;lower [MS] Location: leg [MS] Recorded by:  [MS] Aleah Suarez, RNA 04/29/20 0053    Row Name 07/07/20 0917             Wound 07/05/20 0005 Left posterior calf    Wound - Properties Group Date first assessed: 07/05/20 [MC] Time first assessed: 0005 [MC] Present on Hospital Admission: Y [MC] Side: Left [MC] Orientation: posterior [MC] Location: calf [MC] Recorded by:  [MC] Alana Martínez RN 07/05/20 0007    Care, Wound  sarah boot intact with no concerns   -WK      Recorded by [WK] Shruti Monroy PTA 07/07/20 0946      Row Name 07/07/20 0830             Outcome Summary/Treatment Plan (OT)    Daily Summary of Progress (OT)  progress towards functional goals is fair  -CJ      Barriers to Overall Progress (OT)  Fall/o2!  -CJ      Plan for Continued Treatment (OT)  conntinue with ot poc!  -CJ      Recorded by [CJ] Melquiades Craig COTA/WEST 07/07/20 1111        User Key  (r) = Recorded By, (t) = Taken By, (c) = Cosigned By    Initials Name Effective Dates Discipline    CJ Melquiades Craig COTA/L 08/02/16 -  OT    WK Shruti Monroy PTA 08/02/16 -  PT    MS Aleah Suarez, RNA 05/23/19 -  Nurse    MC Alana Martínez RN 02/03/20 -  Nurse        Wound 04/28/20 2041 groin MASD (Moisture associated skin damage) (Active)   Closure Open to air 7/6/2020  8:08 PM   Base moist;pink;red 7/6/2020  8:08 PM    Periwound moist 7/6/2020  8:08 PM   Periwound Temperature warm 7/6/2020  8:08 PM   Periwound Skin Turgor soft 7/6/2020  8:08 PM   Edges irregular 7/6/2020  8:08 PM   Periwound Care, Wound other (see comments) 7/6/2020  8:08 PM       Wound 04/28/20 2041 Right anterior;lower leg (Active)   Closure ADRIAN 7/6/2020  8:08 PM   Periwound dry;pink;edematous;swelling 7/6/2020 11:20 AM   Drainage Amount none 7/6/2020  8:08 PM   Care, Wound sarah boot 7/6/2020  8:08 PM       Wound 07/05/20 0005 Left posterior calf (Active)   Wound Image   7/6/2020 11:20 AM   Dressing Appearance intact;dressing loose 7/6/2020 11:20 AM   Closure ADRIAN 7/6/2020  8:08 PM   Base moist;red;pink 7/6/2020 11:20 AM   Periwound dry;pink;edematous;swelling 7/6/2020 11:20 AM   Edges irregular 7/6/2020 11:20 AM   Drainage Characteristics/Odor serosanguineous 7/6/2020 11:20 AM   Drainage Amount none 7/6/2020  8:08 PM   Care, Wound sarah boot 7/7/2020  9:17 AM       Occupational Therapy Education                 Title: PT OT SLP Therapies (In Progress)     Topic: Occupational Therapy (Done)     Point: ADL training (Done)     Description:   Instruct learner(s) on proper safety adaptation and remediation techniques during self care or transfers.   Instruct in proper use of assistive devices.              Learning Progress Summary           Patient Acceptance, E, VU by JADDIS at 7/6/2020 1425    Comment:  OT POC, benefits and roles of OT                   Point: Home exercise program (Done)     Description:   Instruct learner(s) on appropriate technique for monitoring, assisting and/or progressing therapeutic exercises/activities.              Learning Progress Summary           Patient Acceptance, E,TB, VU,DU,NR by CJ at 7/7/2020 1111    Comment:  Pt. performed ue exs to increase her act. kevin with adl tasks!                   Point: Precautions (Done)     Description:   Instruct learner(s) on prescribed precautions during self-care and functional transfers.               Learning Progress Summary           Patient Acceptance, E,TB, VU,DU,NR by  at 7/7/2020 1111    Comment:  Pt. performed ue exs to increase her act. kevin with adl tasks!                   Point: Body mechanics (Done)     Description:   Instruct learner(s) on proper positioning and spine alignment during self-care, functional mobility activities and/or exercises.              Learning Progress Summary           Patient Acceptance, E,TB, VU,DU,NR by  at 7/7/2020 1111    Comment:  Pt. performed ue exs to increase her act. kevin with adl tasks!                               User Key     Initials Effective Dates Name Provider Type Discipline     08/02/16 -  Melquiades Craig COTA/L Occupational Therapy Assistant OT     07/05/20 -  Tonya Parks OTR/L Occupational Therapist OT                OT Recommendation and Plan  Outcome Summary/Treatment Plan (OT)  Daily Summary of Progress (OT): progress towards functional goals is fair  Barriers to Overall Progress (OT): Fall/o2!  Plan for Continued Treatment (OT): conntinue with ot poc!  Daily Summary of Progress (OT): progress towards functional goals is fair  Plan of Care Review  Plan of Care Reviewed With: patient  Plan of Care Reviewed With: patient  Outcome Measures     Row Name 07/07/20 0830 07/06/20 1100          How much help from another is currently needed...    Putting on and taking off regular lower body clothing?  1  -  1  -JJ     Bathing (including washing, rinsing, and drying)  2  -  2  -JJ     Toileting (which includes using toilet bed pan or urinal)  3  -  3  -JJ     Putting on and taking off regular upper body clothing  3  -  3  -JJ     Taking care of personal grooming (such as brushing teeth)  4  -  4  -JJ     Eating meals  4  -  4  -JJ     AM-PAC 6 Clicks Score (OT)  17  -  17  -JJ        Functional Assessment    Outcome Measure Options  AM-PAC 6 Clicks Daily Activity (OT)  -  AM-PAC 6 Clicks Daily Activity (OT)  -JJ       User Key   (r) = Recorded By, (t) = Taken By, (c) = Cosigned By    Initials Name Provider Type    Melquiades Chavarria COTA/L Occupational Therapy Assistant    Tonya Poole OTR/L Occupational Therapist           Time Calculation:   Time Calculation- OT     Row Name 07/07/20 0830             Time Calculation- OT    OT Start Time  0830  -      OT Stop Time  0857  -      OT Time Calculation (min)  27 min  -      Total Timed Code Minutes- OT  27 minute(s)  -      TCU Minutes- OT  27 min  -      OT Received On  07/07/20  -      OT Goal Re-Cert Due Date  07/16/20  -        User Key  (r) = Recorded By, (t) = Taken By, (c) = Cosigned By    Initials Name Provider Type     Melquiades Craig COTA/L Occupational Therapy Assistant        Therapy Charges for Today     Code Description Service Date Service Provider Modifiers Qty    49112359714 HC OT THER PROC EA 15 MIN 7/7/2020 Melquiades Craig COTA/L GO 2               KVNG Salguero  7/7/2020

## 2020-07-07 NOTE — THERAPY TREATMENT NOTE
Acute Care - Physical Therapy Treatment Note  [facility]     Patient Name: Emy Bermudez  : 1950  MRN: [id number]  Today's Date: 2020  Onset of Illness/Injury or Date of Surgery: 20     Referring Physician: Dr Tolentino([facility])    Admit Date: 2020    Visit Dx:    ICD-10-CM ICD-9-CM   1. COPD exacerbation (CMS/[facility]) J44.1 491.21   2. Impaired functional mobility and activity tolerance Z74.09 V49.89   3. Decreased activities of daily living (ADL) Z78.9 V49.89     Patient Active Problem List   Diagnosis   • COPD exacerbation (CMS/[facility])   • Obesity, Class III, BMI 40-49.9 (morbid obesity) (CMS/[facility])   • Acute on chronic respiratory failure with hypoxia and hypercapnia (CMS/[facility])   • Normocytic anemia   • Hypothyroidism (acquired)   • Essential hypertension   • Venous insufficiency of both lower extremities   • Stasis dermatitis of both legs       Therapy Treatment    Rehabilitation Treatment Summary     Row Name 20             Treatment Time/Intention    Discipline  physical therapy assistant  -WK      Document Type  therapy note (daily note)  -WK      Subjective Information  complains of;pain back 4/10  -WK      Patient Effort  fair  -WK      Existing Precautions/Restrictions  fall;oxygen therapy device and L/min  -WK      Recorded by [WK] Shruti Monroy, PTA 20      Row Name 20             Bed Mobility Assessment/Treatment    Comment (Bed Mobility)  up in chair   -WK      Recorded by [WK] Shruti Monroy, MADHAV 20      Row Name 20             Sit-Stand Transfer    Sit-Stand [place] (Transfers)  -- refused and stated she just wasn't going to stand right now   -WK      Recorded by [WK] Shruti Monroy PTA 20      Row Name 20             Motor Skills Assessment/Interventions    Additional Documentation  Therapeutic Exercise (Group)  -WK      Recorded by [WK] Shruti Monroy PTA 20      Row Name 20  0917             Therapeutic Exercise    Comment (Therapeutic Exercise)  AROM 10 reps x2 hip flex/ext, hip abd/add, LAQ, ankle pumps   -WK      Recorded by [WK] Shruti Monroy, PTA 07/07/20 0946      Row Name 07/07/20 0917             Positioning and Restraints    Pre-Treatment Position  sitting in chair/recliner  -WK      Post Treatment Position  chair  -WK      In Chair  sitting;call light within reach;encouraged to call for assist  -WK      Recorded by [WK] Shruti Monroy, PTA 07/07/20 0946      Row Name 07/07/20 0917             Pain Scale: Numbers Pre/Post-Treatment    Pain Scale: Numbers, Pretreatment  4/10  -WK      Pain Scale: Numbers, Post-Treatment  2/10  -WK      Pain Location - Orientation  lower  -WK      Pain Location  back  -WK      Pain Intervention(s)  Repositioned  -WK      Recorded by [WK] Shruti Monroy, PTA 07/07/20 0946      Row Name                Wound 04/28/20 2041 groin MASD (Moisture associated skin damage)    Wound - Properties Group Date first assessed: 04/28/20 [MS] Time first assessed: 2041 [MS] Present on Hospital Admission: Y [MS] Location: groin [MS2], and abdominal folds  Primary Wound Type: MASD [MS] Recorded by:  [MS] Aleah Suarez, RNA 04/29/20 0037 [MS2] Aleah Suarez, RNA 04/29/20 0042    Row Name                Wound 04/28/20 2041 Right anterior;lower leg    Wound - Properties Group Date first assessed: 04/28/20 [MS] Time first assessed: 2041 [MS] Side: Right [MS] Orientation: anterior;lower [MS] Location: leg [MS] Recorded by:  [MS] Aleah Suarze, RNA 04/29/20 0053    Row Name 07/07/20 0917             Wound 07/05/20 0005 Left posterior calf    Wound - Properties Group Date first assessed: 07/05/20 [MC] Time first assessed: 0005 [MC] Present on Hospital Admission: Y [MC] Side: Left [MC] Orientation: posterior [MC] Location: calf [MC] Recorded by:  [MC] Alana Martínez RN 07/05/20 0007    Care, Wound  sarah boot intact with no concerns   -WK      Recorded by  [WK] Shruti Monroy, PTA 07/07/20 0946        User Key  (r) = Recorded By, (t) = Taken By, (c) = Cosigned By    Initials Name Effective Dates Discipline    WK Shruti Monroy, PTA 08/02/16 -  PT    MS GrayDaniela Aleah L, RNA 05/23/19 -  Nurse    Alana Roberson RN 02/03/20 -  Nurse          Wound 04/28/20 2041 groin MASD (Moisture associated skin damage) (Active)   Closure Open to air 7/6/2020  8:08 PM   Base moist;pink;red 7/6/2020  8:08 PM   Periwound moist 7/6/2020  8:08 PM   Periwound Temperature warm 7/6/2020  8:08 PM   Periwound Skin Turgor soft 7/6/2020  8:08 PM   Edges irregular 7/6/2020  8:08 PM   Periwound Care, Wound other (see comments) 7/6/2020  8:08 PM       Wound 04/28/20 2041 Right anterior;lower leg (Active)   Closure ADRIAN 7/6/2020  8:08 PM   Periwound dry;pink;edematous;swelling 7/6/2020 11:20 AM   Drainage Amount none 7/6/2020  8:08 PM   Care, Wound sarah boot 7/6/2020  8:08 PM       Wound 07/05/20 0005 Left posterior calf (Active)   Wound Image   7/6/2020 11:20 AM   Dressing Appearance intact;dressing loose 7/6/2020 11:20 AM   Closure ADRIAN 7/6/2020  8:08 PM   Base moist;red;pink 7/6/2020 11:20 AM   Periwound dry;pink;edematous;swelling 7/6/2020 11:20 AM   Edges irregular 7/6/2020 11:20 AM   Drainage Characteristics/Odor serosanguineous 7/6/2020 11:20 AM   Drainage Amount none 7/6/2020  8:08 PM   Care, Wound sarah boot 7/7/2020  9:17 AM           Physical Therapy Education                 Title: PT OT SLP Therapies (In Progress)     Topic: Physical Therapy (In Progress)     Point: Mobility training (In Progress)     Description:   Instruct learner(s) on safety and technique for assisting patient out of bed, chair or wheelchair.  Instruct in the proper use of assistive devices, such as walker, crutches, cane or brace.              Patient Friendly Description:   It's important to get you on your feet again, but we need to do so in a way that is safe for you. Falling has serious consequences, and  your personal safety is the most important thing of all.        When it's time to get out of bed, one of us or a family member will sit next to you on the bed to give you support.     If your doctor or nurse tells you to use a walker, crutches, a cane, or a brace, be sure you use it every time you get out of bed, even if you think you don't need it.    Learning Progress Summary           Patient Acceptance, E, NR by WK at 7/7/2020 0948    Comment:  BOA    Acceptance, E, VU,DU by JUD at 7/6/2020 1025    Comment:  Educated pt on progression of PT POC and benefits of activity                   Point: Home exercise program (Not Started)     Description:   Instruct learner(s) on appropriate technique for monitoring, assisting and/or progressing patient with therapeutic exercises and activities.              Learner Progress:   Not documented in this visit.          Point: Body mechanics (Not Started)     Description:   Instruct learner(s) on proper positioning and spine alignment for patient and/or caregiver during mobility tasks and/or exercises.              Learner Progress:   Not documented in this visit.          Point: Precautions (Not Started)     Description:   Instruct learner(s) on prescribed precautions during mobility and gait tasks              Learner Progress:   Not documented in this visit.                      User Key     Initials Effective Dates Name Provider Type Discipline    JUD 08/02/16 -  Dre Tate PT DPT Physical Therapist PT    WK 08/02/16 -  Shruti Monroy PTA Physical Therapy Assistant PT                PT Recommendation and Plan     Plan of Care Reviewed With: patient  Progress: no change  Outcome Summary: Pt performed AROM 10 reps x 2 of BLE exercises. Pt required increased time for rest due to SOA. Megan boot was intact with no concers.  Outcome Measures     Row Name 07/06/20 1100             How much help from another is currently needed...    Putting on and taking off regular  lower body clothing?  1  -JJ      Bathing (including washing, rinsing, and drying)  2  -JJ      Toileting (which includes using toilet bed pan or urinal)  3  -JJ      Putting on and taking off regular upper body clothing  3  -JJ      Taking care of personal grooming (such as brushing teeth)  4  -JJ      Eating meals  4  -JJ      AM-PAC 6 Clicks Score (OT)  17  -J         Functional Assessment    Outcome Measure Options  AM-PAC 6 Clicks Daily Activity (OT)  -        User Key  (r) = Recorded By, (t) = Taken By, (c) = Cosigned By    Initials Name Provider Type    Tonya Patel, OTR/L Occupational Therapist         Time Calculation:   PT Charges     Row Name 07/07/20 0948             Time Calculation    Start Time  0917  -WK      Stop Time  0941  -WK      Time Calculation (min)  24 min  -WK      PT Received On  07/07/20  -WK         Time Calculation- PT    Total Timed Code Minutes- PT  24 minute(s)  -WK        User Key  (r) = Recorded By, (t) = Taken By, (c) = Cosigned By    Initials Name Provider Type     Shruti Monroy PTA Physical Therapy Assistant        Therapy Charges for Today     Code Description Service Date Service Provider Modifiers Qty    33361375264 HC PT THER PROC EA 15 MIN 7/7/2020 Shruti Monroy PTA GP 2          PT G-Codes  Outcome Measure Options: AM-PAC 6 Clicks Basic Mobility (PT)  AM-PAC 6 Clicks Score (PT): 17  AM-PAC 6 Clicks Score (OT): 17    Shruti Monroy PTA  7/7/2020

## 2020-07-07 NOTE — PROGRESS NOTES
[facility] Medicine Services  INPATIENT PROGRESS NOTE    Length of Stay: 3  Date of Admission: 7/4/2020  Primary Care Physician: Dorie Segura APRN    Subjective     Chief Complaint:     Shortness of breath    HPI     Patient continues to improve daily.  Oxygen requirement is down to 3 to 3-1/2 L today.  She is on a minimal dose of IV steroids and can be transitioned to oral prednisone today.  She is sitting in a chair at bedside with her legs down.  I have asked her to elevate her legs and noted to them decrease edema and decrease the likelihood of continued skin breakdown.  She should be stable for discharge tomorrow after transition to oral steroids.    Review of Systems     All pertinent negatives and positives are as above. All other systems have been reviewed and are negative unless otherwise stated.     Objective    Temp:  [97.7 °F (36.5 °C)-98.3 °F (36.8 °C)] 97.7 °F (36.5 °C)  Heart Rate:  [70-97] 97  Resp:  [16-20] 20  BP: (143-158)/(64-87) 154/77    Lab Results (last 24 hours)     Procedure Component Value Units Date/Time    Blood Culture - Blood, Blood, Venous Line [[id number]] Collected:  07/04/20 1808    Specimen:  Blood, Venous Line Updated:  07/06/20 1900     Blood Culture No growth at 2 days    Blood Culture - Blood, Blood, Venous Line [[id number]] Collected:  07/04/20 1714    Specimen:  Blood, Venous Line Updated:  07/06/20 1745     Blood Culture No growth at 2 days          Imaging Results (Last 24 Hours)     ** No results found for the last 24 hours. **             Intake/Output Summary (Last 24 hours) at 7/7/2020 1540  Last data filed at 7/7/2020 1300  Gross per 24 hour   Intake 440 ml   Output 500 ml   Net -60 ml       Physical Exam  Constitutional: She is oriented to person, place, and time. Sitting in a chair at bedside. No distress.  No family present.   HENT:   Head: Normocephalic and atraumatic.   Eyes: Conjunctivae are normal.   Neck: Neck supple. No  JVD present.   Cardiovascular: Normal rate, regular rhythm, normal heart sounds and intact distal pulses. No murmur heard.  Pulmonary/Chest: Effort normal. No respiratory distress.  Lungs are clear bilaterally today. On 3 L nasal cannula.   Abdominal: Soft. Bowel sounds are normal. She exhibits no distension. There is no tenderness.   Musculoskeletal: Normal range of motion. She exhibits edema. She exhibits no tenderness or deformity.   Morbidly obese.   Neurological: She is alert and oriented to person, place, and time.   Skin: Skin is warm and dry. No rash noted. Unna boots present bilaterally No active cellulitis.  Onychomycosis of toenails.   Psychiatric: She has a normal mood and affect.    Results Review:  I have reviewed the labs, radiology results, and diagnostic studies since my last progress note and made treatment changes reflective of the results.   I have reviewed the current medications.    Assessment/Plan     Active Hospital Problems    Diagnosis   • **Acute on chronic respiratory failure with hypoxia and hypercapnia (CMS/HCC)   • Normocytic anemia   • Hypothyroidism (acquired)   • Essential hypertension   • Venous insufficiency of both lower extremities   • Stasis dermatitis of both legs   • Obesity, Class III, BMI 40-49.9 (morbid obesity) (CMS/HCC)   • COPD exacerbation (CMS/HCC)       PLAN:  Discontinue Solu-Medrol in favor of prednisone 60 mg p.o. daily  Continue DuoNeb treatments 4 times daily  Probable discharge home tomorrow    Eleno Tolentino DO   07/07/20   15:40

## 2020-07-08 VITALS
BODY MASS INDEX: 40.56 KG/M2 | HEART RATE: 85 BPM | TEMPERATURE: 97.8 F | HEIGHT: 63 IN | DIASTOLIC BLOOD PRESSURE: 73 MMHG | WEIGHT: 228.9 LBS | RESPIRATION RATE: 18 BRPM | SYSTOLIC BLOOD PRESSURE: 151 MMHG | OXYGEN SATURATION: 95 %

## 2020-07-08 PROCEDURE — 99406 BEHAV CHNG SMOKING 3-10 MIN: CPT

## 2020-07-08 PROCEDURE — 94799 UNLISTED PULMONARY SVC/PX: CPT

## 2020-07-08 PROCEDURE — 94664 DEMO&/EVAL PT USE INHALER: CPT

## 2020-07-08 PROCEDURE — 63710000001 PREDNISONE PER 1 MG: Performed by: FAMILY MEDICINE

## 2020-07-08 PROCEDURE — 97110 THERAPEUTIC EXERCISES: CPT

## 2020-07-08 RX ORDER — GUAIFENESIN 600 MG/1
1200 TABLET, EXTENDED RELEASE ORAL EVERY 12 HOURS SCHEDULED
Qty: 10 TABLET | Refills: 0 | Status: ON HOLD | OUTPATIENT
Start: 2020-07-08 | End: 2021-03-10

## 2020-07-08 RX ORDER — DOXYCYCLINE 100 MG/1
100 TABLET ORAL EVERY 12 HOURS SCHEDULED
Qty: 3 TABLET | Refills: 0 | Status: SHIPPED | OUTPATIENT
Start: 2020-07-08 | End: 2020-07-10

## 2020-07-08 RX ORDER — LOSARTAN POTASSIUM 25 MG/1
25 TABLET ORAL
Qty: 30 TABLET | Refills: 2 | Status: ON HOLD | OUTPATIENT
Start: 2020-07-09 | End: 2021-03-08

## 2020-07-08 RX ORDER — HYDROXYZINE 50 MG/1
50 TABLET, FILM COATED ORAL 3 TIMES DAILY PRN
Qty: 10 TABLET | Refills: 0 | Status: ON HOLD | OUTPATIENT
Start: 2020-07-08 | End: 2021-03-08

## 2020-07-08 RX ORDER — PREDNISONE 5 MG/1
1 TABLET ORAL TAKE AS DIRECTED
Qty: 1 EACH | Refills: 0 | Status: ON HOLD | OUTPATIENT
Start: 2020-07-08 | End: 2021-01-05

## 2020-07-08 RX ORDER — FLUCONAZOLE 150 MG/1
150 TABLET ORAL DAILY
Qty: 3 TABLET | Refills: 0 | Status: ON HOLD | OUTPATIENT
Start: 2020-07-08 | End: 2021-03-08

## 2020-07-08 RX ADMIN — BUDESONIDE AND FORMOTEROL FUMARATE DIHYDRATE 2 PUFF: 160; 4.5 AEROSOL RESPIRATORY (INHALATION) at 06:13

## 2020-07-08 RX ADMIN — FUROSEMIDE 20 MG: 20 TABLET ORAL at 09:16

## 2020-07-08 RX ADMIN — IPRATROPIUM BROMIDE AND ALBUTEROL SULFATE 3 ML: 2.5; .5 SOLUTION RESPIRATORY (INHALATION) at 06:07

## 2020-07-08 RX ADMIN — LOSARTAN POTASSIUM 25 MG: 50 TABLET, FILM COATED ORAL at 09:18

## 2020-07-08 RX ADMIN — NYSTATIN 1 APPLICATION: 100000 POWDER TOPICAL at 09:20

## 2020-07-08 RX ADMIN — MECLIZINE HYDROCHLORIDE 25 MG: 25 TABLET ORAL at 09:18

## 2020-07-08 RX ADMIN — GUAIFENESIN 1200 MG: 600 TABLET, EXTENDED RELEASE ORAL at 09:18

## 2020-07-08 RX ADMIN — DOXYCYCLINE 100 MG: 100 TABLET, FILM COATED ORAL at 09:16

## 2020-07-08 RX ADMIN — PREDNISONE 60 MG: 20 TABLET ORAL at 09:17

## 2020-07-08 RX ADMIN — IPRATROPIUM BROMIDE AND ALBUTEROL SULFATE 3 ML: 2.5; .5 SOLUTION RESPIRATORY (INHALATION) at 14:12

## 2020-07-08 RX ADMIN — LEVOTHYROXINE SODIUM 75 MCG: 75 TABLET ORAL at 06:19

## 2020-07-08 RX ADMIN — FAMOTIDINE 40 MG: 20 TABLET, FILM COATED ORAL at 09:16

## 2020-07-08 RX ADMIN — ESCITALOPRAM 10 MG: 10 TABLET, FILM COATED ORAL at 09:16

## 2020-07-08 RX ADMIN — IPRATROPIUM BROMIDE AND ALBUTEROL SULFATE 3 ML: 2.5; .5 SOLUTION RESPIRATORY (INHALATION) at 10:04

## 2020-07-08 NOTE — NURSING NOTE
[facility]. She refused and stated \" do I have stupid written on my forehead?\"  "

## 2020-07-08 NOTE — NURSING NOTE
Pt yelled at me from her room to demand to know why the doctor has not discharged her yet. She said that she was supposed to be discharged to day.  I do told her I did not see any orders for discharge.

## 2020-07-08 NOTE — THERAPY TREATMENT NOTE
Acute Care - Physical Therapy Treatment Note  [facility]     Patient Name: Emy Bermudez  : 1950  MRN: [id number]  Today's Date: 2020  Onset of Illness/Injury or Date of Surgery: 20     Referring Physician: Dr Tolentino([facility])    Admit Date: 2020    Visit Dx:    ICD-10-CM ICD-9-CM   1. COPD exacerbation (CMS/HCC) J44.1 491.21   2. Impaired functional mobility and activity tolerance Z74.09 V49.89   3. Decreased activities of daily living (ADL) Z78.9 V49.89     Patient Active Problem List   Diagnosis   • COPD exacerbation (CMS/[facility])   • Obesity, Class III, BMI 40-49.9 (morbid obesity) (CMS/[facility])   • Acute on chronic respiratory failure with hypoxia and hypercapnia (CMS/[facility])   • Normocytic anemia   • Hypothyroidism (acquired)   • Essential hypertension   • Venous insufficiency of both lower extremities   • Stasis dermatitis of both legs       Therapy Treatment    Rehabilitation Treatment Summary     Row Name 20             Treatment Time/Intention    Discipline  physical therapy assistant  -AB      Document Type  therapy note (daily note)  -AB      Subjective Information  weakness;fatigue;pain  -AB2      Mode of Treatment  physical therapy  -AB      Comment  refused to walk due to going home but did perform exercises  -AB2      Existing Precautions/Restrictions  fall;oxygen therapy device and L/min  -AB      Recorded by [AB] Ainsley Madison, PTA 20  [AB2] Ainsley Madison, PTA 2034      Row Name 20             Bed Mobility Assessment/Treatment    Comment (Bed Mobility)  up in chair  -AB      Recorded by [AB] Ainsley Madison, PTA 20      Row Name 20             Therapeutic Exercise    Exercise Type (Therapeutic Exercise)  AROM (active range of motion)  -AB      Position (Therapeutic Exercise)  seated  -AB      Sets/Reps (Therapeutic Exercise)  10 reps  2 sets  -AB      Recorded by [AB] Ainsley Madison, PTA 20      Row Name                 Wound 04/28/20 2041 groin MASD (Moisture associated skin damage)    Wound - Properties Group Date first assessed: 04/28/20 [MS] Time first assessed: 2041 [MS] Present on Hospital Admission: Y [MS] Location: groin [MS2], and abdominal folds  Primary Wound Type: MASD [MS] Recorded by:  [MS] Aleah Suarez, RNA 04/29/20 0037 [MS2] Aleah Suarez, RNA 04/29/20 0042    Row Name                Wound 04/28/20 2041 Right anterior;lower leg    Wound - Properties Group Date first assessed: 04/28/20 [MS] Time first assessed: 2041 [MS] Side: Right [MS] Orientation: anterior;lower [MS] Location: leg [MS] Recorded by:  [MS] Aleah Suarez, RNA 04/29/20 0053    Row Name                Wound 07/05/20 0005 Left posterior calf    Wound - Properties Group Date first assessed: 07/05/20 [MC] Time first assessed: 0005 [MC] Present on Hospital Admission: Y [MC] Side: Left [MC] Orientation: posterior [MC] Location: calf [MC] Recorded by:  [MC] Alana Martínez RN 07/05/20 0007      User Key  (r) = Recorded By, (t) = Taken By, (c) = Cosigned By    Initials Name Effective Dates Discipline    AB Ainsley Madison, PTA 08/02/16 -  PT    MS Aleah Suarez, RNA 05/23/19 -  Nurse    Alana Roberson RN 02/03/20 -  Nurse          Wound 04/28/20 2041 groin MASD (Moisture associated skin damage) (Active)   Closure ADRIAN 7/7/2020  8:27 PM       Wound 04/28/20 2041 Right anterior;lower leg (Active)   Closure ADRIAN 7/7/2020  8:27 PM       Wound 07/05/20 0005 Left posterior calf (Active)   Closure ADRIAN 7/7/2020  8:27 PM   Care, Wound sarah boot 7/7/2020  9:17 AM           Physical Therapy Education                 Title: PT OT SLP Therapies (In Progress)     Topic: Physical Therapy (In Progress)     Point: Mobility training (In Progress)     Description:   Instruct learner(s) on safety and technique for assisting patient out of bed, chair or wheelchair.  Instruct in the proper use of assistive devices, such as walker, crutches,  cane or brace.              Patient Friendly Description:   It's important to get you on your feet again, but we need to do so in a way that is safe for you. Falling has serious consequences, and your personal safety is the most important thing of all.        When it's time to get out of bed, one of us or a family member will sit next to you on the bed to give you support.     If your doctor or nurse tells you to use a walker, crutches, a cane, or a brace, be sure you use it every time you get out of bed, even if you think you don't need it.    Learning Progress Summary           Patient Acceptance, E, NR by WK at 7/7/2020 0948    Comment:  BOA    Acceptance, E, VU,DU by JUD at 7/6/2020 1025    Comment:  Educated pt on progression of PT POC and benefits of activity                   Point: Home exercise program (Not Started)     Description:   Instruct learner(s) on appropriate technique for monitoring, assisting and/or progressing patient with therapeutic exercises and activities.              Learner Progress:   Not documented in this visit.          Point: Body mechanics (Not Started)     Description:   Instruct learner(s) on proper positioning and spine alignment for patient and/or caregiver during mobility tasks and/or exercises.              Learner Progress:   Not documented in this visit.          Point: Precautions (Not Started)     Description:   Instruct learner(s) on prescribed precautions during mobility and gait tasks              Learner Progress:   Not documented in this visit.                      User Key     Initials Effective Dates Name Provider Type Discipline    JUD 08/02/16 -  Dre Tate PT DPT Physical Therapist PT    WK 08/02/16 -  Shruti Monroy PTA Physical Therapy Assistant PT                PT Recommendation and Plan     Plan of Care Reviewed With: patient  Progress: no change  Outcome Summary: She refused to get up and walk due to she thinks she is going home today but she did  perform 10 reps 2 sets of sitting exercises.  Outcome Measures     Row Name 07/07/20 0830 07/06/20 1100          How much help from another is currently needed...    Putting on and taking off regular lower body clothing?  1  -  1  -JJ     Bathing (including washing, rinsing, and drying)  2  -  2  -JJ     Toileting (which includes using toilet bed pan or urinal)  3  -  3  -JJ     Putting on and taking off regular upper body clothing  3  -  3  -JJ     Taking care of personal grooming (such as brushing teeth)  4  -  4  -JJ     Eating meals  4  -  4  -J     AM-PAC 6 Clicks Score (OT)  17  -  17  -        Functional Assessment    Outcome Measure Options  AM-PAC 6 Clicks Daily Activity (OT)  -  AM-PAC 6 Clicks Daily Activity (OT)  -       User Key  (r) = Recorded By, (t) = Taken By, (c) = Cosigned By    Initials Name Provider Type    CJ Melquiades Craig ALBARADO/L Occupational Therapy Assistant    Tonya Poole OTR/L Occupational Therapist         Time Calculation:   PT Charges     Row Name 07/08/20 0820             Time Calculation    Start Time  0820  -AB      Stop Time  0834  -AB      Time Calculation (min)  14 min  -AB      PT Received On  07/08/20  -AB         Time Calculation- PT    Total Timed Code Minutes- PT  14 minute(s)  -AB        User Key  (r) = Recorded By, (t) = Taken By, (c) = Cosigned By    Initials Name Provider Type    AB Ainsley Madison PTA Physical Therapy Assistant        Therapy Charges for Today     Code Description Service Date Service Provider Modifiers Qty    75059695933 HC PT THER PROC EA 15 MIN 7/8/2020 Ainsley Madison PTA GP 1          PT G-Codes  Outcome Measure Options: AM-PAC 6 Clicks Daily Activity (OT)  AM-PAC 6 Clicks Score (PT): 17  AM-PAC 6 Clicks Score (OT): 17    Ainsley Madison PTA  7/8/2020

## 2020-07-08 NOTE — PLAN OF CARE
Problem: Patient Care Overview  Goal: Plan of Care Review  Outcome: Ongoing (interventions implemented as appropriate)  Flowsheets (Taken 7/8/2020 0820)  Progress: no change  Plan of Care Reviewed With: patient  Outcome Summary: She refused to get up and walk due to she thinks she is going home today but she did perform 10 reps 2 sets of sitting exercises.
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
## 2020-07-08 NOTE — PLAN OF CARE
Problem: Patient Care Overview  Goal: Plan of Care Review  Outcome: Ongoing (interventions implemented as appropriate)  Flowsheets (Taken 7/8/2020 4332)  Progress: no change  Plan of Care Reviewed With: patient  Outcome Summary: Patient has no c/o pain. On 3L, O2 sat 94%. Patient slept in chair all night. VSS. Safety maintained. Will continue to monitor.

## 2020-07-09 ENCOUNTER — READMISSION MANAGEMENT (OUTPATIENT)
Dept: CALL CENTER | Facility: HOSPITAL | Age: 70
End: 2020-07-09

## 2020-07-09 LAB
BACTERIA SPEC AEROBE CULT: NORMAL
BACTERIA SPEC AEROBE CULT: NORMAL

## 2020-07-09 NOTE — THERAPY DISCHARGE NOTE
Acute Care - Occupational Therapy Discharge Summary  [facility]     Patient Name: Emy Bermudez  : 1950  MRN: [id number]    Today's Date: 2020  Onset of Illness/Injury or Date of Surgery: 20    Date of Referral to OT: 20  Referring Physician: Dr Tolentino(Mary Janea boots)      Admit Date: 2020        OT Recommendation and Plan    Visit Dx:    ICD-10-CM ICD-9-CM   1. COPD exacerbation (CMS/[facility]) J44.1 491.21   2. Impaired functional mobility and activity tolerance Z74.09 V49.89   3. Decreased activities of daily living (ADL) Z78.9 V49.89   4. Venous insufficiency of both lower extremities I87.2 459.81   5. Stasis dermatitis of both legs I87.2 454.1               Rehab Goal Summary     Row Name 20 0800 20 0700          Physical Therapy Goals    Wound Care Goal Selection (PT)  --  wound care, PT goal 1  -AB        Bed Mobility Goal 1 (PT)    Activity/Assistive Device (Bed Mobility Goal 1, PT)  --  sit to supine/supine to sit  -AB     Roosevelt Level/Cues Needed (Bed Mobility Goal 1, PT)  --  supervision required  -AB     Time Frame (Bed Mobility Goal 1, PT)  --  long term goal (LTG);10 days  -AB     Progress/Outcomes (Bed Mobility Goal 1, PT)  --  goal not met  -AB        Transfer Goal 1 (PT)    Activity/Assistive Device (Transfer Goal 1, PT)  --  sit-to-stand/stand-to-sit;bed-to-chair/chair-to-bed;walker, rolling  -AB     Roosevelt Level/Cues Needed (Transfer Goal 1, PT)  --  supervision required  -AB     Time Frame (Transfer Goal 1, PT)  --  long term goal (LTG);10 days  -AB     Progress/Outcome (Transfer Goal 1, PT)  --  goal not met  -AB        Gait Training Goal 1 (PT)    Activity/Assistive Device (Gait Training Goal 1, PT)  --  gait (walking locomotion);assistive device use;decrease fall risk;improve balance and speed;increase endurance/gait distance  -AB     Roosevelt Level (Gait Training Goal 1, PT)  --  supervision required  -AB     Distance (Gait Goal 1, PT)  --  30  -AB      Time Frame (Gait Training Goal 1, PT)  --  long term goal (LTG);10 days  -AB     Progress/Outcome (Gait Training Goal 1, PT)  --  goal not met  -AB        Wound Care Goal 1 (PT)    Wound Care Goal 1 (PT)  --  B unna boots will stay intact for 5-7 days with no adverse reactions.   -AB     Time Frame (Wound Care Goal 1, PT)  --  long term goal (LTG);10 days  -AB     Progress/Outcome (Wound Care Goal 1, PT)  --  goal met  -AB        Dressing Goal 1 (OT)    Activity/Assistive Device (Dressing Goal 1, OT)  dressing skills, all  -TS  --     Andrews/Cues Needed (Dressing Goal 1, OT)  minimum assist (75% or more patient effort)  -TS  --     Time Frame (Dressing Goal 1, OT)  long term goal (LTG);by discharge  -TS  --     Progress/Outcome (Dressing Goal 1, OT)  goal not met  -TS  --        Toileting Goal 1 (OT)    Activity/Device (Toileting Goal 1, OT)  toileting skills, all;commode  -TS  --     Andrews Level/Cues Needed (Toileting Goal 1, OT)  independent  -TS  --     Time Frame (Toileting Goal 1, OT)  long term goal (LTG);by discharge  -TS  --     Progress/Outcome (Toileting Goal 1, OT)  goal not met  -TS  --         Activity Tolerance Goal 1 (OT)    Activity Tolerance Goal 1 (OT)  Pt will participate in functional task in sitting for 8 minutes with no recovery periods to address decreased activity tolerance  -TS  --     Activity Level (Endurance Goal 1, OT)  O2 sat >/ equal to 88%  -TS  --     Time Frame (Activity Tolerance Goal 1, OT)  long term goal (LTG);by discharge  -TS  --     Progress/Outcome (Activity Tolerance Goal 1, OT)  goal not met  -TS  --       User Key  (r) = Recorded By, (t) = Taken By, (c) = Cosigned By    Initials Name Provider Type Discipline    AB Ainsley Madison, PTA Physical Therapy Assistant PT    TS Julissa Melendez, ALBARADO/L Occupational Therapy Assistant OT          Outcome Measures     Row Name 07/07/20 0830 07/06/20 1100          How much help from another is currently  needed...    Putting on and taking off regular lower body clothing?  1  -CJ  1  -JJ     Bathing (including washing, rinsing, and drying)  2  -CJ  2  -JJ     Toileting (which includes using toilet bed pan or urinal)  3  -CJ  3  -JJ     Putting on and taking off regular upper body clothing  3  -CJ  3  -JJ     Taking care of personal grooming (such as brushing teeth)  4  -  4  -JJ     Eating meals  4  -  4  -JJ     AM-PAC 6 Clicks Score (OT)  17  -  17  -        Functional Assessment    Outcome Measure Options  AM-PAC 6 Clicks Daily Activity (OT)  -  AM-PAC 6 Clicks Daily Activity (OT)  -       User Key  (r) = Recorded By, (t) = Taken By, (c) = Cosigned By    Initials Name Provider Type    Melquiades Chavarria COTA/L Occupational Therapy Assistant    Tonya Poole OTR/L Occupational Therapist          Therapy Suggested Charges     Code   Minutes Charges    None                 OT Discharge Summary  Reason for Discharge: Discharge from facility  Outcomes Achieved: Refer to plan of care for updates on goals achieved  Discharge Destination: Home with assist      KVNG Champion  7/9/2020

## 2020-07-09 NOTE — OUTREACH NOTE
Prep Survey      Responses   [facility] facility patient discharged from?  [facility]   Is LACE score < 7 ?  No   Eligibility  Readm Mgmt   Discharge diagnosis  Acute on chronic respiratory failure with hypoxia and hypercapnia    Does the patient have one of the following disease processes/diagnoses(primary or secondary)?  COPD/Pneumonia   Does the patient have Home health ordered?  No   Is there a DME ordered?  No   Prep survey completed?  Yes          Jenna Laguna RN

## 2020-07-09 NOTE — PROGRESS NOTES
Continued Stay Note  [facility]     Patient Name: Emy Bermudez  MRN: [id number]  Today's Date: 7/9/2020    Admit Date: 7/4/2020    Discharge Plan     Row Name 07/09/20 1000       Plan    Plan  [facility]    Patient/Family in Agreement with Plan  yes    Final Discharge Disposition Code  06 - home with home health care    Final Note  Pt d/c'ed home last night. Yanique with [facility] [phone] is aware and did receive d/c information.         Discharge Codes    No documentation.       Expected Discharge Date and Time     Expected Discharge Date Expected Discharge Time    Jul 8, 2020             ALANA Lebron

## 2020-07-09 NOTE — THERAPY DISCHARGE NOTE
Acute Care - Physical Therapy Discharge Summary  [facility]       Patient Name: Emy Bermudez  : 1950  MRN: [id number]    Today's Date: 2020  Onset of Illness/Injury or Date of Surgery: 20       Referring Physician: Dr Tolentino([facility])      Admit Date: 2020      PT Recommendation and Plan    Visit Dx:    ICD-10-CM ICD-9-CM   1. COPD exacerbation (CMS/[facility]) J44.1 491.21   2. Impaired functional mobility and activity tolerance Z74.09 V49.89   3. Decreased activities of daily living (ADL) Z78.9 V49.89   4. Venous insufficiency of both lower extremities I87.2 459.81   5. Stasis dermatitis of both legs I87.2 454.1       Outcome Measures     Row Name 20 0830 20 1100          How much help from another is currently needed...    Putting on and taking off regular lower body clothing?  1  -  1  -JJ     Bathing (including washing, rinsing, and drying)  2  -CJ  2  -JJ     Toileting (which includes using toilet bed pan or urinal)  3  -CJ  3  -JJ     Putting on and taking off regular upper body clothing  3  -  3  -JJ     Taking care of personal grooming (such as brushing teeth)  4  -  4  -JJ     Eating meals  4  -  4  -JJ     AM-PAC 6 Clicks Score (OT)  17  -  17  -J        Functional Assessment    Outcome Measure Options  AM-PAC 6 Clicks Daily Activity (OT)  -  AM-PAC 6 Clicks Daily Activity (OT)  -J       User Key  (r) = Recorded By, (t) = Taken By, (c) = Cosigned By    Initials Name Provider Type    Melquiades Chavarria ALBARADO/L Occupational Therapy Assistant    Tonya Poole OTR/L Occupational Therapist              Rehab Goal Summary     Row Name 20 0700             Physical Therapy Goals    Wound Care Goal Selection (PT)  wound care, PT goal 1  -AB         Bed Mobility Goal 1 (PT)    Activity/Assistive Device (Bed Mobility Goal 1, PT)  sit to supine/supine to sit  -AB      [place] Level/Cues Needed (Bed Mobility Goal 1, PT)  supervision required  -AB       Time Frame (Bed Mobility Goal 1, PT)  long term goal (LTG);10 days  -AB      Progress/Outcomes (Bed Mobility Goal 1, PT)  goal not met  -AB         Transfer Goal 1 (PT)    Activity/Assistive Device (Transfer Goal 1, PT)  sit-to-stand/stand-to-sit;bed-to-chair/chair-to-bed;walker, rolling  -AB      [place] Level/Cues Needed (Transfer Goal 1, PT)  supervision required  -AB      Time Frame (Transfer Goal 1, PT)  long term goal (LTG);10 days  -AB      Progress/Outcome (Transfer Goal 1, PT)  goal not met  -AB         Gait Training Goal 1 (PT)    Activity/Assistive Device (Gait Training Goal 1, PT)  gait (walking locomotion);assistive device use;decrease fall risk;improve balance and speed;increase endurance/gait distance  -AB      [place] Level (Gait Training Goal 1, PT)  supervision required  -AB      Distance (Gait Goal 1, PT)  30  -AB      Time Frame (Gait Training Goal 1, PT)  long term goal (LTG);10 days  -AB      Progress/Outcome (Gait Training Goal 1, PT)  goal not met  -AB         Wound Care Goal 1 (PT)    Wound Care Goal 1 (PT)  B unna boots will stay intact for 5-7 days with no adverse reactions.   -AB      Time Frame (Wound Care Goal 1, PT)  long term goal (LTG);10 days  -AB      Progress/Outcome (Wound Care Goal 1, PT)  goal met  -AB        User Key  (r) = Recorded By, (t) = Taken By, (c) = Cosigned By    Initials Name Provider Type Discipline    AB Ainsley Madison PTA Physical Therapy Assistant PT          Therapy Charges for Today     Code Description Service Date Service Provider Modifiers Qty    [id number]  PT THER PROC EA 15 MIN 7/8/2020 Ainsley Madison PTA GP 1          PT Discharge Summary  Anticipated Discharge Disposition (PT): home with assist, home with 24/7 care, home with home health  Reason for Discharge: Discharge from facility  Outcomes Achieved: Refer to plan of care for updates on goals achieved  Discharge Destination: Home      Ainsley Madison PTA   7/9/2020
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
## 2020-07-10 ENCOUNTER — READMISSION MANAGEMENT (OUTPATIENT)
Dept: CALL CENTER | Facility: HOSPITAL | Age: 70
End: 2020-07-10

## 2020-07-10 NOTE — OUTREACH NOTE
COPD/PN Week 1 Survey      Responses   [facility] patient discharged from?  [facility]   COVID-19 Test Status  Negative   Does the patient have one of the following disease processes/diagnoses(primary or secondary)?  COPD/Pneumonia   Is there a successful TCM telephone encounter documented?  No   Was the primary reason for admission:  COPD exacerbation   Week 1 attempt successful?  Yes   Call start time  1147   Call end time  1155   Discharge diagnosis  COPD exacerbation   Is patient permission given to speak with other caregiver?  Yes   List who call center can speak with  Joanne Blancas (call first as pt is staying with her) and Belia Rizvi-daughter   Person spoke with today (if not patient) and relationship  patient   Meds reviewed with patient/caregiver?  Yes   Is the patient having any side effects they believe may be caused by any medication additions or changes?  No   Does the patient have all medications ordered at discharge?  Yes   Is the patient taking all medications as directed (includes completed medication regime)?  Yes   Does the patient have a primary care provider?   Yes   Does the patient have an appointment with their PCP or pulmonologist within 7 days of discharge?  Greater than 7 days   Comments regarding PCP  7/17/20   What is preventing the patient from scheduling follow up appointments within 7 days of discharge?  Earlier appointment not available   Nursing Interventions  Verified appointment date/time/provider   Has the patient kept scheduled appointments due by today?  N/A   What is the Home health agency?   resume service with [facility]   Has home health visited the patient within 72 hours of discharge?  Yes   Pulse Ox monitoring  None   Psychosocial issues?  No   Psychosocial comments  Pt is staying with daughter Joanne   [place]  Pt wears 3-4 LPM of O2 continuous   Did the patient receive a copy of their discharge instructions?  Yes   Nursing interventions  Reviewed instructions with  "patient   What is the patient's perception of their health status since discharge?  Improving   Nursing Interventions  Nurse provided patient education   Are the patient's immunizations up to date?   No [Pt reports, \"I don't take any of those shots\"]   If the patient is a current smoker, are they able to teach back resources for cessation?  Smoking cessation medications [Smoker. Pt reports she smokes with her O2 on. She says, \"I've been doing that for 5 years.\" When RN informed patient she could blow herself up she reported, \"I'm not going to get into a discussion about that.\"]   Is the patient/caregiver able to teach back the hierarchy of who to call/visit for symptoms/problems? PCP, Specialist, Home health nurse, Urgent Care, ED, 911  Yes   Is the patient able to teach back COPD zones?  Yes   Nursing interventions  Education provided on various zones   Patient reports what zone on this call?  Yellow Zone   Yellow Zone  Increased shortness of air, Increased or thicker phlegm/mucus, Unable to complete daily activities, Increased swelling of ankles   Yellow interventions  Continue to use daily medications, Use quick relief inhaler as ordered, Use oxygen as ordered, Do not smoke, Get plenty of rest, Call provider immediatly if symptoms do not improve   Week 1 call completed?  Yes          Shelly Kennedy RN  "

## 2020-07-21 ENCOUNTER — READMISSION MANAGEMENT (OUTPATIENT)
Dept: CALL CENTER | Facility: HOSPITAL | Age: 70
End: 2020-07-21

## 2020-07-21 NOTE — OUTREACH NOTE
COPD/PN Week 2 Survey      Responses   [facility] patient discharged from?  [place]   COVID-19 Test Status  Negative   Does the patient have one of the following disease processes/diagnoses(primary or secondary)?  COPD/Pneumonia   Was the primary reason for admission:  COPD exacerbation   Week 2 attempt successful?  Yes   Call start time  1136   Call end time  1140   Discharge diagnosis  COPD exacerbation   Meds reviewed with patient/caregiver?  Yes   Is the patient having any side effects they believe may be caused by any medication additions or changes?  No   Does the patient have all medications ordered at discharge?  Yes   Is the patient taking all medications as directed (includes completed medication regime)?  Yes   Does the patient have a primary care provider?   Yes   Has the patient kept scheduled appointments due by today?  N/A   Comments  Won't see PCP till August, talked on the phone with her.   What is the Home health agency?   resume service with [facility]   Has home health visited the patient within 72 hours of discharge?  Yes   Pulse Ox monitoring  None   Comments  Pt wears 3-4 LPM of O2 continuous   Did the patient receive a copy of their discharge instructions?  Yes   Nursing interventions  Reviewed instructions with patient   What is the patient's perception of their health status since discharge?  Improving   Nursing Interventions  Nurse provided patient education   Are the patient's immunizations up to date?   No   If the patient is a current smoker, are they able to teach back resources for cessation?  Smoking cessation medications, [id number]   Is the patient/caregiver able to teach back the hierarchy of who to call/visit for symptoms/problems? PCP, Specialist, Home health nurse, Urgent Care, ED, 911  Yes   Additional teach back comments  Quit smoking a week ago, encouraged aretha,    Is the patient able to teach back COPD zones?  Yes   Nursing interventions  Education provided on various  zones   Patient reports what zone on this call?  Green Zone   Green Zone  Reports doing well, Breathing without shortness of breath, Usual activity and exercise level, Usual amount of phlegm/mucus without difficulty coughing up, Sleeping well, Appetite is good   Green Zone interventions:  Take daily medications   Week 2 call completed?  Yes   Wrap up additional comments  Encouraged Day 1 questions with PCP and discussing PULM consult.  She denies any needs or issues at this time.          Elisabeth Bledsoe RN

## 2020-07-29 ENCOUNTER — READMISSION MANAGEMENT (OUTPATIENT)
Dept: CALL CENTER | Facility: HOSPITAL | Age: 70
End: 2020-07-29

## 2020-07-29 NOTE — OUTREACH NOTE
COPD/PN Week 3 Survey      Responses   [facility] patient discharged from?  [place]   COVID-19 Test Status  Negative   Does the patient have one of the following disease processes/diagnoses(primary or secondary)?  COPD/Pneumonia   Was the primary reason for admission:  COPD exacerbation   Week 3 attempt successful?  Yes   Call start time  1423   Call end time  1424   Discharge diagnosis  COPD exacerbation   Meds reviewed with patient/caregiver?  Yes   Is the patient having any side effects they believe may be caused by any medication additions or changes?  No   Does the patient have all medications ordered at discharge?  Yes   Is the patient taking all medications as directed (includes completed medication regime)?  Yes   Does the patient have a primary care provider?   Yes   Does the patient have an appointment with their PCP or pulmonologist within 7 days of discharge?  Yes   Has the patient kept scheduled appointments due by today?  Yes   Has home health visited the patient within 72 hours of discharge?  Yes   Pulse Ox monitoring  None   Psychosocial issues?  No   Did the patient receive a copy of their discharge instructions?  Yes   Nursing interventions  Reviewed instructions with patient   What is the patient's perception of their health status since discharge?  Improving   Nursing Interventions  Nurse provided patient education   Are the patient's immunizations up to date?   No   Is the patient/caregiver able to teach back the hierarchy of who to call/visit for symptoms/problems? PCP, Specialist, Home health nurse, Urgent Care, ED, 911  Yes   Is the patient able to teach back COPD zones?  Yes   Nursing interventions  Education provided on various zones   Patient reports what zone on this call?  Green Zone   Green Zone  Reports doing well, Breathing without shortness of breath, Usual activity and exercise level, Usual amount of phlegm/mucus without difficulty coughing up, Sleeping well, Appetite is good    Green Zone interventions:  Take daily medications   Week 3 call completed?  Yes          Ayush Zhang RN

## 2020-08-05 ENCOUNTER — READMISSION MANAGEMENT (OUTPATIENT)
Dept: CALL CENTER | Facility: HOSPITAL | Age: 70
End: 2020-08-05

## 2020-08-05 NOTE — OUTREACH NOTE
COPD/PN Week 4 Survey      Responses   [facility] patient discharged from?  [place]   COVID-19 Test Status  Negative   Does the patient have one of the following disease processes/diagnoses(primary or secondary)?  COPD/Pneumonia   Was the primary reason for admission:  COPD exacerbation   Week 4 attempt successful?  Yes   Call start time  1511   Call end time  1515   Discharge diagnosis  COPD exacerbation   Meds reviewed with patient/caregiver?  Yes   Is the patient having any side effects they believe may be caused by any medication additions or changes?  No   Is the patient taking all medications as directed (includes completed medication regime)?  Yes   Has the patient kept scheduled appointments due by today?  Yes   Is the patient still receiving Home Health Services?  Yes   Pulse Ox monitoring  None   Psychosocial issues?  No   What is the patient's perception of their health status since discharge?  Improving   Nursing Interventions  Nurse provided patient education   Is the patient/caregiver able to teach back the hierarchy of who to call/visit for symptoms/problems? PCP, Specialist, Home health nurse, Urgent Care, ED, 911  Yes   Is the patient able to teach back COPD zones?  Yes   Nursing interventions  Education provided on various zones   Patient reports what zone on this call?  Green Zone   Green Zone  Reports doing well, Breathing without shortness of breath, Usual activity and exercise level, Usual amount of phlegm/mucus without difficulty coughing up, Sleeping well, Appetite is good   Green Zone interventions:  Take daily medications, Avoid indoor/outdoor triggers   Week 4 call completed?  Yes   Would the patient like one additional call?  No   Graduated  Yes   Did the patient feel the follow up calls were helpful during their recovery period?  Yes   Was the number of calls appropriate?  Yes          Shayla Morris RN

## 2020-12-31 ENCOUNTER — HOSPITAL ENCOUNTER (INPATIENT)
Facility: HOSPITAL | Age: 70
LOS: 5 days | Discharge: HOME | End: 2021-01-06
Attending: INTERNAL MEDICINE | Admitting: FAMILY MEDICINE

## 2020-12-31 DIAGNOSIS — J34.2 ACQUIRED DEVIATED NASAL SEPTUM: Primary | ICD-10-CM

## 2020-12-31 DIAGNOSIS — R04.0 EPISTAXIS: ICD-10-CM

## 2020-12-31 PROBLEM — J44.9 COPD (CHRONIC OBSTRUCTIVE PULMONARY DISEASE) (HCC): Status: ACTIVE | Noted: 2020-02-13

## 2020-12-31 PROBLEM — J96.11 CHRONIC RESPIRATORY FAILURE WITH HYPOXIA AND HYPERCAPNIA: Status: ACTIVE | Noted: 2020-07-04

## 2020-12-31 PROBLEM — Z72.0 TOBACCO ABUSE: Status: ACTIVE | Noted: 2020-12-31

## 2020-12-31 PROBLEM — J96.12 CHRONIC RESPIRATORY FAILURE WITH HYPOXIA AND HYPERCAPNIA (HCC): Status: ACTIVE | Noted: 2020-07-04

## 2020-12-31 LAB
ABO GROUP BLD: NORMAL
BLD GP AB SCN SERPL QL: NEGATIVE
HOLD SPECIMEN: NORMAL
RH BLD: POSITIVE
SARS-COV-2 RNA PNL SPEC NAA+PROBE: NOT DETECTED
T&S EXPIRATION DATE: NORMAL

## 2020-12-31 PROCEDURE — 94760 N-INVAS EAR/PLS OXIMETRY 1: CPT

## 2020-12-31 PROCEDURE — 86901 BLOOD TYPING SEROLOGIC RH(D): CPT | Performed by: NURSE PRACTITIONER

## 2020-12-31 PROCEDURE — 99222 1ST HOSP IP/OBS MODERATE 55: CPT | Performed by: OTOLARYNGOLOGY

## 2020-12-31 PROCEDURE — 86850 RBC ANTIBODY SCREEN: CPT | Performed by: NURSE PRACTITIONER

## 2020-12-31 PROCEDURE — 86900 BLOOD TYPING SEROLOGIC ABO: CPT | Performed by: NURSE PRACTITIONER

## 2020-12-31 PROCEDURE — 94640 AIRWAY INHALATION TREATMENT: CPT

## 2020-12-31 PROCEDURE — 85018 HEMOGLOBIN: CPT | Performed by: NURSE PRACTITIONER

## 2020-12-31 PROCEDURE — 85014 HEMATOCRIT: CPT | Performed by: NURSE PRACTITIONER

## 2020-12-31 PROCEDURE — 94799 UNLISTED PULMONARY SVC/PX: CPT

## 2020-12-31 PROCEDURE — 87635 SARS-COV-2 COVID-19 AMP PRB: CPT | Performed by: INTERNAL MEDICINE

## 2020-12-31 RX ORDER — GUAIFENESIN 600 MG/1
1200 TABLET, EXTENDED RELEASE ORAL EVERY 12 HOURS SCHEDULED
Status: DISCONTINUED | OUTPATIENT
Start: 2020-12-31 | End: 2021-01-06 | Stop reason: HOSPADM

## 2020-12-31 RX ORDER — SODIUM CHLORIDE 0.9 % (FLUSH) 0.9 %
10 SYRINGE (ML) INJECTION EVERY 12 HOURS SCHEDULED
Status: DISCONTINUED | OUTPATIENT
Start: 2020-12-31 | End: 2021-01-06 | Stop reason: HOSPADM

## 2020-12-31 RX ORDER — LOSARTAN POTASSIUM 50 MG/1
25 TABLET ORAL
Status: DISCONTINUED | OUTPATIENT
Start: 2020-12-31 | End: 2021-01-06 | Stop reason: HOSPADM

## 2020-12-31 RX ORDER — AMOXICILLIN 500 MG/1
500 CAPSULE ORAL EVERY 8 HOURS SCHEDULED
Status: DISCONTINUED | OUTPATIENT
Start: 2020-12-31 | End: 2021-01-01

## 2020-12-31 RX ORDER — FUROSEMIDE 20 MG/1
20 TABLET ORAL DAILY
Status: DISCONTINUED | OUTPATIENT
Start: 2020-12-31 | End: 2021-01-06 | Stop reason: HOSPADM

## 2020-12-31 RX ORDER — BUDESONIDE AND FORMOTEROL FUMARATE DIHYDRATE 160; 4.5 UG/1; UG/1
2 AEROSOL RESPIRATORY (INHALATION)
Status: DISCONTINUED | OUTPATIENT
Start: 2020-12-31 | End: 2021-01-06 | Stop reason: HOSPADM

## 2020-12-31 RX ORDER — ACETAMINOPHEN 325 MG/1
650 TABLET ORAL EVERY 4 HOURS PRN
Status: DISCONTINUED | OUTPATIENT
Start: 2020-12-31 | End: 2021-01-06 | Stop reason: HOSPADM

## 2020-12-31 RX ORDER — HYDROXYZINE HYDROCHLORIDE 25 MG/1
50 TABLET, FILM COATED ORAL 3 TIMES DAILY PRN
Status: DISCONTINUED | OUTPATIENT
Start: 2020-12-31 | End: 2021-01-06 | Stop reason: HOSPADM

## 2020-12-31 RX ORDER — LORAZEPAM 0.5 MG/1
0.5 TABLET ORAL DAILY PRN
Status: DISCONTINUED | OUTPATIENT
Start: 2020-12-31 | End: 2021-01-06 | Stop reason: HOSPADM

## 2020-12-31 RX ORDER — LEVOTHYROXINE SODIUM 0.07 MG/1
75 TABLET ORAL
Status: DISCONTINUED | OUTPATIENT
Start: 2021-01-01 | End: 2021-01-06 | Stop reason: HOSPADM

## 2020-12-31 RX ORDER — ONDANSETRON 2 MG/ML
4 INJECTION INTRAMUSCULAR; INTRAVENOUS EVERY 6 HOURS PRN
Status: DISCONTINUED | OUTPATIENT
Start: 2020-12-31 | End: 2021-01-06 | Stop reason: HOSPADM

## 2020-12-31 RX ORDER — ESCITALOPRAM OXALATE 10 MG/1
10 TABLET ORAL DAILY
Status: DISCONTINUED | OUTPATIENT
Start: 2020-12-31 | End: 2021-01-06 | Stop reason: HOSPADM

## 2020-12-31 RX ORDER — ECHINACEA PURPUREA EXTRACT 125 MG
2 TABLET ORAL AS NEEDED
Status: DISCONTINUED | OUTPATIENT
Start: 2020-12-31 | End: 2021-01-06 | Stop reason: HOSPADM

## 2020-12-31 RX ORDER — SODIUM CHLORIDE 0.9 % (FLUSH) 0.9 %
10 SYRINGE (ML) INJECTION AS NEEDED
Status: DISCONTINUED | OUTPATIENT
Start: 2020-12-31 | End: 2021-01-06 | Stop reason: HOSPADM

## 2020-12-31 RX ORDER — ALBUTEROL SULFATE 2.5 MG/3ML
2.5 SOLUTION RESPIRATORY (INHALATION) EVERY 4 HOURS PRN
Status: DISCONTINUED | OUTPATIENT
Start: 2020-12-31 | End: 2021-01-06 | Stop reason: HOSPADM

## 2020-12-31 RX ADMIN — BUDESONIDE AND FORMOTEROL FUMARATE DIHYDRATE 2 PUFF: 160; 4.5 AEROSOL RESPIRATORY (INHALATION) at 23:35

## 2020-12-31 RX ADMIN — AMOXICILLIN 500 MG: 500 CAPSULE ORAL at 22:15

## 2020-12-31 RX ADMIN — FUROSEMIDE 20 MG: 20 TABLET ORAL at 21:41

## 2020-12-31 RX ADMIN — Medication 1 APPLICATION: at 22:15

## 2020-12-31 RX ADMIN — ESCITALOPRAM 10 MG: 10 TABLET, FILM COATED ORAL at 21:41

## 2020-12-31 RX ADMIN — HYDROXYZINE HYDROCHLORIDE 50 MG: 25 TABLET ORAL at 22:15

## 2020-12-31 RX ADMIN — SALINE NASAL SPRAY 2 SPRAY: 1.5 SOLUTION NASAL at 22:15

## 2020-12-31 RX ADMIN — GUAIFENESIN 1200 MG: 600 TABLET, EXTENDED RELEASE ORAL at 21:41

## 2021-01-01 LAB
ANION GAP SERPL CALCULATED.3IONS-SCNC: 9 MMOL/L (ref 5–15)
BASOPHILS # BLD AUTO: 0.03 10*3/MM3 (ref 0–0.2)
BASOPHILS NFR BLD AUTO: 0.2 % (ref 0–1.5)
BUN SERPL-MCNC: 36 MG/DL (ref 8–23)
BUN/CREAT SERPL: 34.3 (ref 7–25)
CALCIUM SPEC-SCNC: 8.9 MG/DL (ref 8.6–10.5)
CHLORIDE SERPL-SCNC: 92 MMOL/L (ref 98–107)
CO2 SERPL-SCNC: 38 MMOL/L (ref 22–29)
CREAT SERPL-MCNC: 1.05 MG/DL (ref 0.57–1)
DEPRECATED RDW RBC AUTO: 62 FL (ref 37–54)
DEPRECATED RDW RBC AUTO: 63.5 FL (ref 37–54)
EOSINOPHIL # BLD AUTO: 0.21 10*3/MM3 (ref 0–0.4)
EOSINOPHIL NFR BLD AUTO: 1.1 % (ref 0.3–6.2)
ERYTHROCYTE [DISTWIDTH] IN BLOOD BY AUTOMATED COUNT: 19.4 % (ref 12.3–15.4)
ERYTHROCYTE [DISTWIDTH] IN BLOOD BY AUTOMATED COUNT: 19.7 % (ref 12.3–15.4)
GFR SERPL CREATININE-BSD FRML MDRD: 52 ML/MIN/1.73
GLUCOSE SERPL-MCNC: 150 MG/DL (ref 65–99)
HCT VFR BLD AUTO: 28.4 % (ref 34–46.6)
HCT VFR BLD AUTO: 29.1 % (ref 34–46.6)
HCT VFR BLD AUTO: 29.2 % (ref 34–46.6)
HCT VFR BLD AUTO: 30.4 % (ref 34–46.6)
HGB BLD-MCNC: 9.1 G/DL (ref 12–15.9)
HGB BLD-MCNC: 9.4 G/DL (ref 12–15.9)
HGB BLD-MCNC: 9.4 G/DL (ref 12–15.9)
HGB BLD-MCNC: 9.7 G/DL (ref 12–15.9)
IMM GRANULOCYTES # BLD AUTO: 0.15 10*3/MM3 (ref 0–0.05)
IMM GRANULOCYTES NFR BLD AUTO: 0.8 % (ref 0–0.5)
LYMPHOCYTES # BLD AUTO: 2.71 10*3/MM3 (ref 0.7–3.1)
LYMPHOCYTES NFR BLD AUTO: 13.7 % (ref 19.6–45.3)
MCH RBC QN AUTO: 28 PG (ref 26.6–33)
MCH RBC QN AUTO: 28.8 PG (ref 26.6–33)
MCHC RBC AUTO-ENTMCNC: 31.3 G/DL (ref 31.5–35.7)
MCHC RBC AUTO-ENTMCNC: 33.1 G/DL (ref 31.5–35.7)
MCV RBC AUTO: 87.1 FL (ref 79–97)
MCV RBC AUTO: 89.5 FL (ref 79–97)
MONOCYTES # BLD AUTO: 1.11 10*3/MM3 (ref 0.1–0.9)
MONOCYTES NFR BLD AUTO: 5.6 % (ref 5–12)
NEUTROPHILS NFR BLD AUTO: 15.53 10*3/MM3 (ref 1.7–7)
NEUTROPHILS NFR BLD AUTO: 78.6 % (ref 42.7–76)
NRBC BLD AUTO-RTO: 0 /100 WBC (ref 0–0.2)
PLATELET # BLD AUTO: 229 10*3/MM3 (ref 140–450)
PLATELET # BLD AUTO: 236 10*3/MM3 (ref 140–450)
PMV BLD AUTO: 9.7 FL (ref 6–12)
PMV BLD AUTO: 9.8 FL (ref 6–12)
POTASSIUM SERPL-SCNC: 4 MMOL/L (ref 3.5–5.2)
RBC # BLD AUTO: 3.25 10*6/MM3 (ref 3.77–5.28)
RBC # BLD AUTO: 3.26 10*6/MM3 (ref 3.77–5.28)
SODIUM SERPL-SCNC: 139 MMOL/L (ref 136–145)
WBC # BLD AUTO: 19.74 10*3/MM3 (ref 3.4–10.8)
WBC # BLD AUTO: 22.42 10*3/MM3 (ref 3.4–10.8)

## 2021-01-01 PROCEDURE — 85018 HEMOGLOBIN: CPT | Performed by: NURSE PRACTITIONER

## 2021-01-01 PROCEDURE — 85027 COMPLETE CBC AUTOMATED: CPT | Performed by: NURSE PRACTITIONER

## 2021-01-01 PROCEDURE — 99232 SBSQ HOSP IP/OBS MODERATE 35: CPT | Performed by: OTOLARYNGOLOGY

## 2021-01-01 PROCEDURE — 85014 HEMATOCRIT: CPT | Performed by: NURSE PRACTITIONER

## 2021-01-01 PROCEDURE — G0378 HOSPITAL OBSERVATION PER HR: HCPCS

## 2021-01-01 PROCEDURE — 94799 UNLISTED PULMONARY SVC/PX: CPT

## 2021-01-01 PROCEDURE — 30903 CONTROL OF NOSEBLEED: CPT | Performed by: OTOLARYNGOLOGY

## 2021-01-01 PROCEDURE — 80048 BASIC METABOLIC PNL TOTAL CA: CPT | Performed by: NURSE PRACTITIONER

## 2021-01-01 PROCEDURE — 85025 COMPLETE CBC W/AUTO DIFF WBC: CPT | Performed by: NURSE PRACTITIONER

## 2021-01-01 RX ORDER — AMOXICILLIN 500 MG/1
500 CAPSULE ORAL EVERY 8 HOURS SCHEDULED
Status: COMPLETED | OUTPATIENT
Start: 2021-01-01 | End: 2021-01-04

## 2021-01-01 RX ADMIN — Medication 1 APPLICATION: at 09:02

## 2021-01-01 RX ADMIN — AMOXICILLIN 500 MG: 500 CAPSULE ORAL at 06:05

## 2021-01-01 RX ADMIN — BUDESONIDE AND FORMOTEROL FUMARATE DIHYDRATE 2 PUFF: 160; 4.5 AEROSOL RESPIRATORY (INHALATION) at 07:26

## 2021-01-01 RX ADMIN — FUROSEMIDE 20 MG: 20 TABLET ORAL at 09:03

## 2021-01-01 RX ADMIN — AMOXICILLIN 500 MG: 500 CAPSULE ORAL at 13:59

## 2021-01-01 RX ADMIN — LEVOTHYROXINE SODIUM 75 MCG: 75 TABLET ORAL at 06:06

## 2021-01-01 RX ADMIN — SALINE NASAL SPRAY 2 SPRAY: 1.5 SOLUTION NASAL at 17:01

## 2021-01-01 RX ADMIN — PHENYLEPHRINE HYDROCHLORIDE 2 SPRAY: 0.25 SPRAY NASAL at 17:02

## 2021-01-01 RX ADMIN — GUAIFENESIN 1200 MG: 600 TABLET, EXTENDED RELEASE ORAL at 20:59

## 2021-01-01 RX ADMIN — GUAIFENESIN 1200 MG: 600 TABLET, EXTENDED RELEASE ORAL at 09:02

## 2021-01-01 RX ADMIN — BUDESONIDE AND FORMOTEROL FUMARATE DIHYDRATE 2 PUFF: 160; 4.5 AEROSOL RESPIRATORY (INHALATION) at 19:27

## 2021-01-01 RX ADMIN — SODIUM CHLORIDE, PRESERVATIVE FREE 10 ML: 5 INJECTION INTRAVENOUS at 09:03

## 2021-01-01 RX ADMIN — PHENYLEPHRINE HYDROCHLORIDE 2 SPRAY: 0.25 SPRAY NASAL at 23:00

## 2021-01-01 RX ADMIN — LOSARTAN POTASSIUM 25 MG: 50 TABLET, FILM COATED ORAL at 09:02

## 2021-01-01 RX ADMIN — AMOXICILLIN 500 MG: 500 CAPSULE ORAL at 21:01

## 2021-01-01 RX ADMIN — LORAZEPAM 0.5 MG: 0.5 TABLET ORAL at 20:59

## 2021-01-01 RX ADMIN — ESCITALOPRAM 10 MG: 10 TABLET, FILM COATED ORAL at 09:02

## 2021-01-01 NOTE — PROGRESS NOTES
[facility]  ENT PROGRESS NOTES  2021      Patient Identification:  Name: Emy Bermudez  Age: 70 y.o.  Sex: female  :  1950  MRN: [id number]                     Date of Admission: 2020      CC:    Epistaxis  Subjective   No complaints.  Minimal wheezing noted overnight.  No active bleeding presently.    HISTORY   HPI, ROS, PMFSHx reviewed:   No changes       Objective     PE:    Temp:  [97.3 °F (36.3 °C)-98.8 °F (37.1 °C)] 97.3 °F (36.3 °C)  Heart Rate:  [103-127] 115  Resp:  [16-20] 18  BP: (107-134)/(75-89) 133/81   Body mass index is 39.86 kg/m².     General appearance: acyanotic, in no respiratory distress.   Ability to Communicate: Patient is able to communicate well   Facial exam: facial movement was normal and symmetrical, nontender   Ears - right ear normal, left ear normal.   Nasal exam -left nasal pack in place with minimal dried blood at nares.  There is no active bleeding on the right..   Oropharyngeal exam - mucous membranes moist, pharynx normal without lesions.  Minimal clear drainage is noted posteriorly   Neck exam - supple, no significant adenopathy.     CVS exam: normal rate and regular rhythm.   Chest: no tachypnea, retractions or cyanosis.   No lymphadenopathy in the anterior or posterior neck, supraclavicular areas.   Neurological exam reveals alert, oriented, normal speech, no focal findings or movement disorder noted, neck supple without rigidity.    DATA      MEDICATIONS   I have reviewed the current MAR.     LABS AND IMAGING      I have reviewed the labs and imaging data since the patient was last seen.   Component Value Units   WBC 19.74High  10*3/mm3   RBC 3.25Low  10*6/mm3   Hemoglobin 9.1Low  g/dL   Hematocrit 29.1Low  %   MCV 89.5 fL   MCH 28.0 pg   MCHC 31.3Low  g/dL   RDW 19.4High  %   RDW-SD 63.5High  fl   MPV 9.7 fL   Platelets 236 10*3/mm3   Neutrophil Rel % 78.6High  %   Lymphocyte Rel % 13.7Low  %   Monocyte Rel % 5.6 %   Eosinophil Rel % 1.1 %    Basophil Rel % 0.2 %   Immature Granulocyte Rel % 0.8High  %   Neutrophils Absolute 15.53High  10*3/mm3   Lymphocytes Absolute 2.71 10*3/mm3   Monocytes Absolute 1.11High  10*3/mm3   Eosinophils Absolute 0.21 10*3/mm3   Basophils Absolute 0.03 10*3/mm3   Immature Grans, Absolute 0.15High  10*3/mm3   nRBC 0.0 /100 WBC              01/01/2021 0447 01/01/2021 0547 Basic Metabolic Panel [[id number]]    (Abnormal)   Blood    Final result Component Value Units   Glucose 150High  mg/dL   BUN 36High  mg/dL   Creatinine 1.05High  mg/dL   Sodium 139 mmol/L   Potassium 4.0 mmol/L   Chloride 92Low  mmol/L   CO2 38.0High  mmol/L   Calcium 8.9 mg/dL   eGFR Non African Am 52Low  mL/min/1.73   BUN/Creatinine Ratio 34.3High     Anion Gap 9.0 mmol/L           12/31/2020 2356 01/01/2021 0037 Hemoglobin & Hematocrit, Blood [[id number]]   (Abnormal)   Blood    Final result Component Value Units   Hemoglobin 9.7Low  g/dL   Hematocrit 30.4Low                 Assessment     ASSESSMENT       Epistaxis    COPD (chronic obstructive pulmonary disease) (CMS/HCC)    Obesity, Class III, BMI 40-49.9 (morbid obesity) (CMS/HCC)    Chronic respiratory failure with hypoxia and hypercapnia (CMS/HCC)    Hypothyroidism (acquired)    Essential hypertension    Tobacco abuse            Plan     PLAN     Continue antibiotics and topical mupirocin  Will consider replacing MeroGel pack on the left with another pack tomorrow that the patient can go home with it is no active bleeding is noted in the interim  Continue humidification          Ayush Mata MD  1/1/2021  09:28 CST

## 2021-01-01 NOTE — PLAN OF CARE
Goal Outcome Evaluation:  Plan of Care Reviewed With: patient  Progress: improving  Outcome Summary: VSS except tachycardia, MD aware, telemetry in place, pt in no distress, nasal packing present from [facility], drainage noted from nares that is bloody, face mask with humidification running 5L in place, oxygen saturation WNL, pt denies pain other than to nose, awaiting ENT for eval. Pt has no further questions or concerns at this time, safety maintained, will continue to monitor.

## 2021-01-01 NOTE — PLAN OF CARE
Goal Outcome Evaluation:  Plan of Care Reviewed With: patient  Progress: no change  Outcome Summary: No c/o pain this shift, kevin regular diet, O2 with humidification used pt is at home dose, Dr. Mata in to see pt at begining of shift removed nasal packing to right nare, pt started on PO ABX saline nasal spray and bactroban, tele sinus tach 110-120's, will cont to monitor

## 2021-01-01 NOTE — PROGRESS NOTES
PROCEDURE NOTE    Emy Bermudez    DATE OF PROCEDURE: 1/1/2021    PROCEDURE:   Left anterior complex nasal packing    PREPROCEDURE DIAGNOSIS:   Epistaxis-recurrent    POSTPROCEDURE DIAGNOSIS:  SAME    ANESTHESIA:   None    PROCEDURE DESCRIPTION:    With the patient in the chair bedside a small anterior nasal pack was removed.  Fairly significant left septal deviation was noted.  The bleeding site was difficult to ascertain with certainty but appeared to be near the apex of the septal deviation at approximately the midpoint of the inferior turbinate on the septal side.  I do not believe the length of the previous pack approached this area.     Large Merocel pack with Albert-Synephrine and Bactroban was placed on the left with minimal difficulty.  Some bleeding through the right side was noted which was suctioned without difficulty.  Subsequently the patient ice water gargle for several moments and will seem to subside.     Findings:   As above  Time 17:12   Component Value Units   WBC 22.42High  10*3/mm3   RBC 3.26Low  10*6/mm3   Hemoglobin 9.4Low  g/dL   Hematocrit 28.4Low  %   MCV 87.1 fL   MCH 28.8 pg   MCHC 33.1 g/dL   RDW 19.7High  %   RDW-SD 62.0High  fl   MPV 9.8 fL   Platelets 229 10*3/mm3          Condition:  Stable.  Patient tolerated procedure well.    Complications:  None  There was no significant bleeding.

## 2021-01-01 NOTE — CONSULTS
ENT CONSULT NOTE  2020    Patient Identification:  Name: Emy Bermudez  Age: 70 y.o.  Sex: female  :  1950  MRN: [id number]                     Date of Admission: 2020      CC:    Epistaxis    Subjective     HPI:   Location: Nose  Duration:  12 hours ago  Timing:  acute   Quality:   moderate  Context: Recent history of pneumonia and hospitalization  Modifying Factors: Home oxygen use  Associated Signs/Symptoms: Patient is a 70-year-old year history of tobacco use disorder, chronic obstructive pulmonary disease, chronic with hypoxia.  With hypoxemia requiring home oxygen supplementation at 45 L per nasal cannula as well as hypertension.  She began having left epistaxis and went to the emergency department stop relatively quickly with a gauze she had placed in the left nasal cavity.  When the ER doctor removed it she began to bleed from both sides and subsequently packed.  She   was transferred to [facility] clinic for definitive care.     Otolaryngologic consultation was obtained for evaluation of epistaxis associated with bilateral nasal packing in a patient with chronic respiratory failure associated hypoxia.     ROS:  Review of Systems   Constitutional: Negative for chills and fever.   HENT: Positive for nosebleeds. Negative for sneezing, sore throat and trouble swallowing.    Respiratory: Positive for cough (Chronic) and shortness of breath (Chronic).    Cardiovascular: Negative for chest pain.   Gastrointestinal: Negative for abdominal pain, diarrhea, nausea and vomiting.   Genitourinary: Negative.    Musculoskeletal: Negative.    Neurological: Negative.    Psychiatric/Behavioral: Negative.       Otherwise complete ROS reviewed and negative except as mentioned in the HPI.       HISTORY      Past Medical History:   Diagnosis Date   • Abdominal wall abscess    • Abdominal wall fistula 2018   • Cellulitis    • Chronic respiratory failure with hypoxia (CMS/HCC) 2020   • Colonic obstruction  (CMS/[facility]) 4/3/2018   • COPD (chronic obstructive pulmonary disease) (CMS/[facility])    • Depression    • Diverticul disease small and large intestine, no perforati or abscess    • Edema    • GERD (gastroesophageal reflux disease)    • Hyperlipidemia    • Hypertension    • Hypocalcemia    • Hypothyroid    • Obesity, Class III, BMI 40-49.9 (morbid obesity) (CMS/[facility]) 2020   • Respiratory failure (CMS/[facility])    • Tobacco dependence 2020   • Venous insufficiency    • Vertigo         Past Surgical History:   Procedure Laterality Date   • ABDOMINAL SURGERY     •  SECTION     • COLOSTOMY     • EXPLORATORY LAPAROTOMY N/A 2018    Procedure: LAPAROTOMY EXPLORATORY, partial colectomy, creation colostomy, incision and drainage abdominal wall abscess;  Surgeon: Elda Velazquez MD;  Location: [facility];  Service: General        Social History     Socioeconomic History   • Marital status:      Spouse name: Not on file   • Number of children: Not on file   • Years of education: Not on file   • Highest education level: Not on file   Tobacco Use   • Smoking status: Former Smoker     Packs/day: 0.25     Years: 15.00     Pack years: 3.75     Quit date: 2020     Years since quittin.9   • Smokeless tobacco: Never Used   Substance and Sexual Activity   • Alcohol use: No   • Drug use: No   • Sexual activity: Never        Medications Prior to Admission   Medication Sig Dispense Refill Last Dose   • albuterol (PROVENTIL) (2.5 MG/3ML) 0.083% nebulizer solution Take 2.5 mg by nebulization Every 4 (Four) Hours As Needed for Shortness of Air.      • albuterol sulfate  (90 Base) MCG/ACT inhaler Inhale 1-2 puffs Every 4 (Four) Hours As Needed for Wheezing or Shortness of Air.      • budesonide-formoterol (SYMBICORT) 160-4.5 MCG/ACT inhaler Inhale 2 puffs 2 (Two) Times a Day.      • escitalopram (LEXAPRO) 20 MG tablet Take 10 mg by mouth Daily.      • fluconazole (Diflucan) 150 MG tablet Take 1 tablet by mouth  Daily. 3 tablet 0    • furosemide (LASIX) 20 MG tablet Take 20 mg by mouth Every Morning.      • guaiFENesin (MUCINEX) 600 MG 12 hr tablet Take 2 tablets by mouth Every 12 (Twelve) Hours. 10 tablet 0    • hydrOXYzine (ATARAX) 50 MG tablet Take 1 tablet by mouth 3 (Three) Times a Day As Needed for Anxiety. 10 tablet 0    • levothyroxine (SYNTHROID, LEVOTHROID) 75 MCG tablet Take 75 mcg by mouth Daily.      • LORazepam (ATIVAN) 0.5 MG tablet Take 0.5 mg by mouth Daily As Needed for Anxiety.      • losartan (COZAAR) 25 MG tablet Take 1 tablet by mouth Daily. 30 tablet 2    • meclizine (ANTIVERT) 25 MG tablet Take 25 mg by mouth 2 (Two) Times a Day.      • nicotine (NICODERM CQ) 21 MG/24HR patch Place 1 patch on the skin as directed by provider Daily. 30 patch 1    • nitroglycerin (NITROSTAT) 0.4 MG SL tablet Place 1 tablet under the tongue Every 5 (Five) Minutes As Needed for Chest Pain (Only if SBP Greater Than 100). Take no more than 3 doses in 15 minutes.  12    • nystatin (MYCOSTATIN) 388717 UNIT/GM powder Apply 1 application topically to the appropriate area as directed 3 (Three) Times a Day.      • predniSONE 5 MG (21) tablet therapy pack dosepak Take 1 tablet by mouth Take As Directed. Take as directed on package instructions. 1 each 0       No Known Allergies   There is no immunization history for the selected administration types on file for this patient.     Family History   Adopted: Yes          Objective     PE:    Temp:  [98.1 °F (36.7 °C)-98.8 °F (37.1 °C)] 98.8 °F (37.1 °C)  Heart Rate:  [116-127] 116  Resp:  [16-20] 20  BP: (107-124)/(75-85) 124/75   Body mass index is 39.86 kg/m².     General appearance: oriented to person, place, and time, chronically ill appearing and obese.   Ability to Communicate: Patient is able to communicate   Ears - right ear normal, left ear normal.   Nasal exam -both nasal cavities were packed with MeroGel packs no active bleeding is noted there is no significant amount of  chronic and/or dried blood in the nares.    Facial exam: facial movement was normal and symmetrical, nontender   Oropharyngeal exam - mucous membranes moist, pharynx normal without lesions.   Neck exam - supple, no significant adenopathy.   CVS exam: normal rate and regular rhythm.   Chest: no tachypnea, retractions or cyanosis.   No lymphadenopathy in the anterior or posterior neck, supraclavicular areas.    Neurological exam reveals neck supple without rigidity.    DATA      MEDICATIONS     Current Facility-Administered Medications   Medication Dose Route Frequency Provider Last Rate Last Admin   • acetaminophen (TYLENOL) tablet 650 mg  650 mg Oral Q4H PRN Rachel Travis APRN       • albuterol (PROVENTIL) nebulizer solution 0.083% 2.5 mg/3mL  2.5 mg Nebulization Q4H PRN Rachel Travis APRN       • budesonide-formoterol (SYMBICORT) 160-4.5 MCG/ACT inhaler 2 puff  2 puff Inhalation BID - RT Rachel Travis APRN       • escitalopram (LEXAPRO) tablet 10 mg  10 mg Oral Daily Rachel Travis, APRN       • furosemide (LASIX) tablet 20 mg  20 mg Oral Daily Rachel Travis, APRN       • guaiFENesin (MUCINEX) 12 hr tablet 1,200 mg  1,200 mg Oral Q12H Rachel Travis, APRN       • hydrOXYzine (ATARAX) tablet 50 mg  50 mg Oral TID PRN Rachel Travis APRMYLA       • [START ON 1/1/2021] levothyroxine (SYNTHROID, LEVOTHROID) tablet 75 mcg  75 mcg Oral Q AM Rachel Travis, APRN       • LORazepam (ATIVAN) tablet 0.5 mg  0.5 mg Oral Daily PRN Rachel Travis APRN       • losartan (COZAAR) tablet 25 mg  25 mg Oral Q24H Rachel Travis APRN       • ondansetron (ZOFRAN) injection 4 mg  4 mg Intravenous Q6H PRN Rachel Travis APRN       • phenylephrine (AILEEN-SYNEPHRINE) 0.25 % nasal spray 2 spray  2 spray Each Nare Q4H PRN Rachel Travis APRN       • sodium chloride 0.9 % flush 10 mL  10 mL Intravenous Q12H Rachel Travis APRN       • sodium chloride 0.9 % flush 10 mL  10 mL  Intravenous PRN Rachel Travis K, APRN            No intake or output data in the 24 hours ending 12/31/20 2039     No results found for: WBC, HGB, HCT, PLT  No results found for: NA, K, CL, CO2, BUN, CREATININE, GLUCOSE  No results found for: CALCIUM, MG, PHOS  No results found for: AST, ALT, ALKPHOS  No results found for: APTT, INR  No results found for: COLORU, CLARITYU, SPECGRAV, PHUR, PROTEINUR, GLUCOSEU, KETONESU, BLOODU, NITRITE, LEUKOCYTESUR, BILIRUBINUR, UROBILINOGEN, RBCUA, WBCUA, BACTERIA, UACOMMENT  No results found for: TROPONINT, TROPONINI, BNP  No components found for: HGBA1C;2  No components found for: TSH;2        Imaging Results (All)     None             Assessment     ASSESSMENT        Epistaxis    COPD (chronic obstructive pulmonary disease) (CMS/HCC)    Obesity, Class III, BMI 40-49.9 (morbid obesity) (CMS/HCC)    Chronic respiratory failure with hypoxia and hypercapnia (CMS/HCC)    Hypothyroidism (acquired)    Essential hypertension    Tobacco abuse             Plan     PLAN      No bleeding noted in the left primary onset by history, the right nasal pack was removed with no evidence of bleeding subsequently.   Will treat conservatively and consider removal of a left pack in the next 24 hours if no further bleeding is noted   Antibiotic   Mupirocin ointment   Nasal spray   Labs as ordered per Dr Montelongo in           Ayush Mata MD  12/31/2020  20:39 CST

## 2021-01-02 PROBLEM — D50.0 ANEMIA, BLOOD LOSS: Status: ACTIVE | Noted: 2021-01-02

## 2021-01-02 PROBLEM — N17.9 ACUTE KIDNEY INJURY (HCC): Status: ACTIVE | Noted: 2021-01-02

## 2021-01-02 LAB
ANION GAP SERPL CALCULATED.3IONS-SCNC: 9 MMOL/L (ref 5–15)
BUN SERPL-MCNC: 50 MG/DL (ref 8–23)
BUN/CREAT SERPL: 31.6 (ref 7–25)
CALCIUM SPEC-SCNC: 9 MG/DL (ref 8.6–10.5)
CHLORIDE SERPL-SCNC: 92 MMOL/L (ref 98–107)
CO2 SERPL-SCNC: 35 MMOL/L (ref 22–29)
CREAT SERPL-MCNC: 1.58 MG/DL (ref 0.57–1)
GFR SERPL CREATININE-BSD FRML MDRD: 32 ML/MIN/1.73
GLUCOSE SERPL-MCNC: 115 MG/DL (ref 65–99)
HCT VFR BLD AUTO: 25 % (ref 34–46.6)
HCT VFR BLD AUTO: 25.5 % (ref 34–46.6)
HCT VFR BLD AUTO: 27.5 % (ref 34–46.6)
HGB BLD-MCNC: 7.9 G/DL (ref 12–15.9)
HGB BLD-MCNC: 8.3 G/DL (ref 12–15.9)
HGB BLD-MCNC: 8.5 G/DL (ref 12–15.9)
POTASSIUM SERPL-SCNC: 4.4 MMOL/L (ref 3.5–5.2)
SODIUM SERPL-SCNC: 136 MMOL/L (ref 136–145)

## 2021-01-02 PROCEDURE — 94799 UNLISTED PULMONARY SVC/PX: CPT

## 2021-01-02 PROCEDURE — 85018 HEMOGLOBIN: CPT | Performed by: NURSE PRACTITIONER

## 2021-01-02 PROCEDURE — 85014 HEMATOCRIT: CPT | Performed by: NURSE PRACTITIONER

## 2021-01-02 PROCEDURE — 80048 BASIC METABOLIC PNL TOTAL CA: CPT | Performed by: NURSE PRACTITIONER

## 2021-01-02 PROCEDURE — 99232 SBSQ HOSP IP/OBS MODERATE 35: CPT | Performed by: OTOLARYNGOLOGY

## 2021-01-02 RX ORDER — MECLIZINE HYDROCHLORIDE 25 MG/1
25 TABLET ORAL 2 TIMES DAILY
Status: DISCONTINUED | OUTPATIENT
Start: 2021-01-02 | End: 2021-01-06 | Stop reason: HOSPADM

## 2021-01-02 RX ORDER — ECHINACEA PURPUREA EXTRACT 125 MG
2 TABLET ORAL CONTINUOUS PRN
Status: DISCONTINUED | OUTPATIENT
Start: 2021-01-02 | End: 2021-01-06 | Stop reason: HOSPADM

## 2021-01-02 RX ORDER — POTASSIUM CHLORIDE 750 MG/1
10 TABLET, EXTENDED RELEASE ORAL 2 TIMES DAILY
COMMUNITY

## 2021-01-02 RX ORDER — SODIUM CHLORIDE 9 MG/ML
50 INJECTION, SOLUTION INTRAVENOUS CONTINUOUS
Status: DISCONTINUED | OUTPATIENT
Start: 2021-01-02 | End: 2021-01-03

## 2021-01-02 RX ORDER — ECHINACEA PURPUREA EXTRACT 125 MG
2 TABLET ORAL
Status: DISCONTINUED | OUTPATIENT
Start: 2021-01-02 | End: 2021-01-06 | Stop reason: HOSPADM

## 2021-01-02 RX ADMIN — ESCITALOPRAM 10 MG: 10 TABLET, FILM COATED ORAL at 09:23

## 2021-01-02 RX ADMIN — Medication 1 APPLICATION: at 21:08

## 2021-01-02 RX ADMIN — FUROSEMIDE 20 MG: 20 TABLET ORAL at 09:23

## 2021-01-02 RX ADMIN — AMOXICILLIN 500 MG: 500 CAPSULE ORAL at 21:08

## 2021-01-02 RX ADMIN — BUDESONIDE AND FORMOTEROL FUMARATE DIHYDRATE 2 PUFF: 160; 4.5 AEROSOL RESPIRATORY (INHALATION) at 19:21

## 2021-01-02 RX ADMIN — Medication 1 APPLICATION: at 09:24

## 2021-01-02 RX ADMIN — SALINE NASAL SPRAY 2 SPRAY: 1.5 SOLUTION NASAL at 14:00

## 2021-01-02 RX ADMIN — BUDESONIDE AND FORMOTEROL FUMARATE DIHYDRATE 2 PUFF: 160; 4.5 AEROSOL RESPIRATORY (INHALATION) at 06:53

## 2021-01-02 RX ADMIN — GUAIFENESIN 1200 MG: 600 TABLET, EXTENDED RELEASE ORAL at 09:23

## 2021-01-02 RX ADMIN — SODIUM CHLORIDE 50 ML/HR: 9 INJECTION, SOLUTION INTRAVENOUS at 09:23

## 2021-01-02 RX ADMIN — LEVOTHYROXINE SODIUM 75 MCG: 75 TABLET ORAL at 05:56

## 2021-01-02 RX ADMIN — AMOXICILLIN 500 MG: 500 CAPSULE ORAL at 05:56

## 2021-01-02 RX ADMIN — GUAIFENESIN 1200 MG: 600 TABLET, EXTENDED RELEASE ORAL at 21:08

## 2021-01-02 RX ADMIN — HYDROXYZINE HYDROCHLORIDE 50 MG: 25 TABLET ORAL at 14:00

## 2021-01-02 RX ADMIN — MECLIZINE HYDROCHLORIDE 25 MG: 25 TABLET ORAL at 21:08

## 2021-01-02 RX ADMIN — AMOXICILLIN 500 MG: 500 CAPSULE ORAL at 14:00

## 2021-01-02 RX ADMIN — SALINE NASAL SPRAY 2 SPRAY: 1.5 SOLUTION NASAL at 11:18

## 2021-01-02 RX ADMIN — LORAZEPAM 0.5 MG: 0.5 TABLET ORAL at 21:09

## 2021-01-02 RX ADMIN — SALINE NASAL SPRAY 2 SPRAY: 1.5 SOLUTION NASAL at 21:09

## 2021-01-02 RX ADMIN — MECLIZINE HYDROCHLORIDE 25 MG: 25 TABLET ORAL at 11:31

## 2021-01-02 NOTE — PLAN OF CARE
Goal Outcome Evaluation:  Plan of Care Reviewed With: patient  Progress: no change  Outcome Summary: Patient denies pain this shift. She did request her anxiety medication. Q6H H&H's performed. The most recent one from midnight shows a drop which is explained by an episode of heaving bleeding yesterday afternoon. Patient had some slight bleeding this shift but nasal sprays and pressure application were utilized. Left nasal packing in place and saturated. Face tent. Continuous pulse ox. Up with assistance. Patient rested in chair this shift. She was often found leaning forward no table; we educated her about the need to sit up straight but patient was noncompliant. VSS. Safety maintained. No distress noted at this time.

## 2021-01-02 NOTE — PROGRESS NOTES
[facility] Medicine Services  INPATIENT PROGRESS NOTE    Length of Stay: 1  Date of Admission: 12/31/2020  Primary Care Physician: Dorie Segura APRN    Subjective   Chief Complaint: Follow-up nosebleed  HPI   Developed sudden onset of nosebleed from both nares on date of admission.  Denied any recent coughing, heavy blowing of nose.  Wears oxygen 4 to 5 L at home and reports has humidification on oxygen.    Sitting up in chair.  Packing left nose.  She developed bleeding from left nare yesterday and Dr. Mata removed the packing and noted left septal deviation.  Bleeding difficult to a certain and did not believe the length of the packing approached the area of bleeding.  Left nare repacked.  Hemoglobin down to 7.9.  Creatinine up to 1.5 today.  Continue to monitor H&H.  Gentle IV fluid hydration with creatinine increased to 1.5.  Patient denies nausea, vomiting or abdominal pain.  Denies chest pain or palpitations.  He minified air in place.  ENT has seen today and allowed warm liquids.  Keep packing in place today.    Review of Systems   Constitutional: Negative for chills and fever.   HENT: Positive for nosebleeds. Negative for trouble swallowing.    Eyes: Negative for photophobia and visual disturbance.   Respiratory: Positive for cough (Chronic). Negative for shortness of breath and wheezing.    Cardiovascular: Negative for chest pain, palpitations and leg swelling.   Gastrointestinal: Negative for constipation, diarrhea, nausea and vomiting.   Endocrine: Negative for cold intolerance and polyuria.   Genitourinary: Negative for dysuria, frequency and urgency.   Musculoskeletal: Negative for back pain.   Skin: Negative for color change, pallor, rash and wound.   Allergic/Immunologic: Negative for immunocompromised state.   Neurological: Positive for weakness.   Hematological: Negative for adenopathy. Does not bruise/bleed easily.   Psychiatric/Behavioral: Negative for  agitation, behavioral problems and confusion.      All pertinent negatives and positives are as above. All other systems have been reviewed and are negative unless otherwise stated.     Objective    Temp:  [97.7 °F (36.5 °C)-98.7 °F (37.1 °C)] 97.7 °F (36.5 °C)  Heart Rate:  [] 78  Resp:  [16-18] 16  BP: (102-142)/(50-82) 109/60  Physical Exam  Vitals signs and nursing note reviewed.   Constitutional:       Comments: Sitting up in chair.  Humidified oxygen in place.  Nasal packing left nare.     HENT:      Head: Normocephalic and atraumatic.      Nose:      Comments: Nasal packing left nare.     Mouth/Throat:      Pharynx: No oropharyngeal exudate.   Eyes:      Extraocular Movements: Extraocular movements intact.      Pupils: Pupils are equal, round, and reactive to light.   Neck:      Musculoskeletal: Normal range of motion and neck supple.   Cardiovascular:      Rate and Rhythm: Normal rate.      Heart sounds: No murmur.   Pulmonary:      Breath sounds: No rhonchi, wheezing, or rales.     Comments: Humidified oxygen  Abdominal:      Palpations: Abdomen is soft.   Genitourinary:     Comments: Voiding.  Musculoskeletal:         General: No swelling or tenderness.   Skin:     General: Skin is warm and dry.   Neurological:      General: No focal deficit present.      Mental Status: She is alert and oriented to person, place, and time.   Psychiatric:         Mood and Affect: Mood normal.         Behavior: Behavior normal.         Thought Content: Thought content normal.         Judgment: Judgment normal.      Results Review:  I have reviewed the labs, radiology results, and diagnostic studies.    Laboratory Data:      Results from last 7 days   Lab Units 01/02/21  0709 01/02/21  0001 01/01/21  1712 01/01/21  1550 01/01/21  0447 12/31/20  2356   WBC 10*3/mm3  --   --  22.42*  --  19.74*  --    HEMOGLOBIN g/dL 7.9* 8.5* 9.4* 9.4* 9.1* 9.7*   HEMATOCRIT % 25.5* 27.5* 28.4* 29.2* 29.1* 30.4*   PLATELETS 10*3/mm3  --    --  229  --  236  --         Results from last 7 days   Lab Units 01/02/21  0509 01/01/21  0447   SODIUM mmol/L 136 139   POTASSIUM mmol/L 4.4 4.0   CHLORIDE mmol/L 92* 92*   CO2 mmol/L 35.0* 38.0*   BUN mg/dL 50* 36*   CREATININE mg/dL 1.58* 1.05*   GLUCOSE mg/dL 115* 150*   CALCIUM mg/dL 9.0 8.9     Culture Data:    No results found for: BLOODCX, URINECX, WOUNDCX, MRSACX, RESPCX, STOOLCX    Radiology Data:   Imaging Results (Last 7 Days)     ** No results found for the last 168 hours. **        Intake/Output    Intake/Output Summary (Last 24 hours) at 1/2/2021 1350  Last data filed at 1/1/2021 1830  Gross per 24 hour   Intake 0 ml   Output 600 ml   Net -600 ml       Scheduled Meds  amoxicillin, 500 mg, Oral, Q8H  budesonide-formoterol, 2 puff, Inhalation, BID - RT  escitalopram, 10 mg, Oral, Daily  furosemide, 20 mg, Oral, Daily  guaiFENesin, 1,200 mg, Oral, Q12H  levothyroxine, 75 mcg, Oral, Q AM  losartan, 25 mg, Oral, Q24H  meclizine, 25 mg, Oral, BID  mupirocin, , Each Nare, BID  sodium chloride, 10 mL, Intravenous, Q12H  sodium chloride, 2 spray, Nasal, 5x Daily      I have reviewed the patient current medications.     Assessment/Plan     Active Hospital Problems    Diagnosis   • **Epistaxis   • Anemia, blood loss   • Acute kidney injury suspect secondary to blood loss (CMS/[facility])   • Tobacco abuse   • Essential hypertension   • Hypothyroidism (acquired)   • Chronic respiratory failure with hypoxia and hypercapnia (CMS/[facility])   • Obesity, Class III, BMI 40-49.9 (morbid obesity) (CMS/[facility])   • COPD (chronic obstructive pulmonary disease) (CMS/[facility])     Plan:  1.  Transferred from [place] with epistasis sudden onset 12/31.  Nasal packing bilaterally on admission.  2.  ENT consulted.  Dr. Mata remove packing right nare.  Treat conservatively.  Planed to replacing MeroGel pack on the left.  However, patient developed significant bleeding left nare after evaluation.  Dr. Mata examined at bedside noted significant left  septal deviation.  Bleeding site difficult to a certain but appeared near the apex of the septal deviation and did not believe is all the previous packing reached this area.  Large Merocel pack with Albert-Synephrine and Bactroban paste left nare.  No additional bleeding noted.  Packing remains in place.  3.  Humidified oxygen, ampicillin 500 mg every 8 hours. Mupirocin  4.  Hemoglobin 9.7 down to 7.9.  Patient did not receive IV fluids through the night.  Suspect some blood loss anemia.  Repeat H&H at 1800.  CBC in a.m.  5.  Creatinine 1.58 today.  Creatinine 1.05 on admission.  Gentle IV fluids 50 mL/h.  6.  Avoid hypertension.  Blood pressure 109/60.  7.  ENT has allowed hot coffee as she has no further bleeding noted.  8.  Home occasions reviewed.    Her daughter will assist with decision-making if she is unable to make decisions for herself  The above documentation resulted from a face-to-face encounter by me Trinh YANES, [facility].    Discharge Planning: I expect patient to be discharged to home in 1-2 days.    Electronically signed by JOAQUÍN Amaro, 1/2/2021, 13:50 CST.    I personally evaluated and examined the patient in conjunction with JOAQUÍN Ponce and agree with the assessment, treatment plan, and disposition of the patient as recorded by her. My history, exam, and further recommendations are:     She had another episode of heavy bleeding right after I saw her last night. Dr. Mata came back in and repacked her. She was seen by Dr. Mack today. Plans are to continue to monitor her. She has had a decrease in her hemoglobin and we will recheck this again at 1800. She also had a slight bump in her serum creatinine for which we'll provide some gentle hydration.    Electronically signed by Silvestre Montelongo DO, 1/2/2021, 13:59 CST.

## 2021-01-02 NOTE — NURSING NOTE
Pt began having large amount of nasal bleeding from left nostril with packing in place. Pressure applied to bridge of the nose. Dr. Mata notified. Nasal sprays used as ordered and ice applied to nose. Bleeding subsided. Dr. Mata came to check pt and removed packing and replaced it with another nasal packing to left nare. Slight oozing at times.

## 2021-01-02 NOTE — PROGRESS NOTES
Elliot Mack Jr, MD     OTOLARYNGOLOGY, HEAD AND NECK SURGERY PROGRESS NOTE   Referring Provider: Silvestre Montelongo DO  Reason for Consultation: Epistaxis  Location: 392/1  Accompanied by: No one  Chief complaint: Nosebleed      Interval History:   Since last examined, Emy Bermudez has had her packing changed on the left side.  Since that time she has had no further bleeding.  The patient complains of a dry mouth.  She also complains of aching in her butt.  She denies any bleeding.  Her breathing has not worsened in any capacity.  Patient seen. Chart reviewed.      Review of Systems:   ROS master CMN: No change from prior inquiry    Vital Signs  Temp:  [97.7 °F (36.5 °C)-98.7 °F (37.1 °C)] 97.7 °F (36.5 °C)  Heart Rate:  [] 78  Resp:  [16-18] 16  BP: (102-142)/(50-82) 109/60        EXAMINATION:  EXAMINATION:  CONSTITUTIONAL:    well developed  in no acute distress  Sitting in a chair nodding off to sleep, wearing open facemask    BODY HABITUS:    morbidly obese    COMMUNICATION:    able to communicate normally, normal voice quality    HEAD:     Normocephalic, without obvious abnormality, atraumatic    FACE:    structure normal, no tenderness present, no lesions/masses, no evidence of trauma  adiposis- Lower face    SALIVARY GLANDS:    parotid glands with no tenderness, no swelling, no masses, submandibular glands with normal size, nontender     EYE:    ocular motility normal, eyelids normal, orbits normal, no proptosis, conjunctiva clear, sclera non-icteric, pupils equal, round, reactive to light and accomodation  Color:   brown Arcus-   Bilateral    HEARING:    response to conversational voice normal    EARS:     PINNA:     normal, non-tender, skin intact without lesions      NOSE EXTERNAL:    APPEARANCE: normal, straight, with good projection, no tenderness, no lesions, no tenderness, good nasal support, patent nares    NOSE INTERNAL:    Anterior rhinoscopy   NASALMUCOSA:    abnormal:        Left- Packed  with Merocel, Right- Old blood present, no fresh bleeding    ORAL CAVITY:      LIPS:      structure normal, no tenderness on palpation, no lesions, no evidence of trauma, normal mobility and oral competence    TEETH:       edentulous:  upper and lower    GUMS:      Intact    ORAL MUCOSA:       abnormal: Redundant    FLOOR OF MOUTH:       Warthin's duct patent, mucosa normal  TONGUE:       lingual mucosa normal without lesions, normal tongue mobility      hypertrophy  Severe, prolapse   Severe    PALATE:       hard palate with normal mucosa and structure      soft palate- Low, thick, uvula intact    OROPHARYNX:    Direct examination  oropharyngeal mucosa normal, tonsil(s) with normal appearance    No blood noted in oropharynx    NECK:    short, thick  severe   LARYNGEAL POSITION: Very low  TRACHEA:   midline and Deep    LYMPH NODES :    no adenopathy    THYROID:    no overt thyromegaly, no tenderness, nodules or mass present on palpation, position midline    NEURO/PSYCHIATRIC :      orientation- normal    mood- normal    affect- normal    Attention- normal    Fund of Knowledge- normal    Level of Consciousness- normal    Speech- normal  CRANIAL NERVE: normal  Grossly intact  GAIT:   Deferred, in chair     Results Review:       I have reviewed the patients old records in the chart.  I reviewed the patient's new imaging results and agree with the interpretation.  I reviewed the patient's other test results and agree with the interpretation  Progress Notes by Silvestre Montelongo DO (01/01/2021 15:08)   Progress Notes by Ayush Mata MD (01/01/2021 17:53)   Hemoglobin & Hematocrit, Blood (01/02/2021 00:01)   Basic Metabolic Panel (01/02/2021 05:09)   Hemoglobin & Hematocrit, Blood (01/02/2021 07:09)     Medications, Allergies and Past History Reviewed      Assessment       Epistaxis    COPD (chronic obstructive pulmonary disease) (CMS/[facility])    Obesity, Class III, BMI 40-49.9 (morbid obesity) (CMS/[facility])    Chronic  respiratory failure with hypoxia and hypercapnia (CMS/HCC)    Hypothyroidism (acquired)    Essential hypertension    Tobacco abuse    Anemia, blood loss         Plan      Patient had no recurrent bleeding over the last 24 hours.  She does have a drop in her hemoglobin.  This may be due to equilibration of fluids or blood loss from yesterday.  Since she has had no bleeding, I recommend she be allowed to have hot coffee.  She will require some additional physical therapy for help ambulating.  Continue current nasal packing since she has had no bleeding.  Because of her COPD.,  Patient will require continue O2 monitoring at this point.  I recommend changing serial hemoglobin continued daily since she is no longer bleeding.  Consider iron supplementation.    Following patient as in-patient. Further recommendations will be made based on serial examinations.    Medications/Orders for this encounter: No new medications ordered.  Orders-  Physical therapy, hot coffee       Elliot Mack Jr, MD  01/02/21  09:01 CST

## 2021-01-02 NOTE — PLAN OF CARE
Goal Outcome Evaluation:  Plan of Care Reviewed With: patient  Progress: no change  Outcome Summary: packing remains to left nare with bloody drainage.  nasel spray to right nare.  no episodes at this time of bleeding.  face tent in place.  continue too monitor for bleeding

## 2021-01-03 LAB
ANION GAP SERPL CALCULATED.3IONS-SCNC: 7 MMOL/L (ref 5–15)
BASOPHILS # BLD AUTO: 0.02 10*3/MM3 (ref 0–0.2)
BASOPHILS NFR BLD AUTO: 0.1 % (ref 0–1.5)
BUN SERPL-MCNC: 33 MG/DL (ref 8–23)
BUN/CREAT SERPL: 33 (ref 7–25)
CALCIUM SPEC-SCNC: 8.4 MG/DL (ref 8.6–10.5)
CHLORIDE SERPL-SCNC: 96 MMOL/L (ref 98–107)
CO2 SERPL-SCNC: 35 MMOL/L (ref 22–29)
CREAT SERPL-MCNC: 1 MG/DL (ref 0.57–1)
DEPRECATED RDW RBC AUTO: 63.6 FL (ref 37–54)
EOSINOPHIL # BLD AUTO: 0.28 10*3/MM3 (ref 0–0.4)
EOSINOPHIL NFR BLD AUTO: 1.8 % (ref 0.3–6.2)
ERYTHROCYTE [DISTWIDTH] IN BLOOD BY AUTOMATED COUNT: 19.9 % (ref 12.3–15.4)
GFR SERPL CREATININE-BSD FRML MDRD: 55 ML/MIN/1.73
GLUCOSE SERPL-MCNC: 100 MG/DL (ref 65–99)
HCT VFR BLD AUTO: 23.8 % (ref 34–46.6)
HGB BLD-MCNC: 7.4 G/DL (ref 12–15.9)
IMM GRANULOCYTES # BLD AUTO: 0.09 10*3/MM3 (ref 0–0.05)
IMM GRANULOCYTES NFR BLD AUTO: 0.6 % (ref 0–0.5)
LYMPHOCYTES # BLD AUTO: 1.53 10*3/MM3 (ref 0.7–3.1)
LYMPHOCYTES NFR BLD AUTO: 9.9 % (ref 19.6–45.3)
MCH RBC QN AUTO: 27.8 PG (ref 26.6–33)
MCHC RBC AUTO-ENTMCNC: 31.1 G/DL (ref 31.5–35.7)
MCV RBC AUTO: 89.5 FL (ref 79–97)
MONOCYTES # BLD AUTO: 0.96 10*3/MM3 (ref 0.1–0.9)
MONOCYTES NFR BLD AUTO: 6.2 % (ref 5–12)
NEUTROPHILS NFR BLD AUTO: 12.65 10*3/MM3 (ref 1.7–7)
NEUTROPHILS NFR BLD AUTO: 81.4 % (ref 42.7–76)
NRBC BLD AUTO-RTO: 0 /100 WBC (ref 0–0.2)
PLATELET # BLD AUTO: 205 10*3/MM3 (ref 140–450)
PMV BLD AUTO: 9.6 FL (ref 6–12)
POTASSIUM SERPL-SCNC: 3.7 MMOL/L (ref 3.5–5.2)
RBC # BLD AUTO: 2.66 10*6/MM3 (ref 3.77–5.28)
SODIUM SERPL-SCNC: 138 MMOL/L (ref 136–145)
WBC # BLD AUTO: 15.53 10*3/MM3 (ref 3.4–10.8)

## 2021-01-03 PROCEDURE — 85025 COMPLETE CBC W/AUTO DIFF WBC: CPT | Performed by: NURSE PRACTITIONER

## 2021-01-03 PROCEDURE — 80048 BASIC METABOLIC PNL TOTAL CA: CPT | Performed by: NURSE PRACTITIONER

## 2021-01-03 PROCEDURE — 94799 UNLISTED PULMONARY SVC/PX: CPT

## 2021-01-03 PROCEDURE — 99232 SBSQ HOSP IP/OBS MODERATE 35: CPT | Performed by: OTOLARYNGOLOGY

## 2021-01-03 RX ADMIN — SALINE NASAL SPRAY 2 SPRAY: 1.5 SOLUTION NASAL at 05:02

## 2021-01-03 RX ADMIN — MECLIZINE HYDROCHLORIDE 25 MG: 25 TABLET ORAL at 09:02

## 2021-01-03 RX ADMIN — LORAZEPAM 0.5 MG: 0.5 TABLET ORAL at 20:48

## 2021-01-03 RX ADMIN — SALINE NASAL SPRAY 2 SPRAY: 1.5 SOLUTION NASAL at 17:33

## 2021-01-03 RX ADMIN — GUAIFENESIN 1200 MG: 600 TABLET, EXTENDED RELEASE ORAL at 09:02

## 2021-01-03 RX ADMIN — GUAIFENESIN 1200 MG: 600 TABLET, EXTENDED RELEASE ORAL at 20:47

## 2021-01-03 RX ADMIN — AMOXICILLIN 500 MG: 500 CAPSULE ORAL at 05:01

## 2021-01-03 RX ADMIN — AMOXICILLIN 500 MG: 500 CAPSULE ORAL at 20:48

## 2021-01-03 RX ADMIN — SALINE NASAL SPRAY 2 SPRAY: 1.5 SOLUTION NASAL at 20:46

## 2021-01-03 RX ADMIN — SALINE NASAL SPRAY 2 SPRAY: 1.5 SOLUTION NASAL at 12:31

## 2021-01-03 RX ADMIN — ESCITALOPRAM 10 MG: 10 TABLET, FILM COATED ORAL at 09:02

## 2021-01-03 RX ADMIN — Medication 1 APPLICATION: at 20:48

## 2021-01-03 RX ADMIN — SALINE NASAL SPRAY 2 SPRAY: 1.5 SOLUTION NASAL at 14:59

## 2021-01-03 RX ADMIN — BUDESONIDE AND FORMOTEROL FUMARATE DIHYDRATE 2 PUFF: 160; 4.5 AEROSOL RESPIRATORY (INHALATION) at 07:03

## 2021-01-03 RX ADMIN — BUDESONIDE AND FORMOTEROL FUMARATE DIHYDRATE 2 PUFF: 160; 4.5 AEROSOL RESPIRATORY (INHALATION) at 18:58

## 2021-01-03 RX ADMIN — PHENYLEPHRINE HYDROCHLORIDE 2 SPRAY: 0.25 SPRAY NASAL at 15:01

## 2021-01-03 RX ADMIN — LORAZEPAM 0.5 MG: 0.5 TABLET ORAL at 09:02

## 2021-01-03 RX ADMIN — LEVOTHYROXINE SODIUM 75 MCG: 75 TABLET ORAL at 05:01

## 2021-01-03 RX ADMIN — AMOXICILLIN 500 MG: 500 CAPSULE ORAL at 14:20

## 2021-01-03 RX ADMIN — Medication 1 APPLICATION: at 09:04

## 2021-01-03 RX ADMIN — MECLIZINE HYDROCHLORIDE 25 MG: 25 TABLET ORAL at 20:47

## 2021-01-03 NOTE — PROGRESS NOTES
Elliot Mack Jr, MD     OTOLARYNGOLOGY, HEAD AND NECK SURGERY PROGRESS NOTE   Referring Provider: Silvestre Montelongo DO  Reason for Consultation: Epistaxis  Location: 392/1  Accompanied by: No one  Chief complaint: Nosebleed      Interval History:   Since last examined, Emy Bermudez has had no further bleeding.  She denies any bleeding or trouble swallowing.  She says her breathing is better.  Patient seen. Chart reviewed.      Review of Systems:   JOSÉ master CMN: No change from prior inquiry    Vital Signs  Temp:  [98 °F (36.7 °C)-98.7 °F (37.1 °C)] 98.7 °F (37.1 °C)  Heart Rate:  [90-98] 98  Resp:  [16-18] 16  BP: ()/(53-57) 96/53        EXAMINATION:  CONSTITUTIONAL:    well developed  in no acute distress  Sitting in a chair sleeping, awakens easily, wearing open facemask     BODY HABITUS:    morbidly obese     COMMUNICATION:    able to communicate normally, normal voice quality     HEAD:     Normocephalic, without obvious abnormality, atraumatic     FACE:    structure normal, no tenderness present, no lesions/masses, no evidence of trauma  adiposis- Lower face     SALIVARY GLANDS:    parotid glands with no tenderness, no swelling, no masses, submandibular glands with normal size, nontender      EYE:    ocular motility normal, eyelids normal, orbits normal, no proptosis, conjunctiva clear, sclera non-icteric, pupils equal, round, reactive to light and accomodation  Color:   brown Arcus-   Bilateral     HEARING:    response to conversational voice normal     EARS:     PINNA:     normal, non-tender, skin intact without lesions      NOSE EXTERNAL:    APPEARANCE: normal, straight, with good projection, no tenderness, no lesions, no tenderness, good nasal support, patent nares     NOSE INTERNAL:    Anterior rhinoscopy   NASALMUCOSA:    abnormal:        Left- Packed with Merocel, Right- Old blood present, no fresh bleeding     ORAL CAVITY:      LIPS:      structure normal, no tenderness on palpation, no lesions,  no evidence of trauma, normal mobility and oral competence    TEETH:       edentulous:  upper and lower    GUMS:      Intact    ORAL MUCOSA:       abnormal: Redundant    FLOOR OF MOUTH:       Warthin's duct patent, mucosa normal  TONGUE:       lingual mucosa normal without lesions, normal tongue mobility      hypertrophy  Severe, prolapse   Severe    PALATE:       hard palate with normal mucosa and structure      soft palate- Low, thick, uvula intact     OROPHARYNX:    Direct examination  oropharyngeal mucosa normal, tonsil(s) with normal appearance    No blood noted in oropharynx     NECK:    short, thick  severe   LARYNGEAL POSITION: Very low  TRACHEA:   midline and Deep     LYMPH NODES :    no adenopathy     THYROID:    no overt thyromegaly, no tenderness, nodules or mass present on palpation, position midline    Cardiovascular:  Significant pedal edema     NEURO/PSYCHIATRIC :      orientation- normal    mood- normal    affect- normal    Attention- normal    Fund of Knowledge- normal    Level of Consciousness- normal    Speech- normal  CRANIAL NERVE: normal  Grossly intact  GAIT:   Deferred, in chair    Physical examination has been copied from prior note.  This is been carefully reviewed and appropriate changes have been made in the documentation.     Results Review:       I have reviewed the patients old records in the chart.  I reviewed the patient's other test results and agree with the interpretation  CBC & Differential (01/03/2021 06:50)   Basic Metabolic Panel (01/03/2021 06:50)   Progress Notes by Silvestre Montelongo DO (01/02/2021 13:30)     Medications, Allergies and Past History Reviewed      Assessment       Epistaxis    COPD (chronic obstructive pulmonary disease) (CMS/HCC)    Obesity, Class III, BMI 40-49.9 (morbid obesity) (CMS/HCC)    Chronic respiratory failure with hypoxia and hypercapnia (CMS/HCC)    Hypothyroidism (acquired)    Essential hypertension    Tobacco abuse    Anemia, blood loss     Acute kidney injury suspect secondary to blood loss (CMS/HCC)         Plan      Patient has had no further bleeding.  I will continue the pack for 24 more hours.  I plan removal in the morning and then observation to see if the patient bleeds.  She has had a decrease in her hemoglobin, essentially because of blood loss I feel.  Continue pack.  Continue nasal hygiene.  Watch hemoglobin.    Following patient as in-patient. Further recommendations will be made based on serial examinations.    Medications/Orders for this encounter: No new medications ordered.  No New orders written.      Elliot Mack Jr, MD  01/03/21  10:20 CST

## 2021-01-03 NOTE — PLAN OF CARE
Goal Outcome Evaluation:  Plan of Care Reviewed With: patient  Progress: no change  Outcome Summary: packing remains to left nare with old bloody dried drainage.  small bleeding episode at 1500 today and was stopped after administration of affrin.  patient said she had just had a coughing episode.  continue to monitor for bleeding tonight.  patient sat up in chair all day.

## 2021-01-03 NOTE — PROGRESS NOTES
[facility] Medicine Services  INPATIENT PROGRESS NOTE    Length of Stay: 2  Date of Admission: 12/31/2020  Primary Care Physician: Dorie Segura APRN    Subjective   Chief Complaint: Follow-up nosebleed  HPI   Developed sudden onset of nosebleed from both nares on date of admission.  Denied any recent coughing, heavy blowing of nose.  Wears oxygen 4 to 5 L at home and reports has humidification on oxygen.    Sitting up in chair.  Packing left nose.  She had just a trickle of bleeding from the right nare yesterday.  Hemoglobin 7.4 today.  ENT plans to keep packing in place today and removed tomorrow.  If hemoglobin stable and no further bleeding hopefully discharge tomorrow.  She denies chest pain, palpitations, shortness of breath.  Denies nausea, vomiting or abdominal pain.  Chronic lower extremity edema at baseline.  Creatinine improved 1.0.  IV fluids discontinued.    Review of Systems   Constitutional: Negative for chills and fever.   HENT: Positive for nosebleeds. Negative for trouble swallowing.    Eyes: Negative for photophobia and visual disturbance.   Respiratory: Positive for cough (Chronic). Negative for shortness of breath and wheezing.    Cardiovascular: Negative for chest pain, palpitations and leg swelling.   Gastrointestinal: Negative for constipation, diarrhea, nausea and vomiting.   Endocrine: Negative for cold intolerance and polyuria.   Genitourinary: Negative for dysuria, frequency and urgency.   Musculoskeletal: Negative for back pain.   Skin: Negative for color change, pallor, rash and wound.   Allergic/Immunologic: Negative for immunocompromised state.   Neurological: Positive for weakness.   Hematological: Negative for adenopathy. Does not bruise/bleed easily.   Psychiatric/Behavioral: Negative for agitation, behavioral problems and confusion.      All pertinent negatives and positives are as above. All other systems have been reviewed and are negative  unless otherwise stated.     Objective    Temp:  [98 °F (36.7 °C)-98.7 °F (37.1 °C)] 98.7 °F (37.1 °C)  Heart Rate:  [90-98] 98  Resp:  [16-18] 16  BP: ()/(53-57) 96/53  Physical Exam  Vitals signs and nursing note reviewed.   Constitutional:       Comments: Sitting up in chair.  Humidified oxygen in place.  Nasal packing left nare.     HENT:      Head: Normocephalic and atraumatic.      Nose:      Comments: Nasal packing left nare.     Mouth/Throat:      Pharynx: No oropharyngeal exudate.   Eyes:      Extraocular Movements: Extraocular movements intact.      Pupils: Pupils are equal, round, and reactive to light.   Neck:      Musculoskeletal: Normal range of motion and neck supple.   Cardiovascular:      Rate and Rhythm: Normal rate.      Heart sounds: No murmur.   Pulmonary:      Breath sounds: No rhonchi, wheezing, or rales.     Comments: Humidified oxygen  Abdominal:      Palpations: Abdomen is soft.     Comments: Ostomy with semiformed brown stool.  Genitourinary:     Comments: Voiding.  Musculoskeletal:         General: She has sweling (bilateral lower extremities, at baseline per patient) No tenderness.   Skin:     General: Skin is warm and dry.      Comments: Bilateral lower extremity redness, chronic, chronic venous stasis changes.  No open areas or drainage.  Neurological:      General: No focal deficit present.      Mental Status: She is alert and oriented to person, place, and time.   Psychiatric:         Mood and Affect: Mood normal.         Behavior: Behavior normal.         Thought Content: Thought content normal.         Judgment: Judgment normal.      Results Review:  I have reviewed the labs, radiology results, and diagnostic studies.    Laboratory Data:      Results from last 7 days   Lab Units 01/03/21  0650 01/02/21  1817 01/02/21  0709 01/02/21  0001 01/01/21  1712 01/01/21  1550 01/01/21  0447   WBC 10*3/mm3 15.53*  --   --   --  22.42*  --  19.74*   HEMOGLOBIN g/dL 7.4* 8.3* 7.9* 8.5*  9.4* 9.4* 9.1*   HEMATOCRIT % 23.8* 25.0* 25.5* 27.5* 28.4* 29.2* 29.1*   PLATELETS 10*3/mm3 205  --   --   --  229  --  236        Results from last 7 days   Lab Units 01/03/21  0650 01/02/21  0509 01/01/21  0447   SODIUM mmol/L 138 136 139   POTASSIUM mmol/L 3.7 4.4 4.0   CHLORIDE mmol/L 96* 92* 92*   CO2 mmol/L 35.0* 35.0* 38.0*   BUN mg/dL 33* 50* 36*   CREATININE mg/dL 1.00 1.58* 1.05*   GLUCOSE mg/dL 100* 115* 150*   CALCIUM mg/dL 8.4* 9.0 8.9     Culture Data:    No results found for: BLOODCX, URINECX, WOUNDCX, MRSACX, RESPCX, STOOLCX    Radiology Data:   Imaging Results (Last 7 Days)     ** No results found for the last 168 hours. **        Intake/Output    Intake/Output Summary (Last 24 hours) at 1/3/2021 1150  Last data filed at 1/3/2021 0621  Gross per 24 hour   Intake --   Output 825 ml   Net -825 ml     Scheduled Meds  amoxicillin, 500 mg, Oral, Q8H  budesonide-formoterol, 2 puff, Inhalation, BID - RT  escitalopram, 10 mg, Oral, Daily  furosemide, 20 mg, Oral, Daily  guaiFENesin, 1,200 mg, Oral, Q12H  levothyroxine, 75 mcg, Oral, Q AM  losartan, 25 mg, Oral, Q24H  meclizine, 25 mg, Oral, BID  mupirocin, , Each Nare, BID  sodium chloride, 10 mL, Intravenous, Q12H  sodium chloride, 2 spray, Nasal, 5x Daily      I have reviewed the patient current medications.     Assessment/Plan     Active Hospital Problems    Diagnosis   • **Epistaxis   • Anemia, blood loss   • Acute kidney injury suspect secondary to blood loss (CMS/HCC)   • Tobacco abuse   • Essential hypertension   • Hypothyroidism (acquired)   • Chronic respiratory failure with hypoxia and hypercapnia (CMS/HCC)   • Obesity, Class III, BMI 40-49.9 (morbid obesity) (CMS/HCC)   • COPD (chronic obstructive pulmonary disease) (CMS/HCC)     Plan:  1.  Transferred from [place] with epistasis sudden onset 12/31.  Nasal packing bilaterally on admission.  2.  ENT consulted.  Dr. Mata remove packing right nare.  Treat conservatively.  Planed to replacing  MeroGel pack on the left.  However, patient developed significant bleeding left nare after evaluation.  Dr. Mata examined at bedside noted significant left septal deviation.  Bleeding site difficult to a certain but appeared near the apex of the septal deviation and did not believe is all the previous packing reached this area.  Large Merocel pack with Albert-Synephrine and Bactroban paste left nare.  No additional bleeding noted.  Packing remains in place.  Patient had small amount of bleeding right nare yesterday.  3.  ENT plans to continue packing for 24 more hours.  Plans to remove tomorrow.  Observe for bleeding.  Hopefully discharge tomorrow.  4.  Humidified oxygen, ampicillin 500 mg every 8 hours. Mupirocin  5.  Hemoglobin 9.7 down to 7.4. Suspect some blood loss anemia.  CBC in a.m.  WBC trended down to 15.53.  22.42 on 1/1  6.  Creatinine 1.58 yesterday.  Gentle IV fluids at 50 mL/h.  Creatinine 1.0 today.  Creatinine 1.05 on admission.  Fluids discontinued.  7.  Avoid hypertension.  Blood pressure 96/53, 124/55  8.  ENT has allowed hot coffee as she has no further bleeding noted.  9.  Home medications reviewed.    Her daughter will assist with decision-making if she is unable to make decisions for herself  The above documentation resulted from a face-to-face encounter by me Trinh YANSE, [facility].    Discharge Planning: I expect patient to be discharged to home in 1-2 days.    Electronically signed by JOAQUÍN Amaro, 1/3/2021, 11:50 CST.    I personally evaluated and examined the patient in conjunction with JOAQUÍN Ponce and agree with the assessment, treatment plan, and disposition of the patient as recorded by her. My history, exam, and further recommendations are:     She is stable with stable hemoglobin and no recent bleeding.  Her renal function improved with fluids.    She is extremely hopeful that she will get the packing out tomorrow and be able to go home.  She told me today  "that she is going to buy a humidifier.  I asked her specifically about this on the night of admission and she stated that she already had one, but now says that she does not.  She also states that her daughter keeps their apartment \"way too warm.\"  She is worried that the air is too dry on top of her chronic oxygen use.  This is likely the cause of her bleeding.    Electronically signed by Silvestre Montelongo DO, 1/3/2021, 16:11 CST.      "

## 2021-01-03 NOTE — PLAN OF CARE
Pt has been void of signs or reports of bleeding since last episode (see previous note). Pt demonstrated urinary frequency and voids small amounts. Pt BLE are very edematous and slightly pitting. Pt encouraged to keep them elevated.     Problem: Adult Inpatient Plan of Care  Goal: Plan of Care Review  Outcome: Ongoing, Progressing  Goal: Patient-Specific Goal (Individualized)  Outcome: Ongoing, Progressing  Goal: Absence of Hospital-Acquired Illness or Injury  Outcome: Ongoing, Progressing  Intervention: Identify and Manage Fall Risk  Recent Flowsheet Documentation  Taken 1/3/2021 0011 by Vitor Weinberg RN  Safety Promotion/Fall Prevention: safety round/check completed  Taken 1/2/2021 2220 by Vitor Weinberg RN  Safety Promotion/Fall Prevention: safety round/check completed  Taken 1/2/2021 2143 by Vitor Weinberg RN  Safety Promotion/Fall Prevention: safety round/check completed  Goal: Optimal Comfort and Wellbeing  Outcome: Ongoing, Progressing  Goal: Readiness for Transition of Care  Outcome: Ongoing, Progressing     Problem: Fluid Volume Deficit  Goal: Fluid Balance  Outcome: Ongoing, Progressing     Problem: Fall Injury Risk  Goal: Absence of Fall and Fall-Related Injury  Outcome: Ongoing, Progressing  Intervention: Promote Injury-Free Environment  Recent Flowsheet Documentation  Taken 1/3/2021 0011 by Vitor Weinberg RN  Safety Promotion/Fall Prevention: safety round/check completed  Taken 1/2/2021 2220 by Vitor Weinberg RN  Safety Promotion/Fall Prevention: safety round/check completed  Taken 1/2/2021 2143 by Vitor Weinberg RN  Safety Promotion/Fall Prevention: safety round/check completed   Goal Outcome Evaluation:  Plan of Care Reviewed With: patient  Progress: no change

## 2021-01-03 NOTE — PROGRESS NOTES
Continued Stay Note  FLASH Dao     Patient Name: Emy Bermudez  MRN: [id number]  Today's Date: 1/3/2021    Admit Date: 12/31/2020    Discharge Plan     Row Name 01/03/21 1323       Plan    Plan Comments  SW has attempted to speak with family multiple times this weekend but unable to get ahold of them. SW will follow for possible return call        Discharge Codes    No documentation.             Malina Fowler

## 2021-01-03 NOTE — SIGNIFICANT NOTE
"   01/03/21 0011   HEENT   Nose Symptoms Right Nostril:   Nasal Drainage bloody   Pt educated at start of shift to avoid leaning forward over her table in use of her cell phone and to prop back into the chair and maintain head upright. Pt verbalized understanding but was unable to comply with directions and continued to lean over bedside table in use of cell phone.     Pt called out after having \"coughed\" and reported a degree of bleeding. Pt was found with blood running down her face and neck from what appeared to be her R nostril. Pt also reported blood \"running down my throat.\" Pressure was held and nasal spray (ephrine) was obtained to use. Pt allowed reposition to lean back in chair with nose elevated. Bleeding was stopped.  "

## 2021-01-04 LAB
ABO GROUP BLD: NORMAL
BLD GP AB SCN SERPL QL: NEGATIVE
DEPRECATED RDW RBC AUTO: 65.9 FL (ref 37–54)
ERYTHROCYTE [DISTWIDTH] IN BLOOD BY AUTOMATED COUNT: 20.1 % (ref 12.3–15.4)
HCT VFR BLD AUTO: 22.6 % (ref 34–46.6)
HCT VFR BLD AUTO: 24.3 % (ref 34–46.6)
HGB BLD-MCNC: 7.2 G/DL (ref 12–15.9)
HGB BLD-MCNC: 8 G/DL (ref 12–15.9)
MCH RBC QN AUTO: 29.1 PG (ref 26.6–33)
MCHC RBC AUTO-ENTMCNC: 31.9 G/DL (ref 31.5–35.7)
MCV RBC AUTO: 91.5 FL (ref 79–97)
PLATELET # BLD AUTO: 193 10*3/MM3 (ref 140–450)
PMV BLD AUTO: 10.1 FL (ref 6–12)
RBC # BLD AUTO: 2.47 10*6/MM3 (ref 3.77–5.28)
RH BLD: POSITIVE
T&S EXPIRATION DATE: NORMAL
WBC # BLD AUTO: 13.48 10*3/MM3 (ref 3.4–10.8)

## 2021-01-04 PROCEDURE — 85018 HEMOGLOBIN: CPT | Performed by: FAMILY MEDICINE

## 2021-01-04 PROCEDURE — 86923 COMPATIBILITY TEST ELECTRIC: CPT

## 2021-01-04 PROCEDURE — 86901 BLOOD TYPING SEROLOGIC RH(D): CPT | Performed by: NURSE PRACTITIONER

## 2021-01-04 PROCEDURE — 85014 HEMATOCRIT: CPT | Performed by: FAMILY MEDICINE

## 2021-01-04 PROCEDURE — 86900 BLOOD TYPING SEROLOGIC ABO: CPT

## 2021-01-04 PROCEDURE — 36430 TRANSFUSION BLD/BLD COMPNT: CPT

## 2021-01-04 PROCEDURE — 94799 UNLISTED PULMONARY SVC/PX: CPT

## 2021-01-04 PROCEDURE — 86850 RBC ANTIBODY SCREEN: CPT | Performed by: NURSE PRACTITIONER

## 2021-01-04 PROCEDURE — 86900 BLOOD TYPING SEROLOGIC ABO: CPT | Performed by: NURSE PRACTITIONER

## 2021-01-04 PROCEDURE — 85027 COMPLETE CBC AUTOMATED: CPT | Performed by: NURSE PRACTITIONER

## 2021-01-04 PROCEDURE — 99232 SBSQ HOSP IP/OBS MODERATE 35: CPT | Performed by: OTOLARYNGOLOGY

## 2021-01-04 PROCEDURE — P9016 RBC LEUKOCYTES REDUCED: HCPCS

## 2021-01-04 RX ADMIN — HYDROXYZINE HYDROCHLORIDE 50 MG: 25 TABLET ORAL at 09:16

## 2021-01-04 RX ADMIN — SODIUM CHLORIDE, PRESERVATIVE FREE 10 ML: 5 INJECTION INTRAVENOUS at 21:12

## 2021-01-04 RX ADMIN — SODIUM CHLORIDE, PRESERVATIVE FREE 10 ML: 5 INJECTION INTRAVENOUS at 08:27

## 2021-01-04 RX ADMIN — LEVOTHYROXINE SODIUM 75 MCG: 75 TABLET ORAL at 05:12

## 2021-01-04 RX ADMIN — SALINE NASAL SPRAY 2 SPRAY: 1.5 SOLUTION NASAL at 08:36

## 2021-01-04 RX ADMIN — HYDROXYZINE HYDROCHLORIDE 50 MG: 25 TABLET ORAL at 21:44

## 2021-01-04 RX ADMIN — SALINE NASAL SPRAY 2 SPRAY: 1.5 SOLUTION NASAL at 14:00

## 2021-01-04 RX ADMIN — BUDESONIDE AND FORMOTEROL FUMARATE DIHYDRATE 2 PUFF: 160; 4.5 AEROSOL RESPIRATORY (INHALATION) at 22:50

## 2021-01-04 RX ADMIN — FUROSEMIDE 20 MG: 20 TABLET ORAL at 08:27

## 2021-01-04 RX ADMIN — SALINE NASAL SPRAY 2 SPRAY: 1.5 SOLUTION NASAL at 05:12

## 2021-01-04 RX ADMIN — GUAIFENESIN 1200 MG: 600 TABLET, EXTENDED RELEASE ORAL at 21:13

## 2021-01-04 RX ADMIN — MECLIZINE HYDROCHLORIDE 25 MG: 25 TABLET ORAL at 21:13

## 2021-01-04 RX ADMIN — SALINE NASAL SPRAY 2 SPRAY: 1.5 SOLUTION NASAL at 21:12

## 2021-01-04 RX ADMIN — BUDESONIDE AND FORMOTEROL FUMARATE DIHYDRATE 2 PUFF: 160; 4.5 AEROSOL RESPIRATORY (INHALATION) at 06:45

## 2021-01-04 RX ADMIN — AMOXICILLIN 500 MG: 500 CAPSULE ORAL at 05:12

## 2021-01-04 RX ADMIN — SALINE NASAL SPRAY 2 SPRAY: 1.5 SOLUTION NASAL at 17:00

## 2021-01-04 RX ADMIN — SALINE NASAL SPRAY 2 SPRAY: 1.5 SOLUTION NASAL at 10:20

## 2021-01-04 RX ADMIN — GUAIFENESIN 1200 MG: 600 TABLET, EXTENDED RELEASE ORAL at 08:27

## 2021-01-04 RX ADMIN — ESCITALOPRAM 10 MG: 10 TABLET, FILM COATED ORAL at 08:27

## 2021-01-04 RX ADMIN — Medication 1 APPLICATION: at 21:12

## 2021-01-04 RX ADMIN — PHENYLEPHRINE HYDROCHLORIDE 2 SPRAY: 0.25 SPRAY NASAL at 08:35

## 2021-01-04 RX ADMIN — Medication 1 APPLICATION: at 08:27

## 2021-01-04 RX ADMIN — LOSARTAN POTASSIUM 25 MG: 50 TABLET, FILM COATED ORAL at 08:26

## 2021-01-04 RX ADMIN — MECLIZINE HYDROCHLORIDE 25 MG: 25 TABLET ORAL at 08:27

## 2021-01-04 NOTE — PLAN OF CARE
"  Pt nose began to bleed this morning around 0630. She denies known stimulus and had been resting, reclined in her chair. Bleed was significant as she reported \"so much running down my throat, I'm gonna be sick.\" Pt tolerated yonker suction. Pt garments needed to be changed. Bleed was stopped with nasal spray and pressure. Pt face tent was changed as it contained so much blood. She has not had warm liquids this shift. Pt has appeared to sleep for the duration of this shift.     Problem: Adult Inpatient Plan of Care  Goal: Plan of Care Review  Outcome: Ongoing, Progressing  Goal: Patient-Specific Goal (Individualized)  Outcome: Ongoing, Progressing  Goal: Absence of Hospital-Acquired Illness or Injury  Outcome: Ongoing, Progressing  Intervention: Identify and Manage Fall Risk  Recent Flowsheet Documentation  Taken 1/4/2021 0235 by Vitor Weinberg RN  Safety Promotion/Fall Prevention: safety round/check completed  Taken 1/4/2021 0017 by Vitor Weinberg RN  Safety Promotion/Fall Prevention: safety round/check completed  Taken 1/3/2021 2207 by Vitor Weinberg RN  Safety Promotion/Fall Prevention: safety round/check completed  Taken 1/3/2021 2100 by Vitor Weinberg RN  Safety Promotion/Fall Prevention: safety round/check completed  Goal: Optimal Comfort and Wellbeing  Outcome: Ongoing, Progressing  Goal: Readiness for Transition of Care  Outcome: Ongoing, Progressing     Problem: Fluid Volume Deficit  Goal: Fluid Balance  Outcome: Ongoing, Progressing     Problem: Fall Injury Risk  Goal: Absence of Fall and Fall-Related Injury  Outcome: Ongoing, Progressing  Intervention: Promote Injury-Free Environment  Recent Flowsheet Documentation  Taken 1/4/2021 0235 by Vitor Weinberg RN  Safety Promotion/Fall Prevention: safety round/check completed  Taken 1/4/2021 0017 by Vitor Weinberg RN  Safety Promotion/Fall Prevention: safety round/check completed  Taken 1/3/2021 2207 by Vitor Weinberg RN  Safety " Promotion/Fall Prevention: safety round/check completed  Taken 1/3/2021 2100 by Vitor Weinberg, RN  Safety Promotion/Fall Prevention: safety round/check completed   Goal Outcome Evaluation:  Plan of Care Reviewed With: patient  Progress: no change

## 2021-01-04 NOTE — DISCHARGE SUMMARY
[facility]  DISCHARGE SUMMARY     Date of Admission: 12/31/2020  Date of Discharge:  1/6/2021  Primary Care Physician: Dorie Segura APRN    Presenting Problem/History of Present Illness:  Epistaxis [R04.0]  Epistaxis [R04.0]  Epistaxis [R04.0]     Discharge Diagnoses:  Active Hospital Problems    Diagnosis   • **Epistaxis   • S/P nasal septoplasty     IT plasty  Control L epistaxis     • Anemia, blood loss due to epistasis   • Acute kidney injury suspect secondary to blood loss (CMS/[facility])   • Tobacco abuse   • Acquired deviated nasal septum     Added automatically from request for surgery [id number]     • Essential hypertension   • Hypothyroidism (acquired)   • Chronic respiratory failure with hypoxia and hypercapnia (CMS/HCC)   • Obesity, Class III, BMI 40-49.9 (morbid obesity) (CMS/HCC)   • COPD (chronic obstructive pulmonary disease) (CMS/[facility])     Chief Complaint on Day of Discharge: Feels better.  Less bleeding    History of Present Illness on Day of Discharge:   Sitting up in bed.  Nasal drip pad with some bloody drainage noted since surgery.  Dr. Mack has evaluated twice today and notes remained stable with no longer bleeding.  Patient denies chest pain, palpitations, shortness of breath.  Denies nausea or vomiting.  Tolerating diet.  Wants to go home.  Patient has open face tent here and will have humidification with oxygen after home.  Also humidifier.    Consults: Dr. Ayush Mata/Dr. Mack, ENT    Procedures Performed:  1.  Left anterior complex nasal packing 1/1/2021  2.  Nasal endoscopic examination at bedside 1/5/2020 Dr. Mack  3.  Procedure(s): 1/5/2020 Dr. Mack  EXAM UNDER ANESTHESIA, CONTOL NOSEBLEED  INTERNAL MAXILLARY ARTERY AND ETHMOID ARTERY LIGATION  ETHMOID ARTERY LIGATION  SEPTOPLASTY, RESECTION INFERIOR TURBINATES     Pertinent Test Results:   Laboratory Data Last 7 Days:  Results from last 7 days   Lab Units 01/06/21  0969  01/06/21  0619 01/05/21  2156 01/05/21  0543 01/04/21  1555 01/04/21  0527 01/03/21  0650  01/01/21  1712  01/01/21  0447   WBC 10*3/mm3  --  12.40*  --  9.47  --  13.48* 15.53*  --  22.42*  --  19.74*   HEMOGLOBIN g/dL 8.3* 8.7* 8.9* 7.7* 8.0* 7.2* 7.4*   < > 9.4*   < > 9.1*   HEMATOCRIT % 25.5* 26.0* 27.6* 25.3* 24.3* 22.6* 23.8*   < > 28.4*   < > 29.1*   PLATELETS 10*3/mm3  --  214  --  204  --  193 205  --  229  --  236    < > = values in this interval not displayed.     Results from last 7 days   Lab Units 01/03/21  0650 01/02/21  0509 01/01/21  0447   SODIUM mmol/L 138 136 139   POTASSIUM mmol/L 3.7 4.4 4.0   CHLORIDE mmol/L 96* 92* 92*   CO2 mmol/L 35.0* 35.0* 38.0*   BUN mg/dL 33* 50* 36*   CREATININE mg/dL 1.00 1.58* 1.05*   GLUCOSE mg/dL 100* 115* 150*   CALCIUM mg/dL 8.4* 9.0 8.9     Laboratory Data Last 7 Days:    Lab Results (last 7 days)     Procedure Component Value Units Date/Time    CBC (No Diff) [[id number]]  (Abnormal) Collected: 01/04/21 0527    Specimen: Blood Updated: 01/04/21 0615     WBC 13.48 10*3/mm3      RBC 2.47 10*6/mm3      Hemoglobin 7.2 g/dL      Hematocrit 22.6 %      MCV 91.5 fL      MCH 29.1 pg      MCHC 31.9 g/dL      RDW 20.1 %      RDW-SD 65.9 fl      MPV 10.1 fL      Platelets 193 10*3/mm3     Basic Metabolic Panel [[id number]]  (Abnormal) Collected: 01/03/21 0650    Specimen: Blood Updated: 01/03/21 0751     Glucose 100 mg/dL      BUN 33 mg/dL      Creatinine 1.00 mg/dL      Sodium 138 mmol/L      Potassium 3.7 mmol/L      Chloride 96 mmol/L      CO2 35.0 mmol/L      Calcium 8.4 mg/dL      eGFR Non African Amer 55 mL/min/1.73      BUN/Creatinine Ratio 33.0     Anion Gap 7.0 mmol/L     Narrative:      GFR Normal >60  Chronic Kidney Disease <60  Kidney Failure <15      CBC & Differential [[id number]]  (Abnormal) Collected: 01/03/21 0650    Specimen: Blood Updated: 01/03/21 0736    Narrative:          CBC Auto Differential [[id number]]  (Abnormal) Collected: 01/03/21 0650     Specimen: Blood Updated: 01/03/21 0736     WBC 15.53 10*3/mm3      RBC 2.66 10*6/mm3      Hemoglobin 7.4 g/dL      Hematocrit 23.8 %      MCV 89.5 fL      MCH 27.8 pg      MCHC 31.1 g/dL      RDW 19.9 %      RDW-SD 63.6 fl      MPV 9.6 fL      Platelets 205 10*3/mm3      Neutrophil % 81.4 %      Lymphocyte % 9.9 %      Monocyte % 6.2 %      Eosinophil % 1.8 %      Basophil % 0.1 %      Immature Grans % 0.6 %      Neutrophils, Absolute 12.65 10*3/mm3      Lymphocytes, Absolute 1.53 10*3/mm3      Monocytes, Absolute 0.96 10*3/mm3      Eosinophils, Absolute 0.28 10*3/mm3      Basophils, Absolute 0.02 10*3/mm3      Immature Grans, Absolute 0.09 10*3/mm3      nRBC 0.0 /100 WBC     Hemoglobin & Hematocrit, Blood [[id number]]  (Abnormal) Collected: 01/02/21 1817    Specimen: Blood Updated: 01/02/21 1829     Hemoglobin 8.3 g/dL      Hematocrit 25.0 %     Hemoglobin & Hematocrit, Blood [[id number]]  (Abnormal) Collected: 01/02/21 0709    Specimen: Blood Updated: 01/02/21 0726     Hemoglobin 7.9 g/dL      Hematocrit 25.5 %     Basic Metabolic Panel [[id number]]  (Abnormal) Collected: 01/02/21 0509    Specimen: Blood Updated: 01/02/21 0620     Glucose 115 mg/dL      BUN 50 mg/dL      Creatinine 1.58 mg/dL      Sodium 136 mmol/L      Potassium 4.4 mmol/L      Comment: Slight hemolysis detected by analyzer. Results may be affected.        Chloride 92 mmol/L      CO2 35.0 mmol/L      Calcium 9.0 mg/dL      eGFR Non African Amer 32 mL/min/1.73      BUN/Creatinine Ratio 31.6     Anion Gap 9.0 mmol/L     Narrative:          Hemoglobin & Hematocrit, Blood [[id number]]  (Abnormal) Collected: 01/02/21 0001    Specimen: Blood Updated: 01/02/21 0032     Hemoglobin 8.5 g/dL      Hematocrit 27.5 %     CBC (No Diff) [[id number]]  (Abnormal) Collected: 01/01/21 1712    Specimen: Blood Updated: 01/01/21 1722     WBC 22.42 10*3/mm3      RBC 3.26 10*6/mm3      Hemoglobin 9.4 g/dL      Hematocrit 28.4 %      MCV 87.1 fL      MCH 28.8 pg      MCHC  33.1 g/dL      RDW 19.7 %      RDW-SD 62.0 fl      MPV 9.8 fL      Platelets 229 10*3/mm3     Hemoglobin & Hematocrit, Blood [[id number]]  (Abnormal) Collected: 01/01/21 1550    Specimen: Blood Updated: 01/01/21 1557     Hemoglobin 9.4 g/dL      Hematocrit 29.2 %     Basic Metabolic Panel [[id number]]  (Abnormal) Collected: 01/01/21 0447    Specimen: Blood Updated: 01/01/21 0547     Glucose 150 mg/dL      BUN 36 mg/dL      Creatinine 1.05 mg/dL      Sodium 139 mmol/L      Potassium 4.0 mmol/L      Chloride 92 mmol/L      CO2 38.0 mmol/L      Calcium 8.9 mg/dL      eGFR Non African Amer 52 mL/min/1.73      BUN/Creatinine Ratio 34.3     Anion Gap 9.0 mmol/L     Narrative:          CBC Auto Differential [[id number]]  (Abnormal) Collected: 01/01/21 0447    Specimen: Blood Updated: 01/01/21 0527     WBC 19.74 10*3/mm3      RBC 3.25 10*6/mm3      Hemoglobin 9.1 g/dL      Hematocrit 29.1 %      MCV 89.5 fL      MCH 28.0 pg      MCHC 31.3 g/dL      RDW 19.4 %      RDW-SD 63.5 fl      MPV 9.7 fL      Platelets 236 10*3/mm3      Neutrophil % 78.6 %      Lymphocyte % 13.7 %      Monocyte % 5.6 %      Eosinophil % 1.1 %      Basophil % 0.2 %      Immature Grans % 0.8 %      Neutrophils, Absolute 15.53 10*3/mm3      Lymphocytes, Absolute 2.71 10*3/mm3      Monocytes, Absolute 1.11 10*3/mm3      Eosinophils, Absolute 0.21 10*3/mm3      Basophils, Absolute 0.03 10*3/mm3      Immature Grans, Absolute 0.15 10*3/mm3      nRBC 0.0 /100 WBC     Hemoglobin & Hematocrit, Blood [[id number]]  (Abnormal) Collected: 12/31/20 2356    Specimen: Blood Updated: 01/01/21 0037     Hemoglobin 9.7 g/dL      Hematocrit 30.4 %     Extra Tubes [[id number]] Collected: 12/31/20 1810                 Gold Top - SST [[id number]] Collected: 12/31/20 1810    Specimen: Blood Updated: 12/31/20 1915     Extra Tube Hold for add-ons.     Comment: Auto resulted.       COVID PRE-OP / PRE-PROCEDURE SCREENING ORDER (NO ISOLATION) - Swab, Nasal Cavity [[id number]]  (Normal)  "Collected: 12/31/20 1614    Specimen: Swab from Nasal Cavity Updated: 12/31/20 1711    Narrative:          COVID-19,[employer] IN-HOUSE,Nasal Swab No Transport Media 3-4 HR TAT - Swab, Nasal Cavity [[id number]]  (Normal) Collected: 12/31/20 1614    Specimen: Swab from Nasal Cavity Updated: 12/31/20 1711     COVID19 Not Detected    Narrative:      Fact sheet for providers: https://www.fda.gov/media/637149/download     Fact sheet for patients: https://www.fda.gov/media/541245/download    Test performed by PCR.        Imaging Results (All)     None        Hospital Course  Patient is a 70 y.o. female transferred from [facility] for epistasis.  Patient had recent admission to [facility] 12/18 with pneumonia and COPD exacerbation.  She completed antibiotics and steroids after discharge.  Patient reported she was sitting in chair reading when she noticed her nose was running.  Suddenly, she had blood \"pouring out her nose\".  This would stop intermittently and then start up again.  She presented outside facility.  Patient denied any excessive sneezing or coughing.  She reports chronic productive cough of yellow sputum but no worse than normal.  Patient reports shortness of breath at baseline.  She denied chest pain, fever, chills.  Patient reported electric heat in her home.  She denied any anticoagulation or aspirin use.  Patient denied regular NSAID use.  She wears oxygen at 4 to 5 L chronically at home.  Patient reported she had humidification on oxygen.  However, nursing staff indicated she does not use humidification.  Lab at outside facility WBC 22 (patient on recent steroids), hemoglobin 10.8, INR 1.0, platelet count 268,000, BUN 35, blood pressure 128/88.  Oxygen saturation 97%.  Negative respiratory PCR panel and Covid test with recent hospitalization.    She was admitted to the medical floor with epistasis, chronic respiratory failure with hypoxia and hypercapnia, COPD.  She was made nothing by mouth until " evaluation by ENT.  Oxygen by nasal face tent and will need humidification.  Home medications reviewed and resumed.  Monitor hemoglobin and hematocrit.    ENT consulted and she was seen by Dr. Mata who removed packing right nare.  Recommendations for conservative treatment  and remove packing left nare in the next 24 hours if no further bleeding.  Antibiotics with amoxicillin 500 mg every 8 hours for 3 days, nasal spray, mupirocin ointment.  Patient developed increased bleeding from left nare on 1/1.  Dr. Mata examined patient at the bedside and noted significant left septal deviation.  Bleeding site difficult to acertain but appeared near the apex of the septal deviation and did not believe all the previous packing reach this area.  Large Merocel pack with Albert-Synephrine and Bactroban paste left nare.  Keep packing in place.  Patient had intermittent small amount of bleeding right nare.  On 1/4 Dr. Mack removed nasal packing from left nare.  Patient continued to have intermittent blood noted from left nare.  Continue Afrin and saline hygiene.  Dr. Mack perform a nasal endoscopic examination at the bedside on 1/5 and noted because of recurrent bleeding plans to proceed with surgery.  On 1/5 she was taken to the operating room and had internal maxillary artery and ethmoid artery ligation, septoplasty, resection of inferior turbinates.  Postoperatively patient had some nasal dripping.  She was examined by ENT on 1/6 and patient denied any blood in the back of her throat at that time.  She was hoping to go home if remained without bleeding.  She was reevaluated by ENT later in the day on 1/6 and had no bleeding or discharge from nose.  She was noted to have expected discharge from nose from plastic surgery in the past.  Recommendations for saline nose spray, continue oxygen, no further antibiotics needed.  Recommendations were for oxygen face tent at discharge.  Respiratory was able to supply face tent but  "patient's oxygen company did not care to open face tent.  Oxygen company has ordered humidification and will be shipped to patient's house.  Daughter purchasing humidifier for home use.    Acute blood loss anemia secondary to epistaxis.  Hemoglobin 9.7 on admission trended down to 7.2 with hematocrit 22.6 on 1/4.  Transfused 1 unit packed red blood cell with hemoglobin 8 and hematocrit 24.3.  Hemoglobin 8.3 with hematocrit 25.5 at discharge.    Creatinine 1.05 on admission.  Creatinine up to 1.58 on 1/2.  Hydrated with IV fluids at 50 mL/h.  Creatinine returned 1.0.  Suspect acute kidney injury secondary to acute blood loss anemia.    Recommendations to avoid hypertension.  Blood pressure remained stable 127/57 or less.    On 1/6/2021 she is stable for discharge.  She has been evaluated by ENT twice today and cleared for discharge.  No antibiotics needed at this time.  Continue saline spray as needed.  Recommendations for face tent and humidified oxygen.  Social service assisted with recommendations at discharge.  Follow-up with JOAQUÍN Barahona 1/13/2021.  Follow-up with Dr. Mack 1/18/2021    Physical Exam on Discharge:  /57 (BP Location: Right arm, Patient Position: Sitting)   Pulse 88   Temp 98.7 °F (37.1 °C) (Oral)   Resp 16   Ht 160 cm (63\")   Wt 102 kg (225 lb)   SpO2 93%   BMI 39.86 kg/m²   Physical Exam  Vitals signs and nursing note reviewed.   Constitutional:       Appearance: She is obese.      Comments: Sitting up in chair.  She humidified oxygen per face tent in place.  Drip pad under nose.   HENT:      Head: Normocephalic and atraumatic.      Nose: No congestion.      Mouth/Throat:      Pharynx: No oropharyngeal exudate.   Eyes:      Extraocular Movements: Extraocular movements intact.      Pupils: Pupils are equal, round, and reactive to light.   Neck:      Musculoskeletal: Normal range of motion and neck supple.   Cardiovascular:      Rate and Rhythm: Normal rate and regular " rhythm.      Heart sounds: No murmur.   Pulmonary:      Effort: Pulmonary effort is normal.      Breath sounds: Normal breath sounds.      Comments: Humidified face tent.   Abdominal:      Palpations: Abdomen is soft.      Comments: Ostomy with brown semiformed stool   Genitourinary:     Comments: Voiding.  Musculoskeletal:         General: Swelling (Bilateral lower extremities at baseline per patient.) present.   Skin:     Comments: Bilateral lower extremity redness, chronic, chronic venous stasis changes.  No open areas or drainage.   Neurological:      General: No focal deficit present.      Mental Status: She is alert and oriented to person, place, and time.   Psychiatric:         Mood and Affect: Mood normal.         Behavior: Behavior normal.         Thought Content: Thought content normal.         Judgment: Judgment normal.       Condition on Discharge: Stable    Discharge Disposition: Home with family    Discharge Diet:   Diet Instructions     Advance Diet As Tolerated          Activity at Discharge:   Activity Instructions     Activity as Tolerated          Discharge Care Plan / Instructions:   1.  For recurrent or worsening nosebleeds seek medical attention.  2.  Mupirocin (Bactroban) each nare twice daily.  3.  Sodium chloride nasal spray twice daily  4.  Albert-Synephrine nasal spray every 4 hours as needed nasal bleeding  5.  Follow-up with Damion Segura, 1/13/2021 with CBC  6.  Follow-up with Dr. Mack 1/18/2021.  7.  Humidified oxygen     Discharge Medications:     Discharge Medications      New Medications      Instructions Start Date   mupirocin 2 % nasal ointment  Commonly known as: BACTROBAN   Each Nare, 2 Times Daily      sodium chloride 0.65 % nasal spray   2 sprays, Nasal, As Needed         Continue These Medications      Instructions Start Date   albuterol (2.5 MG/3ML) 0.083% nebulizer solution  Commonly known as: PROVENTIL   2.5 mg, Nebulization, Every 4 Hours PRN      albuterol sulfate HFA  108 (90 Base) MCG/ACT inhaler  Commonly known as: PROVENTIL HFA;VENTOLIN HFA;PROAIR HFA   1-2 puffs, Inhalation, Every 4 Hours PRN      budesonide-formoterol 160-4.5 MCG/ACT inhaler  Commonly known as: SYMBICORT   2 puffs, Inhalation, 2 Times Daily - RT      escitalopram 20 MG tablet  Commonly known as: LEXAPRO   10 mg, Oral, Daily      fluconazole 150 MG tablet  Commonly known as: Diflucan   150 mg, Oral, Daily      furosemide 20 MG tablet  Commonly known as: LASIX   20 mg, Oral, Every Morning      guaiFENesin 600 MG 12 hr tablet  Commonly known as: MUCINEX   1,200 mg, Oral, Every 12 Hours Scheduled      hydrOXYzine 50 MG tablet  Commonly known as: ATARAX   50 mg, Oral, 3 Times Daily PRN      levothyroxine 75 MCG tablet  Commonly known as: SYNTHROID, LEVOTHROID   75 mcg, Oral, Daily      LORazepam 0.5 MG tablet  Commonly known as: ATIVAN   0.5 mg, Oral, Daily PRN      losartan 25 MG tablet  Commonly known as: COZAAR   25 mg, Oral, Every 24 Hours Scheduled      meclizine 25 MG tablet  Commonly known as: ANTIVERT   25 mg, Oral, 2 Times Daily      nicotine 21 MG/24HR patch  Commonly known as: NICODERM CQ   1 patch, Transdermal, Every 24 Hours      nitroglycerin 0.4 MG SL tablet  Commonly known as: NITROSTAT   0.4 mg, Sublingual, Every 5 Minutes PRN, Take no more than 3 doses in 15 minutes.      potassium chloride 10 MEQ CR tablet  Commonly known as: K-DUR,KLOR-CON   10 mEq, Oral, 2 Times Daily           Follow-up Appointments:   Follow-up Information     Dorie Segura APRN Follow up in 1 week(s).    Specialty: Nurse Practitioner  Why: 1 week with ANGELA January 13, 2021 at 11:00 am phone call visit  Go in on January 11, 2021 at 10:00 am for lab draw  Contact information:  [address] [place] [address]  [phone]             Elliot Mack Jr., MD Follow up.    Specialty: Otolaryngology  Why: January 18, 2021 at 2:30 pm  Contact information:  [address] [facility]  [address] [place] [address]  [address]  [phone]                 Future Appointments:  Future Appointments   Date Time Provider Department Center   1/18/2021  2:30 PM Elliot Mack Jr., MD [facility] None     Test Results Pending at Discharge: None    The above documentation resulted from a face-to-face encounter by me Trinh YANES, [facility].    Electronically signed by JOAQUÍN Amaro, 1/6/2021, 14:03 CST.    Time: This discharge process required greater than 35 minutes for discharge    Plan discussed with Dr. Tolentino, Dr. Mack and patient.    Time spent in face-to-face evaluation, chart review, planning and education greater than 35 minutes.

## 2021-01-04 NOTE — PROGRESS NOTES
Discharge Planning Assessment  [facility]     Patient Name: Emy Bermudez  MRN: [id number]  Today's Date: 1/4/2021    Admit Date: 12/31/2020    Discharge Needs Assessment     Row Name 01/04/21 1354       Living Environment    Lives With  child(irene), adult    Name(s) of Who Lives With Patient  Joanne - daughter    Current Living Arrangements  home/apartment/condo    Primary Care Provided by  self    Provides Primary Care For  no one    Family Caregiver if Needed  child(irene), adult    Family Caregiver Names  Joanne    Quality of Family Relationships  helpful;involved;supportive    Able to Return to Prior Arrangements  yes       Resource/Environmental Concerns    Resource/Environmental Concerns  none       Transition Planning    Patient/Family Anticipates Transition to  home with family    Patient/Family Anticipated Services at Transition  community agency    Transportation Anticipated  family or friend will provide       Discharge Needs Assessment    Readmission Within the Last 30 Days  no previous admission in last 30 days    Equipment Currently Used at Home  walker, rolling;oxygen    Concerns to be Addressed  adjustment to diagnosis/illness    Equipment Needed After Discharge  none    Discharge Coordination/Progress  Spoke with pt's daughter, Joanne [phone], to work on d/c plans. Pt lives with her daughter, Joanne, and plans to return home at d/c. She does have O2 already at home. Pt has had [facility] in the past for home health. Spoke with Madeleine august [phone] and their services ended with her in September. Will follow in case pt needs home health again.        Discharge Plan    No documentation.       Continued Care and Services - Admitted Since 12/31/2020    Coordination has not been started for this encounter.         Demographic Summary    No documentation.       Functional Status    No documentation.       Psychosocial    No documentation.       Abuse/Neglect    No documentation.       Legal    No  documentation.       Substance Abuse    No documentation.       Patient Forms    No documentation.           ALANA Lebron

## 2021-01-04 NOTE — PROGRESS NOTES
Elliot Mack Jr, MD     OTOLARYNGOLOGY, HEAD AND NECK SURGERY PROGRESS NOTE   Referring Provider: Eleno Tolentino DO  Reason for Consultation: Epistaxis  Location: 392/1  Accompanied by: Nursing staff  Chief complaint: Nosebleed      Interval History:   Since last examined, Emy Bermudez has had a right-sided event.  She had mild bleeding from the right nose earlier this a.m.  The nurse packed the nose with a small amount of gauze.  The patient has apparently stopped.  She continues with left-sided packing at this point in time.  She is not blood from the left side since Friday.  Patient seen. Chart reviewed.      Review of Systems:   ROS master CMN: No change from prior inquiry    Vital Signs  Temp:  [97.4 °F (36.3 °C)-97.9 °F (36.6 °C)] 97.4 °F (36.3 °C)  Heart Rate:  [] 77  Resp:  [16-20] 20  BP: (125-147)/(55-75) 133/62        EXAMINATION:  CONSTITUTIONAL:    well developed  in no acute distress  Sitting in a chair sleeping, awakens easily, wearing open facemask     BODY HABITUS:    morbidly obese     COMMUNICATION:    able to communicate normally, normal voice quality     HEAD:     Normocephalic, without obvious abnormality, atraumatic     FACE:    structure normal, no tenderness present, no lesions/masses, no evidence of trauma  adiposis- Lower face     SALIVARY GLANDS:    parotid glands with no tenderness, no swelling, no masses, submandibular glands with normal size, nontender      EYE:    ocular motility normal, eyelids normal, orbits normal, no proptosis, conjunctiva clear, sclera non-icteric, pupils equal, round, reactive to light and accomodation  Color:   brown Arcus-   Bilateral     HEARING:    response to conversational voice normal     EARS:     PINNA:     normal, non-tender, skin intact without lesions      NOSE EXTERNAL:    APPEARANCE: normal, straight, with good projection, no tenderness, no lesions, no tenderness, good nasal support, patent nares  Pack bilateral     NOSE INTERNAL:     Anterior rhinoscopy   NASALMUCOSA:    abnormal:        Left- Packed with Merocel, Right- packed with gauze  Bilateral packs removed  Left-deviated nasal septum mid and low moderate to severe, mucosa medial and lateral very excoriated from pack.  No bleeding after removal of pack  Right side-mucosa with some fresh blood and old dried blood.     ORAL CAVITY:      LIPS:      structure normal, no tenderness on palpation, no lesions, no evidence of trauma, normal mobility and oral competence    TEETH:       edentulous:  upper and lower    GUMS:      Intact    ORAL MUCOSA:       abnormal: Redundant, dried blood present on the tongue    FLOOR OF MOUTH:       Warthin's duct patent, mucosa normal  TONGUE:       lingual mucosa normal without lesions, normal tongue mobility      hypertrophy  Severe, prolapse   Severe    PALATE:       hard palate with normal mucosa and structure      soft palate- Low, thick, uvula intact, dried blood present on the anterior surface of the soft palate     OROPHARYNX:    Direct examination  oropharyngeal mucosa normal, tonsil(s) with normal appearance    Minimal amount of fresh clotted blood in the oropharynx but no active bleeding     NECK:    short, thick  severe   LARYNGEAL POSITION: Very low  TRACHEA:   midline and Deep     LYMPH NODES :    no adenopathy     THYROID:    no overt thyromegaly, no tenderness, nodules or mass present on palpation, position midline     Cardiovascular:  Significant pedal edema     NEURO/PSYCHIATRIC :      orientation- normal    mood- normal    affect- normal    Attention- normal    Fund of Knowledge- normal    Level of Consciousness- normal    Speech- normal  CRANIAL NERVE: normal  Grossly intact  GAIT:   Deferred, in chair     Results Review:       I have reviewed the patients old records in the chart.  The following results/records were reviewed:  I reviewed the patient's other test results and agree with the interpretation  Basic Metabolic Panel (01/03/2021  06:50)   CBC (No Diff) (01/04/2021 05:27)   Plan of Care by Vitor Weinberg RN (01/04/2021 05:40)   Progress Notes by Silvestre Montelongo DO (01/03/2021 11:21)     Medications, Allergies and Past History Reviewed      Assessment       Epistaxis    COPD (chronic obstructive pulmonary disease) (CMS/[facility])    Obesity, Class III, BMI 40-49.9 (morbid obesity) (CMS/[facility])    Chronic respiratory failure with hypoxia and hypercapnia (CMS/[facility])    Hypothyroidism (acquired)    Essential hypertension    Tobacco abuse    Anemia, blood loss    Acute kidney injury suspect secondary to blood loss (CMS/[facility])         Plan      Patient apparently bled from the right side last night.  She does not appear to be bleeding this morning.  I removed the packs bilaterally.  I will see if she bleeds.  I will continue with Afrin and saline spray as nasal hygiene.  I will plan nasal endoscopy later today if she bleeds.  Anemia is concerning.  I see no active blood loss right now, but she did have one episode from the right side.  I will leave decision for transfusion up to the hospitalist.    Following patient as in-patient. Further recommendations will be made based on serial examinations.    Medications/Orders for this encounter: Medications ordered- Afrin for both sides of the nose   Orders-  Saline spray frequently       Elliot Mack Jr, MD  01/04/21  09:11 CST

## 2021-01-04 NOTE — PLAN OF CARE
VSS, denies pain, voiding, up to BSC with assist, nasal packing removed by Dr Mack today, bloody dripping has continued all day from left nare in AM and right nare in PM, oral rinse and nasal sprays as ordered, good output from ostomy, BLE edema, NSR per telemetry, face tent oxygen in use at 10L, Dr Mack informed of continued bleeding, 1U RBC given as ordered without difficulty, safety maintained, will continue to monitor    Problem: Adult Inpatient Plan of Care  Goal: Plan of Care Review  Outcome: Ongoing, Progressing  Flowsheets  Taken 1/4/2021 1735 by Izabella Rodriguez, RN  Progress: no change  Taken 1/3/2021 1634 by Elmira Skinner, RN  Plan of Care Reviewed With: patient   Goal Outcome Evaluation:  Plan of Care Reviewed With: patient  Progress: no change

## 2021-01-05 ENCOUNTER — ANESTHESIA EVENT (OUTPATIENT)
Dept: PERIOP | Facility: HOSPITAL | Age: 71
End: 2021-01-05

## 2021-01-05 ENCOUNTER — ANESTHESIA (OUTPATIENT)
Dept: PERIOP | Facility: HOSPITAL | Age: 71
End: 2021-01-05

## 2021-01-05 PROBLEM — Z98.890 S/P NASAL SEPTOPLASTY: Status: ACTIVE | Noted: 2021-01-05

## 2021-01-05 PROBLEM — J34.2 ACQUIRED DEVIATED NASAL SEPTUM: Status: ACTIVE | Noted: 2020-12-31

## 2021-01-05 LAB
BASOPHILS # BLD AUTO: 0.02 10*3/MM3 (ref 0–0.2)
BASOPHILS NFR BLD AUTO: 0.2 % (ref 0–1.5)
BH BB BLOOD EXPIRATION DATE: NORMAL
BH BB BLOOD TYPE BARCODE: 6200
BH BB DISPENSE STATUS: NORMAL
BH BB PRODUCT CODE: NORMAL
BH BB UNIT NUMBER: NORMAL
CROSSMATCH INTERPRETATION: NORMAL
DEPRECATED RDW RBC AUTO: 66 FL (ref 37–54)
EOSINOPHIL # BLD AUTO: 0.43 10*3/MM3 (ref 0–0.4)
EOSINOPHIL NFR BLD AUTO: 4.5 % (ref 0.3–6.2)
ERYTHROCYTE [DISTWIDTH] IN BLOOD BY AUTOMATED COUNT: 19.9 % (ref 12.3–15.4)
HCT VFR BLD AUTO: 25.3 % (ref 34–46.6)
HCT VFR BLD AUTO: 27.6 % (ref 34–46.6)
HGB BLD-MCNC: 7.7 G/DL (ref 12–15.9)
HGB BLD-MCNC: 8.9 G/DL (ref 12–15.9)
IMM GRANULOCYTES # BLD AUTO: 0.05 10*3/MM3 (ref 0–0.05)
IMM GRANULOCYTES NFR BLD AUTO: 0.5 % (ref 0–0.5)
LYMPHOCYTES # BLD AUTO: 1.64 10*3/MM3 (ref 0.7–3.1)
LYMPHOCYTES NFR BLD AUTO: 17.3 % (ref 19.6–45.3)
MCH RBC QN AUTO: 28.2 PG (ref 26.6–33)
MCHC RBC AUTO-ENTMCNC: 30.4 G/DL (ref 31.5–35.7)
MCV RBC AUTO: 92.7 FL (ref 79–97)
MONOCYTES # BLD AUTO: 0.74 10*3/MM3 (ref 0.1–0.9)
MONOCYTES NFR BLD AUTO: 7.8 % (ref 5–12)
NEUTROPHILS NFR BLD AUTO: 6.59 10*3/MM3 (ref 1.7–7)
NEUTROPHILS NFR BLD AUTO: 69.7 % (ref 42.7–76)
NRBC BLD AUTO-RTO: 0 /100 WBC (ref 0–0.2)
PLATELET # BLD AUTO: 204 10*3/MM3 (ref 140–450)
PMV BLD AUTO: 9.7 FL (ref 6–12)
RBC # BLD AUTO: 2.73 10*6/MM3 (ref 3.77–5.28)
UNIT  ABO: NORMAL
UNIT  RH: NORMAL
WBC # BLD AUTO: 9.47 10*3/MM3 (ref 3.4–10.8)

## 2021-01-05 PROCEDURE — 94799 UNLISTED PULMONARY SVC/PX: CPT

## 2021-01-05 PROCEDURE — 25010000002 COCAINE HCL 40 MG/ML SOLUTION: Performed by: OTOLARYNGOLOGY

## 2021-01-05 PROCEDURE — 09SM4ZZ REPOSITION NASAL SEPTUM, PERCUTANEOUS ENDOSCOPIC APPROACH: ICD-10-PCS | Performed by: OTOLARYNGOLOGY

## 2021-01-05 PROCEDURE — 25010000002 PROPOFOL 10 MG/ML EMULSION: Performed by: NURSE ANESTHETIST, CERTIFIED REGISTERED

## 2021-01-05 PROCEDURE — 093K8ZZ CONTROL BLEEDING IN NASAL MUCOSA AND SOFT TISSUE, VIA NATURAL OR ARTIFICIAL OPENING ENDOSCOPIC: ICD-10-PCS | Performed by: OTOLARYNGOLOGY

## 2021-01-05 PROCEDURE — 25010000002 DEXAMETHASONE PER 1 MG: Performed by: OTOLARYNGOLOGY

## 2021-01-05 PROCEDURE — 85014 HEMATOCRIT: CPT | Performed by: OTOLARYNGOLOGY

## 2021-01-05 PROCEDURE — 31231 NASAL ENDOSCOPY DX: CPT | Performed by: OTOLARYNGOLOGY

## 2021-01-05 PROCEDURE — C9046 COCAINE HCL NASAL SOLUTION: HCPCS | Performed by: OTOLARYNGOLOGY

## 2021-01-05 PROCEDURE — 25010000002 HYDROMORPHONE PER 4 MG: Performed by: ANESTHESIOLOGY

## 2021-01-05 PROCEDURE — 99232 SBSQ HOSP IP/OBS MODERATE 35: CPT | Performed by: OTOLARYNGOLOGY

## 2021-01-05 PROCEDURE — 85025 COMPLETE CBC W/AUTO DIFF WBC: CPT | Performed by: NURSE PRACTITIONER

## 2021-01-05 PROCEDURE — 30130 EXCISE INFERIOR TURBINATE: CPT | Performed by: OTOLARYNGOLOGY

## 2021-01-05 PROCEDURE — 09TL8ZZ RESECTION OF NASAL TURBINATE, VIA NATURAL OR ARTIFICIAL OPENING ENDOSCOPIC: ICD-10-PCS | Performed by: OTOLARYNGOLOGY

## 2021-01-05 PROCEDURE — 30520 REPAIR OF NASAL SEPTUM: CPT | Performed by: OTOLARYNGOLOGY

## 2021-01-05 PROCEDURE — 25010000002 SUCCINYLCHOLINE PER 20 MG: Performed by: NURSE ANESTHETIST, CERTIFIED REGISTERED

## 2021-01-05 PROCEDURE — 85018 HEMOGLOBIN: CPT | Performed by: OTOLARYNGOLOGY

## 2021-01-05 PROCEDURE — 30915 LIGATION NASAL SINUS ARTERY: CPT | Performed by: OTOLARYNGOLOGY

## 2021-01-05 PROCEDURE — 30920 LIGATION UPPER JAW ARTERY: CPT | Performed by: OTOLARYNGOLOGY

## 2021-01-05 DEVICE — HEMOST ABS SURGIFOAM SZ100 8X12 10MM: Type: IMPLANTABLE DEVICE | Site: NOSE | Status: FUNCTIONAL

## 2021-01-05 RX ORDER — EPHEDRINE SULFATE 50 MG/ML
INJECTION, SOLUTION INTRAVENOUS AS NEEDED
Status: DISCONTINUED | OUTPATIENT
Start: 2021-01-05 | End: 2021-01-05 | Stop reason: SURG

## 2021-01-05 RX ORDER — ONDANSETRON 2 MG/ML
4 INJECTION INTRAMUSCULAR; INTRAVENOUS AS NEEDED
Status: DISCONTINUED | OUTPATIENT
Start: 2021-01-05 | End: 2021-01-05 | Stop reason: HOSPADM

## 2021-01-05 RX ORDER — OXYMETAZOLINE HYDROCHLORIDE 0.05 G/100ML
2 SPRAY NASAL
Status: DISCONTINUED | OUTPATIENT
Start: 2021-01-05 | End: 2021-01-05

## 2021-01-05 RX ORDER — OXYMETAZOLINE HYDROCHLORIDE 0.05 G/100ML
SPRAY NASAL AS NEEDED
Status: DISCONTINUED | OUTPATIENT
Start: 2021-01-05 | End: 2021-01-05 | Stop reason: HOSPADM

## 2021-01-05 RX ORDER — SODIUM CHLORIDE 0.9 % (FLUSH) 0.9 %
3-10 SYRINGE (ML) INJECTION AS NEEDED
Status: DISCONTINUED | OUTPATIENT
Start: 2021-01-05 | End: 2021-01-05

## 2021-01-05 RX ORDER — LIDOCAINE HYDROCHLORIDE 10 MG/ML
0.5 INJECTION, SOLUTION EPIDURAL; INFILTRATION; INTRACAUDAL; PERINEURAL ONCE AS NEEDED
Status: DISCONTINUED | OUTPATIENT
Start: 2021-01-05 | End: 2021-01-05

## 2021-01-05 RX ORDER — FLUCONAZOLE 200 MG/1
100 TABLET ORAL NIGHTLY
Status: DISCONTINUED | OUTPATIENT
Start: 2021-01-05 | End: 2021-01-06 | Stop reason: HOSPADM

## 2021-01-05 RX ORDER — NYSTATIN 100000 U/G
CREAM TOPICAL EVERY 12 HOURS SCHEDULED
Status: DISCONTINUED | OUTPATIENT
Start: 2021-01-05 | End: 2021-01-06 | Stop reason: HOSPADM

## 2021-01-05 RX ORDER — SODIUM CHLORIDE, SODIUM LACTATE, POTASSIUM CHLORIDE, CALCIUM CHLORIDE 600; 310; 30; 20 MG/100ML; MG/100ML; MG/100ML; MG/100ML
100 INJECTION, SOLUTION INTRAVENOUS CONTINUOUS
Status: DISCONTINUED | OUTPATIENT
Start: 2021-01-05 | End: 2021-01-06

## 2021-01-05 RX ORDER — LABETALOL HYDROCHLORIDE 5 MG/ML
5 INJECTION, SOLUTION INTRAVENOUS
Status: DISCONTINUED | OUTPATIENT
Start: 2021-01-05 | End: 2021-01-05 | Stop reason: HOSPADM

## 2021-01-05 RX ORDER — COCAINE HYDROCHLORIDE 40 MG/ML
SOLUTION NASAL
Status: DISPENSED
Start: 2021-01-05 | End: 2021-01-06

## 2021-01-05 RX ORDER — HYDROMORPHONE HYDROCHLORIDE 1 MG/ML
0.25 INJECTION, SOLUTION INTRAMUSCULAR; INTRAVENOUS; SUBCUTANEOUS
Status: DISCONTINUED | OUTPATIENT
Start: 2021-01-05 | End: 2021-01-05 | Stop reason: HOSPADM

## 2021-01-05 RX ORDER — PROPOFOL 10 MG/ML
VIAL (ML) INTRAVENOUS AS NEEDED
Status: DISCONTINUED | OUTPATIENT
Start: 2021-01-05 | End: 2021-01-05 | Stop reason: SURG

## 2021-01-05 RX ORDER — SUCCINYLCHOLINE CHLORIDE 20 MG/ML
INJECTION INTRAMUSCULAR; INTRAVENOUS AS NEEDED
Status: DISCONTINUED | OUTPATIENT
Start: 2021-01-05 | End: 2021-01-05 | Stop reason: SURG

## 2021-01-05 RX ORDER — ROCURONIUM BROMIDE 10 MG/ML
INJECTION, SOLUTION INTRAVENOUS AS NEEDED
Status: DISCONTINUED | OUTPATIENT
Start: 2021-01-05 | End: 2021-01-05 | Stop reason: SURG

## 2021-01-05 RX ORDER — COCAINE HYDROCHLORIDE 40 MG/ML
SOLUTION NASAL AS NEEDED
Status: DISCONTINUED | OUTPATIENT
Start: 2021-01-05 | End: 2021-01-05 | Stop reason: HOSPADM

## 2021-01-05 RX ORDER — NALOXONE HCL 0.4 MG/ML
0.04 VIAL (ML) INJECTION AS NEEDED
Status: DISCONTINUED | OUTPATIENT
Start: 2021-01-05 | End: 2021-01-05 | Stop reason: HOSPADM

## 2021-01-05 RX ORDER — SODIUM CHLORIDE 0.9 % (FLUSH) 0.9 %
3 SYRINGE (ML) INJECTION EVERY 12 HOURS SCHEDULED
Status: DISCONTINUED | OUTPATIENT
Start: 2021-01-05 | End: 2021-01-05

## 2021-01-05 RX ORDER — DEXAMETHASONE SODIUM PHOSPHATE 4 MG/ML
8 INJECTION, SOLUTION INTRA-ARTICULAR; INTRALESIONAL; INTRAMUSCULAR; INTRAVENOUS; SOFT TISSUE
Status: COMPLETED | OUTPATIENT
Start: 2021-01-05 | End: 2021-01-05

## 2021-01-05 RX ORDER — MAGNESIUM HYDROXIDE 1200 MG/15ML
LIQUID ORAL AS NEEDED
Status: DISCONTINUED | OUTPATIENT
Start: 2021-01-05 | End: 2021-01-05 | Stop reason: HOSPADM

## 2021-01-05 RX ORDER — FENTANYL CITRATE 50 UG/ML
25 INJECTION, SOLUTION INTRAMUSCULAR; INTRAVENOUS
Status: DISCONTINUED | OUTPATIENT
Start: 2021-01-05 | End: 2021-01-05 | Stop reason: HOSPADM

## 2021-01-05 RX ORDER — LIDOCAINE HYDROCHLORIDE AND EPINEPHRINE 10; 10 MG/ML; UG/ML
INJECTION, SOLUTION INFILTRATION; PERINEURAL AS NEEDED
Status: DISCONTINUED | OUTPATIENT
Start: 2021-01-05 | End: 2021-01-05 | Stop reason: HOSPADM

## 2021-01-05 RX ORDER — FLUMAZENIL 0.1 MG/ML
0.2 INJECTION INTRAVENOUS AS NEEDED
Status: DISCONTINUED | OUTPATIENT
Start: 2021-01-05 | End: 2021-01-05 | Stop reason: HOSPADM

## 2021-01-05 RX ADMIN — VASOPRESSIN 1 ML: 20 INJECTION INTRAVENOUS at 18:03

## 2021-01-05 RX ADMIN — ESCITALOPRAM 10 MG: 10 TABLET, FILM COATED ORAL at 08:04

## 2021-01-05 RX ADMIN — SODIUM CHLORIDE, POTASSIUM CHLORIDE, SODIUM LACTATE AND CALCIUM CHLORIDE: 600; 310; 30; 20 INJECTION, SOLUTION INTRAVENOUS at 17:42

## 2021-01-05 RX ADMIN — ROCURONIUM BROMIDE 5 MG: 10 INJECTION INTRAVENOUS at 17:54

## 2021-01-05 RX ADMIN — SODIUM CHLORIDE, PRESERVATIVE FREE 10 ML: 5 INJECTION INTRAVENOUS at 08:04

## 2021-01-05 RX ADMIN — PROPOFOL 25 MG: 10 INJECTION, EMULSION INTRAVENOUS at 18:10

## 2021-01-05 RX ADMIN — PHENYLEPHRINE HYDROCHLORIDE 2 SPRAY: 0.25 SPRAY NASAL at 08:05

## 2021-01-05 RX ADMIN — FLUCONAZOLE 100 MG: 200 TABLET ORAL at 00:39

## 2021-01-05 RX ADMIN — NYSTATIN: 100000 CREAM TOPICAL at 00:39

## 2021-01-05 RX ADMIN — PROPOFOL 25 MG: 10 INJECTION, EMULSION INTRAVENOUS at 19:34

## 2021-01-05 RX ADMIN — MECLIZINE HYDROCHLORIDE 25 MG: 25 TABLET ORAL at 23:32

## 2021-01-05 RX ADMIN — PROPOFOL 25 MG: 10 INJECTION, EMULSION INTRAVENOUS at 19:27

## 2021-01-05 RX ADMIN — SALINE NASAL SPRAY 2 SPRAY: 1.5 SOLUTION NASAL at 14:38

## 2021-01-05 RX ADMIN — FUROSEMIDE 20 MG: 20 TABLET ORAL at 08:04

## 2021-01-05 RX ADMIN — NYSTATIN: 100000 CREAM TOPICAL at 23:39

## 2021-01-05 RX ADMIN — SALINE NASAL SPRAY 2 SPRAY: 1.5 SOLUTION NASAL at 11:37

## 2021-01-05 RX ADMIN — SUCCINYLCHOLINE CHLORIDE 100 MG: 20 INJECTION, SOLUTION INTRAMUSCULAR; INTRAVENOUS at 17:54

## 2021-01-05 RX ADMIN — Medication 1 APPLICATION: at 09:07

## 2021-01-05 RX ADMIN — SALINE NASAL SPRAY 2 SPRAY: 1.5 SOLUTION NASAL at 07:59

## 2021-01-05 RX ADMIN — LOSARTAN POTASSIUM 25 MG: 50 TABLET, FILM COATED ORAL at 08:04

## 2021-01-05 RX ADMIN — SODIUM CHLORIDE, PRESERVATIVE FREE 10 ML: 5 INJECTION INTRAVENOUS at 23:33

## 2021-01-05 RX ADMIN — EPHEDRINE SULFATE 15 MG: 50 INJECTION INTRAVENOUS at 18:18

## 2021-01-05 RX ADMIN — PROPOFOL 25 MG: 10 INJECTION, EMULSION INTRAVENOUS at 18:05

## 2021-01-05 RX ADMIN — BUDESONIDE AND FORMOTEROL FUMARATE DIHYDRATE 2 PUFF: 160; 4.5 AEROSOL RESPIRATORY (INHALATION) at 06:45

## 2021-01-05 RX ADMIN — HYDROXYZINE HYDROCHLORIDE 50 MG: 25 TABLET ORAL at 09:02

## 2021-01-05 RX ADMIN — LORAZEPAM 0.5 MG: 0.5 TABLET ORAL at 06:31

## 2021-01-05 RX ADMIN — LEVOTHYROXINE SODIUM 75 MCG: 75 TABLET ORAL at 06:29

## 2021-01-05 RX ADMIN — LIDOCAINE HYDROCHLORIDE 40 MG: 20 INJECTION, SOLUTION INTRAVENOUS at 17:54

## 2021-01-05 RX ADMIN — FLUCONAZOLE 100 MG: 200 TABLET ORAL at 23:32

## 2021-01-05 RX ADMIN — DEXAMETHASONE SODIUM PHOSPHATE 8 MG: 4 INJECTION, SOLUTION INTRAMUSCULAR; INTRAVENOUS at 17:39

## 2021-01-05 RX ADMIN — GUAIFENESIN 1200 MG: 600 TABLET, EXTENDED RELEASE ORAL at 08:04

## 2021-01-05 RX ADMIN — VASOPRESSIN 1 ML: 20 INJECTION INTRAVENOUS at 18:16

## 2021-01-05 RX ADMIN — HYDROMORPHONE HYDROCHLORIDE 0.25 MG: 1 INJECTION, SOLUTION INTRAMUSCULAR; INTRAVENOUS; SUBCUTANEOUS at 20:07

## 2021-01-05 RX ADMIN — VASOPRESSIN 1 ML: 20 INJECTION INTRAVENOUS at 18:36

## 2021-01-05 RX ADMIN — NYSTATIN: 100000 CREAM TOPICAL at 08:05

## 2021-01-05 RX ADMIN — MECLIZINE HYDROCHLORIDE 25 MG: 25 TABLET ORAL at 08:04

## 2021-01-05 RX ADMIN — PHENYLEPHRINE HYDROCHLORIDE 2 SPRAY: 0.25 SPRAY NASAL at 14:38

## 2021-01-05 RX ADMIN — HYDROMORPHONE HYDROCHLORIDE 0.25 MG: 1 INJECTION, SOLUTION INTRAMUSCULAR; INTRAVENOUS; SUBCUTANEOUS at 20:17

## 2021-01-05 RX ADMIN — PROPOFOL 150 MG: 10 INJECTION, EMULSION INTRAVENOUS at 17:54

## 2021-01-05 RX ADMIN — Medication 2 SPRAY: at 17:39

## 2021-01-05 RX ADMIN — PROPOFOL 25 MG: 10 INJECTION, EMULSION INTRAVENOUS at 19:21

## 2021-01-05 NOTE — NURSING NOTE
Call placed to patient's daughter, Joanne to inform of upcoming surgical procedure this evening.  Detailed voicemail left for daughter.  Patient currently on her cell phone informing her daughter who lives in [place] that she is having surgery this evening.

## 2021-01-05 NOTE — ANESTHESIA PROCEDURE NOTES
Airway  Urgency: elective    Date/Time: 1/5/2021 5:55 PM  Airway not difficult    General Information and Staff    Patient location during procedure: OR  CRNA: John Dozier CRNA    Indications and Patient Condition  Indications for airway management: airway protection    Preoxygenated: yes  Mask difficulty assessment: 0 - not attempted    Final Airway Details  Final airway type: endotracheal airway      Successful airway: ETT  Cuffed: yes   Successful intubation technique: direct laryngoscopy and RSI  Facilitating devices/methods: intubating stylet and cricoid pressure  Endotracheal tube insertion site: oral  Blade: Rosalva  Blade size: 3  ETT size (mm): 7.5  Cormack-Lehane Classification: grade I - full view of glottis  Placement verified by: chest auscultation and capnometry   Measured from: lips  ETT/EBT  to lips (cm): 22  Assessment: lips, teeth, and gum same as pre-op and atraumatic intubation

## 2021-01-05 NOTE — PLAN OF CARE
Goal Outcome Evaluation:  Plan of Care Reviewed With: patient  Progress: no change  Outcome Summary: Patient had one occurrence of bleeding tonight that resolved quickly; using nasal sprays per order; denies pain; voiding per BSC; sitting up in chair all shift so far; continue to monitor.

## 2021-01-05 NOTE — PROGRESS NOTES
Continued Stay Note  FLASH Dao     Patient Name: Emy Bermudez  MRN: [id number]  Today's Date: 1/5/2021    Admit Date: 12/31/2020    Discharge Plan     Row Name 01/05/21 8598       Plan    Plan  Home    Patient/Family in Agreement with Plan  yes    Plan Comments  Pt and daughter plans for her to return home at d/c. Rec'd a referral for a humidifier for pt. Pt gets her oxygen through [employer] so spoke with Lizzette [phone] (customer service). They are going to ship the supplies pt will need for the humidification and it should be at her house in a couple days.        Discharge Codes    No documentation.             ALANA Lebron

## 2021-01-05 NOTE — PLAN OF CARE
VSS, no measurable bleeding from nose today, currently NPO awaiting possible OR intervention later today, Hg 7.9, denies pain, face tent at 10 liters today, up to BSC with SBA, safety maintained, will continue to monitor    Problem: Adult Inpatient Plan of Care  Goal: Plan of Care Review  Outcome: Ongoing, Progressing  Flowsheets  Taken 1/5/2021 1501 by Izabella Rodriguez RN  Progress: improving  Taken 1/5/2021 0345 by Corine Shaw RN  Plan of Care Reviewed With: patient   Goal Outcome Evaluation:  Plan of Care Reviewed With: patient  Progress: improving

## 2021-01-05 NOTE — ANESTHESIA PREPROCEDURE EVALUATION
Anesthesia Evaluation     Patient summary reviewed   no history of anesthetic complications:  NPO Solid Status: > 8 hours  NPO Liquid Status: > 8 hours           Airway   Mallampati: III  TM distance: <3 FB  Possible difficult intubation  Dental    (+) edentulous    Pulmonary    (+) a smoker Current Abstained day of surgery, COPD, home oxygen (4-5 L 02 with humidification),   (-) asthma, sleep apnea  Cardiovascular   Exercise tolerance: poor (<4 METS)    (+) hypertension (per chart, patient states she did not want to start BP medication), hyperlipidemia,   (-) past MI, CAD, dysrhythmias, cardiac stents      Neuro/Psych  (-) seizures, TIA, CVA  GI/Hepatic/Renal/Endo    (+) morbid obesity, GERD,  thyroid problem hypothyroidism  (-) liver disease, no renal disease, diabetes    ROS Comment: S/o colostomy    Musculoskeletal     Abdominal    Substance History      OB/GYN          Other        ROS/Med Hx Other: Epistaxis                  Anesthesia Plan    ASA 3 - emergent     general   (Discussed DNR with patient at bedside and Vilma Mckeon as witness. Patient voiced understanding and agrees to proceed with CPR and resuscitation during operation as could have an iatrogenic or reversible event.)  intravenous induction     Anesthetic plan, all risks, benefits, and alternatives have been provided, discussed and informed consent has been obtained with: patient.  Use of blood products discussed with patient  Consented to blood products.

## 2021-01-05 NOTE — PROCEDURES
ENT PROCEDURE NOTE:  Anesthesia: none    Endoscopy Type:  Nasal Endoscopic Examination    Indications:   1. Acquired deviated nasal septum    2. Epistaxis         Procedure Details:    The patient was placed in the sitting position. After topical anesthesia and decongestion,  a rigid nasal endoscope  0 degree was passed along the nasal floor to the nasopharynx.  The scope was then passed to the middle and superior aspects of the nasal cavity. Systematic examination of the nasal cavity, nasopharynx and structures was performed.     Findings:  NASAL ENDOSCOPIC FINDINGS:    Nasal endoscopy   NASALMUCOSA:    abnormal:        Bilateral- Thick, bloody, dried blood present, corrugated    NASAL PASSAGES:     Obstructed:   Bilateral   NASAL VALVE:     abnormal  Left- Deviated septum obstructing most of the nasal passage, Right- Obstructed by dried secretions and mild low deviated septum   SEPTUM:     mucosa abnormal   Bilateral- Red, excoriated bilateral especially Chrissy area     deviation present:      deviated Right Mild, low     deviated Left Mid, severe    touching turbinate   Left- Into IT   INFERIOR TURBINATES:     abnormal  Left- Partially compressed, Right- Boggy, red, coated, enlarged   MIDDLE TURBINATES:     abnormal:        LEFT: Hard to visualize  RIGHT: Red, blood coated   MIDDLE MEATUS:        BILATERAL: Not well visualized   SPHENO-ETHMOID RECESS:    not visualized:  Bilateral   NASOPHARYNX:     Not visualized   SECRETIONS:     abnormal Left- Old blood, dried blood,, Right- Fresh and old blood mixed    Because of bleeding, recurring, I will take patient to the operating room for septoplasty, turbinoplasty, control nasal bleeding.  Risk and benefits discussed with the patient.  Discussed plan with the nurse.    Condition:  Stable.  Patient tolerated procedure well.    Complications:  None    Post-procedure instructions reviewed with Patient, Nursing staff  Instructions documented in AVS for patient to  review    Elliot Mack Jr, MD  1/5/2021  17:03 CST

## 2021-01-05 NOTE — PROGRESS NOTES
Elliot Mack Jr, MD     OTOLARYNGOLOGY, HEAD AND NECK SURGERY PROGRESS NOTE   Referring Provider: Eleno Tolentino DO  Reason for Consultation: Epistaxis  Location: 392/1  Accompanied by: No one  Chief complaint: Nosebleeds      Interval History:   Since last examined, Emy Bermudez has had some bleeding yesterday.  She has had no bleeding overnight.  She states that her nose has not drip since yesterday evening.  Nurse practitioner reports that the patient has had some bleeding yesterday.  There is some concern over the hemoglobin change from posttransfusion.  Patient states she wishes to go home.  Patient seen. Chart reviewed.      Review of Systems:   JOSÉ master CMN: No change from prior inquiry    Vital Signs  Temp:  [97.5 °F (36.4 °C)-98.1 °F (36.7 °C)] 97.6 °F (36.4 °C)  Heart Rate:  [] 90  Resp:  [16-22] 18  BP: (113-147)/(52-78) 131/54        EXAMINATION:  CONSTITUTIONAL:    well developed  in no acute distress  Sitting in a chair eating breakfast, no bleeding from the nose noted     BODY HABITUS:    morbidly obese     COMMUNICATION:    able to communicate normally, normal voice quality     HEAD:     Normocephalic, without obvious abnormality, atraumatic     FACE:    structure normal, no tenderness present, no lesions/masses, no evidence of trauma  adiposis- Lower face     SALIVARY GLANDS:    parotid glands with no tenderness, no swelling, no masses, submandibular glands with normal size, nontender      EYE:    ocular motility normal, eyelids normal, orbits normal, no proptosis, conjunctiva clear, sclera non-icteric, pupils equal, round, reactive to light and accomodation  Color:   brown Arcus-   Bilateral     HEARING:    response to conversational voice normal     EARS:     PINNA:     normal, non-tender, skin intact without lesions      NOSE EXTERNAL:    APPEARANCE: normal, straight, with good projection, no tenderness, no lesions, no tenderness, good nasal support, patent nares     NOSE  INTERNAL:    Anterior rhinoscopy   NASALMUCOSA:    abnormal:   Left-excoriated mucosa especially over the anterior septum, no active dripping of blood, no blood clot present  Right-mucosa with mild old bloody crusting, no active bleeding  Left-deviated nasal septum mid and low moderate to severe, mucosa medial and lateral very excoriated from pack.    Nasal passage-left side is almost completely obstructed by the septum, the right side is very narrowed  ORAL CAVITY:      LIPS:      structure normal, no tenderness on palpation, no lesions, no evidence of trauma, normal mobility and oral competence    TEETH:       edentulous:  upper and lower    GUMS:      Intact    ORAL MUCOSA:       abnormal: Redundant, dried blood present on the tongue    FLOOR OF MOUTH:       Warthin's duct patent, mucosa normal  TONGUE:       lingual mucosa normal without lesions, normal tongue mobility      hypertrophy  Severe, prolapse   Severe    PALATE:       hard palate with normal mucosa and structure      soft palate-low, thick, no dried blood present, mucosa appears more moist today  OROPHARYNX:    Direct examination  oropharyngeal mucosa normal, tonsil(s) with normal appearance    No blood noted     NECK:    short, thick  severe   LARYNGEAL POSITION: Very low  TRACHEA:   midline and Deep     LYMPH NODES :    no adenopathy     Cardiovascular:  Significant pedal edema     NEURO/PSYCHIATRIC :      orientation- normal    mood- normal    affect- normal    Attention- normal    Fund of Knowledge- normal    Level of Consciousness- normal    Speech- normal  CRANIAL NERVE: normal  Grossly intact  GAIT:   Deferred, in chair     Results Review:       I have reviewed the patients old records in the chart.  I reviewed the patient's other test results and agree with the interpretation  Hemoglobin evaluated with slight decrease after transfusion of 1 unit yesterday  Medications, Allergies and Past History Reviewed      Assessment       Epistaxis    COPD  (chronic obstructive pulmonary disease) (CMS/[facility])    Obesity, Class III, BMI 40-49.9 (morbid obesity) (CMS/[facility])    Chronic respiratory failure with hypoxia and hypercapnia (CMS/[facility])    Hypothyroidism (acquired)    Essential hypertension    Tobacco abuse    Anemia, blood loss due to epistasis    Acute kidney injury suspect secondary to blood loss (CMS/[facility])         Plan      Patient did have some dripping yesterday.  She has not bled overnight and is doing quite well today.  She is asking to go home.  Hemoglobin dropped from 8-7.7.  I am concerned that this may be due to equilibration.  Patient is relatively asymptomatic.  She has not been ambulated see if she gets dizzy with this level of hemoglobin.  I have discussed surgery with the patient.  Since she has not bled overnight, I will make patient n.p.o. and watch her this morning.  If she bleeds again, I plan to take her to the operating room for septoplasty and control of nosebleed.  If she does not bleed, I will follow and recheck hemoglobin to make sure she is not losing blood via the nose.  I will make patient n.p.o. for now with the possibility of her going the operating room after lunch.  I discussed risk and benefits of surgery with the patient.    Following patient as in-patient. Further recommendations will be made based on serial examinations.    Medications/Orders for this encounter: No new medications ordered.  Orders-  N.p.o.       Elliot Mack Jr, MD  01/05/21  09:23 CST

## 2021-01-05 NOTE — PROGRESS NOTES
[facility] Medicine Services  INPATIENT PROGRESS NOTE    Length of Stay: 4  Date of Admission: 12/31/2020  Primary Care Physician: Dorie Segura APRN    Subjective   Chief Complaint: Follow-up nosebleed  HPI   Developed sudden onset of nosebleed from both nares on date of admission.  Denied any recent coughing, heavy blowing of nose.  Wears oxygen 4 to 5 L at home and reports has humidification on oxygen.    Sitting up in chair.  Packing left nare removed by Dr. Mack yesterday and she has continued to have some intermittent dripping from left nare.  Hemoglobin 7.2 she received 1 unit packed red blood cells yesterday.  Hemoglobin 8 posttransfusion.  Hemoglobin trended down to 7.7 today.  Discussed with Dr. Mack and he plans to take patient to surgery this afternoon for septoplasty.  Social service discussed with her oxygen company referral for humidifier and supplies will be delivered.  Patient denies chest pain or palpitation.  Denies nausea vomiting.  No shortness of breath other than baseline.    Review of Systems   Constitutional: Negative for chills and fever.   HENT: Positive for nosebleeds. Negative for trouble swallowing.    Eyes: Negative for photophobia and visual disturbance.   Respiratory: Positive for cough (Chronic). Negative for shortness of breath and wheezing.    Cardiovascular: Negative for chest pain, palpitations and leg swelling.   Gastrointestinal: Negative for constipation, diarrhea, nausea and vomiting.   Endocrine: Negative for cold intolerance and polyuria.   Genitourinary: Negative for dysuria, frequency and urgency.   Musculoskeletal: Negative for back pain.   Skin: Negative for color change, pallor, rash and wound.   Allergic/Immunologic: Negative for immunocompromised state.   Neurological: Positive for weakness.   Hematological: Negative for adenopathy. Does not bruise/bleed easily.   Psychiatric/Behavioral: Negative for agitation,  behavioral problems and confusion.      All pertinent negatives and positives are as above. All other systems have been reviewed and are negative unless otherwise stated.     Objective    Temp:  [97.6 °F (36.4 °C)-98.1 °F (36.7 °C)] 97.7 °F (36.5 °C)  Heart Rate:  [] 77  Resp:  [18-22] 18  BP: (118-133)/(52-62) 127/54  Physical Exam  Vitals signs and nursing note reviewed.   Constitutional:       Comments: Sitting up in chair.  Humidified oxygen in place.  Nasal packing left nare removed 1/4   HENT:      Head: Normocephalic and atraumatic.      Nose:      Comments: Intermittent nasal dripping blood left nare.       Mouth/Throat:      Pharynx: No oropharyngeal exudate.   Eyes:      Extraocular Movements: Extraocular movements intact.      Pupils: Pupils are equal, round, and reactive to light.   Neck:      Musculoskeletal: Normal range of motion and neck supple.   Cardiovascular:      Rate and Rhythm: Normal rate.      Heart sounds: No murmur.   Pulmonary:      Breath sounds: No rhonchi, wheezing, or rales.     Comments: Humidified oxygen  Abdominal:      Palpations: Abdomen is soft.     Comments: Ostomy with semiformed brown stool.  Genitourinary:     Comments: Voiding.  Musculoskeletal:         General: She has sweling (bilateral lower extremities, at baseline per patient) No tenderness.   Skin:     General: Skin is warm and dry.      Comments: Bilateral lower extremity redness, chronic, chronic venous stasis changes.  No open areas or drainage.  Neurological:      General: No focal deficit present.      Mental Status: She is alert and oriented to person, place, and time.   Psychiatric:         Mood and Affect: Mood normal.         Behavior: Behavior normal.         Thought Content: Thought content normal.         Judgment: Judgment normal.      Results Review:  I have reviewed the labs, radiology results, and diagnostic studies.    Laboratory Data:      Results from last 7 days   Lab Units 01/05/21  8421  01/04/21  1555 01/04/21  0527 01/03/21  0650 01/02/21  1817 01/02/21  0709 01/02/21  0001 01/01/21  1712  01/01/21  0447   WBC 10*3/mm3 9.47  --  13.48* 15.53*  --   --   --  22.42*  --  19.74*   HEMOGLOBIN g/dL 7.7* 8.0* 7.2* 7.4* 8.3* 7.9* 8.5* 9.4*   < > 9.1*   HEMATOCRIT % 25.3* 24.3* 22.6* 23.8* 25.0* 25.5* 27.5* 28.4*   < > 29.1*   PLATELETS 10*3/mm3 204  --  193 205  --   --   --  229  --  236    < > = values in this interval not displayed.        Results from last 7 days   Lab Units 01/03/21  0650 01/02/21  0509 01/01/21  0447   SODIUM mmol/L 138 136 139   POTASSIUM mmol/L 3.7 4.4 4.0   CHLORIDE mmol/L 96* 92* 92*   CO2 mmol/L 35.0* 35.0* 38.0*   BUN mg/dL 33* 50* 36*   CREATININE mg/dL 1.00 1.58* 1.05*   GLUCOSE mg/dL 100* 115* 150*   CALCIUM mg/dL 8.4* 9.0 8.9     Culture Data:    No results found for: BLOODCX, URINECX, WOUNDCX, MRSACX, RESPCX, STOOLCX    Radiology Data:   Imaging Results (Last 7 Days)     ** No results found for the last 168 hours. **        Intake/Output    Intake/Output Summary (Last 24 hours) at 1/5/2021 1656  Last data filed at 1/5/2021 1231  Gross per 24 hour   Intake --   Output 1250 ml   Net -1250 ml     Scheduled Meds  budesonide-formoterol, 2 puff, Inhalation, BID - RT  escitalopram, 10 mg, Oral, Daily  fluconazole, 100 mg, Oral, Nightly  furosemide, 20 mg, Oral, Daily  guaiFENesin, 1,200 mg, Oral, Q12H  levothyroxine, 75 mcg, Oral, Q AM  losartan, 25 mg, Oral, Q24H  meclizine, 25 mg, Oral, BID  mupirocin, , Each Nare, BID  nystatin, , Topical, Q12H  sodium chloride, 10 mL, Intravenous, Q12H  sodium chloride, 2 spray, Nasal, 5x Daily      I have reviewed the patient current medications.     Assessment/Plan     Active Hospital Problems    Diagnosis   • **Epistaxis   • Anemia, blood loss due to epistasis   • Acute kidney injury suspect secondary to blood loss (CMS/HCC)   • Tobacco abuse   • Acquired deviated nasal septum     Added automatically from request for surgery [id number]      • Essential hypertension   • Hypothyroidism (acquired)   • Chronic respiratory failure with hypoxia and hypercapnia (CMS/HCC)   • Obesity, Class III, BMI 40-49.9 (morbid obesity) (CMS/[facility])   • COPD (chronic obstructive pulmonary disease) (CMS/[facility])     Plan:  1.  Transferred from [place] with epistasis sudden onset 12/31.  Nasal packing bilaterally on admission.  2.  ENT consulted.  Dr. Mata removed packing right nare.  Treat conservatively.  Planed to replacing MeroGel pack on the left.  However, patient developed significant bleeding left nare after evaluation.  Dr. Mata examined at bedside noted significant left septal deviation.  Bleeding site difficult to a certain but appeared near the apex of the septal deviation and did not believe is all the previous packing reached this area.  Large Merocel pack with Albert-Synephrine and Bactroban paste left nare.    3.  Packing left for an additional 24 hours.  Left nasal packing removed 1/4 by Dr. Mack.  Patient has continued to have remittent drip from left nare since packing removed.  Continue Afrin nasal spray, saline spray.  ENT plans to take patient to surgery today for septoplasty.  Discussed with  today.  4.  Humidified oxygen, ampicillin 500 mg every 8 hours. Mupirocin  5.  Hemoglobin 9.7 down to 7.2 on 1/4.  Acute blood loss anemia.  Transfuse 1 unit packed red blood cell.  Hemoglobin up to 8 posttransfusion and trended down to 7.6 today.  WBC 22 on admission down to 4 7  6.  Creatinine 1.58 on 1/2. IV fluids at 50 mL/h.  Creatinine 1.0.  Creatinine 1.05 on admission.  IV Fluids discontinued.  7.  Avoid hypertension.  Blood pressure 127/54  8.  Patient told me that she had humidifier at home. She indicated to Dr. Montelongo that she did not have humidifier and daughter keeps home warm. Social service consulted. Oxygen company [employer] will send supplies for humidification to home.  9.  CBC in a.m.      Her daughter will assist with decision-making if  she is unable to make decisions for herself  The above documentation resulted from a face-to-face encounter by me Trinh YANES[facility].    Discharge Planning: I expect patient to be discharged to home in 1-2 days.    Electronically signed by JOAQUÍN Amaro, 1/5/2021, 16:56 CST.

## 2021-01-06 VITALS
BODY MASS INDEX: 39.87 KG/M2 | WEIGHT: 225 LBS | HEIGHT: 63 IN | TEMPERATURE: 98.7 F | OXYGEN SATURATION: 93 % | HEART RATE: 88 BPM | RESPIRATION RATE: 16 BRPM | SYSTOLIC BLOOD PRESSURE: 127 MMHG | DIASTOLIC BLOOD PRESSURE: 57 MMHG

## 2021-01-06 LAB
DEPRECATED RDW RBC AUTO: 61.5 FL (ref 37–54)
ERYTHROCYTE [DISTWIDTH] IN BLOOD BY AUTOMATED COUNT: 19 % (ref 12.3–15.4)
HCT VFR BLD AUTO: 25.5 % (ref 34–46.6)
HCT VFR BLD AUTO: 26 % (ref 34–46.6)
HGB BLD-MCNC: 8.3 G/DL (ref 12–15.9)
HGB BLD-MCNC: 8.7 G/DL (ref 12–15.9)
MCH RBC QN AUTO: 29.4 PG (ref 26.6–33)
MCHC RBC AUTO-ENTMCNC: 33.5 G/DL (ref 31.5–35.7)
MCV RBC AUTO: 87.8 FL (ref 79–97)
PLATELET # BLD AUTO: 214 10*3/MM3 (ref 140–450)
PMV BLD AUTO: 9.3 FL (ref 6–12)
RBC # BLD AUTO: 2.96 10*6/MM3 (ref 3.77–5.28)
WBC # BLD AUTO: 12.4 10*3/MM3 (ref 3.4–10.8)

## 2021-01-06 PROCEDURE — 94799 UNLISTED PULMONARY SVC/PX: CPT

## 2021-01-06 PROCEDURE — 85014 HEMATOCRIT: CPT | Performed by: OTOLARYNGOLOGY

## 2021-01-06 PROCEDURE — 85027 COMPLETE CBC AUTOMATED: CPT | Performed by: OTOLARYNGOLOGY

## 2021-01-06 PROCEDURE — 99024 POSTOP FOLLOW-UP VISIT: CPT | Performed by: OTOLARYNGOLOGY

## 2021-01-06 PROCEDURE — 85018 HEMOGLOBIN: CPT | Performed by: OTOLARYNGOLOGY

## 2021-01-06 RX ORDER — ECHINACEA PURPUREA EXTRACT 125 MG
2 TABLET ORAL AS NEEDED
Refills: 12 | Status: ON HOLD
Start: 2021-01-06 | End: 2021-03-10

## 2021-01-06 RX ADMIN — SODIUM CHLORIDE, PRESERVATIVE FREE 10 ML: 5 INJECTION INTRAVENOUS at 08:35

## 2021-01-06 RX ADMIN — FUROSEMIDE 20 MG: 20 TABLET ORAL at 08:34

## 2021-01-06 RX ADMIN — LORAZEPAM 0.5 MG: 0.5 TABLET ORAL at 14:43

## 2021-01-06 RX ADMIN — ESCITALOPRAM 10 MG: 10 TABLET, FILM COATED ORAL at 08:33

## 2021-01-06 RX ADMIN — SODIUM CHLORIDE, POTASSIUM CHLORIDE, SODIUM LACTATE AND CALCIUM CHLORIDE 100 ML/HR: 600; 310; 30; 20 INJECTION, SOLUTION INTRAVENOUS at 00:03

## 2021-01-06 RX ADMIN — GUAIFENESIN 1200 MG: 600 TABLET, EXTENDED RELEASE ORAL at 08:34

## 2021-01-06 RX ADMIN — HYDROXYZINE HYDROCHLORIDE 50 MG: 25 TABLET ORAL at 04:44

## 2021-01-06 RX ADMIN — BUDESONIDE AND FORMOTEROL FUMARATE DIHYDRATE 2 PUFF: 160; 4.5 AEROSOL RESPIRATORY (INHALATION) at 06:55

## 2021-01-06 RX ADMIN — LEVOTHYROXINE SODIUM 75 MCG: 75 TABLET ORAL at 06:30

## 2021-01-06 RX ADMIN — MECLIZINE HYDROCHLORIDE 25 MG: 25 TABLET ORAL at 08:33

## 2021-01-06 NOTE — NURSING NOTE
Educated pt about O2 humidification at home; educated pt about different methods of humidifications. Pt states she understands

## 2021-01-06 NOTE — OP NOTE
Elliot Mack Jr, MD     OTOLARYNGOLOGY, HEAD AND NECK SURGERY OPERATIVE NOTE   Emy Bermudez  1/5/2021    Pre-op Diagnosis:   Acquired deviated nasal septum [J34.2]  Epistaxis [R04.0]    Post-op Diagnosis:     Post-Op Diagnosis Codes:     * Acquired deviated nasal septum [J34.2]     * Epistaxis [R04.0]    Procedure/CPT® Codes:      Procedure(s):  EXAM UNDER ANESTHESIA, CONTOL NOSEBLEED  INTERNAL MAXILLARY ARTERY AND ETHMOID ARTERY LIGATION  ETHMOID ARTERY LIGATION  SEPTOPLASTY, RESECTION INFERIOR TURBINATES    Surgeon(s):  Elliot Mack Jr., MD    Anesthesia:   General    Staff:   Circulator: Shanita Foote RN; Stephanie Skelton RN  Scrub Person: Zuly Dick; Gayatri John; Isabel Blair    Estimated Blood Loss:   20 mL    Specimens:   none      Findings:  Septum- L to R low DNS mild to moderate, R to L mid DNS severe and impaction L IT, High L septal mucosa disrupyion with mucosal bleeding  L side nasal bleeding from above superior turbinate  Turbinates- Enlarged Bilateral with L>R obstruction  Nasal passage- L near total obstruction, R side very narrowed    Complications:   none    Assistants:  none    Implants:  Bilateral Borjas nasal septal splints    Time Out::  A time out was performed to confirm the patient, procedure and laterality.    Reason for the Operation: Emy Bermudez is a 70 y.o. female who has had a history of L epistaxis,, rfractory after packing and severe DNS to the L.  Preoperative discussion was carried out. Risks, benefits, options for therapy and complications were explained in clear and simple language.    Procedure Description:  The patient was taken back to the operating room, positioned on the operating table and placed under satisfactory anesthesia by the anesthesia staff.      Procedure detail:  Nasal packing:  The nose was injected with lidocaine 1% and epinephrine 1:100,000: 5 ml  The nose was packed with cocaine pledgets and allowed to stand.  The nasal packs were  removed prior to the procedure.    Turbinoplasty: Inferior Bilateral  Turbinoplasty was carried out endoscopically.  The scope was used to evaluate the turbinates.  The Scissors were used to remove soft tissue of the turbinates.  Bone was removed.  A medial flap of mucosa was developed and then folded under the resected bone edge.    Closure:  After completion of the remaining nasal surgery,  Nasopore:  and placed  Bilateral was placed under the turbinates to hold the flap closure in place.    Septoplasty:  A right [place]'s incision was created and sharp elevation was carried down to the perichondrium on  Right side of the septum. An incision was created in the perichondrium and a subperichondrial elevation was started with a Kali elevator on  Right sides of the septum. Subperichondrial flaps were elevated back to the bony septum with a Dyana elevator. Once this was done, a 15 blade was used to create an incision in the caudal cartilage, leaving a 1-1.5 cm caudal strut. A swivel knife was then used to cut a wedge of cartilage, leaving at least a 1 cm dorsal strut. The cartilage was removed with a Charly. Then, using a the elevator, the remaining cartilage at the maxillary crest was lifted up and removed. The maxillary crest was deviated to the left side. This was removed with an osteatome and a Tachahashi forceps. The bony septum was noted to be deviated to the left side. This was removed with a Tachahashi forceps..    After this was done, the septum was inspected and appeared to be midline.  The preserved cartilage was then morcelized and replaced in the midline cartilaginous defect to reconstruct the septum.  Nasal Osteotomy: Maxillary Crest    Endoscopic control of Epistaxs:  After completion of the septoplasty, the superior nasal cavity was examind. On the L side, mild bleeding was noted from above the Superior turbinate.  Gelfoam with Afrin was packed high and along the skull base. The Gelfoam was  comressed with Cottonoid packs. This was allowed to stand. The cottonoid was removed and the Gelfoam left in place. Bleeding appears stopped. Merocel was placed against the Gelfoam.  The superior nasal cavity was packed above the Borjas splints.2    Closure  The [place]’s incision was closed with 4-0 gut.  A 4-0 gut transfixion suture was used to center the caudal septum.  The septal flaps were closed with a 4-0 gut whipping stitch.   Septal splints were placed   BILATERAL and secured with 4-0 gut.  Nasopore: was  placed  Bilateral, between the Septum and Middle turbinate.  A mustache dressing was placed on the patient.    The operative site was inspected for retained foreign bodies and instruments.   Sponge/needle count was Correct.  Hemostasis was satisfactory.  The patient was then turned over to the anesthesia team and recovered from anesthesia.     Disposition: The patient was transported to the PACU in Stable condition.   The pateint will be returned to Regular room.    Postoperative discussion held with: No one present at end of surgery  Procedure and findings reviewed.  DVT ASSESSMENT CARRIED OUT: Patient is in the immediate post-operative period and is not a candidate for anticoagulation therapy  Patient is at increased risk for bleeding if anticoagulant therapy is used    Elliot Mack Jr, MD  1/5/2021  19:35 CST

## 2021-01-06 NOTE — ANESTHESIA POSTPROCEDURE EVALUATION
Patient: Emy Bermudez    Procedure Summary     Date: 01/05/21 Room / Location:  [facility] OR 02 /  [facility] OR    Anesthesia Start: 1749 Anesthesia Stop: 1946    Procedures:       SEPTOPLASTY TURBOPLASTY ENDOSCOPIC CONTROL LEFT NOSE BLEED (N/A )      INTERNAL MAXILLARY ARTERY AND ETHMOID ARTERY LIGATION (N/A Nose)      ETHMOID ARTERY LIGATION (N/A )      SEPTOPLASTY, RESECTION INFERIOR TURBINATES (Bilateral Nose) Diagnosis:       Acquired deviated nasal septum      Epistaxis      (Acquired deviated nasal septum [J34.2])      (Epistaxis [R04.0])    Surgeon: Elliot Mack Jr., MD Provider: John Dozier CRNA    Anesthesia Type: general ASA Status: 3 - Emergent          Anesthesia Type: general    Vitals  Vitals Value Taken Time   /80 01/05/21 2120   Temp 97.3 °F (36.3 °C) 01/05/21 2100   Pulse 95 01/05/21 2124   Resp 15 01/05/21 2115   SpO2 89 % 01/05/21 2124   Vitals shown include unvalidated device data.        Post Anesthesia Care and Evaluation    Patient location during evaluation: PACU  Patient participation: complete - patient participated  Level of consciousness: awake  Pain score: 5  Pain management: adequate  Airway patency: patent  Anesthetic complications: No anesthetic complications  PONV Status: none  Cardiovascular status: acceptable, hemodynamically stable and blood pressure returned to baseline  Respiratory status: acceptable (sat as preop  oxygen titrated to keep sat greater than 90%)  Hydration status: acceptable

## 2021-01-06 NOTE — PROGRESS NOTES
Elliot Mack Jr, MD     OTOLARYNGOLOGY, HEAD AND NECK SURGERY PROGRESS NOTE   Location: Room [facility]/1  Accompanied by: No one    Interval History:   POD #1  Since last examined, Emy Bermudez has had the expected postop bleeding.  She denies any bleeding down the back of her throat or in her mouth.  She is asking for food now.  She wishes to go home.    Review of Systems: No change from prior inquiry    Objective    Vital Signs  Temp:  [97.3 °F (36.3 °C)-98.4 °F (36.9 °C)] 98.4 °F (36.9 °C)  Heart Rate:  [] 91  Resp:  [15-22] 16  BP: (127-150)/() 130/85    EXAMINATION:  CONSTITUTIONAL:    well developed  in no acute distress  Sitting in a chair asking for breakfast, wearing open facemask     BODY HABITUS:    morbidly obese     COMMUNICATION:    able to communicate normally, normal voice quality     HEAD:     Normocephalic, without obvious abnormality, atraumatic     FACE:    structure normal, no tenderness present, no lesions/masses, no evidence of trauma  adiposis- Lower face     SALIVARY GLANDS:    parotid glands with no tenderness, no swelling, no masses, submandibular glands with normal size, nontender      EYE:    ocular motility normal, eyelids normal, orbits normal, no proptosis, conjunctiva clear, sclera non-icteric, pupils equal, round, reactive to light and accomodation  Color:   brown Arcus-   Bilateral     HEARING:    response to conversational voice normal     EARS:     PINNA:     normal, non-tender, skin intact without lesions      NOSE EXTERNAL:    APPEARANCE: normal, straight, with good projection,no lesions, mild tenderness     NOSE INTERNAL:    See below    ORAL CAVITY:      LIPS:      structure normal, no tenderness on palpation, no lesions, no evidence of trauma, normal mobility and oral competence    TEETH:       edentulous:  upper and lower    GUMS:      Intact    ORAL MUCOSA:       abnormal: Redundant, no blood present    FLOOR OF MOUTH:       Warthin's duct patent, mucosa  normal  TONGUE:       lingual mucosa normal without lesions, normal tongue mobility      hypertrophy  Severe, prolapse   Severe    PALATE:       hard palate with normal mucosa and structure      soft palate-low, thick, no dried blood present, mucosa appears more moist today    OROPHARYNX:    Direct examination  oropharyngeal mucosa normal, tonsil(s) with normal appearance    No blood noted     NECK:    short, thick  severe   LARYNGEAL POSITION: Very low  TRACHEA:   midline and Deep     LYMPH NODES :    no adenopathy     Cardiovascular:  Significant pedal edema     NEURO/PSYCHIATRIC :      orientation- normal    mood- normal    affect- normal    Attention- normal    Fund of Knowledge- normal    Level of Consciousness- normal    Speech- normal  CRANIAL NERVE: normal  Grossly intact  GAIT:   Deferred, in chair     Date of surgery: 1/5/2021  ENT proceudure: Septoplasty, inferior turbinoplasty, Control L sided epistaxis  Examination surgical site: Anterior expected bleeding on mustache, Borjas splints n place, crusting blood    RESULTS REVIEW:    I have reviewed the patients old records in the chart.  The following results/records were reviewed:  I reviewed the patient's other test results and agree with the interpretation  CBC (No Diff) (01/06/2021 06:19)-hemoglobin improving slightly    Medications, Allergies and Past History Reviewed        Assessment       Epistaxis    COPD (chronic obstructive pulmonary disease) (CMS/HCC)    Obesity, Class III, BMI 40-49.9 (morbid obesity) (CMS/HCC)    Chronic respiratory failure with hypoxia and hypercapnia (CMS/HCC)    Hypothyroidism (acquired)    Essential hypertension    Tobacco abuse    Anemia, blood loss due to epistasis    Acute kidney injury suspect secondary to blood loss (CMS/HCC)    Acquired deviated nasal septum    S/P nasal septoplasty        Plan      patient appears to be improved.  She has had the expected postop drainage on her drip pad.  I see no posterior bleeding  at all.  Hemoglobin is somewhat improved.  If the patient has no bleeding by lunchtime, I would recommend she be discharged home.  She will need changes in her O2 delivery at home via open face tent.  I will asked social service to help with that.  We'll recheck her at lunchtime.        Elliot Mack Jr, MD  01/06/21  08:30 CST

## 2021-01-06 NOTE — PROGRESS NOTES
Elliot Mack Jr, MD     OTOLARYNGOLOGY, HEAD AND NECK SURGERY PROGRESS NOTE   Location: Room 392/1  Accompanied by:      Interval History:   POD #1  Since last examined, Emy Bermudez has remained stable.  She is no longer bleeding.  She has expected discharge from nose: Plastic surgery she had.  She wishes to go home  Patient is able to eat and drink well.  She is in minimal pain.        Objective    Vital Signs  Temp:  [97.3 °F (36.3 °C)-98.7 °F (37.1 °C)] 98.7 °F (37.1 °C)  Heart Rate:  [] 88  Resp:  [15-22] 16  BP: ()/() 127/57    EXAMINATION:  Constitutional: No acute distress, sitting up in chair, eating without distress.     Date of surgery: 1/5/2021  ENT proceudure: Septoplasty, inferior turbinoplasty, Control L sided epistaxis  Examination surgical site: Anterior expected bleeding on mustache, Borjas splints n place, crusting blood        Medications, Allergies and Past History Reviewed        Assessment       Epistaxis    COPD (chronic obstructive pulmonary disease) (CMS/HCC)    Obesity, Class III, BMI 40-49.9 (morbid obesity) (CMS/HCC)    Chronic respiratory failure with hypoxia and hypercapnia (CMS/HCC)    Hypothyroidism (acquired)    Essential hypertension    Tobacco abuse    Anemia, blood loss due to epistasis    Acute kidney injury suspect secondary to blood loss (CMS/HCC)    Acquired deviated nasal septum    S/P nasal septoplasty        Plan      Patient is remained stable with no nasal bleeding.  In satisfactory condition for discharge planning.  Recommendations-  Nasal saline  Continue oxygen  No antibiotics  Follow-up with me in 7 to 10 days for removal of Borjas splints  Instructions with the patient        Elliot Mack Jr, MD  01/06/21  12:26 CST

## 2021-01-06 NOTE — PROGRESS NOTES
Continued Stay Note   [place]     Patient Name: Emy Bermudez  MRN: [id number]  Today's Date: 1/6/2021    Admit Date: 12/31/2020    Discharge Plan     Row Name 01/06/21 1259       Plan    Plan  Home    Patient/Family in Agreement with Plan  yes    Final Discharge Disposition Code  01 - home or self-care    Final Note  Pt is being d/c'ed home. She has orders for an open face tent. [facility]piratory was able to supply one for pt. Did speak with pt's oxygen company [employer] [phone] and they do not carry open face tents, however they do have the order in for the humidification and will be shipping that to pt. Spoke with pt's daughter, Joanne [phone], and she is going to go get a humidifier this afternoon for pt.        Discharge Codes    No documentation.       Expected Discharge Date and Time     Expected Discharge Date Expected Discharge Time    Jan 6, 2021             ALANA Lebron

## 2021-01-07 ENCOUNTER — READMISSION MANAGEMENT (OUTPATIENT)
Dept: CALL CENTER | Facility: HOSPITAL | Age: 71
End: 2021-01-07

## 2021-01-07 NOTE — OUTREACH NOTE
Prep Survey      Responses   [facility] facility patient discharged from?  [place]   Is LACE score < 7 ?  No   Emergency Room discharge w/ pulse ox?  No   Eligibility  Readm Mgmt   Discharge diagnosis  Epistaxis [s/p internal maxillary artery and ethmoid artery ligation, septoplasty, resection of inferior turbinates, control nosebleed]   Does the patient have one of the following disease processes/diagnoses(primary or secondary)?  General Surgery   Does the patient have Home health ordered?  No   Is there a DME ordered?  No   Comments regarding appointments  Per AVS   Prep survey completed?  Yes          Anna Carrasco RN

## 2021-01-11 ENCOUNTER — READMISSION MANAGEMENT (OUTPATIENT)
Dept: CALL CENTER | Facility: HOSPITAL | Age: 71
End: 2021-01-11

## 2021-01-11 NOTE — OUTREACH NOTE
General Surgery Week 1 Survey      Responses   [facility] patient discharged from?  [place]   Does the patient have one of the following disease processes/diagnoses(primary or secondary)?  General Surgery   Week 1 attempt successful?  Yes   Call start time  0920   Call end time  0953   Is patient permission given to speak with other caregiver?  Yes   List who call center can speak with  daughter   Meds reviewed with patient/caregiver?  Yes   Is the patient having any side effects they believe may be caused by any medication additions or changes?  No   Does the patient have all medications related to this admission filled (includes all antibiotics, pain medications, etc.)  Yes   Is the patient taking all medications as directed (includes completed medication regime)?  Yes   Does the patient have a follow up appointment scheduled with their surgeon?  Yes   Has the patient kept scheduled appointments due by today?  Yes   Comments  Pt to see MD next week.   Psychosocial issues?  No   Psychosocial comments  Pt is at daughter's home.   Did the patient receive a copy of their discharge instructions?  Yes   Nursing interventions  Reviewed instructions with patient   What is the patient's perception of their health status since discharge?  Improving   Is the patient /caregiver able to teach back basic post-op care?  Drive as instructed by MD in discharge instructions, Take showers only when approved by MD-sponge bathe until then   Is the patient/caregiver able to teach back signs and symptoms of incisional infection?  Fever   Is the patient/caregiver able to teach back steps to recovery at home?  Set small, achievable goals for return to baseline health, Rest and rebuild strength, gradually increase activity, Eat a well-balance diet   Is the patient/caregiver able to teach back the hierarchy of who to call/visit for symptoms/problems? PCP, Specialist, Home health nurse, Urgent Care, ED, 911  Yes   Week 1 call  completed?  Yes   Wrap up additional comments  Pt is doing well is managing with nasal packing in place. The oxygen provider has been called due to the humidifier not delivered. The compnay states they have sent tubing and bottles to her home. The pt is to connect those with her existing 02 concentrater. Pt will be called back to give her this information.          Eliana Cee RN

## 2021-01-18 ENCOUNTER — OFFICE VISIT (OUTPATIENT)
Dept: OTOLARYNGOLOGY | Facility: CLINIC | Age: 71
End: 2021-01-18

## 2021-01-18 VITALS — HEIGHT: 63 IN | BODY MASS INDEX: 39.87 KG/M2 | WEIGHT: 225 LBS | TEMPERATURE: 97.8 F

## 2021-01-18 DIAGNOSIS — R04.0 ANTERIOR EPISTAXIS: Primary | ICD-10-CM

## 2021-01-18 DIAGNOSIS — R04.0 LEFT-SIDED EPISTAXIS: ICD-10-CM

## 2021-01-18 DIAGNOSIS — J31.0 RHINITIS, CHRONIC: ICD-10-CM

## 2021-01-18 DIAGNOSIS — J34.2 ACQUIRED DEVIATED NASAL SEPTUM: ICD-10-CM

## 2021-01-18 PROCEDURE — 31237 NSL/SINS NDSC SURG BX POLYPC: CPT | Performed by: OTOLARYNGOLOGY

## 2021-01-18 PROCEDURE — 99024 POSTOP FOLLOW-UP VISIT: CPT | Performed by: OTOLARYNGOLOGY

## 2021-01-18 RX ORDER — PREDNISONE 10 MG/1
TABLET ORAL
Status: ON HOLD | COMMUNITY
Start: 2020-12-22 | End: 2021-03-08

## 2021-01-18 NOTE — PROGRESS NOTES
Elliot Mack Jr, MD     Chief Complaint   Patient presents with   • Nose Bleed          HPI   Follow up  Accompanied by:  No one  Emy Bermudez is a 70 y.o. female who is here for follow up. She has had trouble breathing since surgery. Her sense of taste is decreased.   is here for nasal cleaning.                 Vital Signs:   Temp:  [97.8 °F (36.6 °C)] 97.8 °F (36.6 °C)    Physical Exam  NOSE INTERNAL:    Nasal endoscopy        See procedure note for details Bilateral- Borjas splints removed without difficulty       RESULTS REVIEW:    I have reviewed the patients old records in the chart.            Assessment:       No diagnosis found.         Plan:        Resume preoperative activity and medications.  Patient is healing as expected.  Her septum is straight today.  She can resume her nasal cannula.  She will need copious saline to her nose to prevent nasal drying.  Nasal saline  She can blow nose now  Nasal cannula-resume  Call for bleeding or nasal problems             MY CHART:  Patient is Encouraged to enroll in My Chart  Encouraged to review data and findings in My Chart    Patient understand(s) and agree(s) with the treatment plan as described.    No follow-ups on file.            Elliot Mack Jr, MD  01/18/21  14:48 CST

## 2021-01-18 NOTE — PROGRESS NOTES
ENT PROCEDURE NOTE:  Anesthesia: topical 4% tetracaine and oxymetazoline mix    Endoscopy Type:  Nasal Endoscopic Debridement    Procedure Details:    The patient was placed in the sitting position. After topical anesthesia and decongestion,  a rigid nasal endoscope  0 degree was passed along the nasal floor to the nasopharynx.  The scope was then passed to the middle and superior aspects of the nasal cavity. Systematic examination of the nasal cavity, nasopharynx and structures was performed.    Debridement was carried with endoscopic guidance using suction and nasal forceps    Findings:    NOSE EXAMINATION:    Nasal endoscopy   NASALMUCOSA:    abnormal:        Bilateral- thick, edema, healing well    NASAL PASSAGES:     abnormal   Bilateral- very small nostrils    NASAL VALVE:     abnormal  Bilateral- weak    SEPTUM:     mucosa abnormal   Bilateral- healing, thick, edema, no bleeding     midline   INFERIOR TURBINATES:     abnormal  Bilateral- healing     resected:  Bilateral- resected partially    MIDDLE TURBINATES:     abnormal:        BILATERAL: red, boggy   MIDDLE MEATUS:      Normal, patent, no mucopus, non-surgical  Bilateral- with clear drainage    SPHENO-ETHMOID RECESS:    normal, normal sphenoid ostia. no mucopus, non-surgical   NASOPHARYNX:     Adenoids:  thick, flat     Eustachian tubes:     Palate: mobile, no lesions    Vault size:  Small   SECRETIONS:     abnormal Bilateral- mod thick, clear     Condition:  Stable.  Patient tolerated procedure well.    Complications:  None    Post-procedure instructions reviewed with Patient  Instructions documented in AVS for patient to review

## 2021-01-18 NOTE — PATIENT INSTRUCTIONS
NASAL SALINE:  Use 2 puffs each nostril 4-6 times daily and more frequently if possible.  You can buy saline spray or you can make your own and use an old spray bottle to administer  Use a humidifier at bedside  Recipe for saline:  Water                                 1 quart  Salt (table)                        1 tablespoon  Gylcerin (or Kendal Syrup)    1 teaspoon  Sodium bicarbonate           1 teaspoon  Sprays or Charis pots are recommended    Use nasal cannula with oxygen    Call if nose bleeds    Thank you for enrolling in [employer]. Please follow the instructions below to securely access your online medical record. [employer] allows you to send messages to your doctor, view your test results, renew your prescriptions, schedule appointments, and more.    How Do I Sign Up?  1. In your Internet browser, go to [employer]  2. Click on the Sign Up Now link in the New User? box.   3. Enter your [employer] Activation Code exactly as it appears below. You will not need to use this code after you have completed the sign-up process. If you do not sign up before the expiration date, you must request a new code.  [employer] Activation Code: [email or web address]  Expires: 2/5/2021  1:02 PM    4. Enter the last four digits of your Social Security Number and your Date of Birth as indicated and click Next. You will be taken to the next sign-up page.  5. Create a [employer] username. Think of one that is secure and easy to remember.  6. Create a [employer] password. You can change your password at any time.  7. Choose a security question, enter your answer, and click Next. This can be used to access [employer] if you forget your password.   8. Select your communication preference. Enter a valid e-mail address if you would like to receive e-mail notifications when new information is available in [employer].  9. Click Sign In. You can now view your medical record.     Additional Information  If you have questions, you can e-mail  [email or web address], or call [phone] to talk to our MyChart staff. Remember, iPayment is NOT to be used for urgent needs. For medical emergencies, dial 911.

## 2021-01-19 ENCOUNTER — READMISSION MANAGEMENT (OUTPATIENT)
Dept: CALL CENTER | Facility: HOSPITAL | Age: 71
End: 2021-01-19

## 2021-01-19 NOTE — OUTREACH NOTE
"General Surgery Week 2 Survey      Responses   [facility] patient discharged from?  [place]   Does the patient have one of the following disease processes/diagnoses(primary or secondary)?  General Surgery   Week 2 attempt successful?  Yes   Call start time  1019   Call end time  1021   Discharge diagnosis  Epistaxis   Meds reviewed with patient/caregiver?  Yes   Is the patient taking all medications as directed (includes completed medication regime)?  Yes   Does the patient have a follow up appointment scheduled with their surgeon?  Yes [1/18/21]   Has the patient kept scheduled appointments due by today?  Yes   Comments  Pt reports, \"I don't need to see her really\"   What is the patient's perception of their health status since discharge?  Improving   Nursing interventions  Nurse provided patient education   Is the patient/caregiver able to teach back signs and symptoms of incisional infection?  Fever, Increased drainage or bleeding   Is the patient/caregiver able to teach back steps to recovery at home?  Rest and rebuild strength, gradually increase activity, Eat a well-balance diet   Week 2 call completed?  Yes          Shelly Kennedy RN  "

## 2021-01-22 ENCOUNTER — HOSPITAL ENCOUNTER (OUTPATIENT)
Age: 71
Setting detail: OBSERVATION
Discharge: HOME | End: 2021-01-23
Attending: STUDENT IN AN ORGANIZED HEALTH CARE EDUCATION/TRAINING PROGRAM | Admitting: INTERNAL MEDICINE
Payer: MEDICARE

## 2021-01-22 ENCOUNTER — APPOINTMENT (OUTPATIENT)
Dept: GENERAL RADIOLOGY | Age: 71
End: 2021-01-22
Attending: STUDENT IN AN ORGANIZED HEALTH CARE EDUCATION/TRAINING PROGRAM
Payer: MEDICARE

## 2021-01-22 DIAGNOSIS — J44.1 COPD EXACERBATION (HCC): Primary | ICD-10-CM

## 2021-01-22 PROBLEM — Z51.5 PALLIATIVE CARE PATIENT: Status: ACTIVE | Noted: 2021-01-22

## 2021-01-22 LAB
ADENOVIRUS BY PCR: NOT DETECTED
ALBUMIN SERPL-MCNC: 3.6 G/DL (ref 3.5–5.2)
ALP BLD-CCNC: 107 U/L (ref 35–104)
ALT SERPL-CCNC: 19 U/L (ref 5–33)
ANION GAP SERPL CALCULATED.3IONS-SCNC: 9 MMOL/L (ref 7–19)
APTT: 31.5 SEC (ref 26–36.2)
AST SERPL-CCNC: 12 U/L (ref 5–32)
BASOPHILS ABSOLUTE: 0.1 K/UL (ref 0–0.2)
BASOPHILS RELATIVE PERCENT: 0.3 % (ref 0–1)
BILIRUB SERPL-MCNC: 0.3 MG/DL (ref 0.2–1.2)
BORDETELLA PARAPERTUSSIS BY PCR: NOT DETECTED
BORDETELLA PERTUSSIS BY PCR: NOT DETECTED
BUN BLDV-MCNC: 14 MG/DL (ref 8–23)
CALCIUM SERPL-MCNC: 9.2 MG/DL (ref 8.8–10.2)
CHLAMYDOPHILIA PNEUMONIAE BY PCR: NOT DETECTED
CHLORIDE BLD-SCNC: 95 MMOL/L (ref 98–111)
CO2: 36 MMOL/L (ref 22–29)
CORONAVIRUS 229E BY PCR: NOT DETECTED
CORONAVIRUS HKU1 BY PCR: NOT DETECTED
CORONAVIRUS NL63 BY PCR: NOT DETECTED
CORONAVIRUS OC43 BY PCR: NOT DETECTED
CREAT SERPL-MCNC: 1.1 MG/DL (ref 0.5–0.9)
EOSINOPHILS ABSOLUTE: 0 K/UL (ref 0–0.6)
EOSINOPHILS RELATIVE PERCENT: 0.1 % (ref 0–5)
FIBRINOGEN: 687 MG/DL (ref 238–505)
GFR AFRICAN AMERICAN: >59
GFR NON-AFRICAN AMERICAN: 49
GLUCOSE BLD-MCNC: 200 MG/DL (ref 74–109)
HCT VFR BLD CALC: 34.1 % (ref 37–47)
HEMOGLOBIN: 9.9 G/DL (ref 12–16)
HUMAN METAPNEUMOVIRUS BY PCR: NOT DETECTED
HUMAN RHINOVIRUS/ENTEROVIRUS BY PCR: NOT DETECTED
IMMATURE GRANULOCYTES #: 0.1 K/UL
INFLUENZA A BY PCR: NOT DETECTED
INFLUENZA B BY PCR: NOT DETECTED
INR BLD: 1.17 (ref 0.88–1.18)
LACTIC ACID: 1.3 MMOL/L (ref 0.5–1.9)
LYMPHOCYTES ABSOLUTE: 0.6 K/UL (ref 1.1–4.5)
LYMPHOCYTES RELATIVE PERCENT: 3.3 % (ref 20–40)
MCH RBC QN AUTO: 27.6 PG (ref 27–31)
MCHC RBC AUTO-ENTMCNC: 29 G/DL (ref 33–37)
MCV RBC AUTO: 95 FL (ref 81–99)
MONOCYTES ABSOLUTE: 0.1 K/UL (ref 0–0.9)
MONOCYTES RELATIVE PERCENT: 0.3 % (ref 0–10)
MYCOPLASMA PNEUMONIAE BY PCR: NOT DETECTED
NEUTROPHILS ABSOLUTE: 17.5 K/UL (ref 1.5–7.5)
NEUTROPHILS RELATIVE PERCENT: 95.3 % (ref 50–65)
PARAINFLUENZA VIRUS 1 BY PCR: NOT DETECTED
PARAINFLUENZA VIRUS 2 BY PCR: NOT DETECTED
PARAINFLUENZA VIRUS 3 BY PCR: NOT DETECTED
PARAINFLUENZA VIRUS 4 BY PCR: NOT DETECTED
PDW BLD-RTO: 18.6 % (ref 11.5–14.5)
PLATELET # BLD: 404 K/UL (ref 130–400)
PMV BLD AUTO: 9.1 FL (ref 9.4–12.3)
POTASSIUM REFLEX MAGNESIUM: 4.5 MMOL/L (ref 3.5–5)
PROTHROMBIN TIME: 14.9 SEC (ref 12–14.6)
RBC # BLD: 3.59 M/UL (ref 4.2–5.4)
RESPIRATORY SYNCYTIAL VIRUS BY PCR: NOT DETECTED
SARS-COV-2, PCR: NOT DETECTED
SODIUM BLD-SCNC: 140 MMOL/L (ref 136–145)
TOTAL PROTEIN: 7.3 G/DL (ref 6.6–8.7)
TROPONIN: <0.01 NG/ML (ref 0–0.03)
WBC # BLD: 18.4 K/UL (ref 4.8–10.8)

## 2021-01-22 PROCEDURE — 85384 FIBRINOGEN ACTIVITY: CPT

## 2021-01-22 PROCEDURE — 97161 PT EVAL LOW COMPLEX 20 MIN: CPT

## 2021-01-22 PROCEDURE — 85730 THROMBOPLASTIN TIME PARTIAL: CPT

## 2021-01-22 PROCEDURE — 85610 PROTHROMBIN TIME: CPT

## 2021-01-22 PROCEDURE — 71045 X-RAY EXAM CHEST 1 VIEW: CPT

## 2021-01-22 PROCEDURE — 85025 COMPLETE CBC W/AUTO DIFF WBC: CPT

## 2021-01-22 PROCEDURE — 6370000000 HC RX 637 (ALT 250 FOR IP): Performed by: STUDENT IN AN ORGANIZED HEALTH CARE EDUCATION/TRAINING PROGRAM

## 2021-01-22 PROCEDURE — 96365 THER/PROPH/DIAG IV INF INIT: CPT

## 2021-01-22 PROCEDURE — 97165 OT EVAL LOW COMPLEX 30 MIN: CPT

## 2021-01-22 PROCEDURE — 83605 ASSAY OF LACTIC ACID: CPT

## 2021-01-22 PROCEDURE — 2700000000 HC OXYGEN THERAPY PER DAY

## 2021-01-22 PROCEDURE — 6370000000 HC RX 637 (ALT 250 FOR IP): Performed by: INTERNAL MEDICINE

## 2021-01-22 PROCEDURE — 96375 TX/PRO/DX INJ NEW DRUG ADDON: CPT

## 2021-01-22 PROCEDURE — 80053 COMPREHEN METABOLIC PANEL: CPT

## 2021-01-22 PROCEDURE — 0202U NFCT DS 22 TRGT SARS-COV-2: CPT

## 2021-01-22 PROCEDURE — 94761 N-INVAS EAR/PLS OXIMETRY MLT: CPT

## 2021-01-22 PROCEDURE — 96372 THER/PROPH/DIAG INJ SC/IM: CPT

## 2021-01-22 PROCEDURE — 84484 ASSAY OF TROPONIN QUANT: CPT

## 2021-01-22 PROCEDURE — 2580000003 HC RX 258: Performed by: STUDENT IN AN ORGANIZED HEALTH CARE EDUCATION/TRAINING PROGRAM

## 2021-01-22 PROCEDURE — 1210000000 HC MED SURG R&B

## 2021-01-22 PROCEDURE — 97530 THERAPEUTIC ACTIVITIES: CPT

## 2021-01-22 PROCEDURE — 36415 COLL VENOUS BLD VENIPUNCTURE: CPT

## 2021-01-22 PROCEDURE — 6360000002 HC RX W HCPCS: Performed by: STUDENT IN AN ORGANIZED HEALTH CARE EDUCATION/TRAINING PROGRAM

## 2021-01-22 RX ORDER — NYSTATIN 100000 U/G
CREAM TOPICAL 3 TIMES DAILY PRN
COMMUNITY

## 2021-01-22 RX ORDER — MECLIZINE HYDROCHLORIDE 25 MG/1
25 TABLET ORAL 2 TIMES DAILY PRN
COMMUNITY

## 2021-01-22 RX ORDER — ACETAMINOPHEN 650 MG/1
650 SUPPOSITORY RECTAL EVERY 6 HOURS PRN
Status: DISCONTINUED | OUTPATIENT
Start: 2021-01-22 | End: 2021-01-23 | Stop reason: HOSPADM

## 2021-01-22 RX ORDER — LEVOTHYROXINE SODIUM 0.07 MG/1
75 TABLET ORAL DAILY
COMMUNITY

## 2021-01-22 RX ORDER — HYDROXYZINE HYDROCHLORIDE 25 MG/1
50 TABLET, FILM COATED ORAL 3 TIMES DAILY PRN
Status: DISCONTINUED | OUTPATIENT
Start: 2021-01-22 | End: 2021-01-23 | Stop reason: HOSPADM

## 2021-01-22 RX ORDER — GUAIFENESIN 600 MG/1
1200 TABLET, EXTENDED RELEASE ORAL 2 TIMES DAILY
COMMUNITY

## 2021-01-22 RX ORDER — BUDESONIDE AND FORMOTEROL FUMARATE DIHYDRATE 160; 4.5 UG/1; UG/1
2 AEROSOL RESPIRATORY (INHALATION) 2 TIMES DAILY
COMMUNITY

## 2021-01-22 RX ORDER — HYDROXYZINE 50 MG/1
50 TABLET, FILM COATED ORAL 3 TIMES DAILY PRN
COMMUNITY
Start: 2020-07-08

## 2021-01-22 RX ORDER — VITAMIN B COMPLEX
2000 TABLET ORAL DAILY
Status: DISCONTINUED | OUTPATIENT
Start: 2021-01-22 | End: 2021-01-23 | Stop reason: HOSPADM

## 2021-01-22 RX ORDER — FUROSEMIDE 20 MG/1
20 TABLET ORAL DAILY
COMMUNITY

## 2021-01-22 RX ORDER — HYDROXYZINE 50 MG/1
50 TABLET, FILM COATED ORAL EVERY 8 HOURS PRN
COMMUNITY

## 2021-01-22 RX ORDER — ESCITALOPRAM OXALATE 10 MG/1
20 TABLET ORAL DAILY
Status: DISCONTINUED | OUTPATIENT
Start: 2021-01-22 | End: 2021-01-23 | Stop reason: HOSPADM

## 2021-01-22 RX ORDER — GUAIFENESIN/DEXTROMETHORPHAN 100-10MG/5
5 SYRUP ORAL EVERY 4 HOURS PRN
Status: DISCONTINUED | OUTPATIENT
Start: 2021-01-22 | End: 2021-01-23 | Stop reason: HOSPADM

## 2021-01-22 RX ORDER — ACETAMINOPHEN 325 MG/1
650 TABLET ORAL EVERY 6 HOURS PRN
Status: DISCONTINUED | OUTPATIENT
Start: 2021-01-22 | End: 2021-01-23 | Stop reason: HOSPADM

## 2021-01-22 RX ORDER — ESCITALOPRAM OXALATE 20 MG/1
20 TABLET ORAL DAILY
COMMUNITY

## 2021-01-22 RX ORDER — ALBUTEROL SULFATE 90 UG/1
2 AEROSOL, METERED RESPIRATORY (INHALATION) EVERY 6 HOURS PRN
COMMUNITY

## 2021-01-22 RX ORDER — ALBUTEROL SULFATE 90 UG/1
2 AEROSOL, METERED RESPIRATORY (INHALATION) 4 TIMES DAILY
Status: DISCONTINUED | OUTPATIENT
Start: 2021-01-22 | End: 2021-01-23 | Stop reason: HOSPADM

## 2021-01-22 RX ORDER — ONDANSETRON 2 MG/ML
4 INJECTION INTRAMUSCULAR; INTRAVENOUS EVERY 6 HOURS PRN
Status: DISCONTINUED | OUTPATIENT
Start: 2021-01-22 | End: 2021-01-23 | Stop reason: HOSPADM

## 2021-01-22 RX ORDER — POTASSIUM CHLORIDE 750 MG/1
10 TABLET, EXTENDED RELEASE ORAL 2 TIMES DAILY
COMMUNITY

## 2021-01-22 RX ADMIN — ALBUTEROL SULFATE 2 PUFF: 90 AEROSOL, METERED RESPIRATORY (INHALATION) at 16:47

## 2021-01-22 RX ADMIN — HYDROXYZINE HYDROCHLORIDE 50 MG: 25 TABLET, FILM COATED ORAL at 11:34

## 2021-01-22 RX ADMIN — ENOXAPARIN SODIUM 40 MG: 40 INJECTION SUBCUTANEOUS at 19:58

## 2021-01-22 RX ADMIN — CEFTRIAXONE 1000 MG: 1 INJECTION, POWDER, FOR SOLUTION INTRAMUSCULAR; INTRAVENOUS at 11:14

## 2021-01-22 RX ADMIN — HYDROXYZINE HYDROCHLORIDE 50 MG: 25 TABLET, FILM COATED ORAL at 20:00

## 2021-01-22 RX ADMIN — AZITHROMYCIN MONOHYDRATE 500 MG: 500 INJECTION, POWDER, LYOPHILIZED, FOR SOLUTION INTRAVENOUS at 11:14

## 2021-01-22 RX ADMIN — ALBUTEROL SULFATE 2 PUFF: 90 AEROSOL, METERED RESPIRATORY (INHALATION) at 11:35

## 2021-01-22 RX ADMIN — ALBUTEROL SULFATE 2 PUFF: 90 AEROSOL, METERED RESPIRATORY (INHALATION) at 09:47

## 2021-01-22 RX ADMIN — ESCITALOPRAM OXALATE 20 MG: 10 TABLET ORAL at 10:24

## 2021-01-22 RX ADMIN — ENOXAPARIN SODIUM 40 MG: 40 INJECTION SUBCUTANEOUS at 10:24

## 2021-01-22 RX ADMIN — ALBUTEROL SULFATE 2 PUFF: 90 AEROSOL, METERED RESPIRATORY (INHALATION) at 19:58

## 2021-01-22 RX ADMIN — DEXAMETHASONE 6 MG: 2 TABLET ORAL at 10:24

## 2021-01-22 RX ADMIN — Medication 2000 UNITS: at 10:24

## 2021-01-22 NOTE — PLAN OF CARE
Patient admitted this a.m. Patient seen. Doing well. No new complaints. Shortness of breath stable. Patient reports feeling anxious, with a feeling of panic attacks and claustrophobic in the room. Home anxiolytic resume. Continue current management.

## 2021-01-22 NOTE — PROGRESS NOTES
Physical Therapy    Facility/Department: [facility] ONCOLOGY UNIT  Initial Assessment    NAME: Avni Washington  : 1950  MRN: [id number]    Date of Service: 2021    Discharge Recommendations:  Continue to assess pending progress, Patient would benefit from continued therapy after discharge, 24 hour supervision or assist        Assessment   Body structures, Functions, Activity limitations: Decreased functional mobility ; Decreased ADL status; Decreased ROM; Decreased strength;Decreased endurance;Decreased balance  Assessment: Pt UP IN CHAIR PER NSG. ABLE TO STAND AND TAKE STEPS TOWARD BED, STOOD WITH UE SUPPORT WHILE CHAIR WAS REARRANGED. WILL CONT WITH PROGRESSIVE AMB. PT Education: PT Role;Plan of Care  REQUIRES PT FOLLOW UP: Yes  Activity Tolerance  Activity Tolerance: Patient Tolerated treatment well       Patient Diagnosis(es): There were no encounter diagnoses. has a past medical history of COPD (chronic obstructive pulmonary disease) ([facility] [address] Deviated septum, Hypothyroidism, and Palliative care patient. has a past surgical history that includes Nose surgery; Colonoscopy; and colostomy (2018).     Restrictions  Restrictions/Precautions  Restrictions/Precautions: Isolation, Fall Risk  Position Activity Restriction  Other position/activity restrictions: droplet plus  Vision/Hearing  Vision: Within Functional Limits  Hearing: Within functional limits     Subjective  General  Patient assessed for rehabilitation services?: Yes  Diagnosis: COPD EXAC, COVID  Subjective  Subjective: Pt INITIALLY STATES \"I DON'T NEED ANY THERAPY\" BUT WILLING TO PARTICIPATE  Pain Screening  Patient Currently in Pain: No  Vital Signs  Patient Currently in Pain: No  Oxygen Therapy  SpO2: 97 %(WITH ACTIVITY)  O2 Device: Nasal cannula  O2 Flow Rate (L/min): 5 L/min       Orientation  Orientation  Overall Orientation Status: Within Normal Limits  Social/Functional History  Social/Functional History  Lives With: Family  ADL Assistance: Needs assistance  Homemaking Assistance: Needs assistance  Ambulation Assistance: Independent(normally doesn't need a walking device indoors, has a walker available to her, uses a wheelchair out of doors)  Transfer Assistance: Independent  Cognition   Cognition  Overall Cognitive Status: WNL    Objective     Observation/Palpation  Edema: BLE'S - encouraged pt to elevate in recliner but declines    AROM RLE (degrees)  RLE General AROM: BLE's LIMITED FULLY DUE TO WEAKNESS AND BODY HABITUS  Strength RLE  Comment: GROSSLY -4/5  Strength LLE  Comment: GROSSLY -4/5        Bed mobility  Supine to Sit: (NT: UP IN CHAIR)  Transfers  Sit to Stand: Contact guard assistance  Stand to sit: Contact guard assistance  Bed to Chair: Contact guard assistance  Stand Pivot Transfers: Contact guard assistance  Comment: STAND STEP WITHOUT AD FROM RECLINER TO BED (WITH RAILS UP), STOOD, ABLE TO DEMONSTRATE DYNAMIC BALANCE ACTIVITY WITH CGA, STEPPED BACK TO RECLINER  Ambulation  Ambulation?: No     Balance  Sitting - Dynamic: Good  Standing - Dynamic: Fair;+        Plan   Plan  Times per week: AT LEAST 4-6  Current Treatment Recommendations: Strengthening, Balance Training, Functional Mobility Training, Transfer Training, Gait Training, Patient/Caregiver Education & Training, Safety Education & Training  Safety Devices  Type of devices: Call light within reach, Chair alarm in place    G-Code       OutComes Score                                                  AM-PAC Score             Goals  Short term goals  Time Frame for Short term goals: 14 DAYS  Short term goal 1: BED MOB MOD IND  Short term goal 2: TRANSFERS SUP-IND  Short term goal 3: ' SUPERVISION       Therapy Time   Individual Concurrent Group Co-treatment   Time In           Time Out           Minutes                   Mark Nipple, PT

## 2021-01-22 NOTE — CARE COORDINATION
Eliquis coupon has been provided for pt if needed at dc. SW following for further dc needs. Electronically signed by Gume Fernandes on 1/22/2021 at 8:00 AM    Attempted to reach pt's room to discuss dc planning. No answer. Will try again later.    Electronically signed by Gume Fernandes on 1/22/2021 at 8:33 AM

## 2021-01-22 NOTE — H&P
Hospitalist: History and Physical    Date: 2021 Time: 6:24 AM    Name: Gaetano Salinas : 1950 MRN: [id number]    Code Status: Full Code No additional code details    PCP: Unknown Provider Result (Inactive)    Patient's Chief Complaint Is: sob    HPI:  70 yo obese female with COPD, chronic respiratory failure on 4-5 L NC supplemental O2 at baseline, tobacco abuse, hypothyroidism, presents as transfer from outside facility [facility]) after presenting there with 1-2 days of progressive dyspnea worse from her baseline. She has had a minimally productive cough although not significantly worse from her baseline. No chest pain, palpitations, hemoptysis, fevers, chills, sweats. She has fatigue with deconditioning but has not worsened recently. No change in appetite or loss of taste or smell. At outside facility she was initially tachypneic, however never required more than 4-5 L nasal cannula. She tested positive for COVID-19 on rapid screen. ABG at outside facility showed pH 7.6, PCO2 43, and PO2 138 on supplemental oxygen. Chest x-ray showed findings consistent with left basilar atelectasis versus pneumonia. Leukocytosis with WBC 17.  Procalcitonin was negative. Patient had some improvement in respiratory status with treatment at outside facility with bronchodilators and Solu-Medrol. Past Medical History:   Diagnosis Date    COPD (chronic obstructive pulmonary disease) (HCC)     NC 5L baseline at home     Deviated septum     Hypothyroidism      Past Surgical History:   Procedure Laterality Date    COLONOSCOPY      COLOSTOMY  2018    NOSE SURGERY       No family history on file. Social History     Socioeconomic History    Marital status:       Spouse name: Not on file    Number of children: Not on file    Years of education: Not on file    Highest education level: Not on file   Occupational History    Not on file   Social Needs    Financial resource strain: Not on file   [facility] Food insecurity     Worry: Not on file     Inability: Not on file    Transportation needs     Medical: Not on file     Non-medical: Not on file   Tobacco Use    Smoking status: Current Every Day Smoker     Packs/day: 0.25     Years: 50.00     Pack years: 12.50     Types: Cigarettes   Substance and Sexual Activity    Alcohol use: Not on file    Drug use: Not on file    Sexual activity: Not on file   Lifestyle    Physical activity     Days per week: Not on file     Minutes per session: Not on file    Stress: Not on file   Relationships    Social connections     Talks on phone: Not on file     Gets together: Not on file     Attends [employer] service: Not on file     Active member of club or organization: Not on file     Attends meetings of clubs or organizations: Not on file     Relationship status: Not on file    Intimate partner violence     Fear of current or ex partner: Not on file     Emotionally abused: Not on file     Physically abused: Not on file     Forced sexual activity: Not on file   Other Topics Concern    Not on file   Social History Narrative    Not on file     Not on File  Prior to Admission medications    Medication Sig Start Date End Date Taking? Authorizing Provider   escitalopram (LEXAPRO) 20 MG tablet Take 20 mg by mouth daily   Yes Historical Provider, MD       I have reviewed all pertinent history. Prior medical records and laboratory evaluation reviewed. Imaging independently reviewed. Review of Systems:   As per HPI, otherwise all other ROS performed and found to be negative at this time. Physical Exam:  There were no vitals filed for this visit. CONSTITUTIONAL: Obese, no acute distress  PSYCH: Poor historian  HEENT: Normocephalic/atraumatic, no scleral icterus  NECK: Trachea is midline. LUNGS: Normal respiratory effort, no intercostal retractions or accessory muscle use.    CARDIOVASCULAR: Regular rate and rhythm  GI/ABDOMEN: Obese abdomen, nondistended  SKIN: No rashes or lesions  NEUROLOGICAL: No apparent focal neurologic deficits  EXTREMITIES: No deformities    Labs: No results found for this or any previous visit (from the past 72 hour(s)). Imaging: No results found. Assessment/Plan:     Active Problems:    COPD exacerbation (HCC)  Resolved Problems:    * No resolved hospital problems.  *       COVID-19  COPD exacerbation  Left basilar atelectasis, possible community-acquired pneumonia  Chronic hypoxic respiratory failure, on 4-5 L supplemental O2 at baseline    -Continue supplemental O2 support, at baseline  -Screen for Covid with biofire test  -Scheduled inhaled albuterol  -Steroids  -Rocephin and azithromycin for possible CAP  -Incentive spirometry  -Monitor labs, inflammatory markers, procalcitonin    Fatigue/generalized weakness  -PT/OT evaluation    Reconcile home meds    Anticoagulate with Lovenox per Covid protocol      Leroy Kumar  1/22/2021 6:24 AM

## 2021-01-22 NOTE — PROGRESS NOTES
Occupational Therapy   Occupational Therapy Initial Assessment  Date: 2021   Patient Name: Chelle Ballesteros  MRN: [id number]     : 1950    Date of Service: 2021    Discharge Recommendations:  24 hour supervision or assist       Assessment   Assessment: Evaluation completed and tx initiated. At current level, patient will need some supervision/assist at discharge. Treatment Diagnosis: COPD exacerbation  History: uses 4-5L/nc at baseline  REQUIRES OT FOLLOW UP: Yes  Activity Tolerance  Activity Tolerance: Patient Tolerated treatment well  Safety Devices  Safety Devices in place: Yes  Type of devices: Call light within reach; Chair alarm in place;Nurse notified           Patient Diagnosis(es): There were no encounter diagnoses. has a past medical history of COPD (chronic obstructive pulmonary disease) ([facility] [address] Deviated septum, Hypothyroidism, and Palliative care patient. has a past surgical history that includes Nose surgery; Colonoscopy; and colostomy (2018).     Treatment Diagnosis: COPD exacerbation      Restrictions  Restrictions/Precautions  Restrictions/Precautions: Isolation, Fall Risk  Position Activity Restriction  Other position/activity restrictions: droplet plus    Subjective   General  Chart Reviewed: Yes  Patient assessed for rehabilitation services?: Yes  Family / Caregiver Present: No  General Comment  Comments: Nurse, Helder Clark therapy  Patient Currently in Pain: No  Vital Signs  Patient Currently in Pain: No  Social/Functional History  Social/Functional History  Lives With: Family  ADL Assistance: Needs assistance  Homemaking Assistance: Needs assistance  Ambulation Assistance: Independent(normally doesn't need a walking device indoors, has a walker available to her, uses a wheelchair out of doors)  Transfer Assistance: Independent       Objective              Balance  Sitting Balance: Independent  Standing Balance: Contact guard assistance  Functional Mobility  Assist Level: Contact guard assistance  Functional Mobility Comments: beginning level task  Toilet Transfers  Toilet Transfer: Contact guard assistance  ADL  Feeding: Independent  Grooming: Independent;Setup  UE Bathing: Minimal assistance  LE Bathing: Minimal assistance  UE Dressing: Independent;Setup  LE Dressing: Minimal assistance  Toileting: Contact guard assistance           Transfers  Stand Step Transfers: Contact guard assistance                       LUE PROM (degrees)  LUE PROM: WFL  LUE AROM (degrees)  LUE AROM : WFL  RUE PROM (degrees)  RUE PROM: WFL  RUE AROM (degrees)  RUE AROM : WFL                    Tx initiated:  Balance activities, training in therapeutic activities (15 mins)  Plan   Plan  Times per week: 3-5    G-Code     OutComes Score                                                  AM-PAC Score             Goals  Short term goals  Short term goal 1: Patient will be modified independent with seated pant dressing with AE  Short term goal 2: Modified independent with standing level ADL  Short term goal 3: Modified independent with transfers and light ambulatory ADL  Short term goal 4: Independent with therapeutic activity recommendations  Short term goal 5:  Independent to state/demo pacing and energy conservation strategies       Therapy Time   Individual Concurrent Group Co-treatment   Time In           Time Out           Minutes                   Imelda Landis OT Electronically signed by Imelda Landis OT on 1/22/2021 at 3:19 PM

## 2021-01-23 VITALS
TEMPERATURE: 97.8 F | RESPIRATION RATE: 18 BRPM | SYSTOLIC BLOOD PRESSURE: 124 MMHG | OXYGEN SATURATION: 97 % | DIASTOLIC BLOOD PRESSURE: 67 MMHG | HEART RATE: 75 BPM

## 2021-01-23 LAB
ALBUMIN SERPL-MCNC: 3.4 G/DL (ref 3.5–5.2)
ALP BLD-CCNC: 94 U/L (ref 35–104)
ALT SERPL-CCNC: 16 U/L (ref 5–33)
ANION GAP SERPL CALCULATED.3IONS-SCNC: 9 MMOL/L (ref 7–19)
APTT: 38.3 SEC (ref 26–36.2)
AST SERPL-CCNC: 10 U/L (ref 5–32)
BASOPHILS ABSOLUTE: 0 K/UL (ref 0–0.2)
BASOPHILS RELATIVE PERCENT: 0.1 % (ref 0–1)
BILIRUB SERPL-MCNC: <0.2 MG/DL (ref 0.2–1.2)
BUN BLDV-MCNC: 17 MG/DL (ref 8–23)
C-REACTIVE PROTEIN: 7.93 MG/DL (ref 0–0.5)
CALCIUM SERPL-MCNC: 8.9 MG/DL (ref 8.8–10.2)
CHLORIDE BLD-SCNC: 96 MMOL/L (ref 98–111)
CO2: 36 MMOL/L (ref 22–29)
CREAT SERPL-MCNC: 1.1 MG/DL (ref 0.5–0.9)
D DIMER: 0.69 UG/ML FEU (ref 0–0.48)
EOSINOPHILS ABSOLUTE: 0 K/UL (ref 0–0.6)
EOSINOPHILS RELATIVE PERCENT: 0 % (ref 0–5)
FERRITIN: <0.5 NG/ML (ref 13–150)
FIBRINOGEN: 610 MG/DL (ref 238–505)
GFR AFRICAN AMERICAN: >59
GFR NON-AFRICAN AMERICAN: 49
GLUCOSE BLD-MCNC: 157 MG/DL (ref 74–109)
HCT VFR BLD CALC: 29.5 % (ref 37–47)
HEMOGLOBIN: 8.8 G/DL (ref 12–16)
IMMATURE GRANULOCYTES #: 0.1 K/UL
LACTATE DEHYDROGENASE: 190 U/L (ref 91–215)
LYMPHOCYTES ABSOLUTE: 1.1 K/UL (ref 1.1–4.5)
LYMPHOCYTES RELATIVE PERCENT: 6.2 % (ref 20–40)
MCH RBC QN AUTO: 27.8 PG (ref 27–31)
MCHC RBC AUTO-ENTMCNC: 29.8 G/DL (ref 33–37)
MCV RBC AUTO: 93.1 FL (ref 81–99)
MONOCYTES ABSOLUTE: 0.7 K/UL (ref 0–0.9)
MONOCYTES RELATIVE PERCENT: 3.7 % (ref 0–10)
NEUTROPHILS ABSOLUTE: 16.2 K/UL (ref 1.5–7.5)
NEUTROPHILS RELATIVE PERCENT: 89.3 % (ref 50–65)
PDW BLD-RTO: 18.3 % (ref 11.5–14.5)
PLATELET # BLD: 371 K/UL (ref 130–400)
PMV BLD AUTO: 9.2 FL (ref 9.4–12.3)
POTASSIUM REFLEX MAGNESIUM: 4.5 MMOL/L (ref 3.5–5)
PROCALCITONIN: 0.11 NG/ML (ref 0–0.09)
RBC # BLD: 3.17 M/UL (ref 4.2–5.4)
SODIUM BLD-SCNC: 141 MMOL/L (ref 136–145)
TOTAL PROTEIN: 6.5 G/DL (ref 6.6–8.7)
WBC # BLD: 18.2 K/UL (ref 4.8–10.8)

## 2021-01-23 PROCEDURE — 2580000003 HC RX 258: Performed by: STUDENT IN AN ORGANIZED HEALTH CARE EDUCATION/TRAINING PROGRAM

## 2021-01-23 PROCEDURE — 6370000000 HC RX 637 (ALT 250 FOR IP): Performed by: INTERNAL MEDICINE

## 2021-01-23 PROCEDURE — 85025 COMPLETE CBC W/AUTO DIFF WBC: CPT

## 2021-01-23 PROCEDURE — 80053 COMPREHEN METABOLIC PANEL: CPT

## 2021-01-23 PROCEDURE — 6360000002 HC RX W HCPCS: Performed by: STUDENT IN AN ORGANIZED HEALTH CARE EDUCATION/TRAINING PROGRAM

## 2021-01-23 PROCEDURE — 36415 COLL VENOUS BLD VENIPUNCTURE: CPT

## 2021-01-23 PROCEDURE — 83615 LACTATE (LD) (LDH) ENZYME: CPT

## 2021-01-23 PROCEDURE — 96376 TX/PRO/DX INJ SAME DRUG ADON: CPT

## 2021-01-23 PROCEDURE — 86140 C-REACTIVE PROTEIN: CPT

## 2021-01-23 PROCEDURE — 96372 THER/PROPH/DIAG INJ SC/IM: CPT

## 2021-01-23 PROCEDURE — 2700000000 HC OXYGEN THERAPY PER DAY

## 2021-01-23 PROCEDURE — 84145 PROCALCITONIN (PCT): CPT

## 2021-01-23 PROCEDURE — 94761 N-INVAS EAR/PLS OXIMETRY MLT: CPT

## 2021-01-23 PROCEDURE — 82728 ASSAY OF FERRITIN: CPT

## 2021-01-23 PROCEDURE — 85730 THROMBOPLASTIN TIME PARTIAL: CPT

## 2021-01-23 PROCEDURE — 85384 FIBRINOGEN ACTIVITY: CPT

## 2021-01-23 PROCEDURE — 6370000000 HC RX 637 (ALT 250 FOR IP): Performed by: STUDENT IN AN ORGANIZED HEALTH CARE EDUCATION/TRAINING PROGRAM

## 2021-01-23 PROCEDURE — 85379 FIBRIN DEGRADATION QUANT: CPT

## 2021-01-23 PROCEDURE — 96366 THER/PROPH/DIAG IV INF ADDON: CPT

## 2021-01-23 PROCEDURE — G0378 HOSPITAL OBSERVATION PER HR: HCPCS

## 2021-01-23 RX ORDER — CHOLECALCIFEROL (VITAMIN D3) 50 MCG
2000 TABLET ORAL DAILY
Qty: 30 TABLET | Refills: 0 | Status: SHIPPED | OUTPATIENT
Start: 2021-01-24 | End: 2021-02-23

## 2021-01-23 RX ORDER — LEVOFLOXACIN 500 MG/1
500 TABLET, FILM COATED ORAL DAILY
Qty: 5 TABLET | Refills: 0 | Status: SHIPPED | OUTPATIENT
Start: 2021-01-23 | End: 2021-01-23 | Stop reason: HOSPADM

## 2021-01-23 RX ORDER — CEFDINIR 300 MG/1
300 CAPSULE ORAL 2 TIMES DAILY
Qty: 14 CAPSULE | Refills: 0 | Status: SHIPPED | OUTPATIENT
Start: 2021-01-23 | End: 2021-01-30

## 2021-01-23 RX ORDER — DEXAMETHASONE 6 MG/1
6 TABLET ORAL DAILY
Qty: 8 TABLET | Refills: 0 | Status: SHIPPED | OUTPATIENT
Start: 2021-01-24 | End: 2021-02-01

## 2021-01-23 RX ORDER — AZITHROMYCIN 500 MG/1
500 TABLET, FILM COATED ORAL DAILY
Qty: 3 TABLET | Refills: 0 | Status: SHIPPED | OUTPATIENT
Start: 2021-01-23 | End: 2021-01-26

## 2021-01-23 RX ADMIN — HYDROXYZINE HYDROCHLORIDE 50 MG: 25 TABLET, FILM COATED ORAL at 14:40

## 2021-01-23 RX ADMIN — HYDROXYZINE HYDROCHLORIDE 50 MG: 25 TABLET, FILM COATED ORAL at 05:08

## 2021-01-23 RX ADMIN — ENOXAPARIN SODIUM 40 MG: 40 INJECTION SUBCUTANEOUS at 08:17

## 2021-01-23 RX ADMIN — AZITHROMYCIN MONOHYDRATE 500 MG: 500 INJECTION, POWDER, LYOPHILIZED, FOR SOLUTION INTRAVENOUS at 05:07

## 2021-01-23 RX ADMIN — ALBUTEROL SULFATE 2 PUFF: 90 AEROSOL, METERED RESPIRATORY (INHALATION) at 08:17

## 2021-01-23 RX ADMIN — ALBUTEROL SULFATE 2 PUFF: 90 AEROSOL, METERED RESPIRATORY (INHALATION) at 11:53

## 2021-01-23 RX ADMIN — SALINE NASAL SPRAY 1 SPRAY: 1.5 SOLUTION NASAL at 11:53

## 2021-01-23 RX ADMIN — ESCITALOPRAM OXALATE 20 MG: 10 TABLET ORAL at 08:18

## 2021-01-23 RX ADMIN — Medication 2000 UNITS: at 08:18

## 2021-01-23 RX ADMIN — CEFTRIAXONE 1000 MG: 1 INJECTION, POWDER, FOR SOLUTION INTRAMUSCULAR; INTRAVENOUS at 05:08

## 2021-01-23 RX ADMIN — DEXAMETHASONE 6 MG: 2 TABLET ORAL at 08:17

## 2021-01-23 NOTE — PROGRESS NOTES
Attempted to call [facility] (daughter) , Shruthi Guido (daughter) , and Erin Calderon (daughter SO per patient request)  to inform them of patient discharge order. All attempts were unsuccessful.    Electronically signed by Stefanie Dill RN on 1/23/2021 at 2:09 PM

## 2021-01-23 NOTE — PROGRESS NOTES
Spoke with daughter, Antonio Flores. Patient's home oxygen is provided from [facility], and a portable O2 tank from home will be brought for her car ride home. The patient has a caregiver at home that helps her perform ADLs.   Electronically signed by Kwabena Walsh RN on 1/23/2021 at 2:23 PM

## 2021-01-23 NOTE — PROGRESS NOTES
Patient refusing continuous pulse-ox at this time. When trying to place to pulse-ox the patient stated, \" I do not want that, it is a pain in the butt and my oxygen has been fine. \" Patient was educated on the importance of oxygen monitoring, however she still declined.    Electronically signed by Melissa Otero RN on 1/23/2021 at 8:24 AM

## 2021-01-23 NOTE — DISCHARGE SUMMARY
Ainsley Man  :  1950  MRN:  [id number]    Admit date:  2021  Discharge date:  2021       Admitting Physician:  James Judge MD    Advance Directive: Full Code    Consults Made:   PALLIATIVE CARE EVAL      Primary Care Physician:  Roberta Sheridan MD    Discharge Diagnoses: Active Problems:    COPD exacerbation ([address]    Palliative care patient  Resolved Problems:    * No resolved hospital problems. *        Pertinent Labs:  CBC with DIFF:  Recent Labs     21  0732 21  0254   WBC 18.4* 18.2*   RBC 3.59* 3.17*   HGB 9.9* 8.8*   HCT 34.1* 29.5*   MCV 95.0 93.1   MCH 27.6 27.8   MCHC 29.0* 29.8*   RDW 18.6* 18.3*   * 371   MPV 9.1* 9.2*   NEUTOPHILPCT 95.3* 89.3*   LYMPHOPCT 3.3* 6.2*   MONOPCT 0.3 3.7   EOSRELPCT 0.1 0.0   BASOPCT 0.3 0.1   NEUTROABS 17.5* 16.2*   LYMPHSABS 0.6* 1.1   MONOSABS 0.10 0.70   EOSABS 0.00 0.00   BASOSABS 0.10 0.00       CMP/BMP:  Recent Labs     21  0732 21  0254    141   K 4.5 4.5   CL 95* 96*   CO2 36* 36*   ANIONGAP 9 9   GLUCOSE 200* 157*   BUN 14 17   CREATININE 1.1* 1.1*   LABGLOM 49* 49*   CALCIUM 9.2 8.9   PROT 7.3 6.5*   LABALBU 3.6 3.4*   BILITOT 0.3 <0.2   ALKPHOS 107* 94   ALT 19 16   AST 12 10         CRP:    Recent Labs     21  0254   CRP 7.93*     Sed Rate:  No results for input(s): SEDRATE in the last 72 hours. HgBA1c:  No components found for: HGBA1C  FLP:  No results found for: TRIG, HDL, LDLCALC, LDLDIRECT, LABVLDL  TSH:    Lab Results   Component Value Date    TSH 0.81 2015     Troponin T:   Recent Labs     21  0732   TROPONINI <0.01     Pro-BNP: No results for input(s): BNP in the last 72 hours.   INR:   Recent Labs     21  0732   INR 1.17     ABGs: No results found for: PHART, PO2ART, HNZ9MWP  UA:No results for input(s): NITRITE, COLORU, PHUR, LABCAST, WBCUA, RBCUA, MUCUS, TRICHOMONAS, YEAST, BACTERIA, CLARITYU, SPECGRAV, LEUKOCYTESUR, UROBILINOGEN, BILIRUBINUR, BLOODU, GLUCOSEU, smell.     At outside facility she was initially tachypneic, however never required more than 4-5 L nasal cannula. She tested positive for COVID-19 on rapid screen. ABG at outside facility showed pH 7.6, PCO2 43, and PO2 138 on supplemental oxygen. Chest x-ray showed findings consistent with left basilar atelectasis versus pneumonia. Leukocytosis with WBC 17.    Procalcitonin was negative. Patient had some improvement in respiratory status with treatment at outside facility with bronchodilators and Solu-Medrol. COPD Exacerbation  · Initial chest x-ray reporting a left basilar atelectasis, possible community-acquired pneumonia  · Chronic hypoxic respiratory failure, on 4-5 L supplemental O2 at baseline  · Rapid COVID-19 antigen test reportedly positive as per OSH  · Molecular panel, with COVID-19 test on presentation, negative (01/22/2021)  · Continued bronchodilators  · Continued oxygen supplementation  · Patient continued to require only baseline oxygen throughout hospital course. · Continued systemic steroids  · Ceftriaxone and azithromycin started for possible CAP  · Patient clinically stable for discharge      Fatigue/generalized weakness  · PT/OT evaluation    Continued management for other chronic medical conditions. Physical Exam:  Vital Signs: /67   Pulse 75   Temp 97.8 °F (36.6 °C) (Temporal)   Resp 18   SpO2 97%   Physical Exam  Vitals signs and nursing note reviewed. Constitutional:       General: She is not in acute distress. Appearance: She is obese. She is not ill-appearing, toxic-appearing or diaphoretic. HENT:      Head: Normocephalic and atraumatic. Right Ear: External ear normal.      Left Ear: External ear normal.   Pulmonary:      Effort: Pulmonary effort is normal. No respiratory distress. Neurological:      Mental Status: She is alert and oriented to person, place, and time.    Psychiatric:         Mood and Affect: Mood normal.         Behavior: Behavior normal.         Thought Content: Thought content normal.         Judgment: Judgment normal.         Discharge Medications:       Misael Carrillo N Diana Ureña Medication Instructions XOCHILT:[id number]    Printed on:01/23/21 4413   Medication Information                      albuterol sulfate HFA (VENTOLIN HFA) 108 (90 Base) MCG/ACT inhaler  Inhale 2 puffs into the lungs every 6 hours as needed for Wheezing             azithromycin (ZITHROMAX) 500 MG tablet  Take 1 tablet by mouth daily for 3 days             budesonide-formoterol (SYMBICORT) 160-4.5 MCG/ACT AERO  Inhale 2 puffs into the lungs 2 times daily             cefdinir (OMNICEF) 300 MG capsule  Take 1 capsule by mouth 2 times daily for 7 days             dexamethasone (DECADRON) 6 MG tablet  Take 1 tablet by mouth daily for 8 days             escitalopram (LEXAPRO) 20 MG tablet  Take 20 mg by mouth daily             furosemide (LASIX) 20 MG tablet  Take 20 mg by mouth daily             guaiFENesin (MUCINEX) 600 MG extended release tablet  Take 1,200 mg by mouth 2 times daily             hydrOXYzine (ATARAX) 50 MG tablet  Take 50 mg by mouth every 8 hours as needed for Anxiety              hydrOXYzine (ATARAX) 50 MG tablet  Take 50 mg by mouth 3 times daily as needed             levothyroxine (SYNTHROID) 75 MCG tablet  Take 75 mcg by mouth Daily             meclizine (ANTIVERT) 25 MG tablet  Take 25 mg by mouth 2 times daily as needed             nystatin (MYCOSTATIN) 479353 UNIT/GM cream  Apply topically 3 times daily as needed for Dry Skin Apply topically 2 times daily. potassium chloride (KLOR-CON M) 10 MEQ extended release tablet  Take 10 mEq by mouth 2 times daily             Vitamin D (CHOLECALCIFEROL) 50 MCG (2000 UT) TABS tablet  Take 1 tablet by mouth daily                 Discharge Instructions: Follow up with Jeanie Maldonado MD in 7 days. Take medications as directed. Resume activity as tolerated.     Diet: DIET CARDIAC; DISCHARGE STATUS:    Condition: Good  Disposition: Patient is medically stable and will be discharged home    Extended Emergency Contact Information  Primary Emergency Contact: yoandy hinojosa  Home Phone: [phone]  Relation: Child   needed? No  Secondary Emergency Contact: rick estrada  Home Phone: [phone]  Relation: Other   needed? No       Time Spent on discharge is more than 30 mins in the examination, evaluation, counseling and review of medications and discharge plan. Electronically signed by   Josy Porter MD, MPH,   Internal Medicine Hospitalist   1/23/2021 1:05 PM      Thank you Sage Miranda MD for the opportunity to be involved in this patient's care. If you have any questions or concerns please feel free to contact me at [phone]        EMR Dragon/Transcription disclaimer:   Much of this encounter note is an electronic transcription/translation of spoken language to printed text.  The electronic translation of spoken language may permit erroneous, or at times, nonsensical words or phrases to be inadvertently transcribed; although attempts have made to review the note for such errors, some may still exist.

## 2021-01-23 NOTE — PROGRESS NOTES
 azithromycin (ZITHROMAX) 500 mg in D5W 250ml Vial Mate  500 mg Intravenous Q24H Sherwin Rushing MD   Stopped at 01/23/21 0604    albuterol sulfate  (90 Base) MCG/ACT inhaler 2 puff  2 puff Inhalation 4x daily Sherwin Rushing MD   2 puff at 01/23/21 0817    hydrOXYzine (ATARAX) tablet 50 mg  50 mg Oral TID PRN Lisbet Mendoza MD   50 mg at 01/23/21 0508           escitalopram  20 mg Oral Daily    enoxaparin  40 mg Subcutaneous BID    dexamethasone  6 mg Oral Daily    Vitamin D  2,000 Units Oral Daily    cefTRIAXone (ROCEPHIN) IV  1,000 mg Intravenous Q24H    azithromycin  500 mg Intravenous Q24H    albuterol sulfate HFA  2 puff Inhalation 4x daily     sodium chloride, acetaminophen **OR** acetaminophen, guaiFENesin-dextromethorphan, ondansetron, hydrOXYzine  DIET CARDIAC;       Labs:   CBC with DIFF:  Recent Labs     01/22/21  0732 01/23/21  0254   WBC 18.4* 18.2*   RBC 3.59* 3.17*   HGB 9.9* 8.8*   HCT 34.1* 29.5*   MCV 95.0 93.1   MCH 27.6 27.8   MCHC 29.0* 29.8*   RDW 18.6* 18.3*   * 371   MPV 9.1* 9.2*   NEUTOPHILPCT 95.3* 89.3*   LYMPHOPCT 3.3* 6.2*   MONOPCT 0.3 3.7   EOSRELPCT 0.1 0.0   BASOPCT 0.3 0.1   NEUTROABS 17.5* 16.2*   LYMPHSABS 0.6* 1.1   MONOSABS 0.10 0.70   EOSABS 0.00 0.00   BASOSABS 0.10 0.00       CMP/BMP:  Recent Labs     01/22/21  0732 01/23/21  0254    141   K 4.5 4.5   CL 95* 96*   CO2 36* 36*   ANIONGAP 9 9   GLUCOSE 200* 157*   BUN 14 17   CREATININE 1.1* 1.1*   LABGLOM 49* 49*   CALCIUM 9.2 8.9   PROT 7.3 6.5*   LABALBU 3.6 3.4*   BILITOT 0.3 <0.2   ALKPHOS 107* 94   ALT 19 16   AST 12 10         CRP:    Recent Labs     01/23/21  0254   CRP 7.93*     Sed Rate:  No results for input(s): SEDRATE in the last 72 hours.       HgBA1c:  No components found for: HGBA1C  FLP:  No results found for: TRIG, HDL, LDLCALC, LDLDIRECT, LABVLDL  TSH:    Lab Results   Component Value Date    TSH 0.81 06/02/2015     Troponin T:   Recent Labs     01/22/21  0732 01/23/21 0647 125/69 98 °F (36.7 °C) Temporal 84 19 97 %   01/23/21 0017 (!) 147/86 97.2 °F (36.2 °C) Temporal 92 18 98 %   01/22/21 1818 (!) 140/73 97 °F (36.1 °C) Temporal 88 16 97 %   01/22/21 1430      97 %   01/22/21 1147 129/70 97.6 °F (36.4 °C) Temporal 104 20 97 %       24HR INTAKE/OUTPUT:      Intake/Output Summary (Last 24 hours) at 1/23/2021 1025  Last data filed at 1/22/2021 1235  Gross per 24 hour   Intake 360 ml   Output    Net 360 ml       Physical Exam  Nursing notes and vital signs reviewed***  General appearance: No apparent distress, appears stated age and cooperative. Well developed and well groomed. HEENT: atraumatic, eyes with clear conjunctiva and normal lids, pupils and irises normal, external ears and nose are normal, lips normal. Hearing ***Intact. Lips with ***No blisters. Neck: Supple, with full range of motion. No jugular venous distention. Trachea midline. Thyroid no masses noted. No lympadenopathy or bruit. Lungs: Patient in no acute respiratory distress, no increased work of breathing, {lung exam:09873::\"\"clear to auscultation bilaterally\" without rales, rhonchi or wheezes\"}  Heart: {heart exam:5510::\"regular rate and rhythm, S1, S2 normal, no murmur, click, rub or gallop\"}  Abdomen: soft, non-tender; {DISTENTION:846898912} {EXAM; ABDOMEN BOWEL SOUNDS:56776} no masses, no organomegaly  Musculoskeletal: ***ROM Intact in B/L Upper and LE. No joint tenderness or Deformity throughout. Extremities: {extremity exam:5109::\"extremities normal, atraumatic, no cyanosis or edema\"}  Neurologic: No focal neurologic deficits, normal sensation, ***CN: II-XII intact, grossly non-focal.  Skin: Skin color, texture, turgor normal. No rashes or lesions  Psychiatric: Alert and oriented, affect and mood appropriate, thought content appropriate, normal insight. ***Intact memory.     Assessment/plan:     Patient Active Problem List    Diagnosis Date Noted    COPD exacerbation ([facility] [address] 01/22/2021  Palliative care patient 01/22/2021       Hospital Problems           Last Modified POA    COPD exacerbation ([address] 1/22/2021 Yes    Palliative care patient 1/22/2021 Yes          Active Problems:    COPD exacerbation [facility])    Palliative care patient  Resolved Problems:    * No resolved hospital problems. *        Brief Summary  ***      Continue management of other chronic medical conditions - see above and orders. Prognosis ***      Advance Directive: Full Code    DIET CARDIAC;         Consults Made:   PALLIATIVE CARE EVAL    DVT prophylaxis: Enoxaparin***      Discharge planning: tbd***    Time Spent is {Time; 15 min - 8 hours:04441} in the examination, evaluation, counseling and review of medications, assessment and plan.      Electronically signed by   Lisbet Mendoza MD, MPH,   Internal Medicine Hospitalist   1/23/2021 10:25 AM

## 2021-01-23 NOTE — PROGRESS NOTES
Patient given discharge education at bedside. Patient has home and portable O2 available as well as her own pulse-ox.    Electronically signed by Dora Brown RN on 1/23/2021 at 4:16 PM

## 2021-01-25 ENCOUNTER — CARE COORDINATION (OUTPATIENT)
Dept: CASE MANAGEMENT | Age: 71
End: 2021-01-25

## 2021-01-25 NOTE — CARE COORDINATION
[facility] Transitions Initial Follow Up Call    Call within 2 business days of discharge: Yes    Patient: Ronak Romero Patient : 1950   MRN: [id number]  Reason for Admission: COPD  Discharge Date: 21 RARS: Readmission Risk Score: 11      Last Discharge [address] [address]       Complaint Diagnosis Description Type Department Provider    21  COPD exacerbation [facility]) Admission (Discharged) Elizabeth Ford MD           Spoke with: N/A    Facility: [address] [place] [address]      Non-face-to-face services provided:  Reviewed encounter information for continuity of care prior to follow up Care Transitions phone call - chart notes, consults, progress notes, test results, med list, appointments, AVS, other information. Care Transitions 24 Hour Call    Care Transitions Interventions         Follow Up  No future appointments. Attempted to make contact with patient/caregiver for an initial follow up call post discharge without success. Unable to leave a message regarding intent of call and call back information. No answer. CTN to try again at a later time.      Jennyfer Kohler RN

## 2021-01-26 ENCOUNTER — CARE COORDINATION (OUTPATIENT)
Dept: CASE MANAGEMENT | Age: 71
End: 2021-01-26

## 2021-01-26 NOTE — CARE COORDINATION
[facility] Transitions Initial Follow Up Call    Call within 2 business days of discharge: Yes    Patient: Evie Agarwal Patient : 1950   MRN: [id number]  Reason for Admission:   Discharge Date: 21 RARS: Readmission Risk Score: 11      Last Discharge [facility]       Complaint Diagnosis Description Type Department Provider    21  COPD exacerbation [facility]) Admission (Discharged) Lyubov Rahman MD           Spoke with: N/A    Facility: [place] [address]    Non-face-to-face services provided:  Chart review for COVID    Care Transitions 24 Hour Call    Care Transitions Interventions         Follow Up: Attempt #2  to make contact with Melodie Cockayne for [place] initial follow up call post discharge from the hospital without success. Unable to leave a message regarding intent of call and call back information. Will resolve calls at this time due to inability to make contact with patient. No future appointments.     Alisia Esquivel RN

## 2021-01-27 ENCOUNTER — READMISSION MANAGEMENT (OUTPATIENT)
Dept: CALL CENTER | Facility: HOSPITAL | Age: 71
End: 2021-01-27

## 2021-01-27 NOTE — OUTREACH NOTE
General Surgery Week 3 Survey      Responses   [facility] patient discharged from?  [place]   Does the patient have one of the following disease processes/diagnoses(primary or secondary)?  General Surgery   Week 3 attempt successful?  Yes   Call start time  1538   Revoke  Readmitted   Call end time  1540   Discharge diagnosis  Epistaxis   Meds reviewed with patient/caregiver?  Yes   Is the patient taking all medications as directed (includes completed medication regime)?  Yes   What is the patient's perception of their health status since discharge?  Improving          Shelly Kennedy RN

## 2021-03-07 ENCOUNTER — HOSPITAL ENCOUNTER (INPATIENT)
Facility: HOSPITAL | Age: 71
LOS: 4 days | Discharge: HOME HEALTH | End: 2021-03-11
Attending: FAMILY MEDICINE | Admitting: FAMILY MEDICINE

## 2021-03-07 DIAGNOSIS — Z74.09 IMPAIRED FUNCTIONAL MOBILITY AND ACTIVITY TOLERANCE: ICD-10-CM

## 2021-03-07 DIAGNOSIS — I87.2 VENOUS INSUFFICIENCY OF BOTH LOWER EXTREMITIES: ICD-10-CM

## 2021-03-07 PROBLEM — J18.9 PNEUMONIA: Status: ACTIVE | Noted: 2021-03-07

## 2021-03-07 PROCEDURE — 93005 ELECTROCARDIOGRAM TRACING: CPT | Performed by: FAMILY MEDICINE

## 2021-03-07 RX ORDER — FUROSEMIDE 20 MG/1
20 TABLET ORAL
Status: DISCONTINUED | OUTPATIENT
Start: 2021-03-08 | End: 2021-03-11 | Stop reason: HOSPADM

## 2021-03-07 RX ORDER — LEVOTHYROXINE SODIUM 0.07 MG/1
75 TABLET ORAL
Status: DISCONTINUED | OUTPATIENT
Start: 2021-03-08 | End: 2021-03-11 | Stop reason: HOSPADM

## 2021-03-07 RX ORDER — ESCITALOPRAM OXALATE 10 MG/1
10 TABLET ORAL DAILY
Status: DISCONTINUED | OUTPATIENT
Start: 2021-03-08 | End: 2021-03-11 | Stop reason: HOSPADM

## 2021-03-07 RX ORDER — SODIUM CHLORIDE 0.9 % (FLUSH) 0.9 %
10 SYRINGE (ML) INJECTION EVERY 12 HOURS SCHEDULED
Status: DISCONTINUED | OUTPATIENT
Start: 2021-03-08 | End: 2021-03-11 | Stop reason: HOSPADM

## 2021-03-07 RX ORDER — LOSARTAN POTASSIUM 50 MG/1
25 TABLET ORAL
Status: DISCONTINUED | OUTPATIENT
Start: 2021-03-08 | End: 2021-03-11 | Stop reason: HOSPADM

## 2021-03-07 RX ORDER — NITROGLYCERIN 0.4 MG/1
0.4 TABLET SUBLINGUAL
Status: DISCONTINUED | OUTPATIENT
Start: 2021-03-07 | End: 2021-03-11 | Stop reason: HOSPADM

## 2021-03-07 RX ORDER — HYDROXYZINE HYDROCHLORIDE 25 MG/1
50 TABLET, FILM COATED ORAL 3 TIMES DAILY PRN
Status: DISCONTINUED | OUTPATIENT
Start: 2021-03-07 | End: 2021-03-11 | Stop reason: HOSPADM

## 2021-03-07 RX ORDER — GUAIFENESIN 600 MG/1
1200 TABLET, EXTENDED RELEASE ORAL EVERY 12 HOURS SCHEDULED
Status: DISCONTINUED | OUTPATIENT
Start: 2021-03-08 | End: 2021-03-11 | Stop reason: HOSPADM

## 2021-03-07 RX ORDER — ECHINACEA PURPUREA EXTRACT 125 MG
2 TABLET ORAL AS NEEDED
Status: DISCONTINUED | OUTPATIENT
Start: 2021-03-07 | End: 2021-03-11 | Stop reason: HOSPADM

## 2021-03-07 RX ORDER — POTASSIUM CHLORIDE 750 MG/1
10 CAPSULE, EXTENDED RELEASE ORAL DAILY
Status: DISCONTINUED | OUTPATIENT
Start: 2021-03-08 | End: 2021-03-11 | Stop reason: HOSPADM

## 2021-03-07 RX ORDER — BUDESONIDE AND FORMOTEROL FUMARATE DIHYDRATE 160; 4.5 UG/1; UG/1
2 AEROSOL RESPIRATORY (INHALATION)
Status: DISCONTINUED | OUTPATIENT
Start: 2021-03-08 | End: 2021-03-11 | Stop reason: HOSPADM

## 2021-03-07 RX ORDER — ALBUTEROL SULFATE 2.5 MG/3ML
2.5 SOLUTION RESPIRATORY (INHALATION)
Status: DISCONTINUED | OUTPATIENT
Start: 2021-03-08 | End: 2021-03-09

## 2021-03-07 RX ORDER — SODIUM CHLORIDE 0.9 % (FLUSH) 0.9 %
10 SYRINGE (ML) INJECTION AS NEEDED
Status: DISCONTINUED | OUTPATIENT
Start: 2021-03-07 | End: 2021-03-11 | Stop reason: HOSPADM

## 2021-03-08 ENCOUNTER — APPOINTMENT (OUTPATIENT)
Dept: CARDIOLOGY | Facility: HOSPITAL | Age: 71
End: 2021-03-08

## 2021-03-08 ENCOUNTER — APPOINTMENT (OUTPATIENT)
Dept: GENERAL RADIOLOGY | Facility: HOSPITAL | Age: 71
End: 2021-03-08

## 2021-03-08 LAB
ALBUMIN SERPL-MCNC: 3.1 G/DL (ref 3.5–5.2)
ALBUMIN/GLOB SERPL: 0.9 G/DL
ALP SERPL-CCNC: 67 U/L (ref 39–117)
ALT SERPL W P-5'-P-CCNC: 7 U/L (ref 1–33)
ANION GAP SERPL CALCULATED.3IONS-SCNC: 7 MMOL/L (ref 5–15)
AST SERPL-CCNC: 12 U/L (ref 1–32)
BILIRUB SERPL-MCNC: 0.6 MG/DL (ref 0–1.2)
BUN SERPL-MCNC: 9 MG/DL (ref 8–23)
BUN/CREAT SERPL: 11.8 (ref 7–25)
CALCIUM SPEC-SCNC: 9.1 MG/DL (ref 8.6–10.5)
CHLORIDE SERPL-SCNC: 95 MMOL/L (ref 98–107)
CO2 SERPL-SCNC: 35 MMOL/L (ref 22–29)
CREAT SERPL-MCNC: 0.76 MG/DL (ref 0.57–1)
DEPRECATED RDW RBC AUTO: 60.8 FL (ref 37–54)
ERYTHROCYTE [DISTWIDTH] IN BLOOD BY AUTOMATED COUNT: 18.7 % (ref 12.3–15.4)
FERRITIN SERPL-MCNC: 166 NG/ML (ref 13–150)
GFR SERPL CREATININE-BSD FRML MDRD: 75 ML/MIN/1.73
GLOBULIN UR ELPH-MCNC: 3.5 GM/DL
GLUCOSE BLDC GLUCOMTR-MCNC: 172 MG/DL (ref 70–130)
GLUCOSE BLDC GLUCOMTR-MCNC: 189 MG/DL (ref 70–130)
GLUCOSE SERPL-MCNC: 204 MG/DL (ref 65–99)
HCT VFR BLD AUTO: 26.6 % (ref 34–46.6)
HGB BLD-MCNC: 8 G/DL (ref 12–15.9)
IRON 24H UR-MRATE: 16 MCG/DL (ref 37–145)
MCH RBC QN AUTO: 27 PG (ref 26.6–33)
MCHC RBC AUTO-ENTMCNC: 30.1 G/DL (ref 31.5–35.7)
MCV RBC AUTO: 89.9 FL (ref 79–97)
PLATELET # BLD AUTO: 304 10*3/MM3 (ref 140–450)
PMV BLD AUTO: 9.4 FL (ref 6–12)
POTASSIUM SERPL-SCNC: 4.6 MMOL/L (ref 3.5–5.2)
PROT SERPL-MCNC: 6.6 G/DL (ref 6–8.5)
QT INTERVAL: 344 MS
QTC INTERVAL: 441 MS
RBC # BLD AUTO: 2.96 10*6/MM3 (ref 3.77–5.28)
SARS-COV-2 RNA PNL SPEC NAA+PROBE: NOT DETECTED
SODIUM SERPL-SCNC: 137 MMOL/L (ref 136–145)
WBC # BLD AUTO: 22.77 10*3/MM3 (ref 3.4–10.8)

## 2021-03-08 PROCEDURE — 25010000002 ENOXAPARIN PER 10 MG: Performed by: FAMILY MEDICINE

## 2021-03-08 PROCEDURE — 94640 AIRWAY INHALATION TREATMENT: CPT

## 2021-03-08 PROCEDURE — 94799 UNLISTED PULMONARY SVC/PX: CPT

## 2021-03-08 PROCEDURE — 83540 ASSAY OF IRON: CPT | Performed by: FAMILY MEDICINE

## 2021-03-08 PROCEDURE — 87635 SARS-COV-2 COVID-19 AMP PRB: CPT | Performed by: FAMILY MEDICINE

## 2021-03-08 PROCEDURE — 25010000002 METHYLPREDNISOLONE PER 40 MG: Performed by: INTERNAL MEDICINE

## 2021-03-08 PROCEDURE — 80053 COMPREHEN METABOLIC PANEL: CPT | Performed by: FAMILY MEDICINE

## 2021-03-08 PROCEDURE — 25010000002 PIPERACILLIN SOD-TAZOBACTAM PER 1 G: Performed by: FAMILY MEDICINE

## 2021-03-08 PROCEDURE — 71045 X-RAY EXAM CHEST 1 VIEW: CPT

## 2021-03-08 PROCEDURE — 99221 1ST HOSP IP/OBS SF/LOW 40: CPT | Performed by: FAMILY MEDICINE

## 2021-03-08 PROCEDURE — 85027 COMPLETE CBC AUTOMATED: CPT | Performed by: FAMILY MEDICINE

## 2021-03-08 PROCEDURE — 97530 THERAPEUTIC ACTIVITIES: CPT

## 2021-03-08 PROCEDURE — 82962 GLUCOSE BLOOD TEST: CPT

## 2021-03-08 PROCEDURE — 93010 ELECTROCARDIOGRAM REPORT: CPT | Performed by: INTERNAL MEDICINE

## 2021-03-08 PROCEDURE — 29580 STRAPPING UNNA BOOT: CPT

## 2021-03-08 PROCEDURE — 82728 ASSAY OF FERRITIN: CPT | Performed by: FAMILY MEDICINE

## 2021-03-08 PROCEDURE — 97162 PT EVAL MOD COMPLEX 30 MIN: CPT

## 2021-03-08 PROCEDURE — 99222 1ST HOSP IP/OBS MODERATE 55: CPT | Performed by: INTERNAL MEDICINE

## 2021-03-08 PROCEDURE — 25010000002 VANCOMYCIN 10 G RECONSTITUTED SOLUTION: Performed by: FAMILY MEDICINE

## 2021-03-08 PROCEDURE — 87040 BLOOD CULTURE FOR BACTERIA: CPT | Performed by: FAMILY MEDICINE

## 2021-03-08 PROCEDURE — 63710000001 INSULIN LISPRO (HUMAN) PER 5 UNITS: Performed by: FAMILY MEDICINE

## 2021-03-08 RX ORDER — DEXTROSE MONOHYDRATE 25 G/50ML
25 INJECTION, SOLUTION INTRAVENOUS
Status: DISCONTINUED | OUTPATIENT
Start: 2021-03-08 | End: 2021-03-11 | Stop reason: HOSPADM

## 2021-03-08 RX ORDER — METHYLPREDNISOLONE SODIUM SUCCINATE 40 MG/ML
40 INJECTION, POWDER, LYOPHILIZED, FOR SOLUTION INTRAMUSCULAR; INTRAVENOUS EVERY 12 HOURS
Status: DISCONTINUED | OUTPATIENT
Start: 2021-03-08 | End: 2021-03-09

## 2021-03-08 RX ORDER — NICOTINE POLACRILEX 4 MG
15 LOZENGE BUCCAL
Status: DISCONTINUED | OUTPATIENT
Start: 2021-03-08 | End: 2021-03-11 | Stop reason: HOSPADM

## 2021-03-08 RX ADMIN — VANCOMYCIN HYDROCHLORIDE 1750 MG: 10 INJECTION, POWDER, LYOPHILIZED, FOR SOLUTION INTRAVENOUS at 04:39

## 2021-03-08 RX ADMIN — ENOXAPARIN SODIUM 40 MG: 40 INJECTION SUBCUTANEOUS at 09:02

## 2021-03-08 RX ADMIN — VANCOMYCIN HYDROCHLORIDE 750 MG: 10 INJECTION, POWDER, LYOPHILIZED, FOR SOLUTION INTRAVENOUS at 15:21

## 2021-03-08 RX ADMIN — GUAIFENESIN 1200 MG: 600 TABLET, EXTENDED RELEASE ORAL at 21:15

## 2021-03-08 RX ADMIN — ALBUTEROL SULFATE 2.5 MG: 2.5 SOLUTION RESPIRATORY (INHALATION) at 02:01

## 2021-03-08 RX ADMIN — TAZOBACTAM SODIUM AND PIPERACILLIN SODIUM 4.5 G: 500; 4 INJECTION, SOLUTION INTRAVENOUS at 09:02

## 2021-03-08 RX ADMIN — INSULIN LISPRO 2 UNITS: 100 INJECTION, SOLUTION INTRAVENOUS; SUBCUTANEOUS at 18:28

## 2021-03-08 RX ADMIN — SODIUM CHLORIDE, PRESERVATIVE FREE 10 ML: 5 INJECTION INTRAVENOUS at 08:56

## 2021-03-08 RX ADMIN — SODIUM CHLORIDE, PRESERVATIVE FREE 10 ML: 5 INJECTION INTRAVENOUS at 21:15

## 2021-03-08 RX ADMIN — FUROSEMIDE 20 MG: 20 TABLET ORAL at 08:57

## 2021-03-08 RX ADMIN — METHYLPREDNISOLONE SODIUM SUCCINATE 40 MG: 40 INJECTION, POWDER, FOR SOLUTION INTRAMUSCULAR; INTRAVENOUS at 21:15

## 2021-03-08 RX ADMIN — SODIUM CHLORIDE, PRESERVATIVE FREE 10 ML: 5 INJECTION INTRAVENOUS at 02:15

## 2021-03-08 RX ADMIN — ALBUTEROL SULFATE 2.5 MG: 2.5 SOLUTION RESPIRATORY (INHALATION) at 19:52

## 2021-03-08 RX ADMIN — ALBUTEROL SULFATE 2.5 MG: 2.5 SOLUTION RESPIRATORY (INHALATION) at 06:27

## 2021-03-08 RX ADMIN — ALBUTEROL SULFATE 2.5 MG: 2.5 SOLUTION RESPIRATORY (INHALATION) at 11:50

## 2021-03-08 RX ADMIN — ENOXAPARIN SODIUM 40 MG: 40 INJECTION SUBCUTANEOUS at 21:15

## 2021-03-08 RX ADMIN — ESCITALOPRAM 10 MG: 10 TABLET, FILM COATED ORAL at 08:57

## 2021-03-08 RX ADMIN — HYDROXYZINE HYDROCHLORIDE 50 MG: 25 TABLET ORAL at 02:22

## 2021-03-08 RX ADMIN — TAZOBACTAM SODIUM AND PIPERACILLIN SODIUM 4.5 G: 500; 4 INJECTION, SOLUTION INTRAVENOUS at 02:55

## 2021-03-08 RX ADMIN — BUDESONIDE AND FORMOTEROL FUMARATE DIHYDRATE 2 PUFF: 160; 4.5 AEROSOL RESPIRATORY (INHALATION) at 06:27

## 2021-03-08 RX ADMIN — FUROSEMIDE 20 MG: 20 TABLET ORAL at 05:59

## 2021-03-08 RX ADMIN — LEVOTHYROXINE SODIUM 75 MCG: 75 TABLET ORAL at 05:59

## 2021-03-08 RX ADMIN — TAZOBACTAM SODIUM AND PIPERACILLIN SODIUM 4.5 G: 500; 4 INJECTION, SOLUTION INTRAVENOUS at 18:28

## 2021-03-08 RX ADMIN — GUAIFENESIN 1200 MG: 600 TABLET, EXTENDED RELEASE ORAL at 08:57

## 2021-03-08 RX ADMIN — METHYLPREDNISOLONE SODIUM SUCCINATE 40 MG: 40 INJECTION, POWDER, FOR SOLUTION INTRAMUSCULAR; INTRAVENOUS at 09:43

## 2021-03-08 RX ADMIN — POTASSIUM CHLORIDE 10 MEQ: 750 CAPSULE, EXTENDED RELEASE ORAL at 08:57

## 2021-03-08 RX ADMIN — LOSARTAN POTASSIUM 25 MG: 50 TABLET, FILM COATED ORAL at 08:57

## 2021-03-08 RX ADMIN — BUDESONIDE AND FORMOTEROL FUMARATE DIHYDRATE 2 PUFF: 160; 4.5 AEROSOL RESPIRATORY (INHALATION) at 19:52

## 2021-03-08 NOTE — PROGRESS NOTES
Discharge Planning Assessment  [facility]     Patient Name: Emy Bermudez  MRN: [id number]  Today's Date: 3/8/2021    Admit Date: 3/7/2021    Discharge Needs Assessment     Row Name 03/08/21 1652       Living Environment    Lives With  child(irene), adult    Name(s) of Who Lives With Patient  SMILEY MENENDEZ    Current Living Arrangements  home/apartment/condo    Primary Care Provided by  self    Provides Primary Care For  no one, unable/limited ability to care for self    Caregiving Concerns  LIVES WITH DTR WHO WORKS; PT. HOME SEVERAL HRS ALONE DAILY    Family Caregiver if Needed  child(irene), adult    Quality of Family Relationships  helpful;involved;supportive    Able to Return to Prior Arrangements  yes       Resource/Environmental Concerns    Resource/Environmental Concerns  none    Transportation Concerns  car, none       Transition Planning    Patient/Family Anticipates Transition to  home with help/services CURRENT WITH [facility] HOME CARE AND DEPT. OF REHAB SERVICES    Transportation Anticipated  family or friend will provide       Discharge Needs Assessment    Readmission Within the Last 30 Days  no previous admission in last 30 days    Equipment Currently Used at Home  oxygen;commode;wheelchair;hospital bed [facility] PROVIDES HOME O2    Concerns to be Addressed  denies needs/concerns at this time    Equipment Needed After Discharge  none        Discharge Plan     Row Name 03/08/21 1565       Plan    Plan  HOME WITH [facility] HOME CARE AND DEPT OF REHAB SERVICES; BOTH RESUMPTION OF SERVICE    Patient/Family in Agreement with Plan  yes    Plan Comments  PT. REPORTS SHE LIVES AT HOME WITH HER DTR, GEMMA AND IS FOLLOWED BY [facility] HOME CARE AND GETS 13 HRS A WEEK FROM DEPT. OF REHAB SERVICES ([facility]).  LEFT MS FOR [facility] TO RETURN MY CALL TO VERIFY THEY FOLLOW PT. AND HER HOURS; WILL FOLLOW FOR RETURN CALL TO DOCUMENT.  WILL ALSO FOLLOW TO NOTIFY HOME HEALTH WHEN PT. IS D/C/D.  PT. IS NOT INTERESTED IN SNF AS SHE HAS BEEN TO BOTH  IN [place] AND DID NOT HAVE A GOOD EXPERIENCE, PER PT.        Continued Care and Services - Admitted Since 3/7/2021    Coordination has not been started for this encounter.         Demographic Summary    No documentation.       Functional Status    No documentation.       Psychosocial    No documentation.       Abuse/Neglect    No documentation.       Legal    No documentation.       Substance Abuse    No documentation.       Patient Forms    No documentation.           ALANA Emmanuel

## 2021-03-08 NOTE — PLAN OF CARE
Goal Outcome Evaluation:  Plan of Care Reviewed With: patient  Progress: no change  Outcome Summary: PT eval completed.  Orders for functional mobility assessment and for unna boots B LEs.  Pt demonstrates generalized weakness, decrease activity w/ noted shortness of breath w/ minimal activity requiring extended seated rest before moving forward w/ visit.  Pt also demonstrates decrease balance and I w/ functional mobility requiring assist of 1 person and UE support w/ activity at bedside.  Pt w/ B LE edema throughout the entire LEs.  Pt reports she has been wearing unna boots for about the last 2 wks but has been treated w/ unna boots in the past also.  Pt reports her unna boots are changed M, W, F and are due to be changed today.  Unna boots were removed and images obtained of B LEs located in media in chart.  Pt's B lower legs are dry and scaly.  One pin point spot at R medial leg w/ scant bloody drainage.  No other moist areas or drainage noted.  Cleaned LEs w/ normal saline and 4 X 4s. Pt w/ intact dorsalis pedis pulse B and capillary refill.  Circumferential measurements of B LEs obtained.  R LE at met heads 24.5 cm, 10 cm superior of lateral malleolus 38 cm, 20 cm superior of lateral malleolus 48 cm.  L LE at met heads 24.5 cm, 10 cm superior of lateral malleolus 37.5 and 20 cm superior of lateral malleolus 45 cm.  Redressed B LEs w/ unna boots.  Intact capillary refill B.  Will plan drsg changes on M, W, F rotation to maintain current plan.  PT for functional mobility to continue for functional mobility training, strengthening, balance, activity tolerance, edema mgmt, and education for energy conservation techniques.  Pt will need 24/7 assist at discharge w/ continued home care for skilled care.  Also, feel pt's home care should increase from 13 hrs a day to cover most of the day w/ dtr not being at home during the day.  However, if this is not able to be accomplished, feel pt may need a short stay in  subacute care to improve strength and activity tolerance before returning home.

## 2021-03-08 NOTE — CONSULTS
PULMONARY & CRITICAL CARE CONSULT - [facility]    21, 08:53 CST  Patient Care Team:  Dorie Segura APRN as PCP - General (Nurse Practitioner)  Name: Emy Bermudez  : 1950  MRN: [id number]  Contact Serial Number [id number]    Chief complaint: shortness of breath  HPI:  We have been consulted by Yehuda Sanches MD to see this 70 y.o. female born on 1950.  Patient complains of dyspnea in the chest for a few days. Severity: moderate.  Aggravating factors: movement.   Alleviating factors: medication(s) (albuterol and oxygen) Associated symptoms: poor exercise tolerance and cough. Sputum is scant.  Patient currently is on oxygen at home. Respiratory history: COPD  She has been admitted with decline and hypoxia.  This am when we are seeing her she says she is breathing better.  She is s/p ent surgery and initially declined a covid test, but then one was done eventually.  She was in the room with airborne precautions signs and PPE so we visited pt with airborne precautions, but it appears they may not be necessary at this point, and if they are, I would recommend transfer to a negative pressure room.  Past Medical History:   has a past medical history of Abdominal wall abscess, Abdominal wall fistula (2018), Cellulitis, Chronic respiratory failure with hypoxia (CMS/HCC) (2020), Colonic obstruction (CMS/HCC) (4/3/2018), COPD (chronic obstructive pulmonary disease) (CMS/HCC), Depression, Diverticul disease small and large intestine, no perforati or abscess, Edema, GERD (gastroesophageal reflux disease), Hyperlipidemia, Hypertension, Hypocalcemia, Hypothyroid, Obesity, Class III, BMI 40-49.9 (morbid obesity) (CMS/HCC) (2020), Respiratory failure (CMS/HCC), Tobacco dependence (2020), Venous insufficiency, and Vertigo.   has a past surgical history that includes  section; Abdominal surgery; Exploratory Laparotomy (N/A, 2018); Colostomy; Examination under  anesthesia (N/A, 1/5/2021); internal maxillary artery and ethmoid artery ligation (N/A, 1/5/2021); Ethmoid Artery Ligation (N/A, 1/5/2021); and septoplasty, resection inferior turbinates (Bilateral, 1/5/2021).  No Known Allergies  Medications:  albuterol, 2.5 mg, Nebulization, Q6H - RT  budesonide-formoterol, 2 puff, Inhalation, BID - RT  enoxaparin, 40 mg, Subcutaneous, Q12H  escitalopram, 10 mg, Oral, Daily  furosemide, 20 mg, Oral, Q AM  guaiFENesin, 1,200 mg, Oral, Q12H  levothyroxine, 75 mcg, Oral, Q AM  losartan, 25 mg, Oral, Q24H  piperacillin-tazobactam, 4.5 g, Intravenous, Q8H  potassium chloride, 10 mEq, Oral, Daily  sodium chloride, 10 mL, Intravenous, Q12H  vancomycin, 750 mg, Intravenous, Q12H      Pharmacy to Dose enoxaparin (LOVENOX),   Pharmacy to dose vancomycin,   Pharmacy to Dose Zosyn,       Family History:  Family History   Adopted: Yes     Social History:   reports that she quit smoking about 13 months ago. She has a 3.75 pack-year smoking history. She has never used smokeless tobacco. She reports that she does not drink alcohol and does not use drugs.  Review of Systems:  Review of Systems   Constitutional: Negative for chills and fever.   HENT: Positive for sinus pain (congestion).    Respiratory: Positive for shortness of breath.    Cardiovascular: Negative for chest pain.   Gastrointestinal: Negative for abdominal distention, diarrhea, nausea and vomiting.   Genitourinary: Negative for hematuria.   Musculoskeletal: Negative for arthralgias.   Skin: Negative for rash.   Neurological: Negative for tremors and syncope.   Psychiatric/Behavioral: Negative for agitation.      Physical Exam:  Temp:  [97.9 °F (36.6 °C)-99.1 °F (37.3 °C)] 97.9 °F (36.6 °C)  Heart Rate:  [] 87  Resp:  [18-20] 18  BP: (124-152)/(58-89) 124/60    Intake/Output Summary (Last 24 hours) at 3/8/2021 0853  Last data filed at 3/8/2021 0140  Gross per 24 hour   Intake --   Output 100 ml   Net -100 ml          03/08/21  0134   Weight: 110 kg (243 lb 6.4 oz)     SpO2 Percentage    03/08/21 0627 03/08/21 0635 03/08/21 0840   SpO2: 97% 92% 94%     Physical Exam  Constitutional:       General: She is not in acute distress.     Appearance: She is well-developed. She is not ill-appearing or toxic-appearing.   HENT:      Head: Atraumatic.   Eyes:      General: No scleral icterus.     Conjunctiva/sclera: Conjunctivae normal.   Cardiovascular:      Rate and Rhythm: Normal rate and regular rhythm.      Heart sounds: S1 normal and S2 normal.   Pulmonary:      Effort: Pulmonary effort is normal. No accessory muscle usage, prolonged expiration or respiratory distress.      Breath sounds: No stridor. No wheezing.   Abdominal:      General: There is no distension.      Palpations: Abdomen is soft.      Tenderness: There is no abdominal tenderness.   Musculoskeletal:         General: No deformity.      Cervical back: Neck supple.   Lymphadenopathy:      Cervical: No cervical adenopathy.   Skin:     General: Skin is warm.      Findings: No rash.   Neurological:      Mental Status: She is alert.       Results from last 7 days   Lab Units 03/08/21  0418   WBC 10*3/mm3 22.77*   HEMOGLOBIN g/dL 8.0*   PLATELETS 10*3/mm3 304     Results from last 7 days   Lab Units 03/08/21  0418   SODIUM mmol/L 137   POTASSIUM mmol/L 4.6   CO2 mmol/L 35.0*   BUN mg/dL 9   CREATININE mg/dL 0.76   GLUCOSE mg/dL 204*         Lab Results   Component Value Date    PROBNP 38.4 07/04/2020     Recent radiology:   XR Chest 1 View (03/07/2021 23:21):  I am unable to view this image or pull up report    Outside records from [facility] are reviewed indicating cxr a few weeks ago:  Examination. XR CHEST PORTABLE 1/22/2021 5:16 AM   History: Shortness of breath and hypoxia.   A single frontal portable upright view of the chest is obtained. The   comparison is made with the previous study dated 6/5/2015.   There is blunting of the lateral left diaphragm and left costophrenic    sulcus which may represent pleural effusion. The left cardiac border   is obliterated which may represent adjacent infiltrate or   consolidation.   The remaining lungs are unremarkable. The heart size is not evaluated   due to the portable projection. There is no acute bony abnormality.   IMPRESSION:   Left basal pleural effusion and left lingular segment   infiltrate/consolidation.   Signed by Dr Natalia Perez on 1/22/2021 7:04 AM     Date: 01/22/21         My radiograph interpretation/independent review of imaging: CT chest 2020 bilat infiltrates; cxr this admission: mildly increased markings  Other test results (not lab or imaging): Results for orders placed during the hospital encounter of 02/13/20    Adult Transthoracic Echo Complete W/ Cont if Necessary Per Protocol    Interpretation Summary  · Left ventricular systolic function is normal. Estimated ejection fraction is 66 to 70%.  · Left ventricular diastolic dysfunction (grade I) consistent with impaired relaxation.  · Normal right ventricular cavity size and systolic function noted.  · There were no significant (greater than mild) valvular dysfunction.    Problem List as identified by Epic (may contain historical, inactive problems)  Patient Active Problem List   Diagnosis   • COPD (chronic obstructive pulmonary disease) (CMS/HCC)   • Obesity, Class III, BMI 40-49.9 (morbid obesity) (CMS/HCC)   • Chronic respiratory failure with hypoxia and hypercapnia (CMS/HCC)   • Normocytic anemia   • Hypothyroidism (acquired)   • Essential hypertension   • Venous insufficiency of both lower extremities   • Stasis dermatitis of both legs   • Epistaxis   • Tobacco abuse   • Anemia, blood loss due to epistasis   • Acute kidney injury suspect secondary to blood loss (CMS/HCC)   • Acquired deviated nasal septum   • S/P nasal septoplasty   • Pneumonia     Pulmonary Assessment:  New problem (to me), with additional workup planned: apparent copd exacerbation  New problem (to  me), no additional workup planned: recent ent surgery  Other problems either stable, failing to improve or worsenin. Hx pulmonary infiltrates  2. Acute on chronic respiratory failure with hypoxia  3. anemia    Recommend/plan:   · Continue abx, RT  · Add steroids  · Continue oxygen  · Defer eval of anemia to Dr. Sanches     Thank you for this consult.  We will follow along.  Electronically signed by Duncan Monroy MD, 21, 8:43 AM CST.

## 2021-03-08 NOTE — CONSULTS
"Pharmacy Dosing Service  Pharmacokinetics  Vancomycin Initial Evaluation  Assessment/Action/Plan:  Loading dose?: 1750mg   Current Order: Vancomycin 750 mg IVPB every 12 hours  Current end date:3/15/21  Levels: not ordered yet  Additional antimicrobial agent(s): Zosyn    Vancomycin dosage initiated based on population pharmacokinetic parameters. Pharmacy will continue to follow daily and adjust dose accordingly.     Subjective:  Emy Bermuedz is a 70 y.o. female with a Vancomycin \"Pharmacy to Dose\" consult for the treatment of PNA  , day 1 of 7 of treatment.    AUC Model Data:  Loading dose: 1750mg  Regimen: 750 mg every 12 hours for 14 doses.  Start time: 07:17 on 03/08/2021  Exposure target: AUC24 (range)400-600 mg/L.hr  AUC24,ss: 490 mg/L.hr  PAUC*: 65 %  Ctrough,ss: 15.4 mg/L  Pconc*: 35 %  Tox.: 11 %      Objective:  Ht: 160 cm (63\"); Wt: 110 kg (243 lb 6.4 oz)  Estimated Creatinine Clearance: 76.8 mL/min (by C-G formula based on SCr of 0.76 mg/dL).   Creatinine   Date Value Ref Range Status   03/08/2021 0.76 0.57 - 1.00 mg/dL Final   01/03/2021 1.00 0.57 - 1.00 mg/dL Final   01/02/2021 1.58 (H) 0.57 - 1.00 mg/dL Final      Lab Results   Component Value Date    WBC 22.77 (H) 03/08/2021    WBC 18.2 (H) 01/23/2021    WBC 18.4 (H) 01/22/2021      Baseline culture results:  Microbiology Results (last 10 days)       Procedure Component Value - Date/Time    COVID PRE-OP / PRE-PROCEDURE SCREENING ORDER (NO ISOLATION) - Swab, Nasal Cavity [[id number]]  (Normal) Collected: 03/08/21 0000    Lab Status: Final result Specimen: Swab from Nasal Cavity Updated: 03/08/21 0106    Narrative:      The following orders were created for panel order COVID PRE-OP / PRE-PROCEDURE SCREENING ORDER (NO ISOLATION) - Swab, Nasal Cavity.  Procedure                               Abnormality         Status                     ---------                               -----------         ------                     COVID-19,[facility] " IN-KARY...[[id number]]  Normal              Final result                 Please view results for these tests on the individual orders.    COVID-19,[facility] IN-HOUSE,Nasal Swab No Transport Media 3-4 HR TAT - Swab, Nasal Cavity [[id number]]  (Normal) Collected: 03/08/21 0000    Lab Status: Final result Specimen: Swab from Nasal Cavity Updated: 03/08/21 0106     COVID19 Not Detected    Narrative:      Fact sheet for providers: https://www.fda.gov/media/056494/download     Fact sheet for patients: https://www.fda.gov/media/602420/download    Test performed by PCR.    Consider negative results in combination with clinical observations, patient history, and epidemiological information.            Jazmyne Dudley [facility]  03/08/21 06:34 CST

## 2021-03-08 NOTE — H&P
"[facility]  HISTORY AND PHYSICAL    Date of Admission: 3/7/2021  Primary Care Physician: Dorie Segura APRN    Subjective    Chief Complaint: \"I cant breathe\"    This is a 70 yr old lady with chronic resp failure with hypercapnia and hyxpoxia and obesity who presented to the ed with cough, chest congestion and evidence of hypoxia with oxygen sats of 94% on 5 liters of oxygen at home. Chest ct did reveal bilateal infiltrates with consolidation and the patient was admitted. The patient declined a covid 19 test as she recently had nasal surgery and was insistent upon no swabbing of nasopharynx.       Review of Systems   Constitutional: Positive for activity change and fatigue.   HENT: Positive for congestion.    Eyes: Negative.    Respiratory: Positive for cough and shortness of breath.    Cardiovascular: Positive for leg swelling.   Gastrointestinal: Negative.    Endocrine: Negative.    Genitourinary: Negative.    Musculoskeletal: Negative.    Skin: Positive for wound.   Allergic/Immunologic: Negative.    Neurological: Negative.    Hematological: Negative.    Psychiatric/Behavioral: Negative.         Otherwise complete ROS reviewed and negative except as mentioned in the HPI.      Past Medical History:   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   • Cellulitis    • Chronic respiratory failure with hypoxia (CMS/HCC) 2/13/2020   • Colonic obstruction (CMS/HCC) 4/3/2018   • COPD (chronic obstructive pulmonary disease) (CMS/HCC)    • Depression    • Diverticul disease small and large intestine, no perforati or abscess    • Edema    • GERD (gastroesophageal reflux disease)    • Hyperlipidemia    • Hypertension    • Hypocalcemia    • Hypothyroid    • Obesity, Class III, BMI 40-49.9 (morbid obesity) (CMS/HCC) 4/29/2020   • Respiratory failure (CMS/HCC)    • Tobacco dependence 2/17/2020   • Venous insufficiency    • Vertigo        Past Surgical " History:  Past Surgical History:   Procedure Laterality Date   • ABDOMINAL SURGERY     •  SECTION     • COLOSTOMY     • ETHMOID ARTERY LIGATION N/A 2021    Procedure: ETHMOID ARTERY LIGATION;  Surgeon: Elliot Mack Jr., MD;  Location:  [facility] OR;  Service: ENT;  Laterality: N/A;   • EXAM UNDER ANESTHESIA N/A 2021    Procedure: SEPTOPLASTY TURBOPLASTY ENDOSCOPIC CONTROL LEFT NOSE BLEED;  Surgeon: Elliot Mack Jr., MD;  Location:  [facility] OR;  Service: ENT;  Laterality: N/A;   • EXPLORATORY LAPAROTOMY N/A 2018    Procedure: LAPAROTOMY EXPLORATORY, partial colectomy, creation colostomy, incision and drainage abdominal wall abscess;  Surgeon: Elda Velazquez MD;  Location:  [facility] OR;  Service: General   • INTERNAL MAXILLARY ARTERY AND ETHMOID ARTERY LIGATION N/A 2021    Procedure: INTERNAL MAXILLARY ARTERY AND ETHMOID ARTERY LIGATION;  Surgeon: Elliot Mack Jr., MD;  Location:  [facility] OR;  Service: ENT;  Laterality: N/A;   • SEPTOPLASTY, RESECTION INFERIOR TURBINATES Bilateral 2021    Procedure: SEPTOPLASTY, RESECTION INFERIOR TURBINATES;  Surgeon: Elliot Mack Jr., MD;  Location:  [facility] OR;  Service: ENT;  Laterality: Bilateral;       Social History:  reports that she quit smoking about 13 months ago. She has a 3.75 pack-year smoking history. She has never used smokeless tobacco. She reports that she does not drink alcohol and does not use drugs.    Family History: family history is not on file. She was adopted.     Allergies:  No Known Allergies    Medications:  Prior to Admission medications    Medication Sig Start Date End Date Taking? Authorizing Provider   budesonide-formoterol (SYMBICORT) 160-4.5 MCG/ACT inhaler Inhale 2 puffs 2 (Two) Times a Day.   Yes Provider, MD Sukhdev   escitalopram (LEXAPRO) 20 MG tablet Take 10 mg by mouth Daily.   Yes Provider, MD Sukhdev   furosemide (LASIX) 20 MG tablet Take 20 mg by mouth Every Morning.   Yes  ProviderSukhdev MD   guaiFENesin (MUCINEX) 600 MG 12 hr tablet Take 2 tablets by mouth Every 12 (Twelve) Hours. 7/8/20  Yes Eleno Tolentino DO   levothyroxine (SYNTHROID, LEVOTHROID) 75 MCG tablet Take 75 mcg by mouth Daily.   Yes Sukhdev Davis MD   LORazepam (ATIVAN) 0.5 MG tablet Take 0.5 mg by mouth Daily As Needed for Anxiety.   Yes Sukhdev Davis MD   meclizine (ANTIVERT) 25 MG tablet Take 25 mg by mouth 2 (Two) Times a Day.   Yes Sukhdev Davis MD   potassium chloride (K-DUR,KLOR-CON) 10 MEQ CR tablet Take 10 mEq by mouth 2 (Two) Times a Day.   Yes Sukhdev Davis MD   sodium chloride 0.65 % nasal spray 2 sprays into the nostril(s) as directed by provider As Needed for Congestion (Congestion). 1/6/21  Yes Trinh Govea APRN   albuterol (PROVENTIL) (2.5 MG/3ML) 0.083% nebulizer solution Take 2.5 mg by nebulization Every 4 (Four) Hours As Needed for Shortness of Air.    Sukhdev Davis MD   albuterol sulfate  (90 Base) MCG/ACT inhaler Inhale 1-2 puffs Every 4 (Four) Hours As Needed for Wheezing or Shortness of Air.    ProviderSukhdev MD   nitroglycerin (NITROSTAT) 0.4 MG SL tablet Place 1 tablet under the tongue Every 5 (Five) Minutes As Needed for Chest Pain (Only if SBP Greater Than 100). Take no more than 3 doses in 15 minutes. 2/19/20   Francis Chou MD   fluconazole (Diflucan) 150 MG tablet Take 1 tablet by mouth Daily. 7/8/20 3/8/21  Eleno Tolentino DO   hydrOXYzine (ATARAX) 50 MG tablet Take 1 tablet by mouth 3 (Three) Times a Day As Needed for Anxiety. 7/8/20 3/8/21  Eleno Tolentino DO   losartan (COZAAR) 25 MG tablet Take 1 tablet by mouth Daily. 7/9/20 3/8/21  Eleno Tolentino DO   mupirocin (BACTROBAN) 2 % ointment APPLY TO EACH NOSTRIL TWICE DAILY 1/6/21 3/8/21  Provider, MD Sukhdev   nicotine (NICODERM CQ) 21 MG/24HR patch Place 1 patch on the skin as directed by provider Daily. 5/2/20 3/8/21  Eleno Tolentino  "S, DO   predniSONE (DELTASONE) 10 MG tablet  12/22/20 3/8/21  Provider, MD Sukhdev       Objective    Vital Signs: /58 (BP Location: Left arm, Patient Position: Sitting)   Pulse 92   Temp 98.6 °F (37 °C) (Oral)   Resp 18   Ht 160 cm (63\")   Wt 110 kg (243 lb 6.4 oz)   SpO2 95%   BMI 43.12 kg/m²   Physical Exam  Vitals and nursing note reviewed.   Constitutional:       Appearance: She is obese.   HENT:      Head: Normocephalic.      Mouth/Throat:      Mouth: Mucous membranes are moist.      Pharynx: Oropharynx is clear.   Eyes:      Conjunctiva/sclera: Conjunctivae normal.      Pupils: Pupils are equal, round, and reactive to light.   Cardiovascular:      Rate and Rhythm: Normal rate and regular rhythm.      Pulses: Normal pulses.      Heart sounds: Normal heart sounds.   Pulmonary:      Effort: Pulmonary effort is normal.      Breath sounds: Rhonchi and rales present.   Abdominal:      General: Abdomen is flat. Bowel sounds are normal.      Palpations: Abdomen is soft.   Musculoskeletal:         General: Normal range of motion.      Cervical back: Normal range of motion and neck supple.   Skin:     General: Skin is warm and dry.      Capillary Refill: Capillary refill takes less than 2 seconds.   Neurological:      General: No focal deficit present.      Mental Status: She is alert. Mental status is at baseline.   Psychiatric:         Mood and Affect: Mood normal.         Behavior: Behavior normal.         Thought Content: Thought content normal.         Judgment: Judgment normal.           Results Reviewed:  Lab Results (last 24 hours)     Procedure Component Value Units Date/Time    Comprehensive Metabolic Panel [[id number]]  (Abnormal) Collected: 03/08/21 0418    Specimen: Blood Updated: 03/08/21 0514     Glucose 204 mg/dL      BUN 9 mg/dL      Creatinine 0.76 mg/dL      Sodium 137 mmol/L      Potassium 4.6 mmol/L      Chloride 95 mmol/L      CO2 35.0 mmol/L      Calcium 9.1 mg/dL      Total " Protein 6.6 g/dL      Albumin 3.10 g/dL      ALT (SGPT) 7 U/L      AST (SGOT) 12 U/L      Alkaline Phosphatase 67 U/L      Total Bilirubin 0.6 mg/dL      eGFR Non African Amer 75 mL/min/1.73      Globulin 3.5 gm/dL      A/G Ratio 0.9 g/dL      BUN/Creatinine Ratio 11.8     Anion Gap 7.0 mmol/L     Narrative:      GFR Normal >60  Chronic Kidney Disease <60  Kidney Failure <15      CBC (No Diff) [[id number]]  (Abnormal) Collected: 03/08/21 0418    Specimen: Blood Updated: 03/08/21 0450     WBC 22.77 10*3/mm3      RBC 2.96 10*6/mm3      Hemoglobin 8.0 g/dL      Hematocrit 26.6 %      MCV 89.9 fL      MCH 27.0 pg      MCHC 30.1 g/dL      RDW 18.7 %      RDW-SD 60.8 fl      MPV 9.4 fL      Platelets 304 10*3/mm3     COVID PRE-OP / PRE-PROCEDURE SCREENING ORDER (NO ISOLATION) - Swab, Nasal Cavity [[id number]]  (Normal) Collected: 03/08/21 0000    Specimen: Swab from Nasal Cavity Updated: 03/08/21 0106    Narrative:      The following orders were created for panel order COVID PRE-OP / PRE-PROCEDURE SCREENING ORDER (NO ISOLATION) - Swab, Nasal Cavity.  Procedure                               Abnormality         Status                     ---------                               -----------         ------                     COVID-19,[facility] IN-[facility]...[[id number]]  Normal              Final result                 Please view results for these tests on the individual orders.    COVID-19,[facility] IN-HOUSE,Nasal Swab No Transport Media 3-4 HR TAT - Swab, Nasal Cavity [[id number]]  (Normal) Collected: 03/08/21 0000    Specimen: Swab from Nasal Cavity Updated: 03/08/21 0106     COVID19 Not Detected    Narrative:      Fact sheet for providers: https://www.fda.gov/media/332943/download     Fact sheet for patients: https://www.fda.gov/media/445594/download    Test performed by PCR.    Consider negative results in combination with clinical observations, patient history, and epidemiological information.        Imaging Results (Last 24  Hours)     ** No results found for the last 24 hours. **            Active Hospital Problems    Diagnosis    • Pneumonia        Assessment / Plan  Iv antbx, oxygenation, check echo, ask pulmonary to see      Code Status: DNI     I discussed the patient's findings and my recommendations with the patient.,.    Estimated length of stay 4 days.    Yehuda Sanches MD   03/08/21   06:26 CST

## 2021-03-08 NOTE — CONSULTS
Pharmacy Dosing Service  Antimicrobials  Zosyn    Assessment/Action/Plan:  Initiated Zosyn 4.5g IV every 8 hours. Current end date: 3/15     Subjective:  Emy Bermudez is a 70 y.o. female currently being treated for PNA.    Objective:  Estimated Creatinine Clearance: 76.8 mL/min (by C-G formula based on SCr of 0.76 mg/dL).   Creatinine   Date Value Ref Range Status   03/08/2021 0.76 0.57 - 1.00 mg/dL Final   01/03/2021 1.00 0.57 - 1.00 mg/dL Final   01/02/2021 1.58 (H) 0.57 - 1.00 mg/dL Final     Lab Results   Component Value Date    WBC 22.77 (H) 03/08/2021     Temp Readings from Last 1 Encounters:   03/08/21 98.6 °F (37 °C) (Oral)       Culture Results:  Microbiology Results (last 10 days)       Procedure Component Value - Date/Time    COVID PRE-OP / PRE-PROCEDURE SCREENING ORDER (NO ISOLATION) - Swab, Nasal Cavity [[id number]]  (Normal) Collected: 03/08/21 0000    Lab Status: Final result Specimen: Swab from Nasal Cavity Updated: 03/08/21 0106    Narrative:      The following orders were created for panel order COVID PRE-OP / PRE-PROCEDURE SCREENING ORDER (NO ISOLATION) - Swab, Nasal Cavity.  Procedure                               Abnormality         Status                     ---------                               -----------         ------                     COVID-19,[facility]...[[id number]]  Normal              Final result                 Please view results for these tests on the individual orders.    COVID-19,[facility] IN-HOUSE,Nasal Swab No Transport Media 3-4 HR TAT - Swab, Nasal Cavity [[id number]]  (Normal) Collected: 03/08/21 0000    Lab Status: Final result Specimen: Swab from Nasal Cavity Updated: 03/08/21 0106     COVID19 Not Detected    Narrative:      Fact sheet for providers: https://www.fda.gov/media/765389/download     Fact sheet for patients: https://www.fda.gov/media/228269/download    Test performed by PCR.    Consider negative results in combination with clinical observations, patient  history, and epidemiological information.          Current Antimicrobials:   Anti-Infectives (From admission, onward)      Ordered     Dose/Rate Route Frequency Start Stop    03/08/21 0606  piperacillin-tazobactam (ZOSYN) 4.5 g in iso-osmotic dextrose 100 mL IVPB (premix)     Ordering Provider: Yehuda Sanches MD    4.5 g  over 4 Hours Intravenous Every 8 Hours 03/08/21 0900 03/15/21 0859    03/08/21 0218  piperacillin-tazobactam (ZOSYN) 4.5 g in iso-osmotic dextrose 100 mL IVPB (premix)     Ordering Provider: Yehuda Sanches MD    4.5 g  over 30 Minutes Intravenous Once 03/08/21 0315 03/08/21 0325    03/08/21 0218  vancomycin 1750 mg/500 mL 0.9% NS IVPB ([facility])     Ordering Provider: Yehuda Sanches MD    15 mg/kg × 110 kg  over 3 Hours Intravenous Once 03/08/21 0315      03/07/21 2322  Pharmacy to Dose Zosyn     Ordering Provider: Yehuda Sanches MD     Does not apply Continuous PRN 03/07/21 2322 03/15/21 0021    03/07/21 2322  Pharmacy to dose vancomycin     Ordering Provider: Yehuda Sanches MD     Does not apply Continuous PRN 03/07/21 2321 03/15/21 0020            Jazmyne Dudley [facility]  03/08/21 06:09 CST

## 2021-03-08 NOTE — THERAPY EVALUATION
Patient Name: Emy Bermudez  : 1950    MRN: [id number]                              Today's Date: 3/8/2021       Admit Date: 3/7/2021    Visit Dx:     ICD-10-CM ICD-9-CM   1. Impaired functional mobility and activity tolerance  Z74.09 V49.89   2. Venous insufficiency of both lower extremities  I87.2 459.81     Patient Active Problem List   Diagnosis   • COPD (chronic obstructive pulmonary disease) (CMS/[facility])   • Obesity, Class III, BMI 40-49.9 (morbid obesity) (CMS/[facility])   • Chronic respiratory failure with hypoxia and hypercapnia (CMS/[facility])   • Normocytic anemia   • Hypothyroidism (acquired)   • Essential hypertension   • Venous insufficiency of both lower extremities   • Stasis dermatitis of both legs   • Epistaxis   • Tobacco abuse   • Anemia, blood loss due to epistasis   • Acute kidney injury suspect secondary to blood loss (CMS/[facility])   • Acquired deviated nasal septum   • S/P nasal septoplasty   • Pneumonia     Past Medical History:   Diagnosis Date   • Abdominal wall abscess    • Abdominal wall fistula 2018   • Cellulitis    • Chronic respiratory failure with hypoxia (CMS/[facility]) 2020   • Colonic obstruction (CMS/[facility]) 4/3/2018   • COPD (chronic obstructive pulmonary disease) (CMS/[facility])    • Depression    • Diverticul disease small and large intestine, no perforati or abscess    • Edema    • GERD (gastroesophageal reflux disease)    • Hyperlipidemia    • Hypertension    • Hypocalcemia    • Hypothyroid    • Obesity, Class III, BMI 40-49.9 (morbid obesity) (CMS/[facility]) 2020   • Respiratory failure (CMS/[facility])    • Tobacco dependence 2020   • Venous insufficiency    • Vertigo      Past Surgical History:   Procedure Laterality Date   • ABDOMINAL SURGERY     •  SECTION     • COLOSTOMY     • ETHMOID ARTERY LIGATION N/A 2021    Procedure: ETHMOID ARTERY LIGATION;  Surgeon: Elliot Mack Jr., MD;  Location: [facility];  Service: ENT;  Laterality: N/A;   • EXAM UNDER ANESTHESIA N/A  1/5/2021    Procedure: SEPTOPLASTY TURBOPLASTY ENDOSCOPIC CONTROL LEFT NOSE BLEED;  Surgeon: Elliot Mack Jr., MD;  Location:  [facility];  Service: ENT;  Laterality: N/A;   • EXPLORATORY LAPAROTOMY N/A 4/4/2018    Procedure: LAPAROTOMY EXPLORATORY, partial colectomy, creation colostomy, incision and drainage abdominal wall abscess;  Surgeon: Elda Velazquez MD;  Location:  [facility];  Service: General   • INTERNAL MAXILLARY ARTERY AND ETHMOID ARTERY LIGATION N/A 1/5/2021    Procedure: INTERNAL MAXILLARY ARTERY AND ETHMOID ARTERY LIGATION;  Surgeon: Elliot Mack Jr., MD;  Location:  [facility];  Service: ENT;  Laterality: N/A;   • SEPTOPLASTY, RESECTION INFERIOR TURBINATES Bilateral 1/5/2021    Procedure: SEPTOPLASTY, RESECTION INFERIOR TURBINATES;  Surgeon: Elliot Mack Jr., MD;  Location:  [facility];  Service: ENT;  Laterality: Bilateral;     General Information     Row Name 03/08/21 1516          Physical Therapy Time and Intention    Document Type  evaluation;other (see comments) see MAR; orders for unna boots B LEs in addition to functional mobility assessment  -     Mode of Treatment  physical therapy  -     Row Name 03/08/21 1516          General Information    Patient Profile Reviewed  yes  -     Prior Level of Function  independent:;all household mobility;feeding;mod assist:;dressing;bathing;dependent:;cooking;cleaning;driving;shopping  -     Existing Precautions/Restrictions  fall;oxygen therapy device and L/min;other (see comments) B LE edema w/ unna boots in place  -     Barriers to Rehab  medically complex  -     Row Name 03/08/21 1516          Living Environment    Lives With  child(irene), adult  -     Row Name 03/08/21 1516          Home Main Entrance    Number of Stairs, Main Entrance  none  -     Row Name 03/08/21 1516          Stairs Within Home, Primary    Number of Stairs, Within Home, Primary  none  -     Row Name 03/08/21 1516          Cognition     Orientation Status (Cognition)  oriented x 4  -     Row Name 03/08/21 1516          Safety Issues, Functional Mobility    Safety Issues Affecting Function (Mobility)  at risk behavior observed;friction/shear risk;safety precaution awareness  -     Impairments Affecting Function (Mobility)  balance;endurance/activity tolerance;shortness of breath;strength;other (see comments) B LE edema  -     Row Name 03/08/21 1516          Relationship/Environment    Name(s) of Who Lives With Patient  lives w/ dtr currently - Joanne (works away from the home during the day); has home care 13 hrs a week; meals on wheels M-F  -       User Key  (r) = Recorded By, (t) = Taken By, (c) = Cosigned By    Initials Name Provider Type    Joanne Espinal, PT Physical Therapist        Mobility     Row Name 03/08/21 1516          Bed Mobility    Comment (Bed Mobility)  pt up in chair, reports she prefers to stay in the chair; sitting in straight back chair initially; pt did move to a recliner in order for LEs to be elevated for evaluation  -     Row Name 03/08/21 1516          Transfers    Comment (Transfers)  chair to/from chair w/ min to CGA and VCs  -     Row Name 03/08/21 1516          Sit-Stand Transfer    Sit-Stand [place] (Transfers)  contact guard;verbal cues  -     Row Name 03/08/21 1516          Gait/Stairs (Locomotion)    Distance in Feet (Gait)  ~ 5ft chair to chair  -     Comment (Gait/Stairs)  increase HEDY, flexed posture, moves slowly, decrease step length and heel strike  -       User Key  (r) = Recorded By, (t) = Taken By, (c) = Cosigned By    Initials Name Provider Type    Joanne Espinal, PT Physical Therapist        Obj/Interventions     Row Name 03/08/21 1516          Range of Motion Comprehensive    Comment, General Range of Motion  AROM all 4 extremities WFLs  -     Row Name 03/08/21 1516          Strength Comprehensive (MMT)    Comment, General Manual Muscle Testing (MMT) Assessment  no  formal assessment of the UEs; B LEs grossly 4 to 4+/5 except B knee flexors 4-/5  -     Row Name 03/08/21 1516          Balance    Balance Assessment  sitting static balance;standing static balance;standing dynamic balance  -     Static Sitting Balance  WFL;supported;sitting in chair  -     Static Standing Balance  mild impairment;supported;standing increase HEDY, UE support  -     Dynamic Standing Balance  mild impairment;supported;standing wide HEDY, UE support  -     Row Name 03/08/21 1516          Sensory Assessment (Somatosensory)    Sensory Assessment (Somatosensory)  other (see comments) no c/os or reports of N/T  -       User Key  (r) = Recorded By, (t) = Taken By, (c) = Cosigned By    Initials Name Provider Type    Joanne Espinal, PT Physical Therapist        Goals/Plan     Row Name 03/08/21 1516          Transfer Goal 1 (PT)    Activity/Assistive Device (Transfer Goal 1, PT)  sit-to-stand/stand-to-sit;other (see comments) chair to chair  -     [place]/Cues Needed (Transfer Goal 1, PT)  standby assist  -JE     Time Frame (Transfer Goal 1, PT)  long term goal (LTG);10 days  -JE     Progress/Outcome (Transfer Goal 1, PT)  goal ongoing  -     Row Name 03/08/21 1516          Gait Training Goal 1 (PT)    Activity/Assistive Device (Gait Training Goal 1, PT)  gait (walking locomotion);decrease fall risk;improve balance and speed;increase endurance/gait distance;increase energy conservation;other (see comments) w/ or w/out rwx  -JE     [place] Level (Gait Training Goal 1, PT)  standby assist  -JE     Distance (Gait Training Goal 1, PT)  50 ft w/ less than or equal to 2 standing rest w/ SPO2 remaining > 90%  -JE     Time Frame (Gait Training Goal 1, PT)  long term goal (LTG);10 days  -JE     Progress/Outcome (Gait Training Goal 1, PT)  goal ongoing  -     Row Name 03/08/21 1516          ROM Goal 1 (PT)    ROM Goal 1 (PT)  edema mgmt: B LEs to decrease edema in LEs demonstrated by  decrease in circumferential measurements in B LEs by 2 cm throughout; pt also w/out any open wounds noted throughout B LEs  -JE     Time Frame (ROM Goal 1, PT)  long-term goal (LTG);other (see comments) 10 days  -JE     Progress/Outcome (ROM Goal 1, PT)  goal ongoing  -JE     Row Name 03/08/21 1516          Patient Education Goal (PT)    Activity (Patient Education Goal, PT)  HEP for strengthening and energy conservation  -JE     [place]/Cues/Accuracy (Memory Goal 2, PT)  demonstrates adequately;independent;verbalizes understanding  -JE     Time Frame (Patient Education Goal, PT)  long term goal (LTG);10 days  -JE     Progress/Outcome (Patient Education Goal, PT)  goal ongoing  -JE       User Key  (r) = Recorded By, (t) = Taken By, (c) = Cosigned By    Initials Name Provider Type    Joanne Espinal, PT Physical Therapist        Clinical Impression     Row Name 03/08/21 1516          Pain    Additional Documentation  Pain Scale: Numbers Pre/Post-Treatment (Group)  -     Row Name 03/08/21 1516          Pain Scale: Numbers Pre/Post-Treatment    Pretreatment Pain Rating  0/10 - no pain  -     Posttreatment Pain Rating  0/10 - no pain  -     Row Name 03/08/21 1516          Plan of Care Review    Plan of Care Reviewed With  patient  -     Progress  no change  -JE     Outcome Summary  PT eval completed.  Orders for functional mobility assessment and for unna boots B LEs.  Pt demonstrates generalized weakness, decrease activity w/ noted shortness of breath w/ minimal activity requiring extended seated rest before moving forward w/ visit.  Pt also demonstrates decrease balance and I w/ functional mobility requiring assist of 1 person and UE support w/ activity at bedside.  Pt w/ B LE edema throughout the entire LEs.  Pt reports she has been wearing unna boots for about the last 2 wks but has been treated w/ unna boots in the past also.  Pt reports her unna boots are changed M, W, F and are due to be changed  today.  Unna boots were removed and images obtained of B LEs located in media in chart.  Pt's B lower legs are dry and scaly.  One pin point spot at R medial leg w/ scant bloody drainage.  No other moist areas or drainage noted.  Cleaned LEs w/ normal saline and 4 X 4s. Pt w/ intact dorsalis pedis pulse B and capillary refill.  Circumferential measurements of B LEs obtained.  R LE at met heads 24.5 cm, 10 cm superior of lateral malleolus 38 cm, 20 cm superior of lateral malleolus 48 cm.  L LE at met heads 24.5 cm, 10 cm superior of lateral malleolus 37.5 and 20 cm superior of lateral malleolus 45 cm.  Redressed B LEs w/ unna boots.  Intact capillary refill B.  Will plan drsg changes on M, W, F rotation to maintain current plan.  PT for functional mobility to continue for functional mobility training, strengthening, balance, activity tolerance, edema mgmt, and education for energy conservation techniques.  Pt will need 24/7 assist at discharge w/ continued home care for skilled care.  Also, feel pt's home care should increase from 13 hrs a day to cover most of the day w/ dtr not being at home during the day.  However, if this is not able to be accomplished, feel pt may need a short stay in subacute care to improve strength and activity tolerance before returning home.  -     Row Name 03/08/21 1516          Therapy Assessment/Plan (PT)    Patient/Family Therapy Goals Statement (PT)  to return to prior level of function  -     Rehab Potential (PT)  fair, will monitor progress closely  -     Criteria for Skilled Interventions Met (PT)  yes;meets criteria;skilled treatment is necessary  -     Predicted Duration of Therapy Intervention (PT)  until discharge or goals achieved  -     Row Name 03/08/21 1516          Vital Signs    Posttreatment Heart Rate (beats/min)  97  -JE     Pre SpO2 (%)  93  -JE     O2 Delivery Pre Treatment  supplemental O2  -JE     O2 Delivery Intra Treatment  supplemental O2  -JE     O2  Delivery Post Treatment  supplemental O2  -JE     Pre Patient Position  Sitting  -JE     Intra Patient Position  Standing  -JE     Post Patient Position  Sitting  -JE     Row Name 03/08/21 1516          Positioning and Restraints    Pre-Treatment Position  sitting in chair/recliner  -JE     Post Treatment Position  chair  -JE     In Chair  notified nsg;sitting;call light within reach;encouraged to call for assist  -IAIN       User Key  (r) = Recorded By, (t) = Taken By, (c) = Cosigned By    Initials Name Provider Type    Joanne Espinal, PT Physical Therapist        Outcome Measures     Row Name 03/08/21 1516          How much help from another person do you currently need...    Turning from your back to your side while in flat bed without using bedrails?  2  -JE     Moving from lying on back to sitting on the side of a flat bed without bedrails?  2  -JE     Moving to and from a bed to a chair (including a wheelchair)?  3  -JE     Standing up from a chair using your arms (e.g., wheelchair, bedside chair)?  3  -JE     Climbing 3-5 steps with a railing?  2  -JE     To walk in hospital room?  3  -JE     AM-PAC 6 Clicks Score (PT)  15  -     Row Name 03/08/21 1516          Functional Assessment    Outcome Measure Options  AM-PAC 6 Clicks Basic Mobility (PT)  -IAIN       User Key  (r) = Recorded By, (t) = Taken By, (c) = Cosigned By    Initials Name Provider Type    Joanne Espinal, PT Physical Therapist        Physical Therapy Education                 Title: PT OT SLP Therapies (Done)     Topic: Physical Therapy (Done)     Point: Mobility training (Done)     Learning Progress Summary           Patient Acceptance, E,TB,D, VU,DU,NR by IAIN at 3/8/2021 1658    Comment: education re: purpose of PT/importance of activity, for activities to assist w/ edema mgmt to include LE elevation/aps; education for safety w/ functional mobility; education re: risks/benefit of unna boots                   Point: Home exercise program  (Done)     Learning Progress Summary           Patient Acceptance, E,TB,D, VU,DU,NR by  at 3/8/2021 1651    Comment: education re: purpose of PT/importance of activity, for activities to assist w/ edema mgmt to include LE elevation/aps; education for safety w/ functional mobility; education re: risks/benefit of unna boots                   Point: Precautions (Done)     Learning Progress Summary           Patient Acceptance, E,TB,D, VU,DU,NR by IAIN at 3/8/2021 1651    Comment: education re: purpose of PT/importance of activity, for activities to assist w/ edema mgmt to include LE elevation/aps; education for safety w/ functional mobility; education re: risks/benefit of unna boots                               User Key     Initials Effective Dates Name Provider Type Discipline    IAIN 08/02/18 -  Joanne Dick, PT Physical Therapist PT              PT Recommendation and Plan  Planned Therapy Interventions (PT): balance training, bed mobility training, gait training, home exercise program, patient/family education, postural re-education, ROM (range of motion), strengthening, transfer training, other (see comments) (safety/falls prevention, edema mgmt, unna boots B LEs)  Plan of Care Reviewed With: patient  Progress: no change  Outcome Summary: PT eval completed.  Orders for functional mobility assessment and for unna boots B LEs.  Pt demonstrates generalized weakness, decrease activity w/ noted shortness of breath w/ minimal activity requiring extended seated rest before moving forward w/ visit.  Pt also demonstrates decrease balance and I w/ functional mobility requiring assist of 1 person and UE support w/ activity at bedside.  Pt w/ B LE edema throughout the entire LEs.  Pt reports she has been wearing unna boots for about the last 2 wks but has been treated w/ unna boots in the past also.  Pt reports her unna boots are changed M, W, F and are due to be changed today.  Unna boots were removed and images obtained  of B LEs located in media in chart.  Pt's B lower legs are dry and scaly.  One pin point spot at R medial leg w/ scant bloody drainage.  No other moist areas or drainage noted.  Cleaned LEs w/ normal saline and 4 X 4s. Pt w/ intact dorsalis pedis pulse B and capillary refill.  Circumferential measurements of B LEs obtained.  R LE at met heads 24.5 cm, 10 cm superior of lateral malleolus 38 cm, 20 cm superior of lateral malleolus 48 cm.  L LE at met heads 24.5 cm, 10 cm superior of lateral malleolus 37.5 and 20 cm superior of lateral malleolus 45 cm.  Redressed B LEs w/ unna boots.  Intact capillary refill B.  Will plan drsg changes on M, W, F rotation to maintain current plan.  PT for functional mobility to continue for functional mobility training, strengthening, balance, activity tolerance, edema mgmt, and education for energy conservation techniques.  Pt will need 24/7 assist at discharge w/ continued home care for skilled care.  Also, feel pt's home care should increase from 13 hrs a day to cover most of the day w/ dtr not being at home during the day.  However, if this is not able to be accomplished, feel pt may need a short stay in subacute care to improve strength and activity tolerance before returning home.     Time Calculation:   PT Charges     Row Name 03/08/21 1658             Time Calculation    Start Time  1516  -      Stop Time  1655  -      Time Calculation (min)  99 min  -      PT Received On  03/08/21  -      PT Goal Re-Cert Due Date  03/18/21  -        User Key  (r) = Recorded By, (t) = Taken By, (c) = Cosigned By    Initials Name Provider Type    Joanne Espinal, PT Physical Therapist        Therapy Charges for Today     Code Description Service Date Service Provider Modifiers Qty    49775751166 HC PT EVAL MOD COMPLEXITY 4 3/8/2021 Joanne Dick, PT GP 1    19531097705 HC PT STAPPING UNNA BOOT 3/8/2021 Joanne Dick, PT GP 2    84598263572 HC PT THERAPEUTIC ACT EA 15 MIN  3/8/2021 Joanne Dick, PT GP 1          PT G-Codes  Outcome Measure Options: AM-PAC 6 Clicks Basic Mobility (PT)  AM-PAC 6 Clicks Score (PT): 15    Joanne Dick, PT  3/8/2021

## 2021-03-08 NOTE — PLAN OF CARE
Problem: Adult Inpatient Plan of Care  Goal: Plan of Care Review  Outcome: Ongoing, Progressing  Flowsheets (Taken 3/8/2021 4920)  Progress: no change  Plan of Care Reviewed With: patient  Outcome Summary: direct admit to  this shift for pneumonia. pt admitted on venturi mask, 4L NC 28%o2, pt requested to keep venturi mask on, did not attempt to wean to nasal canula. pt sleeping up in recliner. legs elevated. S/-110 on tele. pt requested prn atarax to help sleep. IV abx adminstered. purewick in place. safety maintained.

## 2021-03-08 NOTE — CONSULTS
"Pharmacy Dosing Service  Anticoagulant  Enoxaparin    Assessment/Action/Plan:  Initiated Lovenox 40mg subcutaneous every 12 hours.     Subjective:  Emy Bermudez is a 70 y.o. female on Enoxaparin 40 mg SQ every 12 hours for indication of VTE prophylaxis.  Objective:  [Ht: 160 cm (63\"); Wt: 110 kg (243 lb 6.4 oz); BMI: Body mass index is 43.12 kg/m².]  Estimated Creatinine Clearance: 76.8 mL/min (by C-G formula based on SCr of 0.76 mg/dL).   Lab Results   Component Value Date    DDIMER 0.69 (H) 01/23/2021      Lab Results   Component Value Date    INR 1.17 01/22/2021    INR 1.15 (H) 07/04/2020    INR 1.07 04/04/2018    PROTIME 14.9 (H) 01/22/2021    PROTIME 14.3 07/04/2020    PROTIME 14.2 04/04/2018      Lab Results   Component Value Date    HGB 8.0 (L) 03/08/2021    HGB 8.8 (L) 01/23/2021    HGB 9.9 (L) 01/22/2021      Lab Results   Component Value Date     03/08/2021     01/23/2021     (H) 01/22/2021       Jazmyne Dudley RPH  03/08/21 05:53 CST     "

## 2021-03-09 LAB
ANION GAP SERPL CALCULATED.3IONS-SCNC: 8 MMOL/L (ref 5–15)
BUN SERPL-MCNC: 21 MG/DL (ref 8–23)
BUN/CREAT SERPL: 20.4 (ref 7–25)
CALCIUM SPEC-SCNC: 8.9 MG/DL (ref 8.6–10.5)
CHLORIDE SERPL-SCNC: 99 MMOL/L (ref 98–107)
CO2 SERPL-SCNC: 35 MMOL/L (ref 22–29)
CREAT SERPL-MCNC: 1.03 MG/DL (ref 0.57–1)
DEPRECATED RDW RBC AUTO: 64.1 FL (ref 37–54)
ERYTHROCYTE [DISTWIDTH] IN BLOOD BY AUTOMATED COUNT: 19 % (ref 12.3–15.4)
GFR SERPL CREATININE-BSD FRML MDRD: 53 ML/MIN/1.73
GLUCOSE BLDC GLUCOMTR-MCNC: 203 MG/DL (ref 70–130)
GLUCOSE BLDC GLUCOMTR-MCNC: 236 MG/DL (ref 70–130)
GLUCOSE BLDC GLUCOMTR-MCNC: 254 MG/DL (ref 70–130)
GLUCOSE BLDC GLUCOMTR-MCNC: 274 MG/DL (ref 70–130)
GLUCOSE SERPL-MCNC: 203 MG/DL (ref 65–99)
HCT VFR BLD AUTO: 24.8 % (ref 34–46.6)
HEMOCCULT STL QL: NEGATIVE
HGB BLD-MCNC: 7.2 G/DL (ref 12–15.9)
MCH RBC QN AUTO: 26.7 PG (ref 26.6–33)
MCHC RBC AUTO-ENTMCNC: 29 G/DL (ref 31.5–35.7)
MCV RBC AUTO: 91.9 FL (ref 79–97)
NT-PROBNP SERPL-MCNC: 181 PG/ML (ref 0–900)
PLATELET # BLD AUTO: 318 10*3/MM3 (ref 140–450)
PMV BLD AUTO: 9.7 FL (ref 6–12)
POTASSIUM SERPL-SCNC: 3.9 MMOL/L (ref 3.5–5.2)
RBC # BLD AUTO: 2.7 10*6/MM3 (ref 3.77–5.28)
SODIUM SERPL-SCNC: 142 MMOL/L (ref 136–145)
VANCOMYCIN TROUGH SERPL-MCNC: 17.9 MCG/ML (ref 5–20)
VIT B12 BLD-MCNC: 550 PG/ML (ref 211–946)
WBC # BLD AUTO: 21.29 10*3/MM3 (ref 3.4–10.8)

## 2021-03-09 PROCEDURE — 97110 THERAPEUTIC EXERCISES: CPT

## 2021-03-09 PROCEDURE — 80048 BASIC METABOLIC PNL TOTAL CA: CPT | Performed by: FAMILY MEDICINE

## 2021-03-09 PROCEDURE — 82272 OCCULT BLD FECES 1-3 TESTS: CPT | Performed by: FAMILY MEDICINE

## 2021-03-09 PROCEDURE — 99232 SBSQ HOSP IP/OBS MODERATE 35: CPT | Performed by: INTERNAL MEDICINE

## 2021-03-09 PROCEDURE — 94799 UNLISTED PULMONARY SVC/PX: CPT

## 2021-03-09 PROCEDURE — 82962 GLUCOSE BLOOD TEST: CPT

## 2021-03-09 PROCEDURE — 82607 VITAMIN B-12: CPT | Performed by: FAMILY MEDICINE

## 2021-03-09 PROCEDURE — 25010000002 PIPERACILLIN SOD-TAZOBACTAM PER 1 G: Performed by: FAMILY MEDICINE

## 2021-03-09 PROCEDURE — 25010000002 ENOXAPARIN PER 10 MG: Performed by: FAMILY MEDICINE

## 2021-03-09 PROCEDURE — 25010000002 METHYLPREDNISOLONE PER 40 MG: Performed by: INTERNAL MEDICINE

## 2021-03-09 PROCEDURE — 80202 ASSAY OF VANCOMYCIN: CPT | Performed by: FAMILY MEDICINE

## 2021-03-09 PROCEDURE — 25010000002 VANCOMYCIN 10 G RECONSTITUTED SOLUTION: Performed by: FAMILY MEDICINE

## 2021-03-09 PROCEDURE — 63710000001 INSULIN LISPRO (HUMAN) PER 5 UNITS: Performed by: FAMILY MEDICINE

## 2021-03-09 PROCEDURE — 85027 COMPLETE CBC AUTOMATED: CPT | Performed by: FAMILY MEDICINE

## 2021-03-09 PROCEDURE — 99231 SBSQ HOSP IP/OBS SF/LOW 25: CPT | Performed by: FAMILY MEDICINE

## 2021-03-09 PROCEDURE — 83880 ASSAY OF NATRIURETIC PEPTIDE: CPT | Performed by: NURSE PRACTITIONER

## 2021-03-09 PROCEDURE — 63710000001 PREDNISONE PER 1 MG: Performed by: NURSE PRACTITIONER

## 2021-03-09 RX ORDER — IPRATROPIUM BROMIDE AND ALBUTEROL SULFATE 2.5; .5 MG/3ML; MG/3ML
3 SOLUTION RESPIRATORY (INHALATION)
Status: DISCONTINUED | OUTPATIENT
Start: 2021-03-09 | End: 2021-03-11 | Stop reason: HOSPADM

## 2021-03-09 RX ORDER — PREDNISONE 20 MG/1
20 TABLET ORAL 2 TIMES DAILY WITH MEALS
Status: DISCONTINUED | OUTPATIENT
Start: 2021-03-09 | End: 2021-03-11 | Stop reason: HOSPADM

## 2021-03-09 RX ORDER — FAMOTIDINE 20 MG/1
20 TABLET, FILM COATED ORAL
Status: DISCONTINUED | OUTPATIENT
Start: 2021-03-09 | End: 2021-03-11 | Stop reason: HOSPADM

## 2021-03-09 RX ADMIN — FUROSEMIDE 20 MG: 20 TABLET ORAL at 09:22

## 2021-03-09 RX ADMIN — GUAIFENESIN 1200 MG: 600 TABLET, EXTENDED RELEASE ORAL at 09:23

## 2021-03-09 RX ADMIN — ALBUTEROL SULFATE 2.5 MG: 2.5 SOLUTION RESPIRATORY (INHALATION) at 11:30

## 2021-03-09 RX ADMIN — ENOXAPARIN SODIUM 40 MG: 40 INJECTION SUBCUTANEOUS at 22:00

## 2021-03-09 RX ADMIN — ENOXAPARIN SODIUM 40 MG: 40 INJECTION SUBCUTANEOUS at 09:28

## 2021-03-09 RX ADMIN — TAZOBACTAM SODIUM AND PIPERACILLIN SODIUM 4.5 G: 500; 4 INJECTION, SOLUTION INTRAVENOUS at 09:17

## 2021-03-09 RX ADMIN — FAMOTIDINE 20 MG: 20 TABLET, FILM COATED ORAL at 16:46

## 2021-03-09 RX ADMIN — METHYLPREDNISOLONE SODIUM SUCCINATE 40 MG: 40 INJECTION, POWDER, FOR SOLUTION INTRAMUSCULAR; INTRAVENOUS at 09:23

## 2021-03-09 RX ADMIN — TAZOBACTAM SODIUM AND PIPERACILLIN SODIUM 4.5 G: 500; 4 INJECTION, SOLUTION INTRAVENOUS at 16:46

## 2021-03-09 RX ADMIN — POTASSIUM CHLORIDE 10 MEQ: 750 CAPSULE, EXTENDED RELEASE ORAL at 09:22

## 2021-03-09 RX ADMIN — ESCITALOPRAM 10 MG: 10 TABLET, FILM COATED ORAL at 09:22

## 2021-03-09 RX ADMIN — INSULIN LISPRO 6 UNITS: 100 INJECTION, SOLUTION INTRAVENOUS; SUBCUTANEOUS at 11:42

## 2021-03-09 RX ADMIN — HYDROXYZINE HYDROCHLORIDE 50 MG: 25 TABLET ORAL at 11:51

## 2021-03-09 RX ADMIN — SODIUM CHLORIDE, PRESERVATIVE FREE 10 ML: 5 INJECTION INTRAVENOUS at 22:00

## 2021-03-09 RX ADMIN — INSULIN LISPRO 4 UNITS: 100 INJECTION, SOLUTION INTRAVENOUS; SUBCUTANEOUS at 09:18

## 2021-03-09 RX ADMIN — ALBUTEROL SULFATE 2.5 MG: 2.5 SOLUTION RESPIRATORY (INHALATION) at 00:04

## 2021-03-09 RX ADMIN — VANCOMYCIN HYDROCHLORIDE 750 MG: 10 INJECTION, POWDER, LYOPHILIZED, FOR SOLUTION INTRAVENOUS at 05:43

## 2021-03-09 RX ADMIN — SODIUM CHLORIDE, PRESERVATIVE FREE 10 ML: 5 INJECTION INTRAVENOUS at 09:29

## 2021-03-09 RX ADMIN — TAZOBACTAM SODIUM AND PIPERACILLIN SODIUM 4.5 G: 500; 4 INJECTION, SOLUTION INTRAVENOUS at 02:11

## 2021-03-09 RX ADMIN — LEVOTHYROXINE SODIUM 75 MCG: 75 TABLET ORAL at 05:35

## 2021-03-09 RX ADMIN — GUAIFENESIN 600 MG: 600 TABLET, EXTENDED RELEASE ORAL at 22:00

## 2021-03-09 RX ADMIN — PREDNISONE 20 MG: 20 TABLET ORAL at 17:45

## 2021-03-09 RX ADMIN — HYDROXYZINE HYDROCHLORIDE 50 MG: 25 TABLET ORAL at 02:11

## 2021-03-09 RX ADMIN — INSULIN LISPRO 4 UNITS: 100 INJECTION, SOLUTION INTRAVENOUS; SUBCUTANEOUS at 16:46

## 2021-03-09 RX ADMIN — ALBUTEROL SULFATE 2.5 MG: 2.5 SOLUTION RESPIRATORY (INHALATION) at 06:12

## 2021-03-09 RX ADMIN — IPRATROPIUM BROMIDE AND ALBUTEROL SULFATE 3 ML: 2.5; .5 SOLUTION RESPIRATORY (INHALATION) at 15:05

## 2021-03-09 RX ADMIN — BUDESONIDE AND FORMOTEROL FUMARATE DIHYDRATE 2 PUFF: 160; 4.5 AEROSOL RESPIRATORY (INHALATION) at 06:12

## 2021-03-09 RX ADMIN — VANCOMYCIN HYDROCHLORIDE 750 MG: 10 INJECTION, POWDER, LYOPHILIZED, FOR SOLUTION INTRAVENOUS at 18:13

## 2021-03-09 RX ADMIN — IPRATROPIUM BROMIDE AND ALBUTEROL SULFATE 3 ML: 2.5; .5 SOLUTION RESPIRATORY (INHALATION) at 19:36

## 2021-03-09 RX ADMIN — SALINE NASAL SPRAY 2 SPRAY: 1.5 SOLUTION NASAL at 22:38

## 2021-03-09 RX ADMIN — FUROSEMIDE 20 MG: 20 TABLET ORAL at 05:35

## 2021-03-09 RX ADMIN — HYDROXYZINE HYDROCHLORIDE 50 MG: 25 TABLET ORAL at 22:38

## 2021-03-09 RX ADMIN — BUDESONIDE AND FORMOTEROL FUMARATE DIHYDRATE 2 PUFF: 160; 4.5 AEROSOL RESPIRATORY (INHALATION) at 19:42

## 2021-03-09 NOTE — THERAPY TREATMENT NOTE
Acute Care - Physical Therapy Treatment Note  [facility]     Patient Name: Emy Bermudez  : 1950  MRN: [id number]  Today's Date: 3/9/2021           PT Assessment (last 12 hours)      PT Evaluation and Treatment     Row Name 21 1015          Physical Therapy Time and Intention    Subjective Information  complains of;weakness;fatigue;swelling  -TYLOR     Document Type  therapy note (daily note)  -TYLOR     Mode of Treatment  physical therapy  -TYLOR     Comment, Session Not Performed  pt refused to stand to amb, agreed to exercises only  -TYLOR     Comment  jim unna boots checked, dressings intact with good capillary refill   -TYLOR     Row Name 21 1015          General Information    Existing Precautions/Restrictions  fall;NPO  -TYLOR     Row Name 21 1015          Pain Scale: Numbers Pre/Post-Treatment    Pretreatment Pain Rating  0/10 - no pain  -TYLOR     Posttreatment Pain Rating  0/10 - no pain  -TYLOR     Row Name 21 1015          Motor Skills    Therapeutic Exercise  aerobic  -     Row Name 21 1015          Aerobic Exercise    Comment, Aerobic Exercise (Therapeutic Exercise)  sitting in the chair, performed AROM BLE X 20  -TYLOR     Row Name 21 1015          Positioning and Restraints    Pre-Treatment Position  sitting in chair/recliner  -TYLOR     Post Treatment Position  chair  -TYLOR     In Chair  sitting;call light within reach;encouraged to call for assist  -TYLOR       User Key  (r) = Recorded By, (t) = Taken By, (c) = Cosigned By    Initials Name Provider Type    TYLOR León Altamirano, PTA Physical Therapy Assistant        Physical Therapy Education                 Title: PT OT SLP Therapies (Done)     Topic: Physical Therapy (Done)     Point: Mobility training (Done)     Learning Progress Summary           Patient Acceptance, E,TB,D, VU,DU,NR by  at 3/8/2021 1651    Comment: education re: purpose of PT/importance of activity, for activities to assist w/ edema mgmt to include LE elevation/aps;  education for safety w/ functional mobility; education re: risks/benefit of unna boots                   Point: Home exercise program (Done)     Learning Progress Summary           Patient Acceptance, E,TB,D, VU,DU,NR by IAIN at 3/8/2021 1651    Comment: education re: purpose of PT/importance of activity, for activities to assist w/ edema mgmt to include LE elevation/aps; education for safety w/ functional mobility; education re: risks/benefit of unna boots                   Point: Precautions (Done)     Learning Progress Summary           Patient Acceptance, E,TB,D, VU,DU,NR by IAIN at 3/8/2021 1651    Comment: education re: purpose of PT/importance of activity, for activities to assist w/ edema mgmt to include LE elevation/aps; education for safety w/ functional mobility; education re: risks/benefit of unna boots                               User Key     Initials Effective Dates Name Provider Type [facility] 08/02/18 -  Joanne Dick, PT Physical Therapist PT              PT Recommendation and Plan             Time Calculation:   PT Charges     Row Name 03/09/21 1015             Time Calculation    Start Time  1015  -TYLOR      Stop Time  1025  -TYLOR      Time Calculation (min)  10 min  -TYLOR      PT Received On  03/09/21  -TYLOR         Time Calculation- PT    Total Timed Code Minutes- PT  10 minute(s)  -TYLOR         Timed Charges    31423 - PT Therapeutic Exercise Minutes  10  -TYLOR        User Key  (r) = Recorded By, (t) = Taken By, (c) = Cosigned By    Initials Name Provider Type    León Smith PTA Physical Therapy Assistant        Therapy Charges for Today     Code Description Service Date Service Provider Modifiers Qty    22578645761 HC PT THER PROC EA 15 MIN 3/9/2021 León Altamirano PTA GP 1          PT G-Codes  Outcome Measure Options: AM-PAC 6 Clicks Basic Mobility (PT)  AM-PAC 6 Clicks Score (PT): 15    León Altamirano PTA  3/9/2021

## 2021-03-09 NOTE — PLAN OF CARE
Goal Outcome Evaluation:  Plan of Care Reviewed With: patient     Outcome Summary: VSS, no c/o of pain, wearing 3L/NC, sats in mid 90's, HR NSR 82-95, up in chair all day, sits in soft recliner to sleep, but sits up regular chair to eat meals, bilat unna boots replaced by PT, pt does have large amount of edema in upper thighs, IV abx continue, safety maintained

## 2021-03-09 NOTE — PROGRESS NOTES
"CC:\"im feeling better\"--no new nursing staff concerns    Review of Systems   Constitutional: Positive for activity change.   HENT: Negative.    Eyes: Negative.    Respiratory: Positive for cough and shortness of breath.    Cardiovascular: Negative.    Gastrointestinal: Negative.    Endocrine: Negative.    Genitourinary: Negative.    Musculoskeletal: Negative.    Skin: Negative.    Allergic/Immunologic: Negative.    Neurological: Negative.    Hematological: Negative.    Psychiatric/Behavioral: Negative.      Temp:  [97.5 °F (36.4 °C)-99.1 °F (37.3 °C)] 99 °F (37.2 °C)  Heart Rate:  [] 93  Resp:  [18-20] 18  BP: (113-152)/(50-89) 113/50  I/O last 3 completed shifts:  In: -   Out: 100 [Stool:100]  I/O this shift:  In: 240 [P.O.:240]  Out: 600 [Urine:600]    Physical Exam  Vitals and nursing note reviewed.   Constitutional:       Appearance: Normal appearance. She is normal weight.   HENT:      Head: Normocephalic.      Nose: Nose normal.      Mouth/Throat:      Mouth: Mucous membranes are moist.      Pharynx: Oropharynx is clear.   Eyes:      Conjunctiva/sclera: Conjunctivae normal.      Pupils: Pupils are equal, round, and reactive to light.   Cardiovascular:      Rate and Rhythm: Normal rate and regular rhythm.      Pulses: Normal pulses.      Heart sounds: Normal heart sounds.   Pulmonary:      Effort: Pulmonary effort is normal.      Breath sounds: Normal breath sounds.   Abdominal:      General: Abdomen is flat. Bowel sounds are normal.      Palpations: Abdomen is soft.   Musculoskeletal:         General: Normal range of motion.      Cervical back: Normal range of motion and neck supple.   Skin:     General: Skin is warm and dry.      Capillary Refill: Capillary refill takes less than 2 seconds.   Neurological:      General: No focal deficit present.      Mental Status: She is alert and oriented to person, place, and time. Mental status is at baseline.   Psychiatric:         Mood and Affect: Mood normal.    "      Behavior: Behavior normal.         Thought Content: Thought content normal.         Judgment: Judgment normal.           Pneumonia    Appreciate help of pulmonary--folllow up labs

## 2021-03-09 NOTE — PLAN OF CARE
Problem: Adult Inpatient Plan of Care  Goal: Plan of Care Review  Outcome: Ongoing, Progressing  Flowsheets (Taken 3/9/2021 1723)  Progress: no change  Plan of Care Reviewed With: patient     Goal Outcome Evaluation:  Plan of Care Reviewed With: patient  Progress: no change  Outcome Summary: Had low grade temp of 99.3 and 99.2 today, continues on 3L/NC, states is breathing better, continues to refuse losartan medication, steroids changed to po, continues with moderate BLE edeam, bilat unna boots in place, voiding well, pure wick in place, ostomy bag changed today, skin fold care done, sits in chair most of today was only in bed for about an hour, safety maintained, HR  NSR -ST

## 2021-03-09 NOTE — PROGRESS NOTES
PULMONARY AND CRITICAL CARE PROGRESS NOTE - [facility]    Patient: Emy Bermudez  1950   MR# [id number]   Acct# [id number]  03/09/21   12:47 CST  Referring Provider: Yehuda Sanches MD    Chief Complaint: shortness of breath   Interval history: The patient is sitting up in chair on 3 L nasal cannula.  She is eating breakfast.  The patient states that she has been vaping.  No other aggravating or alleviating factors.     Meds:  albuterol, 2.5 mg, Nebulization, Q6H - RT  budesonide-formoterol, 2 puff, Inhalation, BID - RT  enoxaparin, 40 mg, Subcutaneous, Q12H  escitalopram, 10 mg, Oral, Daily  furosemide, 20 mg, Oral, Q AM  guaiFENesin, 1,200 mg, Oral, Q12H  insulin lispro, 0-9 Units, Subcutaneous, TID AC  levothyroxine, 75 mcg, Oral, Q AM  losartan, 25 mg, Oral, Q24H  methylPREDNISolone sodium succinate, 40 mg, Intravenous, Q12H  piperacillin-tazobactam, 4.5 g, Intravenous, Q8H  potassium chloride, 10 mEq, Oral, Daily  sodium chloride, 10 mL, Intravenous, Q12H  vancomycin, 750 mg, Intravenous, Q12H      Pharmacy to Dose enoxaparin (LOVENOX),   Pharmacy to Dose Zosyn,       Review of Systems:   Review of Systems   Constitutional: Negative for chills and fever.   Respiratory: Positive for cough and shortness of breath.    Gastrointestinal: Negative for diarrhea, nausea and vomiting.     Physical Exam:  SpO2 Percentage    03/09/21 0814 03/09/21 1130 03/09/21 1214   SpO2: 96% 96% 96%     Temp:  [98.7 °F (37.1 °C)-99.3 °F (37.4 °C)] 99.3 °F (37.4 °C)  Heart Rate:  [72-96] 83  Resp:  [16-18] 16  BP: (101-113)/(49-55) 105/55    Intake/Output Summary (Last 24 hours) at 3/9/2021 1247  Last data filed at 3/9/2021 0912  Gross per 24 hour   Intake 480 ml   Output 600 ml   Net -120 ml     Physical Exam  Vitals reviewed.   Constitutional:       General: She is not in acute distress.     Appearance: She is well-developed. She is morbidly obese.      Interventions: Nasal cannula in place.   HENT:       Head: Normocephalic and atraumatic.   Eyes:      General: No scleral icterus.     Conjunctiva/sclera: Conjunctivae normal.      Pupils: Pupils are equal, round, and reactive to light.   Cardiovascular:      Rate and Rhythm: Normal rate.      Heart sounds: Normal heart sounds. No murmur. No friction rub.   Pulmonary:      Effort: Pulmonary effort is normal. No respiratory distress.      Breath sounds: Decreased air movement present. Rhonchi (few ) present. No wheezing or rales.   Musculoskeletal:         General: Normal range of motion.      Cervical back: Normal range of motion and neck supple.      Right lower leg: Edema present.      Left lower leg: Edema present.   Skin:     General: Skin is warm and dry.   Neurological:      Mental Status: She is alert and oriented to person, place, and time.   Psychiatric:         Behavior: Behavior normal.         Thought Content: Thought content normal.         Judgment: Judgment normal.       Laboratory Data:  Results from last 7 days   Lab Units 03/09/21  0441 03/08/21  0418   WBC 10*3/mm3 21.29* 22.77*   HEMOGLOBIN g/dL 7.2* 8.0*   PLATELETS 10*3/mm3 318 304     Results from last 7 days   Lab Units 03/09/21  0441 03/08/21  0418   SODIUM mmol/L 142 137   POTASSIUM mmol/L 3.9 4.6   BUN mg/dL 21 9   CREATININE mg/dL 1.03* 0.76         Blood Culture   Date Value Ref Range Status   03/08/2021 No growth at 24 hours  Preliminary   03/08/2021 No growth at 24 hours  Preliminary     Recent films:  XR Chest 1 View    Result Date: 3/8/2021  Impression:  Similar appearance to 7/6/2020 with chronic small LEFT pleural effusion and diffuse interstitial coarsening/opacity. This report was finalized on 03/08/2021 11:56 by Dr. Willie Ramsey MD.    Films reviewed personally by me.  My interpretation: None to review today Pulmonary Assessment:  1. Possible copd exacerbation, no pfts to confirm   2. recent ent surgery  3. Hx pulmonary infiltrates  4. Acute on chronic respiratory failure with  hypoxia  5. anemia    Recommend:   · Continue supplemental oxygen for O2 sat 90 to 94%  · Continue Symbicort, DuoNeb  · DVT prophylaxis-Lovenox  · Stress ulcer prophylaxis-Pepcid  · Antibiotic-Zosyn/vancomycin  · Transition to oral steroid  · IS/OPEP  · 2D echo pending  · CBC/BMP in am   · BNP     Electronically signed by JOAQUÍN Garcia, 03/09/21, 12:47 CST     Physician substantive portion:  Patient has questions about vaping and we discussed we do not really recommend that now.  I recommend standard nicotine replacement therapy such as patches and gum or inhalational cartridges, not the vaping paraphernalia which I can have different toxic materials.  We will be checking the BNP.  On exam she is in no distress.  She is awake and alert sitting up in the chair.  We will transition her to oral steroids.  Await echo, follow-up labs and continue bronchodilators.  Mobilize as tolerated.    I have seen and examined patient personally, performing a face-to-face diagnostic evaluation with plan of care reviewed and developed with APRN and nursing staff. I have addended and/or modified the above history of present illness, physical examination, and assessment and plan to reflect my findings and impressions. Essential elements of the care plan were discussed with APRN above.  Agree with findings and assessment/plan as documented above.    Electronically signed by Duncan Monroy MD, on 3/9/2021, 19:45 CST

## 2021-03-09 NOTE — PROGRESS NOTES
"Pharmacy Dosing Service  Pharmacokinetics  Vancomycin Follow-up Evaluation    Assessment/Action/Plan:  Vancomycin trough = 17.9 at 16:23 (previous dose administered at 05:43 this AM).     AUC Model Data for current regimen:  Loading dose: 1750mg  Regimen: 750 mg every 12 hours  Start time: 18:06 on 03/09/2021  Exposure target: AUC24 (range)400-600 mg/L.hr  AUC24,ss: 518 mg/L.hr  PAUC*: 93 %  Ctrough,ss: 17.9 mg/L  Pconc*: 31 %  Tox.: 14 %    No dose adjustment at this time. Patient remains on Zosyn as well. Pharmacy will continue to follow renal function daily and adjust dose accordingly. BMP on order for tomorrow AM.      Subjective:  Emy Bermudez is a 70 y.o. female currently on Vancomycin 750 mg IV every 12 hours for the treatment of pneumonia, day 2 of therapy. Current end date: 3-15-21    Objective:  Ht: 160 cm (62.99\"); Wt: 110 kg (243 lb 3 oz)  Estimated Creatinine Clearance: 59.6 mL/min (A) (by C-G formula based on SCr of 1.03 mg/dL (H)).     Creatinine   Date Value Ref Range Status   03/09/2021 1.03 (H) 0.57 - 1.00 mg/dL Final   03/08/2021 0.76 0.57 - 1.00 mg/dL Final   01/03/2021 1.00 0.57 - 1.00 mg/dL Final      Lab Results   Component Value Date    WBC 21.29 (H) 03/09/2021    WBC 22.77 (H) 03/08/2021    WBC 18.2 (H) 01/23/2021         Lab Results   Component Value Date    VANCOTROUGH 17.90 03/09/2021       Culture Results:  Microbiology Results (last 10 days)       Procedure Component Value - Date/Time    Blood Culture - Blood, Arm, Right [[id number]] Collected: 03/08/21 0418    Lab Status: Preliminary result Specimen: Blood from Arm, Right Updated: 03/09/21 0645     Blood Culture No growth at 24 hours    Blood Culture - Blood, Arm, Left [[id number]] Collected: 03/08/21 0418    Lab Status: Preliminary result Specimen: Blood from Arm, Left Updated: 03/09/21 0645     Blood Culture No growth at 24 hours    COVID PRE-OP / PRE-PROCEDURE SCREENING ORDER (NO ISOLATION) - Swab, Nasal Cavity [[id number]]  (Normal) " Collected: 03/08/21 0000    Lab Status: Final result Specimen: Swab from Nasal Cavity Updated: 03/08/21 0106    Narrative:      The following orders were created for panel order COVID PRE-OP / PRE-PROCEDURE SCREENING ORDER (NO ISOLATION) - Swab, Nasal Cavity.  Procedure                               Abnormality         Status                     ---------                               -----------         ------                     COVID-19,[employer] IN-KARY...[[id number]]  Normal              Final result                 Please view results for these tests on the individual orders.    COVID-19,[facility] IN-HOUSE,Nasal Swab No Transport Media 3-4 HR TAT - Swab, Nasal Cavity [[id number]]  (Normal) Collected: 03/08/21 0000    Lab Status: Final result Specimen: Swab from Nasal Cavity Updated: 03/08/21 0106     COVID19 Not Detected    Narrative:      Fact sheet for providers: https://www.fda.gov/media/512309/download     Fact sheet for patients: https://www.fda.gov/media/899410/download    Test performed by PCR.    Consider negative results in combination with clinical observations, patient history, and epidemiological information.            Chance Govea, PharmD   03/09/21 17:22 CST

## 2021-03-09 NOTE — PLAN OF CARE
Problem: Adult Inpatient Plan of Care  Goal: Plan of Care Review  Outcome: Ongoing, Progressing  Flowsheets (Taken 3/9/2021 0637)  Progress: no change  Plan of Care Reviewed With: patient  Outcome Summary: VSS. no c/o pain this shift. pt request prn atarax to help w/ anxiety, administered as ordered. NSR 74-99 on tele. 3L NC, O2 WNL's. bilateral unna boots in place. Up w/ one asst. Purewick in place. colostomy bag emptied. IV abx administered. IV solumedrol administered.  safety maintained.

## 2021-03-10 LAB
ANION GAP SERPL CALCULATED.3IONS-SCNC: 8 MMOL/L (ref 5–15)
BASOPHILS # BLD AUTO: 0.02 10*3/MM3 (ref 0–0.2)
BASOPHILS NFR BLD AUTO: 0.1 % (ref 0–1.5)
BUN SERPL-MCNC: 23 MG/DL (ref 8–23)
BUN/CREAT SERPL: 22.5 (ref 7–25)
CALCIUM SPEC-SCNC: 8.5 MG/DL (ref 8.6–10.5)
CHLORIDE SERPL-SCNC: 100 MMOL/L (ref 98–107)
CO2 SERPL-SCNC: 34 MMOL/L (ref 22–29)
CREAT SERPL-MCNC: 1.02 MG/DL (ref 0.57–1)
DEPRECATED RDW RBC AUTO: 64.4 FL (ref 37–54)
EOSINOPHIL # BLD AUTO: 0 10*3/MM3 (ref 0–0.4)
EOSINOPHIL NFR BLD AUTO: 0 % (ref 0.3–6.2)
ERYTHROCYTE [DISTWIDTH] IN BLOOD BY AUTOMATED COUNT: 19 % (ref 12.3–15.4)
GFR SERPL CREATININE-BSD FRML MDRD: 53 ML/MIN/1.73
GLUCOSE BLDC GLUCOMTR-MCNC: 158 MG/DL (ref 70–130)
GLUCOSE BLDC GLUCOMTR-MCNC: 189 MG/DL (ref 70–130)
GLUCOSE SERPL-MCNC: 205 MG/DL (ref 65–99)
HCT VFR BLD AUTO: 24.6 % (ref 34–46.6)
HGB BLD-MCNC: 7.2 G/DL (ref 12–15.9)
IMM GRANULOCYTES # BLD AUTO: 0.47 10*3/MM3 (ref 0–0.05)
IMM GRANULOCYTES NFR BLD AUTO: 2.6 % (ref 0–0.5)
LYMPHOCYTES # BLD AUTO: 1.1 10*3/MM3 (ref 0.7–3.1)
LYMPHOCYTES NFR BLD AUTO: 6.1 % (ref 19.6–45.3)
MCH RBC QN AUTO: 27.3 PG (ref 26.6–33)
MCHC RBC AUTO-ENTMCNC: 29.3 G/DL (ref 31.5–35.7)
MCV RBC AUTO: 93.2 FL (ref 79–97)
MONOCYTES # BLD AUTO: 0.95 10*3/MM3 (ref 0.1–0.9)
MONOCYTES NFR BLD AUTO: 5.2 % (ref 5–12)
NEUTROPHILS NFR BLD AUTO: 15.61 10*3/MM3 (ref 1.7–7)
NEUTROPHILS NFR BLD AUTO: 86 % (ref 42.7–76)
NRBC BLD AUTO-RTO: 0 /100 WBC (ref 0–0.2)
PLATELET # BLD AUTO: 316 10*3/MM3 (ref 140–450)
PMV BLD AUTO: 9.7 FL (ref 6–12)
POTASSIUM SERPL-SCNC: 3.8 MMOL/L (ref 3.5–5.2)
RBC # BLD AUTO: 2.64 10*6/MM3 (ref 3.77–5.28)
SODIUM SERPL-SCNC: 142 MMOL/L (ref 136–145)
WBC # BLD AUTO: 18.15 10*3/MM3 (ref 3.4–10.8)

## 2021-03-10 PROCEDURE — 63710000001 INSULIN LISPRO (HUMAN) PER 5 UNITS: Performed by: FAMILY MEDICINE

## 2021-03-10 PROCEDURE — 94799 UNLISTED PULMONARY SVC/PX: CPT

## 2021-03-10 PROCEDURE — 99232 SBSQ HOSP IP/OBS MODERATE 35: CPT | Performed by: INTERNAL MEDICINE

## 2021-03-10 PROCEDURE — 25010000002 PIPERACILLIN SOD-TAZOBACTAM PER 1 G: Performed by: FAMILY MEDICINE

## 2021-03-10 PROCEDURE — 63710000001 PREDNISONE PER 1 MG: Performed by: NURSE PRACTITIONER

## 2021-03-10 PROCEDURE — 80048 BASIC METABOLIC PNL TOTAL CA: CPT | Performed by: NURSE PRACTITIONER

## 2021-03-10 PROCEDURE — 25010000002 IRON SUCROSE PER 1 MG: Performed by: FAMILY MEDICINE

## 2021-03-10 PROCEDURE — 25010000002 ENOXAPARIN PER 10 MG: Performed by: FAMILY MEDICINE

## 2021-03-10 PROCEDURE — 99231 SBSQ HOSP IP/OBS SF/LOW 25: CPT | Performed by: FAMILY MEDICINE

## 2021-03-10 PROCEDURE — 82962 GLUCOSE BLOOD TEST: CPT

## 2021-03-10 PROCEDURE — 29580 STRAPPING UNNA BOOT: CPT

## 2021-03-10 PROCEDURE — 25010000002 VANCOMYCIN 10 G RECONSTITUTED SOLUTION: Performed by: FAMILY MEDICINE

## 2021-03-10 PROCEDURE — 85025 COMPLETE CBC W/AUTO DIFF WBC: CPT | Performed by: NURSE PRACTITIONER

## 2021-03-10 PROCEDURE — 97530 THERAPEUTIC ACTIVITIES: CPT

## 2021-03-10 RX ORDER — BUDESONIDE AND FORMOTEROL FUMARATE DIHYDRATE 160; 4.5 UG/1; UG/1
2 AEROSOL RESPIRATORY (INHALATION)
COMMUNITY

## 2021-03-10 RX ORDER — ALBUTEROL SULFATE 90 UG/1
2 AEROSOL, METERED RESPIRATORY (INHALATION) EVERY 6 HOURS PRN
COMMUNITY

## 2021-03-10 RX ORDER — NITROGLYCERIN 0.4 MG/1
0.4 TABLET SUBLINGUAL
COMMUNITY
End: 2021-03-11 | Stop reason: HOSPADM

## 2021-03-10 RX ADMIN — TAZOBACTAM SODIUM AND PIPERACILLIN SODIUM 4.5 G: 500; 4 INJECTION, SOLUTION INTRAVENOUS at 01:30

## 2021-03-10 RX ADMIN — PREDNISONE 20 MG: 20 TABLET ORAL at 08:49

## 2021-03-10 RX ADMIN — SALINE NASAL SPRAY 2 SPRAY: 1.5 SOLUTION NASAL at 10:25

## 2021-03-10 RX ADMIN — INSULIN LISPRO 2 UNITS: 100 INJECTION, SOLUTION INTRAVENOUS; SUBCUTANEOUS at 08:49

## 2021-03-10 RX ADMIN — FUROSEMIDE 20 MG: 20 TABLET ORAL at 06:36

## 2021-03-10 RX ADMIN — FAMOTIDINE 20 MG: 20 TABLET, FILM COATED ORAL at 08:49

## 2021-03-10 RX ADMIN — ESCITALOPRAM 10 MG: 10 TABLET, FILM COATED ORAL at 08:49

## 2021-03-10 RX ADMIN — GUAIFENESIN 1200 MG: 600 TABLET, EXTENDED RELEASE ORAL at 08:49

## 2021-03-10 RX ADMIN — TAZOBACTAM SODIUM AND PIPERACILLIN SODIUM 4.5 G: 500; 4 INJECTION, SOLUTION INTRAVENOUS at 17:34

## 2021-03-10 RX ADMIN — POTASSIUM CHLORIDE 10 MEQ: 750 CAPSULE, EXTENDED RELEASE ORAL at 08:49

## 2021-03-10 RX ADMIN — VANCOMYCIN HYDROCHLORIDE 750 MG: 10 INJECTION, POWDER, LYOPHILIZED, FOR SOLUTION INTRAVENOUS at 06:35

## 2021-03-10 RX ADMIN — IRON SUCROSE 300 MG: 20 INJECTION, SOLUTION INTRAVENOUS at 08:50

## 2021-03-10 RX ADMIN — LEVOTHYROXINE SODIUM 75 MCG: 75 TABLET ORAL at 06:36

## 2021-03-10 RX ADMIN — IPRATROPIUM BROMIDE AND ALBUTEROL SULFATE 3 ML: 2.5; .5 SOLUTION RESPIRATORY (INHALATION) at 14:30

## 2021-03-10 RX ADMIN — PREDNISONE 20 MG: 20 TABLET ORAL at 17:08

## 2021-03-10 RX ADMIN — FAMOTIDINE 20 MG: 20 TABLET, FILM COATED ORAL at 17:08

## 2021-03-10 RX ADMIN — SODIUM CHLORIDE, PRESERVATIVE FREE 10 ML: 5 INJECTION INTRAVENOUS at 08:51

## 2021-03-10 RX ADMIN — INSULIN LISPRO 2 UNITS: 100 INJECTION, SOLUTION INTRAVENOUS; SUBCUTANEOUS at 11:55

## 2021-03-10 RX ADMIN — IPRATROPIUM BROMIDE AND ALBUTEROL SULFATE 3 ML: 2.5; .5 SOLUTION RESPIRATORY (INHALATION) at 07:04

## 2021-03-10 RX ADMIN — TAZOBACTAM SODIUM AND PIPERACILLIN SODIUM 4.5 G: 500; 4 INJECTION, SOLUTION INTRAVENOUS at 10:26

## 2021-03-10 RX ADMIN — BUDESONIDE AND FORMOTEROL FUMARATE DIHYDRATE 2 PUFF: 160; 4.5 AEROSOL RESPIRATORY (INHALATION) at 19:24

## 2021-03-10 RX ADMIN — GUAIFENESIN 1200 MG: 600 TABLET, EXTENDED RELEASE ORAL at 21:26

## 2021-03-10 RX ADMIN — HYDROXYZINE HYDROCHLORIDE 50 MG: 25 TABLET ORAL at 21:26

## 2021-03-10 RX ADMIN — IPRATROPIUM BROMIDE AND ALBUTEROL SULFATE 3 ML: 2.5; .5 SOLUTION RESPIRATORY (INHALATION) at 10:39

## 2021-03-10 RX ADMIN — IPRATROPIUM BROMIDE AND ALBUTEROL SULFATE 3 ML: 2.5; .5 SOLUTION RESPIRATORY (INHALATION) at 19:25

## 2021-03-10 RX ADMIN — ENOXAPARIN SODIUM 40 MG: 40 INJECTION SUBCUTANEOUS at 08:48

## 2021-03-10 RX ADMIN — BUDESONIDE AND FORMOTEROL FUMARATE DIHYDRATE 2 PUFF: 160; 4.5 AEROSOL RESPIRATORY (INHALATION) at 07:04

## 2021-03-10 NOTE — PLAN OF CARE
Goal Outcome Evaluation:  Plan of Care Reviewed With: patient  Progress: improving  Outcome Summary: Pt transfered sit to stand with SBA, took a few steps from one chair to another chair with SBA, declined to attempt to amb further.  Unna boot dressings changed, good capillary refill, plan to change again Friday, per PT.

## 2021-03-10 NOTE — PROGRESS NOTES
"    PULMONARY AND CRITICAL CARE PROGRESS NOTE - [facility]    Patient: Emy Bermudez  1950   MR# [id number]   Acct# [id number]  03/10/21   07:43 CST  Referring Provider: Yehuda Sanches MD    Chief Complaint: shortness of breath   Interval history: The patient is sitting up in chair on 3 L nasal cannula.  She is eating breakfast and looking out the window.  She did not have the echo done because she refused it.  She reports having low energy but thinks that she is \"a little better\" from the pulmonary standpoint.  BNP was normal yesterday.  No other aggravating or alleviating factors.     Meds:  budesonide-formoterol, 2 puff, Inhalation, BID - RT  enoxaparin, 40 mg, Subcutaneous, Q12H  escitalopram, 10 mg, Oral, Daily  famotidine, 20 mg, Oral, BID AC  furosemide, 20 mg, Oral, Q AM  guaiFENesin, 1,200 mg, Oral, Q12H  insulin lispro, 0-9 Units, Subcutaneous, TID AC  ipratropium-albuterol, 3 mL, Nebulization, 4x Daily - RT  iron sucrose (VENOFER) IVPB, 300 mg, Intravenous, Once  levothyroxine, 75 mcg, Oral, Q AM  losartan, 25 mg, Oral, Q24H  piperacillin-tazobactam, 4.5 g, Intravenous, Q8H  potassium chloride, 10 mEq, Oral, Daily  predniSONE, 20 mg, Oral, BID With Meals  sodium chloride, 10 mL, Intravenous, Q12H  vancomycin, 750 mg, Intravenous, Q12H      Pharmacy to Dose enoxaparin (LOVENOX),   Pharmacy to Dose Zosyn,       Review of Systems:   Review of Systems   Constitutional: Negative for chills and fever.   Respiratory: Positive for cough and shortness of breath.    Gastrointestinal: Negative for diarrhea, nausea and vomiting.     Physical Exam:  SpO2 Percentage    03/10/21 0508 03/10/21 0704 03/10/21 0711   SpO2: 97% 98% 98%     Temp:  [97.8 °F (36.6 °C)-99.3 °F (37.4 °C)] 98 °F (36.7 °C)  Heart Rate:  [62-93] 86  Resp:  [16-18] 16  BP: (105-126)/(54-62) 110/62    Intake/Output Summary (Last 24 hours) at 3/10/2021 0743  Last data filed at 3/9/2021 1515  Gross per 24 hour   Intake 240 ml "   Output 500 ml   Net -260 ml     Physical Exam  Vitals reviewed.   Constitutional:       General: She is not in acute distress.     Appearance: She is well-developed. She is morbidly obese.      Interventions: Nasal cannula in place.   HENT:      Head: Normocephalic and atraumatic.   Eyes:      General: No scleral icterus.     Conjunctiva/sclera: Conjunctivae normal.      Pupils: Pupils are equal, round, and reactive to light.   Cardiovascular:      Rate and Rhythm: Normal rate.      Heart sounds: Normal heart sounds. No murmur. No friction rub.   Pulmonary:      Effort: Pulmonary effort is normal. No respiratory distress.      Breath sounds: Decreased air movement present. No wheezing or rales.   Musculoskeletal:         General: Normal range of motion.      Cervical back: Normal range of motion and neck supple.      Right lower leg: Edema present.      Left lower leg: Edema present.   Skin:     General: Skin is warm and dry.   Neurological:      Mental Status: She is alert and oriented to person, place, and time.   Psychiatric:         Behavior: Behavior normal.         Thought Content: Thought content normal.         Judgment: Judgment normal.       Laboratory Data:  Results from last 7 days   Lab Units 03/10/21  0329 03/09/21 0441 03/08/21  0418   WBC 10*3/mm3 18.15* 21.29* 22.77*   HEMOGLOBIN g/dL 7.2* 7.2* 8.0*   PLATELETS 10*3/mm3 316 318 304     Results from last 7 days   Lab Units 03/10/21  0329 03/09/21  0441 03/08/21  0418   SODIUM mmol/L 142 142 137   POTASSIUM mmol/L 3.8 3.9 4.6   BUN mg/dL 23 21 9   CREATININE mg/dL 1.02* 1.03* 0.76         Blood Culture   Date Value Ref Range Status   03/08/2021 No growth at 24 hours  Preliminary   03/08/2021 No growth at 24 hours  Preliminary     Recent films:  XR Chest 1 View    Result Date: 3/8/2021  Impression:  Similar appearance to 7/6/2020 with chronic small LEFT pleural effusion and diffuse interstitial coarsening/opacity. This report was finalized on  03/08/2021 11:56 by Dr. Willie Ramsey MD.    Films reviewed personally by me.  My interpretation: None to review today     Pulmonary Assessment:  1. Possible copd exacerbation, no pfts to confirm   2. recent ent surgery  3. Hx pulmonary infiltrates  4. Acute on chronic respiratory failure with hypoxia  5. anemia    Recommend:   · Continue supplemental oxygen for O2 sat 90 to 94%  · Continue Symbicort, DuoNeb  · DVT prophylaxis-Lovenox  · Stress ulcer prophylaxis-Pepcid  · Antibiotic-Zosyn/vancomycin  · Transition to oral steroid  · IS/OPEP  · She refused to have echocardiogram done  · BNP normal  · Recommend that she stop vaping  · Outpatient PFTs would be helpful if patient was agreeable  · Pulmonary is signing off.    Electronically signed by JOAQUÍN Yan, 03/10/21, 07:43 CST      Physician substantive portion:  She remains on oxygen and in no distress.  She is completing antibiotics.  She refused antibiotics.  She is in no distress.  Is awake and alert.  Breath sounds are diminished.  She is obese.  Recommend transition to oral prednisone today, 20 mg twice a day of prednisone.  This could be reduced to 20 mg daily later in the week and then 10 mg daily for 2 to 3 days and then discontinued altogether.  She can complete 10 days of empiric antibiotics.  Can complete this at home with cefdinir or Augmentin.  No additional pulmonary plans.  Signing off.    I have seen and examined patient personally, performing a face-to-face diagnostic evaluation with plan of care reviewed and developed with APRN and nursing staff. I have addended and/or modified the above history of present illness, physical examination, and assessment and plan to reflect my findings and impressions. Essential elements of the care plan were discussed with APRN above.  Agree with findings and assessment/plan as documented above.    Electronically signed by Duncan Monroy MD, on 3/10/2021, 19:29 CST

## 2021-03-10 NOTE — PROGRESS NOTES
"\"I have a joke for you\"==sitting in her room with cellphone ---denies any new issues    Review of Systems   Constitutional: Positive for activity change.   HENT: Negative.    Eyes: Negative.    Respiratory: Positive for shortness of breath.    Cardiovascular: Negative.    Gastrointestinal: Negative.    Endocrine: Negative.    Genitourinary: Negative.    Musculoskeletal: Negative.    Skin: Negative.    Allergic/Immunologic: Negative.    Neurological: Negative.    Hematological: Negative.    Psychiatric/Behavioral: Negative.      Temp:  [97.8 °F (36.6 °C)-99.3 °F (37.4 °C)] 98 °F (36.7 °C)  Heart Rate:  [62-93] 86  Resp:  [16-18] 16  BP: (105-126)/(54-62) 110/62  I/O last 3 completed shifts:  In: 240 [P.O.:240]  Out: 1100 [Urine:1100]  No intake/output data recorded.    Physical Exam  Vitals and nursing note reviewed.   Constitutional:       Appearance: Normal appearance. She is normal weight.   HENT:      Head: Normocephalic and atraumatic.      Nose: Nose normal.      Mouth/Throat:      Mouth: Mucous membranes are moist.      Pharynx: Oropharynx is clear.   Eyes:      Extraocular Movements: Extraocular movements intact.      Conjunctiva/sclera: Conjunctivae normal.      Pupils: Pupils are equal, round, and reactive to light.   Cardiovascular:      Rate and Rhythm: Normal rate and regular rhythm.      Pulses: Normal pulses.      Heart sounds: Normal heart sounds.   Pulmonary:      Effort: Pulmonary effort is normal.      Breath sounds: Rhonchi present.   Abdominal:      General: Abdomen is flat. Bowel sounds are normal.      Palpations: Abdomen is soft.   Musculoskeletal:         General: Normal range of motion.      Cervical back: Normal range of motion and neck supple.   Skin:     General: Skin is warm and dry.      Capillary Refill: Capillary refill takes less than 2 seconds.   Neurological:      General: No focal deficit present.      Mental Status: She is alert and oriented to person, place, and time. Mental " status is at baseline.   Psychiatric:         Mood and Affect: Mood normal.         Behavior: Behavior normal.         Thought Content: Thought content normal.         Judgment: Judgment normal.           Pneumonia    She has refused echo--appreciate help of pulmonary

## 2021-03-10 NOTE — PLAN OF CARE
Problem: Adult Inpatient Plan of Care  Goal: Plan of Care Review  Outcome: Ongoing, Progressing  Flowsheets (Taken 3/10/2021 0620)  Progress: no change  Plan of Care Reviewed With: patient  Outcome Summary: VSS. no c/o pain this shift. pt request prn atarax to help sleep, administered as ordered w/ good results. pt request to sleep up in recliner. purewick in place, perineal care provided. Sinus 65-93 on tele. 3L NC. unna boots in place. IV antibiotics administered as ordered. safety maintained.

## 2021-03-10 NOTE — PLAN OF CARE
Goal Outcome Evaluation:         Pt progressing towards discharge. MD stated possibly as soon as tomorrow. She is alert and oriented x's 4. Requires assist to BSC and uses pur wick prn.

## 2021-03-10 NOTE — THERAPY TREATMENT NOTE
Acute Care - Physical Therapy Treatment Note  [facility]     Patient Name: Emy Bermudez  : 1950  MRN: [id number]  Today's Date: 3/10/2021           PT Assessment (last 12 hours)      PT Evaluation and Treatment     Row Name 03/10/21 1024          Physical Therapy Time and Intention    Subjective Information  complains of;weakness;fatigue  -TYLOR     Document Type  therapy note (daily note)  -TYLOR     Mode of Treatment  physical therapy  -TYLOR     Comment  jim unna boots changed   -TYLOR     Row Name 03/10/21 1024          General Information    Existing Precautions/Restrictions  fall;oxygen therapy device and L/min  -TYLOR     Row Name 03/10/21 1024          Pain Scale: Numbers Pre/Post-Treatment    Pretreatment Pain Rating  0/10 - no pain  -TYLOR     Posttreatment Pain Rating  0/10 - no pain  -TYLOR     Row Name 03/10/21 1024          Bed Mobility    Comment (Bed Mobility)  in the chair  -TYLOR     Row Name 03/10/21 1024          Transfers    Sit-Stand [place] (Transfers)  verbal cues;standby assist  -TYLOR     Row Name 03/10/21 1024          Gait/Stairs (Locomotion)    [place] Level (Gait)  verbal cues;contact guard  -TYLOR     Distance in Feet (Gait)  few steps from one chair to another chair and back   -TYLOR     Comment (Gait/Stairs)  pt declined to amb further  -TYLOR     Row Name 03/10/21 1024          Positioning and Restraints    Pre-Treatment Position  sitting in chair/recliner  -TYLOR     Post Treatment Position  chair  -TYLOR     In Chair  sitting;call light within reach;encouraged to call for assist  -TYLOR       User Key  (r) = Recorded By, (t) = Taken By, (c) = Cosigned By    Initials Name Provider Type    León Smith, PTA Physical Therapy Assistant        Physical Therapy Education                 Title: PT OT SLP Therapies (Done)     Topic: Physical Therapy (Done)     Point: Mobility training (Done)     Learning Progress Summary           Patient Acceptance, E, VU,NR by TYLOR at 3/10/2021 1024    Comment: progression  with amb, unna boots    Acceptance, E,TB,D, VU,DU,NR by  at 3/8/2021 1651    Comment: education re: purpose of PT/importance of activity, for activities to assist w/ edema mgmt to include LE elevation/aps; education for safety w/ functional mobility; education re: risks/benefit of unna boots                   Point: Home exercise program (Done)     Learning Progress Summary           Patient Acceptance, E,TB,D, VU,DU,NR by  at 3/8/2021 1651    Comment: education re: purpose of PT/importance of activity, for activities to assist w/ edema mgmt to include LE elevation/aps; education for safety w/ functional mobility; education re: risks/benefit of unna boots                   Point: Precautions (Done)     Learning Progress Summary           Patient Acceptance, E,TB,D, VU,DU,NR by  at 3/8/2021 1651    Comment: education re: purpose of PT/importance of activity, for activities to assist w/ edema mgmt to include LE elevation/aps; education for safety w/ functional mobility; education re: risks/benefit of unna boots                               User Key     Initials Effective Dates Name Provider Type Discipline     12/08/16 -  León Altamirano, PTA Physical Therapy Assistant PT     08/02/18 -  Joanne Dick, PT Physical Therapist PT              PT Recommendation and Plan     Plan of Care Reviewed With: patient  Progress: improving  Outcome Summary: Pt transfered sit to stand with SBA, took a few steps from one chair to another chair with SBA, declined to attempt to amb further.  Unna boot dressings changed, good capillary refill, plan to change again Friday, per PT.       Time Calculation:   PT Charges     Row Name 03/10/21 1024             Time Calculation    Start Time  1024  -TYLOR      Stop Time  1105  -TYLOR      Time Calculation (min)  41 min  -TYLOR      PT Received On  03/10/21  -         Time Calculation- PT    Total Timed Code Minutes- PT  41 minute(s)  -TYLOR         Timed Charges    02015 - PT Therapeutic  Activity Minutes  15  -TYLOR        User Key  (r) = Recorded By, (t) = Taken By, (c) = Cosigned By    Initials Name Provider Type    León Smith PTA Physical Therapy Assistant        Therapy Charges for Today     Code Description Service Date Service Provider Modifiers Qty    82012758374 HC PT THER PROC EA 15 MIN 3/9/2021 León Altamirano, MADHAV GP 1    53942259287 HC PT THERAPEUTIC ACT EA 15 MIN 3/10/2021 León Altamirano, MADHAV GP 1    19941450889 HC PT STAPPING UNNA BOOT 3/10/2021 León Altamirano, MADHAV GP 2          PT G-Codes  Outcome Measure Options: AM-PAC 6 Clicks Basic Mobility (PT)  AM-PAC 6 Clicks Score (PT): 15    León Altamirano PTA  3/10/2021

## 2021-03-11 VITALS
HEIGHT: 63 IN | WEIGHT: 243.19 LBS | HEART RATE: 87 BPM | OXYGEN SATURATION: 93 % | RESPIRATION RATE: 18 BRPM | BODY MASS INDEX: 43.09 KG/M2 | SYSTOLIC BLOOD PRESSURE: 140 MMHG | DIASTOLIC BLOOD PRESSURE: 63 MMHG | TEMPERATURE: 97.9 F

## 2021-03-11 LAB
ANION GAP SERPL CALCULATED.3IONS-SCNC: 8 MMOL/L (ref 5–15)
BUN SERPL-MCNC: 22 MG/DL (ref 8–23)
BUN/CREAT SERPL: 26.2 (ref 7–25)
CALCIUM SPEC-SCNC: 8.6 MG/DL (ref 8.6–10.5)
CHLORIDE SERPL-SCNC: 101 MMOL/L (ref 98–107)
CO2 SERPL-SCNC: 33 MMOL/L (ref 22–29)
CREAT SERPL-MCNC: 0.84 MG/DL (ref 0.57–1)
DEPRECATED RDW RBC AUTO: 62.9 FL (ref 37–54)
ERYTHROCYTE [DISTWIDTH] IN BLOOD BY AUTOMATED COUNT: 19.4 % (ref 12.3–15.4)
GFR SERPL CREATININE-BSD FRML MDRD: 67 ML/MIN/1.73
GLUCOSE SERPL-MCNC: 128 MG/DL (ref 65–99)
HCT VFR BLD AUTO: 26 % (ref 34–46.6)
HGB BLD-MCNC: 7.9 G/DL (ref 12–15.9)
MCH RBC QN AUTO: 27.1 PG (ref 26.6–33)
MCHC RBC AUTO-ENTMCNC: 30.4 G/DL (ref 31.5–35.7)
MCV RBC AUTO: 89.3 FL (ref 79–97)
PLATELET # BLD AUTO: 348 10*3/MM3 (ref 140–450)
PMV BLD AUTO: 9.3 FL (ref 6–12)
POTASSIUM SERPL-SCNC: 4.2 MMOL/L (ref 3.5–5.2)
RBC # BLD AUTO: 2.91 10*6/MM3 (ref 3.77–5.28)
SODIUM SERPL-SCNC: 142 MMOL/L (ref 136–145)
WBC # BLD AUTO: 16.78 10*3/MM3 (ref 3.4–10.8)

## 2021-03-11 PROCEDURE — 63710000001 PREDNISONE PER 1 MG: Performed by: NURSE PRACTITIONER

## 2021-03-11 PROCEDURE — 80048 BASIC METABOLIC PNL TOTAL CA: CPT | Performed by: FAMILY MEDICINE

## 2021-03-11 PROCEDURE — 99238 HOSP IP/OBS DSCHRG MGMT 30/<: CPT | Performed by: FAMILY MEDICINE

## 2021-03-11 PROCEDURE — 85027 COMPLETE CBC AUTOMATED: CPT | Performed by: FAMILY MEDICINE

## 2021-03-11 PROCEDURE — 25010000002 ENOXAPARIN PER 10 MG: Performed by: FAMILY MEDICINE

## 2021-03-11 PROCEDURE — 94799 UNLISTED PULMONARY SVC/PX: CPT

## 2021-03-11 RX ORDER — PREDNISONE 20 MG/1
TABLET ORAL
Qty: 8 TABLET | Refills: 0 | Status: ON HOLD | OUTPATIENT
Start: 2021-03-11 | End: 2022-10-06

## 2021-03-11 RX ORDER — AMOXICILLIN AND CLAVULANATE POTASSIUM 875; 125 MG/1; MG/1
1 TABLET, FILM COATED ORAL 2 TIMES DAILY
Qty: 10 TABLET | Refills: 0 | OUTPATIENT
Start: 2021-03-11 | End: 2021-04-17

## 2021-03-11 RX ORDER — NITROGLYCERIN 0.4 MG/1
0.4 TABLET SUBLINGUAL
Qty: 30 TABLET | Refills: 12 | Status: ON HOLD | OUTPATIENT
Start: 2021-03-11 | End: 2022-10-06

## 2021-03-11 RX ADMIN — FUROSEMIDE 20 MG: 20 TABLET ORAL at 06:06

## 2021-03-11 RX ADMIN — PREDNISONE 20 MG: 20 TABLET ORAL at 08:28

## 2021-03-11 RX ADMIN — BUDESONIDE AND FORMOTEROL FUMARATE DIHYDRATE 2 PUFF: 160; 4.5 AEROSOL RESPIRATORY (INHALATION) at 07:10

## 2021-03-11 RX ADMIN — IPRATROPIUM BROMIDE AND ALBUTEROL SULFATE 3 ML: 2.5; .5 SOLUTION RESPIRATORY (INHALATION) at 10:29

## 2021-03-11 RX ADMIN — FUROSEMIDE 20 MG: 20 TABLET ORAL at 08:28

## 2021-03-11 RX ADMIN — ESCITALOPRAM 10 MG: 10 TABLET, FILM COATED ORAL at 08:28

## 2021-03-11 RX ADMIN — IPRATROPIUM BROMIDE AND ALBUTEROL SULFATE 3 ML: 2.5; .5 SOLUTION RESPIRATORY (INHALATION) at 07:09

## 2021-03-11 RX ADMIN — FAMOTIDINE 20 MG: 20 TABLET, FILM COATED ORAL at 08:29

## 2021-03-11 RX ADMIN — ENOXAPARIN SODIUM 40 MG: 40 INJECTION SUBCUTANEOUS at 08:36

## 2021-03-11 RX ADMIN — LEVOTHYROXINE SODIUM 75 MCG: 75 TABLET ORAL at 06:06

## 2021-03-11 RX ADMIN — POTASSIUM CHLORIDE 10 MEQ: 750 CAPSULE, EXTENDED RELEASE ORAL at 08:29

## 2021-03-11 NOTE — PROGRESS NOTES
Continued Stay Note  [facility]     Patient Name: Emy Bermudez  MRN: [id number]  Today's Date: 3/11/2021    Admit Date: 3/7/2021    Discharge Plan     Row Name 03/11/21 0948       Plan    Plan  Home with [facility]    Patient/Family in Agreement with Plan  yes    Final Discharge Disposition Code  06 - home with home health care    Final Note  [facility] spoke to Eleanor at [facility] to informed of dc.  [facility] has also faxed resumption orders to her.  Pt to discharge today.        Discharge Codes    No documentation.       Expected Discharge Date and Time     Expected Discharge Date Expected Discharge Time    Mar 11, 2021             MARISOL Sullivan

## 2021-03-11 NOTE — PLAN OF CARE
Problem: Adult Inpatient Plan of Care  Goal: Plan of Care Review  Outcome: Ongoing, Progressing  Flowsheets (Taken 3/11/2021 0407)  Progress: improving  Plan of Care Reviewed With: patient  Outcome Summary: VSS. no c/o pain this shift. pt request prn atarax for anxiety. pt request to sleep in recliner. sinus 70-95 on telemetry. 3L NC. no IV access, MD aware. plan on d/c today. colostomy bag emptied this shift. purewick in place. safety maintained.

## 2021-03-11 NOTE — THERAPY DISCHARGE NOTE
Acute Care - Physical Therapy Discharge Summary  [facility]       Patient Name: Emy Bermudez  : 1950  MRN: [id number]    Today's Date: 3/11/2021                 Admit Date: 3/7/2021      PT Recommendation and Plan    Visit Dx:    ICD-10-CM ICD-9-CM   1. Impaired functional mobility and activity tolerance  Z74.09 V49.89   2. Venous insufficiency of both lower extremities  I87.2 459.81               Rehab Goal Summary     Row Name 21 1431             Transfer Goal 1 (PT)    Activity/Assistive Device (Transfer Goal 1, PT)  sit-to-stand/stand-to-sit;other (see comments) chair to chair  -AB      [place] Level/Cues Needed (Transfer Goal 1, PT)  standby assist  -AB      Time Frame (Transfer Goal 1, PT)  long term goal (LTG);10 days  -AB      Progress/Outcome (Transfer Goal 1, PT)  goal met  -AB         Gait Training Goal 1 (PT)    Activity/Assistive Device (Gait Training Goal 1, PT)  gait (walking locomotion);decrease fall risk;improve balance and speed;increase endurance/gait distance;increase energy conservation;other (see comments) w/ or w/out rwx  -AB      [place] Level (Gait Training Goal 1, PT)  standby assist  -AB      Distance (Gait Training Goal 1, PT)  50 ft w/ less than or equal to 2 standing rest w/ SPO2 remaining > 90%  -AB      Time Frame (Gait Training Goal 1, PT)  long term goal (LTG);10 days  -AB      Progress/Outcome (Gait Training Goal 1, PT)  goal not met  -AB         ROM Goal 1 (PT)    ROM Goal 1 (PT)  edema mgmt: B LEs to decrease edema in LEs demonstrated by decrease in circumferential measurements in B LEs by 2 cm throughout; pt also w/out any open wounds noted throughout B LEs  -AB      Time Frame (ROM Goal 1, PT)  long-term goal (LTG);other (see comments) 10 days  -AB      Progress/Outcome (ROM Goal 1, PT)  goal partially met  -AB         Patient Education Goal (PT)    Activity (Patient Education Goal, PT)  HEP for strengthening and energy conservation  -AB       [place]/Cues/Accuracy (Memory Goal 2, PT)  demonstrates adequately;independent;verbalizes understanding  -AB      Time Frame (Patient Education Goal, PT)  long term goal (LTG);10 days  -AB      Progress/Outcome (Patient Education Goal, PT)  goal met  -AB        User Key  (r) = Recorded By, (t) = Taken By, (c) = Cosigned By    Initials Name Provider Type Discipline    Ainsley Mir, MADHAV Physical Therapy Assistant PT              PT Discharge Summary  Anticipated Discharge Disposition (PT): home with 24/7 care, home with home health, sub acute care setting  Reason for Discharge: Discharge from facility  Outcomes Achieved: Refer to plan of care for updates on goals achieved  Discharge Destination: Home with assist      Ainsley Madison PTA   3/11/2021

## 2021-03-11 NOTE — PROGRESS NOTES
"CC:\"stop the accuchecks--im not a diabetc\"--she refuses accuchecks and echo--pulmonary has signed off    Review of Systems   Constitutional: Positive for activity change.   HENT: Negative.    Eyes: Negative.    Respiratory: Positive for shortness of breath.    Cardiovascular: Negative.    Gastrointestinal: Negative.    Endocrine: Negative.    Genitourinary: Negative.    Musculoskeletal: Negative.    Skin: Negative.    Allergic/Immunologic: Negative.    Neurological: Negative.    Hematological: Negative.    Psychiatric/Behavioral: Negative.      Temp:  [97.8 °F (36.6 °C)-99.8 °F (37.7 °C)] 98.6 °F (37 °C)  Heart Rate:  [] 77  Resp:  [16-18] 18  BP: (110-150)/(54-71) 146/71  I/O last 3 completed shifts:  In: 720 [P.O.:720]  Out: 750 [Urine:500; Stool:250]  No intake/output data recorded.    Physical Exam  Constitutional:       Appearance: Normal appearance. She is normal weight.   HENT:      Head: Normocephalic and atraumatic.      Nose: Nose normal.      Mouth/Throat:      Mouth: Mucous membranes are dry.      Pharynx: Oropharynx is clear.   Eyes:      Extraocular Movements: Extraocular movements intact.      Conjunctiva/sclera: Conjunctivae normal.      Pupils: Pupils are equal, round, and reactive to light.   Cardiovascular:      Rate and Rhythm: Normal rate and regular rhythm.      Pulses: Normal pulses.      Heart sounds: Normal heart sounds.   Pulmonary:      Effort: Pulmonary effort is normal.      Breath sounds: Rhonchi present.   Abdominal:      General: Abdomen is flat. Bowel sounds are normal.      Palpations: Abdomen is soft.   Musculoskeletal:         General: Normal range of motion.      Cervical back: Normal range of motion and neck supple.   Skin:     General: Skin is warm and dry.      Capillary Refill: Capillary refill takes less than 2 seconds.   Neurological:      General: No focal deficit present.      Mental Status: She is alert and oriented to person, place, and time. Mental status is at " baseline.   Psychiatric:         Mood and Affect: Mood normal.         Behavior: Behavior normal.         Thought Content: Thought content normal.         Judgment: Judgment normal.           Pneumonia    Check labs in am--maybe home tomorrow

## 2021-03-11 NOTE — DISCHARGE PLACEMENT REQUEST
"From:  MARISOL Akers  [phone]    Fausto Bermudez (70 y.o. 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)   Code Status: No CPR    Ht: 160 cm (62.99\")   Wt: 110 kg (243 lb 3 oz)    Admission Cmt: None   Principal Problem: None                Active Insurance as of 3/7/2021     Primary Coverage     Payor Plan Insurance Group Employer/Plan Group    MEDICARE MEDICARE A & B      Payor Plan Address Payor Plan Phone Number Payor Plan Fax Number Effective Dates    [address] [phone]  9/1/2012 - None Entered    [facility] [id number]       Subscriber Name Subscriber Birth Date Member ID       FAUSTO BERMUDEZ 1950 [id number]           Secondary Coverage     Payor Plan Insurance Group Employer/Plan Group    [place]      Payor Plan Address Payor Plan Phone Number Payor Plan Fax Number Effective Dates    [address] [address] [phone]  6/1/2020 - None Entered    [facility] [address]       Subscriber Name Subscriber Birth Date Member ID       FAUSTO BERMUDEZ 1950 [id number]                 Emergency Contacts      (Rel.) Home Phone Work Phone Mobile Phone    Belia Rizvi (Daughter) [phone] -- [phone]    JayneJoanne royal (Daughter) [phone] -- [phone]        Notify Home Health [[id number]] (Order [id number])  Order  Date: 3/11/2021 Department: [address] Released By: " "Loan Dick RN (auto-released) Authorizing: Yehuda Sanches MD   Order History  Inpatient  Date/Time Action Taken User Additional Information   03/11/21 0959 Release Loan Dick RN (auto-released) From Order:[id number]   Order Details    Frequency Duration Priority Order Class   Until Discontinued Until Specified Routine Hospital Performed   Start Date/Time    Start Date Start Time   03/11/21 0957   Order Information    Order Date Service Start Date Start Time End Date   03/11/21 Medicine 03/11/21 0957 03/11/21   Comments    Resume home health          Reference Links    Associated Reports   View Encounter   Verbal Order Info    Action Created on Order Mode Entered by Responsible Provider Signed by Signed on   Ordering 03/11/21 0959 Telephone with readback Loan Dick RN Staton, Thomas Waldon, MD     Reprint Order Requisition    [id number]) on 3/11/21   Encounter    View Encounter          Order Provider Info        Office phone Pager E-mail   Ordering User Loan Dick RN -- -- --   Authorizing Provider Yehuda Sanches [phone] -- --   Attending Provider Yehuda Sanches [phone] -- --   Linked Charges    No charges linked to this procedure   Tracking Reports  Cosign Tracking Order Transmittal Tracking   Authorized by:  Yeuhda Sanches MD  (NPI: [id number])       Lab Component SmartPhrase Guide    [facility] (Order #[id number]) on 3/11/21            History & Physical      Yehuda Sanches MD at 03/08/21 0626          [facility]  HISTORY AND PHYSICAL    Date of Admission: 3/7/2021  Primary Care Physician: Dorie Segura APRN    Subjective    Chief Complaint: \"I cant breathe\"    This is a 70 yr old lady with chronic resp failure with hypercapnia and hyxpoxia and obesity who presented to the ed with cough, chest congestion and evidence of hypoxia with oxygen sats of 94% on 5 liters of oxygen at home. " Chest ct did reveal bilateal infiltrates with consolidation and the patient was admitted. The patient declined a covid 19 test as she recently had nasal surgery and was insistent upon no swabbing of nasopharynx.       Review of Systems   Constitutional: Positive for activity change and fatigue.   HENT: Positive for congestion.    Eyes: Negative.    Respiratory: Positive for cough and shortness of breath.    Cardiovascular: Positive for leg swelling.   Gastrointestinal: Negative.    Endocrine: Negative.    Genitourinary: Negative.    Musculoskeletal: Negative.    Skin: Positive for wound.   Allergic/Immunologic: Negative.    Neurological: Negative.    Hematological: Negative.    Psychiatric/Behavioral: Negative.         Otherwise complete ROS reviewed and negative except as mentioned in the HPI.      Past Medical History:   Past Medical History:   Diagnosis Date   • Abdominal wall abscess    • Abdominal wall fistula 2018   • Cellulitis    • Chronic respiratory failure with hypoxia (CMS/HCC) 2020   • Colonic obstruction (CMS/HCC) 4/3/2018   • COPD (chronic obstructive pulmonary disease) (CMS/HCC)    • Depression    • Diverticul disease small and large intestine, no perforati or abscess    • Edema    • GERD (gastroesophageal reflux disease)    • Hyperlipidemia    • Hypertension    • Hypocalcemia    • Hypothyroid    • Obesity, Class III, BMI 40-49.9 (morbid obesity) (CMS/HCC) 2020   • Respiratory failure (CMS/HCC)    • Tobacco dependence 2020   • Venous insufficiency    • Vertigo        Past Surgical History:  Past Surgical History:   Procedure Laterality Date   • ABDOMINAL SURGERY     •  SECTION     • COLOSTOMY     • ETHMOID ARTERY LIGATION N/A 2021    Procedure: ETHMOID ARTERY LIGATION;  Surgeon: Elliot Mack Jr., MD;  Location: [facility];  Service: ENT;  Laterality: N/A;   • EXAM UNDER ANESTHESIA N/A 2021    Procedure: SEPTOPLASTY TURBOPLASTY ENDOSCOPIC CONTROL LEFT NOSE  BLEED;  Surgeon: Elliot Mack Jr., MD;  Location:  [facility];  Service: ENT;  Laterality: N/A;   • EXPLORATORY LAPAROTOMY N/A 4/4/2018    Procedure: LAPAROTOMY EXPLORATORY, partial colectomy, creation colostomy, incision and drainage abdominal wall abscess;  Surgeon: Elda Velazquez MD;  Location:  [facility] OR;  Service: General   • INTERNAL MAXILLARY ARTERY AND ETHMOID ARTERY LIGATION N/A 1/5/2021    Procedure: INTERNAL MAXILLARY ARTERY AND ETHMOID ARTERY LIGATION;  Surgeon: Elliot Mack Jr., MD;  Location:  [facility];  Service: ENT;  Laterality: N/A;   • SEPTOPLASTY, RESECTION INFERIOR TURBINATES Bilateral 1/5/2021    Procedure: SEPTOPLASTY, RESECTION INFERIOR TURBINATES;  Surgeon: Elliot Mack Jr., MD;  Location:  [facility];  Service: ENT;  Laterality: Bilateral;       Social History:  reports that she quit smoking about 13 months ago. She has a 3.75 pack-year smoking history. She has never used smokeless tobacco. She reports that she does not drink alcohol and does not use drugs.    Family History: family history is not on file. She was adopted.     Allergies:  No Known Allergies    Medications:  Prior to Admission medications    Medication Sig Start Date End Date Taking? Authorizing Provider   budesonide-formoterol (SYMBICORT) 160-4.5 MCG/ACT inhaler Inhale 2 puffs 2 (Two) Times a Day.   Yes Sukhdev Davis MD   escitalopram (LEXAPRO) 20 MG tablet Take 10 mg by mouth Daily.   Yes Sukhdev Davis MD   furosemide (LASIX) 20 MG tablet Take 20 mg by mouth Every Morning.   Yes Sukhdev Davis MD   guaiFENesin (MUCINEX) 600 MG 12 hr tablet Take 2 tablets by mouth Every 12 (Twelve) Hours. 7/8/20  Yes Eleno Tolentino DO   levothyroxine (SYNTHROID, LEVOTHROID) 75 MCG tablet Take 75 mcg by mouth Daily.   Yes Sukhdev Davis MD   LORazepam (ATIVAN) 0.5 MG tablet Take 0.5 mg by mouth Daily As Needed for Anxiety.   Yes Sukhdev Davis MD   meclizine (ANTIVERT) 25 MG  "tablet Take 25 mg by mouth 2 (Two) Times a Day.   Yes Sukhdev Davis MD   potassium chloride (K-DUR,KLOR-CON) 10 MEQ CR tablet Take 10 mEq by mouth 2 (Two) Times a Day.   Yes Sukhdev Davis MD   sodium chloride 0.65 % nasal spray 2 sprays into the nostril(s) as directed by provider As Needed for Congestion (Congestion). 1/6/21  Yes Trinh Govea APRN   albuterol (PROVENTIL) (2.5 MG/3ML) 0.083% nebulizer solution Take 2.5 mg by nebulization Every 4 (Four) Hours As Needed for Shortness of Air.    Sukhdev Davis MD   albuterol sulfate  (90 Base) MCG/ACT inhaler Inhale 1-2 puffs Every 4 (Four) Hours As Needed for Wheezing or Shortness of Air.    Sukhdev Davis MD   nitroglycerin (NITROSTAT) 0.4 MG SL tablet Place 1 tablet under the tongue Every 5 (Five) Minutes As Needed for Chest Pain (Only if SBP Greater Than 100). Take no more than 3 doses in 15 minutes. 2/19/20   Francis Chou MD   fluconazole (Diflucan) 150 MG tablet Take 1 tablet by mouth Daily. 7/8/20 3/8/21  Eleno Tolentino DO   hydrOXYzine (ATARAX) 50 MG tablet Take 1 tablet by mouth 3 (Three) Times a Day As Needed for Anxiety. 7/8/20 3/8/21  Eleno Tolentino DO   losartan (COZAAR) 25 MG tablet Take 1 tablet by mouth Daily. 7/9/20 3/8/21  Eleno Tolentino DO   mupirocin (BACTROBAN) 2 % ointment APPLY TO EACH NOSTRIL TWICE DAILY 1/6/21 3/8/21  Sukhdev Davis MD   nicotine (NICODERM CQ) 21 MG/24HR patch Place 1 patch on the skin as directed by provider Daily. 5/2/20 3/8/21  Eleno Tolentino DO   predniSONE (DELTASONE) 10 MG tablet  12/22/20 3/8/21  Sukhdev Davis MD       Objective    Vital Signs: /58 (BP Location: Left arm, Patient Position: Sitting)   Pulse 92   Temp 98.6 °F (37 °C) (Oral)   Resp 18   Ht 160 cm (63\")   Wt 110 kg (243 lb 6.4 oz)   SpO2 95%   BMI 43.12 kg/m²   Physical Exam  Vitals and nursing note reviewed.   Constitutional:       Appearance: She is " obese.   HENT:      Head: Normocephalic.      Mouth/Throat:      Mouth: Mucous membranes are moist.      Pharynx: Oropharynx is clear.   Eyes:      Conjunctiva/sclera: Conjunctivae normal.      Pupils: Pupils are equal, round, and reactive to light.   Cardiovascular:      Rate and Rhythm: Normal rate and regular rhythm.      Pulses: Normal pulses.      Heart sounds: Normal heart sounds.   Pulmonary:      Effort: Pulmonary effort is normal.      Breath sounds: Rhonchi and rales present.   Abdominal:      General: Abdomen is flat. Bowel sounds are normal.      Palpations: Abdomen is soft.   Musculoskeletal:         General: Normal range of motion.      Cervical back: Normal range of motion and neck supple.   Skin:     General: Skin is warm and dry.      Capillary Refill: Capillary refill takes less than 2 seconds.   Neurological:      General: No focal deficit present.      Mental Status: She is alert. Mental status is at baseline.   Psychiatric:         Mood and Affect: Mood normal.         Behavior: Behavior normal.         Thought Content: Thought content normal.         Judgment: Judgment normal.           Results Reviewed:  Lab Results (last 24 hours)     Procedure Component Value Units Date/Time    Comprehensive Metabolic Panel [[id number]]  (Abnormal) Collected: 03/08/21 0418    Specimen: Blood Updated: 03/08/21 0514     Glucose 204 mg/dL      BUN 9 mg/dL      Creatinine 0.76 mg/dL      Sodium 137 mmol/L      Potassium 4.6 mmol/L      Chloride 95 mmol/L      CO2 35.0 mmol/L      Calcium 9.1 mg/dL      Total Protein 6.6 g/dL      Albumin 3.10 g/dL      ALT (SGPT) 7 U/L      AST (SGOT) 12 U/L      Alkaline Phosphatase 67 U/L      Total Bilirubin 0.6 mg/dL      eGFR Non African Amer 75 mL/min/1.73      Globulin 3.5 gm/dL      A/G Ratio 0.9 g/dL      BUN/Creatinine Ratio 11.8     Anion Gap 7.0 mmol/L     Narrative:      GFR Normal >60  Chronic Kidney Disease <60  Kidney Failure <15      CBC (No Diff) [[id number]]   (Abnormal) Collected: 03/08/21 0418    Specimen: Blood Updated: 03/08/21 0450     WBC 22.77 10*3/mm3      RBC 2.96 10*6/mm3      Hemoglobin 8.0 g/dL      Hematocrit 26.6 %      MCV 89.9 fL      MCH 27.0 pg      MCHC 30.1 g/dL      RDW 18.7 %      RDW-SD 60.8 fl      MPV 9.4 fL      Platelets 304 10*3/mm3     COVID PRE-OP / PRE-PROCEDURE SCREENING ORDER (NO ISOLATION) - Swab, Nasal Cavity [[id number]]  (Normal) Collected: 03/08/21 0000    Specimen: Swab from Nasal Cavity Updated: 03/08/21 0106    Narrative:      The following orders were created for panel order COVID PRE-OP / PRE-PROCEDURE SCREENING ORDER (NO ISOLATION) - Swab, Nasal Cavity.  Procedure                               Abnormality         Status                     ---------                               -----------         ------                     COVID-19,[employer] IN-KARY...[[id number]]  Normal              Final result                 Please view results for these tests on the individual orders.    COVID-19,[facility] IN-HOUSE,Nasal Swab No Transport Media 3-4 HR TAT - Swab, Nasal Cavity [[id number]]  (Normal) Collected: 03/08/21 0000    Specimen: Swab from Nasal Cavity Updated: 03/08/21 0106     COVID19 Not Detected    Narrative:      Fact sheet for providers: https://www.fda.gov/media/905795/download     Fact sheet for patients: https://www.fda.gov/media/667172/download    Test performed by PCR.    Consider negative results in combination with clinical observations, patient history, and epidemiological information.        Imaging Results (Last 24 Hours)     ** No results found for the last 24 hours. **            Active Hospital Problems    Diagnosis    • Pneumonia        Assessment / Plan  Iv antbx, oxygenation, check echo, ask pulmonary to see      Code Status: DNI     I discussed the patient's findings and my recommendations with the patient.,.    Estimated length of stay 4 days.    Yehuda Sanches MD   03/08/21   06:26 CST    Electronically  signed by Yehuda Lowery MD at 03/08/21 0636         Discharge Summary    No notes of this type exist for this encounter.         Discharge Order (From admission, onward)     Start     Ordered    03/11/21 0643  Discharge patient  Once     Expected Discharge Date: 03/11/21    Discharge Disposition: Home or Self Care    Physician of Record for Attribution - Please select from Treatment Team: YEHUDA LOWERY [[id number]]    Review needed by CMO to determine Physician of Record: No       Question Answer Comment   Physician of Record for Attribution - Please select from Treatment Team YEHUDA LOWERY    Review needed by CMO to determine Physician of Record No        03/11/21 0645

## 2021-03-12 ENCOUNTER — READMISSION MANAGEMENT (OUTPATIENT)
Dept: CALL CENTER | Facility: HOSPITAL | Age: 71
End: 2021-03-12

## 2021-03-13 LAB
BACTERIA SPEC AEROBE CULT: NORMAL
BACTERIA SPEC AEROBE CULT: NORMAL

## 2021-03-15 ENCOUNTER — READMISSION MANAGEMENT (OUTPATIENT)
Dept: CALL CENTER | Facility: HOSPITAL | Age: 71
End: 2021-03-15

## 2021-03-15 NOTE — OUTREACH NOTE
COPD/PN Week 1 Survey      Responses   [facility] patient discharged from?  [place]   Does the patient have one of the following disease processes/diagnoses(primary or secondary)?  COPD/Pneumonia   Was the primary reason for admission:  Pneumonia   Week 1 attempt successful?  Yes   Call start time  1254   Call end time  1257   Discharge diagnosis  Pneumonia   Is patient permission given to speak with other caregiver?  Yes   List who call center can speak with  daughters   Meds reviewed with patient/caregiver?  Yes   Is the patient having any side effects they believe may be caused by any medication additions or changes?  No   Does the patient have all medications ordered at discharge?  Yes   Is the patient taking all medications as directed (includes completed medication regime)?  Yes   Does the patient have a primary care provider?   Yes   Does the patient have an appointment with their PCP or specialist within 7 days of discharge?  Yes   Has the patient kept scheduled appointments due by today?  N/A   What is the Home health agency?   [facility]    Has home health visited the patient within 72 hours of discharge?  Yes   What DME was ordered?  3L O2 continuously   Pulse Ox monitoring  None   Comments  Pt reports that she is improving since DC.    Did the patient receive a copy of their discharge instructions?  Yes   Nursing interventions  Reviewed instructions with patient   What is the patient's perception of their health status since discharge?  Improving   Nursing Interventions  Nurse provided patient education   Is the patient/caregiver able to teach back the hierarchy of who to call/visit for symptoms/problems? PCP, Specialist, Home health nurse, Urgent Care, ED, 911  Yes   Is the patient/caregiver able to teach back signs and symptoms of worsening condition:  Fever/chills, Shortness of breath, Chest pain   Is the patient/caregiver able to teach back importance of completing antibiotic course of  treatment?  Yes   Week 1 call completed?  Yes          Faith Rider RN

## 2021-03-18 NOTE — DISCHARGE SUMMARY
"[facility]   DISCHARGE SUMMARY       Date of Admission: 3/7/2021  Date of Discharge:  3/11/2021  Primary Care Physician: Dorie Segura APRN    Presenting Problem/History of Present Illness:  Pneumonia [J18.9]     Final Discharge Diagnoses:  Active Hospital Problems    Diagnosis    • Pneumonia        Consults: pulmonary    Procedures Performed: none    Pertinent Test Results: none    Chief Complaint on Day of Discharge: \"im breathing much better\"    History of Present Illness on Day of Discharge: recent sob and cough improved     Hospital Course:  The patient is a 70 y.o. female who presented to [facility] with sob, cough and hypoxia as a transfer from [facility] ed.She was continued on antbx, steroids and was kindly seen by the pulmonary service.Her hypoxia improved. She declined echo and was cleared for discharge ..      Condition on Discharge:  stable    Physical Exam on Discharge:  /63 (BP Location: Right arm, Patient Position: Sitting)   Pulse 87   Temp 97.9 °F (36.6 °C) (Oral)   Resp 18   Ht 160 cm (62.99\")   Wt 110 kg (243 lb 3 oz)   SpO2 93%   BMI 43.09 kg/m²   Physical Exam  Vitals and nursing note reviewed.   Cardiovascular:      Rate and Rhythm: Normal rate and regular rhythm.      Pulses: Normal pulses.   Pulmonary:      Effort: Pulmonary effort is normal.   Abdominal:      General: Abdomen is flat. Bowel sounds are normal.      Palpations: Abdomen is soft.   Musculoskeletal:      Cervical back: Normal range of motion and neck supple.           Discharge Disposition:  Home-Health Care [facility]    Discharge Medications:     Discharge Medications      New Medications      Instructions Start Date   amoxicillin-clavulanate 875-125 MG per tablet  Commonly known as: Augmentin   1 tablet, Oral, 2 Times Daily      predniSONE 20 MG tablet  Commonly known as: DELTASONE   20mg q daily x 4 days then 10mg x 4 days         Changes to Medications      Instructions Start Date   nitroglycerin " 0.4 MG SL tablet  Commonly known as: NITROSTAT  What changed: Another medication with the same name was removed. Continue taking this medication, and follow the directions you see here.   0.4 mg, Sublingual, Every 5 Minutes PRN, Take no more than 3 doses in 15 minutes.         Continue These Medications      Instructions Start Date   albuterol sulfate  (90 Base) MCG/ACT inhaler  Commonly known as: PROVENTIL HFA;VENTOLIN HFA;PROAIR HFA   2 puffs, Inhalation, Every 6 Hours PRN, Inhale 1 to 2 puffs by mouth every 4 to 6 hours as needed for wheezing or sob       budesonide-formoterol 160-4.5 MCG/ACT inhaler  Commonly known as: SYMBICORT   2 puffs, Inhalation, 2 Times Daily - RT      escitalopram 20 MG tablet  Commonly known as: LEXAPRO   10 mg, Oral, Daily      furosemide 20 MG tablet  Commonly known as: LASIX   20 mg, Oral, Every Morning      levothyroxine 75 MCG tablet  Commonly known as: SYNTHROID, LEVOTHROID   75 mcg, Oral, Daily      LORazepam 0.5 MG tablet  Commonly known as: ATIVAN   0.5 mg, Oral, Daily PRN      meclizine 25 MG tablet  Commonly known as: ANTIVERT   25 mg, Oral, 2 Times Daily      potassium chloride 10 MEQ CR tablet  Commonly known as: K-DUR,KLOR-CON   10 mEq, Oral, 2 Times Daily      vitamin D3 125 MCG (5000 UT) capsule capsule   5,000 Units, Oral, Daily             Discharge Diet:   As tolerated  Activity at Discharge:   As tolerated  Discharge Care Plan/Instructions: take meds as prescribed    Follow-up Appointments:   Future Appointments   Date Time Provider Department Center   4/19/2021  1:00 PM Elliot Mack Jr., MD [facility]       Test Results Pending at Discharge: none    Yehuda Sanches MD  03/17/21  19:28 CDT    Time: 7:33pm

## 2021-03-22 ENCOUNTER — READMISSION MANAGEMENT (OUTPATIENT)
Dept: CALL CENTER | Facility: HOSPITAL | Age: 71
End: 2021-03-22

## 2021-03-22 NOTE — OUTREACH NOTE
COPD/PN Week 2 Survey      Responses   [facility] patient discharged from?  [place]   Does the patient have one of the following disease processes/diagnoses(primary or secondary)?  COPD/Pneumonia   Was the primary reason for admission:  Pneumonia   Week 2 attempt successful?  No   Unsuccessful attempts  Attempt 1          Jaz Mao RN

## 2021-03-26 ENCOUNTER — READMISSION MANAGEMENT (OUTPATIENT)
Dept: CALL CENTER | Facility: HOSPITAL | Age: 71
End: 2021-03-26

## 2021-03-26 NOTE — OUTREACH NOTE
COPD/PN Week 2 Survey      Responses   [facility] patient discharged from?  [place]   Does the patient have one of the following disease processes/diagnoses(primary or secondary)?  COPD/Pneumonia   Was the primary reason for admission:  Pneumonia   Week 2 attempt successful?  Yes   Call start time  1314   Call end time  1318   Meds reviewed with patient/caregiver?  Yes   Is the patient having any side effects they believe may be caused by any medication additions or changes?  No   Does the patient have all medications ordered at discharge?  Yes   Is the patient taking all medications as directed (includes completed medication regime)?  Yes   Does the patient have a primary care provider?   Yes   Does the patient have an appointment with their PCP or specialist within 7 days of discharge?  Yes   Has the patient kept scheduled appointments due by today?  Yes   What is the Home health agency?   [facility]    Has home health visited the patient within 72 hours of discharge?  Yes   What DME was ordered?  3L O2 continuously   Has all DME been delivered?  No   Pulse Ox monitoring  Intermittent   Pulse Ox device source  Current Health   O2 Sat comments  95% on 3L O2   O2 Sat: education provided  Sat levels, Monitoring frequency, When to seek care   O2 Sat education comments  If sats 90% or below and stays there, call 911   Psychosocial issues?  No   Comments  Pt reports that she is improving since DC.    Did the patient receive a copy of their discharge instructions?  Yes   Nursing interventions  Reviewed instructions with patient   What is the patient's perception of their health status since discharge?  Improving   Nursing Interventions  Nurse provided patient education   Are the patient's immunizations up to date?   No   If the patient is a current smoker, are they able to teach back resources for cessation?  [id number]   Is the patient/caregiver able to teach back the hierarchy of who to call/visit for  symptoms/problems? PCP, Specialist, Home health nurse, Urgent Care, ED, 911  Yes   Additional teach back comments  Very little smoking. NO fever, chills or chest pain.   Is the patient able to teach back COPD zones?  Yes   Nursing interventions  Education provided on various zones   Patient reports what zone on this call?  Green Zone   Green Zone  Reports doing well, Breathing without shortness of breath, Usual activity and exercise level, Usual amount of phlegm/mucus without difficulty coughing up, Sleeping well, Appetite is good   Green Zone interventions:  Take daily medications, Use oxygen as prescribed   Week 2 call completed?  Yes   Wrap up additional comments  Discussed Day 1, encouraged when s/s first appear.          Elisabeth Bledsoe RN

## 2021-04-02 ENCOUNTER — READMISSION MANAGEMENT (OUTPATIENT)
Dept: CALL CENTER | Facility: HOSPITAL | Age: 71
End: 2021-04-02

## 2021-04-02 NOTE — OUTREACH NOTE
COPD/PN Week 3 Survey      Responses   [facility] patient discharged from?  [facility]   Does the patient have one of the following disease processes/diagnoses(primary or secondary)?  COPD/Pneumonia   Was the primary reason for admission:  Pneumonia   Week 3 attempt successful?  Yes   Call start time  1213   Call end time  1216   Discharge diagnosis  Pneumonia   Meds reviewed with patient/caregiver?  Yes   Is the patient taking all medications as directed (includes completed medication regime)?  Yes   Has the patient kept scheduled appointments due by today?  Yes   Comments  Pt had a teleheath   What is the Home health agency?   [facility]    Has home health visited the patient within 72 hours of discharge?  Yes   What DME was ordered?  3L O2 continuously   Has all DME been delivered?  No   DME comments  on 3LO2   Pulse Ox monitoring  Intermittent   Psychosocial issues?  No   Comments  Pt reports that she is improving since DC. She c/o weakness and decreased stamina.    What is the patient's perception of their health status since discharge?  Same   Nursing Interventions  Nurse provided patient education   If the patient is a current smoker, are they able to teach back resources for cessation?  [id number]   Is the patient/caregiver able to teach back the hierarchy of who to call/visit for symptoms/problems? PCP, Specialist, Home health nurse, Urgent Care, ED, 911  Yes   Is the patient/caregiver able to teach back signs and symptoms of worsening condition:  Shortness of breath, Fever/chills, Chest pain   Is the patient/caregiver able to teach back importance of completing antibiotic course of treatment?  Yes   Week 3 call completed?  Yes          Faith Rider RN

## 2021-04-13 ENCOUNTER — READMISSION MANAGEMENT (OUTPATIENT)
Dept: CALL CENTER | Facility: HOSPITAL | Age: 71
End: 2021-04-13

## 2021-04-13 NOTE — OUTREACH NOTE
COPD/PN Week 4 Survey      Responses   [facility] patient discharged from?  [place]   Does the patient have one of the following disease processes/diagnoses(primary or secondary)?  COPD/Pneumonia   Was the primary reason for admission:  Pneumonia   Week 4 attempt successful?  Yes   Call start time  0904   Call end time  0906   Discharge diagnosis  Pneumonia   Meds reviewed with patient/caregiver?  Yes   Is the patient having any side effects they believe may be caused by any medication additions or changes?  No   Is the patient taking all medications as directed (includes completed medication regime)?  Yes   Has the patient kept scheduled appointments due by today?  Yes   Is the patient still receiving Home Health Services?  Yes   DME comments  on 3LO2   Pulse Ox monitoring  Intermittent   O2 Sat: education provided  Sat levels   Psychosocial issues?  No   What is the patient's perception of their health status since discharge?  Improving   Nursing Interventions  Nurse provided patient education   If the patient is a current smoker, are they able to teach back resources for cessation?  Not a smoker   Is the patient/caregiver able to teach back the hierarchy of who to call/visit for symptoms/problems? PCP, Specialist, Home health nurse, Urgent Care, ED, 911  Yes   Is the patient/caregiver able to teach back signs and symptoms of worsening condition:  Fever/chills, Shortness of breath, Chest pain   Is the patient/caregiver able to teach back importance of completing antibiotic course of treatment?  Yes   Week 4 call completed?  Yes   Would the patient like one additional call?  No   Graduated  Yes   Is the patient interested in additional calls from an ambulatory ?  NOTE:  applies to high risk patients requiring additional follow-up.  No   Did the patient feel the follow up calls were helpful during their recovery period?  Yes   Was the number of calls appropriate?  Yes          Yokasta Oviedo,  LPN

## 2021-04-17 ENCOUNTER — HOSPITAL ENCOUNTER (EMERGENCY)
Facility: HOSPITAL | Age: 71
Discharge: HOME | End: 2021-04-17
Admitting: EMERGENCY MEDICINE

## 2021-04-17 ENCOUNTER — APPOINTMENT (OUTPATIENT)
Dept: GENERAL RADIOLOGY | Facility: HOSPITAL | Age: 71
End: 2021-04-17

## 2021-04-17 VITALS
HEIGHT: 63 IN | DIASTOLIC BLOOD PRESSURE: 79 MMHG | BODY MASS INDEX: 46.07 KG/M2 | TEMPERATURE: 98.5 F | HEART RATE: 89 BPM | WEIGHT: 260 LBS | RESPIRATION RATE: 22 BRPM | SYSTOLIC BLOOD PRESSURE: 148 MMHG | OXYGEN SATURATION: 98 %

## 2021-04-17 DIAGNOSIS — J44.1 COPD EXACERBATION (HCC): Primary | ICD-10-CM

## 2021-04-17 LAB
A-A DO2: 2.4 MMHG
ALBUMIN SERPL-MCNC: 3.8 G/DL (ref 3.5–5.2)
ALBUMIN/GLOB SERPL: 1 G/DL
ALP SERPL-CCNC: 81 U/L (ref 39–117)
ALT SERPL W P-5'-P-CCNC: 16 U/L (ref 1–33)
ANION GAP SERPL CALCULATED.3IONS-SCNC: 8 MMOL/L (ref 5–15)
APTT PPP: 30.3 SECONDS (ref 24.1–35)
ARTERIAL PATENCY WRIST A: POSITIVE
AST SERPL-CCNC: 15 U/L (ref 1–32)
ATMOSPHERIC PRESS: 749 MMHG
BASE EXCESS BLDA CALC-SCNC: 15.8 MMOL/L (ref 0–2)
BASOPHILS # BLD AUTO: 0.07 10*3/MM3 (ref 0–0.2)
BASOPHILS NFR BLD AUTO: 0.5 % (ref 0–1.5)
BDY SITE: ABNORMAL
BILIRUB SERPL-MCNC: 0.4 MG/DL (ref 0–1.2)
BODY TEMPERATURE: 37 C
BUN SERPL-MCNC: 11 MG/DL (ref 8–23)
BUN/CREAT SERPL: 12.2 (ref 7–25)
CALCIUM SPEC-SCNC: 10.4 MG/DL (ref 8.6–10.5)
CHLORIDE SERPL-SCNC: 95 MMOL/L (ref 98–107)
CO2 SERPL-SCNC: 39 MMOL/L (ref 22–29)
COHGB MFR BLD: 1.9 % (ref 0–5)
CREAT SERPL-MCNC: 0.9 MG/DL (ref 0.57–1)
DEPRECATED RDW RBC AUTO: 65.2 FL (ref 37–54)
EOSINOPHIL # BLD AUTO: 0.16 10*3/MM3 (ref 0–0.4)
EOSINOPHIL NFR BLD AUTO: 1 % (ref 0.3–6.2)
ERYTHROCYTE [DISTWIDTH] IN BLOOD BY AUTOMATED COUNT: 19.5 % (ref 12.3–15.4)
GAS FLOW AIRWAY: 4 LPM
GFR SERPL CREATININE-BSD FRML MDRD: 62 ML/MIN/1.73
GLOBULIN UR ELPH-MCNC: 3.8 GM/DL
GLUCOSE SERPL-MCNC: 112 MG/DL (ref 65–99)
HCO3 BLDA-SCNC: 42 MMOL/L (ref 20–26)
HCT VFR BLD AUTO: 31.2 % (ref 34–46.6)
HCT VFR BLD CALC: 32.4 % (ref 38–51)
HGB BLD-MCNC: 9.3 G/DL (ref 12–15.9)
HGB BLDA-MCNC: 10.6 G/DL (ref 12–16)
IMM GRANULOCYTES # BLD AUTO: 0.07 10*3/MM3 (ref 0–0.05)
IMM GRANULOCYTES NFR BLD AUTO: 0.5 % (ref 0–0.5)
INR PPP: 1.12 (ref 0.91–1.09)
LYMPHOCYTES # BLD AUTO: 1.67 10*3/MM3 (ref 0.7–3.1)
LYMPHOCYTES NFR BLD AUTO: 10.8 % (ref 19.6–45.3)
Lab: ABNORMAL
MCH RBC QN AUTO: 27.3 PG (ref 26.6–33)
MCHC RBC AUTO-ENTMCNC: 29.8 G/DL (ref 31.5–35.7)
MCV RBC AUTO: 91.5 FL (ref 79–97)
METHGB BLD QL: 0.3 % (ref 0–3)
MODALITY: ABNORMAL
MONOCYTES # BLD AUTO: 0.99 10*3/MM3 (ref 0.1–0.9)
MONOCYTES NFR BLD AUTO: 6.4 % (ref 5–12)
NEUTROPHILS NFR BLD AUTO: 12.5 10*3/MM3 (ref 1.7–7)
NEUTROPHILS NFR BLD AUTO: 80.8 % (ref 42.7–76)
NOTE: ABNORMAL
NRBC BLD AUTO-RTO: 0 /100 WBC (ref 0–0.2)
NT-PROBNP SERPL-MCNC: 16.3 PG/ML (ref 0–900)
OXYHGB MFR BLDV: 94.7 % (ref 94–99)
PCO2 BLDA: 59.2 MM HG (ref 35–45)
PCO2 TEMP ADJ BLD: 59.2 MM HG (ref 35–45)
PH BLDA: 7.46 PH UNITS (ref 7.35–7.45)
PH, TEMP CORRECTED: 7.46 PH UNITS (ref 7.35–7.45)
PLATELET # BLD AUTO: 319 10*3/MM3 (ref 140–450)
PMV BLD AUTO: 9.5 FL (ref 6–12)
PO2 BLDA: 78.2 MM HG (ref 83–108)
PO2 TEMP ADJ BLD: 78.2 MM HG (ref 83–108)
POTASSIUM BLDA-SCNC: 3.6 MMOL/L (ref 3.5–5.2)
POTASSIUM SERPL-SCNC: 3.8 MMOL/L (ref 3.5–5.2)
PROT SERPL-MCNC: 7.6 G/DL (ref 6–8.5)
PROTHROMBIN TIME: 14 SECONDS (ref 11.9–14.6)
RBC # BLD AUTO: 3.41 10*6/MM3 (ref 3.77–5.28)
SAO2 % BLDCOA: 96.8 % (ref 94–99)
SARS-COV-2 RNA PNL SPEC NAA+PROBE: NOT DETECTED
SODIUM BLDA-SCNC: 143 MMOL/L (ref 136–145)
SODIUM SERPL-SCNC: 142 MMOL/L (ref 136–145)
TROPONIN T SERPL-MCNC: <0.01 NG/ML (ref 0–0.03)
VENTILATOR MODE: ABNORMAL
WBC # BLD AUTO: 15.46 10*3/MM3 (ref 3.4–10.8)

## 2021-04-17 PROCEDURE — 83050 HGB METHEMOGLOBIN QUAN: CPT

## 2021-04-17 PROCEDURE — 36415 COLL VENOUS BLD VENIPUNCTURE: CPT | Performed by: NURSE PRACTITIONER

## 2021-04-17 PROCEDURE — 25010000002 METHYLPREDNISOLONE PER 125 MG: Performed by: NURSE PRACTITIONER

## 2021-04-17 PROCEDURE — 94640 AIRWAY INHALATION TREATMENT: CPT

## 2021-04-17 PROCEDURE — 36600 WITHDRAWAL OF ARTERIAL BLOOD: CPT

## 2021-04-17 PROCEDURE — 93005 ELECTROCARDIOGRAM TRACING: CPT | Performed by: NURSE PRACTITIONER

## 2021-04-17 PROCEDURE — 82375 ASSAY CARBOXYHB QUANT: CPT

## 2021-04-17 PROCEDURE — 85025 COMPLETE CBC W/AUTO DIFF WBC: CPT | Performed by: NURSE PRACTITIONER

## 2021-04-17 PROCEDURE — 71045 X-RAY EXAM CHEST 1 VIEW: CPT

## 2021-04-17 PROCEDURE — 83880 ASSAY OF NATRIURETIC PEPTIDE: CPT | Performed by: NURSE PRACTITIONER

## 2021-04-17 PROCEDURE — 87635 SARS-COV-2 COVID-19 AMP PRB: CPT | Performed by: NURSE PRACTITIONER

## 2021-04-17 PROCEDURE — 99284 EMERGENCY DEPT VISIT MOD MDM: CPT

## 2021-04-17 PROCEDURE — 84484 ASSAY OF TROPONIN QUANT: CPT | Performed by: NURSE PRACTITIONER

## 2021-04-17 PROCEDURE — 93010 ELECTROCARDIOGRAM REPORT: CPT | Performed by: INTERNAL MEDICINE

## 2021-04-17 PROCEDURE — 85610 PROTHROMBIN TIME: CPT | Performed by: NURSE PRACTITIONER

## 2021-04-17 PROCEDURE — 96374 THER/PROPH/DIAG INJ IV PUSH: CPT

## 2021-04-17 PROCEDURE — 94799 UNLISTED PULMONARY SVC/PX: CPT

## 2021-04-17 PROCEDURE — 80053 COMPREHEN METABOLIC PANEL: CPT | Performed by: NURSE PRACTITIONER

## 2021-04-17 PROCEDURE — 85730 THROMBOPLASTIN TIME PARTIAL: CPT | Performed by: NURSE PRACTITIONER

## 2021-04-17 PROCEDURE — 82805 BLOOD GASES W/O2 SATURATION: CPT

## 2021-04-17 RX ORDER — IPRATROPIUM BROMIDE AND ALBUTEROL SULFATE 2.5; .5 MG/3ML; MG/3ML
3 SOLUTION RESPIRATORY (INHALATION) ONCE
Status: COMPLETED | OUTPATIENT
Start: 2021-04-17 | End: 2021-04-17

## 2021-04-17 RX ORDER — METHYLPREDNISOLONE SODIUM SUCCINATE 125 MG/2ML
125 INJECTION, POWDER, LYOPHILIZED, FOR SOLUTION INTRAMUSCULAR; INTRAVENOUS ONCE
Status: COMPLETED | OUTPATIENT
Start: 2021-04-17 | End: 2021-04-17

## 2021-04-17 RX ORDER — ALBUTEROL SULFATE 0.63 MG/3ML
1 SOLUTION RESPIRATORY (INHALATION) EVERY 4 HOURS PRN
Qty: 25 EACH | Refills: 0 | Status: ON HOLD | OUTPATIENT
Start: 2021-04-17 | End: 2022-10-06

## 2021-04-17 RX ORDER — AZITHROMYCIN 250 MG/1
TABLET, FILM COATED ORAL
Qty: 6 TABLET | Refills: 0 | Status: ON HOLD | OUTPATIENT
Start: 2021-04-17 | End: 2022-10-06

## 2021-04-17 RX ORDER — METHYLPREDNISOLONE 4 MG/1
TABLET ORAL
Qty: 21 EACH | Refills: 0 | Status: ON HOLD | OUTPATIENT
Start: 2021-04-17 | End: 2022-10-06

## 2021-04-17 RX ADMIN — METHYLPREDNISOLONE SODIUM SUCCINATE 125 MG: 125 INJECTION, POWDER, FOR SOLUTION INTRAMUSCULAR; INTRAVENOUS at 13:27

## 2021-04-17 RX ADMIN — IPRATROPIUM BROMIDE AND ALBUTEROL SULFATE 3 ML: 2.5; .5 SOLUTION RESPIRATORY (INHALATION) at 12:54

## 2021-04-17 NOTE — ED PROVIDER NOTES
Subjective   Patient is a 70-year-old female presents emergency department with chief complaints of shortness of breath.  Patient has history of COPD and requires 3 L of oxygen at all times.  She reports a productive cough with worsening shortness of breath x2 days.  She states that she tested positive for Covid in January however she feels this may have been a false positive because she had no symptoms during that time.  Patient denies any chest pain.  She has had no recorded fevers.  Due to symptoms described she came to the ER for evaluation and treatment.          Review of Systems   Constitutional: Negative for fever.   HENT: Positive for congestion.    Eyes: Negative.    Respiratory: Positive for cough and shortness of breath.    Cardiovascular: Negative for chest pain.   Gastrointestinal: Negative.  Negative for abdominal pain, diarrhea, nausea and vomiting.   Endocrine: Negative.    Genitourinary: Negative.  Negative for dysuria.   Musculoskeletal: Negative.  Negative for back pain.   Skin: Negative.  Negative for rash.   All other systems reviewed and are negative.      Past Medical History:   Diagnosis Date   • Abdominal wall abscess    • Abdominal wall fistula 2018   • Cellulitis    • Chronic respiratory failure with hypoxia (CMS/HCC) 2020   • Colonic obstruction (CMS/HCC) 4/3/2018   • COPD (chronic obstructive pulmonary disease) (CMS/HCC)    • Depression    • Diverticul disease small and large intestine, no perforati or abscess    • Edema    • GERD (gastroesophageal reflux disease)    • Hyperlipidemia    • Hypertension    • Hypocalcemia    • Hypothyroid    • Obesity, Class III, BMI 40-49.9 (morbid obesity) (CMS/HCC) 2020   • Respiratory failure (CMS/HCC)    • Tobacco dependence 2020   • Venous insufficiency    • Vertigo        No Known Allergies    Past Surgical History:   Procedure Laterality Date   • ABDOMINAL SURGERY     •  SECTION     • COLOSTOMY     • ETHMOID ARTERY  LIGATION N/A 2021    Procedure: ETHMOID ARTERY LIGATION;  Surgeon: Elliot Mack Jr., MD;  Location:  [facility] OR;  Service: ENT;  Laterality: N/A;   • EXAM UNDER ANESTHESIA N/A 2021    Procedure: SEPTOPLASTY TURBOPLASTY ENDOSCOPIC CONTROL LEFT NOSE BLEED;  Surgeon: Elliot Mack Jr., MD;  Location:  [facility] OR;  Service: ENT;  Laterality: N/A;   • EXPLORATORY LAPAROTOMY N/A 2018    Procedure: LAPAROTOMY EXPLORATORY, partial colectomy, creation colostomy, incision and drainage abdominal wall abscess;  Surgeon: Elda Velazquez MD;  Location:  [facility];  Service: General   • INTERNAL MAXILLARY ARTERY AND ETHMOID ARTERY LIGATION N/A 2021    Procedure: INTERNAL MAXILLARY ARTERY AND ETHMOID ARTERY LIGATION;  Surgeon: Elliot Mack Jr., MD;  Location:  [facility];  Service: ENT;  Laterality: N/A;   • SEPTOPLASTY, RESECTION INFERIOR TURBINATES Bilateral 2021    Procedure: SEPTOPLASTY, RESECTION INFERIOR TURBINATES;  Surgeon: Elliot Mack Jr., MD;  Location:  [facility];  Service: ENT;  Laterality: Bilateral;       Family History   Adopted: Yes       Social History     Socioeconomic History   • Marital status:      Spouse name: Not on file   • Number of children: Not on file   • Years of education: Not on file   • Highest education level: Not on file   Tobacco Use   • Smoking status: Former Smoker     Packs/day: 0.25     Years: 15.00     Pack years: 3.75     Quit date: 2020     Years since quittin.2   • Smokeless tobacco: Never Used   Substance and Sexual Activity   • Alcohol use: No   • Drug use: No   • Sexual activity: Never           Objective   Physical Exam  Vitals and nursing note reviewed.   Constitutional:       Appearance: She is well-developed.   HENT:      Head: Normocephalic and atraumatic.      Nose: Nose normal.      Mouth/Throat:      Mouth: Mucous membranes are moist.   Eyes:      Conjunctiva/sclera: Conjunctivae normal.      Pupils: Pupils are  equal, round, and reactive to light.   Cardiovascular:      Rate and Rhythm: Normal rate and regular rhythm.      Pulses: Normal pulses.      Heart sounds: Normal heart sounds.   Pulmonary:      Effort: Pulmonary effort is normal.      Breath sounds: Wheezing and rales present.   Abdominal:      General: Bowel sounds are normal.      Palpations: Abdomen is soft.   Musculoskeletal:         General: Normal range of motion.      Cervical back: Normal range of motion and neck supple.      Right lower leg: Edema present.      Left lower leg: Edema present.      Comments: Chronic lower extremity edema- reports improvements from baseline   Skin:     General: Skin is warm and dry.      Capillary Refill: Capillary refill takes less than 2 seconds.   Neurological:      General: No focal deficit present.      Mental Status: She is alert and oriented to person, place, and time.   Psychiatric:         Mood and Affect: Mood normal.         Behavior: Behavior normal.         Procedures           ED Course  ED Course as of Apr 17 1911   Sat Apr 17, 2021   1115 I reviewed the EKG.  Computer is calling an acute MI/STEMI in the inferior pattern however there is a poor baseline lead III however lead II and aVF do not meet STEMI criteria.  No reciprocal changes.    [CF]   1458 Labs are consistent with patient's baseline.  She has had some improvements overall on reexam.  We will discharge patient home with COPD exacerbation and recommend close follow-up with her primary care physician.  She will need to return with any worsening symptoms.    [TW]   9343 Currently she doesn't meet admission criteria. Lungs have improved on re-exam and patient is on her baseline oxygen. She is resting comfortably on re-exam in no distress.     [TW]   2630 Patient has been reluctant to be discharged however with each re-exam she is resting in no distress. Case management reviewed labs again and numbers are better than her usual. She was concerned about her  lower extremity swelling however on initial exam she admits this is her usual if not better. Patient's biggest concern is how she will get home and wanted to be admitted as she was worried about a ride home. We are in process of seeing about EMS to take patient back home. Reassurance has been given to the patient. Chest xray does not reveal volume overload and bnp is within normal limits. This has been told to the patient as well when she questioned this examiner and nursing staff about her legs once she realized she was stable for discharge home. She is on her usual dose of oxygen and has been while in the ER.    [TW]      ED Course User Index  [CF] Davion Weinberg MD  [TW] Julissa Travis APRN                                           MDM  Number of Diagnoses or Management Options  COPD exacerbation (CMS/HCC): new and requires workup     Amount and/or Complexity of Data Reviewed  Clinical lab tests: ordered and reviewed  Tests in the radiology section of CPT®: ordered and reviewed  Decide to obtain previous medical records or to obtain history from someone other than the patient: yes  Discuss the patient with other providers: yes    Risk of Complications, Morbidity, and/or Mortality  Presenting problems: moderate  Diagnostic procedures: moderate  Management options: moderate    Patient Progress  Patient progress: improved      Final diagnoses:   COPD exacerbation (CMS/HCC)       ED Disposition  ED Disposition     ED Disposition Condition Comment    Discharge Good           No follow-up provider specified.       Medication List      New Prescriptions    azithromycin 250 MG tablet  Commonly known as: Zithromax Z-Bartolo  Take 2 tablets the first day, then 1 tablet daily for 4 days.     methylPREDNISolone 4 MG dose pack  Commonly known as: MEDROL  Take as directed on package instructions.        Changed    * albuterol sulfate  (90 Base) MCG/ACT inhaler  Commonly known as: PROVENTIL HFA;VENTOLIN  HFA;PROAIR HFA  What changed: Another medication with the same name was added. Make sure you understand how and when to take each.     * albuterol 0.63 MG/3ML nebulizer solution  Commonly known as: ACCUNEB  Take 3 mL by nebulization Every 4 (Four) Hours As Needed for Wheezing.  What changed: You were already taking a medication with the same name, and this prescription was added. Make sure you understand how and when to take each.         * This list has 2 medication(s) that are the same as other medications prescribed for you. Read the directions carefully, and ask your doctor or other care provider to review them with you.            Stop    amoxicillin-clavulanate 875-125 MG per tablet  Commonly known as: Augmentin           Where to Get Your Medications      These medications were sent to [id number] - [place], [place] - [address] [place] [address] AT SEC OF [address] [facility] [phone]  - [phone] FX  [address] [place] [address]    Phone: [phone]   · albuterol 0.63 MG/3ML nebulizer solution  · azithromycin 250 MG tablet  · methylPREDNISolone 4 MG dose pack          Julissa Travis, APRN  04/17/21 8010

## 2021-04-18 LAB
QT INTERVAL: 326 MS
QTC INTERVAL: 418 MS

## 2021-05-03 NOTE — OUTREACH NOTE
Prep Survey      Responses   [facility] patient discharged from?  [place]   Is LACE score < 7 ?  No   Emergency Room discharge w/ pulse ox?  No   Eligibility  Readm Mgmt   Discharge diagnosis  Pneumonia   Does the patient have one of the following disease processes/diagnoses(primary or secondary)?  COPD/Pneumonia   Does the patient have Home health ordered?  Yes   What is the Home health agency?   [facility]    Is there a DME ordered?  No   Prep survey completed?  Yes          Jenna Laguna RN         show

## 2022-10-05 ENCOUNTER — APPOINTMENT (OUTPATIENT)
Dept: CT IMAGING | Facility: HOSPITAL | Age: 72
End: 2022-10-05

## 2022-10-05 ENCOUNTER — HOSPITAL ENCOUNTER (OUTPATIENT)
Facility: HOSPITAL | Age: 72
Setting detail: OBSERVATION
Discharge: LEFT BEFORE DISCHARGE | End: 2022-10-06
Attending: STUDENT IN AN ORGANIZED HEALTH CARE EDUCATION/TRAINING PROGRAM | Admitting: INTERNAL MEDICINE

## 2022-10-05 DIAGNOSIS — R19.7 NAUSEA VOMITING AND DIARRHEA: ICD-10-CM

## 2022-10-05 DIAGNOSIS — R79.89 ELEVATED SERUM CREATININE: ICD-10-CM

## 2022-10-05 DIAGNOSIS — K44.9 HIATAL HERNIA: ICD-10-CM

## 2022-10-05 DIAGNOSIS — R11.2 INTRACTABLE NAUSEA AND VOMITING: Primary | ICD-10-CM

## 2022-10-05 DIAGNOSIS — R10.9 ABDOMINAL PAIN, UNSPECIFIED ABDOMINAL LOCATION: ICD-10-CM

## 2022-10-05 DIAGNOSIS — K46.9 PERISTOMAL HERNIA: ICD-10-CM

## 2022-10-05 DIAGNOSIS — R11.2 NAUSEA VOMITING AND DIARRHEA: ICD-10-CM

## 2022-10-05 LAB
ADV 40+41 DNA STL QL NAA+NON-PROBE: NOT DETECTED
ALBUMIN SERPL-MCNC: 4 G/DL (ref 3.5–5.2)
ALBUMIN/GLOB SERPL: 1.1 G/DL
ALP SERPL-CCNC: 96 U/L (ref 39–117)
ALT SERPL W P-5'-P-CCNC: 10 U/L (ref 1–33)
ANION GAP SERPL CALCULATED.3IONS-SCNC: 11 MMOL/L (ref 5–15)
AST SERPL-CCNC: 15 U/L (ref 1–32)
ASTRO TYP 1-8 RNA STL QL NAA+NON-PROBE: NOT DETECTED
BACTERIA UR QL AUTO: ABNORMAL /HPF
BASOPHILS # BLD AUTO: 0.03 10*3/MM3 (ref 0–0.2)
BASOPHILS NFR BLD AUTO: 0.3 % (ref 0–1.5)
BILIRUB SERPL-MCNC: 0.6 MG/DL (ref 0–1.2)
BILIRUB UR QL STRIP: ABNORMAL
BUN SERPL-MCNC: 17 MG/DL (ref 8–23)
BUN/CREAT SERPL: 15.2 (ref 7–25)
C CAYETANENSIS DNA STL QL NAA+NON-PROBE: NOT DETECTED
C COLI+JEJ+UPSA DNA STL QL NAA+NON-PROBE: NOT DETECTED
CALCIUM SPEC-SCNC: 9.5 MG/DL (ref 8.6–10.5)
CHLORIDE SERPL-SCNC: 93 MMOL/L (ref 98–107)
CLARITY UR: ABNORMAL
CO2 SERPL-SCNC: 35 MMOL/L (ref 22–29)
COLOR UR: ABNORMAL
CREAT SERPL-MCNC: 1.12 MG/DL (ref 0.57–1)
CRYPTOSP DNA STL QL NAA+NON-PROBE: NOT DETECTED
D-LACTATE SERPL-SCNC: 0.9 MMOL/L (ref 0.5–2)
DEPRECATED RDW RBC AUTO: 48.5 FL (ref 37–54)
E HISTOLYT DNA STL QL NAA+NON-PROBE: NOT DETECTED
EAEC PAA PLAS AGGR+AATA ST NAA+NON-PRB: NOT DETECTED
EC STX1+STX2 GENES STL QL NAA+NON-PROBE: NOT DETECTED
EGFRCR SERPLBLD CKD-EPI 2021: 52.4 ML/MIN/1.73
EOSINOPHIL # BLD AUTO: 0.42 10*3/MM3 (ref 0–0.4)
EOSINOPHIL NFR BLD AUTO: 3.7 % (ref 0.3–6.2)
EPEC EAE GENE STL QL NAA+NON-PROBE: NOT DETECTED
ERYTHROCYTE [DISTWIDTH] IN BLOOD BY AUTOMATED COUNT: 14.6 % (ref 12.3–15.4)
ETEC LTA+ST1A+ST1B TOX ST NAA+NON-PROBE: NOT DETECTED
G LAMBLIA DNA STL QL NAA+NON-PROBE: NOT DETECTED
GLOBULIN UR ELPH-MCNC: 3.5 GM/DL
GLUCOSE SERPL-MCNC: 89 MG/DL (ref 65–99)
GLUCOSE UR STRIP-MCNC: NEGATIVE MG/DL
HCT VFR BLD AUTO: 33.4 % (ref 34–46.6)
HGB BLD-MCNC: 10.5 G/DL (ref 12–15.9)
HGB UR QL STRIP.AUTO: NEGATIVE
HYALINE CASTS UR QL AUTO: ABNORMAL /LPF
IMM GRANULOCYTES # BLD AUTO: 0.08 10*3/MM3 (ref 0–0.05)
IMM GRANULOCYTES NFR BLD AUTO: 0.7 % (ref 0–0.5)
KETONES UR QL STRIP: ABNORMAL
LEUKOCYTE ESTERASE UR QL STRIP.AUTO: ABNORMAL
LIPASE SERPL-CCNC: 27 U/L (ref 13–60)
LYMPHOCYTES # BLD AUTO: 1.51 10*3/MM3 (ref 0.7–3.1)
LYMPHOCYTES NFR BLD AUTO: 13.2 % (ref 19.6–45.3)
MAGNESIUM SERPL-MCNC: 2.4 MG/DL (ref 1.6–2.4)
MCH RBC QN AUTO: 28.2 PG (ref 26.6–33)
MCHC RBC AUTO-ENTMCNC: 31.4 G/DL (ref 31.5–35.7)
MCV RBC AUTO: 89.5 FL (ref 79–97)
MONOCYTES # BLD AUTO: 0.6 10*3/MM3 (ref 0.1–0.9)
MONOCYTES NFR BLD AUTO: 5.2 % (ref 5–12)
NEUTROPHILS NFR BLD AUTO: 76.9 % (ref 42.7–76)
NEUTROPHILS NFR BLD AUTO: 8.79 10*3/MM3 (ref 1.7–7)
NITRITE UR QL STRIP: NEGATIVE
NOROVIRUS GI+II RNA STL QL NAA+NON-PROBE: NOT DETECTED
NRBC BLD AUTO-RTO: 0 /100 WBC (ref 0–0.2)
P SHIGELLOIDES DNA STL QL NAA+NON-PROBE: NOT DETECTED
PH UR STRIP.AUTO: 5.5 [PH] (ref 5–8)
PLATELET # BLD AUTO: 289 10*3/MM3 (ref 140–450)
PMV BLD AUTO: 8.7 FL (ref 6–12)
POTASSIUM SERPL-SCNC: 3.9 MMOL/L (ref 3.5–5.2)
PROCALCITONIN SERPL-MCNC: 0.18 NG/ML (ref 0–0.25)
PROT SERPL-MCNC: 7.5 G/DL (ref 6–8.5)
PROT UR QL STRIP: ABNORMAL
RBC # BLD AUTO: 3.73 10*6/MM3 (ref 3.77–5.28)
RBC # UR STRIP: ABNORMAL /HPF
REF LAB TEST METHOD: ABNORMAL
RVA RNA STL QL NAA+NON-PROBE: NOT DETECTED
S ENT+BONG DNA STL QL NAA+NON-PROBE: NOT DETECTED
SAPO I+II+IV+V RNA STL QL NAA+NON-PROBE: NOT DETECTED
SARS-COV-2 RNA PNL SPEC NAA+PROBE: NOT DETECTED
SHIGELLA SP+EIEC IPAH ST NAA+NON-PROBE: NOT DETECTED
SODIUM SERPL-SCNC: 139 MMOL/L (ref 136–145)
SP GR UR STRIP: 1.03 (ref 1–1.03)
SQUAMOUS #/AREA URNS HPF: ABNORMAL /HPF
UROBILINOGEN UR QL STRIP: ABNORMAL
V CHOL+PARA+VUL DNA STL QL NAA+NON-PROBE: NOT DETECTED
V CHOLERAE DNA STL QL NAA+NON-PROBE: NOT DETECTED
WBC # UR STRIP: ABNORMAL /HPF
WBC NRBC COR # BLD: 11.43 10*3/MM3 (ref 3.4–10.8)
Y ENTEROCOL DNA STL QL NAA+NON-PROBE: NOT DETECTED
YEAST URNS QL MICRO: ABNORMAL /HPF

## 2022-10-05 PROCEDURE — 96374 THER/PROPH/DIAG INJ IV PUSH: CPT

## 2022-10-05 PROCEDURE — P9612 CATHETERIZE FOR URINE SPEC: HCPCS

## 2022-10-05 PROCEDURE — 85025 COMPLETE CBC W/AUTO DIFF WBC: CPT | Performed by: PHYSICIAN ASSISTANT

## 2022-10-05 PROCEDURE — 83690 ASSAY OF LIPASE: CPT | Performed by: PHYSICIAN ASSISTANT

## 2022-10-05 PROCEDURE — 25010000002 IOPAMIDOL 61 % SOLUTION: Performed by: PHYSICIAN ASSISTANT

## 2022-10-05 PROCEDURE — 99285 EMERGENCY DEPT VISIT HI MDM: CPT

## 2022-10-05 PROCEDURE — 99284 EMERGENCY DEPT VISIT MOD MDM: CPT

## 2022-10-05 PROCEDURE — 96372 THER/PROPH/DIAG INJ SC/IM: CPT

## 2022-10-05 PROCEDURE — 25010000002 ONDANSETRON PER 1 MG: Performed by: STUDENT IN AN ORGANIZED HEALTH CARE EDUCATION/TRAINING PROGRAM

## 2022-10-05 PROCEDURE — 87635 SARS-COV-2 COVID-19 AMP PRB: CPT | Performed by: PHYSICIAN ASSISTANT

## 2022-10-05 PROCEDURE — 87507 IADNA-DNA/RNA PROBE TQ 12-25: CPT | Performed by: PHYSICIAN ASSISTANT

## 2022-10-05 PROCEDURE — C9803 HOPD COVID-19 SPEC COLLECT: HCPCS

## 2022-10-05 PROCEDURE — 96375 TX/PRO/DX INJ NEW DRUG ADDON: CPT

## 2022-10-05 PROCEDURE — 74177 CT ABD & PELVIS W/CONTRAST: CPT

## 2022-10-05 PROCEDURE — 25010000002 GLUCAGON (HUMAN RECOMBINANT) 1 MG RECONSTITUTED SOLUTION: Performed by: STUDENT IN AN ORGANIZED HEALTH CARE EDUCATION/TRAINING PROGRAM

## 2022-10-05 PROCEDURE — 80053 COMPREHEN METABOLIC PANEL: CPT | Performed by: PHYSICIAN ASSISTANT

## 2022-10-05 PROCEDURE — 25010000002 METOCLOPRAMIDE PER 10 MG: Performed by: STUDENT IN AN ORGANIZED HEALTH CARE EDUCATION/TRAINING PROGRAM

## 2022-10-05 PROCEDURE — 83605 ASSAY OF LACTIC ACID: CPT | Performed by: PHYSICIAN ASSISTANT

## 2022-10-05 PROCEDURE — 81001 URINALYSIS AUTO W/SCOPE: CPT | Performed by: PHYSICIAN ASSISTANT

## 2022-10-05 PROCEDURE — 84145 PROCALCITONIN (PCT): CPT | Performed by: PHYSICIAN ASSISTANT

## 2022-10-05 PROCEDURE — 83735 ASSAY OF MAGNESIUM: CPT | Performed by: PHYSICIAN ASSISTANT

## 2022-10-05 RX ORDER — SODIUM CHLORIDE 0.9 % (FLUSH) 0.9 %
10 SYRINGE (ML) INJECTION AS NEEDED
Status: DISCONTINUED | OUTPATIENT
Start: 2022-10-05 | End: 2022-10-06 | Stop reason: HOSPADM

## 2022-10-05 RX ORDER — ONDANSETRON 2 MG/ML
4 INJECTION INTRAMUSCULAR; INTRAVENOUS ONCE
Status: COMPLETED | OUTPATIENT
Start: 2022-10-05 | End: 2022-10-05

## 2022-10-05 RX ORDER — METOCLOPRAMIDE HYDROCHLORIDE 5 MG/ML
10 INJECTION INTRAMUSCULAR; INTRAVENOUS ONCE
Status: COMPLETED | OUTPATIENT
Start: 2022-10-05 | End: 2022-10-05

## 2022-10-05 RX ORDER — LIDOCAINE HYDROCHLORIDE AND EPINEPHRINE BITARTRATE 20; .01 MG/ML; MG/ML
10 INJECTION, SOLUTION SUBCUTANEOUS ONCE
Status: DISCONTINUED | OUTPATIENT
Start: 2022-10-05 | End: 2022-10-05

## 2022-10-05 RX ADMIN — IOPAMIDOL 100 ML: 612 INJECTION, SOLUTION INTRAVENOUS at 19:34

## 2022-10-05 RX ADMIN — METOCLOPRAMIDE HYDROCHLORIDE 10 MG: 5 INJECTION INTRAMUSCULAR; INTRAVENOUS at 22:40

## 2022-10-05 RX ADMIN — ONDANSETRON 4 MG: 2 INJECTION INTRAMUSCULAR; INTRAVENOUS at 21:18

## 2022-10-05 RX ADMIN — GLUCAGON HYDROCHLORIDE 1 MG: 1 INJECTION, POWDER, FOR SOLUTION INTRAMUSCULAR; INTRAVENOUS; SUBCUTANEOUS at 22:39

## 2022-10-05 RX ADMIN — SODIUM CHLORIDE 1000 ML: 9 INJECTION, SOLUTION INTRAVENOUS at 19:06

## 2022-10-05 NOTE — ED PROVIDER NOTES
"Subjective   History of Present Illness    Patient is a 71-year-old female presenting to ED with difficulty eating, nausea, vomiting.  PMH significant for history abdominal wall abscess status post colectomy and exploratory laparotomy, COPD, thyroid disease, GERD.  Patient states that for \"some time now\" she feels like she is able to eat and drink however shortly after she spits up describing that the food feels like it gets stuck entering into her stomach.  Patient reports \"it is not quite vomiting but I just spit it up.\"  Patient states that over the past 3 to 4 days she has had an increase in this and noticed that the output in her ostomy bag is now a very liquid brown.  Patient denies hematemesis, any black/bloody/tarry stools.  Patient states that a home health nurse came to visit today and commented that patient's urine was darker in color at which time she became concerned she is dehydrated.  Patient denies fevers, chills, diaphoresis, any known recent sick contact, or any medication use for her symptoms.  Patient states some mild generalized abdominal discomfort but denies any localized pain.    Records reviewed show patient was last seen in the ED on 4/17/2021 for COPD exacerbation.    Patient with no outpatient visits since.    Patient's last imaging of the abdomen and pelvis was a CT performed on 4/3/2018 which showed severe constipation, mild colitis, no other acute findings.    Review of Systems   Constitutional: Negative.  Negative for chills, diaphoresis and fever.   HENT: Negative.  Negative for sore throat and trouble swallowing.    Eyes: Negative.    Respiratory: Negative.    Cardiovascular: Negative.    Gastrointestinal: Positive for abdominal pain (Generalized), diarrhea and nausea. Negative for blood in stool, constipation and vomiting.   Genitourinary: Positive for decreased urine volume. Negative for dysuria, flank pain and hematuria.        Reports + dark colored urine   Musculoskeletal: " Negative.  Negative for myalgias.   Skin: Negative.    Neurological: Negative.    Psychiatric/Behavioral: Negative.    All other systems reviewed and are negative.      Past Medical History:   Diagnosis Date   • Abdominal wall abscess    • Abdominal wall fistula 2018   • Cellulitis    • Chronic respiratory failure with hypoxia ([facility]) 2020   • Colonic obstruction ([facility]) 4/3/2018   • COPD (chronic obstructive pulmonary disease) ([facility])    • Depression    • Diverticul disease small and large intestine, no perforati or abscess    • Edema    • GERD (gastroesophageal reflux disease)    • Hyperlipidemia    • Hypertension    • Hypocalcemia    • Hypothyroid    • Obesity, Class III, BMI 40-49.9 (morbid obesity) ([facility]) 2020   • Respiratory failure ([facility])    • Tobacco dependence 2020   • Venous insufficiency    • Vertigo        No Known Allergies    Past Surgical History:   Procedure Laterality Date   • ABDOMINAL SURGERY     •  SECTION     • COLOSTOMY     • ETHMOID ARTERY LIGATION N/A 2021    Procedure: ETHMOID ARTERY LIGATION;  Surgeon: Elliot Mack Jr., MD;  Location:  [facility];  Service: ENT;  Laterality: N/A;   • EXAM UNDER ANESTHESIA N/A 2021    Procedure: SEPTOPLASTY TURBOPLASTY ENDOSCOPIC CONTROL LEFT NOSE BLEED;  Surgeon: Elliot Mack Jr., MD;  Location:  [facility];  Service: ENT;  Laterality: N/A;   • EXPLORATORY LAPAROTOMY N/A 2018    Procedure: LAPAROTOMY EXPLORATORY, partial colectomy, creation colostomy, incision and drainage abdominal wall abscess;  Surgeon: Elda Velazquez MD;  Location:  [facility];  Service: General   • INTERNAL MAXILLARY ARTERY AND ETHMOID ARTERY LIGATION N/A 2021    Procedure: INTERNAL MAXILLARY ARTERY AND ETHMOID ARTERY LIGATION;  Surgeon: Elliot Mack Jr., MD;  Location:  [facility];  Service: ENT;  Laterality: N/A;   • SEPTOPLASTY, RESECTION INFERIOR TURBINATES Bilateral 2021    Procedure: SEPTOPLASTY, RESECTION INFERIOR  TURBINATES;  Surgeon: Elliot Mack Jr., MD;  Location: [facility];  Service: ENT;  Laterality: Bilateral;       Family History   Adopted: Yes       Social History     Socioeconomic History   • Marital status:    Tobacco Use   • Smoking status: Former Smoker     Packs/day: 0.25     Years: 15.00     Pack years: 3.75     Quit date: 2020     Years since quittin.6   • Smokeless tobacco: Never Used   Substance and Sexual Activity   • Alcohol use: No   • Drug use: No   • Sexual activity: Never           Objective   Physical Exam  Vitals and nursing note reviewed.   Constitutional:       General: She is not in acute distress.     Appearance: Normal appearance. She is well-developed, well-groomed and overweight. She is ill-appearing (chronically ill appearing). She is not toxic-appearing or diaphoretic.   HENT:      Head: Normocephalic.      Mouth/Throat:      Mouth: Mucous membranes are moist.      Pharynx: Oropharynx is clear.   Eyes:      General: No scleral icterus.     Conjunctiva/sclera: Conjunctivae normal.      Pupils: Pupils are equal, round, and reactive to light.   Cardiovascular:      Rate and Rhythm: Normal rate and regular rhythm.   Pulmonary:      Effort: Pulmonary effort is normal.      Breath sounds: Normal breath sounds.   Chest:      Chest wall: Tenderness present.   Abdominal:      General: Bowel sounds are normal. There is no distension.      Palpations: Abdomen is soft.      Tenderness: There is abdominal tenderness (generalized). There is no guarding.      Comments: Colostomy in place to the right lower abdomen with liquid brown stool.  Mild generalized abdominal tenderness with no focal tenderness.   Musculoskeletal:         General: Normal range of motion.      Cervical back: Normal range of motion and neck supple.      Right lower leg: No edema.      Left lower leg: No edema.   Skin:     General: Skin is warm and dry.      Coloration: Skin is not jaundiced or pale.    Neurological:      Mental Status: She is alert and oriented to person, place, and time.   Psychiatric:         Mood and Affect: Mood normal.         Behavior: Behavior normal. Behavior is cooperative.         Procedures           ED Course  ED Course as of 10/06/22 0840   Wed Oct 05, 2022   2047 Case discussed at this time with Dr. Dorian Oliver who will be assuming care of patient.  Pending consult from surgeon Dr. Oliver will reevaluate and disposition accordingly.  Please see Dr. Oliver's note below for further ED course as well as final diagnosis.    Working diagnosis: Nausea, vomiting, diarrhea, Parastomal hernia, hiatal hernia. [JS]   2052 Discussion with Dr. Tee, general surgeon.  States that patient can follow-up in the outpatient setting for further evaluation and treatment planning.  [JS]   2333 I assumed care of this patient pending p.o. challenge and reassessment.  I reassessed the patient and she still having intractable vomiting after attempting to p.o. challenge with Sprite and crackers.  I then discussed her case with the gastroenterologist, Dr. Govea, who recommended admission as well for possible scope tomorrow.  Patient is okay with this plan.  She states that she has not been able to tolerate oral intake for the past few days and has been getting worse over the past few weeks.  She has not been able to take her oral medication either.  Given this we will admit her to the hospitalist service.  The hospitalist graciously excepted the patient for admission. [NP]      ED Course User Index  [JS] Wilber Whittington PA-C  [NP] Ramila Fraser MD                                           MDM  Number of Diagnoses or Management Options     Amount and/or Complexity of Data Reviewed  Clinical lab tests: reviewed and ordered  Tests in the radiology section of CPT®: reviewed and ordered  Tests in the medicine section of CPT®: ordered and reviewed  Decide to obtain previous medical records or to obtain  history from someone other than the patient: yes  Review and summarize past medical records: yes  Discuss the patient with other providers: yes            Final diagnoses:   Abdominal pain, unspecified abdominal location   Nausea vomiting and diarrhea   Peristomal hernia   Hiatal hernia   Intractable nausea and vomiting   Elevated serum creatinine       ED Disposition  ED Disposition     ED Disposition   Decision to Admit    Condition   --    Comment   Level of Care: Med/Surg [1]   Diagnosis: Intractable nausea and vomiting [875397]   Admitting Physician: TARI PFEIFFER [[id number]]   Attending Physician: TARI PFEIFFER [[id number]]               Dorie Segura L, APRN  [address] [place] [id number]  [phone]    Schedule an appointment as soon as possible for a visit in 2 days      Psychiatric Emergency Department  [facility] [address] [phone]    As needed    Frederick Tee MD  [address] [facility]   [address]  [place] [address]  [phone]    Schedule an appointment as soon as possible for a visit in 2 days           Medication List      No changes were made to your prescriptions during this visit.          Wilber Whittington PA-C  10/05/22 2049       Wilber Whittington PA-C  10/06/22 0890

## 2022-10-06 VITALS
DIASTOLIC BLOOD PRESSURE: 61 MMHG | OXYGEN SATURATION: 94 % | HEART RATE: 76 BPM | WEIGHT: 212.19 LBS | SYSTOLIC BLOOD PRESSURE: 127 MMHG | RESPIRATION RATE: 18 BRPM | HEIGHT: 63 IN | TEMPERATURE: 98.3 F | BODY MASS INDEX: 37.6 KG/M2

## 2022-10-06 PROBLEM — J96.10 CHRONIC RESPIRATORY FAILURE (HCC): Status: ACTIVE | Noted: 2022-10-06

## 2022-10-06 PROBLEM — E66.9 OBESITY (BMI 30-39.9): Status: ACTIVE | Noted: 2022-10-06

## 2022-10-06 PROBLEM — R11.2 INTRACTABLE NAUSEA AND VOMITING: Status: ACTIVE | Noted: 2022-10-06

## 2022-10-06 LAB
ANION GAP SERPL CALCULATED.3IONS-SCNC: 10 MMOL/L (ref 5–15)
BUN SERPL-MCNC: 16 MG/DL (ref 8–23)
BUN/CREAT SERPL: 15.2 (ref 7–25)
CALCIUM SPEC-SCNC: 9.1 MG/DL (ref 8.6–10.5)
CHLORIDE SERPL-SCNC: 96 MMOL/L (ref 98–107)
CO2 SERPL-SCNC: 29 MMOL/L (ref 22–29)
CREAT SERPL-MCNC: 1.05 MG/DL (ref 0.57–1)
DEPRECATED RDW RBC AUTO: 51.3 FL (ref 37–54)
EGFRCR SERPLBLD CKD-EPI 2021: 56.6 ML/MIN/1.73
EOSINOPHIL # BLD MANUAL: 0.14 10*3/MM3 (ref 0–0.4)
EOSINOPHIL NFR BLD MANUAL: 1 % (ref 0.3–6.2)
ERYTHROCYTE [DISTWIDTH] IN BLOOD BY AUTOMATED COUNT: 14.7 % (ref 12.3–15.4)
GLUCOSE SERPL-MCNC: 71 MG/DL (ref 65–99)
HCT VFR BLD AUTO: 34.6 % (ref 34–46.6)
HGB BLD-MCNC: 10.3 G/DL (ref 12–15.9)
LYMPHOCYTES # BLD MANUAL: 1.47 10*3/MM3 (ref 0.7–3.1)
LYMPHOCYTES NFR BLD MANUAL: 4.1 % (ref 5–12)
MCH RBC QN AUTO: 28.1 PG (ref 26.6–33)
MCHC RBC AUTO-ENTMCNC: 29.8 G/DL (ref 31.5–35.7)
MCV RBC AUTO: 94.5 FL (ref 79–97)
MONOCYTES # BLD: 0.59 10*3/MM3 (ref 0.1–0.9)
NEUTROPHILS # BLD AUTO: 12.1 10*3/MM3 (ref 1.7–7)
NEUTROPHILS NFR BLD MANUAL: 83.5 % (ref 42.7–76)
NEUTS BAND NFR BLD MANUAL: 1 % (ref 0–5)
PLAT MORPH BLD: NORMAL
PLATELET # BLD AUTO: 314 10*3/MM3 (ref 140–450)
PMV BLD AUTO: 9.2 FL (ref 6–12)
POTASSIUM SERPL-SCNC: 4.1 MMOL/L (ref 3.5–5.2)
RBC # BLD AUTO: 3.66 10*6/MM3 (ref 3.77–5.28)
ROULEAUX BLD QL SMEAR: ABNORMAL
SODIUM SERPL-SCNC: 135 MMOL/L (ref 136–145)
VARIANT LYMPHS NFR BLD MANUAL: 10.3 % (ref 19.6–45.3)
WBC MORPH BLD: NORMAL
WBC NRBC COR # BLD: 14.31 10*3/MM3 (ref 3.4–10.8)

## 2022-10-06 PROCEDURE — G0378 HOSPITAL OBSERVATION PER HR: HCPCS

## 2022-10-06 PROCEDURE — 85025 COMPLETE CBC W/AUTO DIFF WBC: CPT | Performed by: FAMILY MEDICINE

## 2022-10-06 PROCEDURE — 96375 TX/PRO/DX INJ NEW DRUG ADDON: CPT

## 2022-10-06 PROCEDURE — 94761 N-INVAS EAR/PLS OXIMETRY MLT: CPT

## 2022-10-06 PROCEDURE — 85007 BL SMEAR W/DIFF WBC COUNT: CPT | Performed by: FAMILY MEDICINE

## 2022-10-06 PROCEDURE — 94640 AIRWAY INHALATION TREATMENT: CPT

## 2022-10-06 PROCEDURE — 80048 BASIC METABOLIC PNL TOTAL CA: CPT | Performed by: FAMILY MEDICINE

## 2022-10-06 PROCEDURE — 94799 UNLISTED PULMONARY SVC/PX: CPT

## 2022-10-06 RX ORDER — FLUOXETINE HYDROCHLORIDE 40 MG/1
40 CAPSULE ORAL DAILY
COMMUNITY

## 2022-10-06 RX ORDER — ALBUTEROL SULFATE 2.5 MG/3ML
2.5 SOLUTION RESPIRATORY (INHALATION) EVERY 4 HOURS PRN
Status: DISCONTINUED | OUTPATIENT
Start: 2022-10-06 | End: 2022-10-06 | Stop reason: HOSPADM

## 2022-10-06 RX ORDER — SODIUM CHLORIDE 0.9 % (FLUSH) 0.9 %
10 SYRINGE (ML) INJECTION AS NEEDED
Status: DISCONTINUED | OUTPATIENT
Start: 2022-10-06 | End: 2022-10-06 | Stop reason: HOSPADM

## 2022-10-06 RX ORDER — ONDANSETRON 2 MG/ML
4 INJECTION INTRAMUSCULAR; INTRAVENOUS EVERY 6 HOURS PRN
Status: DISCONTINUED | OUTPATIENT
Start: 2022-10-06 | End: 2022-10-06 | Stop reason: HOSPADM

## 2022-10-06 RX ORDER — PANTOPRAZOLE SODIUM 40 MG/10ML
40 INJECTION, POWDER, LYOPHILIZED, FOR SOLUTION INTRAVENOUS EVERY 12 HOURS SCHEDULED
Status: DISCONTINUED | OUTPATIENT
Start: 2022-10-06 | End: 2022-10-06 | Stop reason: HOSPADM

## 2022-10-06 RX ORDER — OMEPRAZOLE 20 MG/1
20 CAPSULE, DELAYED RELEASE ORAL DAILY
COMMUNITY

## 2022-10-06 RX ORDER — BUDESONIDE AND FORMOTEROL FUMARATE DIHYDRATE 160; 4.5 UG/1; UG/1
2 AEROSOL RESPIRATORY (INHALATION)
Status: DISCONTINUED | OUTPATIENT
Start: 2022-10-06 | End: 2022-10-06 | Stop reason: HOSPADM

## 2022-10-06 RX ORDER — SODIUM CHLORIDE 9 MG/ML
75 INJECTION, SOLUTION INTRAVENOUS CONTINUOUS
Status: DISCONTINUED | OUTPATIENT
Start: 2022-10-06 | End: 2022-10-06

## 2022-10-06 RX ORDER — IPRATROPIUM BROMIDE AND ALBUTEROL SULFATE 2.5; .5 MG/3ML; MG/3ML
3 SOLUTION RESPIRATORY (INHALATION)
Status: DISCONTINUED | OUTPATIENT
Start: 2022-10-06 | End: 2022-10-06 | Stop reason: HOSPADM

## 2022-10-06 RX ORDER — METOPROLOL SUCCINATE 25 MG/1
25 TABLET, EXTENDED RELEASE ORAL DAILY
COMMUNITY

## 2022-10-06 RX ORDER — SODIUM CHLORIDE 0.9 % (FLUSH) 0.9 %
10 SYRINGE (ML) INJECTION EVERY 12 HOURS SCHEDULED
Status: DISCONTINUED | OUTPATIENT
Start: 2022-10-06 | End: 2022-10-06 | Stop reason: HOSPADM

## 2022-10-06 RX ORDER — HYDROXYZINE 50 MG/1
50 TABLET, FILM COATED ORAL NIGHTLY
COMMUNITY

## 2022-10-06 RX ORDER — ENOXAPARIN SODIUM 100 MG/ML
40 INJECTION SUBCUTANEOUS NIGHTLY
Status: DISCONTINUED | OUTPATIENT
Start: 2022-10-06 | End: 2022-10-06 | Stop reason: HOSPADM

## 2022-10-06 RX ORDER — SODIUM CHLORIDE, SODIUM LACTATE, POTASSIUM CHLORIDE, CALCIUM CHLORIDE 600; 310; 30; 20 MG/100ML; MG/100ML; MG/100ML; MG/100ML
75 INJECTION, SOLUTION INTRAVENOUS CONTINUOUS
Status: DISCONTINUED | OUTPATIENT
Start: 2022-10-06 | End: 2022-10-06 | Stop reason: HOSPADM

## 2022-10-06 RX ADMIN — PANTOPRAZOLE SODIUM 40 MG: 40 INJECTION, POWDER, FOR SOLUTION INTRAVENOUS at 08:11

## 2022-10-06 RX ADMIN — BUDESONIDE AND FORMOTEROL FUMARATE DIHYDRATE 2 PUFF: 160; 4.5 AEROSOL RESPIRATORY (INHALATION) at 08:37

## 2022-10-06 RX ADMIN — SODIUM CHLORIDE 75 ML/HR: 9 INJECTION, SOLUTION INTRAVENOUS at 03:35

## 2022-10-06 RX ADMIN — IPRATROPIUM BROMIDE AND ALBUTEROL SULFATE 3 ML: 2.5; .5 SOLUTION RESPIRATORY (INHALATION) at 12:45

## 2022-10-06 RX ADMIN — Medication 10 ML: at 08:11

## 2022-10-06 NOTE — CONSULTS
"[facility]  Inpatient Gastroenterology Service  Consultation         Request for GI consult received from ED.    Chart reviewed.    Case discussed with ED physician.  Consult received: 10/05/2022 Wed @ 7717  Consultation in progress.        IMPRESSION:  --Atypical dysphagia; Esophageal stricture/stenosis vs. Esophageal Motility Disorder.  --Nausea with atypical emesis; \"spitting up\".  --Esophageal  Reflux.  --Nonspecific generalized abdominal pain, diarrhea.  --Right sided diverting colostomy present with parastomal hernia containing the cecum and a portion of the ascending colon.  --CT finding of diverticulosis without evidence of diverticulitis.    RECOMMEND:  --Will assess in more detail Thursday.  --NPO except for ice chips/sips with meds.  --EGD with possible biopsy, polypectomy, therapeutic control of hemorrhage (e.g., placement of Hemoclip, bipolar cautery, epinephrine injection, and/or band ligation), and/or dilation, as appropriate based on findings at time of procedure.  --EGD timing to be determined when fully assessed.    Full note to follow completed evaluation of patient.    Thank you for the opportunity to serve you and your patients.  Please call if any questions arise regarding our involvement in this patient's care.        Vinny Govea M.D., F.A.C.P., F.A.C.G.    [facility] Inpatient Gastroenterology Service   "

## 2022-10-06 NOTE — PROGRESS NOTES
Patient was signed out to the outgoing advanced practice provider is a patient who presented with intractable nausea and vomiting.    Labs Reviewed   COMPREHENSIVE METABOLIC PANEL - Abnormal; Notable for the following components:       Result Value    Creatinine 1.12 (*)     Chloride 93 (*)     CO2 35.0 (*)     eGFR 52.4 (*)     All other components within normal limits    Narrative:     GFR Normal >60  Chronic Kidney Disease <60  Kidney Failure <15     URINALYSIS W/ CULTURE IF INDICATED - Abnormal; Notable for the following components:    Color, UA Dark Yellow (*)     Appearance, UA Turbid (*)     Ketones, UA 15 mg/dL (1+) (*)     Bilirubin, UA Small (1+) (*)     Protein, UA Trace (*)     Leuk Esterase, UA Trace (*)     All other components within normal limits    Narrative:     In absence of clinical symptoms, the presence of pyuria, bacteria, and/or nitrites on the urinalysis result does not correlate with infection.   CBC WITH AUTO DIFFERENTIAL - Abnormal; Notable for the following components:    WBC 11.43 (*)     RBC 3.73 (*)     Hemoglobin 10.5 (*)     Hematocrit 33.4 (*)     MCHC 31.4 (*)     Neutrophil % 76.9 (*)     Lymphocyte % 13.2 (*)     Immature Grans % 0.7 (*)     Neutrophils, Absolute 8.79 (*)     Eosinophils, Absolute 0.42 (*)     Immature Grans, Absolute 0.08 (*)     All other components within normal limits   URINALYSIS, MICROSCOPIC ONLY - Abnormal; Notable for the following components:    RBC, UA 0-2 (*)     WBC, UA 3-5 (*)     All other components within normal limits   COVID-19,[facility] NO TRANSPORT MEDIA 2 HR TAT - Normal    Narrative:     Fact sheet for providers: https://www.fda.gov/media/615895/download     Fact sheet for patients: https://www.fda.gov/media/826854/download    Test performed by PCR.    Consider negative results in combination with clinical observations, patient history, and epidemiological information.   GASTROINTESTINAL PANEL, PCR - Normal    Narrative:   "   If Aeromonas, Staphylococcus aureus or Bacillus cereus are suspected, please order [id number]: Stool Culture, Aeromonas, S aureus, B Cereus.   LIPASE - Normal   LACTIC ACID, PLASMA - Normal   PROCALCITONIN - Normal    Narrative:     As a Marker for Sepsis (Non-Neonates):    1. <0.5 ng/mL represents a low risk of severe sepsis and/or septic shock.  2. >2 ng/mL represents a high risk of severe sepsis and/or septic shock.    As a Marker for Lower Respiratory Tract Infections that require antibiotic therapy:    PCT on Admission    Antibiotic Therapy       6-12 Hrs later    >0.5                Strongly Recommended  >0.25 - <0.5        Recommended   0.1 - 0.25          Discouraged              Remeasure/reassess PCT  <0.1                Strongly Discouraged     Remeasure/reassess PCT    As 28 day mortality risk marker: \"Change in Procalcitonin Result\" (>80% or <=80%) if Day 0 (or Day 1) and Day 4 values are available. Refer to http://www.[employer] Brasilpct-calculator.com    Change in PCT <=80%  A decrease of PCT levels below or equal to 80% defines a positive change in PCT test result representing a higher risk for 28-day all-cause mortality of patients diagnosed with severe sepsis for septic shock.    Change in PCT >80%  A decrease of PCT levels of more than 80% defines a negative change in PCT result representing a lower risk for 28-day all-cause mortality of patients diagnosed with severe sepsis or septic shock.      MAGNESIUM - Normal   CBC AND DIFFERENTIAL    Narrative:     The following orders were created for panel order CBC & Differential.  Procedure                               Abnormality         Status                     ---------                               -----------         ------                     CBC Auto Differential[[id number]]        Abnormal            Final result                 Please view results for these tests on the individual orders.       CT Abdomen Pelvis With Contrast   Final Result   1. A " right-sided diverting colostomy is present with a peristomal hernia   containing the cecum and a portion of the ascending colon. It also   contains a segment of the terminal ileum. There are diverticula within   the right colon without evidence of diverticulitis. There is a Hernadez's   pouch consisting of the distal descending and sigmoid colon also   containing some diverticula with decompression and no evidence of   diverticulitis. There is no evidence of mechanical bowel obstruction.   2. Bibasilar atelectasis.   3. Hiatal hernia with evidence of gastroesophageal reflux with mild   fluid distention of the distal esophageal segment.   4. Scattered hypodensities within the spleen without evidence of   enlargement. 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   5. Malrotation of the right kidney. No nephrolithiasis or obstructive   uropathy.   6. Uterus and adnexa are unremarkable..            This report was finalized on 10/05/2022 19:58 by Dr. Matthew Lewis MD.        Labs notable for elevated creatinine.  CT abdomen pelvis notable for a large parastomal hernia.    I discussed the CT read with the on-call radiologist states it is quite large but no evidence of acute obstruction.  Discussed with general surgery who states that patient is having significant issues would recommend inpatient admission to hospitalist with surgery to follow in the morning.  No acute emergent surgical intervention needed.    Reevaluated patient bedside states she still cannot stop vomiting.  Showed me some spit up in a vomitus bag.  I gave patient some IV Zofran if she had already received IV fluids.  Attempted p.o. challenge and patient still quite unable to keep anything down.    Patient's presentation is most consistent with intractable nausea and vomiting.  Would consider the patient is intermittently having obstruction.  Suspect more likely having esophagitis given findings on CT  imaging.  Deafly no evidence of small bowel obstruction distally to explain her symptoms.  Will consider gastritis.      Patient was pending admission at time of my signout to the oncoming emergency medicine attending, Dr. Fraser, plan is for likely admission given patient's inability to tolerate p.o. with intractable nausea and vomiting        ED Diagnoses:  Intractable nausea and vomiting  Acute generalized abdominal pain  Parastomal hernia  Elevated serum creatinine

## 2022-10-06 NOTE — H&P
[facility] Medicine Services  HISTORY AND PHYSICAL    Date of Admission: 10/5/2022  Primary Care Physician: Dorie Segura APRN    Subjective     Chief Complaint: Vomiting    History of Present Illness  71 year old female with PMH of COPD/CRF, HTN, hypothyroidism, obesity, colostomy due to bowel obstruction, that presents to the ER with complaints of worsening food tolerance. She has been vomiting any kind of food, to the point that she can not have crackers or her medications without emesis. She is hungry and tired, no similar problem in the past.     Review of Systems   Otherwise complete ROS reviewed and negative except as mentioned in the HPI.    Past Medical History:   Past Medical History:   Diagnosis Date   • Abdominal wall abscess    • Abdominal wall fistula 2018   • Cellulitis    • Chronic respiratory failure with hypoxia ([facility]) 2020   • Colonic obstruction ([facility]) 4/3/2018   • COPD (chronic obstructive pulmonary disease) ([facility])    • Depression    • Diverticul disease small and large intestine, no perforati or abscess    • Edema    • GERD (gastroesophageal reflux disease)    • Hyperlipidemia    • Hypertension    • Hypocalcemia    • Hypothyroid    • Obesity, Class III, BMI 40-49.9 (morbid obesity) ([facility]) 2020   • Respiratory failure ([facility])    • Tobacco dependence 2020   • Venous insufficiency    • Vertigo      Past Surgical History:  Past Surgical History:   Procedure Laterality Date   • ABDOMINAL SURGERY     •  SECTION     • COLOSTOMY     • ETHMOID ARTERY LIGATION N/A 2021    Procedure: ETHMOID ARTERY LIGATION;  Surgeon: Elliot Mack Jr., MD;  Location: [facility] OR;  Service: ENT;  Laterality: N/A;   • EXAM UNDER ANESTHESIA N/A 2021    Procedure: SEPTOPLASTY TURBOPLASTY ENDOSCOPIC CONTROL LEFT NOSE BLEED;  Surgeon: Elliot Mack Jr., MD;  Location: [facility] OR;  Service: ENT;  Laterality: N/A;   • EXPLORATORY LAPAROTOMY N/A  4/4/2018    Procedure: LAPAROTOMY EXPLORATORY, partial colectomy, creation colostomy, incision and drainage abdominal wall abscess;  Surgeon: Elda Velazquez MD;  Location:  [facility] OR;  Service: General   • INTERNAL MAXILLARY ARTERY AND ETHMOID ARTERY LIGATION N/A 1/5/2021    Procedure: INTERNAL MAXILLARY ARTERY AND ETHMOID ARTERY LIGATION;  Surgeon: Elliot Mack Jr., MD;  Location:  [facility] OR;  Service: ENT;  Laterality: N/A;   • SEPTOPLASTY, RESECTION INFERIOR TURBINATES Bilateral 1/5/2021    Procedure: SEPTOPLASTY, RESECTION INFERIOR TURBINATES;  Surgeon: Elliot Mack Jr., MD;  Location:  [facility] OR;  Service: ENT;  Laterality: Bilateral;     Social History:  reports that she quit smoking about 2 years ago. She has a 3.75 pack-year smoking history. She has never used smokeless tobacco. She reports that she does not drink alcohol and does not use drugs.    Family History: family history is not on file. She was adopted.       Allergies:  No Known Allergies    Medications:  Prior to Admission medications    Medication Sig Start Date End Date Taking? Authorizing Provider   albuterol (ACCUNEB) 0.63 MG/3ML nebulizer solution Take 3 mL by nebulization Every 4 (Four) Hours As Needed for Wheezing. 4/17/21   Julissa Travis APRN   albuterol sulfate  (90 Base) MCG/ACT inhaler Inhale 2 puffs Every 6 (Six) Hours As Needed for Wheezing. Inhale 1 to 2 puffs by mouth every 4 to 6 hours as needed for wheezing or sob    ProviderSukhdev MD   azithromycin (Zithromax Z-Bartolo) 250 MG tablet Take 2 tablets the first day, then 1 tablet daily for 4 days. 4/17/21   Julissa Travis APRN   budesonide-formoterol (SYMBICORT) 160-4.5 MCG/ACT inhaler Inhale 2 puffs 2 (Two) Times a Day.    Sukhdev Davis MD   escitalopram (LEXAPRO) 20 MG tablet Take 10 mg by mouth Daily.    Sukhdev Davis MD   furosemide (LASIX) 20 MG tablet Take 20 mg by mouth Every Morning.    Sukhdev Davis MD  "  levothyroxine (SYNTHROID, LEVOTHROID) 75 MCG tablet Take 75 mcg by mouth Daily.    Sukhdev Davis MD   LORazepam (ATIVAN) 0.5 MG tablet Take 0.5 mg by mouth Daily As Needed for Anxiety.    Sukhdev Davis MD   meclizine (ANTIVERT) 25 MG tablet Take 25 mg by mouth 2 (Two) Times a Day.    Sukhdev Davis MD   methylPREDNISolone (MEDROL) 4 MG dose pack Take as directed on package instructions. 4/17/21   Julissa Travis APRN   nitroglycerin (NITROSTAT) 0.4 MG SL tablet Place 1 tablet under the tongue Every 5 (Five) Minutes As Needed for Chest Pain (Only if SBP Greater Than 100). Take no more than 3 doses in 15 minutes. 3/11/21   Yehuda Sanches MD   potassium chloride (K-DUR,KLOR-CON) 10 MEQ CR tablet Take 10 mEq by mouth 2 (Two) Times a Day.    Sukhdev Davis MD   predniSONE (DELTASONE) 20 MG tablet 20mg q daily x 4 days then 10mg x 4 days 3/11/21   Yehuda Sanches MD   vitamin D3 125 MCG (5000 UT) capsule capsule Take 5,000 Units by mouth Daily.    Sukhdev Davis MD     I have utilized all available immediate resources to obtain, update, and review the patient's current medications.    Objective     Vital Signs: /82   Pulse 87   Temp 97.9 °F (36.6 °C)   Resp 24   Ht 160 cm (63\")   Wt 95.3 kg (210 lb)   SpO2 97%   BMI 37.20 kg/m²   Physical Exam  Vitals reviewed.   Constitutional:       General: She is not in acute distress.     Appearance: She is well-developed. She is not toxic-appearing.   HENT:      Head: Normocephalic and atraumatic.      Right Ear: External ear normal.      Left Ear: External ear normal.      Mouth/Throat:      Mouth: Mucous membranes are dry.      Pharynx: Oropharynx is clear.   Eyes:      General:         Right eye: No discharge.         Left eye: No discharge.      Extraocular Movements: Extraocular movements intact.      Conjunctiva/sclera: Conjunctivae normal.      Pupils: Pupils are equal, round, and reactive to light. "   Neck:      Vascular: No JVD.   Cardiovascular:      Rate and Rhythm: Normal rate and regular rhythm.      Pulses: Normal pulses.      Heart sounds: Normal heart sounds. No murmur heard.    No friction rub. No gallop.   Pulmonary:      Effort: Pulmonary effort is normal. No respiratory distress.      Breath sounds: No stridor. Wheezing and rhonchi present. No rales.   Chest:      Chest wall: No tenderness.   Abdominal:      General: Bowel sounds are normal. There is no distension.      Palpations: Abdomen is soft.      Tenderness: There is no abdominal tenderness. There is no guarding or rebound.      Hernia: No hernia is present.   Musculoskeletal:         General: No swelling, tenderness or deformity. Normal range of motion.      Cervical back: Normal range of motion and neck supple. No rigidity or tenderness. No muscular tenderness.      Right lower leg: No edema.      Left lower leg: No edema.   Skin:     General: Skin is warm and dry.      Findings: No erythema or rash.   Neurological:      General: No focal deficit present.      Mental Status: She is alert and oriented to person, place, and time.      Cranial Nerves: No cranial nerve deficit.      Sensory: No sensory deficit.      Motor: No weakness or abnormal muscle tone.      Deep Tendon Reflexes: Reflexes normal.   Psychiatric:         Mood and Affect: Mood normal.         Behavior: Behavior normal.     Results Reviewed:  Lab Results (last 24 hours)     Procedure Component Value Units Date/Time    Gastrointestinal Panel, PCR - Stool, Per Stoma [[id number]]  (Normal) Collected: 10/05/22 1904    Specimen: Stool from Per Stoma Updated: 10/05/22 2116     Campylobacter Not Detected     Plesiomonas shigelloides Not Detected     Salmonella Not Detected     Vibrio Not Detected     Vibrio cholerae Not Detected     Yersinia enterocolitica Not Detected     Enteroaggregative E. coli (EAEC) Not Detected     Enteropathogenic E. coli (EPEC) Not Detected      Enterotoxigenic E. coli (ETEC) lt/st Not Detected     Shiga-like toxin-producing E. coli (STEC) stx1/stx2 Not Detected     Shigella/Enteroinvasive E. coli (EIEC) Not Detected     Cryptosporidium Not Detected     Cyclospora cayetanensis Not Detected     Entamoeba histolytica Not Detected     Giardia lamblia Not Detected     Adenovirus F40/41 Not Detected     Astrovirus Not Detected     Norovirus GI/GII Not Detected     Rotavirus A Not Detected     Sapovirus (I, II, IV or V) Not Detected    Narrative:      If Aeromonas, Staphylococcus aureus or Bacillus cereus are suspected, please order [id number]: Stool Culture, Aeromonas, S aureus, B Cereus.    COVID-19,[facility] [employer] IN-HOUSE,Nasal Swab No Transport Media 3-4 HR TAT - Swab, Nasal Cavity [[id number]]  (Normal) Collected: 10/05/22 1913    Specimen: Swab from Nasal Cavity Updated: 10/05/22 2003     COVID19 Not Detected    Narrative:      Fact sheet for providers: https://www.fda.gov/media/902387/download     Fact sheet for patients: https://www.fda.gov/media/361604/download    Test performed by PCR.    Consider negative results in combination with clinical observations, patient history, and epidemiological information.    Urinalysis, Microscopic Only - Urine, Catheter [[id number]]  (Abnormal) Collected: 10/05/22 1904    Specimen: Urine, Catheter Updated: 10/05/22 1932     RBC, UA 0-2 /HPF      WBC, UA 3-5 /HPF      Comment: Urine culture not indicated.        Bacteria, UA None Seen /HPF      Squamous Epithelial Cells, UA 0-2 /HPF      Yeast, UA Small/1+ Yeast /HPF      Hyaline Casts, UA None Seen /LPF      Methodology Manual Light Microscopy    Urinalysis With Culture If Indicated - Urine, Catheter [[id number]]  (Abnormal) Collected: 10/05/22 1904    Specimen: Urine, Catheter Updated: 10/05/22 1932     Color, UA Dark Yellow     Appearance, UA Turbid     pH, UA 5.5     Specific Gravity, UA 1.028     Glucose, UA Negative     Ketones, UA 15 mg/dL (1+)     Bilirubin, UA Small (1+)  "    Blood, UA Negative     Protein, UA Trace     Leuk Esterase, UA Trace     Nitrite, UA Negative     Urobilinogen, UA 1.0 E.U./dL    Narrative:      In absence of clinical symptoms, the presence of pyuria, bacteria, and/or nitrites on the urinalysis result does not correlate with infection.    Procalcitonin [[id number]]  (Normal) Collected: 10/05/22 1846    Specimen: Blood Updated: 10/05/22 1920     Procalcitonin 0.18 ng/mL     Narrative:      As a Marker for Sepsis (Non-Neonates):    1. <0.5 ng/mL represents a low risk of severe sepsis and/or septic shock.  2. >2 ng/mL represents a high risk of severe sepsis and/or septic shock.    As a Marker for Lower Respiratory Tract Infections that require antibiotic therapy:    PCT on Admission    Antibiotic Therapy       6-12 Hrs later    >0.5                Strongly Recommended  >0.25 - <0.5        Recommended   0.1 - 0.25          Discouraged              Remeasure/reassess PCT  <0.1                Strongly Discouraged     Remeasure/reassess PCT    As 28 day mortality risk marker: \"Change in Procalcitonin Result\" (>80% or <=80%) if Day 0 (or Day 1) and Day 4 values are available. Refer to [email or web address]://[email or web address]-pct-[email or web address]    Change in PCT <=80%  A decrease of PCT levels below or equal to 80% defines a positive change in PCT test result representing a higher risk for 28-day all-cause mortality of patients diagnosed with severe sepsis for septic shock.    Change in PCT >80%  A decrease of PCT levels of more than 80% defines a negative change in PCT result representing a lower risk for 28-day all-cause mortality of patients diagnosed with severe sepsis or septic shock.       Comprehensive Metabolic Panel [[id number]]  (Abnormal) Collected: 10/05/22 1846    Specimen: Blood Updated: 10/05/22 1914     Glucose 89 mg/dL      BUN 17 mg/dL      Creatinine 1.12 mg/dL      Sodium 139 mmol/L      Potassium 3.9 mmol/L      Chloride 93 mmol/L      CO2 35.0 mmol/L      Calcium 9.5 mg/dL "      Total Protein 7.5 g/dL      Albumin 4.00 g/dL      ALT (SGPT) 10 U/L      AST (SGOT) 15 U/L      Alkaline Phosphatase 96 U/L      Total Bilirubin 0.6 mg/dL      Globulin 3.5 gm/dL      A/G Ratio 1.1 g/dL      BUN/Creatinine Ratio 15.2     Anion Gap 11.0 mmol/L      eGFR 52.4 mL/min/1.73      Comment: National Kidney Foundation and American Society of Nephrology (ASN) Task Force recommended calculation based on the Chronic Kidney Disease Epidemiology Collaboration (CKD-EPI) equation refit without adjustment for race.       Narrative:      GFR Normal >60  Chronic Kidney Disease <60  Kidney Failure <15      Lactic Acid, Plasma [[id number]]  (Normal) Collected: 10/05/22 1846    Specimen: Blood Updated: 10/05/22 1910     Lactate 0.9 mmol/L     Lipase [[id number]]  (Normal) Collected: 10/05/22 1846    Specimen: Blood Updated: 10/05/22 1909     Lipase 27 U/L     Magnesium [[id number]]  (Normal) Collected: 10/05/22 1846    Specimen: Blood Updated: 10/05/22 1908     Magnesium 2.4 mg/dL     CBC & Differential [[id number]]  (Abnormal) Collected: 10/05/22 1846    Specimen: Blood Updated: 10/05/22 1855    Narrative:      The following orders were created for panel order CBC & Differential.  Procedure                               Abnormality         Status                     ---------                               -----------         ------                     CBC Auto Differential[[id number]]        Abnormal            Final result                 Please view results for these tests on the individual orders.    CBC Auto Differential [[id number]]  (Abnormal) Collected: 10/05/22 1846    Specimen: Blood Updated: 10/05/22 1855     WBC 11.43 10*3/mm3      RBC 3.73 10*6/mm3      Hemoglobin 10.5 g/dL      Hematocrit 33.4 %      MCV 89.5 fL      MCH 28.2 pg      MCHC 31.4 g/dL      RDW 14.6 %      RDW-SD 48.5 fl      MPV 8.7 fL      Platelets 289 10*3/mm3      Neutrophil % 76.9 %      Lymphocyte % 13.2 %      Monocyte % 5.2 %       Eosinophil % 3.7 %      Basophil % 0.3 %      Immature Grans % 0.7 %      Neutrophils, Absolute 8.79 10*3/mm3      Lymphocytes, Absolute 1.51 10*3/mm3      Monocytes, Absolute 0.60 10*3/mm3      Eosinophils, Absolute 0.42 10*3/mm3      Basophils, Absolute 0.03 10*3/mm3      Immature Grans, Absolute 0.08 10*3/mm3      nRBC 0.0 /100 WBC         Imaging Results (Last 24 Hours)     Procedure Component Value Units Date/Time    CT Abdomen Pelvis With Contrast [[id number]] Collected: 10/05/22 1947     Updated: 10/05/22 2001    Narrative:      CT ABDOMEN PELVIS W CONTRAST- 10/5/2022 7:28 PM CDT     HISTORY: NVD, abd pain       COMPARISON: 04/03/2018.      DLP: 650 mGy cm. All CT scans are performed using dose optimization  techniques as appropriate to the performed exam and including at least  one of the following: Automated exposure control, adjustment of the mA  and/or kV according to size, and the use of the iterative reconstruction  technique.     TECHNIQUE: Following the intravenous administration of contrast, helical  CT tomographic images of the abdomen and pelvis were acquired. Coronal  reformatted images were also provided for review.      FINDINGS:   Bibasilar atelectasis is present. There is a granuloma in the right lung  base.. The base of the heart is unremarkable. There is no pericardial  effusion. There is a small hiatal hernia with the distal esophagus  fluid-filled suggesting gastroesophageal reflux.     LIVER: No focal liver lesion. The hepatic vasculature is patent.      BILIARY SYSTEM: The gallbladder is mildly distended. No discrete stones  or pericholecystic inflammatory changes are present. There is no biliary  dilatation..      PANCREAS: No focal pancreatic lesion.      SPLEEN: There is no evidence of splenomegaly. Heterogeneity is noted of  the spleen of uncertain etiology. These could represent hemangiomas.  FINDINGS are similar to previous more remote CT from 2017 favoring  benignity..      KIDNEYS  AND ADRENALS: The adrenals are unremarkable. No evidence of  discrete enhancing renal mass. There is no nephrolithiasis. There is  malrotation of the right kidney. There is a benign-appearing cortical  cyst lower pole right kidney.. The ureters are decompressed and normal  in appearance.     RETROPERITONEUM: No mass, lymphadenopathy or hemorrhage.      GI TRACT: There is a right midabdomen diverting colostomy. There is a  peristomal hernia containing the cecum and a segment of the ascending  colon as well as the terminal ileum. There is diverticula within the  herniated segment of colon without evidence of diverticulitis. There is  no evidence of mechanical bowel obstruction. There is a Hernadez's pouch  within the lower descending and sigmoid colon with a few diverticula  present. Again no evidence of diverticulitis. The small bowel is normal  in distribution and appearance. No free fluid is present within the  abdomen or pelvis.. The appendix is not visualized and may be surgically  absent..     OTHER: There is no mesenteric mass, lymphadenopathy or fluid collection.  The abdominopelvic vasculature is patent. The osseous structures and  soft tissues demonstrate no worrisome lesions. A small fat-containing  periumbilical hernia is present.      PELVIS: The uterus and adnexa are unremarkable. There is no free fluid  in the cul-de-sac.. The urinary bladder is normal in appearance.       Impression:      1. A right-sided diverting colostomy is present with a peristomal hernia  containing the cecum and a portion of the ascending colon. It also  contains a segment of the terminal ileum. There are diverticula within  the right colon without evidence of diverticulitis. There is a Hernadez's  pouch consisting of the distal descending and sigmoid colon also  containing some diverticula with decompression and no evidence of  diverticulitis. There is no evidence of mechanical bowel obstruction.  2. Bibasilar atelectasis.  3.  Hiatal hernia with evidence of gastroesophageal reflux with mild  fluid distention of the distal esophageal segment.  4. Scattered hypodensities within the spleen without evidence of  enlargement. These were present on previous remote study of 2017 likely  representing hemangiomas.  5. Malrotation of the right kidney. No nephrolithiasis or obstructive  uropathy.  6. Uterus and adnexa are unremarkable..         This report was finalized on 10/05/2022 19:58 by Dr. Matthew Lewis MD.        I have personally reviewed and interpreted the radiology studies and ECG obtained at time of admission.     Assessment / Plan     Assessment:   Active Hospital Problems    Diagnosis    • **Intractable nausea and vomiting    • Hypothyroidism (acquired)    • Normocytic anemia    • Essential hypertension    • Venous insufficiency of both lower extremities    • Stasis dermatitis of both legs    • Chronic respiratory failure with hypoxia and hypercapnia ([facility])    • Obesity, Class III, BMI 40-49.9 (morbid obesity) ([facility])    • COPD (chronic obstructive pulmonary disease) ([facility])      Plan:   Admit to medical floor  Vitals every 4 hours  IVF NS 75 cc/hour  Protonix 40 mg IVPB q12g  GI consult in AM for EGD or other recommendations    COPD > Duoneb QID  Albuterol q6h prn   Symbicort 2 puff BID  Oxygen supplementation by nasal canula     Home medications on hold    DVT prophylaxis > Lovenox 40 mg SQ nightly    Code Status/Advanced Care Plan: Full    The patient's surrogate decision maker is see records.     I discussed my findings and recommendations with the patient.    Estimated length of stay is over 2 midnights.     The patient was seen and examined by me on 10/06/2022 at 0255 AM.    Electronically signed by Corey Juarez MD, 10/06/22, 07:56 CDT.

## 2022-10-06 NOTE — SIGNIFICANT NOTE
10/06/22 1300   Oxygen Therapy   SpO2 94 %   Pulse Oximetry Type Continuous   Device (Oxygen Therapy) room air  (patient said she could not breath and she refuses not having oxygen on. Patient did great on room air no sign of shortness of breath)

## 2022-10-06 NOTE — CASE MANAGEMENT/SOCIAL WORK
Discharge Planning Assessment  [facility]     Patient Name: Emy Bermudez  MRN: [id number]  Today's Date: 10/6/2022    Admit Date: 10/5/2022        Discharge Needs Assessment     Row Name 10/06/22 1245       Living Environment    People in Home child(irene), adult    Name(s) of People in Home blaine Alaniz    Current Living Arrangements home    Primary Care Provided by self;child(irene)    Provides Primary Care For no one, unable/limited ability to care for self    Caregiving Concerns weakness, elderly    Family Caregiver if Needed child(irene), adult    Family Caregiver Names Alison Alaniz    Quality of Family Relationships unable to assess    Able to Return to Prior Arrangements yes       Resource/Environmental Concerns    Resource/Environmental Concerns none       Transition Planning    Patient/Family Anticipates Transition to home with family    Patient/Family Anticipated Services at Transition none    Transportation Anticipated family or friend will provide       Discharge Needs Assessment    Readmission Within the Last 30 Days no previous admission in last 30 days    Current Outpatient/Agency/Support Group other (see comments)  ADDUS, home care assist    Equipment Currently Used at Home bath bench;walker, rolling;rollator;oxygen;respiratory supplies  [employer] oxygen agency used. [facility] HH if needed    Concerns to be Addressed no discharge needs identified    Equipment Needed After Discharge none    Provided Post Acute Provider List? N/A    N/A Provider List Comment Patient has C before and prefers this HH agency is required. She does not think it is necessary now.    Discharge Coordination/Progress Plan home with daughter to assist. DME in home. [facility] HH if needed. [employer] supplies oxygen. PCP and rx coverage available. Lives with daughter. Denies HH at this time.               Discharge Plan    No documentation.               Continued Care and Services - Admitted Since 10/5/2022    Coordination has  not been started for this encounter.       Expected Discharge Date and Time     Expected Discharge Date Expected Discharge Time    Oct 7, 2022          Demographic Summary    No documentation.                Functional Status    No documentation.                Psychosocial    No documentation.                Abuse/Neglect    No documentation.                Legal    No documentation.                Substance Abuse    No documentation.                Patient Forms    No documentation.                   Gloria Frye RN

## 2022-10-06 NOTE — ED NOTES
PO challenge initiated. Patient asked to sit in chair. Patient assisted to chair without incident and is eating crackers at this time.

## 2022-10-06 NOTE — DISCHARGE SUMMARY
[facility]  DISCHARGE SUMMARY       Date of Admission: 10/5/2022  Date of Discharge:  10/6/2022  Primary Care Physician: Dorie Segura APRN    Presenting Problem/History of Present Illness:  Vomiting    Final Discharge Diagnoses:  Active Hospital Problems    Diagnosis    • **Intractable nausea and vomiting    • Chronic respiratory failure (HCC)    • Obesity (BMI 30-39.9)    • Hypothyroidism (acquired)    • Normocytic anemia    • Essential hypertension    • Venous insufficiency of both lower extremities    • Stasis dermatitis of both legs    • Chronic respiratory failure with hypoxia and hypercapnia (HCC)    • Obesity, Class III, BMI 40-49.9 (morbid obesity) (HCC)    • COPD (chronic obstructive pulmonary disease) ([facility])        Consults: GI    Procedures Performed: None    Pertinent Test Results:   Results for orders placed during the hospital encounter of 02/13/20    Adult Transthoracic Echo Complete W/ Cont if Necessary Per Protocol    Interpretation Summary  · Left ventricular systolic function is normal. Estimated ejection fraction is 66 to 70%.  · Left ventricular diastolic dysfunction (grade I) consistent with impaired relaxation.  · Normal right ventricular cavity size and systolic function noted.  · There were no significant (greater than mild) valvular dysfunction.      Imaging Results (All)     Procedure Component Value Units Date/Time    CT Abdomen Pelvis With Contrast [[id number]] Collected: 10/05/22 1947     Updated: 10/05/22 2001    Narrative:      CT ABDOMEN PELVIS W CONTRAST- 10/5/2022 7:28 PM CDT     HISTORY: NVD, abd pain       COMPARISON: 04/03/2018.      DLP: 650 mGy cm. All CT scans are performed using dose optimization  techniques as appropriate to the performed exam and including at least  one of the following: Automated exposure control, adjustment of the mA  and/or kV according to size, and the use of the iterative  reconstruction  technique.     TECHNIQUE: Following the intravenous administration of contrast, helical  CT tomographic images of the abdomen and pelvis were acquired. Coronal  reformatted images were also provided for review.      FINDINGS:   Bibasilar atelectasis is present. There is a granuloma in the right lung  base.. The base of the heart is unremarkable. There is no pericardial  effusion. There is a small hiatal hernia with the distal esophagus  fluid-filled suggesting gastroesophageal reflux.     LIVER: No focal liver lesion. The hepatic vasculature is patent.      BILIARY SYSTEM: The gallbladder is mildly distended. No discrete stones  or pericholecystic inflammatory changes are present. There is no biliary  dilatation..      PANCREAS: No focal pancreatic lesion.      SPLEEN: There is no evidence of splenomegaly. Heterogeneity is noted of  the spleen of uncertain etiology. These could represent hemangiomas.  FINDINGS are similar to previous more remote CT from 2017 favoring  benignity..      KIDNEYS AND ADRENALS: The adrenals are unremarkable. No evidence of  discrete enhancing renal mass. There is no nephrolithiasis. There is  malrotation of the right kidney. There is a benign-appearing cortical  cyst lower pole right kidney.. The ureters are decompressed and normal  in appearance.     RETROPERITONEUM: No mass, lymphadenopathy or hemorrhage.      GI TRACT: There is a right midabdomen diverting colostomy. There is a  peristomal hernia containing the cecum and a segment of the ascending  colon as well as the terminal ileum. There is diverticula within the  herniated segment of colon without evidence of diverticulitis. There is  no evidence of mechanical bowel obstruction. There is a Hernadez's pouch  within the lower descending and sigmoid colon with a few diverticula  present. Again no evidence of diverticulitis. The small bowel is normal  in distribution and appearance. No free fluid is present within  the  abdomen or pelvis.. The appendix is not visualized and may be surgically  absent..     OTHER: There is no mesenteric mass, lymphadenopathy or fluid collection.  The abdominopelvic vasculature is patent. The osseous structures and  soft tissues demonstrate no worrisome lesions. A small fat-containing  periumbilical hernia is present.      PELVIS: The uterus and adnexa are unremarkable. There is no free fluid  in the cul-de-sac.. The urinary bladder is normal in appearance.       Impression:      1. A right-sided diverting colostomy is present with a peristomal hernia  containing the cecum and a portion of the ascending colon. It also  contains a segment of the terminal ileum. There are diverticula within  the right colon without evidence of diverticulitis. There is a Hernadez's  pouch consisting of the distal descending and sigmoid colon also  containing some diverticula with decompression and no evidence of  diverticulitis. There is no evidence of mechanical bowel obstruction.  2. Bibasilar atelectasis.  3. Hiatal hernia with evidence of gastroesophageal reflux with mild  fluid distention of the distal esophageal segment.  4. Scattered hypodensities within the spleen without evidence of  enlargement. These were present on previous remote study of 2017 likely  representing hemangiomas.  5. Malrotation of the right kidney. No nephrolithiasis or obstructive  uropathy.  6. Uterus and adnexa are unremarkable..         This report was finalized on 10/05/2022 19:58 by Dr. Matthew Lewis MD.        LAB RESULTS:      Lab 10/06/22  0406 10/05/22  1846   WBC 14.31* 11.43*   HEMOGLOBIN 10.3* 10.5*   HEMATOCRIT 34.6 33.4*   PLATELETS 314 289   NEUTROS ABS 12.10* 8.79*   IMMATURE GRANS (ABS)  --  0.08*   LYMPHS ABS  --  1.51   MONOS ABS  --  0.60   EOS ABS 0.14 0.42*   MCV 94.5 89.5   PROCALCITONIN  --  0.18   LACTATE  --  0.9         Lab 10/06/22  0406 10/05/22  1846   SODIUM 135* 139   POTASSIUM 4.1 3.9   CHLORIDE 96*  93*   CO2 29.0 35.0*   ANION GAP 10.0 11.0   BUN 16 17   CREATININE 1.05* 1.12*   EGFR 56.6* 52.4*   GLUCOSE 71 89   CALCIUM 9.1 9.5   MAGNESIUM  --  2.4         Lab 10/05/22  1846   TOTAL PROTEIN 7.5   ALBUMIN 4.00   GLOBULIN 3.5   ALT (SGPT) 10   AST (SGOT) 15   BILIRUBIN 0.6   ALK PHOS 96   LIPASE 27                     Brief Urine Lab Results  (Last result in the past 365 days)      Color   Clarity   Blood   Leuk Est   Nitrite   Protein   CREAT   Urine HCG        10/05/22 1904 Dark Yellow   Turbid   Negative   Trace   Negative   Trace               Microbiology Results (last 10 days)     Procedure Component Value - Date/Time    COVID-19,[facility] IN-HOUSE,Nasal Swab No Transport Media 3-4 HR TAT - Swab, Nasal Cavity [[id number]]  (Normal) Collected: 10/05/22 1913    Lab Status: Final result Specimen: Swab from Nasal Cavity Updated: 10/05/22 2003     COVID19 Not Detected    Narrative:      Fact sheet for providers: https://www.fda.gov/media/616561/download     Fact sheet for patients: https://www.fda.gov/media/539699/download    Test performed by PCR.    Consider negative results in combination with clinical observations, patient history, and epidemiological information.    Gastrointestinal Panel, PCR - Stool, Per Stoma [[id number]]  (Normal) Collected: 10/05/22 1904    Lab Status: Final result Specimen: Stool from Per Stoma Updated: 10/05/22 2115     Campylobacter Not Detected     Plesiomonas shigelloides Not Detected     Salmonella Not Detected     Vibrio Not Detected     Vibrio cholerae Not Detected     Yersinia enterocolitica Not Detected     Enteroaggregative E. coli (EAEC) Not Detected     Enteropathogenic E. coli (EPEC) Not Detected     Enterotoxigenic E. coli (ETEC) lt/st Not Detected     Shiga-like toxin-producing E. coli (STEC) stx1/stx2 Not Detected     Shigella/Enteroinvasive E. coli (EIEC) Not Detected     Cryptosporidium Not Detected     Cyclospora cayetanensis Not Detected     Entamoeba histolytica  "Not Detected     Giardia lamblia Not Detected     Adenovirus F40/41 Not Detected     Astrovirus Not Detected     Norovirus GI/GII Not Detected     Rotavirus A Not Detected     Sapovirus (I, II, IV or V) Not Detected    Narrative:      If Aeromonas, Staphylococcus aureus or Bacillus cereus are suspected, please order [id number]: Stool Culture, Aeromonas, S aureus, B Cereus.          Hospital Course:   Patient admitted for  \"vomiting\".  Patient admitted overnight.  Patient did not like being NPO and indicated she had to have food.  It was explained to her by nursing risks and benefits.  Patient voiced that she did not feel as though she needed to be here.  Patient signed AMA form and left.  Patient left prior to me being able to round on the patient.      GI placed a brief note, unsure if patient was seen by GI.        Physical Exam on Discharge:  /61 (BP Location: Right arm, Patient Position: Lying)   Pulse 76   Temp 98.3 °F (36.8 °C) (Oral)   Resp 18   Ht 160 cm (63\")   Wt 96.2 kg (212 lb 3.1 oz)   SpO2 94%   BMI 37.59 kg/m²   Physical Exam  AMA    Condition on Discharge: AMA, patient voiced risks and benefits of AMA.  She indicated there was \"no reason for her to be here\".    Discharge Disposition:      Discharge Medications:     Discharge Medications      Continue These Medications      Instructions Start Date   albuterol sulfate  (90 Base) MCG/ACT inhaler  Commonly known as: PROVENTIL HFA;VENTOLIN HFA;PROAIR HFA   2 puffs, Inhalation, Every 6 Hours PRN, Inhale 1 to 2 puffs by mouth every 4 to 6 hours as needed for wheezing or sob       albuterol 0.63 MG/3ML nebulizer solution  Commonly known as: ACCUNEB   0.63 mg, Nebulization, Every 4 Hours PRN      budesonide-formoterol 160-4.5 MCG/ACT inhaler  Commonly known as: SYMBICORT   2 puffs, Inhalation, 2 Times Daily - RT      furosemide 20 MG tablet  Commonly known as: LASIX   20 mg, Oral, Every Morning      levothyroxine 75 MCG tablet  Commonly known " as: SYNTHROID, LEVOTHROID   75 mcg, Oral, Daily      meclizine 25 MG tablet  Commonly known as: ANTIVERT   25 mg, Oral, 2 Times Daily      nitroglycerin 0.4 MG SL tablet  Commonly known as: NITROSTAT   0.4 mg, Sublingual, Every 5 Minutes PRN, Take no more than 3 doses in 15 minutes.      potassium chloride 10 MEQ CR tablet  Commonly known as: K-DUR,KLOR-CON   10 mEq, Oral, 2 Times Daily      vitamin D3 125 MCG (5000 UT) capsule capsule   5,000 Units, Oral, Daily             Discharge Diet: AMA    Activity at Discharge:     Follow-up Appointments:   No future appointments.    Test Results Pending at Discharge: AMA    Electronically signed by Silvestre Cary MD, 10/06/22, 13:37 CDT.    Time: 20 minutes.

## 2022-10-14 ENCOUNTER — HOSPITAL ENCOUNTER (EMERGENCY)
Facility: HOSPITAL | Age: 72
Discharge: HOME | End: 2022-10-15
Admitting: STUDENT IN AN ORGANIZED HEALTH CARE EDUCATION/TRAINING PROGRAM

## 2022-10-14 ENCOUNTER — APPOINTMENT (OUTPATIENT)
Dept: CT IMAGING | Facility: HOSPITAL | Age: 72
End: 2022-10-14

## 2022-10-14 DIAGNOSIS — R11.2 NAUSEA AND VOMITING, UNSPECIFIED VOMITING TYPE: Primary | ICD-10-CM

## 2022-10-14 DIAGNOSIS — N39.0 URINARY TRACT INFECTION WITHOUT HEMATURIA, SITE UNSPECIFIED: ICD-10-CM

## 2022-10-14 LAB
ALBUMIN SERPL-MCNC: 3.9 G/DL (ref 3.5–5.2)
ALBUMIN/GLOB SERPL: 1.2 G/DL
ALP SERPL-CCNC: 82 U/L (ref 39–117)
ALT SERPL W P-5'-P-CCNC: 9 U/L (ref 1–33)
ANION GAP SERPL CALCULATED.3IONS-SCNC: 6 MMOL/L (ref 5–15)
AST SERPL-CCNC: 14 U/L (ref 1–32)
BACTERIA UR QL AUTO: ABNORMAL /HPF
BASOPHILS # BLD AUTO: 0.03 10*3/MM3 (ref 0–0.2)
BASOPHILS NFR BLD AUTO: 0.3 % (ref 0–1.5)
BILIRUB SERPL-MCNC: 0.4 MG/DL (ref 0–1.2)
BILIRUB UR QL STRIP: NEGATIVE
BUN SERPL-MCNC: 15 MG/DL (ref 8–23)
BUN/CREAT SERPL: 13.5 (ref 7–25)
CALCIUM SPEC-SCNC: 9 MG/DL (ref 8.6–10.5)
CHLORIDE SERPL-SCNC: 92 MMOL/L (ref 98–107)
CLARITY UR: CLEAR
CO2 SERPL-SCNC: 39 MMOL/L (ref 22–29)
COLOR UR: YELLOW
CREAT SERPL-MCNC: 1.11 MG/DL (ref 0.57–1)
CRP SERPL-MCNC: 3.13 MG/DL (ref 0–0.5)
D-LACTATE SERPL-SCNC: 1.2 MMOL/L (ref 0.5–2)
DEPRECATED RDW RBC AUTO: 51.2 FL (ref 37–54)
EGFRCR SERPLBLD CKD-EPI 2021: 52.9 ML/MIN/1.73
EOSINOPHIL # BLD AUTO: 0.42 10*3/MM3 (ref 0–0.4)
EOSINOPHIL NFR BLD AUTO: 3.9 % (ref 0.3–6.2)
ERYTHROCYTE [DISTWIDTH] IN BLOOD BY AUTOMATED COUNT: 14.8 % (ref 12.3–15.4)
GLOBULIN UR ELPH-MCNC: 3.2 GM/DL
GLUCOSE SERPL-MCNC: 103 MG/DL (ref 65–99)
GLUCOSE UR STRIP-MCNC: NEGATIVE MG/DL
HCT VFR BLD AUTO: 35.1 % (ref 34–46.6)
HGB BLD-MCNC: 10.2 G/DL (ref 12–15.9)
HGB UR QL STRIP.AUTO: ABNORMAL
HYALINE CASTS UR QL AUTO: ABNORMAL /LPF
IMM GRANULOCYTES # BLD AUTO: 0.04 10*3/MM3 (ref 0–0.05)
IMM GRANULOCYTES NFR BLD AUTO: 0.4 % (ref 0–0.5)
INR PPP: 1.2 (ref 0.91–1.09)
KETONES UR QL STRIP: NEGATIVE
LEUKOCYTE ESTERASE UR QL STRIP.AUTO: ABNORMAL
LIPASE SERPL-CCNC: 45 U/L (ref 13–60)
LYMPHOCYTES # BLD AUTO: 1.3 10*3/MM3 (ref 0.7–3.1)
LYMPHOCYTES NFR BLD AUTO: 12.1 % (ref 19.6–45.3)
MCH RBC QN AUTO: 27 PG (ref 26.6–33)
MCHC RBC AUTO-ENTMCNC: 29.1 G/DL (ref 31.5–35.7)
MCV RBC AUTO: 92.9 FL (ref 79–97)
MONOCYTES # BLD AUTO: 0.44 10*3/MM3 (ref 0.1–0.9)
MONOCYTES NFR BLD AUTO: 4.1 % (ref 5–12)
NEUTROPHILS NFR BLD AUTO: 79.2 % (ref 42.7–76)
NEUTROPHILS NFR BLD AUTO: 8.51 10*3/MM3 (ref 1.7–7)
NITRITE UR QL STRIP: NEGATIVE
NRBC BLD AUTO-RTO: 0 /100 WBC (ref 0–0.2)
PH UR STRIP.AUTO: 6 [PH] (ref 5–8)
PLATELET # BLD AUTO: 289 10*3/MM3 (ref 140–450)
PMV BLD AUTO: 9.6 FL (ref 6–12)
POTASSIUM SERPL-SCNC: 3.6 MMOL/L (ref 3.5–5.2)
PROT SERPL-MCNC: 7.1 G/DL (ref 6–8.5)
PROT UR QL STRIP: NEGATIVE
PROTHROMBIN TIME: 14.8 SECONDS (ref 11.9–14.6)
RBC # BLD AUTO: 3.78 10*6/MM3 (ref 3.77–5.28)
RBC # UR STRIP: ABNORMAL /HPF
REF LAB TEST METHOD: ABNORMAL
SODIUM SERPL-SCNC: 137 MMOL/L (ref 136–145)
SP GR UR STRIP: 1.02 (ref 1–1.03)
SQUAMOUS #/AREA URNS HPF: ABNORMAL /HPF
UROBILINOGEN UR QL STRIP: ABNORMAL
WBC # UR STRIP: ABNORMAL /HPF
WBC NRBC COR # BLD: 10.74 10*3/MM3 (ref 3.4–10.8)

## 2022-10-14 PROCEDURE — 81001 URINALYSIS AUTO W/SCOPE: CPT | Performed by: NURSE PRACTITIONER

## 2022-10-14 PROCEDURE — 99285 EMERGENCY DEPT VISIT HI MDM: CPT

## 2022-10-14 PROCEDURE — 85610 PROTHROMBIN TIME: CPT | Performed by: NURSE PRACTITIONER

## 2022-10-14 PROCEDURE — 96374 THER/PROPH/DIAG INJ IV PUSH: CPT

## 2022-10-14 PROCEDURE — 25010000002 METOCLOPRAMIDE PER 10 MG: Performed by: PHYSICIAN ASSISTANT

## 2022-10-14 PROCEDURE — 83605 ASSAY OF LACTIC ACID: CPT | Performed by: NURSE PRACTITIONER

## 2022-10-14 PROCEDURE — 83690 ASSAY OF LIPASE: CPT | Performed by: NURSE PRACTITIONER

## 2022-10-14 PROCEDURE — 25010000002 IOPAMIDOL 61 % SOLUTION: Performed by: NURSE PRACTITIONER

## 2022-10-14 PROCEDURE — 25010000002 PROMETHAZINE PER 50 MG: Performed by: PHYSICIAN ASSISTANT

## 2022-10-14 PROCEDURE — 74177 CT ABD & PELVIS W/CONTRAST: CPT

## 2022-10-14 PROCEDURE — 86140 C-REACTIVE PROTEIN: CPT | Performed by: NURSE PRACTITIONER

## 2022-10-14 PROCEDURE — 96375 TX/PRO/DX INJ NEW DRUG ADDON: CPT

## 2022-10-14 PROCEDURE — 25010000002 ONDANSETRON PER 1 MG: Performed by: PHYSICIAN ASSISTANT

## 2022-10-14 PROCEDURE — 85025 COMPLETE CBC W/AUTO DIFF WBC: CPT | Performed by: NURSE PRACTITIONER

## 2022-10-14 PROCEDURE — 80053 COMPREHEN METABOLIC PANEL: CPT | Performed by: NURSE PRACTITIONER

## 2022-10-14 PROCEDURE — 99284 EMERGENCY DEPT VISIT MOD MDM: CPT

## 2022-10-14 RX ORDER — ONDANSETRON 2 MG/ML
4 INJECTION INTRAMUSCULAR; INTRAVENOUS ONCE
Status: COMPLETED | OUTPATIENT
Start: 2022-10-14 | End: 2022-10-14

## 2022-10-14 RX ORDER — SODIUM CHLORIDE 0.9 % (FLUSH) 0.9 %
10 SYRINGE (ML) INJECTION AS NEEDED
Status: DISCONTINUED | OUTPATIENT
Start: 2022-10-14 | End: 2022-10-15 | Stop reason: HOSPADM

## 2022-10-14 RX ORDER — METOCLOPRAMIDE HYDROCHLORIDE 5 MG/ML
10 INJECTION INTRAMUSCULAR; INTRAVENOUS ONCE
Status: COMPLETED | OUTPATIENT
Start: 2022-10-14 | End: 2022-10-14

## 2022-10-14 RX ORDER — DIAZEPAM 5 MG/ML
5 INJECTION, SOLUTION INTRAMUSCULAR; INTRAVENOUS ONCE
Status: COMPLETED | OUTPATIENT
Start: 2022-10-14 | End: 2022-10-15

## 2022-10-14 RX ORDER — CEFDINIR 300 MG/1
300 CAPSULE ORAL ONCE
Status: COMPLETED | OUTPATIENT
Start: 2022-10-14 | End: 2022-10-15

## 2022-10-14 RX ADMIN — METOCLOPRAMIDE HYDROCHLORIDE 10 MG: 5 INJECTION INTRAMUSCULAR; INTRAVENOUS at 21:52

## 2022-10-14 RX ADMIN — ONDANSETRON 4 MG: 2 INJECTION INTRAMUSCULAR; INTRAVENOUS at 21:52

## 2022-10-14 RX ADMIN — PROMETHAZINE HYDROCHLORIDE 12.5 MG: 25 INJECTION INTRAMUSCULAR; INTRAVENOUS at 23:06

## 2022-10-14 RX ADMIN — SODIUM CHLORIDE, POTASSIUM CHLORIDE, SODIUM LACTATE AND CALCIUM CHLORIDE 1000 ML: 600; 310; 30; 20 INJECTION, SOLUTION INTRAVENOUS at 20:31

## 2022-10-14 RX ADMIN — IOPAMIDOL 100 ML: 612 INJECTION, SOLUTION INTRAVENOUS at 18:34

## 2022-10-14 NOTE — ED PROVIDER NOTES
Subjective   History of Present Illness  71 yof with PMH of COPD, hypothyroidism, diverticular disease, GERD and cellulitis presents with c/o decreased oral intake.  She reports she has been having issues eating. She denies vomiting.  She describes it as 'I eat and it goes down.  Then it comes back up into my chest.  It's not vomit.  It's like spit up.'  She states her PCP dx her with reflux and was given a PPI.  She continues to have symptoms.  She has a colostomy and states the output is not decreased and more solid.  She denies fever.  She denies CP.  She wears oxygen continuously and denies worsening of chronic SOB. She has not had an endoscopy.  She was recently admitted for similar issues but left AMA         Review of Systems   Constitutional: Negative for activity change, appetite change, fatigue and fever.   HENT: Negative for congestion, ear pain, facial swelling and sore throat.    Eyes: Negative for discharge and visual disturbance.   Respiratory: Negative for apnea, chest tightness, shortness of breath, wheezing and stridor.    Cardiovascular: Negative for chest pain and palpitations.   Gastrointestinal: Positive for vomiting. Negative for abdominal distention, abdominal pain, diarrhea and nausea.   Genitourinary: Negative for difficulty urinating and dysuria.   Musculoskeletal: Negative for arthralgias and myalgias.   Skin: Negative for rash and wound.   Neurological: Negative for dizziness and seizures.   Psychiatric/Behavioral: Negative for agitation and confusion.       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   • Cellulitis    • Chronic respiratory failure with hypoxia ([facility]) 2/13/2020   • Colonic obstruction ([facility]) 4/3/2018   • COPD (chronic obstructive pulmonary disease) ([facility])    • Depression    • Diverticul disease small and large intestine, no perforati or abscess    • Edema    • GERD (gastroesophageal reflux disease)    •  Hyperlipidemia    • Hypertension    • Hypocalcemia    • Hypothyroid    • Obesity, Class III, BMI 40-49.9 (morbid obesity) ([facility]) 2020   • Respiratory failure ([facility])    • Tobacco dependence 2020   • Venous insufficiency    • Vertigo        No Known Allergies    Past Surgical History:   Procedure Laterality Date   • ABDOMINAL SURGERY     •  SECTION     • COLOSTOMY     • ETHMOID ARTERY LIGATION N/A 2021    Procedure: ETHMOID ARTERY LIGATION;  Surgeon: Elliot Mack Jr., MD;  Location:  [facility] OR;  Service: ENT;  Laterality: N/A;   • EXAM UNDER ANESTHESIA N/A 2021    Procedure: SEPTOPLASTY TURBOPLASTY ENDOSCOPIC CONTROL LEFT NOSE BLEED;  Surgeon: Elliot Mack Jr., MD;  Location:  [facility] OR;  Service: ENT;  Laterality: N/A;   • EXPLORATORY LAPAROTOMY N/A 2018    Procedure: LAPAROTOMY EXPLORATORY, partial colectomy, creation colostomy, incision and drainage abdominal wall abscess;  Surgeon: Elda Velazquez MD;  Location:  [facility] OR;  Service: General   • INTERNAL MAXILLARY ARTERY AND ETHMOID ARTERY LIGATION N/A 2021    Procedure: INTERNAL MAXILLARY ARTERY AND ETHMOID ARTERY LIGATION;  Surgeon: Elliot Mack Jr., MD;  Location:  [facility] OR;  Service: ENT;  Laterality: N/A;   • SEPTOPLASTY, RESECTION INFERIOR TURBINATES Bilateral 2021    Procedure: SEPTOPLASTY, RESECTION INFERIOR TURBINATES;  Surgeon: Elliot Mack Jr., MD;  Location:  [facility] OR;  Service: ENT;  Laterality: Bilateral;       Family History   Adopted: Yes       Social History     Socioeconomic History   • Marital status:    Tobacco Use   • Smoking status: Former     Packs/day: 0.25     Years: 15.00     Pack years: 3.75     Types: Cigarettes     Quit date: 2020     Years since quittin.7   • Smokeless tobacco: Never   Substance and Sexual Activity   • Alcohol use: No   • Drug use: No   • Sexual activity: Never           Objective   Physical Exam  Vitals and nursing note reviewed.    Constitutional:       General: She is not in acute distress.     Appearance: She is well-developed. She is not toxic-appearing.   HENT:      Head: Normocephalic.   Eyes:      Pupils: Pupils are equal, round, and reactive to light.   Cardiovascular:      Rate and Rhythm: Normal rate and regular rhythm.      Heart sounds: No murmur heard.  Pulmonary:      Effort: Pulmonary effort is normal. No respiratory distress.      Breath sounds: Decreased breath sounds present. No wheezing, rhonchi or rales.   Abdominal:      General: Bowel sounds are normal. There is no distension.      Palpations: Abdomen is soft.      Tenderness: There is no abdominal tenderness. There is no right CVA tenderness or left CVA tenderness.          Comments: Colostomy present.  Brown output visualized    Musculoskeletal:         General: Normal range of motion.      Cervical back: Normal range of motion and neck supple.   Skin:     General: Skin is warm and dry.   Neurological:      Mental Status: She is alert and oriented to person, place, and time.         Procedures           ED Course  ED Course as of 10/15/22 0130   Fri Oct 14, 2022   1855 CT of the abdomen/pelvis - IMPRESSION: 1. Exam is similar to the 10/5/2022 study. Left hemicolectomy changes with right sided colostomy. Stable peristomal hernia defect with no evidence of bowel obstruction. Colonic diverticulosis. Fatty containing umbilical hernia.  2. Stable scattered hypodensities of the spleen no splenomegaly. Stable inferior renal cyst. 3. Right lower lobe opacities favoring atelectasis over pneumonia. [KS]   2009 Transfer of care to ADDIS Whittington PA-C [KS]   2016 Assumed care of patient at this time from Ms. Black Pickett JOAQUÍN. Pending results of urinalysis will reevaluate and disposition accordingly. Please see Ms. Pickett note above for HPI, ROS, and physical examination findings.  [JS]   2047 Urinalysis showed small leukocytes, 1+ bacteria, 3-5 white blood cells however 3-6  squamous epithelium. [JS]   2048 Upon reevaluation patient sitting comfortably in the bed playing on her phone.  Reviewed lab and imaging findings.  Patient is concerned that if she goes home she will have nausea vomiting again and is requesting to be admitted for GI evaluation.  Discussed that we will trial a p.o. challenge with the cefdinir for further treatment planning for which she is amenable. [JS]   2350 Case discussed at this time with Dr. Choi, hospitalist who recommends trialing Valium. [JS]   Sat Oct 15, 2022   0100 Patient witnessed taking large sips of water and food after which she had emesis.  Valium was administered and patient began taking small slow sips of p.o. fluids for which she was able to tolerate without difficulty including no further nausea or emesis.  Advised patient she will need to continue to treat her self with a bland diet, hydration, and appropriate use of antiemetics outpatient.  Discussed need to follow with her primary care provider within the next 24 to 48 hours for close reevaluation, continued monitoring of her urine, as well as follow-up on her nausea.  Discussed tricked return precautions and need for immediate return to ED should she develop any new or worsening symptoms.  Patient is appreciative, continues to be tolerating water without difficulty, with stable vital signs with no further questions, concerns, needs at this time and is stable for discharge. [JS]      ED Course User Index  [JS] Wilber Whittington PA-C  [KS] Black Pickett, JOAQUÍN                                           MDM  Number of Diagnoses or Management Options     Amount and/or Complexity of Data Reviewed  Clinical lab tests: reviewed  Tests in the radiology section of CPT®: reviewed  Tests in the medicine section of CPT®: ordered and reviewed  Decide to obtain previous medical records or to obtain history from someone other than the patient: yes  Obtain history from someone other than the  patient: yes (Ms. Black Pickett APRN)  Discuss the patient with other providers: yes (Dr. Ramila Fraser (attending))    Patient Progress  Patient progress: improved      Final diagnoses:   Nausea and vomiting, unspecified vomiting type   Urinary tract infection without hematuria, site unspecified       ED Disposition  ED Disposition     ED Disposition   Discharge    Condition   Stable    Comment   --             Colton Dorie JOAQUÍN DODSON  [address] [facility] [address]  [phone]    Schedule an appointment as soon as possible for a visit in 2 days      [facility] Emergency Department  [address] [place] [id number] [phone]    As needed         Medication List      New Prescriptions    cefdinir 300 MG capsule  Commonly known as: OMNICEF  Take 1 capsule by mouth 2 (Two) Times a Day for 7 days.     promethazine 25 MG suppository  Commonly known as: PHENERGAN  Insert 1 suppository into the rectum Every 6 (Six) Hours As Needed for Nausea or Vomiting.           Where to Get Your Medications      These medications were sent to [id number] - [place], [place] - [address] [place] [address] AT [address] & [facility] [phone]  - [phone] FX  [address] [place] [id number]    Phone: [phone]   · cefdinir 300 MG capsule  · promethazine 25 MG suppository         Wilber Whittington PA-C  10/15/22 013

## 2022-10-15 VITALS
TEMPERATURE: 98.5 F | BODY MASS INDEX: 38.62 KG/M2 | RESPIRATION RATE: 18 BRPM | HEIGHT: 63 IN | OXYGEN SATURATION: 100 % | DIASTOLIC BLOOD PRESSURE: 76 MMHG | SYSTOLIC BLOOD PRESSURE: 131 MMHG | HEART RATE: 93 BPM | WEIGHT: 218 LBS

## 2022-10-15 PROCEDURE — 25010000002 DIAZEPAM PER 5 MG: Performed by: STUDENT IN AN ORGANIZED HEALTH CARE EDUCATION/TRAINING PROGRAM

## 2022-10-15 PROCEDURE — 96375 TX/PRO/DX INJ NEW DRUG ADDON: CPT

## 2022-10-15 PROCEDURE — 99285 EMERGENCY DEPT VISIT HI MDM: CPT

## 2022-10-15 RX ORDER — PROMETHAZINE HYDROCHLORIDE 25 MG/1
25 SUPPOSITORY RECTAL EVERY 6 HOURS PRN
Qty: 12 SUPPOSITORY | Refills: 0 | Status: SHIPPED | OUTPATIENT
Start: 2022-10-15

## 2022-10-15 RX ORDER — CEFDINIR 300 MG/1
300 CAPSULE ORAL 2 TIMES DAILY
Qty: 14 CAPSULE | Refills: 0 | Status: SHIPPED | OUTPATIENT
Start: 2022-10-15 | End: 2022-10-22

## 2022-10-15 RX ADMIN — DIAZEPAM 5 MG: 5 INJECTION INTRAMUSCULAR; INTRAVENOUS at 00:22

## 2022-10-15 RX ADMIN — CEFDINIR 300 MG: 300 CAPSULE ORAL at 01:47

## 2022-10-15 NOTE — ED NOTES
Pt was given a few sips of water at this time and was encouraged by this nurse to take often sips.

## 2022-10-15 NOTE — ED NOTES
Pt was able to call her home care taker. Pts personal care taker is at bedside at this time. Pt to be discharged to caretaker.

## 2022-10-15 NOTE — DISCHARGE INSTRUCTIONS
Please complete the antibiotics for your urinary tract infection in their entirety.  Please follow-up with your primary care provider within the next 24 to 48 hours for close reevaluation.  Should you develop any new or worsening symptoms please return to the ER for further evaluation.

## 2023-03-14 NOTE — PROGRESS NOTES
[facility] Medicine Services  INPATIENT PROGRESS NOTE    Length of Stay: 3  Date of Admission: 12/31/2020  Primary Care Physician: Dorie Segura APRN    Subjective   Chief Complaint: Follow-up nosebleed  HPI   Developed sudden onset of nosebleed from both nares on date of admission.  Denied any recent coughing, heavy blowing of nose.  Wears oxygen 4 to 5 L at home and reports has humidification on oxygen.    Sitting up in chair.  Packing left nose removed earlier this morning by Dr. Mack and she continues to have bleeding from left nare.  She has changed the 4 x 4 multiple times.  Hemoglobin 7.2 today.  Transfuse 1 unit packed red blood cells.  ENT plans to do nasal endoscopy at bedside this afternoon.  Patient denies chest pain or palpitations.  Denies nausea or vomiting.  No increased shortness of breath.  Reports she was unable to eat her meal because of blood dripping down her throat.     Review of Systems   Constitutional: Negative for chills and fever.   HENT: Positive for nosebleeds. Negative for trouble swallowing.    Eyes: Negative for photophobia and visual disturbance.   Respiratory: Positive for cough (Chronic). Negative for shortness of breath and wheezing.    Cardiovascular: Negative for chest pain, palpitations and leg swelling.   Gastrointestinal: Negative for constipation, diarrhea, nausea and vomiting.   Endocrine: Negative for cold intolerance and polyuria.   Genitourinary: Negative for dysuria, frequency and urgency.   Musculoskeletal: Negative for back pain.   Skin: Negative for color change, pallor, rash and wound.   Allergic/Immunologic: Negative for immunocompromised state.   Neurological: Positive for weakness.   Hematological: Negative for adenopathy. Does not bruise/bleed easily.   Psychiatric/Behavioral: Negative for agitation, behavioral problems and confusion.      All pertinent negatives and positives are as above. All other systems have been  "[facility]  Discharge Note    Date: 3/15/2023  Name: Caroline Macias  [facility] Number: [id number]    Therapy Diagnosis:   Encounter Diagnoses   Name Primary?    Left shoulder pain, unspecified chronicity Yes    Decreased range of motion of shoulder, left     Shoulder weakness     Impingement syndrome of left shoulder        Physician: Rogerio Bailey Jr., *    Physician Orders: Eval & Treat  Medical Diagnosis: M75.52 (ICD-10-CM) - Bursitis of left shoulder   Surgical Procedure and Date: N/a  Evaluation Date: 1/18/2023  Insurance Authorization Period Expiration: 1/10/2024  Plan of Care Expiration: 4/18/2023  Progress Note Due: 3/21/2023  Date of Return to MD: not set  Visit # / Visits authorized: 9/20 (10/12 in POC)  FOTO: 3/3 today    Precautions:  Standard, DM2, HTN    Date of Last visit: 3/15/2023  Total Visits Received: 10    Time In: 10:45am  Time Out: 11:25am  Total Billable Time: 40 minutes    SUBJECTIVE     Pt update: "I've been great. Therapy really helped. I don't have any pain at all, and I'm getting  back into doing hair more often"    She was compliant with home exercise program given last session, completes some of the home exercise program every day.   Response to previous treatment: N/a  Functional change: No pain during reaching    Pain: 0/10 upon arrival,  0/10 upon end of session   Location: left shoulder      OBJECTIVE     Objective Measures updated at progress report unless specified. - reassessed 2/16/2023    Palpation: Some pain with palpation to anterior shoulder       Shoulder ROM. Measured in degrees.    (R) UE (unaffected) (L) UE (1/18/23)   LUE 2/16/23 LUE 3/14/23     AROM AROM Norm AROM AROM   Shoulder Flexion  140 125 180 140 145   Shoulder Abduction  140 110 0-180 138 138   Shoulder Extension  45 45 0-50 WNL 65    Shoulder Internal Rotation 90 45 0-90 72 WNL   Shoulder External Rotation  90 68 0-90 82 85   Comment:No pain with any motion upon discharge     Manual " Muscle Test:   Patient decline to participate during eval due to pain, 3+ for all shoulder motions in limited ROM, pain with any resistance                                                      2/16/2023             3/15/2023   Shoulder Flexion RUE:  4- 5-   Shoulder Extension RUE:  4- 4+   Shoulder Abduction RUE:   4 5-   Internal Rotation RUE:  4- 5-   External Rotation RUE:  4+ 4+          Shoulder Flexion LUE:  4- 5-   Shoulder Extension LUE:  4- 4+   Shoulder Abduction LUE:  4 5-   Internal Rotation LUE:   4- 5-   External Rotation LUE:   4+ 4+             Special Tests:   TEST 1/18/2023 1/18/2023 2/14/2023 3/15/2023   Shoulder Left Right Left Left   Bentley Tao Positive  Negative  Slight positive (2/10) Negative   Neer Imgingement Positive  Negative  Slight positive (2/10) Negative   Yergason's  Negative  Negative  Negative Negative   Empty Can (Supraspinatus) Positive  Negative  Negative Negative      Posture Alignment : Slightly decreased but still slouched posture, forward head     Joint integrity: Normal     Scapular Control/Dyskinesis:     Normal / Subtle / Obvious   Left Subtle   Right Normal         Limitation/Restriction for FOTO Shoulder Survey     Therapist reviewed FOTO scores for Caroline Macias on 3/15/2023.   FOTO documents entered into [employer] - see Media section.     Limitation Score: 0% limitation remaining         Treatment     Caroline received the treatments listed below:     Therapeutic activities to improve functional performance for 40 minutes, including:   - 8 minutes arm bike to warm up for session (4 forwards, 4 backwards)  - Objective measures and reassess progress towards goals  - Discuss and review HEP for continued strengthening / activity tolerance  - Discuss grading of activities and theraband levels  - Towel exercises (abduction, PNF patterns, shoulder flexion, tricep extensions, bicep flexion, external&internal rotation)    Patient Education and Home Exercises      Education  reviewed and are negative unless otherwise stated.     Objective    Temp:  [97.4 °F (36.3 °C)-97.9 °F (36.6 °C)] 97.6 °F (36.4 °C)  Heart Rate:  [] 88  Resp:  [16-20] 16  BP: (113-147)/(62-78) 128/64  Physical Exam  Vitals signs and nursing note reviewed.   Constitutional:       Comments: Sitting up in chair.  Humidified oxygen in place.  Nasal packing left nare removed 1/4   HENT:      Head: Normocephalic and atraumatic.      Nose:      Comments: Nasal dripping blood left nare.  4 x 4 in place     Mouth/Throat:      Pharynx: No oropharyngeal exudate.   Eyes:      Extraocular Movements: Extraocular movements intact.      Pupils: Pupils are equal, round, and reactive to light.   Neck:      Musculoskeletal: Normal range of motion and neck supple.   Cardiovascular:      Rate and Rhythm: Normal rate.      Heart sounds: No murmur.   Pulmonary:      Breath sounds: No rhonchi, wheezing, or rales.     Comments: Humidified oxygen  Abdominal:      Palpations: Abdomen is soft.     Comments: Ostomy with semiformed brown stool.  Genitourinary:     Comments: Voiding.  Musculoskeletal:         General: She has sweling (bilateral lower extremities, at baseline per patient) No tenderness.   Skin:     General: Skin is warm and dry.      Comments: Bilateral lower extremity redness, chronic, chronic venous stasis changes.  No open areas or drainage.  Neurological:      General: No focal deficit present.      Mental Status: She is alert and oriented to person, place, and time.   Psychiatric:         Mood and Affect: Mood normal.         Behavior: Behavior normal.         Thought Content: Thought content normal.         Judgment: Judgment normal.      Results Review:  I have reviewed the labs, radiology results, and diagnostic studies.    Laboratory Data:      Results from last 7 days   Lab Units 01/04/21  0527 01/03/21  0650 01/02/21  1817 01/02/21  0709 01/02/21  0001 01/01/21  1712 01/01/21  1550 01/01/21  0447   WBC 10*3/mm3  13.48* 15.53*  --   --   --  22.42*  --  19.74*   HEMOGLOBIN g/dL 7.2* 7.4* 8.3* 7.9* 8.5* 9.4* 9.4* 9.1*   HEMATOCRIT % 22.6* 23.8* 25.0* 25.5* 27.5* 28.4* 29.2* 29.1*   PLATELETS 10*3/mm3 193 205  --   --   --  229  --  236        Results from last 7 days   Lab Units 01/03/21  0650 01/02/21  0509 01/01/21  0447   SODIUM mmol/L 138 136 139   POTASSIUM mmol/L 3.7 4.4 4.0   CHLORIDE mmol/L 96* 92* 92*   CO2 mmol/L 35.0* 35.0* 38.0*   BUN mg/dL 33* 50* 36*   CREATININE mg/dL 1.00 1.58* 1.05*   GLUCOSE mg/dL 100* 115* 150*   CALCIUM mg/dL 8.4* 9.0 8.9     Culture Data:    No results found for: BLOODCX, URINECX, WOUNDCX, MRSACX, RESPCX, STOOLCX    Radiology Data:   Imaging Results (Last 7 Days)     ** No results found for the last 168 hours. **        Intake/Output    Intake/Output Summary (Last 24 hours) at 1/4/2021 1459  Last data filed at 1/4/2021 1432  Gross per 24 hour   Intake 300 ml   Output 500 ml   Net -200 ml     Scheduled Meds  budesonide-formoterol, 2 puff, Inhalation, BID - RT  escitalopram, 10 mg, Oral, Daily  furosemide, 20 mg, Oral, Daily  guaiFENesin, 1,200 mg, Oral, Q12H  levothyroxine, 75 mcg, Oral, Q AM  losartan, 25 mg, Oral, Q24H  meclizine, 25 mg, Oral, BID  mupirocin, , Each Nare, BID  sodium chloride, 10 mL, Intravenous, Q12H  sodium chloride, 2 spray, Nasal, 5x Daily      I have reviewed the patient current medications.     Assessment/Plan     Active Hospital Problems    Diagnosis   • **Epistaxis   • Anemia, blood loss due to epistasis   • Acute kidney injury suspect secondary to blood loss (CMS/HCC)   • Tobacco abuse   • Essential hypertension   • Hypothyroidism (acquired)   • Chronic respiratory failure with hypoxia and hypercapnia (CMS/[facility])   • Obesity, Class III, BMI 40-49.9 (morbid obesity) (CMS/[facility])   • COPD (chronic obstructive pulmonary disease) (CMS/[facility])     Plan:  1.  Transferred from [place] with epistasis sudden onset 12/31.  Nasal packing bilaterally on admission.  2.  ENT  provided:   - HEP to continue strengthening and maintain ROM at home  - Plan of care  - Progress towards goals     Written Home Exercises Provided: Patient instructed to cont prior HEP.  Exercises were reviewed and Caroline was able to demonstrate them prior to the end of the session.  Caroline demonstrated good  understanding of the HEP provided. See EMR under Patient Instructions for exercises provided during therapy sessions.       Assessment     Pt would continue to benefit from skilled OT. Throughout OT, the patient has demonstrated great progress with tolerable range of motion and reduction of pain. The patient no longer reports pain with any motions, in any plane or with any sudden movement. Provided blue theraband today for continued HEP strengthening, and review of towel exercises for continuing strength at home. The patient will be discharged to home exercise routine, and is adherent to HEP so far. Returned to PLOF with IADLs and work, goals met. Pt prognosis is Good. Pt's spiritual, cultural and educational needs considered and pt agreeable to plan of care and goals.    Anticipated barriers to occupational therapy: None    Goals:  Short Term Goals (STGs); to be met within 5 weeks (2/21/2023).  1)  Patient to be IND with HEP to maximize R shoulder functional capacity. - met 2/16/2023   2)  Patient will report IND use of modalities at home for pain management.- met  2/16/2023   3)  Assess MMT of shoulder - met  2/16/2023   4)  Patient will report a pain level of 0 out of 10 during active range of motion without resistance. - met  2/6/2023    5) Increase shoulder external/internal rotation ROM by 25 degrees to increase functional arm use for ADLs/work/leisure activities. -  met  2/16/2023   6) New: Patient will report 0/10 pain with sudden movements of the shoulder of 45 degrees or less - met 3/15/2023      Long Term Goals :To be met by discharge.  1) Patient will report a pain level of 0 out of 10 during lifting  consulted.  Dr. Mata removed packing right nare.  Treat conservatively.  Planed to replacing MeroGel pack on the left.  However, patient developed significant bleeding left nare after evaluation.  Dr. Mata examined at bedside noted significant left septal deviation.  Bleeding site difficult to a certain but appeared near the apex of the septal deviation and did not believe is all the previous packing reached this area.  Large Merocel pack with Albert-Synephrine and Bactroban paste left nare.    3.  Packing left for an additional 24 hours.  Left nasal packing removed earlier today by Dr. Mack.  Patient has continued to have bleeding from left nare since packing removed.  ENT plans nasal endoscopy at bedside today as patient has continued to have epistaxis post packing removal.  Continue Afrin nasal spray and saline spray.  4.  Humidified oxygen, ampicillin 500 mg every 8 hours. Mupirocin  5.  Hemoglobin 9.7 down to 7.2 today.  Acute blood loss anemia.  Will transfuse 1 unit packed red blood cells. WBC 22 on admission down to 13.48  6.  Creatinine 1.58 on 1/2. IV fluids at 50 mL/h.  Creatinine 1.0.  Creatinine 1.05 on admission.  IV Fluids discontinued.  7.  Avoid hypertension.  Blood pressure 128/64  8.  Patient told me that she had humidifier at home.  Yesterday she indicated to Dr. Montelongo that she did not have humidifier and daughter Home warm.  I discussed with patient needs humidified air as extremely dry air can contribute to epistaxis.  9.  CBC in a.m.  CBC posttransfusion    Her daughter will assist with decision-making if she is unable to make decisions for herself  The above documentation resulted from a face-to-face encounter by me Trinh YANES, [facility].    Discharge Planning: I expect patient to be discharged to home in 1-2 days.    Electronically signed by JOAQUÍN Amaro, 1/4/2021, 14:59 CST.         of objects at home. - met  3/15/2023   2) Increase shoulder strength to 4+/5 in all shoulder motions without pain. - met  3/15/2023   3) Increase ROM by 45 degrees to increase functional shoulder use for ADLs/work/leisure activities. - nearly met  3/15/2023   4) Patient will demo improved FOTO score by 20 points. - met 2/23/2023  5) Pt will return to prior level of function for ADLs, IADLs, and household management. - met  3/15/2023   6) Pt will report 0/10 pain with sudden movements of the shoulder met 3/15/2023     Discharge reason: Patient has met all of his/her goals, is now asymptomatic    Discharge FOTO Score: 0% limitation      PLAN   This patient is discharged from Occupational Therapy      Ingrid Card, OT

## 2024-02-01 ENCOUNTER — TRANSCRIBE ORDERS (OUTPATIENT)
Dept: HOME HEALTH SERVICES | Facility: HOME HEALTHCARE | Age: 74
End: 2024-02-01
Payer: MEDICARE

## 2024-02-01 ENCOUNTER — HOME HEALTH ADMISSION (OUTPATIENT)
Dept: HOME HEALTH SERVICES | Facility: HOME HEALTHCARE | Age: 74
End: 2024-02-01
Payer: MEDICARE

## 2024-02-01 DIAGNOSIS — I87.393 CHRONIC VENOUS HYPERTENSION WITH COMPLICATION INVOLVING BOTH SIDES: Primary | ICD-10-CM

## 2024-02-01 DIAGNOSIS — I87.2 VENOUS STASIS DERMATITIS OF BOTH LOWER EXTREMITIES: ICD-10-CM

## 2024-02-01 DIAGNOSIS — J96.11 CHRONIC HYPOXEMIC RESPIRATORY FAILURE: ICD-10-CM

## 2024-02-05 ENCOUNTER — HOME CARE VISIT (OUTPATIENT)
Dept: HOME HEALTH SERVICES | Facility: CLINIC | Age: 74
End: 2024-02-05
Payer: MEDICARE

## 2024-02-05 PROCEDURE — G0299 HHS/HOSPICE OF RN EA 15 MIN: HCPCS

## 2024-02-06 VITALS
RESPIRATION RATE: 20 BRPM | DIASTOLIC BLOOD PRESSURE: 72 MMHG | OXYGEN SATURATION: 98 % | SYSTOLIC BLOOD PRESSURE: 130 MMHG | HEIGHT: 63 IN | WEIGHT: 200 LBS | BODY MASS INDEX: 35.44 KG/M2 | TEMPERATURE: 97.7 F | HEART RATE: 98 BPM

## 2024-02-09 ENCOUNTER — HOME CARE VISIT (OUTPATIENT)
Dept: HOME HEALTH SERVICES | Facility: CLINIC | Age: 74
End: 2024-02-09
Payer: MEDICARE

## 2024-02-09 VITALS
RESPIRATION RATE: 16 BRPM | OXYGEN SATURATION: 97 % | HEART RATE: 93 BPM | SYSTOLIC BLOOD PRESSURE: 122 MMHG | DIASTOLIC BLOOD PRESSURE: 64 MMHG | TEMPERATURE: 96.6 F

## 2024-02-09 PROCEDURE — G0151 HHCP-SERV OF PT,EA 15 MIN: HCPCS

## 2024-02-09 NOTE — CASE COMMUNICATION
"SOC Note:   At evaluation patient was identified by name and . She denied signs and symptoms of COVID 19. Patient agreed to home health care services and Initial paperwork was signed. Medications were reviewd with no negative interaction noted. Allergies was reviewed. Discussed home and fire safety, emergency plans. Education provided on patient care folder, fall precautions, need for assistive equipment for mobility, 24/7 call line.  Patient  verbalized understanding.     Home Health ordered for: disciplines SN/PT/OT    Reason for Hosp/Primary Dx/Co-morbidities: Venous insufficiency (chronic) (peripheral)      Focus of Care: Venous insufficiency (chronic) (peripheral), CPA, physical assessment, medication education, fall risk prevention education, o2 safety education      Patient's goal(s):\"I want to get stronger\"      Current Functional status/mobility/DME: with a  walker      HB status/Living Arrangements: lives with daughter      Skin Integrity/wound status: intact    Code Status: full code    Fall Risk/Safety concerns: fall risk due to o2 use and weakness      Educated on Emergency Plan, steps to take prior to going to the ER and when to Call [facility]:  educated       Medication issues/Concerns:No concerns    Additional Problems/Concerns: no     SDOH Barriers (i.e. caregiver concerns,  social isolation, transportation, food insecurity, environment, income etc.)/Need for MSW: no    Plan for next visit:  CPA, physical assessment, medication education, fall risk prevention education, o2 safety education"

## 2024-02-13 ENCOUNTER — APPOINTMENT (OUTPATIENT)
Dept: GENERAL RADIOLOGY | Facility: HOSPITAL | Age: 74
DRG: 871 | End: 2024-02-13
Payer: MEDICARE

## 2024-02-13 ENCOUNTER — HOSPITAL ENCOUNTER (INPATIENT)
Facility: HOSPITAL | Age: 74
LOS: 5 days | Discharge: HOME HEALTH | DRG: 871 | End: 2024-02-18
Attending: EMERGENCY MEDICINE | Admitting: FAMILY MEDICINE
Payer: MEDICARE

## 2024-02-13 ENCOUNTER — APPOINTMENT (OUTPATIENT)
Dept: CT IMAGING | Facility: HOSPITAL | Age: 74
DRG: 871 | End: 2024-02-13
Payer: MEDICARE

## 2024-02-13 DIAGNOSIS — J18.9 MULTIFOCAL PNEUMONIA: ICD-10-CM

## 2024-02-13 DIAGNOSIS — J96.01 ACUTE RESPIRATORY FAILURE WITH HYPOXIA AND HYPERCAPNIA: Primary | ICD-10-CM

## 2024-02-13 DIAGNOSIS — J96.02 ACUTE RESPIRATORY FAILURE WITH HYPOXIA AND HYPERCAPNIA: Primary | ICD-10-CM

## 2024-02-13 DIAGNOSIS — J96.21 ACUTE ON CHRONIC RESPIRATORY FAILURE WITH HYPOXIA AND HYPERCAPNIA: ICD-10-CM

## 2024-02-13 DIAGNOSIS — J96.22 ACUTE ON CHRONIC RESPIRATORY FAILURE WITH HYPOXIA AND HYPERCAPNIA: ICD-10-CM

## 2024-02-13 DIAGNOSIS — Z74.09 IMPAIRED MOBILITY: ICD-10-CM

## 2024-02-13 DIAGNOSIS — J11.1 INFLUENZA: ICD-10-CM

## 2024-02-13 DIAGNOSIS — R91.1 PULMONARY NODULE: ICD-10-CM

## 2024-02-13 PROBLEM — J96.00 ACUTE RESPIRATORY FAILURE: Status: ACTIVE | Noted: 2024-02-13

## 2024-02-13 PROBLEM — A41.9 SEPSIS: Status: ACTIVE | Noted: 2024-02-13

## 2024-02-13 PROBLEM — J10.1 INFLUENZA B: Status: ACTIVE | Noted: 2024-02-13

## 2024-02-13 PROBLEM — I89.0 LYMPHEDEMA: Status: ACTIVE | Noted: 2024-02-13

## 2024-02-13 PROBLEM — R79.89 ELEVATED TROPONIN: Status: ACTIVE | Noted: 2024-02-13

## 2024-02-13 PROBLEM — L03.115 CELLULITIS OF RIGHT LOWER EXTREMITY: Status: ACTIVE | Noted: 2020-04-28

## 2024-02-13 LAB
A-A DO2: 100.3 MMHG
A-A DO2: ABNORMAL
ALBUMIN SERPL-MCNC: 3.8 G/DL (ref 3.5–5.2)
ALBUMIN/GLOB SERPL: 0.9 G/DL
ALP SERPL-CCNC: 108 U/L (ref 39–117)
ALT SERPL W P-5'-P-CCNC: 11 U/L (ref 1–33)
ANION GAP SERPL CALCULATED.3IONS-SCNC: 9 MMOL/L (ref 5–15)
ARTERIAL PATENCY WRIST A: ABNORMAL
ARTERIAL PATENCY WRIST A: POSITIVE
AST SERPL-CCNC: 23 U/L (ref 1–32)
ATMOSPHERIC PRESS: 752 MMHG
ATMOSPHERIC PRESS: 753 MMHG
BASE EXCESS BLDA CALC-SCNC: 6.4 MMOL/L (ref 0–2)
BASE EXCESS BLDA CALC-SCNC: 8.3 MMOL/L (ref 0–2)
BASOPHILS # BLD AUTO: 0.03 10*3/MM3 (ref 0–0.2)
BASOPHILS NFR BLD AUTO: 0.4 % (ref 0–1.5)
BDY SITE: ABNORMAL
BDY SITE: ABNORMAL
BILIRUB SERPL-MCNC: 0.5 MG/DL (ref 0–1.2)
BODY TEMPERATURE: 37
BODY TEMPERATURE: 37
BUN SERPL-MCNC: 12 MG/DL (ref 8–23)
BUN/CREAT SERPL: 11.5 (ref 7–25)
CALCIUM SPEC-SCNC: 9.6 MG/DL (ref 8.6–10.5)
CHLORIDE SERPL-SCNC: 95 MMOL/L (ref 98–107)
CO2 SERPL-SCNC: 34 MMOL/L (ref 22–29)
COHGB MFR BLD: 1.4 % (ref 0–5)
COHGB MFR BLD: 2.5 % (ref 0–5)
CREAT SERPL-MCNC: 1.04 MG/DL (ref 0.57–1)
D DIMER PPP FEU-MCNC: 1.2 MCGFEU/ML (ref 0–0.74)
D-LACTATE SERPL-SCNC: 1 MMOL/L (ref 0.5–2)
DEPRECATED RDW RBC AUTO: 58 FL (ref 37–54)
EGFRCR SERPLBLD CKD-EPI 2021: 56.5 ML/MIN/1.73
EOSINOPHIL # BLD AUTO: 0.09 10*3/MM3 (ref 0–0.4)
EOSINOPHIL NFR BLD AUTO: 1.1 % (ref 0.3–6.2)
EPAP: 6
ERYTHROCYTE [DISTWIDTH] IN BLOOD BY AUTOMATED COUNT: 17.6 % (ref 12.3–15.4)
FERRITIN SERPL-MCNC: 356.6 NG/ML (ref 13–150)
FLUAV RNA RESP QL NAA+PROBE: NOT DETECTED
FLUBV RNA RESP QL NAA+PROBE: DETECTED
GAS FLOW AIRWAY: 4 LPM
GLOBULIN UR ELPH-MCNC: 4.4 GM/DL
GLUCOSE SERPL-MCNC: 103 MG/DL (ref 65–99)
HCO3 BLDA-SCNC: 33.9 MMOL/L (ref 20–26)
HCO3 BLDA-SCNC: 35.4 MMOL/L (ref 20–26)
HCT VFR BLD AUTO: 35.4 % (ref 34–46.6)
HCT VFR BLD CALC: 28.6 % (ref 38–51)
HCT VFR BLD CALC: 29.8 % (ref 38–51)
HGB BLD-MCNC: 10.4 G/DL (ref 12–15.9)
HGB BLDA-MCNC: 9.3 G/DL (ref 12–16)
HGB BLDA-MCNC: 9.7 G/DL (ref 12–16)
HOLD SPECIMEN: NORMAL
HOLD SPECIMEN: NORMAL
IMM GRANULOCYTES # BLD AUTO: 0.07 10*3/MM3 (ref 0–0.05)
IMM GRANULOCYTES NFR BLD AUTO: 0.8 % (ref 0–0.5)
INHALED O2 CONCENTRATION: 40 %
IPAP: 12
IRON 24H UR-MRATE: 18 MCG/DL (ref 37–145)
IRON SATN MFR SERPL: 7 % (ref 20–50)
LYMPHOCYTES # BLD AUTO: 0.57 10*3/MM3 (ref 0.7–3.1)
LYMPHOCYTES NFR BLD AUTO: 6.7 % (ref 19.6–45.3)
Lab: ABNORMAL
Lab: ABNORMAL
MAGNESIUM SERPL-MCNC: 2.1 MG/DL (ref 1.6–2.4)
MCH RBC QN AUTO: 26.5 PG (ref 26.6–33)
MCHC RBC AUTO-ENTMCNC: 29.4 G/DL (ref 31.5–35.7)
MCV RBC AUTO: 90.1 FL (ref 79–97)
METHGB BLD QL: 0 % (ref 0–3)
METHGB BLD QL: 0.9 % (ref 0–3)
MODALITY: ABNORMAL
MODALITY: ABNORMAL
MONOCYTES # BLD AUTO: 0.56 10*3/MM3 (ref 0.1–0.9)
MONOCYTES NFR BLD AUTO: 6.6 % (ref 5–12)
NEUTROPHILS NFR BLD AUTO: 7.14 10*3/MM3 (ref 1.7–7)
NEUTROPHILS NFR BLD AUTO: 84.4 % (ref 42.7–76)
NRBC BLD AUTO-RTO: 0 /100 WBC (ref 0–0.2)
NT-PROBNP SERPL-MCNC: 49.6 PG/ML (ref 0–900)
OXYHGB MFR BLDV: 95.6 % (ref 94–99)
OXYHGB MFR BLDV: 96.2 % (ref 94–99)
PCO2 BLDA: 63.4 MM HG (ref 35–45)
PCO2 BLDA: 66.4 MM HG (ref 35–45)
PCO2 TEMP ADJ BLD: 63.4 MM HG (ref 35–45)
PCO2 TEMP ADJ BLD: 66.4 MM HG (ref 35–45)
PH BLDA: 7.32 PH UNITS (ref 7.35–7.45)
PH BLDA: 7.36 PH UNITS (ref 7.35–7.45)
PH, TEMP CORRECTED: 7.32 PH UNITS (ref 7.35–7.45)
PH, TEMP CORRECTED: 7.36 PH UNITS (ref 7.35–7.45)
PLATELET # BLD AUTO: 207 10*3/MM3 (ref 140–450)
PMV BLD AUTO: 8.7 FL (ref 6–12)
PO2 BLDA: 100 MM HG (ref 83–108)
PO2 BLDA: 109 MM HG (ref 83–108)
PO2 TEMP ADJ BLD: 100 MM HG (ref 83–108)
PO2 TEMP ADJ BLD: 109 MM HG (ref 83–108)
POTASSIUM BLDA-SCNC: 3.9 MMOL/L (ref 3.5–5.2)
POTASSIUM BLDA-SCNC: 4.1 MMOL/L (ref 3.5–5.2)
POTASSIUM SERPL-SCNC: 4.2 MMOL/L (ref 3.5–5.2)
PROCALCITONIN SERPL-MCNC: 0.35 NG/ML (ref 0–0.25)
PROT SERPL-MCNC: 8.2 G/DL (ref 6–8.5)
RBC # BLD AUTO: 3.93 10*6/MM3 (ref 3.77–5.28)
SAO2 % BLDCOA: 97.4 % (ref 94–99)
SAO2 % BLDCOA: 97.9 % (ref 94–99)
SARS-COV-2 RNA RESP QL NAA+PROBE: NOT DETECTED
SET MECH RESP RATE: 12
SODIUM BLDA-SCNC: 140 MMOL/L (ref 136–145)
SODIUM BLDA-SCNC: 140 MMOL/L (ref 136–145)
SODIUM SERPL-SCNC: 138 MMOL/L (ref 136–145)
TIBC SERPL-MCNC: 253 MCG/DL (ref 298–536)
TRANSFERRIN SERPL-MCNC: 170 MG/DL (ref 200–360)
TROPONIN T SERPL HS-MCNC: 21 NG/L
TROPONIN T SERPL HS-MCNC: 24 NG/L
VENTILATOR MODE: ABNORMAL
VENTILATOR MODE: ABNORMAL
WBC NRBC COR # BLD AUTO: 8.46 10*3/MM3 (ref 3.4–10.8)
WHOLE BLOOD HOLD COAG: NORMAL
WHOLE BLOOD HOLD SPECIMEN: NORMAL

## 2024-02-13 PROCEDURE — 93005 ELECTROCARDIOGRAM TRACING: CPT | Performed by: STUDENT IN AN ORGANIZED HEALTH CARE EDUCATION/TRAINING PROGRAM

## 2024-02-13 PROCEDURE — 94799 UNLISTED PULMONARY SVC/PX: CPT

## 2024-02-13 PROCEDURE — 25810000003 SODIUM CHLORIDE 0.9 % SOLUTION 250 ML FLEX CONT

## 2024-02-13 PROCEDURE — 82805 BLOOD GASES W/O2 SATURATION: CPT

## 2024-02-13 PROCEDURE — 94660 CPAP INITIATION&MGMT: CPT

## 2024-02-13 PROCEDURE — 83050 HGB METHEMOGLOBIN QUAN: CPT

## 2024-02-13 PROCEDURE — 83735 ASSAY OF MAGNESIUM: CPT

## 2024-02-13 PROCEDURE — 25010000002 PIPERACILLIN SOD-TAZOBACTAM PER 1 G: Performed by: INTERNAL MEDICINE

## 2024-02-13 PROCEDURE — 93010 ELECTROCARDIOGRAM REPORT: CPT | Performed by: INTERNAL MEDICINE

## 2024-02-13 PROCEDURE — 82728 ASSAY OF FERRITIN: CPT | Performed by: NURSE PRACTITIONER

## 2024-02-13 PROCEDURE — 71275 CT ANGIOGRAPHY CHEST: CPT

## 2024-02-13 PROCEDURE — 80053 COMPREHEN METABOLIC PANEL: CPT

## 2024-02-13 PROCEDURE — 36415 COLL VENOUS BLD VENIPUNCTURE: CPT

## 2024-02-13 PROCEDURE — 36600 WITHDRAWAL OF ARTERIAL BLOOD: CPT

## 2024-02-13 PROCEDURE — 84145 PROCALCITONIN (PCT): CPT

## 2024-02-13 PROCEDURE — 84466 ASSAY OF TRANSFERRIN: CPT | Performed by: NURSE PRACTITIONER

## 2024-02-13 PROCEDURE — 25010000002 AZITHROMYCIN PER 500 MG

## 2024-02-13 PROCEDURE — 83880 ASSAY OF NATRIURETIC PEPTIDE: CPT

## 2024-02-13 PROCEDURE — 85379 FIBRIN DEGRADATION QUANT: CPT

## 2024-02-13 PROCEDURE — 25810000003 SODIUM CHLORIDE 0.9 % SOLUTION 500 ML FLEX CONT: Performed by: NURSE PRACTITIONER

## 2024-02-13 PROCEDURE — 93005 ELECTROCARDIOGRAM TRACING: CPT

## 2024-02-13 PROCEDURE — 25510000001 IOPAMIDOL PER 1 ML: Performed by: EMERGENCY MEDICINE

## 2024-02-13 PROCEDURE — 82375 ASSAY CARBOXYHB QUANT: CPT

## 2024-02-13 PROCEDURE — 87636 SARSCOV2 & INF A&B AMP PRB: CPT

## 2024-02-13 PROCEDURE — 84484 ASSAY OF TROPONIN QUANT: CPT

## 2024-02-13 PROCEDURE — 99285 EMERGENCY DEPT VISIT HI MDM: CPT

## 2024-02-13 PROCEDURE — 94640 AIRWAY INHALATION TREATMENT: CPT

## 2024-02-13 PROCEDURE — 87040 BLOOD CULTURE FOR BACTERIA: CPT

## 2024-02-13 PROCEDURE — 83605 ASSAY OF LACTIC ACID: CPT

## 2024-02-13 PROCEDURE — 85025 COMPLETE CBC W/AUTO DIFF WBC: CPT

## 2024-02-13 PROCEDURE — 25010000002 METHYLPREDNISOLONE PER 40 MG: Performed by: NURSE PRACTITIONER

## 2024-02-13 PROCEDURE — 25010000002 CEFTRIAXONE PER 250 MG

## 2024-02-13 PROCEDURE — 71045 X-RAY EXAM CHEST 1 VIEW: CPT

## 2024-02-13 PROCEDURE — 83540 ASSAY OF IRON: CPT | Performed by: NURSE PRACTITIONER

## 2024-02-13 PROCEDURE — 25010000002 METHYLPREDNISOLONE PER 125 MG

## 2024-02-13 RX ORDER — BISACODYL 10 MG
10 SUPPOSITORY, RECTAL RECTAL DAILY PRN
Status: DISCONTINUED | OUTPATIENT
Start: 2024-02-13 | End: 2024-02-18 | Stop reason: HOSPADM

## 2024-02-13 RX ORDER — BISACODYL 5 MG/1
5 TABLET, DELAYED RELEASE ORAL DAILY PRN
Status: DISCONTINUED | OUTPATIENT
Start: 2024-02-13 | End: 2024-02-18 | Stop reason: HOSPADM

## 2024-02-13 RX ORDER — SODIUM CHLORIDE 0.9 % (FLUSH) 0.9 %
10 SYRINGE (ML) INJECTION AS NEEDED
Status: DISCONTINUED | OUTPATIENT
Start: 2024-02-13 | End: 2024-02-18 | Stop reason: HOSPADM

## 2024-02-13 RX ORDER — ACETAMINOPHEN 160 MG/5ML
650 SOLUTION ORAL EVERY 4 HOURS PRN
Status: DISCONTINUED | OUTPATIENT
Start: 2024-02-13 | End: 2024-02-18 | Stop reason: HOSPADM

## 2024-02-13 RX ORDER — ACETAMINOPHEN 500 MG
1000 TABLET ORAL ONCE
Status: COMPLETED | OUTPATIENT
Start: 2024-02-13 | End: 2024-02-13

## 2024-02-13 RX ORDER — SODIUM CHLORIDE 9 MG/ML
40 INJECTION, SOLUTION INTRAVENOUS AS NEEDED
Status: DISCONTINUED | OUTPATIENT
Start: 2024-02-13 | End: 2024-02-18 | Stop reason: HOSPADM

## 2024-02-13 RX ORDER — AMOXICILLIN 250 MG
2 CAPSULE ORAL 2 TIMES DAILY PRN
Status: DISCONTINUED | OUTPATIENT
Start: 2024-02-13 | End: 2024-02-18 | Stop reason: HOSPADM

## 2024-02-13 RX ORDER — SODIUM CHLORIDE 0.9 % (FLUSH) 0.9 %
10 SYRINGE (ML) INJECTION EVERY 12 HOURS SCHEDULED
Status: DISCONTINUED | OUTPATIENT
Start: 2024-02-13 | End: 2024-02-18 | Stop reason: HOSPADM

## 2024-02-13 RX ORDER — NITROGLYCERIN 0.4 MG/1
0.4 TABLET SUBLINGUAL
Status: DISCONTINUED | OUTPATIENT
Start: 2024-02-13 | End: 2024-02-18 | Stop reason: HOSPADM

## 2024-02-13 RX ORDER — POLYETHYLENE GLYCOL 3350 17 G/17G
17 POWDER, FOR SOLUTION ORAL DAILY PRN
Status: DISCONTINUED | OUTPATIENT
Start: 2024-02-13 | End: 2024-02-18 | Stop reason: HOSPADM

## 2024-02-13 RX ORDER — IPRATROPIUM BROMIDE AND ALBUTEROL SULFATE 2.5; .5 MG/3ML; MG/3ML
3 SOLUTION RESPIRATORY (INHALATION)
Status: DISCONTINUED | OUTPATIENT
Start: 2024-02-14 | End: 2024-02-16 | Stop reason: SDUPTHER

## 2024-02-13 RX ORDER — ACETAMINOPHEN 650 MG/1
650 SUPPOSITORY RECTAL EVERY 4 HOURS PRN
Status: DISCONTINUED | OUTPATIENT
Start: 2024-02-13 | End: 2024-02-18 | Stop reason: HOSPADM

## 2024-02-13 RX ORDER — METHYLPREDNISOLONE SODIUM SUCCINATE 40 MG/ML
40 INJECTION, POWDER, LYOPHILIZED, FOR SOLUTION INTRAMUSCULAR; INTRAVENOUS EVERY 8 HOURS SCHEDULED
Status: DISCONTINUED | OUTPATIENT
Start: 2024-02-14 | End: 2024-02-15

## 2024-02-13 RX ORDER — ONDANSETRON 4 MG/1
4 TABLET, ORALLY DISINTEGRATING ORAL EVERY 6 HOURS PRN
Status: DISCONTINUED | OUTPATIENT
Start: 2024-02-13 | End: 2024-02-18 | Stop reason: HOSPADM

## 2024-02-13 RX ORDER — ONDANSETRON 2 MG/ML
4 INJECTION INTRAMUSCULAR; INTRAVENOUS EVERY 6 HOURS PRN
Status: DISCONTINUED | OUTPATIENT
Start: 2024-02-13 | End: 2024-02-18 | Stop reason: HOSPADM

## 2024-02-13 RX ORDER — DIPHENHYDRAMINE HYDROCHLORIDE 50 MG/ML
25 INJECTION INTRAMUSCULAR; INTRAVENOUS EVERY 6 HOURS PRN
Status: DISCONTINUED | OUTPATIENT
Start: 2024-02-13 | End: 2024-02-18 | Stop reason: HOSPADM

## 2024-02-13 RX ORDER — IPRATROPIUM BROMIDE AND ALBUTEROL SULFATE 2.5; .5 MG/3ML; MG/3ML
3 SOLUTION RESPIRATORY (INHALATION) ONCE
Status: COMPLETED | OUTPATIENT
Start: 2024-02-13 | End: 2024-02-13

## 2024-02-13 RX ORDER — IPRATROPIUM BROMIDE AND ALBUTEROL SULFATE 2.5; .5 MG/3ML; MG/3ML
3 SOLUTION RESPIRATORY (INHALATION)
Status: DISCONTINUED | OUTPATIENT
Start: 2024-02-13 | End: 2024-02-13

## 2024-02-13 RX ORDER — METHYLPREDNISOLONE SODIUM SUCCINATE 125 MG/2ML
125 INJECTION, POWDER, LYOPHILIZED, FOR SOLUTION INTRAMUSCULAR; INTRAVENOUS ONCE
Status: COMPLETED | OUTPATIENT
Start: 2024-02-13 | End: 2024-02-13

## 2024-02-13 RX ORDER — ENOXAPARIN SODIUM 100 MG/ML
40 INJECTION SUBCUTANEOUS DAILY
Status: DISCONTINUED | OUTPATIENT
Start: 2024-02-14 | End: 2024-02-16

## 2024-02-13 RX ORDER — ACETAMINOPHEN 325 MG/1
650 TABLET ORAL EVERY 4 HOURS PRN
Status: DISCONTINUED | OUTPATIENT
Start: 2024-02-13 | End: 2024-02-18 | Stop reason: HOSPADM

## 2024-02-13 RX ADMIN — CEFTRIAXONE 1000 MG: 1 INJECTION, POWDER, FOR SOLUTION INTRAMUSCULAR; INTRAVENOUS at 19:46

## 2024-02-13 RX ADMIN — SODIUM CHLORIDE 40 ML: 9 INJECTION, SOLUTION INTRAVENOUS at 23:13

## 2024-02-13 RX ADMIN — METHYLPREDNISOLONE SODIUM SUCCINATE 40 MG: 40 INJECTION, POWDER, FOR SOLUTION INTRAMUSCULAR; INTRAVENOUS at 23:12

## 2024-02-13 RX ADMIN — PIPERACILLIN SODIUM AND TAZOBACTAM SODIUM 3.38 G: 3; .375 INJECTION, POWDER, LYOPHILIZED, FOR SOLUTION INTRAVENOUS at 23:13

## 2024-02-13 RX ADMIN — ACETAMINOPHEN 1000 MG: 500 TABLET ORAL at 17:01

## 2024-02-13 RX ADMIN — IPRATROPIUM BROMIDE AND ALBUTEROL SULFATE 3 ML: .5; 3 SOLUTION RESPIRATORY (INHALATION) at 18:23

## 2024-02-13 RX ADMIN — AZITHROMYCIN MONOHYDRATE 500 MG: 500 INJECTION, POWDER, LYOPHILIZED, FOR SOLUTION INTRAVENOUS at 20:39

## 2024-02-13 RX ADMIN — METHYLPREDNISOLONE SODIUM SUCCINATE 125 MG: 125 INJECTION, POWDER, FOR SOLUTION INTRAMUSCULAR; INTRAVENOUS at 18:38

## 2024-02-13 RX ADMIN — Medication 10 ML: at 23:16

## 2024-02-13 RX ADMIN — IOPAMIDOL 100 ML: 755 INJECTION, SOLUTION INTRAVENOUS at 20:22

## 2024-02-13 NOTE — ED PROVIDER NOTES
Subjective   History of Present Illness  Patient is a 73-year-old female who presents emergency department with complaints of shortness of breath.  Patient states that she has had shortness of breath for 1 week.  Patient lives at home with her daughter and EMS was called due to her shortness of breath.  Patient wears 4 L of oxygen continuously which she is currently on 4 L here.  Patient reports that she is supposed to take Lasix 20 mg daily, however she has not had it in approximately 2 days due to her having to get up to urinate.  Patient denies pain anywhere.  Denies chest pain.  States that she has been coughing with sputum production.  Denies hemoptysis.  Denies fever at home, however she is febrile here. Denies history of DVT or pulmonary embolism.  Patient does have edema present to bilateral lower extremities, patient states that this is her baseline.        Review of Systems   Respiratory:  Positive for cough and shortness of breath.    All other systems reviewed and are negative.      Past Medical History:   Diagnosis Date    Abdominal wall abscess     Abdominal wall fistula 2018    Cellulitis     Chronic respiratory failure with hypoxia 2020    Colonic obstruction 4/3/2018    COPD (chronic obstructive pulmonary disease)     Depression     Diverticul disease small and large intestine, no perforati or abscess     Edema     GERD (gastroesophageal reflux disease)     Hyperlipidemia     Hypertension     Hypocalcemia     Hypothyroid     Obesity, Class III, BMI 40-49.9 (morbid obesity) 2020    Respiratory failure     Tobacco dependence 2020    Venous insufficiency     Vertigo        No Known Allergies    Past Surgical History:   Procedure Laterality Date    ABDOMINAL SURGERY       SECTION      COLOSTOMY      ETHMOID ARTERY LIGATION N/A 2021    Procedure: ETHMOID ARTERY LIGATION;  Surgeon: Elliot Mack Jr., MD;  Location: [facility] OR;  Service: ENT;  Laterality: N/A;    EXAM  UNDER ANESTHESIA N/A 2021    Procedure: SEPTOPLASTY TURBOPLASTY ENDOSCOPIC CONTROL LEFT NOSE BLEED;  Surgeon: Elliot Mack Jr., MD;  Location:  [facility] OR;  Service: ENT;  Laterality: N/A;    EXPLORATORY LAPAROTOMY N/A 2018    Procedure: LAPAROTOMY EXPLORATORY, partial colectomy, creation colostomy, incision and drainage abdominal wall abscess;  Surgeon: Elda Velazquez MD;  Location:  [facility] OR;  Service: General    INTERNAL MAXILLARY ARTERY AND ETHMOID ARTERY LIGATION N/A 2021    Procedure: INTERNAL MAXILLARY ARTERY AND ETHMOID ARTERY LIGATION;  Surgeon: Elliot Mack Jr., MD;  Location:  [facility] OR;  Service: ENT;  Laterality: N/A;    SEPTOPLASTY, RESECTION INFERIOR TURBINATES Bilateral 2021    Procedure: SEPTOPLASTY, RESECTION INFERIOR TURBINATES;  Surgeon: Elliot Mack Jr., MD;  Location:  [facility] OR;  Service: ENT;  Laterality: Bilateral;       Family History   Adopted: Yes       Social History     Socioeconomic History    Marital status:    Tobacco Use    Smoking status: Former     Packs/day: 0.25     Years: 15.00     Additional pack years: 0.00     Total pack years: 3.75     Types: Cigarettes     Quit date: 2020     Years since quittin.0    Smokeless tobacco: Never   Substance and Sexual Activity    Alcohol use: No    Drug use: No    Sexual activity: Never           Objective   Physical Exam  Vitals and nursing note reviewed.   Constitutional:       General: She is not in acute distress.     Appearance: Normal appearance. She is normal weight. She is ill-appearing. She is not toxic-appearing.   HENT:      Head: Normocephalic.   Cardiovascular:      Rate and Rhythm: Normal rate and regular rhythm.      Pulses: Normal pulses.      Heart sounds: Normal heart sounds.   Pulmonary:      Breath sounds: Rhonchi and rales present. No wheezing.      Comments: Tachypneic.  Abdominal:      General: Abdomen is flat. Bowel sounds are normal. There is no distension.       Palpations: Abdomen is soft.      Tenderness: There is no abdominal tenderness.   Musculoskeletal:         General: Normal range of motion.      Cervical back: Normal range of motion and neck supple.   Skin:     General: Skin is warm and dry.   Neurological:      General: No focal deficit present.      Mental Status: She is alert and oriented to person, place, and time. Mental status is at baseline.   Psychiatric:         Mood and Affect: Mood normal.         Behavior: Behavior normal.         Thought Content: Thought content normal.         Judgment: Judgment normal.         Procedures           ED Course  ED Course as of 02/13/24 1922 Tue Feb 13, 2024   1738 Repeat ecg ordered [AS]   1920 Case signed out to Dr. Parmar.  CT angiogram chest pending at this time. [KR]      ED Course User Index  [AS] Eliud Melendez MD  [KR] Trinh Jeffries APRN                                             Medical Decision Making  Amount and/or Complexity of Data Reviewed  Labs: ordered.  Radiology: ordered.  ECG/medicine tests: ordered.    Risk  OTC drugs.  Prescription drug management.        Final diagnoses:   None       ED Disposition  ED Disposition       None            No follow-up provider specified.       Medication List      No changes were made to your prescriptions during this visit.            Trinh Jeffries APRN  02/13/24 1922

## 2024-02-14 ENCOUNTER — HOME CARE VISIT (OUTPATIENT)
Dept: HOME HEALTH SERVICES | Facility: HOME HEALTHCARE | Age: 74
End: 2024-02-14
Payer: MEDICARE

## 2024-02-14 PROBLEM — R79.89 ELEVATED TROPONIN: Status: RESOLVED | Noted: 2024-02-13 | Resolved: 2024-02-14

## 2024-02-14 LAB
ANION GAP SERPL CALCULATED.3IONS-SCNC: 11 MMOL/L (ref 5–15)
BUN SERPL-MCNC: 15 MG/DL (ref 8–23)
BUN/CREAT SERPL: 14.3 (ref 7–25)
CALCIUM SPEC-SCNC: 8.9 MG/DL (ref 8.6–10.5)
CHLORIDE SERPL-SCNC: 97 MMOL/L (ref 98–107)
CHOLEST SERPL-MCNC: 142 MG/DL (ref 0–200)
CO2 SERPL-SCNC: 31 MMOL/L (ref 22–29)
CREAT SERPL-MCNC: 1.05 MG/DL (ref 0.57–1)
DEPRECATED RDW RBC AUTO: 56.5 FL (ref 37–54)
EGFRCR SERPLBLD CKD-EPI 2021: 55.9 ML/MIN/1.73
ERYTHROCYTE [DISTWIDTH] IN BLOOD BY AUTOMATED COUNT: 17.5 % (ref 12.3–15.4)
GLUCOSE SERPL-MCNC: 174 MG/DL (ref 65–99)
HBA1C MFR BLD: 5.5 % (ref 4.8–5.6)
HCT VFR BLD AUTO: 32.6 % (ref 34–46.6)
HDLC SERPL-MCNC: 36 MG/DL (ref 40–60)
HGB BLD-MCNC: 10 G/DL (ref 12–15.9)
L PNEUMO1 AG UR QL IA: NEGATIVE
LDLC SERPL CALC-MCNC: 90 MG/DL (ref 0–100)
LDLC/HDLC SERPL: 2.48 {RATIO}
MCH RBC QN AUTO: 26.8 PG (ref 26.6–33)
MCHC RBC AUTO-ENTMCNC: 30.7 G/DL (ref 31.5–35.7)
MCV RBC AUTO: 87.4 FL (ref 79–97)
MRSA DNA SPEC QL NAA+PROBE: NORMAL
PLATELET # BLD AUTO: 195 10*3/MM3 (ref 140–450)
PMV BLD AUTO: 8.7 FL (ref 6–12)
POTASSIUM SERPL-SCNC: 4.2 MMOL/L (ref 3.5–5.2)
RBC # BLD AUTO: 3.73 10*6/MM3 (ref 3.77–5.28)
S PNEUM AG SPEC QL LA: NEGATIVE
SODIUM SERPL-SCNC: 139 MMOL/L (ref 136–145)
TRIGL SERPL-MCNC: 83 MG/DL (ref 0–150)
TSH SERPL DL<=0.05 MIU/L-ACNC: 0.73 UIU/ML (ref 0.27–4.2)
VLDLC SERPL-MCNC: 16 MG/DL (ref 5–40)
WBC NRBC COR # BLD AUTO: 8.19 10*3/MM3 (ref 3.4–10.8)

## 2024-02-14 PROCEDURE — 94660 CPAP INITIATION&MGMT: CPT

## 2024-02-14 PROCEDURE — 29580 STRAPPING UNNA BOOT: CPT

## 2024-02-14 PROCEDURE — 80061 LIPID PANEL: CPT | Performed by: NURSE PRACTITIONER

## 2024-02-14 PROCEDURE — 25810000003 SODIUM CHLORIDE 0.9 % SOLUTION 250 ML FLEX CONT: Performed by: NURSE PRACTITIONER

## 2024-02-14 PROCEDURE — 97161 PT EVAL LOW COMPLEX 20 MIN: CPT

## 2024-02-14 PROCEDURE — 94799 UNLISTED PULMONARY SVC/PX: CPT

## 2024-02-14 PROCEDURE — 97165 OT EVAL LOW COMPLEX 30 MIN: CPT

## 2024-02-14 PROCEDURE — 25010000002 PIPERACILLIN SOD-TAZOBACTAM PER 1 G: Performed by: NURSE PRACTITIONER

## 2024-02-14 PROCEDURE — 25010000002 DIPHENHYDRAMINE PER 50 MG: Performed by: INTERNAL MEDICINE

## 2024-02-14 PROCEDURE — 87449 NOS EACH ORGANISM AG IA: CPT | Performed by: NURSE PRACTITIONER

## 2024-02-14 PROCEDURE — 83036 HEMOGLOBIN GLYCOSYLATED A1C: CPT | Performed by: NURSE PRACTITIONER

## 2024-02-14 PROCEDURE — 87899 AGENT NOS ASSAY W/OPTIC: CPT | Performed by: NURSE PRACTITIONER

## 2024-02-14 PROCEDURE — 80048 BASIC METABOLIC PNL TOTAL CA: CPT | Performed by: NURSE PRACTITIONER

## 2024-02-14 PROCEDURE — 94761 N-INVAS EAR/PLS OXIMETRY MLT: CPT

## 2024-02-14 PROCEDURE — 25010000002 AZITHROMYCIN PER 500 MG: Performed by: NURSE PRACTITIONER

## 2024-02-14 PROCEDURE — 36415 COLL VENOUS BLD VENIPUNCTURE: CPT | Performed by: NURSE PRACTITIONER

## 2024-02-14 PROCEDURE — 85027 COMPLETE CBC AUTOMATED: CPT | Performed by: NURSE PRACTITIONER

## 2024-02-14 PROCEDURE — 25010000002 ENOXAPARIN PER 10 MG: Performed by: NURSE PRACTITIONER

## 2024-02-14 PROCEDURE — 84443 ASSAY THYROID STIM HORMONE: CPT | Performed by: NURSE PRACTITIONER

## 2024-02-14 PROCEDURE — 25010000002 METHYLPREDNISOLONE PER 40 MG: Performed by: NURSE PRACTITIONER

## 2024-02-14 PROCEDURE — 87641 MR-STAPH DNA AMP PROBE: CPT | Performed by: NURSE PRACTITIONER

## 2024-02-14 RX ORDER — HYDROXYZINE HYDROCHLORIDE 25 MG/1
50 TABLET, FILM COATED ORAL 2 TIMES DAILY PRN
Status: DISCONTINUED | OUTPATIENT
Start: 2024-02-14 | End: 2024-02-18 | Stop reason: HOSPADM

## 2024-02-14 RX ORDER — POTASSIUM CHLORIDE 750 MG/1
10 CAPSULE, EXTENDED RELEASE ORAL 2 TIMES DAILY WITH MEALS
Status: DISCONTINUED | OUTPATIENT
Start: 2024-02-14 | End: 2024-02-14

## 2024-02-14 RX ORDER — BENZONATATE 100 MG/1
100 CAPSULE ORAL 3 TIMES DAILY PRN
Status: DISCONTINUED | OUTPATIENT
Start: 2024-02-14 | End: 2024-02-18 | Stop reason: HOSPADM

## 2024-02-14 RX ORDER — LEVOTHYROXINE SODIUM 0.07 MG/1
75 TABLET ORAL
Status: DISCONTINUED | OUTPATIENT
Start: 2024-02-14 | End: 2024-02-18 | Stop reason: HOSPADM

## 2024-02-14 RX ORDER — PANTOPRAZOLE SODIUM 40 MG/1
40 TABLET, DELAYED RELEASE ORAL
Status: DISCONTINUED | OUTPATIENT
Start: 2024-02-14 | End: 2024-02-18 | Stop reason: HOSPADM

## 2024-02-14 RX ORDER — GUAIFENESIN 600 MG/1
1200 TABLET, EXTENDED RELEASE ORAL EVERY 12 HOURS SCHEDULED
Status: DISCONTINUED | OUTPATIENT
Start: 2024-02-14 | End: 2024-02-15

## 2024-02-14 RX ORDER — HYDROXYZINE HYDROCHLORIDE 25 MG/1
50 TABLET, FILM COATED ORAL NIGHTLY
Status: DISCONTINUED | OUTPATIENT
Start: 2024-02-14 | End: 2024-02-14

## 2024-02-14 RX ORDER — SERTRALINE HYDROCHLORIDE 100 MG/1
100 TABLET, FILM COATED ORAL EVERY EVENING
COMMUNITY
End: 2024-02-20

## 2024-02-14 RX ORDER — METOPROLOL SUCCINATE 25 MG/1
25 TABLET, EXTENDED RELEASE ORAL DAILY
Status: DISCONTINUED | OUTPATIENT
Start: 2024-02-14 | End: 2024-02-14

## 2024-02-14 RX ORDER — FUROSEMIDE 20 MG/1
20 TABLET ORAL DAILY
Status: DISCONTINUED | OUTPATIENT
Start: 2024-02-14 | End: 2024-02-18 | Stop reason: HOSPADM

## 2024-02-14 RX ORDER — BUDESONIDE AND FORMOTEROL FUMARATE DIHYDRATE 160; 4.5 UG/1; UG/1
2 AEROSOL RESPIRATORY (INHALATION)
Status: DISCONTINUED | OUTPATIENT
Start: 2024-02-14 | End: 2024-02-18 | Stop reason: HOSPADM

## 2024-02-14 RX ORDER — OSELTAMIVIR PHOSPHATE 30 MG/1
30 CAPSULE ORAL EVERY 12 HOURS SCHEDULED
Qty: 10 CAPSULE | Refills: 0 | Status: DISCONTINUED | OUTPATIENT
Start: 2024-02-14 | End: 2024-02-18 | Stop reason: HOSPADM

## 2024-02-14 RX ORDER — SERTRALINE HYDROCHLORIDE 100 MG/1
100 TABLET, FILM COATED ORAL NIGHTLY
Status: DISCONTINUED | OUTPATIENT
Start: 2024-02-14 | End: 2024-02-18 | Stop reason: HOSPADM

## 2024-02-14 RX ORDER — FLUOXETINE HYDROCHLORIDE 20 MG/1
40 CAPSULE ORAL DAILY
Status: DISCONTINUED | OUTPATIENT
Start: 2024-02-14 | End: 2024-02-14

## 2024-02-14 RX ADMIN — IPRATROPIUM BROMIDE AND ALBUTEROL SULFATE 3 ML: .5; 3 SOLUTION RESPIRATORY (INHALATION) at 18:38

## 2024-02-14 RX ADMIN — AZITHROMYCIN DIHYDRATE 500 MG: 500 INJECTION, POWDER, LYOPHILIZED, FOR SOLUTION INTRAVENOUS at 20:56

## 2024-02-14 RX ADMIN — LEVOTHYROXINE SODIUM 75 MCG: 75 TABLET ORAL at 06:25

## 2024-02-14 RX ADMIN — ZINC OXIDE 1 APPLICATION: 200 OINTMENT TOPICAL at 16:54

## 2024-02-14 RX ADMIN — GUAIFENESIN 1200 MG: 600 TABLET, EXTENDED RELEASE ORAL at 20:55

## 2024-02-14 RX ADMIN — PIPERACILLIN SODIUM AND TAZOBACTAM SODIUM 3.38 G: 3; .375 INJECTION, POWDER, LYOPHILIZED, FOR SOLUTION INTRAVENOUS at 20:56

## 2024-02-14 RX ADMIN — METHYLPREDNISOLONE SODIUM SUCCINATE 40 MG: 40 INJECTION, POWDER, FOR SOLUTION INTRAMUSCULAR; INTRAVENOUS at 05:40

## 2024-02-14 RX ADMIN — BUDESONIDE AND FORMOTEROL FUMARATE DIHYDRATE 2 PUFF: 160; 4.5 AEROSOL RESPIRATORY (INHALATION) at 06:39

## 2024-02-14 RX ADMIN — ENOXAPARIN SODIUM 40 MG: 100 INJECTION SUBCUTANEOUS at 08:50

## 2024-02-14 RX ADMIN — BUDESONIDE AND FORMOTEROL FUMARATE DIHYDRATE 2 PUFF: 160; 4.5 AEROSOL RESPIRATORY (INHALATION) at 18:38

## 2024-02-14 RX ADMIN — HYDROXYZINE HYDROCHLORIDE 50 MG: 25 TABLET ORAL at 22:35

## 2024-02-14 RX ADMIN — METHYLPREDNISOLONE SODIUM SUCCINATE 40 MG: 40 INJECTION, POWDER, FOR SOLUTION INTRAMUSCULAR; INTRAVENOUS at 14:13

## 2024-02-14 RX ADMIN — Medication 10 ML: at 08:39

## 2024-02-14 RX ADMIN — SERTRALINE HYDROCHLORIDE 100 MG: 100 TABLET, FILM COATED ORAL at 20:55

## 2024-02-14 RX ADMIN — Medication 10 ML: at 20:57

## 2024-02-14 RX ADMIN — PIPERACILLIN SODIUM AND TAZOBACTAM SODIUM 3.38 G: 3; .375 INJECTION, POWDER, LYOPHILIZED, FOR SOLUTION INTRAVENOUS at 04:46

## 2024-02-14 RX ADMIN — IPRATROPIUM BROMIDE AND ALBUTEROL SULFATE 3 ML: .5; 3 SOLUTION RESPIRATORY (INHALATION) at 06:37

## 2024-02-14 RX ADMIN — OSELTAMIVIR PHOSPHATE 30 MG: 30 CAPSULE ORAL at 16:54

## 2024-02-14 RX ADMIN — PIPERACILLIN SODIUM AND TAZOBACTAM SODIUM 3.38 G: 3; .375 INJECTION, POWDER, LYOPHILIZED, FOR SOLUTION INTRAVENOUS at 14:13

## 2024-02-14 RX ADMIN — PANTOPRAZOLE SODIUM 40 MG: 40 TABLET, DELAYED RELEASE ORAL at 06:25

## 2024-02-14 RX ADMIN — FUROSEMIDE 20 MG: 20 TABLET ORAL at 08:51

## 2024-02-14 RX ADMIN — METHYLPREDNISOLONE SODIUM SUCCINATE 40 MG: 40 INJECTION, POWDER, FOR SOLUTION INTRAMUSCULAR; INTRAVENOUS at 20:55

## 2024-02-14 RX ADMIN — IPRATROPIUM BROMIDE AND ALBUTEROL SULFATE 3 ML: .5; 3 SOLUTION RESPIRATORY (INHALATION) at 11:32

## 2024-02-14 RX ADMIN — METOPROLOL SUCCINATE 25 MG: 25 TABLET, EXTENDED RELEASE ORAL at 08:39

## 2024-02-14 RX ADMIN — IPRATROPIUM BROMIDE AND ALBUTEROL SULFATE 3 ML: .5; 3 SOLUTION RESPIRATORY (INHALATION) at 15:31

## 2024-02-14 RX ADMIN — DIPHENHYDRAMINE HYDROCHLORIDE 25 MG: 50 INJECTION, SOLUTION INTRAMUSCULAR; INTRAVENOUS at 02:44

## 2024-02-14 NOTE — NURSING NOTE
[facility]  INPATIENT WOUND & OSTOMY CARE    Today's Date: 02/14/24    Patient Name: Emy Bermudez  MRN: [id number]  [facility]: [id number]  PCP: Dorie Segura APRN  Attending Provider: Eleno Tolentino DO  Length of Stay: 1    Wound care consulted for varghese-area skin tear. Patient has excoriation to labia area. Patient states this has been there for a long time. She states it is itchy. She states she is rarely incontinent. She currently has a purewick in place. Magic barrier cream ordered.     Inpatient wound care will continue to follow during hospital stay.  Please contact if any issues or concerns arise.     This document has been electronically signed by Marilyn Luciano RN on 2/14/2024 12:25 CST

## 2024-02-14 NOTE — PROGRESS NOTES
[facility] Medicine Services  INPATIENT PROGRESS NOTE    Patient Name: Emy Bermudez  Date of Admission: 2/13/2024  Today's Date: 02/14/24  Length of Stay: 1  Primary Care Physician: Dorie Segura APRN    Subjective   Chief Complaint: Shortness of breath  HPI   Ms Bermudez presented to [facility] emergency room 2/13/2024 with weeklong history of worsening shortness of breath.  She has a history of COPD and chronic respiratory failure on oxygen at 4 L continuously  She reported cough with light yellow sputum production.  She denied nausea, vomiting or diarrhea.  She has chronic bilateral lower extremity lymphedema with Unna boots in the past.  ABGs pH 7.3 5, pO2 100, pCO2 63.4 on 4 L.  She was placed on BiPAP with pH 7.31, pCO2 66, pO2 109.  D-dimer 1.2.  CT angiogram of the chest no definite central pulmonary emboli.  Peripheral pulmonary artery branches difficult to fully evaluate.  Atheromatous disease thoracic aorta and coronary arteries.  Cardiomegaly.  Bilateral tree-in-bud opacities as well as areas of patchy consolidation worrisome for pneumonia.  Findings likely infectious or inflammatory.  Retained secretions bilateral lower lobes.  5 mm subpleural right upper lobe nodule versus more focal consolidation in area of tree-in-bud opacification.  Consolidation versus atelectasis right lower lobe.  2 adjacent pleural-based opacities right upper lobe.  Follow-up CT 3 months.  Chest x-ray linear infiltrate atelectasis versus scarring.  Influenza B positive.  Temperature 100.6, heart rate 106, respirations 35 in ER.  DuoNeb, Solu-Medrol, Rocephin, azithromycin given in ER.  Fluid bolus not given due to concern for overload.    Today  Patient placed on BiPAP in ER and has been weaned back to baseline oxygen at 4 L.  She reports thick yellow sputum production.  No complaints of nausea or vomiting.  She reports worsening shortness of breath for the last week.  She feels some  better today.  Bilateral lower extremities chronically reddened and swollen.  Patient has had Unna boots in the past.  Consult physical therapy and she is agreeable to Unna boots.  Continue Rocephin and azithromycin.      Review of Systems   Constitutional:  Positive for activity change and fatigue.   HENT:  Negative for congestion and trouble swallowing.    Eyes:  Negative for photophobia and visual disturbance.   Respiratory:  Positive for cough and shortness of breath.    Cardiovascular:  Positive for leg swelling. Negative for chest pain.   Gastrointestinal:  Negative for constipation, diarrhea and vomiting.   Endocrine: Negative for cold intolerance, heat intolerance and polyuria.   Genitourinary:  Negative for dysuria, frequency and urgency.   Musculoskeletal:  Negative for gait problem.   Skin:  Positive for wound (Chronic erythema, lymphedema bilateral lower extremities).   Allergic/Immunologic: Negative for immunocompromised state.   Neurological:  Positive for weakness. Negative for light-headedness.   Hematological:  Negative for adenopathy. Does not bruise/bleed easily.   Psychiatric/Behavioral:  Negative for agitation, behavioral problems and confusion.       All pertinent negatives and positives are as above. All other systems have been reviewed and are negative unless otherwise stated.     Objective    Temp:  [97.2 °F (36.2 °C)-100.6 °F (38.1 °C)] 97.4 °F (36.3 °C)  Heart Rate:  [] 74  Resp:  [20-35] 20  BP: (129-172)/(81-89) 158/87  Physical Exam  Vitals and nursing note reviewed.   Constitutional:       Appearance: She is obese.      Comments: Sitting up in chair.  Oxygen at 4 L.  Saturation 94%.   HENT:      Head: Normocephalic and atraumatic.      Nose: No congestion.      Mouth/Throat:      Pharynx: No oropharyngeal exudate or posterior oropharyngeal erythema.   Eyes:      Extraocular Movements: Extraocular movements intact.      Pupils: Pupils are equal, round, and reactive to light.    Cardiovascular:      Rate and Rhythm: Normal rate and regular rhythm.   Pulmonary:      Breath sounds: Rhonchi present.      Comments: Oxygen at 4 L with saturation 95%.  Reports thick yellow sputum production.  Abdominal:      Palpations: Abdomen is soft.      Tenderness: There is no abdominal tenderness.   Genitourinary:     Comments: Voiding.  Musculoskeletal:         General: Swelling (Bilateral lower extremities, chronic) present.      Cervical back: Normal range of motion and neck supple.   Skin:     Findings: Erythema (Bilateral lower extremities, chronic edema) present.   Neurological:      General: No focal deficit present.      Mental Status: She is alert and oriented to person, place, and time.   Psychiatric:         Mood and Affect: Mood normal.         Behavior: Behavior normal.         Thought Content: Thought content normal.         Judgment: Judgment normal.       Results Review:  I have reviewed the labs, radiology results, and diagnostic studies.    Laboratory Data:   Results from last 7 days   Lab Units 02/14/24  0754 02/13/24  1641   WBC 10*3/mm3 8.19 8.46   HEMOGLOBIN g/dL 10.0* 10.4*   HEMATOCRIT % 32.6* 35.4   PLATELETS 10*3/mm3 195 207        Results from last 7 days   Lab Units 02/14/24  0754 02/13/24  2141 02/13/24  1726 02/13/24  1641   SODIUM mmol/L 139  --   --  138   SODIUM, ARTERIAL mmol/L  --  140 140  --    POTASSIUM mmol/L 4.2  --   --  4.2   CHLORIDE mmol/L 97*  --   --  95*   CO2 mmol/L 31.0*  --   --  34.0*   BUN mg/dL 15  --   --  12   CREATININE mg/dL 1.05*  --   --  1.04*   CALCIUM mg/dL 8.9  --   --  9.6   BILIRUBIN mg/dL  --   --   --  0.5   ALK PHOS U/L  --   --   --  108   ALT (SGPT) U/L  --   --   --  11   AST (SGOT) U/L  --   --   --  23   GLUCOSE mg/dL 174*  --   --  103*     Microbiology Results (last 10 days)       Procedure Component Value - Date/Time    MRSA Screen, PCR (Inpatient) - Swab, Nares [[id number]]  (Normal) Collected: 02/14/24 0540    Lab Status: Final  result Specimen: Swab from Nares Updated: 02/14/24 0708     MRSA PCR No MRSA Detected    Narrative:      The negative predictive value of this diagnostic test is high and should only be used to consider de-escalating anti-MRSA therapy. A positive result may indicate colonization with MRSA and must be correlated clinically.    S. Pneumo Ag Urine or CSF - Urine, Urine, Clean Catch [[id number]]  (Normal) Collected: 02/14/24 0236    Lab Status: Final result Specimen: Urine, Clean Catch Updated: 02/14/24 0322     Strep Pneumo Ag Negative    Legionella Antigen, Urine - Urine, Urine, Clean Catch [[id number]]  (Normal) Collected: 02/14/24 0236    Lab Status: Final result Specimen: Urine, Clean Catch Updated: 02/14/24 0322     LEGIONELLA ANTIGEN, URINE Negative    COVID PRE-OP / PRE-PROCEDURE SCREENING ORDER (NO ISOLATION) - Swab, Nasopharynx [[id number]]  (Abnormal) Collected: 02/13/24 1653    Lab Status: Final result Specimen: Swab from Nasopharynx Updated: 02/13/24 1817    Narrative:      The following orders were created for panel order COVID PRE-OP / PRE-PROCEDURE SCREENING ORDER (NO ISOLATION) - Swab, Nasopharynx.  Procedure                               Abnormality         Status                     ---------                               -----------         ------                     COVID-19 and FLU A/B PCR...[[id number]]  Abnormal            Final result                 Please view results for these tests on the individual orders.    COVID-19 and FLU A/B PCR, 1 HR TAT - Swab, Nasopharynx [[id number]]  (Abnormal) Collected: 02/13/24 1653    Lab Status: Final result Specimen: Swab from Nasopharynx Updated: 02/13/24 1817     COVID19 Not Detected     Influenza A PCR Not Detected     Influenza B PCR Detected    Narrative:      Fact sheet for providers: https://www.fda.gov/media/622391/download    Fact sheet for patients: https://www.fda.gov/media/256205/download    Test performed by PCR.           Radiology Data:   Imaging  Results (Last 24 Hours)       Procedure Component Value Units Date/Time    CT Angiogram Chest [[id number]] Collected: 02/13/24 2030     Updated: 02/13/24 2044    Narrative:      EXAMINATION:  CT ANGIOGRAM CHEST-  2/13/2024 7:04 PM     HISTORY: Elevated D-dimer. Shortness of air. D-dimer 1.2.     COMPARISON : 2/13/2020.     DLP: 266.29 mGy.cm Automated dosage reduction technique was utilized to  decrease patient dosage.     TECHNIQUE: CT angio was performed of the chest with IV contrast.  Coronal, sagittal and 3-D reconstruction were performed.     INDEPENDENT 3-D WORKSTATION UTILIZED FOR RECONSTRUCTION: Yes. A  radiologist was not present in the department.     MEDIASTINUM, HEART AND VASCULAR STRUCTURES: There is atheromatous  calcification of the thoracic aorta and coronary arteries. The thoracic  aorta is normal in caliber. The pulmonary arteries are normal in  caliber. There is slightly less than optimal opacification of the  pulmonary arteries. No central pulmonary embolus is seen. More  peripheral vessels are difficult to evaluate due to limited  opacification. There is mild cardiomegaly. Mediastinal lymph nodes  measure up to 8 mm in short axis diameter. They are not enlarged by size  criteria. There are small hilar lymph nodes.     LUNGS: There are tree-in-bud opacities bilaterally in the upper lobes.  There are tree-in-bud opacities in the right middle lobe, lingula and  bilateral lower lobes. There are retained secretions in bilateral lower  lobe bronchi. There is a 5 mm subpleural right upper lobe pulmonary  nodule versus more focal consolidation in the area of tree-in-bud  opacification. There is a 3-4 mm fissural nodule along the left major  fissure image 32 series 6. There are focal areas of consolidation versus  atelectasis in the right lower lobe inferiorly and posteriorly. There  are 2 adjacent pleural-based opacities in the right upper lobe  posteriorly image 59 series 6, likely focal areas of  atelectasis.     UPPER ABDOMEN: There is fatty infiltration of the liver. There are no  acute appearing findings in the upper abdomen.     BONES: There are degenerative changes of the spine.          Impression:      1. Somewhat limited opacification of the pulmonary arteries. No definite  central pulmonary emboli. More peripheral pulmonary artery branches are  difficult to fully evaluate. There is atheromatous disease of the  thoracic aorta and coronary arteries. There is cardiomegaly.  2. Bilateral tree-in-bud opacities as well as areas of patchy  consolidation, worrisome for pneumonia. The findings are likely  infectious or inflammatory. There are also retained secretions in  bilateral lower lobe bronchi.  3. A 5 mm subpleural right upper lobe nodule versus a more focal  consolidation in an area of tree-in-bud opacification. There are also  focal areas of consolidation versus atelectasis in the right lower lobe  posteriorly and inferiorly. There are 2 adjacent pleural-based opacities  in the right upper lobe posteriorly likely areas of focal atelectasis or  pneumonia. A follow-up CT in 3-6 months would be helpful to document  resolution of findings.  4. There is a tiny fissural nodule along the left major fissure, likely  a fissural lymph node. There are small mediastinal lymph nodes that are  likely reactive.  5. Fatty infiltration of the liver.        The full report of this exam was immediately signed and available to the  emergency room. The patient is currently in the emergency room.     This report was signed and finalized on 2/13/2024 8:41 PM by Dr. Tunde Christopher MD.       XR Chest 1 View [[id number]] Collected: 02/13/24 1713     Updated: 02/13/24 1717    Narrative:      EXAMINATION:  XR CHEST 1 VW-  2/13/2024 4:11 PM     HISTORY: Shortness of air.     COMPARISON: 4/17/2021.     TECHNIQUE: Single view AP image.     FINDINGS: There is blunting of the left costophrenic angle versus  scarring. There are  linear infiltrates in both lung bases. There is  stable bronchial wall thickening. Heart size is upper limits of normal.  No acute appearing bony abnormality is seen.          Impression:      1. Linear infiltrates in both lung bases. Atelectasis versus scarring.  2. Blunting of the left costophrenic angle may represent scarring versus  a small amount of pleural fluid.  3. Stable bronchial wall thickening.           This report was signed and finalized on 2/13/2024 5:14 PM by Dr. Tunde Christopher MD.             azithromycin, 500 mg, Intravenous, Q24H  budesonide-formoterol, 2 puff, Inhalation, BID - RT  [START ON 2/15/2024] cefTRIAXone, 2,000 mg, Intravenous, Q24H  enoxaparin, 40 mg, Subcutaneous, Daily  furosemide, 20 mg, Oral, Daily  hydrOXYzine, 50 mg, Oral, Nightly  ipratropium-albuterol, 3 mL, Nebulization, 4x Daily - RT  levothyroxine, 75 mcg, Oral, Q AM  methylPREDNISolone sodium succinate, 40 mg, Intravenous, Q8H  oseltamivir, 30 mg, Oral, Q12H  pantoprazole, 40 mg, Oral, Q AM  piperacillin-tazobactam, 3.375 g, Intravenous, Q8H  sodium chloride, 10 mL, Intravenous, Q12H       I have reviewed the patient's current medications.     Assessment/Plan   Assessment  Active Hospital Problems    Diagnosis     **Acute on chronic respiratory failure with hypoxia and hypercapnia due to influenza B     Lymphedema     Influenza B     Sepsis     Pneumonia of both lungs due to infectious organism, tree in bud     Essential hypertension     Hypothyroidism (acquired)     Normocytic anemia     Cellulitis of right lower extremity     COPD (chronic obstructive pulmonary disease)        Treatment Plan    1.  Acute on chronic hypoxic hypercapnic respiratory failure secondary to influenza B.  Reported increasing shortness of breath for the past week.  pH 7.31, pCO2 66 on BiPAP.  Patient has been transitioned back to oxygen at 4 L.  Continue Symbicort, DuoNebs, incentive spirometry, flutter valve, Solu-Medrol, Mucinex.    2.   Influenza B. Add Tamiflu 30 mg twice daily for 5 days.  Discussed Wilber Holloway, pharmacist.  Supportive care.    3.  Pneumonia with tree-in-bud opacity.  Azithromycin and Zosyn ordered admission.  Pharmacy recommends switching Zosyn to Rocephin.  Strep pneumonia negative, Legionella negative, MRSA PCR negative.  Continue DuoNebs 4 times daily, Symbicort, Solu-Medrol every 8 hours, spirometry and flutter valve, Mucinex.    4.  Primary hypertension.  Blood pressure 158/87.  Still listed as metoprolol home medication and pharmacy clarified patient does not take.    5.  Hypothyroidism.  Continue Synthroid    6.  Chronic lymphedema bilateral lower extremities.  Lower extremities reddened, swollen, no open areas or drainage.  Physical therapy consulted and Unna boots placed today.    7.  COPD.  Wears oxygen at 4 L continuously.  Continue Symbicort, DuoNebs, Solu-Medrol, incentive spirometry, flutter valve, Mucinex    8.  Normocytic anemia.  Hemoglobin 10.4.  Iron 18, saturation 7%, ferritin 356, transferrin 170, TIBC 253.  Repeat CBC in AM.    9.  Lovenox for deep vein thrombosis prophylaxis.      Medical Decision Making  Number and Complexity of problems: 8  Acute on chronic hypoxic hypercapnic respiratory failure secondary to influenza B.  Acute, high complexity poses threat to life and bodily function, required BiPAP, improving  Influenza B: Acute, high complexity, not at baseline  Pneumonia with tree-in-bud opacity: Acute, high complexity, not at baseline  Primary hypertension: Chronic, moderate complexity, not at baseline  Hypothyroidism: Chronic, low complexity, stable  Chronic edema lower extremities: Chronic, moderate complexity, not at baseline  COPD: Chronic, high complexity, not at baseline  Normocytic anemia: Chronic, moderate complexity, stable    Differential Diagnosis: None    Conditions and Status        Condition is improving.     [facility] Data  External documents reviewed: Otology office visit  1/19/2024  Cardiac tracing (EKG, telemetry) interpretation: Normal sinus rhythm at 84 on telemetry  Radiology interpretation: Reviewed radiology interpretation CTA chest, chest x-ray  Labs reviewed:   BMP 2/14/2024.  Repeat BMP in AM.  CBC 2/14/2024.  Repeat CBC in AM.  ABGs  Hemoglobin A1c, TSH, iron panel  Pneumonia, Legionella, MRSA nasal swab  Blood cultures pending    Any tests that were considered but not ordered: None     Decision rules/scores evaluated (example [id number], [place], etc): None     Discussed with: Dr. Tolentino and patient.     Care Planning  Shared decision making: Dr. Tolentino and patient.  Patient agrees to IV antibiotics, steroids, Mucinex, nebulizer treatments.  Agrees to physical therapy consulted and Mary Janea boots.  Code status and discussions: Full code  The patient surrogate decision maker is her daughter, Madeleine Bates  Social Determinants of Health that impact treatment or disposition: None  I expect the patient to be discharged to home in 3-4 days.     Electronically signed by JOAQUÍN Amaro, 02/14/24, 14:52 CST.     The above documentation resulted from a face-to-face encounter by me Trinh YANES, [facility].

## 2024-02-14 NOTE — PLAN OF CARE
Goal Outcome Evaluation:  Plan of Care Reviewed With: patient        Progress: no change  Outcome Evaluation: Patient has had a good day, up in chair most of the day, no c/o pain, vitals WNL on 4L oxygen. Wound care saw her and ordered a special barrier cream for her groin excoriation. PT wrapped her legs in sarah boots. Started her on tamiflu. Calls out repeatedly.

## 2024-02-14 NOTE — ED NOTES
"Nursing report ED to floor  Emy Bermudez  73 y.o.  female    HPI:   Chief Complaint   Patient presents with    Shortness of Breath       Admitting doctor:   Isabel Choi DO    Consulting provider(s):  Consults       No orders found from 1/15/2024 to 2/14/2024.             Admitting diagnosis:   The primary encounter diagnosis was Acute respiratory failure with hypoxia and hypercapnia. Diagnoses of Influenza, Multifocal pneumonia, Pulmonary nodule, and Acute on chronic respiratory failure with hypoxia and hypercapnia were also pertinent to this visit.    Code status:   Current Code Status       Date Active Code Status Order ID Comments User Context       2/13/2024 2122 CPR (Attempt to Resuscitate) [id number]  Lavinia Chou, [facility]        Question Answer    Code Status (Patient has no pulse and is not breathing) CPR (Attempt to Resuscitate)    Medical Interventions (Patient has pulse or is breathing) Full Support                    Allergies:   Patient has no known allergies.    Intake and Output  No intake or output data in the 24 hours ending 02/13/24 2129    Weight:       02/13/24  1633   Weight: 85.5 kg (188 lb 8 oz)       Most recent vitals:   Vitals:    02/13/24 1633 02/13/24 1823 02/13/24 1938 02/13/24 2101   BP: 166/87   148/81   Pulse: 106 113 100 95   Resp: (!) 35 28 (!) 33    Temp: (!) 100.6 °F (38.1 °C)      TempSrc: Oral      SpO2: 100% 96% 96% 98%   Weight: 85.5 kg (188 lb 8 oz)      Height: 160 cm (63\")        Oxygen Therapy: .    Active LDAs/IV Access:   Lines, Drains & Airways       Active LDAs       Name Placement date Placement time Site Days    Peripheral IV 02/13/24 1636 Posterior;Right Hand 02/13/24  1636  Hand  less than 1    Peripheral IV 02/13/24 1947 Anterior;Right Forearm 02/13/24 1947  Forearm  less than 1    Colostomy RUQ 04/04/18  1159  RUQ  2141                    Labs (abnormal labs have a star):   Labs Reviewed   COVID-19 AND FLU A/B, NP SWAB IN TRANSPORT MEDIA 1 HR TAT - " Abnormal; Notable for the following components:       Result Value    Influenza B PCR Detected (*)     All other components within normal limits    Narrative:     Fact sheet for providers: https://www.fda.gov/media/856028/download    Fact sheet for patients: https://www.fda.gov/media/788809/download    Test performed by PCR.   COMPREHENSIVE METABOLIC PANEL - Abnormal; Notable for the following components:    Glucose 103 (*)     Creatinine 1.04 (*)     Chloride 95 (*)     CO2 34.0 (*)     eGFR 56.5 (*)     All other components within normal limits    Narrative:     GFR Normal >60  Chronic Kidney Disease <60  Kidney Failure <15    The GFR formula is only valid for adults with stable renal function between ages 18 and 70.   SINGLE HSTROPONIN T - Abnormal; Notable for the following components:    HS Troponin T 21 (*)     All other components within normal limits    Narrative:     High Sensitive Troponin T Reference Range:  <14.0 ng/L- Negative Female for AMI  <22.0 ng/L- Negative Male for AMI  >=14 - Abnormal Female indicating possible myocardial injury.  >=22 - Abnormal Male indicating possible myocardial injury.   Clinicians would have to utilize clinical acumen, EKG, Troponin, and serial changes to determine if it is an Acute Myocardial Infarction or myocardial injury due to an underlying chronic condition.        CBC WITH AUTO DIFFERENTIAL - Abnormal; Notable for the following components:    Hemoglobin 10.4 (*)     MCH 26.5 (*)     MCHC 29.4 (*)     RDW 17.6 (*)     RDW-SD 58.0 (*)     Neutrophil % 84.4 (*)     Lymphocyte % 6.7 (*)     Immature Grans % 0.8 (*)     Neutrophils, Absolute 7.14 (*)     Lymphocytes, Absolute 0.57 (*)     Immature Grans, Absolute 0.07 (*)     All other components within normal limits   D-DIMER, QUANTITATIVE - Abnormal; Notable for the following components:    D-Dimer, Quantitative 1.20 (*)     All other components within normal limits    Narrative:     According to the assay  "'s published package insert, a normal (<0.50 MCGFEU/mL) D-dimer result in conjunction with a non-high clinical probability assessment, excludes deep vein thrombosis (DVT) and pulmonary embolism (PE) with high sensitivity.    D-dimer values increase with age and this can make VTE exclusion of an older population difficult. To address this, the [employer], based on best available evidence and recent guidelines, recommends that clinicians use age-adjusted D-dimer thresholds in patients greater than 50 years of age with: a) a low probability of PE who do not meet all Pulmonary Embolism Rule Out Criteria, or b) in those with intermediate probability of PE.   The formula for an age-adjusted D-dimer cut-off is \"age/100\".  For example, a 60 year old patient would have an age-adjusted cut-off of 0.60 MCGFEU/mL and an 80 year old 0.80 MCGFEU/mL.   PROCALCITONIN - Abnormal; Notable for the following components:    Procalcitonin 0.35 (*)     All other components within normal limits    Narrative:     As a Marker for Sepsis (Non-Neonates):    1. <0.5 ng/mL represents a low risk of severe sepsis and/or septic shock.  2. >2 ng/mL represents a high risk of severe sepsis and/or septic shock.    As a Marker for Lower Respiratory Tract Infections that require antibiotic therapy:    PCT on Admission    Antibiotic Therapy       6-12 Hrs later    >0.5                Strongly Recommended  >0.25 - <0.5        Recommended   0.1 - 0.25          Discouraged              Remeasure/reassess PCT  <0.1                Strongly Discouraged     Remeasure/reassess PCT    As 28 day mortality risk marker: \"Change in Procalcitonin Result\" (>80% or <=80%) if Day 0 (or Day 1) and Day 4 values are available. Refer to http://www.[employer]-pct-calculator.com    Change in PCT <=80%  A decrease of PCT levels below or equal to 80% defines a positive change in PCT test result representing a higher risk for 28-day all-cause mortality " of patients diagnosed with severe sepsis for septic shock.    Change in PCT >80%  A decrease of PCT levels of more than 80% defines a negative change in PCT result representing a lower risk for 28-day all-cause mortality of patients diagnosed with severe sepsis or septic shock.      BLOOD GAS, ARTERIAL W/CO-OXIMETRY - Abnormal; Notable for the following components:    pCO2, Arterial 63.4 (*)     HCO3, Arterial 35.4 (*)     Base Excess, Arterial 8.3 (*)     Hemoglobin, Blood Gas 9.7 (*)     Hematocrit, Blood Gas 29.8 (*)     pCO2, Temperature Corrected 63.4 (*)     All other components within normal limits   SINGLE HSTROPONIN T - Abnormal; Notable for the following components:    HS Troponin T 24 (*)     All other components within normal limits    Narrative:     High Sensitive Troponin T Reference Range:  <14.0 ng/L- Negative Female for AMI  <22.0 ng/L- Negative Male for AMI  >=14 - Abnormal Female indicating possible myocardial injury.  >=22 - Abnormal Male indicating possible myocardial injury.   Clinicians would have to utilize clinical acumen, EKG, Troponin, and serial changes to determine if it is an Acute Myocardial Infarction or myocardial injury due to an underlying chronic condition.        BNP (IN-HOUSE) - Normal    Narrative:     This assay is used as an aid in the diagnosis of individuals suspected of having heart failure. It can be used as an aid in the diagnosis of acute decompensated heart failure (ADHF) in patients presenting with signs and symptoms of ADHF to the emergency department (ED). In addition, NT-proBNP of <300 pg/mL indicates ADHF is not likely.    Age Range Result Interpretation  NT-proBNP Concentration (pg/mL:      <50             Positive            >450                   Gray                 300-450                    Negative             <300    50-75           Positive            >900                  Gray                300-900                  Negative            <300      >75              Positive            >1800                  Gray                300-1800                  Negative            <300   LACTIC ACID, PLASMA - Normal   MAGNESIUM - Normal   COVID PRE-OP / PRE-PROCEDURE SCREENING ORDER (NO ISOLATION)    Narrative:     The following orders were created for panel order COVID PRE-OP / PRE-PROCEDURE SCREENING ORDER (NO ISOLATION) - Swab, Nasopharynx.  Procedure                               Abnormality         Status                     ---------                               -----------         ------                     COVID-19 and FLU A/B PCR...[[id number]]  Abnormal            Final result                 Please view results for these tests on the individual orders.   BLOOD CULTURE   BLOOD CULTURE   RESPIRATORY CULTURE   STREP PNEUMO AG, URINE OR CSF   LEGIONELLA ANTIGEN, URINE   MRSA SCREEN, PCR   RAINBOW DRAW    Narrative:     The following orders were created for panel order [place] Draw.  Procedure                               Abnormality         Status                     ---------                               -----------         ------                     Green Top (Gel)[[id number]]                                  Final result               Lavender Top[[id number]]                                     Final result               Red Top[[id number]]                                          Final result               Light Blue Top[[id number]]                                   Final result                 Please view results for these tests on the individual orders.   BLOOD GAS, ARTERIAL W/CO-OXIMETRY   BLOOD GAS, ARTERIAL   FERRITIN   IRON PROFILE   CBC AND DIFFERENTIAL    Narrative:     The following orders were created for panel order CBC & Differential.  Procedure                               Abnormality         Status                     ---------                               -----------         ------                     CBC Auto Differential[[id number]]        Abnormal             Final result                 Please view results for these tests on the individual orders.   GREEN TOP   LAVENDER TOP   RED TOP   LIGHT BLUE TOP       Meds given in ED:   Medications   sodium chloride 0.9 % flush 10 mL (has no administration in time range)   sodium chloride 0.9 % flush 10 mL (has no administration in time range)   azithromycin (ZITHROMAX) 500 mg in sodium chloride 0.9 % 250 mL IVPB-VTB (500 mg Intravenous New Bag 2/13/24 2039)   sodium chloride 0.9 % flush 10 mL (has no administration in time range)   sodium chloride 0.9 % flush 10 mL (has no administration in time range)   sodium chloride 0.9 % infusion 40 mL (has no administration in time range)   piperacillin-tazobactam (ZOSYN) 3.375 g IVPB in 100 mL NS MBP (CD) (has no administration in time range)   azithromycin (ZITHROMAX) 500 mg in sodium chloride 0.9 % 250 mL IVPB-VTB (has no administration in time range)   ipratropium-albuterol (DUO-NEB) nebulizer solution 3 mL (has no administration in time range)   methylPREDNISolone sodium succinate (SOLU-Medrol) injection 40 mg (has no administration in time range)   Enoxaparin Sodium (LOVENOX) syringe 40 mg (has no administration in time range)   nitroglycerin (NITROSTAT) SL tablet 0.4 mg (has no administration in time range)   acetaminophen (TYLENOL) tablet 650 mg (has no administration in time range)     Or   acetaminophen (TYLENOL) 160 MG/5ML oral solution 650 mg (has no administration in time range)     Or   acetaminophen (TYLENOL) suppository 650 mg (has no administration in time range)   sennosides-docusate (PERICOLACE) 8.6-50 MG per tablet 2 tablet (has no administration in time range)     And   polyethylene glycol (MIRALAX) packet 17 g (has no administration in time range)     And   bisacodyl (DULCOLAX) EC tablet 5 mg (has no administration in time range)     And   bisacodyl (DULCOLAX) suppository 10 mg (has no administration in time range)   ondansetron ODT (ZOFRAN-ODT) disintegrating  tablet 4 mg (has no administration in time range)     Or   ondansetron (ZOFRAN) injection 4 mg (has no administration in time range)   acetaminophen (TYLENOL) tablet 1,000 mg (1,000 mg Oral Given 2/13/24 1701)   ipratropium-albuterol (DUO-NEB) nebulizer solution 3 mL (3 mL Nebulization Given 2/13/24 1823)   methylPREDNISolone sodium succinate (SOLU-Medrol) injection 125 mg (125 mg Intravenous Given 2/13/24 1838)   cefTRIAXone (ROCEPHIN) 1,000 mg in sodium chloride 0.9 % 100 mL IVPB (0 mg Intravenous Stopped 2/13/24 2039)   iopamidol (ISOVUE-370) 76 % injection 100 mL (100 mL Intravenous Given 2/13/24 2022)           NIH Stroke Scale:       Isolation/Infection(s):  No active isolations   VRE, Influenza     COVID Testing  Collected .  Resulted .    Nursing report ED to floor:  Mental status: .A&O x4  Ambulatory status: wheelchair bound  Precautions: .fall    ED nurse phone extentsion- .. [phone]

## 2024-02-14 NOTE — PROGRESS NOTES
RT EQUIPMENT DEVICE RELATED - SKIN ASSESSMENT    Anselmo Score:        RT Medical Equipment/Device:     NIV Mask:  Under-the-nose   size:  A    Skin Assessment:      Cheek:  Intact  Chin:  Intact  Neck:  Intact  Nose:  Intact  Mouth:  Intact    Device Skin Pressure Protection:  Skin-to-device areas padded:  None Required    Nurse Notification:  Gifty Mercado, RRT

## 2024-02-14 NOTE — H&P
[facility] Medicine Services  HISTORY AND PHYSICAL    Date of Admission: 2/13/2024  Primary Care Physician: Dorie Segura APRN    Subjective   Primary Historian: Patient    Chief Complaint: Shortness of breathing    History of Present Illness  Emy Bermudez is a 73-year-old female with a past medical history of COPD with a remote history of tobacco use, hypertension, chronic respiratory failure on 4 L nasal cannula continuously, suspected bilateral lower extremity lymphedema on chronic diuretics, obesity, please see below for complete list.  Patient presents to [facility] emergency department via EMS for complaints of weeklong history of shortness of breathing.  Workup did reveal hypercapnia on ABGs, pO2 was in the in normal limits on 4 L, workup revealed positive influenza A, bilateral tree-in-bud pneumonia.  Patient states she does have a cough, positive sputum production thick and yellowish in color.  Patient has been feverish.  Patient does meet criteria for sepsis, blood pressure is within normal limits.  Due to concerns for volume overload we will not give the sepsis bolus nor start fluids and watch closely.  Patient has no other complaints.  She is requesting something to eat.  Tolerating BiPAP without difficulty.  She is admitted for further evaluation and treatment.    Review of Systems   Otherwise complete ROS reviewed and negative except as mentioned in the HPI.    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    Cellulitis     Chronic respiratory failure with hypoxia 2/13/2020    Colonic obstruction 4/3/2018    COPD (chronic obstructive pulmonary disease)     Depression     Diverticul disease small and large intestine, no perforati or abscess     Edema     GERD (gastroesophageal reflux disease)     Hyperlipidemia     Hypertension     Hypocalcemia     Hypothyroid     Obesity,  Class III, BMI 40-49.9 (morbid obesity) 2020    Respiratory failure     Tobacco dependence 2020    Venous insufficiency     Vertigo      Past Surgical History:  Past Surgical History:   Procedure Laterality Date    ABDOMINAL SURGERY       SECTION      COLOSTOMY      ETHMOID ARTERY LIGATION N/A 2021    Procedure: ETHMOID ARTERY LIGATION;  Surgeon: Elliot Mack Jr., MD;  Location:  [facility] OR;  Service: ENT;  Laterality: N/A;    EXAM UNDER ANESTHESIA N/A 2021    Procedure: SEPTOPLASTY TURBOPLASTY ENDOSCOPIC CONTROL LEFT NOSE BLEED;  Surgeon: Elliot Mack Jr., MD;  Location:  [facility] OR;  Service: ENT;  Laterality: N/A;    EXPLORATORY LAPAROTOMY N/A 2018    Procedure: LAPAROTOMY EXPLORATORY, partial colectomy, creation colostomy, incision and drainage abdominal wall abscess;  Surgeon: Elda Velazquez MD;  Location:  [facility] OR;  Service: General    INTERNAL MAXILLARY ARTERY AND ETHMOID ARTERY LIGATION N/A 2021    Procedure: INTERNAL MAXILLARY ARTERY AND ETHMOID ARTERY LIGATION;  Surgeon: Elliot Mack Jr., MD;  Location:  [facility] OR;  Service: ENT;  Laterality: N/A;    SEPTOPLASTY, RESECTION INFERIOR TURBINATES Bilateral 2021    Procedure: SEPTOPLASTY, RESECTION INFERIOR TURBINATES;  Surgeon: Elliot Mack Jr., MD;  Location:  [facility] OR;  Service: ENT;  Laterality: Bilateral;     Social History:  reports that she quit smoking about 4 years ago. She has a 3.75 pack-year smoking history. She has never used smokeless tobacco. She reports that she does not drink alcohol and does not use drugs.    Family History: family history is not on file. She was adopted.       Allergies:  No Known Allergies    Medications:  Prior to Admission medications    Medication Sig Start Date End Date Taking? Authorizing Provider   albuterol sulfate  (90 Base) MCG/ACT inhaler Inhale 2 puffs Every 6 (Six) Hours As Needed for Wheezing. Inhale 1 to 2 puffs by mouth every 4 to 6  "hours as needed for wheezing or sob   Indications: Chronic Obstructive Lung Disease    Sukhdev Davis MD   budesonide-formoterol (SYMBICORT) 160-4.5 MCG/ACT inhaler Inhale 2 puffs 2 (Two) Times a Day. Indications: Chronic Obstructive Lung Disease    Sukhdev Davis MD   FLUoxetine (PROzac) 40 MG capsule Take 40 mg by mouth Daily.    Sukhdev Davis MD   furosemide (LASIX) 20 MG tablet Take 20 mg by mouth Every Morning. Indications: High Blood Pressure Disorder    Sukhdev Davis MD   hydrOXYzine (ATARAX) 50 MG tablet Take 50 mg by mouth Every Night. Indications: Feeling Anxious    Sukhdev Davis MD   levothyroxine (SYNTHROID, LEVOTHROID) 75 MCG tablet Take 75 mcg by mouth Daily. Indications: Underactive Thyroid    Sukhdev Davis MD   metoprolol succinate XL (TOPROL-XL) 25 MG 24 hr tablet Take 25 mg by mouth Daily.    Sukhdev Davis MD   omeprazole (priLOSEC) 20 MG capsule Take 20 mg by mouth Daily. Indications: Heartburn    Sukhdev Davis MD   potassium chloride (K-DUR,KLOR-CON) 10 MEQ CR tablet Take 10 mEq by mouth 2 (Two) Times a Day. Indications: Low Amount of Potassium in the Blood    Sukhdev Davis MD   promethazine (PHENERGAN) 25 MG suppository Insert 1 suppository into the rectum Every 6 (Six) Hours As Needed for Nausea or Vomiting. 10/15/22   Wilber Whittington PA-C   vitamin D3 125 MCG (5000 UT) capsule capsule Take 5,000 Units by mouth Daily.    ProviderSukhdev MD     I have utilized all available immediate resources to obtain, update, or review the patient's current medications (including all prescriptions, over-the-counter products, herbals, cannabis/cannabidiol products, and vitamin/mineral/dietary (nutritional) supplements).    Objective     Vital Signs: /81   Pulse 95   Temp (!) 100.6 °F (38.1 °C) (Oral)   Resp (!) 33   Ht 160 cm (63\")   Wt 85.5 kg (188 lb 8 oz)   SpO2 98%   BMI 33.39 kg/m²   Physical Exam  Vitals reviewed. "   Constitutional:       Appearance: She is obese. She is ill-appearing.   HENT:      Head: Normocephalic and atraumatic.      Mouth/Throat:      Mouth: Mucous membranes are dry.      Pharynx: Oropharynx is clear.   Eyes:      Extraocular Movements: Extraocular movements intact.      Conjunctiva/sclera: Conjunctivae normal.   Cardiovascular:      Rate and Rhythm: Regular rhythm. Tachycardia present.   Pulmonary:      Breath sounds: Wheezing (Scattered) and rhonchi present.   Abdominal:      General: Abdomen is protuberant.      Palpations: Abdomen is soft.   Musculoskeletal:      Cervical back: Normal range of motion and neck supple.      Right lower leg: Edema present.      Left lower leg: Edema present.      Comments: Generalized weakness and debility, appearance of bilateral lower extremity lymphedema, signs of ocular insufficiency   Skin:     General: Skin is warm and dry.      Findings: Erythema (Right lower extremity) present.   Neurological:      General: No focal deficit present.      Mental Status: She is alert and oriented to person, place, and time.   Psychiatric:         Mood and Affect: Mood normal.         Behavior: Behavior normal.        Results Reviewed:  Lab Results (last 24 hours)       Procedure Component Value Units Date/Time    Single High Sensitivity Troponin T [[id number]]  (Abnormal) Collected: 02/13/24 1846    Specimen: Blood Updated: 02/13/24 1916     HS Troponin T 24 ng/L     COVID-19 and FLU A/B PCR, 1 HR TAT - Swab, Nasopharynx [[id number]]  (Abnormal) Collected: 02/13/24 1653    Specimen: Swab from Nasopharynx Updated: 02/13/24 1817     COVID19 Not Detected     Influenza A PCR Not Detected     Influenza B PCR Detected    Blood Culture - Blood, Hand, Right [[id number]] Collected: 02/13/24 1650    Specimen: Blood from Hand, Right Updated: 02/13/24 1735    Blood Culture - Blood, Arm, Left [[id number]] Collected: 02/13/24 1646    Specimen: Blood from Arm, Left Updated: 02/13/24 1735     Procalcitonin [[id number]]  (Abnormal) Collected: 02/13/24 1641    Specimen: Blood Updated: 02/13/24 1731     Procalcitonin 0.35 ng/mL     Comprehensive Metabolic Panel [[id number]]  (Abnormal) Collected: 02/13/24 1641    Specimen: Blood Updated: 02/13/24 1725     Glucose 103 mg/dL      BUN 12 mg/dL      Creatinine 1.04 mg/dL      Sodium 138 mmol/L      Potassium 4.2 mmol/L      Chloride 95 mmol/L      CO2 34.0 mmol/L      Calcium 9.6 mg/dL      Total Protein 8.2 g/dL      Albumin 3.8 g/dL      ALT (SGPT) 11 U/L      AST (SGOT) 23 U/L      Alkaline Phosphatase 108 U/L      Total Bilirubin 0.5 mg/dL      Globulin 4.4 gm/dL      A/G Ratio 0.9 g/dL      BUN/Creatinine Ratio 11.5     Anion Gap 9.0 mmol/L      eGFR 56.5 mL/min/1.73     Magnesium [[id number]]  (Normal) Collected: 02/13/24 1641    Specimen: Blood Updated: 02/13/24 1722     Magnesium 2.1 mg/dL     Blood Gas, Arterial With Co-Ox [[id number]]  (Abnormal) Collected: 02/13/24 1726    Specimen: Arterial Blood Updated: 02/13/24 1722     Site Right Radial     Beto's Test N/A     pH, Arterial 7.355 pH units      pCO2, Arterial 63.4 mm Hg      Comment: 83 Value above reference range        pO2, Arterial 100.0 mm Hg      HCO3, Arterial 35.4 mmol/L      Comment: 83 Value above reference range        Base Excess, Arterial 8.3 mmol/L      Comment: 83 Value above reference range        O2 Saturation, Arterial 97.9 %      Hemoglobin, Blood Gas 9.7 g/dL      Comment: 84 Value below reference range        Hematocrit, Blood Gas 29.8 %      Comment: 84 Value below reference range        Oxyhemoglobin 95.6 %      Methemoglobin 0.90 %      Carboxyhemoglobin 1.4 %      A-a DO2 --     Comment: UNABLE TO CALCULATE        Temperature 37.0     Sodium, Arterial 140 mmol/L      Potassium, Arterial 3.9 mmol/L      Barometric Pressure for Blood Gas 752 mmHg      Modality Nasal Cannula     Flow Rate 4.0 lpm      Ventilator Mode NA     Collected by [id number]     Comment: Meter:  [id number]     :  [id number]        pH, Temp Corrected 7.355 pH Units      pCO2, Temperature Corrected 63.4 mm Hg      pO2, Temperature Corrected 100 mm Hg     BNP [[id number]]  (Normal) Collected: 02/13/24 1641    Specimen: Blood Updated: 02/13/24 1721     proBNP 49.6 pg/mL     Single High Sensitivity Troponin T [[id number]]  (Abnormal) Collected: 02/13/24 1641    Specimen: Blood Updated: 02/13/24 1720     HS Troponin T 21 ng/L     Lactic Acid, Plasma [[id number]]  (Normal) Collected: 02/13/24 1646    Specimen: Blood Updated: 02/13/24 1719     Lactate 1.0 mmol/L     D-dimer, Quantitative [[id number]]  (Abnormal) Collected: 02/13/24 1646    Specimen: Blood Updated: 02/13/24 1713     D-Dimer, Quantitative 1.20 MCGFEU/mL     CBC Auto Differential [[id number]]  (Abnormal) Collected: 02/13/24 1641    Specimen: Blood Updated: 02/13/24 1703     WBC 8.46 10*3/mm3      RBC 3.93 10*6/mm3      Hemoglobin 10.4 g/dL      Hematocrit 35.4 %      MCV 90.1 fL      MCH 26.5 pg      MCHC 29.4 g/dL      RDW 17.6 %      RDW-SD 58.0 fl      MPV 8.7 fL      Platelets 207 10*3/mm3      Neutrophil % 84.4 %      Lymphocyte % 6.7 %      Monocyte % 6.6 %      Eosinophil % 1.1 %      Basophil % 0.4 %      Immature Grans % 0.8 %      Neutrophils, Absolute 7.14 10*3/mm3      Lymphocytes, Absolute 0.57 10*3/mm3      Monocytes, Absolute 0.56 10*3/mm3      Eosinophils, Absolute 0.09 10*3/mm3      Basophils, Absolute 0.03 10*3/mm3      Immature Grans, Absolute 0.07 10*3/mm3      nRBC 0.0 /100 WBC           Imaging Results (Last 24 Hours)       Procedure Component Value Units Date/Time    CT Angiogram Chest [[id number]] Collected: 02/13/24 2030     Updated: 02/13/24 2044    Narrative:      EXAMINATION:  CT ANGIOGRAM CHEST-  2/13/2024 7:04 PM     HISTORY: Elevated D-dimer. Shortness of air. D-dimer 1.2.     COMPARISON : 2/13/2020.     DLP: 266.29 mGy.cm Automated dosage reduction technique was utilized to  decrease patient dosage.     TECHNIQUE:  CT angio was performed of the chest with IV contrast.  Coronal, sagittal and 3-D reconstruction were performed.     INDEPENDENT 3-D WORKSTATION UTILIZED FOR RECONSTRUCTION: Yes. A  radiologist was not present in the department.     MEDIASTINUM, HEART AND VASCULAR STRUCTURES: There is atheromatous  calcification of the thoracic aorta and coronary arteries. The thoracic  aorta is normal in caliber. The pulmonary arteries are normal in  caliber. There is slightly less than optimal opacification of the  pulmonary arteries. No central pulmonary embolus is seen. More  peripheral vessels are difficult to evaluate due to limited  opacification. There is mild cardiomegaly. Mediastinal lymph nodes  measure up to 8 mm in short axis diameter. They are not enlarged by size  criteria. There are small hilar lymph nodes.     LUNGS: There are tree-in-bud opacities bilaterally in the upper lobes.  There are tree-in-bud opacities in the right middle lobe, lingula and  bilateral lower lobes. There are retained secretions in bilateral lower  lobe bronchi. There is a 5 mm subpleural right upper lobe pulmonary  nodule versus more focal consolidation in the area of tree-in-bud  opacification. There is a 3-4 mm fissural nodule along the left major  fissure image 32 series 6. There are focal areas of consolidation versus  atelectasis in the right lower lobe inferiorly and posteriorly. There  are 2 adjacent pleural-based opacities in the right upper lobe  posteriorly image 59 series 6, likely focal areas of atelectasis.     UPPER ABDOMEN: There is fatty infiltration of the liver. There are no  acute appearing findings in the upper abdomen.     BONES: There are degenerative changes of the spine.          Impression:      1. Somewhat limited opacification of the pulmonary arteries. No definite  central pulmonary emboli. More peripheral pulmonary artery branches are  difficult to fully evaluate. There is atheromatous disease of the  thoracic  aorta and coronary arteries. There is cardiomegaly.  2. Bilateral tree-in-bud opacities as well as areas of patchy  consolidation, worrisome for pneumonia. The findings are likely  infectious or inflammatory. There are also retained secretions in  bilateral lower lobe bronchi.  3. A 5 mm subpleural right upper lobe nodule versus a more focal  consolidation in an area of tree-in-bud opacification. There are also  focal areas of consolidation versus atelectasis in the right lower lobe  posteriorly and inferiorly. There are 2 adjacent pleural-based opacities  in the right upper lobe posteriorly likely areas of focal atelectasis or  pneumonia. A follow-up CT in 3-6 months would be helpful to document  resolution of findings.  4. There is a tiny fissural nodule along the left major fissure, likely  a fissural lymph node. There are small mediastinal lymph nodes that are  likely reactive.  5. Fatty infiltration of the liver.        The full report of this exam was immediately signed and available to the  emergency room. The patient is currently in the emergency room.     This report was signed and finalized on 2/13/2024 8:41 PM by Dr. Tunde Christopher MD.       XR Chest 1 View [[id number]] Collected: 02/13/24 1713     Updated: 02/13/24 1717    Narrative:      EXAMINATION:  XR CHEST 1 VW-  2/13/2024 4:11 PM     HISTORY: Shortness of air.     COMPARISON: 4/17/2021.     TECHNIQUE: Single view AP image.     FINDINGS: There is blunting of the left costophrenic angle versus  scarring. There are linear infiltrates in both lung bases. There is  stable bronchial wall thickening. Heart size is upper limits of normal.  No acute appearing bony abnormality is seen.          Impression:      1. Linear infiltrates in both lung bases. Atelectasis versus scarring.  2. Blunting of the left costophrenic angle may represent scarring versus  a small amount of pleural fluid.  3. Stable bronchial wall thickening.           This report was signed  and finalized on 2/13/2024 5:14 PM by Dr. Tunde Christopher MD.             Assessment / Plan   Assessment:   Active Hospital Problems    Diagnosis     **Acute on chronic respiratory failure with hypoxia and hypercapnia     Lymphedema     Influenza B     Elevated troponin     Sepsis     Pneumonia of both lungs due to infectious organism, tree in bud     Essential hypertension     Hypothyroidism (acquired)     Normocytic anemia     Cellulitis of right lower extremity     COPD (chronic obstructive pulmonary disease)        Treatment Plan  1.  The patient will be admitted to Dr. Choi's service here at [facility].   2.  Continue azithromycin and start Zosyn  3.  Continue BiPAP-RT to manage, ABGs as needed  4.  Supplemental oxygen, (patient uses 4 L continuously at home), incentive spirometry, continuous pulse oximetry, OPEP  5.  DuoNebs, Solu-Medrol  6.  DVT prophylaxis with Lovenox  7.  Obtain urine studies for strep pneumoniae and Legionella, respiratory culture, MRSA screen  8.  Anemia studies, labs in a.m.  9.  Cardiac monitoring, daily weights, telemetry orders, turn every 2 hours  10.  Consult , OT and PT    Medical Decision Making  Number and Complexity of problems: 10  Differential Diagnosis: None    Conditions and Status        Condition is unchanged.     [facility] Data  External documents reviewed: No  Cardiac tracing (EKG, telemetry) interpretation: Reviewed  Radiology interpretation: Reviewed  Labs reviewed: Yes  Any tests that were considered but not ordered: No     Decision rules/scores evaluated (example [id number], Wells, etc): No     Discussed with: Patient and Dr. Choi  Patient presents to [facility] emergency department via EMS for complaints of weeklong history of shortness of breathing. Bilateral LE edema. BiPAP. Case and plan discussed with NP and agree.      Care Planning  Shared decision making: Patient and Dr. Choi  Code status and discussions:  Full    Disposition  Social Determinants of Health that impact treatment or disposition: None  Estimated length of stay is 2+ days.     I confirmed that the patient's advanced care plan is present, code status is documented, and a surrogate decision maker is listed in the patient's medical record.     The patient's surrogate decision maker is patient and daughters.     The patient was seen and examined by me on 2/13/2024 at 9 PM.    Electronically signed by JOAQUÍN Washington, 02/13/24, 21:37 CST.

## 2024-02-14 NOTE — CASE MANAGEMENT/SOCIAL WORK
Discharge Planning Assessment   Feliberto     Patient Name: Emy Bermudez  MRN: [id number]  Today's Date: 2/14/2024    Admit Date: 2/13/2024    Plan: [facility]   Discharge Needs Assessment       Row Name 02/14/24 1113       Living Environment    People in Home child(irene), adult    Name(s) of People in Home Lives with Joanne- Dtr and 2 dependent grandchildren    Current Living Arrangements home    Potentially Unsafe Housing Conditions none    In the past 12 months has the electric, gas, oil, or water company threatened to shut off services in your home? No    Primary Care Provided by self;child(irene)    Provides Primary Care For no one    Family Caregiver if Needed child(irene), adult    Quality of Family Relationships supportive;involved;helpful    Able to Return to Prior Arrangements yes       Resource/Environmental Concerns    Resource/Environmental Concerns none       Transportation Needs    In the past 12 months, has lack of transportation kept you from medical appointments or from getting medications? no    In the past 12 months, has lack of transportation kept you from meetings, work, or from getting things needed for daily living? No       Food Insecurity    Within the past 12 months, you worried that your food would run out before you got the money to buy more. Never true    Within the past 12 months, the food you bought just didn't last and you didn't have money to get more. Never true       Transition Planning    Patient/Family Anticipates Transition to home with family;home with help/services    Patient/Family Anticipated Services at Transition home health care    Transportation Anticipated family or friend will provide       Discharge Needs Assessment    Readmission Within the Last 30 Days no previous admission in last 30 days    Current Outpatient/Agency/Support Group homecare agency    Equipment Currently Used at Home rollator;wheelchair;commode;oxygen;nebulizer    Concerns to be Addressed basic  needs;discharge planning    Anticipated Changes Related to Illness none    Equipment Needed After Discharge none    Outpatient/Agency/Support Group Needs homecare agency    Discharge Facility/Level of Care Needs home with home health    Current Discharge Risk chronically ill                   Discharge Plan       Row Name 02/14/24 1115       Plan    Plan [facility]    Patient/Family in Agreement with Plan yes    Plan Comments Spoke with pt to assess for d/c needs. Pt lives at home with her dtr and dependent grandchildren.  Pt plans to return home upon d/c. Pt is followed by [facility] care, this confirmed and they follow for P.T. and Skilled Nursing.  Pt has a PCP/RX coverage.  Pt does have needed DME to include O2 continuously at 4L via [employer].  She denies any other needs.  Will follow.                  Continued Care and Services - Admitted Since 2/13/2024    Coordination has not been started for this encounter.          Demographic Summary    No documentation.                  Functional Status    No documentation.                  Psychosocial    No documentation.                  Abuse/Neglect    No documentation.                  Legal    No documentation.                  Substance Abuse    No documentation.                  Patient Forms    No documentation.                     MARISOL Antonio

## 2024-02-14 NOTE — PLAN OF CARE
Goal Outcome Evaluation:              Outcome Evaluation: NTN assessment complete. PO 75% breakfast, 240 mL oral fluid total. Skin breakdown does not require MNT services at this time. Continue with current NTN POC. NTN following per protocol.

## 2024-02-14 NOTE — THERAPY EVALUATION
Acute Care - Occupational Therapy Initial Evaluation  [facility]     Patient Name: Emy Bermudez  : 1950  MRN: [id number]  Today's Date: 2024  Onset of Illness/Injury or Date of Surgery: 24  Date of Referral to OT: 24  Referring Physician: Lavinia YANES    Admit Date: 2024       ICD-10-CM ICD-9-CM   1. Acute respiratory failure with hypoxia and hypercapnia  J96.01 518.81    J96.02    2. Influenza  J11.1 487.1   3. Multifocal pneumonia  J18.9 486   4. Pulmonary nodule  R91.1 793.11   5. Acute on chronic respiratory failure with hypoxia and hypercapnia  J96.21 518.84    J96.22 786.09     799.02     Patient Active Problem List   Diagnosis    COPD (chronic obstructive pulmonary disease)    Cellulitis of right lower extremity    Obesity, Class III, BMI 40-49.9 (morbid obesity)    Chronic respiratory failure with hypoxia and hypercapnia    Normocytic anemia    Hypothyroidism (acquired)    Essential hypertension    Venous insufficiency of both lower extremities    Stasis dermatitis of both legs    Epistaxis    Tobacco abuse    Anemia, blood loss due to epistasis    Acute kidney injury    Acquired deviated nasal septum    S/P nasal septoplasty    Pneumonia of both lungs due to infectious organism, tree in bud    Intractable nausea and vomiting    Chronic respiratory failure    Obesity (BMI 30-39.9)    Acute on chronic respiratory failure with hypoxia and hypercapnia    Lymphedema    Influenza B    Elevated troponin    Sepsis     Past Medical History:   Diagnosis Date    Abdominal wall abscess     Abdominal wall fistula 2018    Cellulitis     Chronic respiratory failure with hypoxia 2020    Colonic obstruction 4/3/2018    COPD (chronic obstructive pulmonary disease)     Depression     Diverticul disease small and large intestine, no perforati or abscess     Edema     GERD (gastroesophageal reflux disease)     Hyperlipidemia     Hypertension     Hypocalcemia     Hypothyroid      Obesity, Class III, BMI 40-49.9 (morbid obesity) 2020    Respiratory failure     Tobacco dependence 2020    Venous insufficiency     Vertigo      Past Surgical History:   Procedure Laterality Date    ABDOMINAL SURGERY       SECTION      COLOSTOMY      ETHMOID ARTERY LIGATION N/A 2021    Procedure: ETHMOID ARTERY LIGATION;  Surgeon: Elliot Mack Jr., MD;  Location:  [facility];  Service: ENT;  Laterality: N/A;    EXAM UNDER ANESTHESIA N/A 2021    Procedure: SEPTOPLASTY TURBOPLASTY ENDOSCOPIC CONTROL LEFT NOSE BLEED;  Surgeon: Elliot Mack Jr., MD;  Location:  [facility];  Service: ENT;  Laterality: N/A;    EXPLORATORY LAPAROTOMY N/A 2018    Procedure: LAPAROTOMY EXPLORATORY, partial colectomy, creation colostomy, incision and drainage abdominal wall abscess;  Surgeon: Elda Velazquez MD;  Location:  [facility];  Service: General    INTERNAL MAXILLARY ARTERY AND ETHMOID ARTERY LIGATION N/A 2021    Procedure: INTERNAL MAXILLARY ARTERY AND ETHMOID ARTERY LIGATION;  Surgeon: Elliot Mack Jr., MD;  Location:  [facility];  Service: ENT;  Laterality: N/A;    SEPTOPLASTY, RESECTION INFERIOR TURBINATES Bilateral 2021    Procedure: SEPTOPLASTY, RESECTION INFERIOR TURBINATES;  Surgeon: Elliot Mack Jr., MD;  Location:  [facility];  Service: ENT;  Laterality: Bilateral;         OT ASSESSMENT FLOWSHEET (last 12 hours)       OT Evaluation and Treatment       Row Name 24 0940                   OT Time and Intention    Subjective Information complains of;dyspnea;fatigue  -AC        Document Type evaluation  -AC        Mode of Treatment occupational therapy  -           General Information    Patient Profile Reviewed yes  -AC        Onset of Illness/Injury or Date of Surgery 24  -        Referring Physician Lavinia Chou APRN  -AC        Prior Level of Function independent:;gait;w/c or scooter;transfer;bed mobility;feeding;grooming;max  "assist:;dressing;bathing;dependent:;home management;cooking;cleaning;driving;shopping  sleeps in lift chair, walks very little and only very short distances to [facility], uses w/c for majority of her mobility, she lets the HH worker bathe and dress her because \"it's easier\"  -AC        Equipment Currently Used at Home oxygen;rollator;wheelchair;ramp;walker, rolling;power chair, (recliner lift);commode, bedside  -AC        Pertinent History of Current Functional Problem SOA x1 week, fever, Dx: acute on chronic resp failure, flu B, B pneumonia, lymphedema, elev troponin, sepsis  -AC        Existing Precautions/Restrictions fall;oxygen therapy device and L/min  -AC        Risks Reviewed LOB;increased discomfort;change in vital signs;lines disloged;patient:  -AC        Benefits Reviewed improve function;increase independence;increase strength;patient:  -AC        Barriers to Rehab previous functional deficit;medically complex  -AC           Living Environment    Current Living Arrangements home  -AC        Home Accessibility wheelchair accessible;stairs within home  -AC        People in Home child(irene), adult;grandchild(irene);other (see comments)  daughter and 2 grandsons ages 10 and live with pt; pt also has home health worker/aid who comes 5 days per week to assist with ADL/IADL  -AC           Stairs Within Home, Primary    Number of Stairs, Within Home, Primary three  -AC        Stair Railings, Within Home, Primary railings not in good condition, need repair for safe use  -AC        Stairs Comment, Within Home, Primary to kitchen  -AC           Pain Assessment    Pretreatment Pain Rating 0/10 - no pain  -AC        Posttreatment Pain Rating 0/10 - no pain  -AC        Pain Intervention(s) --  -AC           Cognition    Orientation Status (Cognition) oriented x 4  -AC        Follows Commands (Cognition) WFL  -AC        Personal Safety Interventions fall prevention program maintained;gait belt;muscle strengthening " facilitated;nonskid shoes/slippers when out of bed;supervised activity;toileting scheduled  -           Range of Motion Comprehensive    General Range of Motion bilateral upper extremity ROM WFL  -           Strength Comprehensive (MMT)    Comment, General Manual Muscle Testing (MMT) Assessment 4-/5 B shoulders, 4/5 B biceps/triceps  -           Activities of Daily Living    BADL Assessment/Intervention lower body dressing  -           Lower Body Dressing Assessment/Training    [place] Level (Lower Body Dressing) don;doff;socks;maximum assist (25% patient effort)  -        Position (Lower Body Dressing) edge of bed sitting  -           BAD Safety/Performance    Impairments, BADL Safety/Performance balance;endurance/activity tolerance;strength;shortness of breath  -           Bed Mobility    Bed Mobility --  -        Assistive Device (Bed Mobility) --  -        Comment, (Bed Mobility) up in chair  -           Functional Mobility    Functional Mobility- Ind. Level unable to perform  -        Functional Mobility- Device --  -        Functional Mobility- Comment quickly requested to sit after completing transfer  -           Transfer Assessment/Treatment    Transfers sit-stand transfer;stand-sit transfer  -        Comment, (Transfers) immediately requested to sit after transferring due to SOA, fatigue  -           Sit-Stand Transfer    Sit-Stand [place] (Transfers) contact guard  -        Assistive Device (Sit-Stand Transfers) walker, front-wheeled  -           Stand-Sit Transfer    Stand-Sit [place] (Transfers) contact guard;verbal cues  -           Safety Issues, Functional Mobility    Impairments Affecting Function (Mobility) balance;strength;endurance/activity tolerance;shortness of breath  -           Balance    Balance Assessment sitting static balance;sitting dynamic balance;standing static balance;standing dynamic balance  -        Static Sitting Balance  "independent  -AC        Dynamic Sitting Balance independent  -AC        Position, Sitting Balance sitting edge of bed  -AC        Static Standing Balance contact guard  -AC        Position/Device Used, Standing Balance walker, front-wheeled  -AC           Wound 02/13/24 2227 labia Skin Tear    Wound - Properties Group Placement Date: 02/13/24  -PA Placement Time: 2227 -PA Location: labia  -PA Primary Wound Type: Skin tear  -PA    Retired Wound - Properties Group Placement Date: 02/13/24  -PA Placement Time: 2227 -PA Location: labia  -PA Primary Wound Type: Skin tear  -PA    Retired Wound - Properties Group Date first assessed: 02/13/24  -PA Time first assessed: 2227 -PA Location: labia  -PA Primary Wound Type: Skin tear  -PA       Plan of Care Review    Plan of Care Reviewed With patient  -AC        Progress no change  -AC        Outcome Evaluation OT eval completed.  Pt alert and oriented x4.  Pt lives with daughter and grandchildren.  She receives assist from HH caregiver 5 days/wk for bathing, dressing and home mgmt.  She walks a very short distance to BSC and back to lift chair at baseline.  Uses w/c as her primary mode of mobility.  She is not highly motivated and reports she is satisfied with using w/c and having assist for ADLs because \"it's just easier.\"  Pt is very fearful of falling as well.  She is currently on 5L O2 with sat 94%.  Strength is decreased in BUE at 4-/5 grossly.  She needs maxA to don/doff socks but is independent with feeding and grooming.  Pt stood once with CGA but very quickly requested to sit back down due to fatigue/weakness and SOA.  Deficits noted in endurance, strength, knowledge and balance. Pt's goal is to be able to complete toileting independently and walk far enough to make it to her BSC and back to chair.  OT will continue to work with pt on increasing her strength, endurance and independence with ADLs to return her to PLOF.  Recommend home with HH.  -AC           Vital " Signs    Pre SpO2 (%) 94  -AC        O2 Delivery Pre Treatment supplemental O2  5L  -AC        Pre Patient Position Sitting  -AC           Positioning and Restraints    Pre-Treatment Position sitting in chair/recliner  -AC        Post Treatment Position chair  -AC        In Chair sitting;call light within reach;encouraged to call for assist  -AC           Therapy Assessment/Plan (OT)    Date of Referral to OT 02/13/24  -AC        OT Diagnosis decreased adl  -AC        Rehab Potential (OT) good, to achieve stated therapy goals  -AC        Criteria for Skilled Therapeutic Interventions Met (OT) yes;meets criteria;skilled treatment is necessary  -AC        Therapy Frequency (OT) 3 times/wk  -AC        Predicted Duration of Therapy Intervention (OT) 10 days  -AC        Activity Limitations Related to Problem List (OT) BADLs not performed adequately or safely  -AC        Planned Therapy Interventions (OT) activity tolerance training;functional balance retraining;occupation/activity based interventions;patient/caregiver education/training;strengthening exercise;transfer/mobility retraining;BADL retraining  -AC           OT Goals    Transfer Goal Selection (OT) transfer, OT goal 1  -AC        Bathing Goal Selection (OT) --  -AC        Dressing Goal Selection (OT) --  -AC        Toileting Goal Selection (OT) toileting, OT goal 1  -AC        Activity Tolerance Goal Selection (OT) activity tolerance, OT goal 1  -AC           Transfer Goal 1 (OT)    Activity/Assistive Device (Transfer Goal 1, OT) commode, bedside without drop arms;walker, rolling  -AC        Hyde Level/Cues Needed (Transfer Goal 1, OT) standby assist  -AC        Time Frame (Transfer Goal 1, OT) long term goal (LTG);10 days  -AC        Progress/Outcome (Transfer Goal 1, OT) new goal  -AC           Bathing Goal 1 (OT)    Activity/Device (Bathing Goal 1, OT) --  -AC        Time Frame (Bathing Goal 1, OT) --  -AC           Dressing Goal 1 (OT)     Activity/Device (Dressing Goal 1, OT) --  -AC        Time Frame (Dressing Goal 1, OT) --  -AC        Progress/Outcome (Dressing Goal 1, OT) --  -AC           Toileting Goal 1 (OT)    Activity/Device (Toileting Goal 1, OT) toileting skills, all;adjust/manage clothing;perform perineal hygiene;commode, bedside without drop arms  -AC        [place] Level/Cues Needed (Toileting Goal 1, OT) standby assist  -AC        Time Frame (Toileting Goal 1, OT) long term goal (LTG);10 days  -AC        Progress/Outcome (Toileting Goal 1, OT) new goal  -AC            Activity Tolerance Goal 1 (OT)    Activity Level (Endurance Goal 1, OT) 10 min activity;O2 sat >/ equal to 88%  on 4L nc  -AC        Time Frame (Activity Tolerance Goal 1, OT) long term goal (LTG);10 days  -AC        Progress/Outcome (Activity Tolerance Goal 1, OT) new goal  -AC                  User Key  (r) = Recorded By, (t) = Taken By, (c) = Cosigned By      Initials Name Effective Dates     Rayshawn Devlin, OTR/L, CNT 02/03/23 -     Tuan Sanderson, RN 08/01/23 -                      Occupational Therapy Education       Title: PT OT SLP Therapies (Done)       Topic: Occupational Therapy (Done)       Point: ADL training (Done)       Description:   Instruct learner(s) on proper safety adaptation and remediation techniques during self care or transfers.   Instruct in proper use of assistive devices.                  Learning Progress Summary             Patient Acceptance, E, VU,NR by  at 2/14/2024 1102                         Point: Home exercise program (Done)       Description:   Instruct learner(s) on appropriate technique for monitoring, assisting and/or progressing therapeutic exercises/activities.                  Learning Progress Summary             Patient Acceptance, E, VU,NR by  at 2/14/2024 1102                                         User Key       Initials Effective Dates Name Provider Type Discipline     02/03/23 -  Rayshawn Devlin,  "OTR/L, CNT Occupational Therapist OT                      OT Recommendation and Plan  Planned Therapy Interventions (OT): activity tolerance training, functional balance retraining, occupation/activity based interventions, patient/caregiver education/training, strengthening exercise, transfer/mobility retraining, BADL retraining  Therapy Frequency (OT): 3 times/wk  Plan of Care Review  Plan of Care Reviewed With: patient  Progress: no change  Outcome Evaluation: OT eval completed.  Pt alert and oriented x4.  Pt lives with daughter and grandchildren.  She receives assist from HH caregiver 5 days/wk for bathing, dressing and home mgmt.  She walks a very short distance to BSC and back to lift chair at baseline.  Uses w/c as her primary mode of mobility.  She is not highly motivated and reports she is satisfied with using w/c and having assist for ADLs because \"it's just easier.\"  Pt is very fearful of falling as well.  She is currently on 5L O2 with sat 94%.  Strength is decreased in BUE at 4-/5 grossly.  She needs maxA to don/doff socks but is independent with feeding and grooming.  Pt stood once with CGA but very quickly requested to sit back down due to fatigue/weakness and SOA.  Deficits noted in endurance, strength, knowledge and balance. Pt's goal is to be able to complete toileting independently and walk far enough to make it to her BSC and back to chair.  OT will continue to work with pt on increasing her strength, endurance and independence with ADLs to return her to PLOF.  Recommend home with HH.  Plan of Care Reviewed With: patient  Outcome Evaluation: OT eval completed.  Pt alert and oriented x4.  Pt lives with daughter and grandchildren.  She receives assist from HH caregiver 5 days/wk for bathing, dressing and home mgmt.  She walks a very short distance to BSC and back to lift chair at baseline.  Uses w/c as her primary mode of mobility.  She is not highly motivated and reports she is satisfied with using " "w/c and having assist for ADLs because \"it's just easier.\"  Pt is very fearful of falling as well.  She is currently on 5L O2 with sat 94%.  Strength is decreased in BUE at 4-/5 grossly.  She needs maxA to don/doff socks but is independent with feeding and grooming.  Pt stood once with CGA but very quickly requested to sit back down due to fatigue/weakness and SOA.  Deficits noted in endurance, strength, knowledge and balance. Pt's goal is to be able to complete toileting independently and walk far enough to make it to her BSC and back to chair.  OT will continue to work with pt on increasing her strength, endurance and independence with ADLs to return her to [facility].  Recommend home with HH.     Outcome Measures       Row Name 02/14/24 0940             How much help from another is currently needed...    Putting on and taking off regular lower body clothing? 2  -AC      Bathing (including washing, rinsing, and drying) 2  -AC      Toileting (which includes using toilet bed pan or urinal) 2  -AC      Putting on and taking off regular upper body clothing 3  -AC      Taking care of personal grooming (such as brushing teeth) 3  -AC      Eating meals 4  -AC      AM-PAC 6 Clicks Score (OT) 16  -AC         Functional Assessment    Outcome Measure Options AM-PAC 6 Clicks Daily Activity (OT)  -                User Key  (r) = Recorded By, (t) = Taken By, (c) = Cosigned By      Initials Name Provider Type    Rayshawn Espino, OTR/L, SAULO Occupational Therapist                    Time Calculation:    Time Calculation- OT       Row Name 02/14/24 1102             Time Calculation- OT    OT Start Time 0940  -      OT Stop Time 1040  -      OT Time Calculation (min) 60 min  -      OT Received On 02/14/24  -      OT Goal Re-Cert Due Date 02/24/24  -                User Key  (r) = Recorded By, (t) = Taken By, (c) = Cosigned By      Initials Name Provider Type    Rayshawn Espino, OTR/L, SAULO Occupational Therapist      "             Therapy Charges for Today       Code Description Service Date Service Provider Modifiers Qty    89599838879 HC OT EVAL LOW COMPLEXITY 4 2/14/2024 Rayshawn Devlin, OTR/L, CNT GO 1                 ARLEN Hernandez/L, SAULO  2/14/2024

## 2024-02-14 NOTE — PLAN OF CARE
"Goal Outcome Evaluation:  Plan of Care Reviewed With: patient        Progress: no change  Outcome Evaluation: ANA hyde completed.  Pt alert and oriented x4.  Pt lives with daughter and grandchildren.  She receives assist from HH caregiver 5 days/wk for bathing, dressing and home mgmt.  She walks a very short distance to BSC and back to lift chair at baseline.  Uses w/c as her primary mode of mobility.  She is not highly motivated and reports she is satisfied with using w/c and having assist for ADLs because \"it's just easier.\"  Pt is very fearful of falling as well.  She is currently on 5L O2 with sat 94%.  Strength is decreased in BUE at 4-/5 grossly.  She needs maxA to don/doff socks but is independent with feeding and grooming.  Pt stood once with CGA but very quickly requested to sit back down due to fatigue/weakness and SOA.  Deficits noted in endurance, strength, knowledge and balance. Pt's goal is to be able to complete toileting independently and walk far enough to make it to her BSC and back to chair.  OT will continue to work with pt on increasing her strength, endurance and independence with ADLs to return her to PLOF.  Recommend home with HH.                               "

## 2024-02-14 NOTE — THERAPY EVALUATION
Patient Name: Emy Bermudez  : 1950    MRN: [id number]                              Today's Date: 2024       Admit Date: 2024    Visit Dx:     ICD-10-CM ICD-9-CM   1. Acute respiratory failure with hypoxia and hypercapnia  J96.01 518.81    J96.02    2. Influenza  J11.1 487.1   3. Multifocal pneumonia  J18.9 486   4. Pulmonary nodule  R91.1 793.11   5. Acute on chronic respiratory failure with hypoxia and hypercapnia  J96.21 518.84    J96.22 786.09     799.02     Patient Active Problem List   Diagnosis    COPD (chronic obstructive pulmonary disease)    Cellulitis of right lower extremity    Obesity, Class III, BMI 40-49.9 (morbid obesity)    Chronic respiratory failure with hypoxia and hypercapnia    Normocytic anemia    Hypothyroidism (acquired)    Essential hypertension    Venous insufficiency of both lower extremities    Stasis dermatitis of both legs    Epistaxis    Tobacco abuse    Anemia, blood loss due to epistasis    Acute kidney injury    Acquired deviated nasal septum    S/P nasal septoplasty    Pneumonia of both lungs due to infectious organism, tree in bud    Intractable nausea and vomiting    Chronic respiratory failure    Obesity (BMI 30-39.9)    Acute on chronic respiratory failure with hypoxia and hypercapnia due to influenza B    Lymphedema    Influenza B    Sepsis     Past Medical History:   Diagnosis Date    Abdominal wall abscess     Abdominal wall fistula 2018    Cellulitis     Chronic respiratory failure with hypoxia 2020    Colonic obstruction 4/3/2018    COPD (chronic obstructive pulmonary disease)     Depression     Diverticul disease small and large intestine, no perforati or abscess     Edema     GERD (gastroesophageal reflux disease)     Hyperlipidemia     Hypertension     Hypocalcemia     Hypothyroid     Obesity, Class III, BMI 40-49.9 (morbid obesity) 2020    Respiratory failure     Tobacco dependence 2020    Venous insufficiency     Vertigo      Past  Surgical History:   Procedure Laterality Date    ABDOMINAL SURGERY       SECTION      COLOSTOMY      ETHMOID ARTERY LIGATION N/A 2021    Procedure: ETHMOID ARTERY LIGATION;  Surgeon: Elliot Mack Jr., MD;  Location:  [facility];  Service: ENT;  Laterality: N/A;    EXAM UNDER ANESTHESIA N/A 2021    Procedure: SEPTOPLASTY TURBOPLASTY ENDOSCOPIC CONTROL LEFT NOSE BLEED;  Surgeon: Elliot Mack Jr., MD;  Location:  [facility];  Service: ENT;  Laterality: N/A;    EXPLORATORY LAPAROTOMY N/A 2018    Procedure: LAPAROTOMY EXPLORATORY, partial colectomy, creation colostomy, incision and drainage abdominal wall abscess;  Surgeon: Elda Velazquez MD;  Location:  [facility];  Service: General    INTERNAL MAXILLARY ARTERY AND ETHMOID ARTERY LIGATION N/A 2021    Procedure: INTERNAL MAXILLARY ARTERY AND ETHMOID ARTERY LIGATION;  Surgeon: Elliot Mack Jr., MD;  Location:  [facility];  Service: ENT;  Laterality: N/A;    SEPTOPLASTY, RESECTION INFERIOR TURBINATES Bilateral 2021    Procedure: SEPTOPLASTY, RESECTION INFERIOR TURBINATES;  Surgeon: Elliot Mack Jr., MD;  Location:  [facility];  Service: ENT;  Laterality: Bilateral;      General Information       Row Name 24 1319          Physical Therapy Time and Intention    Document Type evaluation  mobility and BLE unna boots  -     Mode of Treatment physical therapy  -       Row Name 24 131          General Information    Patient Profile Reviewed yes  -     Prior Level of Function independent:;all household mobility;max assist:;ADL's;dependent:;home management;cooking;cleaning;driving;shopping  sleeps in lift chair,  primary mode of mobility  -     Existing Precautions/Restrictions fall;oxygen therapy device and L/min  -     Barriers to Rehab previous functional deficit;medically complex  -       Row Name 24 5832          Living Environment    People in Home child(irene), adult  -       Row Name  02/14/24 1319          Stairs Within Home, Primary    Number of Stairs, Within Home, Primary three  -     Stair Railings, Within Home, Primary railings not in good condition, need repair for safe use  -[facility] Name 02/14/24 1319          Cognition    Orientation Status (Cognition) oriented x 4  -[facility] Name 02/14/24 1319          Safety Issues, Functional Mobility    Safety Issues Affecting Function (Mobility) ability to follow commands  -     Impairments Affecting Function (Mobility) balance;strength;endurance/activity tolerance;shortness of breath;range of motion (ROM)  -               User Key  (r) = Recorded By, (t) = Taken By, (c) = Cosigned By      Initials Name Provider Type     Jeffrey Reddy, PT Physical Therapist                   Mobility       [facility] Name 02/14/24 1319          Bed Mobility    Comment, (Bed Mobility) up in chair  sleeps in lift chair  -[facility] Name 02/14/24 1319          Sit-Stand Transfer    Sit-Stand [place] (Transfers) contact guard  -LH       Row Name 02/14/24 1319          Gait/Stairs (Locomotion)    Distance in Feet (Gait) --  declined gait, only goes short distances at baseline  -               User Key  (r) = Recorded By, (t) = Taken By, (c) = Cosigned By      Initials Name Provider Type     Jeffrey Reddy, PT Physical Therapist                   Obj/Interventions       [facility] Name 02/14/24 1319          Range of Motion Comprehensive    General Range of Motion lower extremity range of motion deficits identified  -LH       Row Name 02/14/24 1319          Strength Comprehensive (MMT)    General Manual Muscle Testing (MMT) Assessment upper extremity strength deficits identified;lower extremity strength deficits identified  -               User Key  (r) = Recorded By, (t) = Taken By, (c) = Cosigned By      Initials Name Provider Type     Jeffrey Reddy, PT Physical Therapist                   Goals/Plan       [facility] Name 02/14/24 1319          Transfer Goal 1  (PT)    Activity/Assistive Device (Transfer Goal 1, PT) sit-to-stand/stand-to-sit  -     [place] Level/Cues Needed (Transfer Goal 1, PT) independent  -     Time Frame (Transfer Goal 1, PT) 10 days  -     Progress/Outcome (Transfer Goal 1, PT) new [facility]  -       Row Name 02/14/24 1319          Gait Training Goal 1 (PT)    Activity/Assistive Device (Gait Training Goal 1, PT) gait (walking locomotion);assistive device use  -     [place] Level (Gait Training Goal 1, PT) standby assist  -     Distance (Gait Training Goal 1, PT) 10ft  -     Time Frame (Gait Training Goal 1, PT) 10 days  -     Progress/Outcome (Gait Training Goal 1, PT) new goal  -       Row Name 02/14/24 1319          Therapy Assessment/Plan (PT)    Planned Therapy Interventions (PT) balance training;bed mobility training;gait training;stretching;strengthening;ROM (range of motion);patient/family education;transfer training;wound care  unna boots  -               User Key  (r) = Recorded By, (t) = Taken By, (c) = Cosigned By      Initials Name Provider Type     Jeffrey Reddy, PT Physical Therapist                   Clinical Impression       Row Name 02/14/24 1319          Pain    Pretreatment Pain Rating 0/10 - no pain  -     Posttreatment Pain Rating 0/10 - no pain  -     Pain Intervention(s) Medication (See MAR)  -       Row Name 02/14/24 1319          Plan of Care Review    Plan of Care Reviewed With patient  -     Outcome Evaluation PT IE complete.  A&Ox4.  Pt stands CGA and quickly requests to sit back down due to weakness/fatigue.  5L O2.  BLE unna boots also applied today.  3 layers (unna boot, kerlex, coban).  Photo obtained and is residing in [employer].  PT to see for functional mobility training and daily unna boot checks.  Unna boots due to be changed by 2.21.24 or sooner if needed.  Recommend follow up with HH at [place].  Thank you for referral.  -       Row Name 02/14/24 1319          Therapy Assessment/Plan  (PT)    Patient/Family Therapy Goals Statement (PT) return home  -     Rehab Potential (PT) good, to achieve stated therapy goals  -     Criteria for Skilled Interventions Met (PT) yes;skilled treatment is necessary  -     Therapy Frequency (PT) 2 times/day  -     Predicted Duration of Therapy Intervention (PT) until dc  -       Row Name 02/14/24 1319          Positioning and Restraints    Pre-Treatment Position sitting in chair/recliner  -     Post Treatment Position chair  -LH     In Chair sitting;call light within reach;encouraged to call for assist;with nsg  -               User Key  (r) = Recorded By, (t) = Taken By, (c) = Cosigned By      Initials Name Provider Type     Jeffrey Reddy, PT Physical Therapist                   Outcome Measures       Row Name 02/14/24 1319          How much help from another person do you currently need...    Turning from your back to your side while in flat bed without using bedrails? 4  -LH     Moving from lying on back to sitting on the side of a flat bed without bedrails? 3  -LH     Moving to and from a bed to a chair (including a wheelchair)? 3  -LH     Standing up from a chair using your arms (e.g., wheelchair, bedside chair)? 3  -LH     Climbing 3-5 steps with a railing? 2  -LH     To walk in hospital room? 2  -     AM-PAC 6 Clicks Score (PT) 17  -     Highest Level of Mobility Goal 5 --> Static standing  -       Row Name 02/14/24 1319 02/14/24 0940       Functional Assessment    Outcome Measure Options AM-PAC 6 Clicks Basic Mobility (PT)  - AM-PAC 6 Clicks Daily Activity (OT)  -AC              User Key  (r) = Recorded By, (t) = Taken By, (c) = Cosigned By      Initials Name Provider Type    AC Rayshawn Devlin, OTR/L, CNT Occupational Therapist     Jeffrey Reddy, PT Physical Therapist                  PT Assessment (last 12 hours)       PT Evaluation and Treatment       Row Name 02/14/24 1320 02/14/24 1319       Physical Therapy Time and Intention     Comment LLE unna boot  - RLE unna boot  -      Row Name 02/14/24 1320 02/14/24 1319       Edema Symptoms/Interventions    Treatment Interventions (Edema) --  unna boot  -LH --  unna boot  -LH      Row Name             Wound 02/13/24 2227 labia Skin Tear    Wound - Properties Group Placement Date: 02/13/24  -PA Placement Time: 2227 -PA Location: labia  -PA Primary Wound Type: Skin tear  -PA    Retired Wound - Properties Group Placement Date: 02/13/24  -PA Placement Time: 2227 -PA Location: labia  -PA Primary Wound Type: Skin tear  -PA    Retired Wound - Properties Group Date first assessed: 02/13/24  -PA Time first assessed: 2227 -PA Location: labia  -PA Primary Wound Type: Skin tear  -PA      Row Name 02/14/24 1319          Physical Therapy Goals    Wound Care Goal Selection (PT) wound care, PT goal 1  -       Row Name 02/14/24 1319          Wound Care Goal 1 (PT)    Wound Care Goal 1 (PT) BLE will demo evidence of decreased swelling.  -     Time Frame (Wound Care Goal 1, PT) 1 week  -     Progress/Outcome (Wound Care Goal 1, PT) new goal  -               User Key  (r) = Recorded By, (t) = Taken By, (c) = Cosigned By      Initials Name Provider Type     Jeffrey Reddy, PT Physical Therapist    Tuan Sanderson, RN Registered Nurse                                  Physical Therapy Education       Title: PT OT SLP Therapies (Done)       Topic: Physical Therapy (Done)       Point: Mobility training (Done)       Learning Progress Summary             Patient Acceptance, E,D, VU,DU,NR by  at 2/14/2024 1449    Comment: benefits of PT and POC, call for A OOB                         Point: Precautions (Done)       Learning Progress Summary             Patient Acceptance, E,D, VU,DU,NR by  at 2/14/2024 1449    Comment: benefits of PT and POC, call for A OOB                                         User Key       Initials Effective Dates Name Provider Type [facility] 02/03/23 -  Jeffrey Reddy,  PT Physical Therapist PT                  PT Recommendation and Plan  Planned Therapy Interventions (PT): balance training, bed mobility training, gait training, stretching, strengthening, ROM (range of motion), patient/family education, transfer training, wound care (unna boots)  Plan of Care Reviewed With: patient  Outcome Evaluation: PT IE complete.  A&Ox4.  Pt stands CGA and quickly requests to sit back down due to weakness/fatigue.  5L O2.  BLE unna boots also applied today.  3 layers (unna boot, kerlex, coban).  Photo obtained and is residing in [employer].  PT to see for functional mobility training and daily unna boot checks.  Unna boots due to be changed by 2.21.24 or sooner if needed.  Recommend follow up with HH at [place].  Thank you for referral.     Time Calculation:         PT Charges       Row Name 02/14/24 1452             Time Calculation    Start Time 1319  -      Stop Time 1419  -      Time Calculation (min) 60 min  -LH      PT Received On 02/14/24  -      PT Goal Re-Cert Due Date 02/24/24   BLE unna boot due 2.21.24  -LH         Untimed Charges    PT Eval/Re-eval Minutes 30  -LH      Wound Care 93847 Unna boot  -LH      29580-Unna Boot 30  -LH         Total Minutes    Untimed Charges Total Minutes 60  -LH       Total Minutes 60  -LH                User Key  (r) = Recorded By, (t) = Taken By, (c) = Cosigned By      Initials Name Provider Type     Jeffrey Reddy, PT Physical Therapist                  Therapy Charges for Today       Code Description Service Date Service Provider Modifiers Qty    42278685195 HC PT EVAL LOW COMPLEXITY 2 2/14/2024 Jeffrey Reddy, PT GP 1    09233157728 HC PT STAPPING UNNA BOOT 2/14/2024 Jeffrey Reddy, PT GP 2            PT G-Codes  Outcome Measure Options: AM-PAC 6 Clicks Basic Mobility (PT)  AM-PAC 6 Clicks Score (PT): 17  AM-PAC 6 Clicks Score (OT): 16  PT Discharge Summary  Anticipated Discharge Disposition (PT): home with assist, home with home health    Jeffrey SEBASTIAN  Maureen, PT  2/14/2024

## 2024-02-14 NOTE — ED PROVIDER NOTES
Subjective   History of Present Illness    Review of Systems    Past Medical History:   Diagnosis Date    Abdominal wall abscess     Abdominal wall fistula 2018    Cellulitis     Chronic respiratory failure with hypoxia 2020    Colonic obstruction 4/3/2018    COPD (chronic obstructive pulmonary disease)     Depression     Diverticul disease small and large intestine, no perforati or abscess     Edema     GERD (gastroesophageal reflux disease)     Hyperlipidemia     Hypertension     Hypocalcemia     Hypothyroid     Obesity, Class III, BMI 40-49.9 (morbid obesity) 2020    Respiratory failure     Tobacco dependence 2020    Venous insufficiency     Vertigo        No Known Allergies    Past Surgical History:   Procedure Laterality Date    ABDOMINAL SURGERY       SECTION      COLOSTOMY      ETHMOID ARTERY LIGATION N/A 2021    Procedure: ETHMOID ARTERY LIGATION;  Surgeon: Elliot Mack Jr., MD;  Location:  [facility];  Service: ENT;  Laterality: N/A;    EXAM UNDER ANESTHESIA N/A 2021    Procedure: SEPTOPLASTY TURBOPLASTY ENDOSCOPIC CONTROL LEFT NOSE BLEED;  Surgeon: Elliot Mack Jr., MD;  Location:  [facility];  Service: ENT;  Laterality: N/A;    EXPLORATORY LAPAROTOMY N/A 2018    Procedure: LAPAROTOMY EXPLORATORY, partial colectomy, creation colostomy, incision and drainage abdominal wall abscess;  Surgeon: Elda Velazquez MD;  Location:  [facility];  Service: General    INTERNAL MAXILLARY ARTERY AND ETHMOID ARTERY LIGATION N/A 2021    Procedure: INTERNAL MAXILLARY ARTERY AND ETHMOID ARTERY LIGATION;  Surgeon: Elliot Mack Jr., MD;  Location:  [facility];  Service: ENT;  Laterality: N/A;    SEPTOPLASTY, RESECTION INFERIOR TURBINATES Bilateral 2021    Procedure: SEPTOPLASTY, RESECTION INFERIOR TURBINATES;  Surgeon: Elliot Mack Jr., MD;  Location:  [facility];  Service: ENT;  Laterality: Bilateral;       Family History   Adopted: Yes       Social  History     Socioeconomic History    Marital status:    Tobacco Use    Smoking status: Former     Packs/day: 0.25     Years: 15.00     Additional pack years: 0.00     Total pack years: 3.75     Types: Cigarettes     Quit date: 2020     Years since quittin.0    Smokeless tobacco: Never   Substance and Sexual Activity    Alcohol use: No    Drug use: No    Sexual activity: Never           Objective   Physical Exam    Procedures           ED Course  ED Course as of 24   1738 Repeat ecg ordered [AS]   1920 Case signed out to Dr. Parmar.  CT angiogram chest pending at this time. [KR]    Patient given shortness of breath hypocarbic hypoxic IV antibiotics have been given CT of the chest negative for PE. [TS]     Somewhat limited opacification of the pulmonary arteries. No definite  central pulmonary emboli. More peripheral pulmonary artery branches are  difficult to fully evaluate. There is atheromatous disease of the  thoracic aorta and coronary arteries. There is cardiomegaly.  2. Bilateral tree-in-bud opacities as well as areas of patchy  consolidation, worrisome for pneumonia. The findings are likely  infectious or inflammatory. There are also retained secretions in  bilateral lower lobe bronchi.  3. A 5 mm subpleural right upper lobe nodule versus a more focal  consolidation in an area of tree-in-bud opacification. There are also  focal areas of consolidation versus atelectasis in the right lower lobe  posteriorly and inferiorly. There are 2 adjacent pleural-based opacities  in the right upper lobe posteriorly likely areas of focal atelectasis or  pneumonia. A follow-up CT in 3-6 months would be helpful to document  resolution of findings.  4. There is a tiny fissural nodule along the left major fissure, likely  a fissural lymph node. There are small mediastinal lymph nodes that are  likely reactive.  5. Fatty infiltration of the liver.  These findings discussed the  patient and need for follow-up was stressed. [TS]      ED Course User Index  [AS] Eliud Melendez MD  [KR] Trinh Jeffries APRN  [TS] Ad Parmar MD                                             Medical Decision Making  Amount and/or Complexity of Data Reviewed  Labs: ordered.  Radiology: ordered.  ECG/medicine tests: ordered.  Discussion of management or test interpretation with external provider(s): She will be admitted to medicine service IV antibiotics been given steroids and neb treatments been given the patient.  She feels better    Risk  OTC drugs.  Prescription drug management.        Final diagnoses:   Acute respiratory failure with hypoxia and hypercapnia   Influenza   Multifocal pneumonia   Pulmonary nodule       ED Disposition  ED Disposition       ED Disposition   Decision to Admit    Condition   --    Comment   Level of Care: Telemetry [5]   Diagnosis: Acute respiratory failure [518.81.ICD-9-CM]   Admitting Physician: DANIELLE GRIMES [[id number]]   Attending Physician: DANIELLE GRIMES [[id number]]   Certification: I Certify That Inpatient Hospital Services Are Medically Necessary For Greater Than 2 Midnights                 No follow-up provider specified.       Medication List      No changes were made to your prescriptions during this visit.            Ad Parmar MD  02/13/24 4140

## 2024-02-14 NOTE — PLAN OF CARE
Problem: Adult Inpatient Plan of Care  Goal: Plan of Care Review  Recent Flowsheet Documentation  Taken 2/14/2024 1319 by Jeffrey Reddy, PT  Plan of Care Reviewed With: patient  Outcome Evaluation: PT IE complete.  A&Ox4.  Pt stands CGA and quickly requests to sit back down due to weakness/fatigue.  5L O2.  BLE unna boots also applied today.  3 layers (unna boot, kerlex, coban).  Photo obtained and is residing in [employer].  PT to see for functional mobility training and daily unna boot checks.  Unna boots due to be changed by 2.21.24 or sooner if needed.  Recommend follow up with HH at [place].  Thank you for referral.   Goal Outcome Evaluation:  Plan of Care Reviewed With: patient           Outcome Evaluation: PT IE complete.  A&Ox4.  Pt stands CGA and quickly requests to sit back down due to weakness/fatigue.  5L O2.  BLE unna boots also applied today.  3 layers (unna boot, kerlex, coban).  Photo obtained and is residing in [employer].  PT to see for functional mobility training and daily unna boot checks.  Unna boots due to be changed by 2.21.24 or sooner if needed.  Recommend follow up with HH at [place].  Thank you for referral.      Anticipated Discharge Disposition (PT): home with assist, home with home health

## 2024-02-14 NOTE — PLAN OF CARE
Goal Outcome Evaluation:              Outcome Evaluation: ESTHER*4. no c/o pain. refusing bipap. notified Dr. mahmood about bipap. ordered benadryl for anxiety. resting well.

## 2024-02-14 NOTE — PROGRESS NOTES
Pharmacy Recommendation  Antimicrobial Stewardship   Duration of Therapy      Assessment/Action/Plan:  Azithromycin 500 mg IV every 24 hours x 7 days for the treatment of Pneumonia has been reduced to 2 doses (plus initial dose given in ER) for a total duration of therapy of 3 days per hospital policy.     Subjective:   Emy Bermudez is a 73 y.o. female     Objective:  Lab Results   Component Value Date    WBC 8.46 02/13/2024     Temp Readings from Last 1 Encounters:   02/13/24 (!) 100.6 °F (38.1 °C) (Oral)     Culture Results:  Microbiology Results (last 10 days)       Procedure Component Value - Date/Time    COVID PRE-OP / PRE-PROCEDURE SCREENING ORDER (NO ISOLATION) - Swab, Nasopharynx [[id number]]  (Abnormal) Collected: 02/13/24 1653    Lab Status: Final result Specimen: Swab from Nasopharynx Updated: 02/13/24 1817    Narrative:      The following orders were created for panel order COVID PRE-OP / PRE-PROCEDURE SCREENING ORDER (NO ISOLATION) - Swab, Nasopharynx.  Procedure                               Abnormality         Status                     ---------                               -----------         ------                     COVID-19 and FLU A/B PCR...[[id number]]  Abnormal            Final result                 Please view results for these tests on the individual orders.    COVID-19 and FLU A/B PCR, 1 HR TAT - Swab, Nasopharynx [[id number]]  (Abnormal) Collected: 02/13/24 1653    Lab Status: Final result Specimen: Swab from Nasopharynx Updated: 02/13/24 1817     COVID19 Not Detected     Influenza A PCR Not Detected     Influenza B PCR Detected    Narrative:      Fact sheet for providers: https://www.fda.gov/media/320195/download    Fact sheet for patients: https://www.fda.gov/media/410771/download    Test performed by PCR.          Current Antimicrobials:   Anti-Infectives (From admission, onward)      Ordered     Dose/Rate Route Frequency Start Stop    02/13/24 2122  piperacillin-tazobactam (ZOSYN)  3.375 g IVPB in 100 mL NS MBP (CD)        Ordering Provider: Lavinia Chou APRN    3.375 g  over 4 Hours Intravenous Every 8 Hours 02/14/24 0500 02/20/24 2059 02/13/24 2122  azithromycin (ZITHROMAX) 500 mg in sodium chloride 0.9 % 250 mL IVPB-VTB        Ordering Provider: Lavinia Chou APRN    500 mg  over 60 Minutes Intravenous Every 24 Hours 02/14/24 0000 02/20/24 2359 02/13/24 2202  piperacillin-tazobactam (ZOSYN) 3.375 g IVPB in 100 mL NS MBP (CD)        Ordering Provider: Isabel Choi DO    3.375 g  over 30 Minutes Intravenous Once 02/13/24 2300 02/13/24 1919  cefTRIAXone (ROCEPHIN) 1,000 mg in sodium chloride 0.9 % 100 mL IVPB        Ordering Provider: Trinh Jeffries APRN    1,000 mg  200 mL/hr over 30 Minutes Intravenous Once 02/13/24 1935 02/13/24 2039 02/13/24 1919  azithromycin (ZITHROMAX) 500 mg in sodium chloride 0.9 % 250 mL IVPB-VTB        Ordering Provider: Trinh Jeffries APRN    500 mg  over 60 Minutes Intravenous Once 02/13/24 1935 02/13/24 2139            Saurabh Kern, Jian  02/13/24 22:16 CST

## 2024-02-14 NOTE — PROGRESS NOTES
RT EQUIPMENT DEVICE RELATED - SKIN ASSESSMENT    Anselmo Score:  Anselmo Score: 14     RT Medical Equipment/Device:     NIV Mask:  Under-the-nose   size:  A    Skin Assessment:      Cheek:  Intact    Device Skin Pressure Protection:  Skin-to skin areas padded    Nurse Notification:  Gifty Lynch, RRT

## 2024-02-15 LAB
ANION GAP SERPL CALCULATED.3IONS-SCNC: 11 MMOL/L (ref 5–15)
BUN SERPL-MCNC: 22 MG/DL (ref 8–23)
BUN/CREAT SERPL: 16.8 (ref 7–25)
CALCIUM SPEC-SCNC: 8.6 MG/DL (ref 8.6–10.5)
CHLORIDE SERPL-SCNC: 95 MMOL/L (ref 98–107)
CO2 SERPL-SCNC: 33 MMOL/L (ref 22–29)
CREAT SERPL-MCNC: 1.31 MG/DL (ref 0.57–1)
DEPRECATED RDW RBC AUTO: 59.1 FL (ref 37–54)
EGFRCR SERPLBLD CKD-EPI 2021: 42.8 ML/MIN/1.73
ERYTHROCYTE [DISTWIDTH] IN BLOOD BY AUTOMATED COUNT: 17.6 % (ref 12.3–15.4)
GLUCOSE SERPL-MCNC: 110 MG/DL (ref 65–99)
HCT VFR BLD AUTO: 30.6 % (ref 34–46.6)
HGB BLD-MCNC: 9.1 G/DL (ref 12–15.9)
HYPOCHROMIA BLD QL: ABNORMAL
LYMPHOCYTES # BLD MANUAL: 0.78 10*3/MM3 (ref 0.7–3.1)
LYMPHOCYTES NFR BLD MANUAL: 3 % (ref 5–12)
MCH RBC QN AUTO: 27 PG (ref 26.6–33)
MCHC RBC AUTO-ENTMCNC: 29.7 G/DL (ref 31.5–35.7)
MCV RBC AUTO: 90.8 FL (ref 79–97)
MONOCYTES # BLD: 0.39 10*3/MM3 (ref 0.1–0.9)
NEUTROPHILS # BLD AUTO: 11.89 10*3/MM3 (ref 1.7–7)
NEUTROPHILS NFR BLD MANUAL: 79 % (ref 42.7–76)
NEUTS BAND NFR BLD MANUAL: 12 % (ref 0–5)
PLAT MORPH BLD: NORMAL
PLATELET # BLD AUTO: 231 10*3/MM3 (ref 140–450)
PMV BLD AUTO: 9.1 FL (ref 6–12)
POLYCHROMASIA BLD QL SMEAR: ABNORMAL
POTASSIUM SERPL-SCNC: 3.9 MMOL/L (ref 3.5–5.2)
RBC # BLD AUTO: 3.37 10*6/MM3 (ref 3.77–5.28)
SODIUM SERPL-SCNC: 139 MMOL/L (ref 136–145)
VARIANT LYMPHS NFR BLD MANUAL: 1 % (ref 0–5)
VARIANT LYMPHS NFR BLD MANUAL: 5 % (ref 19.6–45.3)
WBC MORPH BLD: NORMAL
WBC NRBC COR # BLD AUTO: 13.07 10*3/MM3 (ref 3.4–10.8)

## 2024-02-15 PROCEDURE — 25810000003 SODIUM CHLORIDE 0.9 % SOLUTION 250 ML FLEX CONT: Performed by: NURSE PRACTITIONER

## 2024-02-15 PROCEDURE — 94799 UNLISTED PULMONARY SVC/PX: CPT

## 2024-02-15 PROCEDURE — 97110 THERAPEUTIC EXERCISES: CPT

## 2024-02-15 PROCEDURE — 25010000002 CEFTRIAXONE PER 250 MG: Performed by: NURSE PRACTITIONER

## 2024-02-15 PROCEDURE — 94761 N-INVAS EAR/PLS OXIMETRY MLT: CPT

## 2024-02-15 PROCEDURE — 25010000002 ENOXAPARIN PER 10 MG: Performed by: NURSE PRACTITIONER

## 2024-02-15 PROCEDURE — 80048 BASIC METABOLIC PNL TOTAL CA: CPT | Performed by: NURSE PRACTITIONER

## 2024-02-15 PROCEDURE — 97530 THERAPEUTIC ACTIVITIES: CPT

## 2024-02-15 PROCEDURE — 94664 DEMO&/EVAL PT USE INHALER: CPT

## 2024-02-15 PROCEDURE — 97535 SELF CARE MNGMENT TRAINING: CPT

## 2024-02-15 PROCEDURE — 85025 COMPLETE CBC W/AUTO DIFF WBC: CPT | Performed by: NURSE PRACTITIONER

## 2024-02-15 PROCEDURE — 25010000002 METHYLPREDNISOLONE PER 40 MG: Performed by: NURSE PRACTITIONER

## 2024-02-15 PROCEDURE — 85007 BL SMEAR W/DIFF WBC COUNT: CPT | Performed by: NURSE PRACTITIONER

## 2024-02-15 PROCEDURE — 25010000002 METHYLPREDNISOLONE PER 40 MG

## 2024-02-15 PROCEDURE — 25010000002 AZITHROMYCIN PER 500 MG: Performed by: NURSE PRACTITIONER

## 2024-02-15 PROCEDURE — 99222 1ST HOSP IP/OBS MODERATE 55: CPT | Performed by: INTERNAL MEDICINE

## 2024-02-15 RX ORDER — ECHINACEA PURPUREA EXTRACT 125 MG
2 TABLET ORAL
Status: DISCONTINUED | OUTPATIENT
Start: 2024-02-15 | End: 2024-02-18 | Stop reason: HOSPADM

## 2024-02-15 RX ORDER — METHYLPREDNISOLONE SODIUM SUCCINATE 40 MG/ML
40 INJECTION, POWDER, LYOPHILIZED, FOR SOLUTION INTRAMUSCULAR; INTRAVENOUS EVERY 12 HOURS
Status: DISCONTINUED | OUTPATIENT
Start: 2024-02-15 | End: 2024-02-16

## 2024-02-15 RX ORDER — ALUMINA, MAGNESIA, AND SIMETHICONE 2400; 2400; 240 MG/30ML; MG/30ML; MG/30ML
15 SUSPENSION ORAL EVERY 6 HOURS PRN
Status: DISCONTINUED | OUTPATIENT
Start: 2024-02-15 | End: 2024-02-18 | Stop reason: HOSPADM

## 2024-02-15 RX ORDER — FLUTICASONE PROPIONATE 50 MCG
2 SPRAY, SUSPENSION (ML) NASAL DAILY
Status: DISCONTINUED | OUTPATIENT
Start: 2024-02-15 | End: 2024-02-18 | Stop reason: HOSPADM

## 2024-02-15 RX ORDER — SODIUM CHLORIDE/ALOE VERA
1 GEL (GRAM) NASAL AS NEEDED
Status: DISCONTINUED | OUTPATIENT
Start: 2024-02-15 | End: 2024-02-18 | Stop reason: HOSPADM

## 2024-02-15 RX ORDER — GUAIFENESIN 200 MG/10ML
200 LIQUID ORAL EVERY 4 HOURS PRN
Status: DISCONTINUED | OUTPATIENT
Start: 2024-02-15 | End: 2024-02-18 | Stop reason: HOSPADM

## 2024-02-15 RX ORDER — CETIRIZINE HYDROCHLORIDE 10 MG/1
10 TABLET ORAL DAILY
Status: DISCONTINUED | OUTPATIENT
Start: 2024-02-15 | End: 2024-02-18 | Stop reason: HOSPADM

## 2024-02-15 RX ORDER — FLUTICASONE PROPIONATE 50 MCG
2 SPRAY, SUSPENSION (ML) NASAL 2 TIMES DAILY
Status: DISCONTINUED | OUTPATIENT
Start: 2024-02-15 | End: 2024-02-15

## 2024-02-15 RX ADMIN — IPRATROPIUM BROMIDE AND ALBUTEROL SULFATE 3 ML: .5; 3 SOLUTION RESPIRATORY (INHALATION) at 06:18

## 2024-02-15 RX ADMIN — ENOXAPARIN SODIUM 40 MG: 100 INJECTION SUBCUTANEOUS at 08:57

## 2024-02-15 RX ADMIN — ZINC OXIDE 1 APPLICATION: 200 OINTMENT TOPICAL at 17:33

## 2024-02-15 RX ADMIN — AZITHROMYCIN DIHYDRATE 500 MG: 500 INJECTION, POWDER, LYOPHILIZED, FOR SOLUTION INTRAVENOUS at 20:48

## 2024-02-15 RX ADMIN — Medication 10 ML: at 20:50

## 2024-02-15 RX ADMIN — OSELTAMIVIR PHOSPHATE 30 MG: 30 CAPSULE ORAL at 10:29

## 2024-02-15 RX ADMIN — BUDESONIDE AND FORMOTEROL FUMARATE DIHYDRATE 2 PUFF: 160; 4.5 AEROSOL RESPIRATORY (INHALATION) at 18:48

## 2024-02-15 RX ADMIN — LEVOTHYROXINE SODIUM 75 MCG: 75 TABLET ORAL at 05:34

## 2024-02-15 RX ADMIN — OSELTAMIVIR PHOSPHATE 30 MG: 30 CAPSULE ORAL at 20:49

## 2024-02-15 RX ADMIN — HYDROXYZINE HYDROCHLORIDE 50 MG: 25 TABLET ORAL at 20:49

## 2024-02-15 RX ADMIN — GUAIFENESIN 200 MG: 200 SOLUTION ORAL at 10:29

## 2024-02-15 RX ADMIN — PANTOPRAZOLE SODIUM 40 MG: 40 TABLET, DELAYED RELEASE ORAL at 05:34

## 2024-02-15 RX ADMIN — GUAIFENESIN 200 MG: 200 SOLUTION ORAL at 20:49

## 2024-02-15 RX ADMIN — BUDESONIDE AND FORMOTEROL FUMARATE DIHYDRATE 2 PUFF: 160; 4.5 AEROSOL RESPIRATORY (INHALATION) at 06:19

## 2024-02-15 RX ADMIN — IPRATROPIUM BROMIDE AND ALBUTEROL SULFATE 3 ML: .5; 3 SOLUTION RESPIRATORY (INHALATION) at 18:47

## 2024-02-15 RX ADMIN — METHYLPREDNISOLONE SODIUM SUCCINATE 40 MG: 40 INJECTION, POWDER, FOR SOLUTION INTRAMUSCULAR; INTRAVENOUS at 05:34

## 2024-02-15 RX ADMIN — Medication 10 ML: at 08:59

## 2024-02-15 RX ADMIN — SALINE NASAL SPRAY 2 SPRAY: 1.5 SOLUTION NASAL at 12:17

## 2024-02-15 RX ADMIN — IPRATROPIUM BROMIDE AND ALBUTEROL SULFATE 3 ML: .5; 3 SOLUTION RESPIRATORY (INHALATION) at 10:34

## 2024-02-15 RX ADMIN — IPRATROPIUM BROMIDE AND ALBUTEROL SULFATE 3 ML: .5; 3 SOLUTION RESPIRATORY (INHALATION) at 14:27

## 2024-02-15 RX ADMIN — CEFTRIAXONE SODIUM 2000 MG: 2 INJECTION, POWDER, FOR SOLUTION INTRAMUSCULAR; INTRAVENOUS at 08:58

## 2024-02-15 RX ADMIN — FLUTICASONE PROPIONATE 2 SPRAY: 50 SPRAY, METERED NASAL at 10:31

## 2024-02-15 RX ADMIN — METHYLPREDNISOLONE SODIUM SUCCINATE 40 MG: 40 INJECTION, POWDER, FOR SOLUTION INTRAMUSCULAR; INTRAVENOUS at 17:33

## 2024-02-15 NOTE — PROGRESS NOTES
[facility] Medicine Services  INPATIENT PROGRESS NOTE    Patient Name: Emy Bermudez  Date of Admission: 2/13/2024  Today's Date: 02/15/24  Length of Stay: 2  Primary Care Physician: Dorie Segura APRN    Subjective   Chief Complaint: Shortness of breath  HPI   Ms Bermudez presented to [facility] emergency room 2/13/2024 with weeklong history of worsening shortness of breath.  She has a history of COPD and chronic respiratory failure on oxygen at 4 L continuously  She reported cough with light yellow sputum production.  She denied nausea, vomiting or diarrhea.  She has chronic bilateral lower extremity lymphedema with Unna boots in the past.  ABGs pH 7.3 5, pO2 100, pCO2 63.4 on 4 L.  She was placed on BiPAP with pH 7.31, pCO2 66, pO2 109.  D-dimer 1.2.  CT angiogram of the chest no definite central pulmonary emboli.  Peripheral pulmonary artery branches difficult to fully evaluate.  Atheromatous disease thoracic aorta and coronary arteries.  Cardiomegaly.  Bilateral tree-in-bud opacities as well as areas of patchy consolidation worrisome for pneumonia.  Findings likely infectious or inflammatory.  Retained secretions bilateral lower lobes.  5 mm subpleural right upper lobe nodule versus more focal consolidation in area of tree-in-bud opacification.  Consolidation versus atelectasis right lower lobe.  2 adjacent pleural-based opacities right upper lobe.  Follow-up CT 3 months.  Chest x-ray linear infiltrate atelectasis versus scarring.  Influenza B positive.  Temperature 100.6, heart rate 106, respirations 35 in ER.  DuoNeb, Solu-Medrol, Rocephin, azithromycin given in ER.  Fluid bolus not given due to concern for overload.    Today  Patient examined sitting up in chair on 4 L nasal cannula.  She has increased work of breathing, specifically with conversation.  She continues to complain of shortness of breath.  She has not ambulated.  I did encourage her to participate in  physical therapy.  Attempt to wean steroids today by decreasing IV Solu-Medrol to every 12, although patient does have labored breathing and continues with impressive wheeze, continue steroid wean very cautiously.  Continue antibiotic treatment for potential superimposed bacterial component.  Saline spray/Flonase for complaints of congestion.  Patient is agreeable to her plan of care.     ROS:  All pertinent negatives and positives are as above. All other systems have been reviewed and are negative unless otherwise stated.     Objective    Temp:  [97.5 °F (36.4 °C)-98.2 °F (36.8 °C)] 97.7 °F (36.5 °C)  Heart Rate:  [58-77] 68  Resp:  [20] 20  BP: (109-158)/(59-87) 137/68  Physical Exam  Vitals and nursing note reviewed.   Constitutional:       Appearance: She is obese.      Comments: Up in chair, 4 L nasal cannula, no visitors at bedside   HENT:      Head: Normocephalic and atraumatic.      Nose: No congestion.      Mouth/Throat:      Pharynx: No oropharyngeal exudate or posterior oropharyngeal erythema.   Eyes:      Extraocular Movements: Extraocular movements intact.      Pupils: Pupils are equal, round, and reactive to light.   Cardiovascular:      Rate and Rhythm: Normal rate and regular rhythm.   Pulmonary:      Breath sounds: Rhonchi present.      Comments: Diffuse inspiratory and expiratory wheeze.  4 L nasal cannula.  Abdominal:      Palpations: Abdomen is soft.      Tenderness: There is no abdominal tenderness.   Genitourinary:     Comments: Voiding.  Musculoskeletal:         General: Swelling present.      Cervical back: Normal range of motion and neck supple.      Comments: Chronic bilateral lower extremity edema with unna boots in place   Skin:     Findings: Erythema (Bilateral lower extremities, chronic edema) present.   Neurological:      General: No focal deficit present.      Mental Status: She is alert and oriented to person, place, and time.   Psychiatric:         Mood and Affect: Mood normal.          Behavior: Behavior normal.         Thought Content: Thought content normal.         Judgment: Judgment normal.       Results Review:  I have reviewed the labs, radiology results, and diagnostic studies.    Laboratory Data:   Results from last 7 days   Lab Units 02/15/24  0557 02/14/24  0754 02/13/24  1641   WBC 10*3/mm3 13.07* 8.19 8.46   HEMOGLOBIN g/dL 9.1* 10.0* 10.4*   HEMATOCRIT % 30.6* 32.6* 35.4   PLATELETS 10*3/mm3 231 195 207        Results from last 7 days   Lab Units 02/15/24  0557 02/14/24  0754 02/13/24  2141 02/13/24  1726 02/13/24  1641   SODIUM mmol/L 139 139  --   --  138   SODIUM, ARTERIAL mmol/L  --   --  140   < >  --    POTASSIUM mmol/L 3.9 4.2  --   --  4.2   CHLORIDE mmol/L 95* 97*  --   --  95*   CO2 mmol/L 33.0* 31.0*  --   --  34.0*   BUN mg/dL 22 15  --   --  12   CREATININE mg/dL 1.31* 1.05*  --   --  1.04*   CALCIUM mg/dL 8.6 8.9  --   --  9.6   BILIRUBIN mg/dL  --   --   --   --  0.5   ALK PHOS U/L  --   --   --   --  108   ALT (SGPT) U/L  --   --   --   --  11   AST (SGOT) U/L  --   --   --   --  23   GLUCOSE mg/dL 110* 174*  --   --  103*    < > = values in this interval not displayed.     Microbiology Results (last 10 days)       Procedure Component Value - Date/Time    MRSA Screen, PCR (Inpatient) - Swab, Nares [[id number]]  (Normal) Collected: 02/14/24 0540    Lab Status: Final result Specimen: Swab from Nares Updated: 02/14/24 0708     MRSA PCR No MRSA Detected    Narrative:      The negative predictive value of this diagnostic test is high and should only be used to consider de-escalating anti-MRSA therapy. A positive result may indicate colonization with MRSA and must be correlated clinically.    S. Pneumo Ag Urine or CSF - Urine, Urine, Clean Catch [[id number]]  (Normal) Collected: 02/14/24 0236    Lab Status: Final result Specimen: Urine, Clean Catch Updated: 02/14/24 0322     Strep Pneumo Ag Negative    Legionella Antigen, Urine - Urine, Urine, Clean Catch [[id number]]   (Normal) Collected: 02/14/24 0236    Lab Status: Final result Specimen: Urine, Clean Catch Updated: 02/14/24 0322     LEGIONELLA ANTIGEN, URINE Negative    COVID PRE-OP / PRE-PROCEDURE SCREENING ORDER (NO ISOLATION) - Swab, Nasopharynx [[id number]]  (Abnormal) Collected: 02/13/24 1653    Lab Status: Final result Specimen: Swab from Nasopharynx Updated: 02/13/24 1817    Narrative:      The following orders were created for panel order COVID PRE-OP / PRE-PROCEDURE SCREENING ORDER (NO ISOLATION) - Swab, Nasopharynx.  Procedure                               Abnormality         Status                     ---------                               -----------         ------                     COVID-19 and FLU A/B PCR...[[id number]]  Abnormal            Final result                 Please view results for these tests on the individual orders.    COVID-19 and FLU A/B PCR, 1 HR TAT - Swab, Nasopharynx [[id number]]  (Abnormal) Collected: 02/13/24 1653    Lab Status: Final result Specimen: Swab from Nasopharynx Updated: 02/13/24 1817     COVID19 Not Detected     Influenza A PCR Not Detected     Influenza B PCR Detected    Narrative:      Fact sheet for providers: https://www.fda.gov/media/619512/download    Fact sheet for patients: https://www.fda.gov/media/568661/download    Test performed by PCR.    Blood Culture - Blood, Hand, Right [[id number]]  (Normal) Collected: 02/13/24 1650    Lab Status: Preliminary result Specimen: Blood from Hand, Right Updated: 02/14/24 1746     Blood Culture No growth at 24 hours    Blood Culture - Blood, Arm, Left [[id number]]  (Normal) Collected: 02/13/24 1646    Lab Status: Preliminary result Specimen: Blood from Arm, Left Updated: 02/14/24 1746     Blood Culture No growth at 24 hours           I have reviewed the patient's current medications.     Assessment/Plan   Assessment  Active Hospital Problems    Diagnosis     **Acute on chronic respiratory failure with hypoxia and hypercapnia due to  influenza B     Lymphedema     Influenza B     Sepsis     Pneumonia of both lungs due to infectious organism, tree in bud     Essential hypertension     Hypothyroidism (acquired)     Normocytic anemia     Cellulitis of right lower extremity     COPD (chronic obstructive pulmonary disease)        Treatment Plan  Acute on chronic hypoxic hypercapnic respiratory failure with COPD exacerbation secondary to influenza B-  Continue Symbicort, DuoNebs, incentive spirometry, OPEP  Continue Tamiflu  Daily Rocephin, azithromycin  Decrease IV Solu-Medrol from every 8 to every 12  Continue 4 L nasal cannula    Pneumonia with tree-in-bud opacity-  Strep pneumonia negative, Legionella negative, MRSA PCR negative.  Continue efforts at pulmonary toileting with incentive spirometry and OPEP  Mucinex transition to liquid Robitussin per patient request  Continue Rocephin, azithromycin  Sputum culture to be collected if able  Blood culture x 2 no growth at 24 hours    Primary hypertension-  Continue to closely monitor, blood pressure trend currently appropriate    Hypothyroidism-  Continue Synthroid    Chronic lymphedema bilateral lower extremities-  Lower extremities reddened, swollen, no open areas or drainage.  Physical therapy was consulted in place bilateral unna boots on 2/14    Normocytic anemia-  Hemoglobin 9.1 this morning, no obvious signs or symptoms of bleeding  CBC in a.m.    Lovenox for deep vein thrombosis prophylaxis.    PT/OT for deconditioning    Medical Decision Making  Number and Complexity of problems: 8  Acute on chronic hypoxic hypercapnic respiratory failure secondary to influenza B.  Acute, high complexity poses threat to life and bodily function, required BiPAP, improving  Influenza B: Acute, high complexity, not at baseline  Pneumonia with tree-in-bud opacity: Acute, high complexity, not at baseline  Primary hypertension: Chronic, moderate complexity, not at baseline  Hypothyroidism: Chronic, low complexity,  stable  Chronic edema lower extremities: Chronic, moderate complexity, not at baseline  COPD: Chronic, high complexity, not at baseline  Normocytic anemia: Chronic, moderate complexity, stable    Differential Diagnosis: None    Conditions and Status        Condition is improving.     [facility] Data  External documents reviewed: Otology office visit 1/19/2024  Cardiac tracing (EKG, telemetry) interpretation: Normal sinus rhythm   Radiology interpretation: Reviewed radiology interpretation CTA chest, chest x-ray  Labs reviewed:   CBC, BMP, ABG, hemoglobin A1c, TSH, iron profile, blood cultures    Any tests that were considered but not ordered: None     Decision rules/scores evaluated (example [id number], Wells, etc): None     Discussed with: Dr. Tolentino and patient.     Care Planning  Shared decision making: Discussed with above, patient is agreeable to her plan of care  Code status and discussions: Full code  The patient surrogate decision maker is her daughter, Madeleine Bates  Social Determinants of Health that impact treatment or disposition: None  I expect the patient to be discharged to home in 2 to 3 days.    Electronically signed by JOAQUÍN Murry, 02/15/24, 09:39 CST.

## 2024-02-15 NOTE — PLAN OF CARE
Goal Outcome Evaluation:  Plan of Care Reviewed With: patient           Outcome Evaluation: Pt o2 is 95% sitting in chair and 97-98%with activity. Pt easily become SOA with minimal activity. Pt performed 2 sit to stand CGA from roughly 10 seconds. Pt requires rest break after each activity.

## 2024-02-15 NOTE — PLAN OF CARE
Goal Outcome Evaluation:              Outcome Evaluation: VS WNL. O2 >92% @4L BNC. Patient refuses to wear Bipap at night. Patient refuses to go to the bed and wants to stay in the recliner. IV antibiotics given. BLE are wrapped in Unna boots. WIll continue to monitor. SB/NSR 54-83.

## 2024-02-15 NOTE — THERAPY TREATMENT NOTE
Acute Care - Physical Therapy Treatment Note   [place]     Patient Name: Emy Bermudez  : 1950  MRN: [id number]  Today's Date: 2/15/2024   Onset of Illness/Injury or Date of Surgery: 24  Visit Dx:     ICD-10-CM ICD-9-CM   1. Acute respiratory failure with hypoxia and hypercapnia  J96.01 518.81    J96.02    2. Influenza  J11.1 487.1   3. Multifocal pneumonia  J18.9 486   4. Pulmonary nodule  R91.1 793.11   5. Acute on chronic respiratory failure with hypoxia and hypercapnia  J96.21 518.84    J96.22 786.09     799.02     Patient Active Problem List   Diagnosis    COPD (chronic obstructive pulmonary disease)    Cellulitis of right lower extremity    Obesity, Class III, BMI 40-49.9 (morbid obesity)    Chronic respiratory failure with hypoxia and hypercapnia    Normocytic anemia    Hypothyroidism (acquired)    Essential hypertension    Venous insufficiency of both lower extremities    Stasis dermatitis of both legs    Epistaxis    Tobacco abuse    Anemia, blood loss due to epistasis    Acute kidney injury    Acquired deviated nasal septum    S/P nasal septoplasty    Pneumonia of both lungs due to infectious organism, tree in bud    Intractable nausea and vomiting    Chronic respiratory failure    Obesity (BMI 30-39.9)    Acute on chronic respiratory failure with hypoxia and hypercapnia due to influenza B    Lymphedema    Influenza B    Sepsis     Past Medical History:   Diagnosis Date    Abdominal wall abscess     Abdominal wall fistula 2018    Cellulitis     Chronic respiratory failure with hypoxia 2020    Colonic obstruction 4/3/2018    COPD (chronic obstructive pulmonary disease)     Depression     Diverticul disease small and large intestine, no perforati or abscess     Edema     GERD (gastroesophageal reflux disease)     Hyperlipidemia     Hypertension     Hypocalcemia     Hypothyroid     Obesity, Class III, BMI 40-49.9 (morbid obesity) 2020    Respiratory failure     Tobacco dependence  2020    Venous insufficiency     Vertigo      Past Surgical History:   Procedure Laterality Date    ABDOMINAL SURGERY       SECTION      COLOSTOMY      ETHMOID ARTERY LIGATION N/A 2021    Procedure: ETHMOID ARTERY LIGATION;  Surgeon: Elliot Mack Jr., MD;  Location:  [facility] OR;  Service: ENT;  Laterality: N/A;    EXAM UNDER ANESTHESIA N/A 2021    Procedure: SEPTOPLASTY TURBOPLASTY ENDOSCOPIC CONTROL LEFT NOSE BLEED;  Surgeon: Elliot Mack Jr., MD;  Location:  [facility] OR;  Service: ENT;  Laterality: N/A;    EXPLORATORY LAPAROTOMY N/A 2018    Procedure: LAPAROTOMY EXPLORATORY, partial colectomy, creation colostomy, incision and drainage abdominal wall abscess;  Surgeon: Elda Velazquez MD;  Location:  [facility] OR;  Service: General    INTERNAL MAXILLARY ARTERY AND ETHMOID ARTERY LIGATION N/A 2021    Procedure: INTERNAL MAXILLARY ARTERY AND ETHMOID ARTERY LIGATION;  Surgeon: Elliot Mack Jr., MD;  Location:  [facility] OR;  Service: ENT;  Laterality: N/A;    SEPTOPLASTY, RESECTION INFERIOR TURBINATES Bilateral 2021    Procedure: SEPTOPLASTY, RESECTION INFERIOR TURBINATES;  Surgeon: Elliot Mack Jr., MD;  Location:  [facility] OR;  Service: ENT;  Laterality: Bilateral;     PT Assessment (last 12 hours)       PT Evaluation and Treatment       Row Name 02/15/24 1107          Physical Therapy Time and Intention    Subjective Information complains of;dyspnea  -WK     Document Type therapy note (daily note)  -WK     Mode of Treatment physical therapy  -WK       Row Name 02/15/24 1107          General Information    Existing Precautions/Restrictions fall;oxygen therapy device and L/min  -WK       Row Name 02/15/24 1107          Bed Mobility    Comment, (Bed Mobility) in chair  -WK       Row Name 02/15/24 1107          Sit-Stand Transfer    Sit-Stand [place] (Transfers) contact guard  stood x 2 for roughly 10 seconds  -WK     Assistive Device (Sit-Stand Transfers)  walker, front-wheeled  -WK       Row Name 02/15/24 1107          Stand-Sit Transfer    Stand-Sit [place] (Transfers) verbal cues;contact guard  -WK       Row Name 02/15/24 1107          Gait/Stairs (Locomotion)    Comment, (Gait/Stairs) easily become SOA with minimal actvity. O2 98-99% on 4L  -WK       Row Name 02/15/24 1107          Motor Skills    Comments, Therapeutic Exercise AROM 10 reps with rest due to SOA  -WK     Additional Documentation Comments, Therapeutic Exercise (Row)  -WK       Row Name             Wound 02/13/24 2227 labia Skin Tear    Wound - Properties Group Placement Date: 02/13/24  -PA Placement Time: 2227 -PA Location: labia  -PA Primary Wound Type: Skin tear  -PA    Retired Wound - Properties Group Placement Date: 02/13/24  -PA Placement Time: 2227 -PA Location: labia  -PA Primary Wound Type: Skin tear  -PA    Retired Wound - Properties Group Date first assessed: 02/13/24  -PA Time first assessed: 2227 -PA Location: labia  -PA Primary Wound Type: Skin tear  -PA      Row Name 02/15/24 1107          Plan of Care Review    Plan of Care Reviewed With patient  -WK     Outcome Evaluation Pt o2 is 95% sitting in chair and 97-98%with activity. Pt easily become SOA with minimal activity. Pt performed 2 sit to stand CGA from roughly 10 seconds. Pt requires rest break after each activity.  -WK       Row Name 02/15/24 1107          Positioning and Restraints    Pre-Treatment Position sitting in chair/recliner  -WK     Post Treatment Position chair  -WK     In Chair reclined;call light within reach;encouraged to call for assist;with other staff  -WK               User Key  (r) = Recorded By, (t) = Taken By, (c) = Cosigned By      Initials Name Provider Type    WK Shruti Monroy PTA Physical Therapist Assistant    Tuan Sanderson, RN Registered Nurse                    Physical Therapy Education       Title: PT OT SLP Therapies (Done)       Topic: Physical Therapy (Done)       Point:  Mobility training (Done)       Learning Progress Summary             Patient Acceptance, E,D, VU,DU,NR by  at 2/14/2024 1449    Comment: benefits of PT and POC, call for A OOB                         Point: Precautions (Done)       Learning Progress Summary             Patient Acceptance, E,D, VU,DU,NR by  at 2/14/2024 1449    Comment: benefits of PT and POC, call for A OOB                                         User Key       Initials Effective Dates Name Provider Type Discipline     02/03/23 -  Jeffrey Reddy, PT Physical Therapist PT                  PT Recommendation and Plan     Plan of Care Reviewed With: patient  Outcome Evaluation: Pt o2 is 95% sitting in chair and 97-98%with activity. Pt easily become SOA with minimal activity. Pt performed 2 sit to stand CGA from roughly 10 seconds. Pt requires rest break after each activity.   Outcome Measures       Row Name 02/15/24 1107 02/14/24 0940          How much help from another person do you currently need...    Turning from your back to your side while in flat bed without using bedrails? 4  -WK --     Moving from lying on back to sitting on the side of a flat bed without bedrails? 3  -WK --     Moving to and from a bed to a chair (including a wheelchair)? 3  -WK --     Standing up from a chair using your arms (e.g., wheelchair, bedside chair)? 3  -WK --     Climbing 3-5 steps with a railing? 2  -WK --     To walk in hospital room? 2  -WK --     AM-PAC 6 Clicks Score (PT) 17  -WK --     Highest Level of Mobility Goal 5 --> Static standing  -WK --        How much help from another is currently needed...    Putting on and taking off regular lower body clothing? -- 2  -AC     Bathing (including washing, rinsing, and drying) -- 2  -AC     Toileting (which includes using toilet bed pan or urinal) -- 2  -AC     Putting on and taking off regular upper body clothing -- 3  -AC     Taking care of personal grooming (such as brushing teeth) -- 3  -AC     Eating meals  -- 4  -AC     AM-PAC 6 Clicks Score (OT) -- 16  -AC        Functional Assessment    Outcome Measure Options AM-PAC 6 Clicks Basic Mobility (PT)  -WK AM-PAC 6 Clicks Daily Activity (OT)  -AC               User Key  (r) = Recorded By, (t) = Taken By, (c) = Cosigned By      Initials Name Provider Type    AC Rayshawn Devlin, OTR/L, CNT Occupational Therapist    WK Shruti Monroy PTA Physical Therapist Assistant                     Time Calculation:    PT Charges       Row Name 02/15/24 1132             Time Calculation    Start Time 1107  -WK      Stop Time 1132  -WK      Time Calculation (min) 25 min  -WK      PT Received On 02/15/24  -WK         Time Calculation- PT    Total Timed Code Minutes- PT 25 minute(s)  -WK         Timed Charges    67437 - PT Therapeutic Exercise Minutes 12  -WK      99363 - PT Therapeutic Activity Minutes 13  -WK         Total Minutes    Timed Charges Total Minutes 25  -WK       Total Minutes 25  -WK                User Key  (r) = Recorded By, (t) = Taken By, (c) = Cosigned By      Initials Name Provider Type    WK Shruti Monroy PTA Physical Therapist Assistant                  Therapy Charges for Today       Code Description Service Date Service Provider Modifiers Qty    36374580478 HC PT THER PROC EA 15 MIN 2/15/2024 Shruti Monroy PTA GP 1    82601561203 HC PT THERAPEUTIC ACT EA 15 MIN 2/15/2024 Shruti Monroy PTA GP 1            PT G-Codes  Outcome Measure Options: AM-PAC 6 Clicks Basic Mobility (PT)  AM-PAC 6 Clicks Score (PT): 17  AM-PAC 6 Clicks Score (OT): 16    Shruti Monroy PTA  2/15/2024

## 2024-02-15 NOTE — PLAN OF CARE
Goal Outcome Evaluation:  Plan of Care Reviewed With: patient        Progress: improving  Outcome Evaluation: Pt educated on and provided pulmonary HEP. Pt encouraged to increase activity at tolerated. Pt would benefit from continued rehab via HH at discharge. Continue OT POC

## 2024-02-15 NOTE — CONSULTS
[facility] [place] [facility] [place] - [facility]     02/15/24, 10:53 CST  Patient Care Team:  Dorie Segura APRN as PCP - General (Nurse Practitioner)  Name: Emy Bermudez  : 1950  MRN: [id number]  Contact Serial Number [id number]     Chief complaint: Respiratory failure, influenza and possible superimposed pneumonia  HPI:  We have been consulted by Eleno Tolentino DO to see this 73 y.o. female.  Patient was admitted to the hospital on the  after presenting to the emergency department via EMS with complaints of shortness of breath over the prior week.  She has known history of COPD, prior tobacco use (quit 2024), hypertension and chronic respiratory failure on 4 L of supplemental oxygen.  She is on chronic diuretics secondary to suspected lower extremity lymphedema.  ABG on the  on BiPAP showed a pH of 7.317, pCO2 of 66 pO2 of 109 and HCO3 of 33.9.  proBNP was normal.  High-sensitivity troponin was elevated.  Sodium is normal potassium 3.9 and creatinine of 1.31.  TSH was normal.  Hemoglobin A1c 5.5.  Procalcitonin was elevated at 0.35 and lactate was 1.0.  White blood cell count this morning has trended up to 13.07.  Hemoglobin stable at 9.1 and normal platelets.  MRSA was negative.  Legionella and strep urine antigens were negative.  She was negative for COVID and influenza A but positive for influenza B.  CTA of the chest showed no definite central pulmonary emboli.  Bilateral tree-in-bud opacities with patchy areas of consolidation.  Retained secretions in the bilateral lower lobe bronchi.  Small mediastinal lymph nodes that are likely reactive.  Review of nursing note indicates she has been refusing to wear the BiPAP at night as well as to return to the bed at night.  She has been on 4 L nasal cannula with a sat of 98%.  She is on albuterol and Symbicort at home.  She has completed Zosyn.  Azithromycin completes today.  She is currently on Rocephin.   Past  Medical History:   has a past medical history of Abdominal wall abscess, Abdominal wall fistula (2018), Cellulitis, Chronic respiratory failure with hypoxia (2020), Colonic obstruction (4/3/2018), COPD (chronic obstructive pulmonary disease), Depression, Diverticul disease small and large intestine, no perforati or abscess, Edema, GERD (gastroesophageal reflux disease), Hyperlipidemia, Hypertension, Hypocalcemia, Hypothyroid, Obesity, Class III, BMI 40-49.9 (morbid obesity) (2020), Respiratory failure, Tobacco dependence (2020), Venous insufficiency, and Vertigo.   has a past surgical history that includes  section; Abdominal surgery; Exploratory Laparotomy (N/A, 2018); Colostomy; Examination under anesthesia (N/A, 2021); internal maxillary artery and ethmoid artery ligation (N/A, 2021); Ethmoid Artery Ligation (N/A, 2021); and septoplasty, resection inferior turbinates (Bilateral, 2021).  No Known Allergies  Medications:  azithromycin, 500 mg, Intravenous, Q24H  budesonide-formoterol, 2 puff, Inhalation, BID - RT  cefTRIAXone, 2,000 mg, Intravenous, Q24H  enoxaparin, 40 mg, Subcutaneous, Daily  fluticasone, 2 spray, Each Nare, BID  [Held by provider] furosemide, 20 mg, Oral, Daily  ipratropium-albuterol, 3 mL, Nebulization, 4x Daily - RT  levothyroxine, 75 mcg, Oral, Q AM  methylPREDNISolone sodium succinate, 40 mg, Intravenous, Q12H  oseltamivir, 30 mg, Oral, Q12H  pantoprazole, 40 mg, Oral, Q AM  sertraline, 100 mg, Oral, Nightly  sodium chloride, 10 mL, Intravenous, Q12H  sodium chloride, 2 spray, Each Nare, Q4H        Family History:  Family History   Adopted: Yes      Social History:   reports that she quit smoking about 4 years ago. Her smoking use included cigarettes. She has a 3.75 pack-year smoking history. She has never used smokeless tobacco. She reports that she does not drink alcohol and does not use drugs.  Review of Systems:  Review of Systems   Physical  Exam:  Temp:  [97.5 °F (36.4 °C)-98.2 °F (36.8 °C)] 97.7 °F (36.5 °C)  Heart Rate:  [58-85] 85  Resp:  [20] 20  BP: (109-158)/(59-87) 137/68  Intake/Output Summary (Last 24 hours) at 2/15/2024 1053  Last data filed at 2/15/2024 0541      Gross per 24 hour   Intake 750 ml   Output 1050 ml   Net -300 ml      Vitals               02/13/24  2234 02/14/24  0420 02/15/24  0541   Weight: 92.5 kg (204 lb) 93 kg (205 lb 1.6 oz) 90.2 kg (198 lb 12.8 oz)               SpO2 Percentage     02/15/24 0804 02/15/24 1034 02/15/24 1040   SpO2: 95% 97% 98%      Body mass index is 35.22 kg/m².   Physical Exam  Vitals and nursing note reviewed.   Constitutional:       Appearance: Normal appearance. She is obese.      Interventions: Nasal cannula in place.   HENT:      Head: Normocephalic and atraumatic.      Nose: Nose normal.      Mouth/Throat:      Mouth: Mucous membranes are dry.   Eyes:      General:         Right eye: No discharge.         Left eye: No discharge.      Conjunctiva/sclera: Conjunctivae normal.      Pupils: Pupils are equal, round, and reactive to light.   Cardiovascular:      Rate and Rhythm: Normal rate and regular rhythm.   Pulmonary:      Breath sounds: Wheezing and rhonchi present.   Abdominal:      General: Abdomen is flat.      Palpations: Abdomen is soft.      Comments: Colostomy   Musculoskeletal:         General: Normal range of motion.      Cervical back: Normal range of motion and neck supple.      Comments: Bilateral lower extremities with sarah boots    Skin:     General: Skin is warm and dry.   Neurological:      General: No focal deficit present.      Mental Status: She is alert and oriented to person, place, and time.   Psychiatric:         Mood and Affect: Mood normal.         Behavior: Behavior normal.         Result Review         Results from last 7 days   Lab Units 02/15/24  0557 02/14/24  0754 02/13/24  1641   WBC 10*3/mm3 13.07* 8.19 8.46   HEMOGLOBIN g/dL 9.1* 10.0* 10.4*   PLATELETS 10*3/mm3  231 195 207               Results from last 7 days   Lab Units 02/15/24  0557 02/14/24  0754 02/13/24  2141 02/13/24  1726 02/13/24  1641   SODIUM mmol/L 139 139  --   --  138   SODIUM, ARTERIAL mmol/L  --   --  140   < >  --    POTASSIUM mmol/L 3.9 4.2  --   --  4.2   CO2 mmol/L 33.0* 31.0*  --   --  34.0*   BUN mg/dL 22 15  --   --  12   CREATININE mg/dL 1.31* 1.05*  --   --  1.04*   MAGNESIUM mg/dL  --   --   --   --  2.1   GLUCOSE mg/dL 110* 174*  --   --  103*    < > = values in this interval not displayed.            Results from last 7 days   Lab Units 02/13/24 2141 02/13/24 1726   PH, ARTERIAL pH units 7.317* 7.355   PCO2, ARTERIAL mm Hg 66.4* 63.4*   PO2 ART mm Hg 109.0* 100.0   FIO2 % 40  --       Microbiology Results (last 10 days)         Procedure Component Value - Date/Time     MRSA Screen, PCR (Inpatient) - Swab, Nares [[id number]]  (Normal) Collected: 02/14/24 0540     Lab Status: Final result Specimen: Swab from Nares Updated: 02/14/24 0708       MRSA PCR No MRSA Detected     Narrative:       The negative predictive value of this diagnostic test is high and should only be used to consider de-escalating anti-MRSA therapy. A positive result may indicate colonization with MRSA and must be correlated clinically.     S. Pneumo Ag Urine or CSF - Urine, Urine, Clean Catch [[id number]]  (Normal) Collected: 02/14/24 0236     Lab Status: Final result Specimen: Urine, Clean Catch Updated: 02/14/24 0322       Strep Pneumo Ag Negative     Legionella Antigen, Urine - Urine, Urine, Clean Catch [[id number]]  (Normal) Collected: 02/14/24 0236     Lab Status: Final result Specimen: Urine, Clean Catch Updated: 02/14/24 0322       LEGIONELLA ANTIGEN, URINE Negative     COVID PRE-OP / PRE-PROCEDURE SCREENING ORDER (NO ISOLATION) - Swab, Nasopharynx [[id number]]  (Abnormal) Collected: 02/13/24 1653     Lab Status: Final result Specimen: Swab from Nasopharynx Updated: 02/13/24 1817     Narrative:       The following orders  were created for panel order COVID PRE-OP / PRE-PROCEDURE SCREENING ORDER (NO ISOLATION) - Swab, Nasopharynx.  Procedure                               Abnormality         Status                     ---------                               -----------         ------                     COVID-19 and FLU A/B PCR...[[id number]]  Abnormal            Final result                  Please view results for these tests on the individual orders.     COVID-19 and FLU A/B PCR, 1 HR TAT - Swab, Nasopharynx [[id number]]  (Abnormal) Collected: 02/13/24 1653     Lab Status: Final result Specimen: Swab from Nasopharynx Updated: 02/13/24 1817       COVID19 Not Detected       Influenza A PCR Not Detected       Influenza B PCR Detected     Narrative:       Fact sheet for providers: https://www.fda.gov/media/371093/download    Fact sheet for patients: https://www.fda.gov/media/537898/download     Test performed by PCR.     Blood Culture - Blood, Hand, Right [[id number]]  (Normal) Collected: 02/13/24 1650     Lab Status: Preliminary result Specimen: Blood from Hand, Right Updated: 02/14/24 1746       Blood Culture No growth at 24 hours     Blood Culture - Blood, Arm, Left [[id number]]  (Normal) Collected: 02/13/24 1646     Lab Status: Preliminary result Specimen: Blood from Arm, Left Updated: 02/14/24 1746       Blood Culture No growth at 24 hours             Recent radiology:   Imaging Results (Last 72 Hours)         Procedure Component Value Units Date/Time     CT Angiogram Chest [[id number]] Collected: 02/13/24 2030       Updated: 02/13/24 2044     Narrative:       EXAMINATION:  CT ANGIOGRAM CHEST-  2/13/2024 7:04 PM     HISTORY: Elevated D-dimer. Shortness of air. D-dimer 1.2.     COMPARISON : 2/13/2020.     DLP: 266.29 mGy.cm Automated dosage reduction technique was utilized to  decrease patient dosage.     TECHNIQUE: CT angio was performed of the chest with IV contrast.  Coronal, sagittal and 3-D reconstruction were performed.      INDEPENDENT 3-D WORKSTATION UTILIZED FOR RECONSTRUCTION: Yes. A  radiologist was not present in the department.     MEDIASTINUM, HEART AND VASCULAR STRUCTURES: There is atheromatous  calcification of the thoracic aorta and coronary arteries. The thoracic  aorta is normal in caliber. The pulmonary arteries are normal in  caliber. There is slightly less than optimal opacification of the  pulmonary arteries. No central pulmonary embolus is seen. More  peripheral vessels are difficult to evaluate due to limited  opacification. There is mild cardiomegaly. Mediastinal lymph nodes  measure up to 8 mm in short axis diameter. They are not enlarged by size  criteria. There are small hilar lymph nodes.     LUNGS: There are tree-in-bud opacities bilaterally in the upper lobes.  There are tree-in-bud opacities in the right middle lobe, lingula and  bilateral lower lobes. There are retained secretions in bilateral lower  lobe bronchi. There is a 5 mm subpleural right upper lobe pulmonary  nodule versus more focal consolidation in the area of tree-in-bud  opacification. There is a 3-4 mm fissural nodule along the left major  fissure image 32 series 6. There are focal areas of consolidation versus  atelectasis in the right lower lobe inferiorly and posteriorly. There  are 2 adjacent pleural-based opacities in the right upper lobe  posteriorly image 59 series 6, likely focal areas of atelectasis.     UPPER ABDOMEN: There is fatty infiltration of the liver. There are no  acute appearing findings in the upper abdomen.     BONES: There are degenerative changes of the spine.           Impression:       1. Somewhat limited opacification of the pulmonary arteries. No definite  central pulmonary emboli. More peripheral pulmonary artery branches are  difficult to fully evaluate. There is atheromatous disease of the  thoracic aorta and coronary arteries. There is cardiomegaly.  2. Bilateral tree-in-bud opacities as well as areas of  patchy  consolidation, worrisome for pneumonia. The findings are likely  infectious or inflammatory. There are also retained secretions in  bilateral lower lobe bronchi.  3. A 5 mm subpleural right upper lobe nodule versus a more focal  consolidation in an area of tree-in-bud opacification. There are also  focal areas of consolidation versus atelectasis in the right lower lobe  posteriorly and inferiorly. There are 2 adjacent pleural-based opacities  in the right upper lobe posteriorly likely areas of focal atelectasis or  pneumonia. A follow-up CT in 3-6 months would be helpful to document  resolution of findings.  4. There is a tiny fissural nodule along the left major fissure, likely  a fissural lymph node. There are small mediastinal lymph nodes that are  likely reactive.  5. Fatty infiltration of the liver.        The full report of this exam was immediately signed and available to the  emergency room. The patient is currently in the emergency room.     This report was signed and finalized on 2/13/2024 8:41 PM by Dr. Tunde Christopher MD.        XR Chest 1 View [[id number]] Collected: 02/13/24 1713       Updated: 02/13/24 1717     Narrative:       EXAMINATION:  XR CHEST 1 VW-  2/13/2024 4:11 PM     HISTORY: Shortness of air.     COMPARISON: 4/17/2021.     TECHNIQUE: Single view AP image.     FINDINGS: There is blunting of the left costophrenic angle versus  scarring. There are linear infiltrates in both lung bases. There is  stable bronchial wall thickening. Heart size is upper limits of normal.  No acute appearing bony abnormality is seen.           Impression:       1. Linear infiltrates in both lung bases. Atelectasis versus scarring.  2. Blunting of the left costophrenic angle may represent scarring versus  a small amount of pleural fluid.  3. Stable bronchial wall thickening.           This report was signed and finalized on 2/13/2024 5:14 PM by Dr. Tunde Christopher MD.                   Other test results  (not lab or imaging): Results for orders placed during the hospital encounter of 02/13/20     Adult Transthoracic Echo Complete W/ Cont if Necessary Per Protocol     Interpretation Summary  · Left ventricular systolic function is normal. Estimated ejection fraction is 66 to 70%.  · Left ventricular diastolic dysfunction (grade I) consistent with impaired relaxation.  · Normal right ventricular cavity size and systolic function noted.  · There were no significant (greater than mild) valvular dysfunction.   :  Independent review of ekg:  Problem List as identified by Epic (may contain historical, inactive problems)    Acute on chronic respiratory failure with hypoxia and hypercapnia due to influenza B    COPD (chronic obstructive pulmonary disease)    Cellulitis of right lower extremity    Normocytic anemia    Hypothyroidism (acquired)    Essential hypertension    Pneumonia of both lungs due to infectious organism, tree in bud    Lymphedema    Influenza B    Sepsis     Pulmonary Assessment:     Acute on chronic respiratory failure with hypoxia and hypercapnia secondary to influenza B  Influenza B  Pneumonia  Hypertension  Hypothyroidism  Chronic bilateral lower extremity lymphedema  Normocytic anemia  COPD  Former smoker  Allergic rhinitis     Recommend/plan:   Continue supplemental oxygen and titrate to keep O2 sat greater than 90%, currently on 4 L baseline  Recommend BiPAP use nightly and as needed, patient currently refusing  Continue SM 40 milligrams IV every 12 hours  Tamiflu X 5 days  Add Ayr gel for nasal dryness  Completed Zosyn, completing azithromycin today, started on Rocephin  Continue Symbicort  Continue DuoNebs scheduled  DVT and GI prophylaxis: Lovenox/Protonix  Continue IS/ OPEP  Offered Metaneb, wants to try OPEP a few more days, cough currently dry   Further recommendations per physician     Thank you for this consult.  We will follow along.     I personally spent 8 minutes in accordance with split  shared billing, this excludes any time spent jointly with the MD and/or other billable services (if applicable).     Electronically signed by JOAQUÍN Martinez, 02/15/24, 10:53 AM CST.      Physician Substantive Portion: Medical Decision Making  Result Review  Results from last 7 days   Lab Units 02/15/24  0557 02/14/24  0754 02/13/24  1641   WBC 10*3/mm3 13.07* 8.19 8.46   HEMOGLOBIN g/dL 9.1* 10.0* 10.4*   PLATELETS 10*3/mm3 231 195 207     Results from last 7 days   Lab Units 02/15/24  0557 02/14/24  0754 02/13/24  2141 02/13/24  1726 02/13/24  1646 02/13/24  1641   SODIUM mmol/L 139 139  --   --   --  138   SODIUM, ARTERIAL mmol/L  --   --  140   < >  --   --    POTASSIUM mmol/L 3.9 4.2  --   --   --  4.2   CO2 mmol/L 33.0* 31.0*  --   --   --  34.0*   BUN mg/dL 22 15  --   --   --  12   CREATININE mg/dL 1.31* 1.05*  --   --   --  1.04*   MAGNESIUM mg/dL  --   --   --   --   --  2.1   GLUCOSE mg/dL 110* 174*  --   --   --  103*   LACTATE mmol/L  --   --   --   --  1.0  --     < > = values in this interval not displayed.     Results from last 7 days   Lab Units 02/13/24 2141 02/13/24  1726   PH, ARTERIAL pH units 7.317* 7.355   PCO2, ARTERIAL mm Hg 66.4* 63.4*   PO2 ART mm Hg 109.0* 100.0   FIO2 % 40  --      Lab Results   Component Value Date    PROBNP 49.6 02/13/2024     Microbiology Results (last 10 days)       Procedure Component Value - Date/Time    MRSA Screen, PCR (Inpatient) - Swab, Nares [[id number]]  (Normal) Collected: 02/14/24 0540    Lab Status: Final result Specimen: Swab from Nares Updated: 02/14/24 0708     MRSA PCR No MRSA Detected    Narrative:      The negative predictive value of this diagnostic test is high and should only be used to consider de-escalating anti-MRSA therapy. A positive result may indicate colonization with MRSA and must be correlated clinically.    S. Pneumo Ag Urine or CSF - Urine, Urine, Clean Catch [[id number]]  (Normal) Collected: 02/14/24 0236    Lab Status:  Final result Specimen: Urine, Clean Catch Updated: 02/14/24 0322     Strep Pneumo Ag Negative    Legionella Antigen, Urine - Urine, Urine, Clean Catch [[id number]]  (Normal) Collected: 02/14/24 0236    Lab Status: Final result Specimen: Urine, Clean Catch Updated: 02/14/24 0322     LEGIONELLA ANTIGEN, URINE Negative    COVID PRE-OP / PRE-PROCEDURE SCREENING ORDER (NO ISOLATION) - Swab, Nasopharynx [[id number]]  (Abnormal) Collected: 02/13/24 1653    Lab Status: Final result Specimen: Swab from Nasopharynx Updated: 02/13/24 1817    Narrative:      The following orders were created for panel order COVID PRE-OP / PRE-PROCEDURE SCREENING ORDER (NO ISOLATION) - Swab, Nasopharynx.  Procedure                               Abnormality         Status                     ---------                               -----------         ------                     COVID-19 and FLU A/B PCR...[[id number]]  Abnormal            Final result                 Please view results for these tests on the individual orders.    COVID-19 and FLU A/B PCR, 1 HR TAT - Swab, Nasopharynx [[id number]]  (Abnormal) Collected: 02/13/24 1653    Lab Status: Final result Specimen: Swab from Nasopharynx Updated: 02/13/24 1817     COVID19 Not Detected     Influenza A PCR Not Detected     Influenza B PCR Detected    Narrative:      Fact sheet for providers: https://www.fda.gov/media/887919/download    Fact sheet for patients: https://www.fda.gov/media/344364/download    Test performed by PCR.    Blood Culture - Blood, Hand, Right [[id number]]  (Normal) Collected: 02/13/24 1650    Lab Status: Preliminary result Specimen: Blood from Hand, Right Updated: 02/15/24 1745     Blood Culture No growth at 2 days    Blood Culture - Blood, Arm, Left [[id number]]  (Normal) Collected: 02/13/24 1646    Lab Status: Preliminary result Specimen: Blood from Arm, Left Updated: 02/15/24 1745     Blood Culture No growth at 2 days             Recent radiology:   Imaging Results (Last 72  Hours)       Procedure Component Value Units Date/Time    CT Angiogram Chest [[id number]] Collected: 02/13/24 2030     Updated: 02/13/24 2044    Narrative:      EXAMINATION:  CT ANGIOGRAM CHEST-  2/13/2024 7:04 PM     HISTORY: Elevated D-dimer. Shortness of air. D-dimer 1.2.     COMPARISON : 2/13/2020.     DLP: 266.29 mGy.cm Automated dosage reduction technique was utilized to  decrease patient dosage.     TECHNIQUE: CT angio was performed of the chest with IV contrast.  Coronal, sagittal and 3-D reconstruction were performed.     INDEPENDENT 3-D WORKSTATION UTILIZED FOR RECONSTRUCTION: Yes. A  radiologist was not present in the department.     MEDIASTINUM, HEART AND VASCULAR STRUCTURES: There is atheromatous  calcification of the thoracic aorta and coronary arteries. The thoracic  aorta is normal in caliber. The pulmonary arteries are normal in  caliber. There is slightly less than optimal opacification of the  pulmonary arteries. No central pulmonary embolus is seen. More  peripheral vessels are difficult to evaluate due to limited  opacification. There is mild cardiomegaly. Mediastinal lymph nodes  measure up to 8 mm in short axis diameter. They are not enlarged by size  criteria. There are small hilar lymph nodes.     LUNGS: There are tree-in-bud opacities bilaterally in the upper lobes.  There are tree-in-bud opacities in the right middle lobe, lingula and  bilateral lower lobes. There are retained secretions in bilateral lower  lobe bronchi. There is a 5 mm subpleural right upper lobe pulmonary  nodule versus more focal consolidation in the area of tree-in-bud  opacification. There is a 3-4 mm fissural nodule along the left major  fissure image 32 series 6. There are focal areas of consolidation versus  atelectasis in the right lower lobe inferiorly and posteriorly. There  are 2 adjacent pleural-based opacities in the right upper lobe  posteriorly image 59 series 6, likely focal areas of atelectasis.     UPPER  ABDOMEN: There is fatty infiltration of the liver. There are no  acute appearing findings in the upper abdomen.     BONES: There are degenerative changes of the spine.          Impression:      1. Somewhat limited opacification of the pulmonary arteries. No definite  central pulmonary emboli. More peripheral pulmonary artery branches are  difficult to fully evaluate. There is atheromatous disease of the  thoracic aorta and coronary arteries. There is cardiomegaly.  2. Bilateral tree-in-bud opacities as well as areas of patchy  consolidation, worrisome for pneumonia. The findings are likely  infectious or inflammatory. There are also retained secretions in  bilateral lower lobe bronchi.  3. A 5 mm subpleural right upper lobe nodule versus a more focal  consolidation in an area of tree-in-bud opacification. There are also  focal areas of consolidation versus atelectasis in the right lower lobe  posteriorly and inferiorly. There are 2 adjacent pleural-based opacities  in the right upper lobe posteriorly likely areas of focal atelectasis or  pneumonia. A follow-up CT in 3-6 months would be helpful to document  resolution of findings.  4. There is a tiny fissural nodule along the left major fissure, likely  a fissural lymph node. There are small mediastinal lymph nodes that are  likely reactive.  5. Fatty infiltration of the liver.        The full report of this exam was immediately signed and available to the  emergency room. The patient is currently in the emergency room.     This report was signed and finalized on 2/13/2024 8:41 PM by Dr. Tunde Christopher MD.       XR Chest 1 View [[id number]] Collected: 02/13/24 1713     Updated: 02/13/24 1717    Narrative:      EXAMINATION:  XR CHEST 1 VW-  2/13/2024 4:11 PM     HISTORY: Shortness of air.     COMPARISON: 4/17/2021.     TECHNIQUE: Single view AP image.     FINDINGS: There is blunting of the left costophrenic angle versus  scarring. There are linear infiltrates in both  lung bases. There is  stable bronchial wall thickening. Heart size is upper limits of normal.  No acute appearing bony abnormality is seen.          Impression:      1. Linear infiltrates in both lung bases. Atelectasis versus scarring.  2. Blunting of the left costophrenic angle may represent scarring versus  a small amount of pleural fluid.  3. Stable bronchial wall thickening.           This report was signed and finalized on 2/13/2024 5:14 PM by Dr. Tunde Christopher MD.               Personal review of imaging : CT scan of the chest and the chest x-ray reviewed showed chronic changes with tree-in-bud appearances and scarring of the lung with bibasilar atelectasis.  Small left-sided pleural effusion also noted increased bronchial wall thickening suggesting tracheobronchitis noted.  Other test results: Results for orders placed during the hospital encounter of 02/13/20    Adult Transthoracic Echo Complete W/ Cont if Necessary Per Protocol    Interpretation Summary  · Left ventricular systolic function is normal. Estimated ejection fraction is 66 to 70%.  · Left ventricular diastolic dysfunction (grade I) consistent with impaired relaxation.  · Normal right ventricular cavity size and systolic function noted.  · There were no significant (greater than mild) valvular dysfunction.  Echocardiogram showed diastolic dysfunction.  Independent review of ekg: Done.  Problem List as identified by Epic (may contain historical, inactive problems)    Acute on chronic respiratory failure with hypoxia and hypercapnia due to influenza B    COPD (chronic obstructive pulmonary disease)    Cellulitis of right lower extremity    Normocytic anemia    Hypothyroidism (acquired)    Essential hypertension    Pneumonia of both lungs due to infectious organism, tree in bud    Lymphedema    Influenza B    Sepsis    Pulmonary Assessment:  Acute on chronic hypercapnic and hypoxic respiratory failure  Acute exacerbation of COPD  Chronic  respiratory failure on home oxygen  Influenza B infection  Cellulitis of the right lower extremity  History of tobacco abuse  Bilateral pneumonia   Chronic scarring and bronchiectatic changes  Lymphedema  Hypertension  Hyperlipidemia  Allergic rhinitis    Recommend/plan:   Patient was seen as inpatient pulmonary consult today.  She is a very pleasant morbidly obese  female who presented to the hospital on 13 February and was admitted on the hospitalist team  Ported shortness of breath for a week.  She has known history of COPD.  She is on 4 L oxygen at home.  On arrival she was noted to have acute on chronic hypercapnic and hypoxic respiratory failure and was started on BiPAP  She was also noted to have some chronic changes in the imaging studies and was started on antibiotic coverage for possible pneumonia versus tracheobronchitis.  Respiratory viral screen was positive for influenza B  No COVID or RSV or rhinovirus was isolated.  Patient is started on Symbicort DuoNeb and provide treatment  She is getting BiPAP support on and off and at night and feeling little better.  Bilateral lower extremity lymphedema and some cellulitis noted in the right lower extremity.  Continue current care plan titrate oxygen to keep saturation more than 92%.  She is normally on 4 L oxygen at home continue BiPAP on and off and at night.  She does not have a BiPAP at home and would like to set up the BiPAP  Or CPAP will check the blood gas tomorrow morning.  Patient requalify for home trilogy if her hypercapnia is not corrected by regular BiPAP.  She was started on Flonase and Zyrtec for nasal allergy.  She will be getting DVT and stress ulcer prophylaxis and pain and anxiety control.  She is getting empiric treatment with ceftriaxone and azithromycin which could be de-escalated slowly.  She was showing some leukocytosis which is improving.  Physical activity as tolerated.  Incentive spirometry and flutter valve  Repeat labs and  imaging studies from time to time.  Plan for outpatient pulmonary clinic visit when she is more stable.  Old status: Full.  Overall prognosis: Guarded  We appreciate the consult and we will follow.    Time spent by me:  45 minutes    This visit was performed by both a physician and an [facility] RN.  I personally evaluated and examined the patient.  I performed all aspects of the medical decision making as documented.    Electronically signed by     Dillan Diaz MD,   Pulmonologist/Intensivist   2/15/2024, 19:02 CST

## 2024-02-15 NOTE — PROGRESS NOTES
RT EQUIPMENT DEVICE RELATED - SKIN ASSESSMENT    Anselmo Score:  Anselmo Score: 18     RT Medical Equipment/Device:     NIV Mask:  Under-the-nose   size:  A    Skin Assessment:       :    PT REFUSES TO WEAR    Device Skin Pressure Protection:   PT REFUSES TO WEAR    Nurse Notification:  Gifty Lynch, RRT  PT REFUSES TO WEAR NPPV

## 2024-02-15 NOTE — THERAPY TREATMENT NOTE
Acute Care - Occupational Therapy Treatment Note  [facility]     Patient Name: Emy Bermudez  : 1950  MRN: [id number]  Today's Date: 2/15/2024  Onset of Illness/Injury or Date of Surgery: 24  Date of Referral to OT: 24  Referring Physician: Lavinia YANES    Admit Date: 2024       ICD-10-CM ICD-9-CM   1. Acute respiratory failure with hypoxia and hypercapnia  J96.01 518.81    J96.02    2. Influenza  J11.1 487.1   3. Multifocal pneumonia  J18.9 486   4. Pulmonary nodule  R91.1 793.11   5. Acute on chronic respiratory failure with hypoxia and hypercapnia  J96.21 518.84    J96.22 786.09     799.02     Patient Active Problem List   Diagnosis    COPD (chronic obstructive pulmonary disease)    Cellulitis of right lower extremity    Obesity, Class III, BMI 40-49.9 (morbid obesity)    Chronic respiratory failure with hypoxia and hypercapnia    Normocytic anemia    Hypothyroidism (acquired)    Essential hypertension    Venous insufficiency of both lower extremities    Stasis dermatitis of both legs    Epistaxis    Tobacco abuse    Anemia, blood loss due to epistasis    Acute kidney injury    Acquired deviated nasal septum    S/P nasal septoplasty    Pneumonia of both lungs due to infectious organism, tree in bud    Intractable nausea and vomiting    Chronic respiratory failure    Obesity (BMI 30-39.9)    Acute on chronic respiratory failure with hypoxia and hypercapnia due to influenza B    Lymphedema    Influenza B    Sepsis     Past Medical History:   Diagnosis Date    Abdominal wall abscess     Abdominal wall fistula 2018    Cellulitis     Chronic respiratory failure with hypoxia 2020    Colonic obstruction 4/3/2018    COPD (chronic obstructive pulmonary disease)     Depression     Diverticul disease small and large intestine, no perforati or abscess     Edema     GERD (gastroesophageal reflux disease)     Hyperlipidemia     Hypertension     Hypocalcemia     Hypothyroid     Obesity,  Class III, BMI 40-49.9 (morbid obesity) 2020    Respiratory failure     Tobacco dependence 2020    Venous insufficiency     Vertigo      Past Surgical History:   Procedure Laterality Date    ABDOMINAL SURGERY       SECTION      COLOSTOMY      ETHMOID ARTERY LIGATION N/A 2021    Procedure: ETHMOID ARTERY LIGATION;  Surgeon: Elliot Mack Jr., MD;  Location:  [facility] OR;  Service: ENT;  Laterality: N/A;    EXAM UNDER ANESTHESIA N/A 2021    Procedure: SEPTOPLASTY TURBOPLASTY ENDOSCOPIC CONTROL LEFT NOSE BLEED;  Surgeon: Elliot Mack Jr., MD;  Location:  [facility] OR;  Service: ENT;  Laterality: N/A;    EXPLORATORY LAPAROTOMY N/A 2018    Procedure: LAPAROTOMY EXPLORATORY, partial colectomy, creation colostomy, incision and drainage abdominal wall abscess;  Surgeon: Elda Velazquez MD;  Location:  [facility];  Service: General    INTERNAL MAXILLARY ARTERY AND ETHMOID ARTERY LIGATION N/A 2021    Procedure: INTERNAL MAXILLARY ARTERY AND ETHMOID ARTERY LIGATION;  Surgeon: Elliot Mack Jr., MD;  Location:  [facility];  Service: ENT;  Laterality: N/A;    SEPTOPLASTY, RESECTION INFERIOR TURBINATES Bilateral 2021    Procedure: SEPTOPLASTY, RESECTION INFERIOR TURBINATES;  Surgeon: Elliot Mack Jr., MD;  Location:  [facility] OR;  Service: ENT;  Laterality: Bilateral;         OT ASSESSMENT FLOWSHEET (last 12 hours)       OT Evaluation and Treatment       Row Name 02/15/24 1440                   OT Time and Intention    Subjective Information complains of;dyspnea  -TS        Document Type therapy note (daily note)  -TS        Mode of Treatment occupational therapy  -TS        Patient Effort good  -TS           General Information    Existing Precautions/Restrictions fall;oxygen therapy device and L/min  -TS           Pain Assessment    Pretreatment Pain Rating 0/10 - no pain  -TS        Posttreatment Pain Rating 0/10 - no pain  -TS           Cognition    Personal Safety  Interventions fall prevention program maintained;gait belt;nonskid shoes/slippers when out of bed  -TS           Motor Skills    Therapeutic Exercise aerobic  -TS           Aerobic Exercise    Comment, Aerobic Exercise (Therapeutic Exercise) Pt completed AROM BUE pulmonary exercises as tolerated. Pt and ALBARADO discussed importance of activity as tolerated. Pt provided pulmonary HEP and ALBARADO gave verbal education, pt states understanding  -TS           Wound 02/13/24 2227 labia Skin Tear    Wound - Properties Group Placement Date: 02/13/24  -PA Placement Time: 2227 -PA Location: labia  -PA Primary Wound Type: Skin tear  -PA    Retired Wound - Properties Group Placement Date: 02/13/24  -PA Placement Time: 2227 -PA Location: labia  -PA Primary Wound Type: Skin tear  -PA    Retired Wound - Properties Group Date first assessed: 02/13/24  -PA Time first assessed: 2227 -PA Location: labia  -PA Primary Wound Type: Skin tear  -PA       Plan of Care Review    Plan of Care Reviewed With patient  -TS        Progress improving  -TS        Outcome Evaluation Pt educated on and provided pulmonary HEP. Pt encouraged to increase activity at tolerated. Pt would benefit from continued rehab via HH at discharge. Continue OT POC  -TS           Positioning and Restraints    Pre-Treatment Position sitting in chair/recliner  -TS        Post Treatment Position chair  -TS        In Chair sitting;call light within reach;encouraged to call for assist  -TS                  User Key  (r) = Recorded By, (t) = Taken By, (c) = Cosigned By      Initials Name Effective Dates    TS Julissa Melendez ALBARADO 02/03/23 -     Tuan Sanderson RN 08/01/23 -                      Occupational Therapy Education       Title: PT OT SLP Therapies (Done)       Topic: Occupational Therapy (Done)       Point: ADL training (Done)       Description:   Instruct learner(s) on proper safety adaptation and remediation techniques during self care or transfers.    Instruct in proper use of assistive devices.                  Learning Progress Summary             Patient Acceptance, E, VU,NR by  at 2/14/2024 1102                         Point: Home exercise program (Done)       Description:   Instruct learner(s) on appropriate technique for monitoring, assisting and/or progressing therapeutic exercises/activities.                  Learning Progress Summary             Patient Acceptance, E, VU,NR by  at 2/14/2024 1102                                         User Key       Initials Effective Dates Name Provider Type Discipline     02/03/23 -  Rayshawn Devlin, OTR/L, CNT Occupational Therapist OT                      OT Recommendation and Plan     Plan of Care Review  Plan of Care Reviewed With: patient  Progress: improving  Outcome Evaluation: Pt educated on and provided pulmonary HEP. Pt encouraged to increase activity at tolerated. Pt would benefit from continued rehab via HH at discharge. Continue OT POC  Plan of Care Reviewed With: patient  Outcome Evaluation: Pt educated on and provided pulmonary HEP. Pt encouraged to increase activity at tolerated. Pt would benefit from continued rehab via HH at discharge. Continue OT POC     Outcome Measures       Row Name 02/15/24 1500 02/15/24 1107 02/14/24 0940       How much help from another person do you currently need...    Turning from your back to your side while in flat bed without using bedrails? -- 4  -WK --    Moving from lying on back to sitting on the side of a flat bed without bedrails? -- 3  -WK --    Moving to and from a bed to a chair (including a wheelchair)? -- 3  -WK --    Standing up from a chair using your arms (e.g., wheelchair, bedside chair)? -- 3  -WK --    Climbing 3-5 steps with a railing? -- 2  -WK --    To walk in hospital room? -- 2  -WK --    AM-PAC 6 Clicks Score (PT) -- 17  -WK --    Highest Level of Mobility Goal -- 5 --> Static standing  -WK --       How much help from another is currently  needed...    Putting on and taking off regular lower body clothing? 2  -TS -- 2  -AC    Bathing (including washing, rinsing, and drying) 2  -TS -- 2  -AC    Toileting (which includes using toilet bed pan or urinal) 3  -TS -- 2  -AC    Putting on and taking off regular upper body clothing 3  -TS -- 3  -AC    Taking care of personal grooming (such as brushing teeth) 3  -TS -- 3  -AC    Eating meals 4  -TS -- 4  -AC    AM-PAC 6 Clicks Score (OT) 17  -TS -- 16  -AC       Functional Assessment    Outcome Measure Options -- AM-PAC 6 Clicks Basic Mobility (PT)  -WK AM-PAC 6 Clicks Daily Activity (OT)  -AC              User Key  (r) = Recorded By, (t) = Taken By, (c) = Cosigned By      Initials Name Provider Type    AC Rayshawn Devlin, OTR/L, CNT Occupational Therapist    Julissa Garcia COTA Occupational Therapist Assistant    WK Shruti Monroy, PTA Physical Therapist Assistant                    Time Calculation:    Time Calculation- OT       Row Name 02/15/24 1532             Time Calculation- OT    OT Start Time 1440  -TS      OT Stop Time 1525  -TS      OT Time Calculation (min) 45 min  -TS      Total Timed Code Minutes- OT 45 minute(s)  -TS      OT Received On 02/15/24  -TS         Timed Charges    40010 - OT Therapeutic Activity Minutes 45  -TS      82337 - OT Self Care/Mgmt Minutes --  -TS         Total Minutes    Timed Charges Total Minutes 45  -TS       Total Minutes 45  -TS                User Key  (r) = Recorded By, (t) = Taken By, (c) = Cosigned By      Initials Name Provider Type     Julissa Melendez COTA Occupational Therapist Assistant                  Therapy Charges for Today       Code Description Service Date Service Provider Modifiers Qty    [id number]  OT THERAPEUTIC ACT EA 15 MIN 2/15/2024 Julissa Melendez COTA GO 3                 Julissa LANZA. VERENA Melendez  2/15/2024

## 2024-02-15 NOTE — PROGRESS NOTES
RT EQUIPMENT DEVICE RELATED - SKIN ASSESSMENT    Anselmo Score:  Anselmo Score: 18     RT Medical Equipment/Device:     NIV Mask:  Under-the-nose   size:  c    Skin Assessment:      Cheek:  Intact  Lips:  Intact  Mouth:  Intact    Device Skin Pressure Protection:  Skin-to-device areas padded:  None Required    Nurse Notification:  Gifty West, CRT

## 2024-02-15 NOTE — PROGRESS NOTES
[facility] [place] - [facility]    02/15/24, 10:53 CST  Patient Care Team:  Dorie Segura APRN as PCP - General (Nurse Practitioner)  Name: Emy Bermudez  : 1950  MRN: [id number]  Contact Serial Number [id number]    Chief complaint: Respiratory failure, influenza and possible superimposed pneumonia  HPI:  We have been consulted by Eleno Tolentino DO to see this 73 y.o. female.  Patient was admitted to the hospital on the  after presenting to the emergency department via EMS with complaints of shortness of breath over the prior week.  She has known history of COPD, prior tobacco use (quit 2024), hypertension and chronic respiratory failure on 4 L of supplemental oxygen.  She is on chronic diuretics secondary to suspected lower extremity lymphedema.  ABG on the  on BiPAP showed a pH of 7.317, pCO2 of 66 pO2 of 109 and HCO3 of 33.9.  proBNP was normal.  High-sensitivity troponin was elevated.  Sodium is normal potassium 3.9 and creatinine of 1.31.  TSH was normal.  Hemoglobin A1c 5.5.  Procalcitonin was elevated at 0.35 and lactate was 1.0.  White blood cell count this morning has trended up to 13.07.  Hemoglobin stable at 9.1 and normal platelets.  MRSA was negative.  Legionella and strep urine antigens were negative.  She was negative for COVID and influenza A but positive for influenza B.  CTA of the chest showed no definite central pulmonary emboli.  Bilateral tree-in-bud opacities with patchy areas of consolidation.  Retained secretions in the bilateral lower lobe bronchi.  Small mediastinal lymph nodes that are likely reactive.  Review of nursing note indicates she has been refusing to wear the BiPAP at night as well as to return to the bed at night.  She has been on 4 L nasal cannula with a sat of 98%.  She is on albuterol and Symbicort at home.  She has completed Zosyn.  Azithromycin completes today.  She is currently on Rocephin.   Past  Medical History:   has a past medical history of Abdominal wall abscess, Abdominal wall fistula (2018), Cellulitis, Chronic respiratory failure with hypoxia (2020), Colonic obstruction (4/3/2018), COPD (chronic obstructive pulmonary disease), Depression, Diverticul disease small and large intestine, no perforati or abscess, Edema, GERD (gastroesophageal reflux disease), Hyperlipidemia, Hypertension, Hypocalcemia, Hypothyroid, Obesity, Class III, BMI 40-49.9 (morbid obesity) (2020), Respiratory failure, Tobacco dependence (2020), Venous insufficiency, and Vertigo.   has a past surgical history that includes  section; Abdominal surgery; Exploratory Laparotomy (N/A, 2018); Colostomy; Examination under anesthesia (N/A, 2021); internal maxillary artery and ethmoid artery ligation (N/A, 2021); Ethmoid Artery Ligation (N/A, 2021); and septoplasty, resection inferior turbinates (Bilateral, 2021).  No Known Allergies  Medications:  azithromycin, 500 mg, Intravenous, Q24H  budesonide-formoterol, 2 puff, Inhalation, BID - RT  cefTRIAXone, 2,000 mg, Intravenous, Q24H  enoxaparin, 40 mg, Subcutaneous, Daily  fluticasone, 2 spray, Each Nare, BID  [Held by provider] furosemide, 20 mg, Oral, Daily  ipratropium-albuterol, 3 mL, Nebulization, 4x Daily - RT  levothyroxine, 75 mcg, Oral, Q AM  methylPREDNISolone sodium succinate, 40 mg, Intravenous, Q12H  oseltamivir, 30 mg, Oral, Q12H  pantoprazole, 40 mg, Oral, Q AM  sertraline, 100 mg, Oral, Nightly  sodium chloride, 10 mL, Intravenous, Q12H  sodium chloride, 2 spray, Each Nare, Q4H         Family History:  Family History   Adopted: Yes     Social History:   reports that she quit smoking about 4 years ago. Her smoking use included cigarettes. She has a 3.75 pack-year smoking history. She has never used smokeless tobacco. She reports that she does not drink alcohol and does not use drugs.  Review of Systems:  Review of Systems   Physical  Exam:  Temp:  [97.5 °F (36.4 °C)-98.2 °F (36.8 °C)] 97.7 °F (36.5 °C)  Heart Rate:  [58-85] 85  Resp:  [20] 20  BP: (109-158)/(59-87) 137/68  Intake/Output Summary (Last 24 hours) at 2/15/2024 1053  Last data filed at 2/15/2024 0541  Gross per 24 hour   Intake 750 ml   Output 1050 ml   Net -300 ml         02/13/24  2234 02/14/24  0420 02/15/24  0541   Weight: 92.5 kg (204 lb) 93 kg (205 lb 1.6 oz) 90.2 kg (198 lb 12.8 oz)     SpO2 Percentage    02/15/24 0804 02/15/24 1034 02/15/24 1040   SpO2: 95% 97% 98%     Body mass index is 35.22 kg/m².   Physical Exam  Vitals and nursing note reviewed.   Constitutional:       Appearance: Normal appearance. She is obese.      Interventions: Nasal cannula in place.   HENT:      Head: Normocephalic and atraumatic.      Nose: Nose normal.      Mouth/Throat:      Mouth: Mucous membranes are dry.   Eyes:      General:         Right eye: No discharge.         Left eye: No discharge.      Conjunctiva/sclera: Conjunctivae normal.      Pupils: Pupils are equal, round, and reactive to light.   Cardiovascular:      Rate and Rhythm: Normal rate and regular rhythm.   Pulmonary:      Breath sounds: Wheezing and rhonchi present.   Abdominal:      General: Abdomen is flat.      Palpations: Abdomen is soft.      Comments: Colostomy   Musculoskeletal:         General: Normal range of motion.      Cervical back: Normal range of motion and neck supple.      Comments: Bilateral lower extremities with sarah boots    Skin:     General: Skin is warm and dry.   Neurological:      General: No focal deficit present.      Mental Status: She is alert and oriented to person, place, and time.   Psychiatric:         Mood and Affect: Mood normal.         Behavior: Behavior normal.       Result Review  Results from last 7 days   Lab Units 02/15/24  0557 02/14/24  0754 02/13/24  1641   WBC 10*3/mm3 13.07* 8.19 8.46   HEMOGLOBIN g/dL 9.1* 10.0* 10.4*   PLATELETS 10*3/mm3 231 195 207     Results from last 7 days    Lab Units 02/15/24  0557 02/14/24  0754 02/13/24  2141 02/13/24  1726 02/13/24  1641   SODIUM mmol/L 139 139  --   --  138   SODIUM, ARTERIAL mmol/L  --   --  140   < >  --    POTASSIUM mmol/L 3.9 4.2  --   --  4.2   CO2 mmol/L 33.0* 31.0*  --   --  34.0*   BUN mg/dL 22 15  --   --  12   CREATININE mg/dL 1.31* 1.05*  --   --  1.04*   MAGNESIUM mg/dL  --   --   --   --  2.1   GLUCOSE mg/dL 110* 174*  --   --  103*    < > = values in this interval not displayed.     Results from last 7 days   Lab Units 02/13/24 2141 02/13/24 1726   PH, ARTERIAL pH units 7.317* 7.355   PCO2, ARTERIAL mm Hg 66.4* 63.4*   PO2 ART mm Hg 109.0* 100.0   FIO2 % 40  --      Microbiology Results (last 10 days)       Procedure Component Value - Date/Time    MRSA Screen, PCR (Inpatient) - Swab, Nares [[id number]]  (Normal) Collected: 02/14/24 0540    Lab Status: Final result Specimen: Swab from Nares Updated: 02/14/24 0708     MRSA PCR No MRSA Detected    Narrative:      The negative predictive value of this diagnostic test is high and should only be used to consider de-escalating anti-MRSA therapy. A positive result may indicate colonization with MRSA and must be correlated clinically.    S. Pneumo Ag Urine or CSF - Urine, Urine, Clean Catch [[id number]]  (Normal) Collected: 02/14/24 0236    Lab Status: Final result Specimen: Urine, Clean Catch Updated: 02/14/24 0322     Strep Pneumo Ag Negative    Legionella Antigen, Urine - Urine, Urine, Clean Catch [[id number]]  (Normal) Collected: 02/14/24 0236    Lab Status: Final result Specimen: Urine, Clean Catch Updated: 02/14/24 0322     LEGIONELLA ANTIGEN, URINE Negative    COVID PRE-OP / PRE-PROCEDURE SCREENING ORDER (NO ISOLATION) - Swab, Nasopharynx [[id number]]  (Abnormal) Collected: 02/13/24 1653    Lab Status: Final result Specimen: Swab from Nasopharynx Updated: 02/13/24 1817    Narrative:      The following orders were created for panel order COVID PRE-OP / PRE-PROCEDURE SCREENING ORDER (NO  ISOLATION) - Swab, Nasopharynx.  Procedure                               Abnormality         Status                     ---------                               -----------         ------                     COVID-19 and FLU A/B PCR...[[id number]]  Abnormal            Final result                 Please view results for these tests on the individual orders.    COVID-19 and FLU A/B PCR, 1 HR TAT - Swab, Nasopharynx [[id number]]  (Abnormal) Collected: 02/13/24 1653    Lab Status: Final result Specimen: Swab from Nasopharynx Updated: 02/13/24 1817     COVID19 Not Detected     Influenza A PCR Not Detected     Influenza B PCR Detected    Narrative:      Fact sheet for providers: https://www.fda.gov/media/364018/download    Fact sheet for patients: https://www.fda.gov/media/279541/download    Test performed by PCR.    Blood Culture - Blood, Hand, Right [[id number]]  (Normal) Collected: 02/13/24 1650    Lab Status: Preliminary result Specimen: Blood from Hand, Right Updated: 02/14/24 1746     Blood Culture No growth at 24 hours    Blood Culture - Blood, Arm, Left [[id number]]  (Normal) Collected: 02/13/24 1646    Lab Status: Preliminary result Specimen: Blood from Arm, Left Updated: 02/14/24 1746     Blood Culture No growth at 24 hours          Recent radiology:   Imaging Results (Last 72 Hours)       Procedure Component Value Units Date/Time    CT Angiogram Chest [[id number]] Collected: 02/13/24 2030     Updated: 02/13/24 2044    Narrative:      EXAMINATION:  CT ANGIOGRAM CHEST-  2/13/2024 7:04 PM     HISTORY: Elevated D-dimer. Shortness of air. D-dimer 1.2.     COMPARISON : 2/13/2020.     DLP: 266.29 mGy.cm Automated dosage reduction technique was utilized to  decrease patient dosage.     TECHNIQUE: CT angio was performed of the chest with IV contrast.  Coronal, sagittal and 3-D reconstruction were performed.     INDEPENDENT 3-D WORKSTATION UTILIZED FOR RECONSTRUCTION: Yes. A  radiologist was not present in the department.      MEDIASTINUM, HEART AND VASCULAR STRUCTURES: There is atheromatous  calcification of the thoracic aorta and coronary arteries. The thoracic  aorta is normal in caliber. The pulmonary arteries are normal in  caliber. There is slightly less than optimal opacification of the  pulmonary arteries. No central pulmonary embolus is seen. More  peripheral vessels are difficult to evaluate due to limited  opacification. There is mild cardiomegaly. Mediastinal lymph nodes  measure up to 8 mm in short axis diameter. They are not enlarged by size  criteria. There are small hilar lymph nodes.     LUNGS: There are tree-in-bud opacities bilaterally in the upper lobes.  There are tree-in-bud opacities in the right middle lobe, lingula and  bilateral lower lobes. There are retained secretions in bilateral lower  lobe bronchi. There is a 5 mm subpleural right upper lobe pulmonary  nodule versus more focal consolidation in the area of tree-in-bud  opacification. There is a 3-4 mm fissural nodule along the left major  fissure image 32 series 6. There are focal areas of consolidation versus  atelectasis in the right lower lobe inferiorly and posteriorly. There  are 2 adjacent pleural-based opacities in the right upper lobe  posteriorly image 59 series 6, likely focal areas of atelectasis.     UPPER ABDOMEN: There is fatty infiltration of the liver. There are no  acute appearing findings in the upper abdomen.     BONES: There are degenerative changes of the spine.          Impression:      1. Somewhat limited opacification of the pulmonary arteries. No definite  central pulmonary emboli. More peripheral pulmonary artery branches are  difficult to fully evaluate. There is atheromatous disease of the  thoracic aorta and coronary arteries. There is cardiomegaly.  2. Bilateral tree-in-bud opacities as well as areas of patchy  consolidation, worrisome for pneumonia. The findings are likely  infectious or inflammatory. There are also retained  secretions in  bilateral lower lobe bronchi.  3. A 5 mm subpleural right upper lobe nodule versus a more focal  consolidation in an area of tree-in-bud opacification. There are also  focal areas of consolidation versus atelectasis in the right lower lobe  posteriorly and inferiorly. There are 2 adjacent pleural-based opacities  in the right upper lobe posteriorly likely areas of focal atelectasis or  pneumonia. A follow-up CT in 3-6 months would be helpful to document  resolution of findings.  4. There is a tiny fissural nodule along the left major fissure, likely  a fissural lymph node. There are small mediastinal lymph nodes that are  likely reactive.  5. Fatty infiltration of the liver.        The full report of this exam was immediately signed and available to the  emergency room. The patient is currently in the emergency room.     This report was signed and finalized on 2/13/2024 8:41 PM by Dr. Tunde Christopher MD.       XR Chest 1 View [[id number]] Collected: 02/13/24 1713     Updated: 02/13/24 1717    Narrative:      EXAMINATION:  XR CHEST 1 VW-  2/13/2024 4:11 PM     HISTORY: Shortness of air.     COMPARISON: 4/17/2021.     TECHNIQUE: Single view AP image.     FINDINGS: There is blunting of the left costophrenic angle versus  scarring. There are linear infiltrates in both lung bases. There is  stable bronchial wall thickening. Heart size is upper limits of normal.  No acute appearing bony abnormality is seen.          Impression:      1. Linear infiltrates in both lung bases. Atelectasis versus scarring.  2. Blunting of the left costophrenic angle may represent scarring versus  a small amount of pleural fluid.  3. Stable bronchial wall thickening.           This report was signed and finalized on 2/13/2024 5:14 PM by Dr. Tunde Christopher MD.               Other test results (not lab or imaging): Results for orders placed during the hospital encounter of 02/13/20    Adult Transthoracic Echo Complete W/ Cont if  Necessary Per Protocol    Interpretation Summary  · Left ventricular systolic function is normal. Estimated ejection fraction is 66 to 70%.  · Left ventricular diastolic dysfunction (grade I) consistent with impaired relaxation.  · Normal right ventricular cavity size and systolic function noted.  · There were no significant (greater than mild) valvular dysfunction.   :  Independent review of ekg:  Problem List as identified by Epic (may contain historical, inactive problems)    Acute on chronic respiratory failure with hypoxia and hypercapnia due to influenza B    COPD (chronic obstructive pulmonary disease)    Cellulitis of right lower extremity    Normocytic anemia    Hypothyroidism (acquired)    Essential hypertension    Pneumonia of both lungs due to infectious organism, tree in bud    Lymphedema    Influenza B    Sepsis    Pulmonary Assessment:    Acute on chronic respiratory failure with hypoxia and hypercapnia secondary to influenza B  Influenza B  Pneumonia  Hypertension  Hypothyroidism  Chronic bilateral lower extremity lymphedema  Normocytic anemia  COPD  Former smoker    Recommend/plan:   Continue supplemental oxygen and titrate to keep O2 sat greater than 90%, currently on 4 L baseline  Recommend BiPAP use nightly and as needed, patient currently refusing  Continue SM 40 milligrams IV every 12 hours  Tamiflu X 5 days  Add Ayr gel for nasal dryness  Completed Zosyn, completing azithromycin today, started on Rocephin  Continue Symbicort  Continue DuoNebs scheduled  DVT and GI prophylaxis: Lovenox/Protonix  Continue IS/ OPEP  Offered Metaneb, wants to try OPEP a few more days, cough currently dry   Further recommendations per physician    Thank you for this consult.  We will follow along.    I personally spent 8 minutes in accordance with split shared billing, this excludes any time spent jointly with the MD and/or other billable services (if applicable).    Electronically signed by Rupal Harper  JOAQUÍN, 02/15/24, 10:53 AM CST.

## 2024-02-16 LAB
ANION GAP SERPL CALCULATED.3IONS-SCNC: 11 MMOL/L (ref 5–15)
BUN SERPL-MCNC: 24 MG/DL (ref 8–23)
BUN/CREAT SERPL: 22 (ref 7–25)
CALCIUM SPEC-SCNC: 8.9 MG/DL (ref 8.6–10.5)
CHLORIDE SERPL-SCNC: 99 MMOL/L (ref 98–107)
CO2 SERPL-SCNC: 32 MMOL/L (ref 22–29)
CREAT SERPL-MCNC: 1.09 MG/DL (ref 0.57–1)
DEPRECATED RDW RBC AUTO: 56.2 FL (ref 37–54)
EGFRCR SERPLBLD CKD-EPI 2021: 53.4 ML/MIN/1.73
ERYTHROCYTE [DISTWIDTH] IN BLOOD BY AUTOMATED COUNT: 17.4 % (ref 12.3–15.4)
GLUCOSE SERPL-MCNC: 124 MG/DL (ref 65–99)
HCT VFR BLD AUTO: 27.8 % (ref 34–46.6)
HGB BLD-MCNC: 8.5 G/DL (ref 12–15.9)
MCH RBC QN AUTO: 26.8 PG (ref 26.6–33)
MCHC RBC AUTO-ENTMCNC: 30.6 G/DL (ref 31.5–35.7)
MCV RBC AUTO: 87.7 FL (ref 79–97)
PLATELET # BLD AUTO: 190 10*3/MM3 (ref 140–450)
PMV BLD AUTO: 9.3 FL (ref 6–12)
POTASSIUM SERPL-SCNC: 4 MMOL/L (ref 3.5–5.2)
RBC # BLD AUTO: 3.17 10*6/MM3 (ref 3.77–5.28)
SODIUM SERPL-SCNC: 142 MMOL/L (ref 136–145)
WBC NRBC COR # BLD AUTO: 11.57 10*3/MM3 (ref 3.4–10.8)

## 2024-02-16 PROCEDURE — 97110 THERAPEUTIC EXERCISES: CPT

## 2024-02-16 PROCEDURE — 94799 UNLISTED PULMONARY SVC/PX: CPT

## 2024-02-16 PROCEDURE — 85027 COMPLETE CBC AUTOMATED: CPT

## 2024-02-16 PROCEDURE — 25010000002 CEFTRIAXONE PER 250 MG: Performed by: NURSE PRACTITIONER

## 2024-02-16 PROCEDURE — 25010000002 ENOXAPARIN PER 10 MG: Performed by: NURSE PRACTITIONER

## 2024-02-16 PROCEDURE — 25010000002 METHYLPREDNISOLONE PER 40 MG

## 2024-02-16 PROCEDURE — 99233 SBSQ HOSP IP/OBS HIGH 50: CPT | Performed by: INTERNAL MEDICINE

## 2024-02-16 PROCEDURE — 94664 DEMO&/EVAL PT USE INHALER: CPT

## 2024-02-16 PROCEDURE — 80048 BASIC METABOLIC PNL TOTAL CA: CPT

## 2024-02-16 RX ORDER — METHYLPREDNISOLONE SODIUM SUCCINATE 40 MG/ML
40 INJECTION, POWDER, LYOPHILIZED, FOR SOLUTION INTRAMUSCULAR; INTRAVENOUS DAILY
Status: DISCONTINUED | OUTPATIENT
Start: 2024-02-17 | End: 2024-02-17

## 2024-02-16 RX ADMIN — CEFTRIAXONE SODIUM 2000 MG: 2 INJECTION, POWDER, FOR SOLUTION INTRAMUSCULAR; INTRAVENOUS at 10:06

## 2024-02-16 RX ADMIN — FUROSEMIDE 20 MG: 20 TABLET ORAL at 10:06

## 2024-02-16 RX ADMIN — IPRATROPIUM BROMIDE 0.5 MG: 0.5 SOLUTION RESPIRATORY (INHALATION) at 14:24

## 2024-02-16 RX ADMIN — OSELTAMIVIR PHOSPHATE 30 MG: 30 CAPSULE ORAL at 22:18

## 2024-02-16 RX ADMIN — Medication 10 ML: at 10:07

## 2024-02-16 RX ADMIN — HYDROXYZINE HYDROCHLORIDE 50 MG: 25 TABLET ORAL at 22:23

## 2024-02-16 RX ADMIN — OSELTAMIVIR PHOSPHATE 30 MG: 30 CAPSULE ORAL at 10:09

## 2024-02-16 RX ADMIN — PANTOPRAZOLE SODIUM 40 MG: 40 TABLET, DELAYED RELEASE ORAL at 05:27

## 2024-02-16 RX ADMIN — BUDESONIDE AND FORMOTEROL FUMARATE DIHYDRATE 2 PUFF: 160; 4.5 AEROSOL RESPIRATORY (INHALATION) at 06:55

## 2024-02-16 RX ADMIN — LEVOTHYROXINE SODIUM 75 MCG: 75 TABLET ORAL at 05:27

## 2024-02-16 RX ADMIN — ALBUTEROL SULFATE 1.25 MG: 2.5 SOLUTION RESPIRATORY (INHALATION) at 14:24

## 2024-02-16 RX ADMIN — METHYLPREDNISOLONE SODIUM SUCCINATE 40 MG: 40 INJECTION, POWDER, FOR SOLUTION INTRAMUSCULAR; INTRAVENOUS at 05:27

## 2024-02-16 RX ADMIN — GUAIFENESIN 200 MG: 200 SOLUTION ORAL at 18:24

## 2024-02-16 RX ADMIN — IPRATROPIUM BROMIDE AND ALBUTEROL SULFATE 3 ML: .5; 3 SOLUTION RESPIRATORY (INHALATION) at 06:53

## 2024-02-16 RX ADMIN — ENOXAPARIN SODIUM 40 MG: 100 INJECTION SUBCUTANEOUS at 10:07

## 2024-02-16 RX ADMIN — ALBUTEROL SULFATE 1.25 MG: 2.5 SOLUTION RESPIRATORY (INHALATION) at 18:44

## 2024-02-16 RX ADMIN — IPRATROPIUM BROMIDE 0.5 MG: 0.5 SOLUTION RESPIRATORY (INHALATION) at 18:44

## 2024-02-16 RX ADMIN — BUDESONIDE AND FORMOTEROL FUMARATE DIHYDRATE 2 PUFF: 160; 4.5 AEROSOL RESPIRATORY (INHALATION) at 18:44

## 2024-02-16 RX ADMIN — Medication 10 ML: at 22:18

## 2024-02-16 RX ADMIN — CETIRIZINE HYDROCHLORIDE 10 MG: 10 TABLET ORAL at 10:06

## 2024-02-16 NOTE — THERAPY TREATMENT NOTE
Patient Name: Emy Bermudez  : 1950    MRN: [id number]                              Today's Date: 2024       Admit Date: 2024    Visit Dx:     ICD-10-CM ICD-9-CM   1. Acute respiratory failure with hypoxia and hypercapnia  J96.01 518.81    J96.02    2. Influenza  J11.1 487.1   3. Multifocal pneumonia  J18.9 486   4. Pulmonary nodule  R91.1 793.11   5. Acute on chronic respiratory failure with hypoxia and hypercapnia  J96.21 518.84    J96.22 786.09     799.02     Patient Active Problem List   Diagnosis    COPD (chronic obstructive pulmonary disease)    Cellulitis of right lower extremity    Obesity, Class III, BMI 40-49.9 (morbid obesity)    Chronic respiratory failure with hypoxia and hypercapnia    Normocytic anemia    Hypothyroidism (acquired)    Essential hypertension    Venous insufficiency of both lower extremities    Stasis dermatitis of both legs    Epistaxis    Tobacco abuse    Anemia, blood loss due to epistasis    Acute kidney injury    Acquired deviated nasal septum    S/P nasal septoplasty    Pneumonia of both lungs due to infectious organism, tree in bud    Intractable nausea and vomiting    Chronic respiratory failure    Obesity (BMI 30-39.9)    Acute on chronic respiratory failure with hypoxia and hypercapnia due to influenza B    Lymphedema    Influenza B    Sepsis     Past Medical History:   Diagnosis Date    Abdominal wall abscess     Abdominal wall fistula 2018    Cellulitis     Chronic respiratory failure with hypoxia 2020    Colonic obstruction 4/3/2018    COPD (chronic obstructive pulmonary disease)     Depression     Diverticul disease small and large intestine, no perforati or abscess     Edema     GERD (gastroesophageal reflux disease)     Hyperlipidemia     Hypertension     Hypocalcemia     Hypothyroid     Obesity, Class III, BMI 40-49.9 (morbid obesity) 2020    Respiratory failure     Tobacco dependence 2020    Venous insufficiency     Vertigo      Past  Surgical History:   Procedure Laterality Date    ABDOMINAL SURGERY       SECTION      COLOSTOMY      ETHMOID ARTERY LIGATION N/A 2021    Procedure: ETHMOID ARTERY LIGATION;  Surgeon: Elliot Mack Jr., MD;  Location:  [facility];  Service: ENT;  Laterality: N/A;    EXAM UNDER ANESTHESIA N/A 2021    Procedure: SEPTOPLASTY TURBOPLASTY ENDOSCOPIC CONTROL LEFT NOSE BLEED;  Surgeon: Elliot Mack Jr., MD;  Location:  [facility];  Service: ENT;  Laterality: N/A;    EXPLORATORY LAPAROTOMY N/A 2018    Procedure: LAPAROTOMY EXPLORATORY, partial colectomy, creation colostomy, incision and drainage abdominal wall abscess;  Surgeon: Elda Velazquez MD;  Location:  [facility];  Service: General    INTERNAL MAXILLARY ARTERY AND ETHMOID ARTERY LIGATION N/A 2021    Procedure: INTERNAL MAXILLARY ARTERY AND ETHMOID ARTERY LIGATION;  Surgeon: Elliot Mack Jr., MD;  Location:  [facility];  Service: ENT;  Laterality: N/A;    SEPTOPLASTY, RESECTION INFERIOR TURBINATES Bilateral 2021    Procedure: SEPTOPLASTY, RESECTION INFERIOR TURBINATES;  Surgeon: Elliot Mack Jr., MD;  Location:  [facility];  Service: ENT;  Laterality: Bilateral;      General Information       Row Name 2410          OT Time and Intention    Document Type therapy note (daily note)  -MT     Mode of Treatment occupational therapy  -MT       Row Name 24          General Information    Patient Profile Reviewed yes  -MT     Existing Precautions/Restrictions fall;oxygen therapy device and L/min  -MT       Row Name 24          Cognition    Orientation Status (Cognition) oriented x 4  -MT       Row Name 2410          Safety Issues, Functional Mobility    Impairments Affecting Function (Mobility) balance;strength;endurance/activity tolerance;shortness of breath;range of motion (ROM)  -MT               User Key  (r) = Recorded By, (t) = Taken By, (c) = Cosigned By      Initials Name  Provider Type    Ayanna Mclain COTA Occupational Therapist Assistant                     Mobility/ADL's       Row Name 02/16/24 0810          Bed Mobility    Comment, (Bed Mobility) pt up in chair  -MT       Row Name 02/16/24 0810          Transfers    Transfers sit-stand transfer;stand-sit transfer  -MT               User Key  (r) = Recorded By, (t) = Taken By, (c) = Cosigned By      Initials Name Provider Type    Ayanna Mclain COTA Occupational Therapist Assistant                   Obj/Interventions       Row Name 02/16/24 0810          Motor Skills    Therapeutic Exercise --  Provided pt with red theraband and educated on performance daily for HEP. Pt performed x5 planes/ranges x2 reps each pt required RBs between each plane of movement and demo'd increased SOA with minimal activity. Performed for increased fxl endurance/ADLs  -MT               User Key  (r) = Recorded By, (t) = Taken By, (c) = Cosigned By      Initials Name Provider Type    Ayanna Mclain COTA Occupational Therapist Assistant                   Goals/Plan    No documentation.                  Clinical Impression       Row Name 02/16/24 0810          Pain Assessment    Pre/Posttreatment Pain Comment did not report pain, frustrated about meal tray  -MT       Row Name 02/16/24 0810          Plan of Care Review    Plan of Care Reviewed With patient  -MT     Progress no change  -MT       Row Name 02/16/24 0810          Therapy Plan Review/Discharge Plan (OT)    Anticipated Discharge Disposition (OT) sub acute care setting;home with 24/7 care;home with home health  pending pt progress, pt close to reported home baseline  -MT       Row Name 02/16/24 0810          Positioning and Restraints    Pre-Treatment Position sitting in chair/recliner  -MT     Post Treatment Position chair  -MT     In Chair sitting;call light within reach;encouraged to call for assist;notified nsg  -MT               User Key  (r) = Recorded By, (t) = Taken By, (c)  = Cosigned By      Initials Name Provider Type    MT Ayanna Jerome COTA Occupational Therapist Assistant                   Outcome Measures       Row Name 02/16/24 0810          How much help from another is currently needed...    Putting on and taking off regular lower body clothing? 2  -MT     Bathing (including washing, rinsing, and drying) 2  -MT     Toileting (which includes using toilet bed pan or urinal) 3  -MT     Putting on and taking off regular upper body clothing 3  -MT     Taking care of personal grooming (such as brushing teeth) 3  -MT     Eating meals 4  -MT     AM-PAC 6 Clicks Score (OT) 17  -MT       Row Name 02/15/24 2045          How much help from another person do you currently need...    Turning from your back to your side while in flat bed without using bedrails? 4  -KV     Moving from lying on back to sitting on the side of a flat bed without bedrails? 3  -KV     Moving to and from a bed to a chair (including a wheelchair)? 3  -KV     Standing up from a chair using your arms (e.g., wheelchair, bedside chair)? 3  -KV     Climbing 3-5 steps with a railing? 3  -KV     To walk in hospital room? 2  -KV     AM-PAC 6 Clicks Score (PT) 18  -KV     Highest Level of Mobility Goal 6 --> Walk 10 steps or more  -KV               User Key  (r) = Recorded By, (t) = Taken By, (c) = Cosigned By      Initials Name Provider Type    MT Ayanna Jerome COTA Occupational Therapist Assistant    Rosario Gallardo, RN Registered Nurse                    Occupational Therapy Education       Title: PT OT SLP Therapies (Done)       Topic: Occupational Therapy (Done)       Point: ADL training (Done)       Description:   Instruct learner(s) on proper safety adaptation and remediation techniques during self care or transfers.   Instruct in proper use of assistive devices.                  Learning Progress Summary             Patient Acceptance, E, VU,NR by AC at 2/14/2024 1102                         Point: Home  exercise program (Done)       Description:   Instruct learner(s) on appropriate technique for monitoring, assisting and/or progressing therapeutic exercises/activities.                  Learning Progress Summary             Patient Acceptance, E, VU,NR by  at 2/14/2024 1102                                         User Key       Initials Effective Dates Name Provider Type Discipline     02/03/23 -  Rayshawn Devlin, OTR/L, CNT Occupational Therapist OT                  OT Recommendation and Plan     Plan of Care Review  Plan of Care Reviewed With: patient  Progress: no change  Outcome Evaluation: Provided pt with red theraband and educated on performance daily for HEP. Pt performed x5 planes/ranges x2 reps each pt required RBs between each plane of movement and demo'd increased SOA with minimal activity. Performed for increased fxl endurance/strength/ADLs. Pt meal tray arrived and pt frustrated on options, pt provided with phone and number to call for meal options. Recommend continued OT POC     Time Calculation:         Time Calculation- OT       Row Name 02/16/24 0810             Time Calculation- OT    OT Start Time 0810  -MT      OT Stop Time 0834  -MT      OT Time Calculation (min) 24 min  -MT      Total Timed Code Minutes- OT 24 minute(s)  -MT      OT Received On 02/16/24  -MT         Timed Charges    05324 - OT Therapeutic Exercise Minutes 24  -MT         Total Minutes    Timed Charges Total Minutes 24  -MT       Total Minutes 24  -MT                User Key  (r) = Recorded By, (t) = Taken By, (c) = Cosigned By      Initials Name Provider Type    MT Ayanna Jerome COTA Occupational Therapist Assistant                  Therapy Charges for Today       Code Description Service Date Service Provider Modifiers Qty    01007282290 HC OT THER PROC EA 15 MIN 2/16/2024 Ayanna Jerome COTA GO 2                 VERENA Umana  2/16/2024

## 2024-02-16 NOTE — PROGRESS NOTES
"    [facility] Medicine Services  INPATIENT PROGRESS NOTE    Patient Name: Emy Bermudez  Date of Admission: 2/13/2024  Today's Date: 02/16/24  Length of Stay: 3  Primary Care Physician: Dorie Segura APRN    Subjective   Chief Complaint: Shortness of breath  HPI   Ms Bermudez presented to [facility] emergency room 2/13/2024 with weeklong history of worsening shortness of breath.  She has a history of COPD and chronic respiratory failure on oxygen at 4 L continuously  She reported cough with light yellow sputum production.  She denied nausea, vomiting or diarrhea.  She has chronic bilateral lower extremity lymphedema with Unna boots in the past.  ABGs pH 7.3 5, pO2 100, pCO2 63.4 on 4 L.  She was placed on BiPAP with pH 7.31, pCO2 66, pO2 109.  D-dimer 1.2.  CT angiogram of the chest no definite central pulmonary emboli.  Peripheral pulmonary artery branches difficult to fully evaluate.  Atheromatous disease thoracic aorta and coronary arteries.  Cardiomegaly.  Bilateral tree-in-bud opacities as well as areas of patchy consolidation worrisome for pneumonia.  Findings likely infectious or inflammatory.  Retained secretions bilateral lower lobes.  5 mm subpleural right upper lobe nodule versus more focal consolidation in area of tree-in-bud opacification.  Consolidation versus atelectasis right lower lobe.  2 adjacent pleural-based opacities right upper lobe.  Follow-up CT 3 months.  Chest x-ray linear infiltrate atelectasis versus scarring.  Influenza B positive.  Temperature 100.6, heart rate 106, respirations 35 in ER.  DuoNeb, Solu-Medrol, Rocephin, azithromycin given in ER.  Fluid bolus not given due to concern for overload.    Today  Patient examined sitting up in recliner on 4 L nasal cannula.  She believes her shortness of breath has improved.  She is concerned her DuoNeb treatments are making her feel \"funny\".  I have decreased the use to half-strength although have " encouraged her to be compliant with these on a scheduled basis.  She refused BiPAP last evening.  She refused ABG this morning after being evaluated by pulmonology in hopes of setting up a home BiPAP/CPAP/trilogy device.  Patient continues to dictate quite a bit of her care.  She is agreeable to continue antibiotics and steroids at this time, we will do so.  I have again encouraged her to be as active as she tolerates with therapy.    ROS:  All pertinent negatives and positives are as above. All other systems have been reviewed and are negative unless otherwise stated.     Objective    Temp:  [97.7 °F (36.5 °C)-98.5 °F (36.9 °C)] 97.7 °F (36.5 °C)  Heart Rate:  [68-80] 71  Resp:  [18-20] 20  BP: (125-144)/(60-74) 125/60  Physical Exam  Vitals and nursing note reviewed.   Constitutional:       Appearance: She is obese.      Comments: Up in chair, 4 L nasal cannula, no visitors at bedside   HENT:      Head: Normocephalic and atraumatic.      Nose: No congestion.      Mouth/Throat:      Pharynx: No oropharyngeal exudate or posterior oropharyngeal erythema.   Eyes:      Extraocular Movements: Extraocular movements intact.      Pupils: Pupils are equal, round, and reactive to light.   Cardiovascular:      Rate and Rhythm: Normal rate and regular rhythm.   Pulmonary:      Breath sounds: Rhonchi present.      Comments: Expiratory wheeze remains, but improved.  4 L nasal cannula.  Abdominal:      Palpations: Abdomen is soft.      Tenderness: There is no abdominal tenderness.   Genitourinary:     Comments: Voiding.  Musculoskeletal:         General: Swelling present.      Cervical back: Normal range of motion and neck supple.      Comments: Chronic bilateral lower extremity edema with unna boots in place   Skin:     Findings: Erythema (Bilateral lower extremities, chronic edema) present.   Neurological:      General: No focal deficit present.      Mental Status: She is alert and oriented to person, place, and time.    Psychiatric:         Mood and Affect: Mood normal.         Behavior: Behavior normal.         Thought Content: Thought content normal.         Judgment: Judgment normal.       Results Review:  I have reviewed the labs, radiology results, and diagnostic studies.    Laboratory Data:   Results from last 7 days   Lab Units 02/16/24  0532 02/15/24  0557 02/14/24  0754   WBC 10*3/mm3 11.57* 13.07* 8.19   HEMOGLOBIN g/dL 8.5* 9.1* 10.0*   HEMATOCRIT % 27.8* 30.6* 32.6*   PLATELETS 10*3/mm3 190 231 195        Results from last 7 days   Lab Units 02/16/24  0532 02/15/24  0557 02/14/24  0754 02/13/24  1726 02/13/24  1641   SODIUM mmol/L 142 139 139  --  138   SODIUM, ARTERIAL   --   --   --    < >  --    POTASSIUM mmol/L 4.0 3.9 4.2  --  4.2   CHLORIDE mmol/L 99 95* 97*  --  95*   CO2 mmol/L 32.0* 33.0* 31.0*  --  34.0*   BUN mg/dL 24* 22 15  --  12   CREATININE mg/dL 1.09* 1.31* 1.05*  --  1.04*   CALCIUM mg/dL 8.9 8.6 8.9  --  9.6   BILIRUBIN mg/dL  --   --   --   --  0.5   ALK PHOS U/L  --   --   --   --  108   ALT (SGPT) U/L  --   --   --   --  11   AST (SGOT) U/L  --   --   --   --  23   GLUCOSE mg/dL 124* 110* 174*  --  103*    < > = values in this interval not displayed.     Microbiology Results (last 10 days)       Procedure Component Value - Date/Time    MRSA Screen, PCR (Inpatient) - Swab, Nares [[id number]]  (Normal) Collected: 02/14/24 0540    Lab Status: Final result Specimen: Swab from Nares Updated: 02/14/24 0708     MRSA PCR No MRSA Detected    Narrative:      The negative predictive value of this diagnostic test is high and should only be used to consider de-escalating anti-MRSA therapy. A positive result may indicate colonization with MRSA and must be correlated clinically.    S. Pneumo Ag Urine or CSF - Urine, Urine, Clean Catch [[id number]]  (Normal) Collected: 02/14/24 0236    Lab Status: Final result Specimen: Urine, Clean Catch Updated: 02/14/24 0322     Strep Pneumo Ag Negative    Legionella Antigen,  Urine - Urine, Urine, Clean Catch [[id number]]  (Normal) Collected: 02/14/24 0236    Lab Status: Final result Specimen: Urine, Clean Catch Updated: 02/14/24 0322     LEGIONELLA ANTIGEN, URINE Negative    COVID PRE-OP / PRE-PROCEDURE SCREENING ORDER (NO ISOLATION) - Swab, Nasopharynx [[id number]]  (Abnormal) Collected: 02/13/24 1653    Lab Status: Final result Specimen: Swab from Nasopharynx Updated: 02/13/24 1817    Narrative:      The following orders were created for panel order COVID PRE-OP / PRE-PROCEDURE SCREENING ORDER (NO ISOLATION) - Swab, Nasopharynx.  Procedure                               Abnormality         Status                     ---------                               -----------         ------                     COVID-19 and FLU A/B PCR...[[id number]]  Abnormal            Final result                 Please view results for these tests on the individual orders.    COVID-19 and FLU A/B PCR, 1 HR TAT - Swab, Nasopharynx [[id number]]  (Abnormal) Collected: 02/13/24 1653    Lab Status: Final result Specimen: Swab from Nasopharynx Updated: 02/13/24 1817     COVID19 Not Detected     Influenza A PCR Not Detected     Influenza B PCR Detected    Narrative:      Fact sheet for providers: https://www.fda.gov/media/653387/download    Fact sheet for patients: https://www.fda.gov/media/253230/download    Test performed by PCR.    Blood Culture - Blood, Hand, Right [[id number]]  (Normal) Collected: 02/13/24 1650    Lab Status: Preliminary result Specimen: Blood from Hand, Right Updated: 02/15/24 1745     Blood Culture No growth at 2 days    Blood Culture - Blood, Arm, Left [[id number]]  (Normal) Collected: 02/13/24 1646    Lab Status: Preliminary result Specimen: Blood from Arm, Left Updated: 02/15/24 1745     Blood Culture No growth at 2 days           I have reviewed the patient's current medications.     Assessment/Plan   Assessment  Active Hospital Problems    Diagnosis     **Acute on chronic respiratory failure  with hypoxia and hypercapnia due to influenza B     Lymphedema     Influenza B     Sepsis     Pneumonia of both lungs due to infectious organism, tree in bud     Essential hypertension     Hypothyroidism (acquired)     Normocytic anemia     Cellulitis of right lower extremity     COPD (chronic obstructive pulmonary disease)        Treatment Plan  Acute on chronic hypoxic hypercapnic respiratory failure with COPD exacerbation secondary to influenza B-  Continue Symbicort, DuoNebs, incentive spirometry, OPEP  Continue Tamiflu  Daily Rocephin, azithromycin  Decrease IV Solu-Medrol to daily.  Continue 4 L nasal cannula    Pneumonia with tree-in-bud opacity-  Strep pneumonia negative, Legionella negative, MRSA PCR negative.  Continue efforts at pulmonary toileting with incentive spirometry and OPEP  Mucinex transition to liquid Robitussin per patient request  Azithromycin complete.  Continue Rocephin.  Sputum culture to be collected if able  Blood culture x 2 no growth at 2 days    Primary hypertension-  Continue to closely monitor, blood pressure trend currently appropriate    Hypothyroidism-  Continue Synthroid    Chronic lymphedema bilateral lower extremities-  Lower extremities reddened, swollen, no open areas or drainage.  Physical therapy was consulted in place bilateral unna boots on 2/14    Normocytic anemia-  Hemoglobin 8.5 this morning, no obvious signs or symptoms of bleeding  CBC in a.m.    Lovenox for deep vein thrombosis prophylaxis.    PT/OT for deconditioning    Medical Decision Making  Number and Complexity of problems: 8  Acute on chronic hypoxic hypercapnic respiratory failure secondary to influenza B.  Acute, high complexity poses threat to life and bodily function, required BiPAP, improving  Influenza B: Acute, high complexity, not at baseline  Pneumonia with tree-in-bud opacity: Acute, high complexity, not at baseline  Primary hypertension: Chronic, moderate complexity, not at  baseline  Hypothyroidism: Chronic, low complexity, stable  Chronic edema lower extremities: Chronic, moderate complexity, not at baseline  COPD: Chronic, high complexity, not at baseline  Normocytic anemia: Chronic, moderate complexity, stable    Differential Diagnosis: None    Conditions and Status        Condition is improving.     [facility] Data  External documents reviewed: Otology office visit 1/19/2024  Cardiac tracing (EKG, telemetry) interpretation: Normal sinus rhythm   Radiology interpretation: Reviewed radiology interpretation CTA chest, chest x-ray  Labs reviewed:   CBC, BMP, ABG, hemoglobin A1c, TSH, iron profile, blood cultures    Any tests that were considered but not ordered: None     Decision rules/scores evaluated (example [id number], Wells, etc): None     Discussed with: Dr. Tolentino and patient.     Care Planning  Shared decision making: Discussed with above, patient is agreeable to her plan of care  Code status and discussions: Full code  The patient surrogate decision maker is her daughter, Madeleine Bates  Social Determinants of Health that impact treatment or disposition: None  I expect the patient to be discharged to home in 2 to 3 days.    Electronically signed by JOAQUÍN Murry, 02/16/24, 10:52 CST.

## 2024-02-16 NOTE — CASE MANAGEMENT/SOCIAL WORK
Continued Stay Note   [facility]     Patient Name: Emy Bermudez  MRN: [id number]  Today's Date: 2/16/2024    Admit Date: 2/13/2024    [facility]   Discharge Plan       Row Name 02/16/24 1341       Plan    [facility]    Patient/Family in Agreement with Plan yes    Plan Comments SS following for discharge. Plan is return home with [facility]. Will need to notify [facility] at time of discharge.    Final Discharge Disposition Code 06 - home with home health care             LEA Aponte

## 2024-02-16 NOTE — PLAN OF CARE
Goal Outcome Evaluation:  Plan of Care Reviewed With: patient        Progress: no change  Outcome Evaluation: Provided pt with red theraband and educated on performance daily for HEP. Pt performed x5 planes/ranges x2 reps each pt required RBs between each plane of movement and demo'd increased SOA with minimal activity. Performed for increased fxl endurance/strength/ADLs. Pt meal tray arrived and pt frustrated on options, pt provided with phone and number to call for meal options. Recommend continued OT POC      Anticipated Discharge Disposition (OT): sub acute care setting, home with 24/7 care, home with home health (pending pt progress, pt close to reported home baseline)

## 2024-02-16 NOTE — PROGRESS NOTES
[facility] [place] PULMONARY AND CRITICAL CARE PROGRESS NOTE - [facility]    Patient: Emy Bermudez  1950   MR# [id number]   Acct# [id number]  02/16/24   08:41 CST  Referring Provider: Eleno Tolentino DO    Chief Complaint: Respiratory failure, influenza and possible superimposed pneumonia    Interval history: She is seen sitting up in the bedside chair. She is upset about her breakfast and not liking what she has had the last couple of days. She wants the door left open. She was provided the number [id number] and phone placed in reach if she does not like what they have brought her in this am. She does not have a Bipap in the room and continues to decline to wear it. She remains on 4L baseline oxygen. Dr. Diaz wanted to check an ABG to see about getting her a home PAP versus Trilogy. She has refused the ABG.   Meds:  albuterol, 1.25 mg, Nebulization, 4x Daily - RT   And  ipratropium, 0.5 mg, Nebulization, 4x Daily - RT  budesonide-formoterol, 2 puff, Inhalation, BID - RT  cefTRIAXone, 2,000 mg, Intravenous, Q24H  cetirizine, 10 mg, Oral, Daily  enoxaparin, 40 mg, Subcutaneous, Daily  fluticasone, 2 spray, Each Nare, Daily  furosemide, 20 mg, Oral, Daily  levothyroxine, 75 mcg, Oral, Q AM  methylPREDNISolone sodium succinate, 40 mg, Intravenous, Q12H  oseltamivir, 30 mg, Oral, Q12H  pantoprazole, 40 mg, Oral, Q AM  sertraline, 100 mg, Oral, Nightly  sodium chloride, 10 mL, Intravenous, Q12H  sodium chloride, 2 spray, Each Nare, Q4H         Physical Exam:  SpO2 Percentage    02/16/24 0653 02/16/24 0704 02/16/24 0748   SpO2: 94% 93% 96%     Body mass index is 35.02 kg/m².   Temp:  [97.7 °F (36.5 °C)-98.5 °F (36.9 °C)] 97.7 °F (36.5 °C)  Heart Rate:  [68-85] 71  Resp:  [18-20] 20  BP: (125-144)/(60-74) 125/60  Intake/Output Summary (Last 24 hours) at 2/16/2024 0841  Last data filed at 2/16/2024 0600  Gross per 24 hour   Intake 240 ml   Output 1300 ml   Net -1060 ml     Physical Exam  Vitals and nursing note  reviewed.   Constitutional:       Appearance: Normal appearance. She is obese.      Interventions: Nasal cannula in place.   HENT:      Head: Normocephalic and atraumatic.      Nose: Nose normal.      Mouth/Throat:      Mouth: Mucous membranes are dry.   Eyes:      General:         Right eye: No discharge.         Left eye: No discharge.      Conjunctiva/sclera: Conjunctivae normal.      Pupils: Pupils are equal, round, and reactive to light.   Cardiovascular:      Rate and Rhythm: Normal rate and regular rhythm.   Pulmonary:      Breath sounds: Wheezing and rhonchi present.   Abdominal:      General: Abdomen is flat.      Palpations: Abdomen is soft.      Comments: Colostomy   Musculoskeletal:         General: Normal range of motion.      Cervical back: Normal range of motion and neck supple.      Comments: Bilateral lower extremities with sarah boots    Skin:     General: Skin is warm and dry.   Neurological:      General: No focal deficit present.      Mental Status: She is alert and oriented to person, place, and time.   Psychiatric:         Mood and Affect: Mood normal.         Behavior: Behavior normal.   Laboratory Data:  Results from last 7 days   Lab Units 02/16/24  0532 02/15/24  0557 02/14/24  0754   WBC 10*3/mm3 11.57* 13.07* 8.19   HEMOGLOBIN g/dL 8.5* 9.1* 10.0*   PLATELETS 10*3/mm3 190 231 195     Results from last 7 days   Lab Units 02/16/24  0532 02/15/24  0557 02/14/24  0754   SODIUM mmol/L 142 139 139   POTASSIUM mmol/L 4.0 3.9 4.2   BUN mg/dL 24* 22 15   CREATININE mg/dL 1.09* 1.31* 1.05*     Results from last 7 days   Lab Units 02/13/24  2141 02/13/24  1726   PH, ARTERIAL pH units 7.317* 7.355   PCO2, ARTERIAL mm Hg 66.4* 63.4*   PO2 ART mm Hg 109.0* 100.0   FIO2 % 40  --      Microbiology Results (last 10 days)       Procedure Component Value - Date/Time    MRSA Screen, PCR (Inpatient) - Swab, Nares [[id number]]  (Normal) Collected: 02/14/24 0540    Lab Status: Final result Specimen: Swab from  Nereyda Updated: 02/14/24 0708     MRSA PCR No MRSA Detected    Narrative:      The negative predictive value of this diagnostic test is high and should only be used to consider de-escalating anti-MRSA therapy. A positive result may indicate colonization with MRSA and must be correlated clinically.    S. Pneumo Ag Urine or CSF - Urine, Urine, Clean Catch [[id number]]  (Normal) Collected: 02/14/24 0236    Lab Status: Final result Specimen: Urine, Clean Catch Updated: 02/14/24 0322     Strep Pneumo Ag Negative    Legionella Antigen, Urine - Urine, Urine, Clean Catch [[id number]]  (Normal) Collected: 02/14/24 0236    Lab Status: Final result Specimen: Urine, Clean Catch Updated: 02/14/24 0322     LEGIONELLA ANTIGEN, URINE Negative    COVID PRE-OP / PRE-PROCEDURE SCREENING ORDER (NO ISOLATION) - Swab, Nasopharynx [[id number]]  (Abnormal) Collected: 02/13/24 1653    Lab Status: Final result Specimen: Swab from Nasopharynx Updated: 02/13/24 1817    Narrative:      The following orders were created for panel order COVID PRE-OP / PRE-PROCEDURE SCREENING ORDER (NO ISOLATION) - Swab, Nasopharynx.  Procedure                               Abnormality         Status                     ---------                               -----------         ------                     COVID-19 and FLU A/B PCR...[[id number]]  Abnormal            Final result                 Please view results for these tests on the individual orders.    COVID-19 and FLU A/B PCR, 1 HR TAT - Swab, Nasopharynx [[id number]]  (Abnormal) Collected: 02/13/24 1653    Lab Status: Final result Specimen: Swab from Nasopharynx Updated: 02/13/24 1817     COVID19 Not Detected     Influenza A PCR Not Detected     Influenza B PCR Detected    Narrative:      Fact sheet for providers: https://www.fda.gov/media/780144/download    Fact sheet for patients: https://www.fda.gov/media/483321/download    Test performed by PCR.    Blood Culture - Blood, Hand, Right [[id number]]  (Normal)  Collected: 02/13/24 1650    Lab Status: Preliminary result Specimen: Blood from Hand, Right Updated: 02/15/24 1745     Blood Culture No growth at 2 days    Blood Culture - Blood, Arm, Left [[id number]]  (Normal) Collected: 02/13/24 1646    Lab Status: Preliminary result Specimen: Blood from Arm, Left Updated: 02/15/24 1745     Blood Culture No growth at 2 days          Recent films:  No radiology results for the last day    Pulmonary Assessment:  Acute on chronic respiratory failure with hypoxia and hypercapnia secondary to influenza B  Influenza B  Pneumonia  Hypertension  Hypothyroidism  Chronic bilateral lower extremity lymphedema  Normocytic anemia  COPD  Former smoker  Allergic rhinitis    Recommend:   Continue supplemental oxygen and titrate to keep O2 sat greater than 90%, currently on 4 L baseline  Continues to refuse bipap, refused ABG this am  Solu medrol  40 milligrams IV every 12 hours, decrease to daily   Tamiflu X 5 days  Continue Ayr gel for nasal dryness  Completed Zosyn, completed azithromycin today, started on Rocephin  Continue Symbicort  Continue DuoNebs scheduled  DVT and GI prophylaxis: Lovenox/Protonix  Continue IS/ OPEP  Follow up in 2 months with Dr. Diaz       I personally spent 7 minutes in accordance with split shared billing, this excludes any time spent jointly with the MD and/or other billable services (if applicable).    Electronically signed by JOAQUÍN Martinez, 2/16/2024, 08:41 CST       Physician supplemental documentation:  Patient was seen in the follow-up visit in pulmonary rounds in medical floor today.  On 4 L of oxygen and refused BiPAP and arterial blood gas drawn this morning  She is unwilling to continue inpatient treatment and like to go home and to be discharged as soon as possible  Discussed with the patient she agreed to try BiPAP again tonight.  Continue respiratory isolation for influenza B  Symbicort DuoNeb.  She is completing Zosyn and azithromycin.   On Rocephin now  Continue incentive spirometry.  Titrate oxygen.  She did not wear home CPAP but may qualify for home BiPAP or trilogy.  However if she is not compliant with NIPPV we may have to send her home on only oxygen  We will monitor the progress.  Hopefully discharge home this weekend  Continue incentive spirometry and flutter valve.  DVT and stress ulcer prophylaxis  Pain and anxiety control.  Repeat labs and imaging studies from time to time.  ABG in the morning if patient permits.  We will plan for outpatient pulmonary clinic follow-up with me in 2 months time  CODE STATUS: Full.  Overall prognosis: Guarded  We we will follow    Pertinent physical exam finding: Morbidly obese  female sitting up.  Heart sounds normal rate and rhythm regular.  Lungs decreased breath sound bibasilar crackles abdomen soft nontender bowel sounds extremities trace ankle edema.  Patient appears anxious.  Neurology intact    Personal review of imaging : CXR shows last chest x-ray shows atelectasis and scarring and low lung volumes.  No new x-rays done today    I personally spent 25 minutes in accordance with split shared billing. Time spent includes time reviewing chart, face-to-face time,  counseling patient/family/caregiver, ordering medications/tests/procedures, communicating with other health care professionals, documenting clinical information in the electronic health record, and coordination of care.  I personally made or approved the management plan for the number and complexity of problems addressed at the encounter, and I take responsibility for the management of that plan and its associated risk of complications and/or morbidity or mortality of patient management.    Electronically signed by     Dillan Diaz MD,  Pulmonologist/Intensivist   2/16/2024, 20:01 CST

## 2024-02-16 NOTE — PLAN OF CARE
Goal Outcome Evaluation:           Progress: improving  Outcome Evaluation: Patient reports improvement since time of admission. Remains on home o2 level of 4L NC. IV rocephin and steroids administered per order. Megan boots in place. Remains in droplet isolation for flu B. Safety maintained, up in chair all day. S [id number] on tele.

## 2024-02-17 LAB
ANION GAP SERPL CALCULATED.3IONS-SCNC: 7 MMOL/L (ref 5–15)
BUN SERPL-MCNC: 28 MG/DL (ref 8–23)
BUN/CREAT SERPL: 25.5 (ref 7–25)
CALCIUM SPEC-SCNC: 9.1 MG/DL (ref 8.6–10.5)
CHLORIDE SERPL-SCNC: 100 MMOL/L (ref 98–107)
CO2 SERPL-SCNC: 37 MMOL/L (ref 22–29)
CREAT SERPL-MCNC: 1.1 MG/DL (ref 0.57–1)
DEPRECATED RDW RBC AUTO: 57.9 FL (ref 37–54)
EGFRCR SERPLBLD CKD-EPI 2021: 52.8 ML/MIN/1.73
ERYTHROCYTE [DISTWIDTH] IN BLOOD BY AUTOMATED COUNT: 17.5 % (ref 12.3–15.4)
GLUCOSE SERPL-MCNC: 88 MG/DL (ref 65–99)
HCT VFR BLD AUTO: 29.7 % (ref 34–46.6)
HGB BLD-MCNC: 8.8 G/DL (ref 12–15.9)
MCH RBC QN AUTO: 26.5 PG (ref 26.6–33)
MCHC RBC AUTO-ENTMCNC: 29.6 G/DL (ref 31.5–35.7)
MCV RBC AUTO: 89.5 FL (ref 79–97)
PLATELET # BLD AUTO: 208 10*3/MM3 (ref 140–450)
PMV BLD AUTO: 8.9 FL (ref 6–12)
POTASSIUM SERPL-SCNC: 3.6 MMOL/L (ref 3.5–5.2)
RBC # BLD AUTO: 3.32 10*6/MM3 (ref 3.77–5.28)
SODIUM SERPL-SCNC: 144 MMOL/L (ref 136–145)
WBC NRBC COR # BLD AUTO: 11.4 10*3/MM3 (ref 3.4–10.8)

## 2024-02-17 PROCEDURE — 25010000002 CEFTRIAXONE PER 250 MG: Performed by: NURSE PRACTITIONER

## 2024-02-17 PROCEDURE — 97530 THERAPEUTIC ACTIVITIES: CPT

## 2024-02-17 PROCEDURE — 94799 UNLISTED PULMONARY SVC/PX: CPT

## 2024-02-17 PROCEDURE — 97110 THERAPEUTIC EXERCISES: CPT

## 2024-02-17 PROCEDURE — 99232 SBSQ HOSP IP/OBS MODERATE 35: CPT | Performed by: INTERNAL MEDICINE

## 2024-02-17 PROCEDURE — 80048 BASIC METABOLIC PNL TOTAL CA: CPT

## 2024-02-17 PROCEDURE — 63710000001 PREDNISONE PER 1 MG

## 2024-02-17 PROCEDURE — 85027 COMPLETE CBC AUTOMATED: CPT

## 2024-02-17 RX ORDER — PREDNISONE 20 MG/1
40 TABLET ORAL
Status: DISCONTINUED | OUTPATIENT
Start: 2024-02-17 | End: 2024-02-18 | Stop reason: HOSPADM

## 2024-02-17 RX ADMIN — ALBUTEROL SULFATE 1.25 MG: 2.5 SOLUTION RESPIRATORY (INHALATION) at 18:34

## 2024-02-17 RX ADMIN — IPRATROPIUM BROMIDE 0.5 MG: 0.5 SOLUTION RESPIRATORY (INHALATION) at 07:14

## 2024-02-17 RX ADMIN — IPRATROPIUM BROMIDE 0.5 MG: 0.5 SOLUTION RESPIRATORY (INHALATION) at 10:59

## 2024-02-17 RX ADMIN — ALBUTEROL SULFATE 1.25 MG: 2.5 SOLUTION RESPIRATORY (INHALATION) at 07:13

## 2024-02-17 RX ADMIN — FUROSEMIDE 20 MG: 20 TABLET ORAL at 09:42

## 2024-02-17 RX ADMIN — NYSTATIN 500000 UNITS: 100000 SUSPENSION ORAL at 23:03

## 2024-02-17 RX ADMIN — OSELTAMIVIR PHOSPHATE 30 MG: 30 CAPSULE ORAL at 09:42

## 2024-02-17 RX ADMIN — IPRATROPIUM BROMIDE 0.5 MG: 0.5 SOLUTION RESPIRATORY (INHALATION) at 14:21

## 2024-02-17 RX ADMIN — CETIRIZINE HYDROCHLORIDE 10 MG: 10 TABLET ORAL at 09:42

## 2024-02-17 RX ADMIN — LEVOTHYROXINE SODIUM 75 MCG: 75 TABLET ORAL at 05:58

## 2024-02-17 RX ADMIN — BUDESONIDE AND FORMOTEROL FUMARATE DIHYDRATE 2 PUFF: 160; 4.5 AEROSOL RESPIRATORY (INHALATION) at 18:34

## 2024-02-17 RX ADMIN — PREDNISONE 40 MG: 20 TABLET ORAL at 08:12

## 2024-02-17 RX ADMIN — NYSTATIN 500000 UNITS: 100000 SUSPENSION ORAL at 17:37

## 2024-02-17 RX ADMIN — IPRATROPIUM BROMIDE 0.5 MG: 0.5 SOLUTION RESPIRATORY (INHALATION) at 18:34

## 2024-02-17 RX ADMIN — CEFTRIAXONE SODIUM 2000 MG: 2 INJECTION, POWDER, FOR SOLUTION INTRAMUSCULAR; INTRAVENOUS at 10:06

## 2024-02-17 RX ADMIN — OSELTAMIVIR PHOSPHATE 30 MG: 30 CAPSULE ORAL at 23:03

## 2024-02-17 RX ADMIN — PANTOPRAZOLE SODIUM 40 MG: 40 TABLET, DELAYED RELEASE ORAL at 05:58

## 2024-02-17 RX ADMIN — Medication 10 ML: at 23:10

## 2024-02-17 RX ADMIN — ALBUTEROL SULFATE 1.25 MG: 2.5 SOLUTION RESPIRATORY (INHALATION) at 10:59

## 2024-02-17 RX ADMIN — ALBUTEROL SULFATE 1.25 MG: 2.5 SOLUTION RESPIRATORY (INHALATION) at 14:21

## 2024-02-17 RX ADMIN — Medication 10 ML: at 09:42

## 2024-02-17 RX ADMIN — BUDESONIDE AND FORMOTEROL FUMARATE DIHYDRATE 2 PUFF: 160; 4.5 AEROSOL RESPIRATORY (INHALATION) at 07:14

## 2024-02-17 NOTE — PLAN OF CARE
Goal Outcome Evaluation:  Plan of Care Reviewed With: patient        Progress: improving  Outcome Evaluation: Pt was up in the chair, stated that she stays in the chair, placed waffle cushion under pt.  Pt was able to transfer sit to stand with CGA.  Performed stand  pivot chair to [facility] and back to chair with CGA.  Pt's unna boots were checked and are intact.  Pt stated she would like home health therapy to continue at home.

## 2024-02-17 NOTE — PROGRESS NOTES
[facility] Medicine Services  INPATIENT PROGRESS NOTE    Patient Name: Emy Bermudez  Date of Admission: 2/13/2024  Today's Date: 02/17/24  Length of Stay: 4  Primary Care Physician: Dorie Segura APRN    Subjective   Chief Complaint: Shortness of breath  HPI   Ms Bermudez presented to [facility] emergency room 2/13/2024 with weeklong history of worsening shortness of breath.  She has a history of COPD and chronic respiratory failure on oxygen at 4 L continuously  She reported cough with light yellow sputum production.  She denied nausea, vomiting or diarrhea.  She has chronic bilateral lower extremity lymphedema with Unna boots in the past.  ABGs pH 7.3 5, pO2 100, pCO2 63.4 on 4 L.  She was placed on BiPAP with pH 7.31, pCO2 66, pO2 109.  D-dimer 1.2.  CT angiogram of the chest no definite central pulmonary emboli.  Peripheral pulmonary artery branches difficult to fully evaluate.  Atheromatous disease thoracic aorta and coronary arteries.  Cardiomegaly.  Bilateral tree-in-bud opacities as well as areas of patchy consolidation worrisome for pneumonia.  Findings likely infectious or inflammatory.  Retained secretions bilateral lower lobes.  5 mm subpleural right upper lobe nodule versus more focal consolidation in area of tree-in-bud opacification.  Consolidation versus atelectasis right lower lobe.  2 adjacent pleural-based opacities right upper lobe.  Follow-up CT 3 months.  Chest x-ray linear infiltrate atelectasis versus scarring.  Influenza B positive.  Temperature 100.6, heart rate 106, respirations 35 in ER.  DuoNeb, Solu-Medrol, Rocephin, azithromycin given in ER.  Fluid bolus not given due to concern for overload.    Today  Patient examined sitting up in recliner on 4 L nasal cannula.  She again declined BiPAP last evening.  Continuing steroids, antibiotics, Tamiflu.  Her breathing has improved.  She continues to be weak.  She was unable to stand and pivot with  nursing yesterday.  She plans to return home at discharge and states she most pivot and function independently there she does not always have the help of her daughter.  I reminded her to actively work with therapy today in hopes of confirming this activity level in preparation of likely discharge home tomorrow as long as there is continued respiratory recovery.  She is agreeable to this and expresses understanding.  Will also plan to order home health at time of discharge.    ROS:  All pertinent negatives and positives are as above. All other systems have been reviewed and are negative unless otherwise stated.     Objective    Temp:  [97.9 °F (36.6 °C)-98.8 °F (37.1 °C)] 98.2 °F (36.8 °C)  Heart Rate:  [64-89] 89  Resp:  [18] 18  BP: (135-149)/(61-73) 145/65  Physical Exam  Vitals and nursing note reviewed.   Constitutional:       Appearance: She is obese.      Comments: Up in chair, 4 L nasal cannula, no visitors at bedside   HENT:      Head: Normocephalic and atraumatic.      Nose: No congestion.      Mouth/Throat:      Pharynx: No oropharyngeal exudate or posterior oropharyngeal erythema.   Eyes:      Extraocular Movements: Extraocular movements intact.      Pupils: Pupils are equal, round, and reactive to light.   Cardiovascular:      Rate and Rhythm: Normal rate and regular rhythm.   Pulmonary:      Breath sounds: Rhonchi present.      Comments: Expiratory wheeze remains, but improved.  4 L nasal cannula.  Abdominal:      Palpations: Abdomen is soft.      Tenderness: There is no abdominal tenderness.   Genitourinary:     Comments: Voiding.  Musculoskeletal:         General: Swelling present.      Cervical back: Normal range of motion and neck supple.      Comments: Chronic bilateral lower extremity edema with unna boots in place   Skin:     Findings: Erythema (Bilateral lower extremities, chronic edema) present.   Neurological:      General: No focal deficit present.      Mental Status: She is alert and  oriented to person, place, and time.   Psychiatric:         Mood and Affect: Mood normal.         Behavior: Behavior normal.         Thought Content: Thought content normal.         Judgment: Judgment normal.       Results Review:  I have reviewed the labs, radiology results, and diagnostic studies.    Laboratory Data:   Results from last 7 days   Lab Units 02/17/24  0521 02/16/24  0532 02/15/24  0557   WBC 10*3/mm3 11.40* 11.57* 13.07*   HEMOGLOBIN g/dL 8.8* 8.5* 9.1*   HEMATOCRIT % 29.7* 27.8* 30.6*   PLATELETS 10*3/mm3 208 190 231        Results from last 7 days   Lab Units 02/17/24  0521 02/16/24  0532 02/15/24  0557 02/13/24  1726 02/13/24  1641   SODIUM mmol/L 144 142 139   < > 138   SODIUM, ARTERIAL   --   --   --    < >  --    POTASSIUM mmol/L 3.6 4.0 3.9   < > 4.2   CHLORIDE mmol/L 100 99 95*   < > 95*   CO2 mmol/L 37.0* 32.0* 33.0*   < > 34.0*   BUN mg/dL 28* 24* 22   < > 12   CREATININE mg/dL 1.10* 1.09* 1.31*   < > 1.04*   CALCIUM mg/dL 9.1 8.9 8.6   < > 9.6   BILIRUBIN mg/dL  --   --   --   --  0.5   ALK PHOS U/L  --   --   --   --  108   ALT (SGPT) U/L  --   --   --   --  11   AST (SGOT) U/L  --   --   --   --  23   GLUCOSE mg/dL 88 124* 110*   < > 103*    < > = values in this interval not displayed.     Microbiology Results (last 10 days)       Procedure Component Value - Date/Time    MRSA Screen, PCR (Inpatient) - Swab, Nares [[id number]]  (Normal) Collected: 02/14/24 0540    Lab Status: Final result Specimen: Swab from Nares Updated: 02/14/24 0708     MRSA PCR No MRSA Detected    Narrative:      The negative predictive value of this diagnostic test is high and should only be used to consider de-escalating anti-MRSA therapy. A positive result may indicate colonization with MRSA and must be correlated clinically.    S. Pneumo Ag Urine or CSF - Urine, Urine, Clean Catch [[id number]]  (Normal) Collected: 02/14/24 0236    Lab Status: Final result Specimen: Urine, Clean Catch Updated: 02/14/24 1425      Strep Pneumo Ag Negative    Legionella Antigen, Urine - Urine, Urine, Clean Catch [[id number]]  (Normal) Collected: 02/14/24 0236    Lab Status: Final result Specimen: Urine, Clean Catch Updated: 02/14/24 0322     LEGIONELLA ANTIGEN, URINE Negative    COVID PRE-OP / PRE-PROCEDURE SCREENING ORDER (NO ISOLATION) - Swab, Nasopharynx [[id number]]  (Abnormal) Collected: 02/13/24 1653    Lab Status: Final result Specimen: Swab from Nasopharynx Updated: 02/13/24 1817    Narrative:      The following orders were created for panel order COVID PRE-OP / PRE-PROCEDURE SCREENING ORDER (NO ISOLATION) - Swab, Nasopharynx.  Procedure                               Abnormality         Status                     ---------                               -----------         ------                     COVID-19 and FLU A/B PCR...[[id number]]  Abnormal            Final result                 Please view results for these tests on the individual orders.    COVID-19 and FLU A/B PCR, 1 HR TAT - Swab, Nasopharynx [[id number]]  (Abnormal) Collected: 02/13/24 1653    Lab Status: Final result Specimen: Swab from Nasopharynx Updated: 02/13/24 1817     COVID19 Not Detected     Influenza A PCR Not Detected     Influenza B PCR Detected    Narrative:      Fact sheet for providers: https://www.fda.gov/media/284349/download    Fact sheet for patients: https://www.fda.gov/media/120050/download    Test performed by PCR.    Blood Culture - Blood, Hand, Right [[id number]]  (Normal) Collected: 02/13/24 1650    Lab Status: Preliminary result Specimen: Blood from Hand, Right Updated: 02/16/24 1745     Blood Culture No growth at 3 days    Blood Culture - Blood, Arm, Left [[id number]]  (Normal) Collected: 02/13/24 1646    Lab Status: Preliminary result Specimen: Blood from Arm, Left Updated: 02/16/24 1745     Blood Culture No growth at 3 days           I have reviewed the patient's current medications.     Assessment/Plan   Assessment  Active Hospital Problems     Diagnosis     **Acute on chronic respiratory failure with hypoxia and hypercapnia due to influenza B     Lymphedema     Influenza B     Sepsis     Pneumonia of both lungs due to infectious organism, tree in bud     Essential hypertension     Hypothyroidism (acquired)     Normocytic anemia     Cellulitis of right lower extremity     COPD (chronic obstructive pulmonary disease)        Treatment Plan  Acute on chronic hypoxic hypercapnic respiratory failure with COPD exacerbation secondary to influenza B-  Continue Symbicort, DuoNebs, incentive spirometry, OPEP  Continue Tamiflu  Azithromycin complete, continue Rocephin  Transition IV Solu-Medrol to daily prednisone  Continue 4 L nasal cannula    Pneumonia with tree-in-bud opacity-  Strep pneumonia negative, Legionella negative, MRSA PCR negative.  Continue efforts at pulmonary toileting with incentive spirometry and OPEP  Mucinex transition to liquid Robitussin per patient request  Azithromycin complete.  Continue Rocephin.  Sputum culture to be collected if able  Blood culture x 2 no growth at 3 days    Primary hypertension-  Continue to closely monitor, blood pressure trend currently appropriate    Hypothyroidism-  Continue Synthroid    Chronic lymphedema bilateral lower extremities-  Lower extremities reddened, swollen, no open areas or drainage.  Physical therapy was consulted in place bilateral unna boots on 2/14    Normocytic anemia-  Hemoglobin 8.8 this morning, no obvious signs or symptoms of bleeding    Lovenox for deep vein thrombosis prophylaxis.    PT/OT for deconditioning    Medical Decision Making  Number and Complexity of problems: 8  Acute on chronic hypoxic hypercapnic respiratory failure secondary to influenza B.  Acute, high complexity poses threat to life and bodily function, required BiPAP, improving  Influenza B: Acute, high complexity, not at baseline  Pneumonia with tree-in-bud opacity: Acute, high complexity, not at baseline  Primary  hypertension: Chronic, moderate complexity, not at baseline  Hypothyroidism: Chronic, low complexity, stable  Chronic edema lower extremities: Chronic, moderate complexity, not at baseline  COPD: Chronic, high complexity, not at baseline  Normocytic anemia: Chronic, moderate complexity, stable    Differential Diagnosis: None    Conditions and Status        Condition is improving.     [facility] Data  External documents reviewed: Otology office visit 1/19/2024  Cardiac tracing (EKG, telemetry) interpretation: Normal sinus rhythm   Radiology interpretation: Reviewed radiology interpretation CTA chest, chest x-ray  Labs reviewed:   CBC, BMP, ABG, hemoglobin A1c, TSH, iron profile, blood cultures    Any tests that were considered but not ordered: None     Decision rules/scores evaluated (example [id number], Wells, etc): None     Discussed with: Dr. Tolentino and patient.     Care Planning  Shared decision making: Discussed with above, patient is agreeable to her plan of care  Code status and discussions: Full code  The patient surrogate decision maker is her daughter, Madeleine Bates  Social Determinants of Health that impact treatment or disposition: None  I expect the patient to be discharged to home, likely tomorrow    Electronically signed by JOAQUÍN Murry, 02/17/24, 11:12 CST.

## 2024-02-17 NOTE — PLAN OF CARE
Goal Outcome Evaluation:           Progress: no change  Outcome Evaluation: Patient on home O2 of 4L NC. States she will attempt to wear bipap tonight with sleep. VSS, safety maintained. Up in chair this shift. Prn robitussin given per patient request. S [id number] on tele.

## 2024-02-17 NOTE — PROGRESS NOTES
[facility] [place] PULMONARY AND CRITICAL CARE PROGRESS NOTE - [facility]    Patient: Emy Bermudez  1950   MR# [id number]   Acct# [id number]  02/17/24   16:47 CST  Referring Provider: Eleno Tolentino DO    Chief Complaint: Respiratory failure, influenza and possible superimposed pneumonia    Interval history: Patient was seen in the follow-up visit in pulmonary rounds in medical floor today.  She was sitting up and was having lunch and denied any acute complaints.  She is on full dose of oxygen and again did not wear the BiPAP last night but told me nobody put it on although apparently she refused it.    Meds:  albuterol, 1.25 mg, Nebulization, 4x Daily - RT   And  ipratropium, 0.5 mg, Nebulization, 4x Daily - RT  budesonide-formoterol, 2 puff, Inhalation, BID - RT  cefTRIAXone, 2,000 mg, Intravenous, Q24H  cetirizine, 10 mg, Oral, Daily  fluticasone, 2 spray, Each Nare, Daily  furosemide, 20 mg, Oral, Daily  levothyroxine, 75 mcg, Oral, Q AM  nystatin, 5 mL, Swish & Spit, 4x Daily  oseltamivir, 30 mg, Oral, Q12H  pantoprazole, 40 mg, Oral, Q AM  predniSONE, 40 mg, Oral, Daily With Breakfast  sertraline, 100 mg, Oral, Nightly  sodium chloride, 10 mL, Intravenous, Q12H  sodium chloride, 2 spray, Each Nare, Q4H         Physical Exam:  SpO2 Percentage    02/17/24 1100 02/17/24 1421 02/17/24 1427   SpO2: 100% 97% 96%     Body mass index is 35.25 kg/m².   Temp:  [97.9 °F (36.6 °C)-98.8 °F (37.1 °C)] 97.9 °F (36.6 °C)  Heart Rate:  [64-89] 71  Resp:  [18] 18  BP: (135-163)/(61-79) 163/79  Intake/Output Summary (Last 24 hours) at 2/17/2024 1647  Last data filed at 2/17/2024 1500  Gross per 24 hour   Intake 1320 ml   Output 2850 ml   Net -1530 ml     Physical Exam  Vitals and nursing note reviewed.   Constitutional:       Appearance: Normal appearance. She is obese.      Interventions: Nasal cannula in place.   HENT:      Head: Normocephalic and atraumatic.      Nose: Nose normal.      Mouth/Throat:      Mouth:  Mucous membranes are dry.   Eyes:      General:         Right eye: No discharge.         Left eye: No discharge.      Conjunctiva/sclera: Conjunctivae normal.      Pupils: Pupils are equal, round, and reactive to light.   Cardiovascular:      Rate and Rhythm: Normal rate and regular rhythm.   Pulmonary:      Breath sounds: Wheezing and rhonchi present.   Abdominal:      General: Abdomen is flat.      Palpations: Abdomen is soft.      Comments: Colostomy   Musculoskeletal:         General: Normal range of motion.      Cervical back: Normal range of motion and neck supple.      Comments: Bilateral lower extremities with sarah boots    Skin:     General: Skin is warm and dry.   Neurological:      General: No focal deficit present.      Mental Status: She is alert and oriented to person, place, and time.   Psychiatric:         Mood and Affect: Mood normal.         Behavior: Behavior normal.   Laboratory Data:  Results from last 7 days   Lab Units 02/17/24  0521 02/16/24  0532 02/15/24  0557   WBC 10*3/mm3 11.40* 11.57* 13.07*   HEMOGLOBIN g/dL 8.8* 8.5* 9.1*   PLATELETS 10*3/mm3 208 190 231     Results from last 7 days   Lab Units 02/17/24  0521 02/16/24  0532 02/15/24  0557   SODIUM mmol/L 144 142 139   POTASSIUM mmol/L 3.6 4.0 3.9   BUN mg/dL 28* 24* 22   CREATININE mg/dL 1.10* 1.09* 1.31*     Results from last 7 days   Lab Units 02/13/24  2141 02/13/24  1726   PH, ARTERIAL pH units 7.317* 7.355   PCO2, ARTERIAL mm Hg 66.4* 63.4*   PO2 ART mm Hg 109.0* 100.0   FIO2 % 40  --      Microbiology Results (last 10 days)       Procedure Component Value - Date/Time    MRSA Screen, PCR (Inpatient) - Swab, Nares [[id number]]  (Normal) Collected: 02/14/24 0540    Lab Status: Final result Specimen: Swab from Nares Updated: 02/14/24 0708     MRSA PCR No MRSA Detected    Narrative:      The negative predictive value of this diagnostic test is high and should only be used to consider de-escalating anti-MRSA therapy. A positive result  may indicate colonization with MRSA and must be correlated clinically.    S. Pneumo Ag Urine or CSF - Urine, Urine, Clean Catch [[id number]]  (Normal) Collected: 02/14/24 0236    Lab Status: Final result Specimen: Urine, Clean Catch Updated: 02/14/24 0322     Strep Pneumo Ag Negative    Legionella Antigen, Urine - Urine, Urine, Clean Catch [[id number]]  (Normal) Collected: 02/14/24 0236    Lab Status: Final result Specimen: Urine, Clean Catch Updated: 02/14/24 0322     LEGIONELLA ANTIGEN, URINE Negative    COVID PRE-OP / PRE-PROCEDURE SCREENING ORDER (NO ISOLATION) - Swab, Nasopharynx [[id number]]  (Abnormal) Collected: 02/13/24 1653    Lab Status: Final result Specimen: Swab from Nasopharynx Updated: 02/13/24 1817    Narrative:      The following orders were created for panel order COVID PRE-OP / PRE-PROCEDURE SCREENING ORDER (NO ISOLATION) - Swab, Nasopharynx.  Procedure                               Abnormality         Status                     ---------                               -----------         ------                     COVID-19 and FLU A/B PCR...[[id number]]  Abnormal            Final result                 Please view results for these tests on the individual orders.    COVID-19 and FLU A/B PCR, 1 HR TAT - Swab, Nasopharynx [[id number]]  (Abnormal) Collected: 02/13/24 1653    Lab Status: Final result Specimen: Swab from Nasopharynx Updated: 02/13/24 1817     COVID19 Not Detected     Influenza A PCR Not Detected     Influenza B PCR Detected    Narrative:      Fact sheet for providers: https://www.fda.gov/media/682940/download    Fact sheet for patients: https://www.fda.gov/media/153751/download    Test performed by PCR.    Blood Culture - Blood, Hand, Right [[id number]]  (Normal) Collected: 02/13/24 1650    Lab Status: Preliminary result Specimen: Blood from Hand, Right Updated: 02/16/24 1745     Blood Culture No growth at 3 days    Blood Culture - Blood, Arm, Left [[id number]]  (Normal) Collected: 02/13/24  1646    Lab Status: Preliminary result Specimen: Blood from Arm, Left Updated: 02/16/24 2884     Blood Culture No growth at 3 days          Recent films:  No radiology results for the last day    Pulmonary Assessment:  Acute on chronic respiratory failure with hypoxia and hypercapnia secondary to influenza B  Influenza B  Pneumonia  Hypertension  Hypothyroidism  Chronic bilateral lower extremity lymphedema  Normocytic anemia  COPD  Former smoker  Allergic rhinitis    Recommend:     Patient was seen in the follow-up visit in pulmonary rounds in medical floor today.  On 4 L of oxygen and refused BiPAP and arterial blood gas drawn this morning  There is significant noncompliance issue.    I doubt she will ever be compliant with the BiPAP or CPAP  Continue isolation for influenza.  Plan for discharge home after home oxygen evaluation  Continue Symbicort DuoNeb.  Discharge home on albuterol HFA instead of DuoNeb  Completing antibiotics.  Discharged home on p.o. antibiotics  Continue incentive spirometry.  P.o. prednisone.  Steroid taper  She did not wear home CPAP but may qualify for home BiPAP or trilogy.  DVT and stress ulcer prophylaxis pain and anxiety control  Nutritional support.  Repeat labs as needed.  Plan for discharge home in 1 or 2 days  We will plan for outpatient pulmonary clinic follow-up with me in 2 months time  CODE STATUS: Full.  Overall prognosis: Guarded  We we will sign off.  Please call us if needed.  We appreciate the consult      Electronically signed by     Dillan Diaz MD,  Pulmonologist/Intensivist   2/17/2024, 16:47 CST

## 2024-02-17 NOTE — PLAN OF CARE
Problem: Noninvasive Ventilation Acute  Goal: Effective Unassisted Ventilation and Oxygenation  Outcome: Ongoing, Progressing     Problem: Adult Inpatient Plan of Care  Goal: Plan of Care Review  Outcome: Ongoing, Progressing  Flowsheets (Taken 2/17/2024 0315)  Progress: no change  Goal: Patient-Specific Goal (Individualized)  Outcome: Ongoing, Progressing  Goal: Absence of Hospital-Acquired Illness or Injury  Outcome: Ongoing, Progressing  Goal: Optimal Comfort and Wellbeing  Outcome: Ongoing, Progressing  Goal: Readiness for Transition of Care  Outcome: Ongoing, Progressing     Problem: Skin Injury Risk Increased  Goal: Skin Health and Integrity  Outcome: Ongoing, Progressing     Problem: Fall Injury Risk  Goal: Absence of Fall and Fall-Related Injury  Outcome: Ongoing, Progressing   Goal Outcome Evaluation:           Progress: no change

## 2024-02-18 ENCOUNTER — READMISSION MANAGEMENT (OUTPATIENT)
Dept: CALL CENTER | Facility: HOSPITAL | Age: 74
End: 2024-02-18
Payer: MEDICARE

## 2024-02-18 VITALS
BODY MASS INDEX: 35.26 KG/M2 | DIASTOLIC BLOOD PRESSURE: 59 MMHG | HEIGHT: 63 IN | RESPIRATION RATE: 18 BRPM | TEMPERATURE: 98.1 F | WEIGHT: 199 LBS | OXYGEN SATURATION: 97 % | SYSTOLIC BLOOD PRESSURE: 136 MMHG | HEART RATE: 70 BPM

## 2024-02-18 LAB
BACTERIA SPEC AEROBE CULT: NORMAL
BACTERIA SPEC AEROBE CULT: NORMAL
QT INTERVAL: 378 MS
QT INTERVAL: 424 MS
QTC INTERVAL: 499 MS
QTC INTERVAL: 563 MS

## 2024-02-18 PROCEDURE — 94799 UNLISTED PULMONARY SVC/PX: CPT

## 2024-02-18 PROCEDURE — 63710000001 PREDNISONE PER 1 MG

## 2024-02-18 PROCEDURE — 25010000002 CEFTRIAXONE PER 250 MG: Performed by: NURSE PRACTITIONER

## 2024-02-18 RX ORDER — CEFDINIR 300 MG/1
300 CAPSULE ORAL 2 TIMES DAILY
Qty: 4 CAPSULE | Refills: 0 | Status: SHIPPED | OUTPATIENT
Start: 2024-02-19 | End: 2024-02-21

## 2024-02-18 RX ORDER — PREDNISONE 10 MG/1
TABLET ORAL
Qty: 26 TABLET | Refills: 0 | Status: SHIPPED | OUTPATIENT
Start: 2024-02-18 | End: 2024-02-29

## 2024-02-18 RX ORDER — OSELTAMIVIR PHOSPHATE 30 MG/1
30 CAPSULE ORAL EVERY 12 HOURS SCHEDULED
Qty: 2 CAPSULE | Refills: 0 | Status: SHIPPED | OUTPATIENT
Start: 2024-02-18 | End: 2024-02-19

## 2024-02-18 RX ADMIN — CEFTRIAXONE SODIUM 2000 MG: 2 INJECTION, POWDER, FOR SOLUTION INTRAMUSCULAR; INTRAVENOUS at 08:47

## 2024-02-18 RX ADMIN — Medication 10 ML: at 08:47

## 2024-02-18 RX ADMIN — PREDNISONE 40 MG: 20 TABLET ORAL at 08:46

## 2024-02-18 RX ADMIN — OSELTAMIVIR PHOSPHATE 30 MG: 30 CAPSULE ORAL at 08:46

## 2024-02-18 RX ADMIN — LEVOTHYROXINE SODIUM 75 MCG: 75 TABLET ORAL at 06:11

## 2024-02-18 RX ADMIN — NYSTATIN 500000 UNITS: 100000 SUSPENSION ORAL at 12:56

## 2024-02-18 RX ADMIN — ZINC OXIDE 1 APPLICATION: 200 OINTMENT TOPICAL at 10:28

## 2024-02-18 RX ADMIN — IPRATROPIUM BROMIDE 0.5 MG: 0.5 SOLUTION RESPIRATORY (INHALATION) at 07:11

## 2024-02-18 RX ADMIN — PANTOPRAZOLE SODIUM 40 MG: 40 TABLET, DELAYED RELEASE ORAL at 06:11

## 2024-02-18 RX ADMIN — ALBUTEROL SULFATE 1.25 MG: 2.5 SOLUTION RESPIRATORY (INHALATION) at 10:53

## 2024-02-18 RX ADMIN — ALBUTEROL SULFATE 1.25 MG: 2.5 SOLUTION RESPIRATORY (INHALATION) at 07:11

## 2024-02-18 RX ADMIN — HYDROXYZINE HYDROCHLORIDE 50 MG: 25 TABLET ORAL at 00:09

## 2024-02-18 RX ADMIN — CETIRIZINE HYDROCHLORIDE 10 MG: 10 TABLET ORAL at 08:46

## 2024-02-18 RX ADMIN — FUROSEMIDE 20 MG: 20 TABLET ORAL at 08:46

## 2024-02-18 RX ADMIN — BUDESONIDE AND FORMOTEROL FUMARATE DIHYDRATE 2 PUFF: 160; 4.5 AEROSOL RESPIRATORY (INHALATION) at 07:12

## 2024-02-18 RX ADMIN — NYSTATIN 500000 UNITS: 100000 SUSPENSION ORAL at 08:46

## 2024-02-18 RX ADMIN — IPRATROPIUM BROMIDE 0.5 MG: 0.5 SOLUTION RESPIRATORY (INHALATION) at 10:53

## 2024-02-18 NOTE — PLAN OF CARE
Goal Outcome Evaluation: Patient discharging home with home health. New medications and follow up appointments reviewed with the patient, all questions and concerns were addressed. Patient A&Ox4, on her home O2 of 4L.                     Problem: Noninvasive Ventilation Acute  Goal: Effective Unassisted Ventilation and Oxygenation  Outcome: Adequate for Care Transition     Problem: Adult Inpatient Plan of Care  Goal: Plan of Care Review  Outcome: Adequate for Care Transition  Goal: Patient-Specific Goal (Individualized)  Outcome: Adequate for Care Transition  Goal: Absence of Hospital-Acquired Illness or Injury  Outcome: Adequate for Care Transition  Intervention: Identify and Manage Fall Risk  Recent Flowsheet Documentation  Taken 2/18/2024 1000 by Marsha Delgado RN  Safety Promotion/Fall Prevention: safety round/check completed  Taken 2/18/2024 0846 by Marsha Delgado RN  Safety Promotion/Fall Prevention: safety round/check completed  Taken 2/18/2024 0800 by Marsha Delgado RN  Safety Promotion/Fall Prevention: safety round/check completed  Taken 2/18/2024 0700 by Marsha Delgado RN  Safety Promotion/Fall Prevention: safety round/check completed  Intervention: Prevent Skin Injury  Recent Flowsheet Documentation  Taken 2/18/2024 0846 by Marsha Delgado RN  Body Position: position changed independently  Intervention: Prevent and Manage VTE (Venous Thromboembolism) Risk  Recent Flowsheet Documentation  Taken 2/18/2024 0846 by Marsha Delgado RN  Activity Management: up in chair  Intervention: Prevent Infection  Recent Flowsheet Documentation  Taken 2/18/2024 0846 by Marsha Delgado RN  Infection Prevention:   hand hygiene promoted   personal protective equipment utilized   rest/sleep promoted   single patient room provided  Goal: Optimal Comfort and Wellbeing  Outcome: Adequate for Care Transition  Goal: Readiness for Transition of Care  Outcome: Adequate for Care Transition     Problem: Skin  Injury Risk Increased  Goal: Skin Health and Integrity  Outcome: Adequate for Care Transition     Problem: Fall Injury Risk  Goal: Absence of Fall and Fall-Related Injury  Outcome: Adequate for Care Transition  Intervention: Identify and Manage Contributors  Recent Flowsheet Documentation  Taken 2/18/2024 0846 by Marsha Delgado RN  Self-Care Promotion: independence encouraged  Intervention: Promote Injury-Free Environment  Recent Flowsheet Documentation  Taken 2/18/2024 1000 by Marsha Delgado RN  Safety Promotion/Fall Prevention: safety round/check completed  Taken 2/18/2024 0846 by Marsha Delgado RN  Safety Promotion/Fall Prevention: safety round/check completed  Taken 2/18/2024 0800 by Marsha Delgado RN  Safety Promotion/Fall Prevention: safety round/check completed  Taken 2/18/2024 0700 by Marsha Delgado RN  Safety Promotion/Fall Prevention: safety round/check completed

## 2024-02-18 NOTE — THERAPY DISCHARGE NOTE
Acute Care - Physical Therapy Discharge Summary  [facility]       Patient Name: Emy Bermudez  : 1950  MRN: [id number]    Today's Date: 2024  Onset of Illness/Injury or Date of Surgery: 24       Referring Physician: Lavinia YANES      Admit Date: 2024      PT Recommendation and Plan    Visit Dx:    ICD-10-CM ICD-9-CM   1. Acute respiratory failure with hypoxia and hypercapnia  J96.01 518.81    J96.02    2. Influenza  J11.1 487.1   3. Multifocal pneumonia  J18.9 486   4. Pulmonary nodule  R91.1 793.11   5. Acute on chronic respiratory failure with hypoxia and hypercapnia  J96.21 518.84    J96.22 786.09     799.02   6. Impaired mobility [Z74.09]  Z74.09 799.89        Outcome Measures       Row Name 24 1525             How much help from another person do you currently need...    Turning from your back to your side while in flat bed without using bedrails? 3  -TYLOR      Moving from lying on back to sitting on the side of a flat bed without bedrails? 3  -TYLOR      Moving to and from a bed to a chair (including a wheelchair)? 3  -TYLOR      Standing up from a chair using your arms (e.g., wheelchair, bedside chair)? 3  -TYLOR      Climbing 3-5 steps with a railing? 1  -TYLOR      To walk in hospital room? 1  -TYLOR      AM-PAC 6 Clicks Score (PT) 14  -TYLOR      Highest Level of Mobility Goal 4 --> Transfer to chair/commode  -TYLOR         Functional Assessment    Outcome Measure Options AM-PAC 6 Clicks Basic Mobility (PT)  -TYLOR                User Key  (r) = Recorded By, (t) = Taken By, (c) = Cosigned By      Initials Name Provider Type    León Smith PTA Physical Therapist Assistant                         PT Rehab Goals       Row Name 24 1515             Transfer Goal 1 (PT)    Activity/Assistive Device (Transfer Goal 1, PT) sit-to-stand/stand-to-sit  -AH      [place] Level/Cues Needed (Transfer Goal 1, PT) independent  -AH      Time Frame (Transfer Goal 1, PT) 10 days  -       Progress/Outcome (Transfer Goal 1, PT) goal not met  -         Gait Training Goal 1 (PT)    Activity/Assistive Device (Gait Training Goal 1, PT) gait (walking locomotion);assistive device use  -      [place] Level (Gait Training Goal 1, PT) standby assist  -      Distance (Gait Training Goal 1, PT) 10ft  -      Time Frame (Gait Training Goal 1, PT) 10 days  -      Progress/Outcome (Gait Training Goal 1, PT) goal not met  -         Wound Care Goal 1 (PT)    Wound Care Goal 1 (PT) BLE will demo evidence of decreased swelling.  -      Time Frame (Wound Care Goal 1, PT) 1 week  -      Progress/Outcome (Wound Care Goal 1, PT) goal not met  -                User Key  (r) = Recorded By, (t) = Taken By, (c) = Cosigned By      Initials Name Provider Type Discipline    Adelaida Cavazos PTA Physical Therapist Assistant PT                        PT Discharge Summary  Reason for Discharge: Discharge from facility  Outcomes Achieved: Refer to plan of care for updates on goals achieved  Discharge Destination: Home with home health      Adelaida Hickman PTA   2/18/2024

## 2024-02-18 NOTE — PLAN OF CARE
Goal Outcome Evaluation:   Patient with intact Unna boots. Legs are hanging and red. Refuses to elevate legs at this time. Discussed importance of elevating both legs.

## 2024-02-18 NOTE — PROGRESS NOTES
"Bipap pulled from pt room on the 15th due to constant refusal to wear. Pt remains non compliant stating she will not wear \"that machine and is leaving here tomorrow, if she has to go AMA to do it.\" Pt is up in chair, awake, alert and watching TV at this time. No distress noted. Charge RN in formed. Will cont to monitor.  "

## 2024-02-18 NOTE — PLAN OF CARE
Goal Outcome Evaluation:           Progress: no change  Outcome Evaluation: S/ST  on tele. VSS. Pt up in chair throughout entire shift. No c/o of this shift. Pt stands and pivots to BSC but states she can't wipe herself. Pt only voided 50ml last night and 150ml this morning. Bladder scan this am showed 123ml of urine. Probable discharge today. Safety maintained.

## 2024-02-18 NOTE — DISCHARGE SUMMARY
[facility] Medicine Services  DISCHARGE SUMMARY       Date of Admission: 2/13/2024  Date of Discharge:  2/18/2024  Primary Care Physician: Dorie Segura APRN    Presenting Problem/History of Present Illness:  Acute on chronic respiratory failure with hypoxia and hypercapnia secondary to COPD exacerbation secondary to pneumonia and influenza B    Final Discharge Diagnoses:  Active Hospital Problems    Diagnosis     **Acute on chronic respiratory failure with hypoxia and hypercapnia due to influenza B     Lymphedema     Influenza B     Sepsis     Pneumonia of both lungs due to infectious organism, tree in bud     Essential hypertension     Hypothyroidism (acquired)     Normocytic anemia     Cellulitis of right lower extremity     COPD (chronic obstructive pulmonary disease)        Consults: Pulmonology, Dr. Diaz    Procedures Performed: None    Pertinent Test Results:   Results for orders placed during the hospital encounter of 02/13/20    Adult Transthoracic Echo Complete W/ Cont if Necessary Per Protocol    Interpretation Summary  · Left ventricular systolic function is normal. Estimated ejection fraction is 66 to 70%.  · Left ventricular diastolic dysfunction (grade I) consistent with impaired relaxation.  · Normal right ventricular cavity size and systolic function noted.  · There were no significant (greater than mild) valvular dysfunction.      Imaging Results (All)       Procedure Component Value Units Date/Time    CT Angiogram Chest [[id number]] Collected: 02/13/24 2030     Updated: 02/13/24 2044    Narrative:      EXAMINATION:  CT ANGIOGRAM CHEST-  2/13/2024 7:04 PM     HISTORY: Elevated D-dimer. Shortness of air. D-dimer 1.2.     COMPARISON : 2/13/2020.     DLP: 266.29 mGy.cm Automated dosage reduction technique was utilized to  decrease patient dosage.     TECHNIQUE: CT angio was performed of the chest with IV contrast.  Coronal, sagittal and 3-D reconstruction  were performed.     INDEPENDENT 3-D WORKSTATION UTILIZED FOR RECONSTRUCTION: Yes. A  radiologist was not present in the department.     MEDIASTINUM, HEART AND VASCULAR STRUCTURES: There is atheromatous  calcification of the thoracic aorta and coronary arteries. The thoracic  aorta is normal in caliber. The pulmonary arteries are normal in  caliber. There is slightly less than optimal opacification of the  pulmonary arteries. No central pulmonary embolus is seen. More  peripheral vessels are difficult to evaluate due to limited  opacification. There is mild cardiomegaly. Mediastinal lymph nodes  measure up to 8 mm in short axis diameter. They are not enlarged by size  criteria. There are small hilar lymph nodes.     LUNGS: There are tree-in-bud opacities bilaterally in the upper lobes.  There are tree-in-bud opacities in the right middle lobe, lingula and  bilateral lower lobes. There are retained secretions in bilateral lower  lobe bronchi. There is a 5 mm subpleural right upper lobe pulmonary  nodule versus more focal consolidation in the area of tree-in-bud  opacification. There is a 3-4 mm fissural nodule along the left major  fissure image 32 series 6. There are focal areas of consolidation versus  atelectasis in the right lower lobe inferiorly and posteriorly. There  are 2 adjacent pleural-based opacities in the right upper lobe  posteriorly image 59 series 6, likely focal areas of atelectasis.     UPPER ABDOMEN: There is fatty infiltration of the liver. There are no  acute appearing findings in the upper abdomen.     BONES: There are degenerative changes of the spine.          Impression:      1. Somewhat limited opacification of the pulmonary arteries. No definite  central pulmonary emboli. More peripheral pulmonary artery branches are  difficult to fully evaluate. There is atheromatous disease of the  thoracic aorta and coronary arteries. There is cardiomegaly.  2. Bilateral tree-in-bud opacities as well as  areas of patchy  consolidation, worrisome for pneumonia. The findings are likely  infectious or inflammatory. There are also retained secretions in  bilateral lower lobe bronchi.  3. A 5 mm subpleural right upper lobe nodule versus a more focal  consolidation in an area of tree-in-bud opacification. There are also  focal areas of consolidation versus atelectasis in the right lower lobe  posteriorly and inferiorly. There are 2 adjacent pleural-based opacities  in the right upper lobe posteriorly likely areas of focal atelectasis or  pneumonia. A follow-up CT in 3-6 months would be helpful to document  resolution of findings.  4. There is a tiny fissural nodule along the left major fissure, likely  a fissural lymph node. There are small mediastinal lymph nodes that are  likely reactive.  5. Fatty infiltration of the liver.        The full report of this exam was immediately signed and available to the  emergency room. The patient is currently in the emergency room.     This report was signed and finalized on 2/13/2024 8:41 PM by Dr. Tunde Christopher MD.       XR Chest 1 View [[id number]] Collected: 02/13/24 1713     Updated: 02/13/24 1717    Narrative:      EXAMINATION:  XR CHEST 1 VW-  2/13/2024 4:11 PM     HISTORY: Shortness of air.     COMPARISON: 4/17/2021.     TECHNIQUE: Single view AP image.     FINDINGS: There is blunting of the left costophrenic angle versus  scarring. There are linear infiltrates in both lung bases. There is  stable bronchial wall thickening. Heart size is upper limits of normal.  No acute appearing bony abnormality is seen.          Impression:      1. Linear infiltrates in both lung bases. Atelectasis versus scarring.  2. Blunting of the left costophrenic angle may represent scarring versus  a small amount of pleural fluid.  3. Stable bronchial wall thickening.           This report was signed and finalized on 2/13/2024 5:14 PM by Dr. Tunde Christopher MD.             LAB RESULTS:      Lab  02/17/24  0521 02/16/24  0532 02/15/24  0557 02/14/24  0754 02/13/24  1646 02/13/24  1641   WBC 11.40* 11.57* 13.07* 8.19  --  8.46   HEMOGLOBIN 8.8* 8.5* 9.1* 10.0*  --  10.4*   HEMATOCRIT 29.7* 27.8* 30.6* 32.6*  --  35.4   PLATELETS 208 190 231 195  --  207   NEUTROS ABS  --   --  11.89*  --   --  7.14*   IMMATURE GRANS (ABS)  --   --   --   --   --  0.07*   LYMPHS ABS  --   --   --   --   --  0.57*   MONOS ABS  --   --   --   --   --  0.56   EOS ABS  --   --   --   --   --  0.09   MCV 89.5 87.7 90.8 87.4  --  90.1   PROCALCITONIN  --   --   --   --   --  0.35*   LACTATE  --   --   --   --  1.0  --    D DIMER QUANT  --   --   --   --  1.20*  --          Lab 02/17/24  0521 02/16/24  0532 02/15/24  0557 02/14/24  [facility] 02/13/24  2141 02/13/24  1726 02/13/24  1641   SODIUM 144 142 139 139  --   --  138   SODIUM, ARTERIAL  --   --   --   --  140   < >  --    POTASSIUM 3.6 4.0 3.9 4.2  --   --  4.2   CHLORIDE 100 99 95* 97*  --   --  95*   CO2 37.0* 32.0* 33.0* 31.0*  --   --  34.0*   ANION GAP 7.0 11.0 11.0 11.0  --   --  9.0   BUN 28* 24* 22 15  --   --  12   CREATININE 1.10* 1.09* 1.31* 1.05*  --   --  1.04*   EGFR 52.8* 53.4* 42.8* 55.9*  --   --  56.5*   GLUCOSE 88 124* 110* 174*  --   --  103*   CALCIUM 9.1 8.9 8.6 8.9  --   --  9.6   MAGNESIUM  --   --   --   --   --   --  2.1   HEMOGLOBIN A1C  --   --   --  5.50  --   --   --    TSH  --   --   --  0.734  --   --   --     < > = values in this interval not displayed.         Lab 02/13/24  1641   TOTAL PROTEIN 8.2   ALBUMIN 3.8   GLOBULIN 4.4   ALT (SGPT) 11   AST (SGOT) 23   BILIRUBIN 0.5   ALK PHOS 108         Lab 02/13/24  1846 02/13/24  1641   PROBNP  --  49.6   HSTROP T 24* 21*         Lab 02/14/24  0754   CHOLESTEROL 142   LDL CHOL 90   HDL CHOL 36*   TRIGLYCERIDES 83         Lab 02/13/24  1846   IRON 18*   IRON SATURATION (TSAT) 7*   TIBC 253*   TRANSFERRIN 170*   FERRITIN 356.60*         Lab 02/13/24  2141 02/13/24  1726   PH, ARTERIAL 7.317* 7.355   PCO2,  ARTERIAL 66.4* 63.4*   PO2 .0* 100.0   O2 SATURATION ART 97.4 97.9   FIO2 40  --    HCO3 ART 33.9* 35.4*   BASE EXCESS ART 6.4* 8.3*   CARBOXYHEMOGLOBIN 2.5 1.4     Brief Urine Lab Results       None          Microbiology Results (last 10 days)       Procedure Component Value - Date/Time    MRSA Screen, PCR (Inpatient) - Swab, Nares [[id number]]  (Normal) Collected: 02/14/24 0540    Lab Status: Final result Specimen: Swab from Nares Updated: 02/14/24 0708     MRSA PCR No MRSA Detected    Narrative:      The negative predictive value of this diagnostic test is high and should only be used to consider de-escalating anti-MRSA therapy. A positive result may indicate colonization with MRSA and must be correlated clinically.    S. Pneumo Ag Urine or CSF - Urine, Urine, Clean Catch [[id number]]  (Normal) Collected: 02/14/24 0236    Lab Status: Final result Specimen: Urine, Clean Catch Updated: 02/14/24 0322     Strep Pneumo Ag Negative    Legionella Antigen, Urine - Urine, Urine, Clean Catch [[id number]]  (Normal) Collected: 02/14/24 0236    Lab Status: Final result Specimen: Urine, Clean Catch Updated: 02/14/24 0322     LEGIONELLA ANTIGEN, URINE Negative    COVID PRE-OP / PRE-PROCEDURE SCREENING ORDER (NO ISOLATION) - Swab, Nasopharynx [[id number]]  (Abnormal) Collected: 02/13/24 1653    Lab Status: Final result Specimen: Swab from Nasopharynx Updated: 02/13/24 1817    Narrative:      The following orders were created for panel order COVID PRE-OP / PRE-PROCEDURE SCREENING ORDER (NO ISOLATION) - Swab, Nasopharynx.  Procedure                               Abnormality         Status                     ---------                               -----------         ------                     COVID-19 and FLU A/B PCR...[[id number]]  Abnormal            Final result                 Please view results for these tests on the individual orders.    COVID-19 and FLU A/B PCR, 1 HR TAT - Swab, Nasopharynx [[id number]]  (Abnormal)  Collected: 02/13/24 1653    Lab Status: Final result Specimen: Swab from Nasopharynx Updated: 02/13/24 1817     COVID19 Not Detected     Influenza A PCR Not Detected     Influenza B PCR Detected    Narrative:      Fact sheet for providers: https://www.fda.gov/media/620595/download    Fact sheet for patients: https://www.fda.gov/media/546573/download    Test performed by PCR.    Blood Culture - Blood, Hand, Right [[id number]]  (Normal) Collected: 02/13/24 1650    Lab Status: Preliminary result Specimen: Blood from Hand, Right Updated: 02/17/24 1746     Blood Culture No growth at 4 days    Blood Culture - Blood, Arm, Left [[id number]]  (Normal) Collected: 02/13/24 1646    Lab Status: Preliminary result Specimen: Blood from Arm, Left Updated: 02/17/24 1745     Blood Culture No growth at 4 days            Hospital Course:   Ms Bermudez presented to [facility] emergency room 2/13/2024 with weeklong history of worsening shortness of breath.  She has a history of COPD and chronic respiratory failure on oxygen at 4 L continuously  She reported cough with light yellow sputum production.  She denied nausea, vomiting or diarrhea.  She has chronic bilateral lower extremity lymphedema with Unna boots in the past.  ABGs pH 7.3 5, pO2 100, pCO2 63.4 on 4 L.  She was placed on BiPAP with pH 7.31, pCO2 66, pO2 109.  D-dimer 1.2.  CT angiogram of the chest no definite central pulmonary emboli.  Peripheral pulmonary artery branches difficult to fully evaluate.  Atheromatous disease thoracic aorta and coronary arteries.  Cardiomegaly.  Bilateral tree-in-bud opacities as well as areas of patchy consolidation worrisome for pneumonia.  Findings likely infectious or inflammatory.  Retained secretions bilateral lower lobes.  5 mm subpleural right upper lobe nodule versus more focal consolidation in area of tree-in-bud opacification.  Consolidation versus atelectasis right lower lobe.  2 adjacent pleural-based opacities right upper  "lobe.  Follow-up CT 3 months.  Chest x-ray linear infiltrate atelectasis versus scarring.  Influenza B positive.  Temperature 100.6, heart rate 106, respirations 35 in ER.  DuoNeb, Solu-Medrol, Rocephin, azithromycin given in ER.  Fluid bolus not given due to concern for overload.    She was treated for acute on chronic respiratory failure with hypoxia and hypercapnia secondary to COPD exacerbation, secondary to influenza B with pneumonia.  She was treated with Symbicort, DuoNebs, incentive spirometry, OPEP, Tamiflu, Rocephin, azithromycin, IV Solu-Medrol.  Azithromycin is now complete.  Transition Rocephin to cefdinir at discharge.  She was treated with Symbicort, DuoNebs, incentive spirometry, OPEP, Tamiflu, Rocephin, azithromycin, IV Solu-Medrol.  Azithromycin is now complete.  Transition Rocephin to cefdinir at discharge.  IV Solu-Medrol has been weaned to daily prednisone.  Plan to discharge on prednisone taper.  Supplemental oxygen has been weaned down to 4 L nasal cannula which is patient's chronic oxygen requirement.  Blood culture no growth to date.  Patient has been offered BiPAP/CPAP/trilogy evaluation per pulmonology.  She has refused BiPAP and refused ABGs and participation of this evaluation.    She has now weaned to her home oxygen and is greatly improved.  She is requesting discharge home.  She has no further concerns or questions at this time and tells me she has all needed DME at home.  Home health physical, occupational therapy as well as nursing will be ordered at discharge.     Follow-up with PCP in 1 week  Follow-up with pulmonology in 2 months    Physical Exam on Discharge:  /59 (BP Location: Right arm, Patient Position: Sitting)   Pulse 70   Temp 98.1 °F (36.7 °C) (Oral)   Resp 18   Ht 160 cm (63\")   Wt 90.3 kg (199 lb)   SpO2 97%   BMI 35.25 kg/m²   Physical Exam  Vitals and nursing note reviewed.   Constitutional:       Appearance: She is obese.      Comments: Up in chair, 4 L " nasal cannula, no visitors at bedside   HENT:      Head: Normocephalic and atraumatic.      Nose: No congestion.      Mouth/Throat:      Pharynx: No oropharyngeal exudate or posterior oropharyngeal erythema.   Eyes:      Extraocular Movements: Extraocular movements intact.      Pupils: Pupils are equal, round, and reactive to light.   Cardiovascular:      Rate and Rhythm: Normal rate and regular rhythm.   Pulmonary:      Breath sounds: Scant rhonchi present but improved     Comments: Expiratory wheeze, 4 L nasal cannula  Abdominal:      Palpations: Abdomen is soft.      Tenderness: There is no abdominal tenderness.   Genitourinary:     Comments: Voiding.  Musculoskeletal:         General: Swelling present.      Cervical back: Normal range of motion and neck supple.      Comments: Chronic bilateral lower extremity edema with unna boots in place   Skin:     Findings: Erythema (Bilateral lower extremities, chronic edema) present.   Neurological:      General: No focal deficit present.      Mental Status: She is alert and oriented to person, place, and time.   Psychiatric:         Mood and Affect: Mood normal.         Behavior: Behavior normal.         Thought Content: Thought content normal.         Judgment: Judgment normal.     Condition on Discharge: Stable    Discharge Disposition:  Home-Health Care [facility]    Discharge Medications:     Discharge Medications        New Medications        Instructions Start Date   cefdinir 300 MG capsule  Commonly known as: OMNICEF   300 mg, Oral, 2 Times Daily   Start Date: February 19, 2024     O2  Commonly known as: OXYGEN   3 L/min, Inhalation, Continuous      O2  Commonly known as: OXYGEN   4 L/min, Inhalation, Continuous      oseltamivir 30 MG capsule  Commonly known as: TAMIFLU   30 mg, Oral, Every 12 Hours Scheduled      predniSONE 10 MG tablet  Commonly known as: DELTASONE   Take 4 tablets by mouth Daily for 2 days, THEN 3 tablets Daily for 3 days, THEN 2 tablets Daily for 3  days, THEN 1 tablet Daily for 3 days.   Start Date: February 18, 2024            Continue These Medications        Instructions Start Date   albuterol sulfate  (90 Base) MCG/ACT inhaler  Commonly known as: PROVENTIL HFA;VENTOLIN HFA;PROAIR HFA   2 puffs, Inhalation, Every 6 Hours PRN, Inhale 1 to 2 puffs by mouth every 4 to 6 hours as needed for wheezing or sob       budesonide-formoterol 160-4.5 MCG/ACT inhaler  Commonly known as: SYMBICORT   2 puffs, Inhalation, 2 Times Daily - RT      furosemide 20 MG tablet  Commonly known as: LASIX   20 mg, Oral, Every Morning      hydrOXYzine 50 MG tablet  Commonly known as: ATARAX   50 mg, Oral, Nightly      levothyroxine 75 MCG tablet  Commonly known as: SYNTHROID, LEVOTHROID   75 mcg, Oral, Daily      omeprazole 20 MG capsule  Commonly known as: priLOSEC   20 mg, Oral, Daily      sertraline 100 MG tablet  Commonly known as: ZOLOFT   100 mg, Oral, Every Evening             Discharge Diet:   Diet Instructions       Diet: Cardiac Diets; Healthy Heart (2-3 Na+); Regular Texture (IDDSI 7); Thin (IDDSI 0)      Discharge Diet: Cardiac Diets    Cardiac Diet: Healthy Heart (2-3 Na+)    Texture: Regular Texture (IDDSI 7)    Fluid Consistency: Thin (IDDSI 0)            Activity at Discharge:   Activity Instructions       Up WIth Assist              Follow-up Appointments:  PCP 1 week  Pulmonology 2 months  No future appointments.    Test Results Pending at Discharge: Blood culture x 2 no growth at 4 days, follow to completion    Electronically signed by JOAQUÍN Murry, 02/18/24, 09:01 CST.    Time: 35 minutes.

## 2024-02-18 NOTE — CASE MANAGEMENT/SOCIAL WORK
Continued Stay Note   [facility]     Patient Name: Emy Bermudez  MRN: [id number]  Today's Date: 2/18/2024    Admit Date: 2/13/2024    Plan: Home with [facility]   Discharge Plan       Row Name 02/18/24 0928       Plan    Plan Home with [facility]    Plan Comments SW informed [facility] centralized intake of patient's discharge.                   Discharge Codes    No documentation.                 Expected Discharge Date and Time       Expected Discharge Date Expected Discharge Time    Feb 18, 2024               MARISOL Peguero

## 2024-02-18 NOTE — THERAPY DISCHARGE NOTE
Acute Care - Occupational Therapy Discharge Summary  [facility]     Patient Name: Emy Bermudez  : 1950  MRN: [id number]    Today's Date: 2024  Onset of Illness/Injury or Date of Surgery: 24    Date of Referral to OT: 24  Referring Physician: Lavinia YANES      Admit Date: 2024        OT Recommendation and Plan    Visit Dx:    ICD-10-CM ICD-9-CM   1. Acute respiratory failure with hypoxia and hypercapnia  J96.01 518.81    J96.02    2. Influenza  J11.1 487.1   3. Multifocal pneumonia  J18.9 486   4. Pulmonary nodule  R91.1 793.11   5. Acute on chronic respiratory failure with hypoxia and hypercapnia  J96.21 518.84    J96.22 786.09     799.02   6. Impaired mobility [Z74.09]  Z74.09 799.89                OT Rehab Goals       Row Name 24 1400             Transfer Goal 1 (OT)    Activity/Assistive Device (Transfer Goal 1, OT) commode, bedside without drop arms;walker, rolling  -      [place] Level/Cues Needed (Transfer Goal 1, OT) standby assist  -JJ      Time Frame (Transfer Goal 1, OT) long term goal (LTG);10 days  -JJ      Progress/Outcome (Transfer Goal 1, OT) goal not met;discharged from facility  -J         Toileting Goal 1 (OT)    Activity/Device (Toileting Goal 1, OT) toileting skills, all;adjust/manage clothing;perform perineal hygiene;commode, bedside without drop arms  -JJ      [place] Level/Cues Needed (Toileting Goal 1, OT) standby assist  -JJ      Time Frame (Toileting Goal 1, OT) long term goal (LTG);10 days  -JJ      Progress/Outcome (Toileting Goal 1, OT) goal partially met;discharged from facility  -          Activity Tolerance Goal 1 (OT)    Activity Level (Endurance Goal 1, OT) 10 min activity;O2 sat >/ equal to 88%  on 4L nc  -JJ      Time Frame (Activity Tolerance Goal 1, OT) long term goal (LTG);by discharge  -J      Progress/Outcome (Activity Tolerance Goal 1, OT) goal not met;discharged from facility  -                User Key  (r) =  Recorded By, (t) = Taken By, (c) = Cosigned By      Initials Name Provider Type Discipline    Tonya Poole, OTR/L, CSRS Occupational Therapist OT                     Outcome Measures       Row Name 02/17/24 1525 02/15/24 1500          How much help from another person do you currently need...    Turning from your back to your side while in flat bed without using bedrails? 3  -TYLOR --     Moving from lying on back to sitting on the side of a flat bed without bedrails? 3  -TYLOR --     Moving to and from a bed to a chair (including a wheelchair)? 3  -TYLOR --     Standing up from a chair using your arms (e.g., wheelchair, bedside chair)? 3  -TYLOR --     Climbing 3-5 steps with a railing? 1  -TYLOR --     To walk in hospital room? 1  -TYLOR --     AM-PAC 6 Clicks Score (PT) 14  -TYLOR --     Highest Level of Mobility Goal 4 --> Transfer to chair/commode  -TYLOR --        How much help from another is currently needed...    Putting on and taking off regular lower body clothing? -- 2  -TS     Bathing (including washing, rinsing, and drying) -- 2  -TS     Toileting (which includes using toilet bed pan or urinal) -- 3  -TS     Putting on and taking off regular upper body clothing -- 3  -TS     Taking care of personal grooming (such as brushing teeth) -- 3  -TS     Eating meals -- 4  -TS     AM-PAC 6 Clicks Score (OT) -- 17  -TS        Functional Assessment    Outcome Measure Options AM-PAC 6 Clicks Basic Mobility (PT)  -TYLOR --               User Key  (r) = Recorded By, (t) = Taken By, (c) = Cosigned By      Initials Name Provider Type    TS Julissa Melendez COTA Occupational Therapist Assistant    León Smith, PTA Physical Therapist Assistant                    Timed Therapy Charges  Total Units: 2      Suggested Charges  Total Units: 2      Procedure Name Documented Minutes Units Code    HC OT THER PROC EA 15 MIN 24 2   70071 (CPT®)                 Documented Minutes  Total Minutes: 24      Therapy Provided Minutes     86370 - OT Therapeutic Exercise Minutes 24                        OT Discharge Summary  Anticipated Discharge Disposition (OT): sub acute care setting, home with 24/7 care, home with home health  Reason for Discharge: Discharge from facility  Outcomes Achieved: Refer to plan of care for updates on goals achieved  Discharge Destination: Home with assist      ARLEN Graham/WEST, CSRS  2/18/2024

## 2024-02-18 NOTE — PLAN OF CARE
Goal Outcome Evaluation:              Outcome Evaluation: Pt up in chair all day today. Pt stand and pivot to BSC with min asst. HR SB/SR 58-90 BPM.

## 2024-02-20 ENCOUNTER — HOME CARE VISIT (OUTPATIENT)
Dept: HOME HEALTH SERVICES | Facility: CLINIC | Age: 74
End: 2024-02-20
Payer: MEDICARE

## 2024-02-20 ENCOUNTER — HOME CARE VISIT (OUTPATIENT)
Dept: HOME HEALTH SERVICES | Facility: HOME HEALTHCARE | Age: 74
End: 2024-02-20
Payer: MEDICARE

## 2024-02-20 VITALS
TEMPERATURE: 98.4 F | OXYGEN SATURATION: 98 % | DIASTOLIC BLOOD PRESSURE: 56 MMHG | RESPIRATION RATE: 18 BRPM | HEART RATE: 68 BPM | SYSTOLIC BLOOD PRESSURE: 112 MMHG

## 2024-02-20 PROCEDURE — G0299 HHS/HOSPICE OF RN EA 15 MIN: HCPCS

## 2024-02-21 ENCOUNTER — READMISSION MANAGEMENT (OUTPATIENT)
Dept: CALL CENTER | Facility: HOSPITAL | Age: 74
End: 2024-02-21
Payer: MEDICARE

## 2024-02-21 NOTE — CASE COMMUNICATION
Resumption of Care Note: Pt went to [facility] 2/13-2/18 for acute on chronic respiratory failure with hypoxia/hypercapnia due to influenza B. Patient was on 3L, now on 4L.  Was previously seeing patient for Venous insufficiency (chronic) (peripheral).  Pt now having unna boots changed twice weekly.    Reason for hospitalization/new problems: Acute on chronic respiratory failure/ bilateral pneumonia    LAURA Ogema Findings: Pt has labia skin tea r, photo'd n measured, unna boots in place, no orders in place yet, but anticipate twice weekly unna boot changes.    New/changed medications: Tamiflu, prednisone, cefdiner, magic ointment, increase in oxygen from 3 L to 4L.    New/changed orders: Unna boot changes twice weekly, magic ointment to labia, tamiflu, prednisone dose pack.    Educated on Emergency Plan, steps to take prior to going to the ER and when to Call [facility] :  Pt verbalized understanding    Plan/Focus of Care and Skilled need: Venous insufficiency (chronic) (peripheral), Acute on Chronic respiratory failute      Plan for next visit: Oxygen safety, med education, unna boot change

## 2024-02-21 NOTE — HOME HEALTH
"Resumption of Care Note: Pt went to [facility] 2/13-2/18 for acute on chronic respiratory failure with hypoxia/hypercapnia due to influenza B. Patient was on 3L, now on 4L.  Was previously seeing patient for Venous insufficiency (chronic) (peripheral).  Pt now having unna boots changed twice weekly.    Reason for hospitalization/new problems: Acute on chronic respiratory failure/ bilateral pneumonia    LAURA [place] Findings: Pt has labia skin tear, photo'd n measured, unna boots in place,  twice weekly unna boot changes.    New/changed medications: Tamiflu, prednisone, cefdiner, magic ointment, increase in oxygen from 3 L to 4L.    New/changed orders: Unna boot changes twice weekly, magic ointment to labia, tamiflu, prednisone dose pack.    Educated on Emergency Plan, steps to take prior to going to the ER and when to Call [facility]:  Pt verbalized understanding    Plan/Focus of Care and Skilled need: Venous insufficiency (chronic) (peripheral), Acute on Chronic respiratory failute    CONCERNS:  Patient is using a vape while SN is present, Patient states when she smokes she goes outside, when asked if she continues to wear her oxygen, she states yes.  RN educated pt on high risk of flammability and injury if smoking with oxygen.  Patient states she is aware of the risk and \"it hasn't caught her on fire yet, I am not worried about it.\"  Educated patient multiple times about very high risk of fire, death and destruction of property/home if smoking while using oxygen.  Pt states \"thank you for telling me, I will tell you what I have told everyone else, smoking is my freedom\"      Plan for next visit: Oxygen safety, med education, unna boot change"

## 2024-02-21 NOTE — OUTREACH NOTE
COPD/PN Week 1 Survey      Flowsheet Row Responses   [facility] patient discharged from? [place]   Does the patient have one of the following disease processes/diagnoses(primary or secondary)? Pneumonia   Week 1 attempt successful? Yes   Call start time 0942   Call end time 0947   Discharge diagnosis cute on chronic respiratory failure with hypoxia and hypercapnia due to influenza B       Lymphedema       Influenza B       Sepsis       Pneumonia of both lungs due to infectious organism, tree in bud       Essential hypertension       Hypothyroidism (acquired)       Normocytic anemia       Cellulitis of right lower extremity       COPD (chronic obstructive pulmonary disease)   Person spoke with today (if not patient) and relationship Patient   Meds reviewed with patient/caregiver? Yes   Does the patient have all medications ordered at discharge? Yes   Is the patient taking all medications as directed (includes completed medication regime)? Yes   Does the patient have a primary care provider?  Yes   Does the patient have an appointment with their PCP or specialist within 7 days of discharge? No   Comments regarding PCP Follow up with Dorie YANES.   What is preventing the patient from scheduling follow up appointments within 7 days of discharge? Haven't had time   Nursing Interventions Educated patient on importance of making appointment, Advised patient to make appointment   Has the patient kept scheduled appointments due by today? N/A   What is the Home health agency?  [facility]   Has home health visited the patient within 72 hours of discharge? Yes   Home health comments Bilateral unna boots to be changed at least every 7 days.   DME comments Patient reports that she is wearing home O24L during day   Pulse Ox monitoring Intermittent   Pulse Ox device source Patient   O2 Sat comments Patient reports high 90's   O2 Sat: education provided Sat levels, Monitoring frequency, When to seek care    Psychosocial issues? No   Did the patient receive a copy of their discharge instructions? Yes   Nursing interventions Reviewed instructions with patient   What is the patient's perception of their health status since discharge? Improving   If the patient is a current smoker, are they able to teach back resources for cessation? Not a smoker   Is the patient/caregiver able to teach back the hierarchy of who to call/visit for symptoms/problems? PCP, Specialist, Home health nurse, Urgent Care, ED, 911 Yes   Is the patient/caregiver able to teach back signs and symptoms of worsening condition: Fever/chills, Shortness of breath, Chest pain   Is the patient/caregiver able to teach back importance of completing antibiotic course of treatment? Yes   Week 1 call completed? Yes   Is the patient interested in additional calls from an ambulatory ? No   Would this patient benefit from a Referral to [facility] Social Work? No   Call end time [id number]            JIE SMART - Registered Nurse

## 2024-02-22 ENCOUNTER — HOME CARE VISIT (OUTPATIENT)
Dept: HOME HEALTH SERVICES | Facility: CLINIC | Age: 74
End: 2024-02-22
Payer: MEDICARE

## 2024-02-22 VITALS
HEART RATE: 76 BPM | SYSTOLIC BLOOD PRESSURE: 144 MMHG | RESPIRATION RATE: 20 BRPM | TEMPERATURE: 98.1 F | OXYGEN SATURATION: 97 % | DIASTOLIC BLOOD PRESSURE: 84 MMHG

## 2024-02-22 VITALS — RESPIRATION RATE: 19 BRPM | OXYGEN SATURATION: 96 %

## 2024-02-22 PROCEDURE — G0300 HHS/HOSPICE OF LPN EA 15 MIN: HCPCS

## 2024-02-22 PROCEDURE — G0151 HHCP-SERV OF PT,EA 15 MIN: HCPCS

## 2024-02-22 NOTE — Clinical Note
PT performed evaluation, patient does not desire additional PT treatment sessions at this time.    Patient interested in power w/c.Spoke with patient regarding prescription being sent to gavi Ambrose, from MD office to get process started.    Please fax to Sasha Dickens (PCP)

## 2024-02-23 NOTE — HOME HEALTH
Routine Visit Note:    SKill/Education Provided: Assessment, wound care, education-fall prevention/oxygen safety/edema control/breathing techniques(Incentive Spirometer use)    Patient/Caregiver Response: Patient voices understanding with all education this visit.     Falls: No    Med Changes: No    Physician Contact: No     Plan for Next Visit: Assessment, education, wound care, vitals    Other: No s/s of Covid noted. Takes labetalol 100mg po BID  Admit to L&D  Magnesium sulfate therapy   BMZ series begun   IV Labetalol/hydralazine per protocol   Reviewed lab findings and diagnosis of superimposed preeclampsia with the patient via the language line.  Explained the need for inpatient management until delivery.  Deliver by 34wga or sooner if bp cannot be controlled, development of any other severe features of preeclampsia, or worsening fetal well-being.  All questions answered.    8/15/20-Pt required IV labetalol and hydralazine yesterday for blood pressure control, but bp remained stable overnight on labetalol 400mg tid.  Pt to complete 24 hours IV magnesium sulfate and BMZ series this afternoon.  Will remain inpatient for close maternal fetal surveillance afterward.  Deliver by 34wga or sooner for worsening maternal or fetal status    08/16/20 - BP was stable overnight but increased to severe range this AM.  Improved after addition of Procardia 30 daily.  Continue with Labetalol 400 TID.  Pt again advised on treatment plan and goals.  Will consider proceeding with delivery prior to 34 WGA if BP remains difficult to control or worsens.     8/17/20 9:20 AM   Continue 24 hours of seizure prophylaxis with MgS04.   Cont aniti-HTN meds  Labetalol 400 TID and Procardia 30 BID.   8/18/20-s/p 24 hr seizure prophylaxis with magnesium sulfate (dc'd at 2300); cotintue to manage blood pressures; remains on labetalol 400mg tid and procardia 30 bid  08/19/20 - BP stable and in mild range on medications.  Doing well.  8/20/20 overnight blood pressures close to severe range. Pt denies depression or severe anxiety. Do not suspect preeclampsia. Will increase procardia to 60 BID and will monitor one more day. Baby doing well in NICU.  8/21-Improved BP control on labetalol 400 mg TID and Procardia XL 60 mg BID, will discharge home with close outpatient follow up for BP control

## 2024-02-23 NOTE — HOME HEALTH
PT Eval Note    Patient's goal:  Patient does not wish to have additional PT visits      Problems identified:  1. LE weakness  2. decreased balance  3. Increase in soa    Describe the Functional status and safety:  Patient able to transfer from recliner to w/c, patient reports she does not wish to focus or work on walking    Describe any environmental issues:small home, clutter    Any equipment needs: patient desires to have power w/c, notified MD    Plan for next visit:  PT performed evaluation only, no further visits

## 2024-02-26 NOTE — PROGRESS NOTES
"Enter Query Response Below      Query Response:   Acute on chronic respiratory failure with hypoxia and hypercapnia secondary to COPD exacerbation secondary to pneumonia and influenza B     Electronically signed by Eleno Tolentino DO, 24, 09:25 CST.               If applicable, please update the problem list.       Patient: Emy Bermudez        : 1950  Account: [id number]           Admit Date: 2024        How to Respond to this query:       a. Click New Note     b. Answer query within the yellow box.                c. Update the Problem List, if applicable.      If you have any questions about this query contact me at: .;h       ,     73 year old female with history of COPD on chronic oxygen usage of 4L per NC, obesity presented with cough, shortness of breath and is noted to have \" Acute on chronic respiratory failure with hypoxia and hypercapnia secondary to COPD exacerbation secondary to pneumonia and influenza B,\" per the discharge summary,  along with sepsis. Patient was consulted per pulmonologist and was initially placed on BIPAP with patient later refusing.  Other treatments included Duonebs, Symbicort,    Please clarify the following:    Acute on chronic respiratory failure with hypoxia and hypercapnia also due to sepsis  Acute on chronic respiratory failure with hypoxia and hypercapnia not due to sepsis in this case  Other- specify_______  Unable to determine      By submitting this query, we are merely seeking further clarification of documentation to accurately reflect all conditions that you are monitoring, evaluating, treating or that extend the hospitalization or utilize additional resources of care. Please utilize your independent clinical judgment when addressing the question(s) above.     This query and your response, once completed, will be entered into the legal medical record.    Sincerely,  Andrew Parsons  Clinical Documentation Integrity Program     "

## 2024-02-28 ENCOUNTER — HOME CARE VISIT (OUTPATIENT)
Dept: HOME HEALTH SERVICES | Facility: CLINIC | Age: 74
End: 2024-02-28
Payer: MEDICARE

## 2024-02-28 PROCEDURE — G0300 HHS/HOSPICE OF LPN EA 15 MIN: HCPCS

## 2024-02-29 ENCOUNTER — READMISSION MANAGEMENT (OUTPATIENT)
Dept: CALL CENTER | Facility: HOSPITAL | Age: 74
End: 2024-02-29
Payer: MEDICARE

## 2024-02-29 NOTE — OUTREACH NOTE
COPD/PN Week 2 Survey      Flowsheet Row Responses   [facility] patient discharged from? [place]   Does the patient have one of the following disease processes/diagnoses(primary or secondary)? Pneumonia   Week 2 attempt successful? Yes   Call start time 1606   Call end time 1608   Discharge diagnosis cute on chronic respiratory failure with hypoxia and hypercapnia due to influenza B       Lymphedema       Influenza B       Sepsis       Pneumonia of both lungs due to infectious organism, tree in bud       Essential hypertension       Hypothyroidism (acquired)       Normocytic anemia       Cellulitis of right lower extremity       COPD (chronic obstructive pulmonary disease)   Meds reviewed with patient/caregiver? Yes   Is the patient having any side effects they believe may be caused by any medication additions or changes? No   Does the patient have all medications ordered at discharge? Yes   Is the patient taking all medications as directed (includes completed medication regime)? Yes   Medication comments pt has completed atbs and steroids   Does the patient have a primary care provider?  Yes   Does the patient have an appointment with their PCP or specialist within 7 days of discharge? No   Comments regarding PCP Declines f/u with PCP   Has the patient kept scheduled appointments due by today? N/A   Comments Pt has appt with Pulmo in April   What is the Home health agency?   [facility]   Has home health visited the patient within 72 hours of discharge? Yes   Home health comments Bilateral unna boots to be changed at least every 7 days.--reports wounds are healing   DME comments Reports she has had O2 for a while   Pulse Ox monitoring Intermittent   Pulse Ox device source Patient   O2 Sat comments Patient reports sats 95-99%   O2 Sat: education provided Sat levels   Did the patient receive a copy of their discharge instructions? Yes   Nursing interventions Reviewed instructions with patient   What is the  patient's perception of their health status since discharge? Improving  [Pt is brief--reports she is doing better and has no concerns to discuss today.  Encoruaged to monitor for worsening resp s/s.]   Nursing Interventions Nurse provided patient education   Is the patient/caregiver able to teach back the hierarchy of who to call/visit for symptoms/problems? PCP, Specialist, Home health nurse, Urgent Care, ED, 911 Yes   Is the patient/caregiver able to teach back signs and symptoms of worsening condition: Fever/chills, Shortness of breath, Chest pain  [reviewed]   Week 2 call completed? Yes   Graduated Yes   Call end time 1608            KARRI STOCK - Registered Nurse

## 2024-03-01 ENCOUNTER — HOME CARE VISIT (OUTPATIENT)
Dept: HOME HEALTH SERVICES | Facility: CLINIC | Age: 74
End: 2024-03-01
Payer: MEDICARE

## 2024-03-01 PROCEDURE — G0300 HHS/HOSPICE OF LPN EA 15 MIN: HCPCS

## 2024-03-02 VITALS
HEART RATE: 87 BPM | DIASTOLIC BLOOD PRESSURE: 68 MMHG | RESPIRATION RATE: 18 BRPM | SYSTOLIC BLOOD PRESSURE: 138 MMHG | TEMPERATURE: 97.9 F | OXYGEN SATURATION: 98 %

## 2024-03-04 ENCOUNTER — HOME CARE VISIT (OUTPATIENT)
Dept: HOME HEALTH SERVICES | Facility: CLINIC | Age: 74
End: 2024-03-04
Payer: MEDICARE

## 2024-03-04 PROCEDURE — G0300 HHS/HOSPICE OF LPN EA 15 MIN: HCPCS

## 2024-03-05 VITALS
OXYGEN SATURATION: 98 % | SYSTOLIC BLOOD PRESSURE: 138 MMHG | HEART RATE: 87 BPM | DIASTOLIC BLOOD PRESSURE: 76 MMHG | RESPIRATION RATE: 18 BRPM | TEMPERATURE: 97.9 F

## 2024-03-05 NOTE — HOME HEALTH
COVID SCREENING: NO S/S OR EXPOSURE NOTED/ NO TRAVEL NOTED    FOCUS OF CARE/SKILLED NEED:  CPA/EDUCATION/WOUND CARE PER MD ORDERS. PT IIS DOING WELL AT THIS TIME WOUND CARE PERFORMED PER MD ORDERS, UNNABOOTS APPLIED PT SHREYAS WELL. O2 SAFETY REVIEWED WITH PT PT V/U BY TEACH BACK METHOD    TEACHING/INTERVENTIONS: O2 SAFETY/FALL PREVENTION/WOUND CARE    PATIENT/CG RESPONSE: U/V BY TEACH BACK METHOD    PROGRESS TOWARD GOALS: PROGRESSING    PHYSICIAN CONTACT: MD CALLED UPDATED ON LABS URINARY S/S AND UPDATED ON RECENT ER STAY ON SAT APPOINTMENT MADE FOR 1240 TODAY WITH THE MD    FALLS SINCE LAST VISIT? N    MEDICATION CHANGES SINCE LAST VISIT? N    PLAN FOR NEXT VISIT: EDUCATION WITH MEDS. FALL PREVENTION.WOUND CARE.O2 SAFETY

## 2024-03-05 NOTE — HOME HEALTH
COVID SCREENING: NO S/S OR EXPOSURE NOTED/ NO TRAVEL NOTED    FOCUS OF CARE/SKILLED NEED:  CPA/EDUCATION/WOUND CARE. PT IS DOING WELL AT THIS TIME PT IS SITTING IN THE CHAIR AT THIS TIME WITH O2 INFUSING PER NC WHILE PT HAS VAP IN HAND, O2 SAFETY GIVEN TO PT ON O2 SAFETY WITH R/T VAP PT IS NOT INTERRESTED IN STOPPING VAPING AT THIS TIME, UNNABOOTS REMOVED PT CO EXTREME PAIN DURING REMOVAL OF UNNA BOOTS PT DECLINED REAPPLIYING UNNABOOTS AT THIS TIME D/T PAIN, EDUCATION GIVEN TO PT ON PROPER SKIN CARE AND PREVENTION OF SKIN LESIONS AT THIS TIME     TEACHING/INTERVENTIONS: PROPER SKIN CARE REVIEWED. O2 SAFETY REVIEWED. WOUND CARE REVIEWED,     PATIENT/CG RESPONSE: U/V BY TEACH BACK METHOD    PROGRESS TOWARD GOALS: PROGRESSING    PHYSICIAN CONTACT: NONE    FALLS SINCE LAST VISIT? N    MEDICATION CHANGES SINCE LAST VISIT? N    PLAN FOR NEXT VISIT: EDUCATION WITH MEDS. FOLLOW UP ON SKIN CARE. WOUND CARE. AND O2 SAFETY

## 2024-03-07 ENCOUNTER — HOME CARE VISIT (OUTPATIENT)
Dept: HOME HEALTH SERVICES | Facility: CLINIC | Age: 74
End: 2024-03-07
Payer: MEDICARE

## 2024-03-07 VITALS
OXYGEN SATURATION: 99 % | SYSTOLIC BLOOD PRESSURE: 126 MMHG | TEMPERATURE: 98.1 F | RESPIRATION RATE: 18 BRPM | DIASTOLIC BLOOD PRESSURE: 70 MMHG | HEART RATE: 89 BPM

## 2024-03-07 PROCEDURE — G0300 HHS/HOSPICE OF LPN EA 15 MIN: HCPCS

## 2024-03-08 NOTE — HOME HEALTH
Routine Visit Note:    Patient states she had appointment with her vascular surgeon regarding her BLE today in [facility], but had to cancel due to her daughter's car not working. Patient states appointment was rescheduled by them on 4/22/2023.     SKill/Education Provided: Assessment, education-fall prevention/edema control/good skincare, wound care, photos obtained    Patient/Caregiver Response: Patient voices understanding with all education this visit.     Falls: No    Med Changes: No    Physician Contact: No     Plan for Next Visit: Assessment, education-oxygen safety/smoking cessation, wound care    Other: No s/s of Covid noted.

## 2024-03-12 ENCOUNTER — HOME CARE VISIT (OUTPATIENT)
Dept: HOME HEALTH SERVICES | Facility: CLINIC | Age: 74
End: 2024-03-12
Payer: MEDICARE

## 2024-03-12 PROCEDURE — G0300 HHS/HOSPICE OF LPN EA 15 MIN: HCPCS

## 2024-03-13 VITALS
DIASTOLIC BLOOD PRESSURE: 83 MMHG | OXYGEN SATURATION: 98 % | TEMPERATURE: 97.9 F | SYSTOLIC BLOOD PRESSURE: 138 MMHG | RESPIRATION RATE: 18 BRPM | HEART RATE: 78 BPM

## 2024-03-13 VITALS
OXYGEN SATURATION: 97 % | HEART RATE: 84 BPM | RESPIRATION RATE: 20 BRPM | TEMPERATURE: 97.8 F | SYSTOLIC BLOOD PRESSURE: 114 MMHG | DIASTOLIC BLOOD PRESSURE: 84 MMHG

## 2024-03-13 NOTE — HOME HEALTH
Routine Visit Note:    SKill/Education Provided: Assessment, education-good skin care/edema control, wound care, photos obtained     Patient/Caregiver Response: Patient voices understanding with all education this visit.     Falls: No    Med Changes: No    Physician Contact: No     Plan for Next Visit: Assessment, education-pressure prevention/skincare, wound care    Other: No s/s of Covid noted.

## 2024-03-13 NOTE — HOME HEALTH
COVID SCREENING: NO S/S OR EXPOSURE NOTED/ NO TRAVEL NOTED    FOCUS OF CARE/SKILLED NEED:  CPA/WOUND CARE. PT IS DOING WELL TODAY PT DENIES ANY NEEDS OR ACUTE NEEDS PT DECLINED UNNABOOTS BEING REAPPLIED AT THIS TIME EDUCATION GIVEN ON PROPER SKIN CARE AND PROPPING LEGS UP TO KEEP EDEMA DOWN     TEACHING/INTERVENTIONS: EDEMA PREVENTION MEDICATION REVIEWED FALL PREVENTION    PATIENT/CG RESPONSE: PT V/U     PROGRESS TOWARD GOALS: PROGRESSING    PHYSICIAN CONTACT: NONE    FALLS SINCE LAST VISIT? N    MEDICATION CHANGES SINCE LAST VISIT? N    PLAN FOR NEXT VISIT: EDUCATION WITH MEDS. FALL PREVENTION. WOUND CARE

## 2024-03-15 ENCOUNTER — HOME CARE VISIT (OUTPATIENT)
Dept: HOME HEALTH SERVICES | Facility: CLINIC | Age: 74
End: 2024-03-15
Payer: MEDICARE

## 2024-03-15 PROCEDURE — G0300 HHS/HOSPICE OF LPN EA 15 MIN: HCPCS

## 2024-03-18 ENCOUNTER — HOME CARE VISIT (OUTPATIENT)
Dept: HOME HEALTH SERVICES | Facility: CLINIC | Age: 74
End: 2024-03-18
Payer: MEDICARE

## 2024-03-18 VITALS
OXYGEN SATURATION: 98 % | TEMPERATURE: 97.4 F | SYSTOLIC BLOOD PRESSURE: 138 MMHG | HEART RATE: 91 BPM | DIASTOLIC BLOOD PRESSURE: 78 MMHG | RESPIRATION RATE: 20 BRPM

## 2024-03-18 PROCEDURE — G0300 HHS/HOSPICE OF LPN EA 15 MIN: HCPCS

## 2024-03-18 NOTE — HOME HEALTH
Routine Visit Note:    Patient states she is low on cream for her right labia, reports compound came from the hospital originally. This nurse called Patient's PCP office inquiring order for cream so Patient can continue due to Patient reporting this is the only cream she has seen improvements to area with. Rani called this nurse back stating they could not order a compound at Patient's regular pharmacy so Patient could either buy ingredients and mix own compound or could send order to another pharmacy that does compounds. Patient and informed of options and requests order be sent to another pharmacy. Rani informed, advised to follow-up with Patient if any other questions come up. Patient aware.    Patient also observed using bedside commode without proper hygeine practices. Patient advised on good hygeine practice, advised she could use bottles to rinse areas if having trouble wiping areas clean. Also advised for gown/pads being worn to be kept clean due to possibility of coming in contact with open or excoriated areas to aide in healing and prevent infections. Patient voices understanding.     SKill/Education Provided: Assessment, education-good hygeine and varghese care/edema control/, wound care, PCP office contacted     Patient/Caregiver Response: Patient voices understanding with all education this visit.     Falls: No    Med Changes: No    Physician Contact: Yes, derek above note.     Plan for Next Visit: Assessment, education-oxygen safety/edema control/skin and pericare, wound care, photos and measurements    Other: No s/s of Covid noted.
Hide Additional Notes?: No
Detail Level: Zone

## 2024-03-21 ENCOUNTER — HOME CARE VISIT (OUTPATIENT)
Dept: HOME HEALTH SERVICES | Facility: CLINIC | Age: 74
End: 2024-03-21
Payer: MEDICARE

## 2024-03-21 VITALS
RESPIRATION RATE: 20 BRPM | TEMPERATURE: 97.9 F | SYSTOLIC BLOOD PRESSURE: 142 MMHG | HEART RATE: 87 BPM | DIASTOLIC BLOOD PRESSURE: 87 MMHG | OXYGEN SATURATION: 97 %

## 2024-03-21 VITALS
OXYGEN SATURATION: 99 % | SYSTOLIC BLOOD PRESSURE: 114 MMHG | HEART RATE: 94 BPM | DIASTOLIC BLOOD PRESSURE: 62 MMHG | RESPIRATION RATE: 20 BRPM

## 2024-03-21 PROCEDURE — G0300 HHS/HOSPICE OF LPN EA 15 MIN: HCPCS

## 2024-03-21 NOTE — HOME HEALTH
COVID SCREENING: NO S/S OR EXPOSURE NOTED/ NO TRAVEL NOTED    FOCUS OF CARE/SKILLED NEED:  CPA/EDUCATION WOUND CARE. PT IS DOING WELL TODAY VS AND CPA ASSESMENT WNL NO CO FL S/S OF DISTRESS NOTED WOUND CARE PER MD ORDERS PT SHREYAS WELL    TEACHING/INTERVENTIONS: WOUND CARE AND WOUND HEALING REVIEWED/O2 SAFETY REVIEWED / MEDICATION EDUCATION REVIEWED    PATIENT/CG RESPONSE: PT V/U BY TEACH BACK METHOD    PROGRESS TOWARD GOALS: PROGRESSING    PHYSICIAN CONTACT: NONE    FALLS SINCE LAST VISIT? N    MEDICATION CHANGES SINCE LAST VISIT? N    PLAN FOR NEXT VISIT: EDUCATION WITH MEDS. O2 SAFETY WOUND CARE PER ORDERS

## 2024-03-21 NOTE — HOME HEALTH
Routine Visit Note:    SKill/Education Provided: Assessment, vitals, education-edema control/pericare/pain interventions, wound care    Patient/Caregiver Response: Patient voices understanding with all education this visit.     Falls: No    Med Changes: No    Physician Contact: No     Plan for Next Visit: Assessment, education-good skincare/med education, vitals,     Other: No s/s of Covid noted.

## 2024-03-25 ENCOUNTER — HOME CARE VISIT (OUTPATIENT)
Dept: HOME HEALTH SERVICES | Facility: CLINIC | Age: 74
End: 2024-03-25
Payer: MEDICARE

## 2024-03-25 VITALS
SYSTOLIC BLOOD PRESSURE: 142 MMHG | HEART RATE: 90 BPM | TEMPERATURE: 98.6 F | OXYGEN SATURATION: 98 % | RESPIRATION RATE: 20 BRPM | DIASTOLIC BLOOD PRESSURE: 62 MMHG

## 2024-03-25 PROCEDURE — G0299 HHS/HOSPICE OF RN EA 15 MIN: HCPCS

## 2024-03-25 NOTE — HOME HEALTH
"FOCUS OF CARE/ SKILLED NEED: Unna boots to bilat lower extremities    TEACHING/INTERVENTIONS: Legs cleaned w/ NS and gauze, patted dry, zinc unna boots applied w/ coflex.  Pt did not have nylons, but coflex was holding fine.  Legs were open to air upon arrival.  Pt using vape instead of smoking.  Educated on oxygen safety.  Pt verbalized understanding, however states she does go outside and smoke with her oxygen in place.  Educated patient about fire safety, hazards of smoking with oxygen including death. Pt states \"she knows\".  Pt using nebulizer as directed.  Encouraged pt to keep legs elevated for edema control.  Pt demonstrated by reclining chair. Pressure injury prevention also discussed, educated patient to offload and change positions every 2 hours while sitting in chair. Pt verbalizes understanding of avoiding pressure injury.    PROGRESS TOWARD GOALS: Ongoing    PHYSICIAN CONTACT: No    INSURANCE CHANGES? No    FALLS SINCE LAST VISIT? No    MEDICATION CHANGES SINCE LAST VISIT? No    PLANS FOR NEXT VISIT: Unna boots, oxygen safety, fall prevention    PRE-SCREENED FOR COVID?  Yes, no s/s of Covid, no recent exposure"

## 2024-03-28 ENCOUNTER — HOME CARE VISIT (OUTPATIENT)
Dept: HOME HEALTH SERVICES | Facility: CLINIC | Age: 74
End: 2024-03-28
Payer: MEDICARE

## 2024-03-28 PROCEDURE — G0300 HHS/HOSPICE OF LPN EA 15 MIN: HCPCS

## 2024-03-30 VITALS
HEART RATE: 86 BPM | DIASTOLIC BLOOD PRESSURE: 80 MMHG | RESPIRATION RATE: 18 BRPM | TEMPERATURE: 97.4 F | OXYGEN SATURATION: 98 % | SYSTOLIC BLOOD PRESSURE: 136 MMHG

## 2024-03-30 NOTE — HOME HEALTH
Routine Visit Note:    SKill/Education Provided: Assessment, education-fall prevention/edema control/skin integrity, wound care    Patient/Caregiver Response: Patient voices understanding with all education this visit.     Falls: No    Med Changes: No    Physician Contact: No     Plan for Next Visit: Assessment, education-edema control/oxygen safety/fall prevention/med review, wound care    Other: No s/s of Covid noted.

## 2024-04-04 ENCOUNTER — HOME CARE VISIT (OUTPATIENT)
Dept: HOME HEALTH SERVICES | Facility: CLINIC | Age: 74
End: 2024-04-04
Payer: MEDICARE

## 2024-04-04 VITALS
DIASTOLIC BLOOD PRESSURE: 68 MMHG | HEART RATE: 76 BPM | SYSTOLIC BLOOD PRESSURE: 128 MMHG | TEMPERATURE: 98.4 F | OXYGEN SATURATION: 99 % | RESPIRATION RATE: 20 BRPM

## 2024-04-04 PROCEDURE — G0299 HHS/HOSPICE OF RN EA 15 MIN: HCPCS

## 2024-04-05 NOTE — CASE COMMUNICATION
Please route to Sasha Moncada, APRN  60 Day Summary  Home Health need continues for: Unna boot application to bilat lower extremities for PVD  Primary diagnoses/co-morbidities/recent procedures in past 60 days that impact current episode: HTN, peripheral venous insufficiency, copd, dependent on oxygen  Current level of functional ability: Ambulate/transfer w/ assist of 1  Homebound status and living arrangements: Homebound lives with da ughter/grandchildren  Goals accomplished and/or measurable progress toward unmet goals in past 60 days: Still with lymphedema bilat lower extremities, still has labia skin tear  Focus of care for next 60 days for each discipline ordered: Venous peripheral insufficiency  Skin integrity/wound status: Intact except redness and edema on bilat lower extremities, labia skin tear  Estimated date when home care services will end When goals met   SDOH changes/barriers (i.e. Caregiver availability, social isolation, environment, income, transportation access, food insecurity etc.) Pt refuses.  Need for MSW referral? N  Plan for next visit Unna boot application, wound care to labia if patient will allow. OXygen safety teaching.

## 2024-04-05 NOTE — HOME HEALTH
60 Day Summary  Home Health need continues for: Unna boot application to bilat lower extremities for PVD  Primary diagnoses/co-morbidities/recent procedures in past 60 days that impact current episode: HTN, peripheral venous insufficiency, copd, dependent on oxygen  Current level of functional ability: Ambulate/transfer w/ assist of 1  Homebound status and living arrangements: Homebound lives with daughter/grandchildren  Goals accomplished and/or measurable progress toward unmet goals in past 60 days: Still with lymphedema bilat lower extremities, still has labia skin tear  Focus of care for next 60 days for each discipline ordered: Venous peripheral insufficiency  Skin integrity/wound status: Intact except redness and edema on bilat lower extremities, labia skin tear  Estimated date when home care services will end When goals met  SDOH changes/barriers (i.e. Caregiver availability, social isolation, environment, income, transportation access, food insecurity etc.) Pt refuses.  Need for MSW referral? N  Plan for next visit Unna boot application, wound care to labia if patient will allow. OXygen safety teaching.

## 2024-04-09 ENCOUNTER — HOME CARE VISIT (OUTPATIENT)
Dept: HOME HEALTH SERVICES | Facility: CLINIC | Age: 74
End: 2024-04-09
Payer: MEDICARE

## 2024-04-09 PROCEDURE — G0300 HHS/HOSPICE OF LPN EA 15 MIN: HCPCS

## 2024-04-12 ENCOUNTER — HOME CARE VISIT (OUTPATIENT)
Dept: HOME HEALTH SERVICES | Facility: CLINIC | Age: 74
End: 2024-04-12
Payer: MEDICARE

## 2024-04-12 VITALS
TEMPERATURE: 97.9 F | OXYGEN SATURATION: 100 % | SYSTOLIC BLOOD PRESSURE: 142 MMHG | DIASTOLIC BLOOD PRESSURE: 62 MMHG | RESPIRATION RATE: 18 BRPM | HEART RATE: 85 BPM

## 2024-04-12 PROCEDURE — G0299 HHS/HOSPICE OF RN EA 15 MIN: HCPCS

## 2024-04-12 NOTE — HOME HEALTH
"FOCUS OF CARE/ SKILLED NEED: Unna boots, med education,     TEACHING/INTERVENTIONS: Pt sitting in chair with feet dangling, no unna boots on at this time.  States she took them off last night.  Pt not wanting unna boots applied today, \"I will do it monday\". Lotion applied to dry spots on legs/feet.  Pt educated on sitting with legs elevated, not dangling.  Edema @ +2-3 In bilateral legs with erythema. Pt educated on smoking cessation, currently wearing oxygen @ 2-3L. Educated on energy conservation techniques and cough/deep breathing.  Pt verbalized understanding.  Pt states wound on labia is healed, will not allow assessment, \"my other aide takes care of that\". Educated on oxygen safety, fall prevention, pressure injury prevention and offloading.    PROGRESS TOWARD GOALS: Ongoing    PHYSICIAN CONTACT: No    INSURANCE CHANGES? No    FALLS SINCE LAST VISIT? No    MEDICATION CHANGES SINCE LAST VISIT? No    PLANS FOR NEXT VISIT: Unna boot application, oxygen safety teaching    PRE-SCREENED FOR COVID?  Yes, no s/s of Covid, no recent exposure"

## 2024-04-16 ENCOUNTER — HOME CARE VISIT (OUTPATIENT)
Dept: HOME HEALTH SERVICES | Facility: CLINIC | Age: 74
End: 2024-04-16
Payer: MEDICARE

## 2024-04-16 VITALS
TEMPERATURE: 97.9 F | SYSTOLIC BLOOD PRESSURE: 137 MMHG | OXYGEN SATURATION: 98 % | DIASTOLIC BLOOD PRESSURE: 82 MMHG | HEART RATE: 87 BPM | RESPIRATION RATE: 20 BRPM

## 2024-04-16 PROCEDURE — G0300 HHS/HOSPICE OF LPN EA 15 MIN: HCPCS

## 2024-04-16 NOTE — HOME HEALTH
COVID SCREENING: NO S/S OR EXPOSURE NOTED/ NO TRAVEL NOTED    FOCUS OF CARE/SKILLED NEED:  CPA/EDUCATION/WOUND CARE. PT IS DOING WELL TODAY. PT HAS ALL MEDS IN THE HOME NO CHANGES NOTED, O2 PER MD ORDERS PT IS AWARE HOW TO ORDERS MORE O2 AND ALL SUPPLIES. WOUND CARE PER MD ORDERS NO OPEN AREAS NOTED AT THIS TIME    TEACHING/INTERVENTIONS: MEDICATION EDUCATION. FALL PREVENTION. WOUND CARE    PATIENT/CG RESPONSE: PT V/U BY TEACH BACK METHOD    PROGRESS TOWARD GOALS: PROGRESSING    PHYSICIAN CONTACT: NONE    FALLS SINCE LAST VISIT? N    MEDICATION CHANGES SINCE LAST VISIT? N    PLAN FOR NEXT VISIT: EDUCATION  WITH MEDS. WOUND CARE PER MD ORDERS

## 2024-04-18 VITALS
TEMPERATURE: 97.9 F | DIASTOLIC BLOOD PRESSURE: 78 MMHG | RESPIRATION RATE: 20 BRPM | SYSTOLIC BLOOD PRESSURE: 143 MMHG | OXYGEN SATURATION: 96 % | HEART RATE: 78 BPM

## 2024-04-18 NOTE — HOME HEALTH
COVID SCREENING: NO S/S OR EXPOSURE NOTED/ NO TRAVEL NOTED    FOCUS OF CARE/SKILLED NEED:  CPA/EDUCATION/WOUND CARE PT IS DOING WELL TODAY PT DENIES ANY COMPLAINTS OR ISSUES AT THIS TIME WOUND REMAIN HEALED AT THIS TIME NO OPEN AREAS NOTED AT THIS TIME PT DECLINED UNNABOOTS AT THIS TIME CALL PLACED TO PTS PCP TO UPDATE ON HEALED WOUNDS AND TO REQUEST SOME COMPRESSION SOCKS OR LEG WRAPS SPOKE WITH NURSE NURSE WILL ORDER THE PT SUPPLIES WILL START THE DC PROCESS    TEACHING/INTERVENTIONS: WOUND CARE AND HEALING PROCESS    PATIENT/CG RESPONSE:    PROGRESS TOWARD GOALS: PROGRESSING    PHYSICIAN CONTACT: CALL PLACED TO PCPS OFFICE TO UPDATE ON HEALED WOUNDS AND REQUEST COMPRESSION SOCKS    FALLS SINCE LAST VISIT? N    MEDICATION CHANGES SINCE LAST VISIT? N    PLAN FOR NEXT VISIT: EDUCATION  WITH MEDS. FOLLOW UP ON COMPRESSION SOCKS OR LEG WRAPS WOUND CARE

## 2024-04-19 ENCOUNTER — HOME CARE VISIT (OUTPATIENT)
Dept: HOME HEALTH SERVICES | Facility: CLINIC | Age: 74
End: 2024-04-19
Payer: MEDICARE

## 2024-04-19 PROCEDURE — G0299 HHS/HOSPICE OF RN EA 15 MIN: HCPCS

## 2024-04-20 VITALS
DIASTOLIC BLOOD PRESSURE: 72 MMHG | SYSTOLIC BLOOD PRESSURE: 138 MMHG | RESPIRATION RATE: 20 BRPM | TEMPERATURE: 98.1 F | HEART RATE: 92 BPM | OXYGEN SATURATION: 98 %

## 2024-04-21 NOTE — HOME HEALTH
"FOCUS OF CARE/ SKILLED NEED: Unna boots for lymphedema in lower extremities, copd teaching, med education/oxygen safety    TEACHING/INTERVENTIONS: Pt ready to go get assessed for an electric wheelchair.  Pt refusing unna boot placement at this time.  States the \"nurse\" told her she could go to compression hose and that she was waiting for them to be delivered/ordered.  Labia wound has healed according to patient.  Assisted patient to get briefs in place before appointment.  Pt educated on oxygen safety, smoking cessation and copd disease management.  Pt verbalized understanding.  Lower extremities have no open wounds at this time, however the left leg does have what appears to be blisters starting.  Educated patient on edema management, low salt diet and keeping legs elevated, not dangling while sitting in her recliner.    PROGRESS TOWARD GOALS: Ongoing    PHYSICIAN CONTACT: No    INSURANCE CHANGES? No    FALLS SINCE LAST VISIT? No    MEDICATION CHANGES SINCE LAST VISIT? Pt on nebulizer that wasn't listed previously due to insurance complications on payment, pt has med now, added to list.    PLANS FOR NEXT VISIT: Reevaluate unna boot or compression, work on discharge if compression stockings approved, pt home caregiver/aide can assist in putting those on.    PRE-SCREENED FOR COVID?  Yes, no s/s of Covid, no recent exposure"

## 2024-04-22 ENCOUNTER — HOME CARE VISIT (OUTPATIENT)
Dept: HOME HEALTH SERVICES | Facility: CLINIC | Age: 74
End: 2024-04-22
Payer: MEDICARE

## 2024-04-22 PROCEDURE — G0300 HHS/HOSPICE OF LPN EA 15 MIN: HCPCS

## 2024-04-24 VITALS
RESPIRATION RATE: 20 BRPM | HEART RATE: 83 BPM | DIASTOLIC BLOOD PRESSURE: 84 MMHG | SYSTOLIC BLOOD PRESSURE: 144 MMHG | TEMPERATURE: 97.3 F | OXYGEN SATURATION: 94 %

## 2024-04-25 ENCOUNTER — HOME CARE VISIT (OUTPATIENT)
Dept: HOME HEALTH SERVICES | Facility: CLINIC | Age: 74
End: 2024-04-25
Payer: MEDICARE

## 2024-04-25 VITALS
RESPIRATION RATE: 18 BRPM | HEART RATE: 93 BPM | SYSTOLIC BLOOD PRESSURE: 132 MMHG | DIASTOLIC BLOOD PRESSURE: 74 MMHG | OXYGEN SATURATION: 97 % | TEMPERATURE: 98.3 F

## 2024-04-25 PROCEDURE — G0299 HHS/HOSPICE OF RN EA 15 MIN: HCPCS

## 2024-04-26 NOTE — HOME HEALTH
"FOCUS OF CARE/ SKILLED NEED: Lower extremity edema assessment, oxygen safety, med education    TEACHING/INTERVENTIONS: Pt states she called her pcp office is still waiting on the compression stockings that have been ordered for her.  Declined unna boots, states they don't do any good, her legs are \"good\" when she elevates them.  Pt has a pair of compression hose in dresser, retrieved them and offerred to put them on patient, but patient declined, stating \"they won't fit\". Educated pt to put a pillow under her legs when reclining in chair to assist with better edema control.  Left leg has unopened blisters, redness, edema, Right leg has redness edema. Encouraged her to not dangle legs for long period of times and to limit her salt intake.  Pt verbalized understanding.  Pt still smoking, expresses no desire to quit at this time.  Resources given.  Patient awaiting electric wheelchair approval through medicare/ MD office.  Pt also waiting on [employer] insurance appeal for inhaler.    PROGRESS TOWARD GOALS:  Ongoing,    PHYSICIAN CONTACT: No    INSURANCE CHANGES? No    FALLS SINCE LAST VISIT? No    MEDICATION CHANGES SINCE LAST VISIT? Awaiting [employer] approval from insurance.    PLANS FOR NEXT VISIT: Predischarge if continue to refuse compression stockings/unna boots.    PRE-SCREENED FOR COVID?  Yes, no s/s of Covid, no recent exposure"

## 2024-04-29 ENCOUNTER — HOME CARE VISIT (OUTPATIENT)
Dept: HOME HEALTH SERVICES | Facility: CLINIC | Age: 74
End: 2024-04-29
Payer: MEDICARE

## 2024-04-29 VITALS
HEART RATE: 84 BPM | TEMPERATURE: 98.6 F | SYSTOLIC BLOOD PRESSURE: 136 MMHG | OXYGEN SATURATION: 96 % | RESPIRATION RATE: 18 BRPM | DIASTOLIC BLOOD PRESSURE: 78 MMHG

## 2024-04-29 PROCEDURE — G0300 HHS/HOSPICE OF LPN EA 15 MIN: HCPCS

## 2024-04-30 NOTE — HOME HEALTH
Routine Visit Note:    Patient states she is waiting on her Doctor at clinic to order compression stockings, but they are not here yet. States she supposed to work on them tomorrow and will get back with Patient, offered Patient unna boots wraps in place due to Patient not having compression stockings and edeam has been increasing, Patient hesitant but agreeable to unna boots until compression stockings come in. Also spoke with Patient regarding taking her lasix regularly, due to Patient voicing she quit taking due to voiding continuosuly. Patient educated on indication and importance and related to her edema in BLE's.    SKill/Education Provided: Assessment, education-edema control/skin integrity/low sodium diet/med compliance wound care, photos obtained    Patient/Caregiver Response: Patient voices understanding with all education this visit.     Falls: No    Med Changes: No    Physician Contact: No     Plan for Next Visit: Assessment, education-oxygen safety/fall prevention/safety/edema control, wound care    Other: No s/s of Covid noted.

## 2024-05-02 ENCOUNTER — HOME CARE VISIT (OUTPATIENT)
Dept: HOME HEALTH SERVICES | Facility: CLINIC | Age: 74
End: 2024-05-02
Payer: MEDICARE

## 2024-05-02 PROCEDURE — G0300 HHS/HOSPICE OF LPN EA 15 MIN: HCPCS

## 2024-05-06 ENCOUNTER — HOME CARE VISIT (OUTPATIENT)
Dept: HOME HEALTH SERVICES | Facility: CLINIC | Age: 74
End: 2024-05-06
Payer: MEDICARE

## 2024-05-06 VITALS
OXYGEN SATURATION: 99 % | DIASTOLIC BLOOD PRESSURE: 78 MMHG | TEMPERATURE: 97.9 F | SYSTOLIC BLOOD PRESSURE: 132 MMHG | OXYGEN SATURATION: 97 % | HEART RATE: 88 BPM | DIASTOLIC BLOOD PRESSURE: 78 MMHG | RESPIRATION RATE: 18 BRPM | RESPIRATION RATE: 18 BRPM | TEMPERATURE: 97.9 F | HEART RATE: 78 BPM | SYSTOLIC BLOOD PRESSURE: 122 MMHG

## 2024-05-06 PROCEDURE — G0300 HHS/HOSPICE OF LPN EA 15 MIN: HCPCS

## 2024-05-06 NOTE — HOME HEALTH
"COVID SCREENING: NO S/S OR EXPOSURE NOTED/ NO TRAVEL NOTED    FOCUS OF CARE/SKILLED NEED:  CPA/EDUCATION/MEDICATION REVIEWED/WOUND CARE. PT IS DOING FAIR TODAY, PT DECLINED WOUND CARE AND UNNABOOTS NO OPEN AREAS NOTED TO LOWER EXT, PT STATES SHE IS NO LONGER GOING TO ALLOW THE UNNABOOTS TO BE PLACED PT STATES THEY DONT DO ANY GOOD ANYWAY, PT DENIES ANY NEEDS AT THIS TIME. MEDICATIONS REVIEWED WITH PT U/V PT STATES SHE HAS NOT BEEN TAKING HER LASIX D/T ITS TOO HARD TO GET UP AND DOWN TO VOID EDUCATION GIVEN R/T PTS MEDS PT STATES SHE WILL \"TRY\" TO TAKE LASIX AS ORDERED     TEACHING/INTERVENTIONS: WOUND CARE ORDERS AND MEDICATION REVIEWED    PATIENT/CG RESPONSE: U/V BY TEACH BACK METHOD    PROGRESS TOWARD GOALS: PROGRESSING    PHYSICIAN CONTACT: NONE    FALLS SINCE LAST VISIT? N    MEDICATION CHANGES SINCE LAST VISIT? N    PLAN FOR NEXT VISIT: EDUCATION  WITH MEDS. WOUND CARE MEDICATION EDUCATION"

## 2024-05-10 ENCOUNTER — HOME CARE VISIT (OUTPATIENT)
Dept: HOME HEALTH SERVICES | Facility: CLINIC | Age: 74
End: 2024-05-10
Payer: MEDICARE

## 2024-05-10 PROCEDURE — G0300 HHS/HOSPICE OF LPN EA 15 MIN: HCPCS

## 2024-05-13 ENCOUNTER — HOME CARE VISIT (OUTPATIENT)
Dept: HOME HEALTH SERVICES | Facility: CLINIC | Age: 74
End: 2024-05-13
Payer: MEDICARE

## 2024-05-13 VITALS
RESPIRATION RATE: 20 BRPM | TEMPERATURE: 97.9 F | HEART RATE: 88 BPM | DIASTOLIC BLOOD PRESSURE: 72 MMHG | SYSTOLIC BLOOD PRESSURE: 130 MMHG

## 2024-05-13 VITALS
OXYGEN SATURATION: 98 % | DIASTOLIC BLOOD PRESSURE: 62 MMHG | RESPIRATION RATE: 18 BRPM | TEMPERATURE: 98.2 F | HEART RATE: 98 BPM | SYSTOLIC BLOOD PRESSURE: 130 MMHG

## 2024-05-13 PROCEDURE — G0162 HHC RN E&M PLAN SVS, 15 MIN: HCPCS

## 2024-05-13 NOTE — HOME HEALTH
COVID SCREENING: NO S/S OR EXPOSURE NOTED/ NO TRAVEL NOTED    FOCUS OF CARE/SKILLED NEED:  CPA/EDUCATION/PRE DC VISIT - PT DECLINED UNNABOOTS AND STATES SHE DOES NOT WANT THEM ANY LONGER EDICATION REVIEWED PT REMAINS NON-COMPLIANT WITH LASIX EDUCATION GIVEN ON MEDS ACTION AND PURPOSE PT V/U HOWEVER STATES ITS TOO MUCH TROUBLE TO GET UP AND DOWN ALL THE TIME    TEACHING/INTERVENTIONS:  PRE DC VISIT COMPLETED    PATIENT/CG RESPONSE: U/V BY TEACH BACK    PROGRESS TOWARD GOALS: PROGRESSING    PHYSICIAN CONTACT: NONE    FALLS SINCE LAST VISIT? N    MEDICATION CHANGES SINCE LAST VISIT? N    PLAN FOR NEXT VISIT: EDUCATION  WITH MEDS. DC VISIT

## 2024-05-13 NOTE — CASE COMMUNICATION
Please route to Sasha YANES @ [facility]    THE FOLLOWING INSTRUCTIONS WERE REVIEWED UPON DISCHARGE: Pt has been refusing unna boots, states compression hosiery coming from primary APRN office.  No current skill for continuing Home Health. Goals achieved at this point.    Keep your appointment with JOAQUÍN Nelson @ [facility] on May 24th.    Call your doctor if you develop a new or worsening symptom, especially if you de velop increasing shortness of air, chest pain, temperature greater than 100.3.    Continue to take the medications prescribed by your doctor. A medication list is attached. Be sure to keep them informed of all medications you take including over the counter herbal, vitamins/minerals and the ones you take only when needed.    Follow the Regular Diet as ordered by your doctor.     Continue with home program as instructed by therapist (suha sidhu given).    Continue wound care as ordered. Get compression hose per APRN    Community resource referrals (list provided). In folder    Other notes/instructions: Keep legs elevated, do not dangle. Try to limit salt intake. Stopping smoking resources provided however patient not interested in them at this time. Oxygen safety precautions reinforced i.e. not smoking around oxygen.    Instructions given to Patient    Instructions nancyi isiah per Visit

## 2024-10-11 NOTE — PLAN OF CARE
"Goal Outcome Evaluation:  Plan of Care Reviewed With: patient        Progress: no change  Outcome Evaluation: Pt A&Ox4. Denies pain, baseline n/t L hand. AGUILERA, upx1 - currently sitting up in chair. PPP. no nausea and vomiting so far this shift. NPO diet maintained - pt demanding food. RN educated pt on reason for being NPO and possible EGD to which patient states she does not want to wait on the doctor all day. Requested to leave AMA - MD Raeann notified. RN educated pt on the risks and consequences of leaving AMA to which the pt responded there is no reason for her to be here as \"I am feeling better.\" AMA form has been signed, waiting for pt to find a ride home. 3 L NC, . RUQ colostomy, CDI. Call light within reach. Safety maintained.  "
Goal Outcome Evaluation:  Plan of Care Reviewed With: patient        Progress: no change  Outcome Evaluation: Patient was admitted to the floor from ER, alert and oriented times 4, denies pain or discomfort at this time, assisted  from WC to bed x2 staff, O2/3L NC, , SCD's, remains NPO exceptice chips, no N/V noted, BLE with redness noted, AGUILERA, VSS, safety maintained.  
Alert and oriented, no focal deficits, no motor or sensory deficits.

## 2024-11-01 ENCOUNTER — TRANSCRIBE ORDERS (OUTPATIENT)
Dept: HOME HEALTH SERVICES | Facility: HOME HEALTHCARE | Age: 74
End: 2024-11-01
Payer: MEDICARE

## 2024-11-01 ENCOUNTER — HOME HEALTH ADMISSION (OUTPATIENT)
Dept: HOME HEALTH SERVICES | Facility: HOME HEALTHCARE | Age: 74
End: 2024-11-01
Payer: MEDICARE

## 2024-11-01 DIAGNOSIS — I87.2 DERMATITIS, STASIS: ICD-10-CM

## 2024-11-01 DIAGNOSIS — I87.393 CHRONIC VENOUS HYPERTENSION WITH COMPLICATION INVOLVING BOTH SIDES: Primary | ICD-10-CM

## 2024-11-03 ENCOUNTER — HOME CARE VISIT (OUTPATIENT)
Dept: HOME HEALTH SERVICES | Facility: CLINIC | Age: 74
End: 2024-11-03
Payer: MEDICARE

## 2024-11-03 VITALS
DIASTOLIC BLOOD PRESSURE: 64 MMHG | RESPIRATION RATE: 18 BRPM | HEART RATE: 72 BPM | OXYGEN SATURATION: 98 % | SYSTOLIC BLOOD PRESSURE: 132 MMHG | TEMPERATURE: 98.4 F

## 2024-11-03 PROCEDURE — G0299 HHS/HOSPICE OF RN EA 15 MIN: HCPCS

## 2024-11-04 ENCOUNTER — TELEPHONE (OUTPATIENT)
Dept: HOME HEALTH SERVICES | Facility: HOME HEALTHCARE | Age: 74
End: 2024-11-04
Payer: MEDICARE

## 2024-11-04 NOTE — TELEPHONE ENCOUNTER
Call placed to provider, as patient is requesting to have Unna Boots put back on. Awaiting return call to advise? Can give verbal order or fax order? Awaiting response/le

## 2024-11-05 NOTE — TELEPHONE ENCOUNTER
Order received per fax for Unna brandon Suman, but called provider back to ask the frequency to change them? /awaiting return call or fax/le

## 2024-11-07 ENCOUNTER — HOME CARE VISIT (OUTPATIENT)
Dept: HOME HEALTH SERVICES | Facility: CLINIC | Age: 74
End: 2024-11-07
Payer: MEDICARE

## 2024-11-07 VITALS
TEMPERATURE: 97.6 F | DIASTOLIC BLOOD PRESSURE: 68 MMHG | HEART RATE: 79 BPM | RESPIRATION RATE: 20 BRPM | OXYGEN SATURATION: 98 % | SYSTOLIC BLOOD PRESSURE: 132 MMHG

## 2024-11-07 PROCEDURE — G0299 HHS/HOSPICE OF RN EA 15 MIN: HCPCS

## 2024-11-07 NOTE — CASE COMMUNICATION
"Please route to Sasha Moncada, APRN   SOC Note: 73yo female who has chronic respiratory failure and peripheral vascular disease with stasis dermatitis.  She is on 4L of continuous O2.  Her bilateral lower extremities are extremely swollen, with tight, flaky, discolored skin. Pt has requested unna boots for her legs.  Will see about getting order for them from her MD.  Pt has hx of noncompliance with unna boots in past.    Home Heal th ordered for: [facility]    Reason for Hosp/Primary Dx/Co-morbidities: chronic peripheral venous insufficiency, stasis dermatitis, COPD, chronic respiratory failure    Focus of Care: chronic peripheral venous hypertension with lower extremity complications    Patient's goal(s):\"for legs to get better\"    Current Functional status/mobility/DME: has walker and motorized wheelchair    HB status/Living Arrangements: lives with dagmar moreno and her family    Skin Integrity/wound status: edematous, tight, flaky, discolored bilateral lower extemities, colostomy    Code Status: CPR    Fall Risk/Safety concerns: high fall risk    Educated on Emergency Plan, steps to take prior to going to the ER and when to Call Home Health [facility]:  yes    Medication issues/Concerns:no    Additional Problems/Concerns: hx of noncompliance    SDOH Barriers (i.e. caregiver concerns, social isola tion, transportation, food insecurity, environment, income etc.)/Need for MSW: no"

## 2024-11-07 NOTE — HOME HEALTH
"SOC Note: 75yo female who has chronic respiratory failure and peripheral vascular disease with stasis dermatitis.  She is on 4L of continuous O2.  Her bilateral lower extremities are extremely swollen, with tight, flaky, discolored skin. Pt has requested unna boots for her legs.  Will see about getting order for them from her MD.  Pt has hx of noncompliance with unna boots in past.    Home Health ordered for: [facility]    Reason for Hosp/Primary Dx/Co-morbidities: chronic peripheral venous insufficiency, stasis dermatitis, COPD, chronic respiratory failure    Focus of Care: chronic peripheral venous hypertension with lower extremity complications    Patient's goal(s):\"for legs to get better\"    Current Functional status/mobility/DME: has walker and motorized wheelchair    HB status/Living Arrangements: lives with daughter and her family    Skin Integrity/wound status: edematous, tight, flaky, discolored bilateral lower extemities, colostomy    Code Status: CPR    Fall Risk/Safety concerns: high fall risk    Educated on Emergency Plan, steps to take prior to going to the ER and when to Call [facility] Health [facility]:  yes    Medication issues/Concerns:no    Additional Problems/Concerns: hx of noncompliance    SDOH Barriers (i.e. caregiver concerns, social isolation, transportation, food insecurity, environment, income etc.)/Need for MSW: no"

## 2024-11-13 ENCOUNTER — HOME CARE VISIT (OUTPATIENT)
Dept: HOME HEALTH SERVICES | Facility: HOME HEALTHCARE | Age: 74
End: 2024-11-13
Payer: MEDICARE

## 2024-11-13 NOTE — CASE COMMUNICATION
TC to pt with her options with alternate housing. Pt would like to go to [facility] for rehab. Spoke with admitting at [facility] and since she has Medicare part B she may not require a 3 day hospital stay. TC to JOAQUÍN Nelson and spoke with nurse who states she will speak with provider and send referral to [facility]. Pt notified and in agreeance.

## 2024-11-18 ENCOUNTER — HOME CARE VISIT (OUTPATIENT)
Dept: HOME HEALTH SERVICES | Facility: CLINIC | Age: 74
End: 2024-11-18
Payer: MEDICARE

## 2024-11-18 PROCEDURE — G0300 HHS/HOSPICE OF LPN EA 15 MIN: HCPCS

## 2024-11-19 VITALS
DIASTOLIC BLOOD PRESSURE: 64 MMHG | OXYGEN SATURATION: 98 % | RESPIRATION RATE: 18 BRPM | TEMPERATURE: 97.4 F | SYSTOLIC BLOOD PRESSURE: 134 MMHG | HEART RATE: 95 BPM

## 2024-11-25 ENCOUNTER — HOME CARE VISIT (OUTPATIENT)
Dept: HOME HEALTH SERVICES | Facility: CLINIC | Age: 74
End: 2024-11-25
Payer: MEDICARE

## 2024-11-25 PROCEDURE — G0300 HHS/HOSPICE OF LPN EA 15 MIN: HCPCS

## 2024-12-02 ENCOUNTER — HOME CARE VISIT (OUTPATIENT)
Dept: HOME HEALTH SERVICES | Facility: CLINIC | Age: 74
End: 2024-12-02
Payer: MEDICARE

## 2024-12-02 VITALS
SYSTOLIC BLOOD PRESSURE: 132 MMHG | SYSTOLIC BLOOD PRESSURE: 132 MMHG | RESPIRATION RATE: 18 BRPM | DIASTOLIC BLOOD PRESSURE: 60 MMHG | TEMPERATURE: 97.9 F | TEMPERATURE: 97.9 F | HEART RATE: 87 BPM | OXYGEN SATURATION: 98 % | OXYGEN SATURATION: 98 % | DIASTOLIC BLOOD PRESSURE: 76 MMHG | RESPIRATION RATE: 18 BRPM | HEART RATE: 76 BPM

## 2024-12-02 PROCEDURE — G0300 HHS/HOSPICE OF LPN EA 15 MIN: HCPCS

## 2024-12-03 ENCOUNTER — APPOINTMENT (OUTPATIENT)
Dept: CT IMAGING | Facility: HOSPITAL | Age: 74
End: 2024-12-03
Payer: MEDICARE

## 2024-12-03 ENCOUNTER — APPOINTMENT (OUTPATIENT)
Dept: GENERAL RADIOLOGY | Facility: HOSPITAL | Age: 74
End: 2024-12-03
Payer: MEDICARE

## 2024-12-03 ENCOUNTER — HOSPITAL ENCOUNTER (EMERGENCY)
Facility: HOSPITAL | Age: 74
Discharge: HOME | End: 2024-12-04
Attending: STUDENT IN AN ORGANIZED HEALTH CARE EDUCATION/TRAINING PROGRAM | Admitting: STUDENT IN AN ORGANIZED HEALTH CARE EDUCATION/TRAINING PROGRAM
Payer: MEDICARE

## 2024-12-03 DIAGNOSIS — J44.1 CHRONIC OBSTRUCTIVE PULMONARY DISEASE WITH ACUTE EXACERBATION: ICD-10-CM

## 2024-12-03 DIAGNOSIS — J98.11 ATELECTASIS: Primary | ICD-10-CM

## 2024-12-03 LAB
ALBUMIN SERPL-MCNC: 3.4 G/DL (ref 3.5–5.2)
ALBUMIN/GLOB SERPL: 0.9 G/DL
ALP SERPL-CCNC: 111 U/L (ref 39–117)
ALT SERPL W P-5'-P-CCNC: <5 U/L (ref 1–33)
ANION GAP SERPL CALCULATED.3IONS-SCNC: 12 MMOL/L (ref 5–15)
APTT PPP: 20.7 SECONDS (ref 24.5–36)
AST SERPL-CCNC: 12 U/L (ref 1–32)
BASOPHILS # BLD AUTO: 0.04 10*3/MM3 (ref 0–0.2)
BASOPHILS NFR BLD AUTO: 0.3 % (ref 0–1.5)
BILIRUB SERPL-MCNC: 1 MG/DL (ref 0–1.2)
BUN SERPL-MCNC: 13 MG/DL (ref 8–23)
BUN/CREAT SERPL: 12.5 (ref 7–25)
CALCIUM SPEC-SCNC: 8.6 MG/DL (ref 8.6–10.5)
CHLORIDE SERPL-SCNC: 96 MMOL/L (ref 98–107)
CO2 SERPL-SCNC: 26 MMOL/L (ref 22–29)
CREAT SERPL-MCNC: 1.04 MG/DL (ref 0.57–1)
D DIMER PPP FEU-MCNC: 0.92 MCGFEU/ML (ref 0–0.75)
DEPRECATED RDW RBC AUTO: 61.7 FL (ref 37–54)
EGFRCR SERPLBLD CKD-EPI 2021: 56.2 ML/MIN/1.73
EOSINOPHIL # BLD AUTO: 0.07 10*3/MM3 (ref 0–0.4)
EOSINOPHIL NFR BLD AUTO: 0.6 % (ref 0.3–6.2)
ERYTHROCYTE [DISTWIDTH] IN BLOOD BY AUTOMATED COUNT: 18.2 % (ref 12.3–15.4)
GLOBULIN UR ELPH-MCNC: 3.6 GM/DL
GLUCOSE SERPL-MCNC: 130 MG/DL (ref 65–99)
HCT VFR BLD AUTO: 34.2 % (ref 34–46.6)
HGB BLD-MCNC: 10.6 G/DL (ref 12–15.9)
HOLD SPECIMEN: NORMAL
IMM GRANULOCYTES # BLD AUTO: 0.05 10*3/MM3 (ref 0–0.05)
IMM GRANULOCYTES NFR BLD AUTO: 0.4 % (ref 0–0.5)
INR PPP: 1.1 (ref 0.91–1.09)
LYMPHOCYTES # BLD AUTO: 0.9 10*3/MM3 (ref 0.7–3.1)
LYMPHOCYTES NFR BLD AUTO: 7.1 % (ref 19.6–45.3)
MCH RBC QN AUTO: 28.6 PG (ref 26.6–33)
MCHC RBC AUTO-ENTMCNC: 31 G/DL (ref 31.5–35.7)
MCV RBC AUTO: 92.4 FL (ref 79–97)
MONOCYTES # BLD AUTO: 0.62 10*3/MM3 (ref 0.1–0.9)
MONOCYTES NFR BLD AUTO: 4.9 % (ref 5–12)
NEUTROPHILS NFR BLD AUTO: 10.97 10*3/MM3 (ref 1.7–7)
NEUTROPHILS NFR BLD AUTO: 86.7 % (ref 42.7–76)
NRBC BLD AUTO-RTO: 0 /100 WBC (ref 0–0.2)
NT-PROBNP SERPL-MCNC: 112.4 PG/ML (ref 0–1800)
PLATELET # BLD AUTO: 209 10*3/MM3 (ref 140–450)
PMV BLD AUTO: 8.4 FL (ref 6–12)
POTASSIUM SERPL-SCNC: 4.5 MMOL/L (ref 3.5–5.2)
PROT SERPL-MCNC: 7 G/DL (ref 6–8.5)
PROTHROMBIN TIME: 14.6 SECONDS (ref 11.8–14.8)
RBC # BLD AUTO: 3.7 10*6/MM3 (ref 3.77–5.28)
SODIUM SERPL-SCNC: 134 MMOL/L (ref 136–145)
TROPONIN T SERPL HS-MCNC: 25 NG/L
WBC NRBC COR # BLD AUTO: 12.65 10*3/MM3 (ref 3.4–10.8)
WHOLE BLOOD HOLD COAG: NORMAL
WHOLE BLOOD HOLD SPECIMEN: NORMAL

## 2024-12-03 PROCEDURE — 85610 PROTHROMBIN TIME: CPT | Performed by: STUDENT IN AN ORGANIZED HEALTH CARE EDUCATION/TRAINING PROGRAM

## 2024-12-03 PROCEDURE — 71045 X-RAY EXAM CHEST 1 VIEW: CPT

## 2024-12-03 PROCEDURE — 85730 THROMBOPLASTIN TIME PARTIAL: CPT | Performed by: STUDENT IN AN ORGANIZED HEALTH CARE EDUCATION/TRAINING PROGRAM

## 2024-12-03 PROCEDURE — 36415 COLL VENOUS BLD VENIPUNCTURE: CPT

## 2024-12-03 PROCEDURE — 85379 FIBRIN DEGRADATION QUANT: CPT | Performed by: STUDENT IN AN ORGANIZED HEALTH CARE EDUCATION/TRAINING PROGRAM

## 2024-12-03 PROCEDURE — 83880 ASSAY OF NATRIURETIC PEPTIDE: CPT | Performed by: STUDENT IN AN ORGANIZED HEALTH CARE EDUCATION/TRAINING PROGRAM

## 2024-12-03 PROCEDURE — 94664 DEMO&/EVAL PT USE INHALER: CPT

## 2024-12-03 PROCEDURE — 94640 AIRWAY INHALATION TREATMENT: CPT

## 2024-12-03 PROCEDURE — 80053 COMPREHEN METABOLIC PANEL: CPT | Performed by: STUDENT IN AN ORGANIZED HEALTH CARE EDUCATION/TRAINING PROGRAM

## 2024-12-03 PROCEDURE — 99283 EMERGENCY DEPT VISIT LOW MDM: CPT

## 2024-12-03 PROCEDURE — 94799 UNLISTED PULMONARY SVC/PX: CPT

## 2024-12-03 PROCEDURE — 84484 ASSAY OF TROPONIN QUANT: CPT | Performed by: STUDENT IN AN ORGANIZED HEALTH CARE EDUCATION/TRAINING PROGRAM

## 2024-12-03 PROCEDURE — 85025 COMPLETE CBC W/AUTO DIFF WBC: CPT | Performed by: STUDENT IN AN ORGANIZED HEALTH CARE EDUCATION/TRAINING PROGRAM

## 2024-12-03 PROCEDURE — 93010 ELECTROCARDIOGRAM REPORT: CPT | Performed by: INTERNAL MEDICINE

## 2024-12-03 RX ORDER — DEXAMETHASONE SODIUM PHOSPHATE 10 MG/ML
10 INJECTION INTRAMUSCULAR; INTRAVENOUS ONCE
Status: DISCONTINUED | OUTPATIENT
Start: 2024-12-03 | End: 2024-12-03

## 2024-12-03 RX ORDER — IPRATROPIUM BROMIDE AND ALBUTEROL SULFATE 2.5; .5 MG/3ML; MG/3ML
3 SOLUTION RESPIRATORY (INHALATION) ONCE
Status: COMPLETED | OUTPATIENT
Start: 2024-12-03 | End: 2024-12-03

## 2024-12-03 RX ORDER — PREDNISONE 50 MG/1
50 TABLET ORAL DAILY
Qty: 5 TABLET | Refills: 0 | Status: SHIPPED | OUTPATIENT
Start: 2024-12-03

## 2024-12-03 RX ORDER — AZITHROMYCIN 250 MG/1
250 TABLET, FILM COATED ORAL DAILY
Qty: 6 TABLET | Refills: 0 | Status: SHIPPED | OUTPATIENT
Start: 2024-12-03

## 2024-12-03 RX ADMIN — IPRATROPIUM BROMIDE AND ALBUTEROL SULFATE 3 ML: .5; 3 SOLUTION RESPIRATORY (INHALATION) at 20:39

## 2024-12-04 ENCOUNTER — HOME CARE VISIT (OUTPATIENT)
Dept: HOME HEALTH SERVICES | Facility: CLINIC | Age: 74
End: 2024-12-04
Payer: MEDICARE

## 2024-12-04 VITALS
HEIGHT: 62 IN | DIASTOLIC BLOOD PRESSURE: 74 MMHG | RESPIRATION RATE: 20 BRPM | WEIGHT: 207.2 LBS | SYSTOLIC BLOOD PRESSURE: 146 MMHG | BODY MASS INDEX: 38.13 KG/M2 | HEART RATE: 86 BPM | TEMPERATURE: 98.4 F | OXYGEN SATURATION: 94 %

## 2024-12-04 PROCEDURE — G0155 HHCP-SVS OF CSW,EA 15 MIN: HCPCS

## 2024-12-04 NOTE — ED PROVIDER NOTES
Subjective   History of Present Illness  This is a 74 y.o F with hx of COPD, chronic leg swelling, comes to the emergency room for CC shortness of breath not improving with home treatments. Denies chest pain, nausea, vomiting,or diaphoresis. Positive for cough, with no fever. Negative for worsening leg swelling from baseline.         Review of Systems   HENT: Negative.     Respiratory:  Positive for cough and shortness of breath.    Gastrointestinal: Negative.    Genitourinary:         Vaginal lesion   Musculoskeletal: Negative.    Neurological: Negative.        Past Medical History:   Diagnosis Date    Abdominal wall abscess     Abdominal wall fistula 2018    Cellulitis     Chronic respiratory failure with hypoxia 2020    Colonic obstruction 4/3/2018    COPD (chronic obstructive pulmonary disease)     Depression     Diverticul disease small and large intestine, no perforati or abscess     Edema     GERD (gastroesophageal reflux disease)     Hyperlipidemia     Hypertension     Hypocalcemia     Hypothyroid     Obesity, Class III, BMI 40-49.9 (morbid obesity) 2020    Respiratory failure     Tobacco dependence 2020    Venous insufficiency     Vertigo        No Known Allergies    Past Surgical History:   Procedure Laterality Date    ABDOMINAL SURGERY       SECTION      COLOSTOMY      ETHMOID ARTERY LIGATION N/A 2021    Procedure: ETHMOID ARTERY LIGATION;  Surgeon: Elliot Mack Jr., MD;  Location: [facility] OR;  Service: ENT;  Laterality: N/A;    EXAM UNDER ANESTHESIA N/A 2021    Procedure: SEPTOPLASTY TURBOPLASTY ENDOSCOPIC CONTROL LEFT NOSE BLEED;  Surgeon: Elliot Mack Jr., MD;  Location: [facility] OR;  Service: ENT;  Laterality: N/A;    EXPLORATORY LAPAROTOMY N/A 2018    Procedure: LAPAROTOMY EXPLORATORY, partial colectomy, creation colostomy, incision and drainage abdominal wall abscess;  Surgeon: Elda Velazquez MD;  Location: [facility] OR;  Service: General     INTERNAL MAXILLARY ARTERY AND ETHMOID ARTERY LIGATION N/A 2021    Procedure: INTERNAL MAXILLARY ARTERY AND ETHMOID ARTERY LIGATION;  Surgeon: Elliot Mack Jr., MD;  Location:  [facility] OR;  Service: ENT;  Laterality: N/A;    SEPTOPLASTY, RESECTION INFERIOR TURBINATES Bilateral 2021    Procedure: SEPTOPLASTY, RESECTION INFERIOR TURBINATES;  Surgeon: Elliot Mack Jr., MD;  Location:  [facility] OR;  Service: ENT;  Laterality: Bilateral;       Family History   Adopted: Yes       Social History     Socioeconomic History    Marital status:    Tobacco Use    Smoking status: Former     Current packs/day: 0.00     Average packs/day: 1 pack/day for 50.0 years (50.0 ttl pk-yrs)     Types: Cigarettes     Start date: 1974     Quit date: 2024     Years since quittin.8    Smokeless tobacco: Never    Tobacco comments:     2/15/24, quit a week ago, was smoking 3 cigarettes a day    Vaping Use    Vaping status: Never Used   Substance and Sexual Activity    Alcohol use: No    Drug use: No    Sexual activity: Never           Objective   Physical Exam  Constitutional:       General: She is not in acute distress.     Appearance: She is not ill-appearing or toxic-appearing.   HENT:      Head: Normocephalic and atraumatic.   Eyes:      Extraocular Movements: Extraocular movements intact.      Pupils: Pupils are equal, round, and reactive to light.   Neck:      Thyroid: No thyromegaly.      Vascular: No hepatojugular reflux.      Trachea: No tracheal deviation.   Cardiovascular:      Rate and Rhythm: Normal rate and regular rhythm. No extrasystoles are present.     Pulses: No decreased pulses.      Heart sounds: No murmur heard.     No friction rub. No gallop.   Pulmonary:      Effort: Pulmonary effort is normal. No tachypnea, bradypnea, accessory muscle usage or respiratory distress.      Breath sounds: Normal breath sounds.      Comments: Bilateral mild expiratory wheezing.   Chest:      Chest wall:  No deformity or crepitus. There is no dullness to percussion.   Abdominal:      Palpations: Abdomen is soft.   Genitourinary:     Comments: Chaperone present. 1 cm R labia majora ulcer. No infection.   Skin:     General: Skin is warm.   Neurological:      General: No focal deficit present.      Mental Status: She is alert and oriented to person, place, and time.      Cranial Nerves: No cranial nerve deficit.         Procedures           ED Course                                                       Medical Decision Making  This is a 74 y.o F with hx of COPD, leg swelling chronic, here in the emergency room for worsening shortness of breath for 2 days. She has tried treatments at home, duo neb with minimal relief. Does not have increase of  demand of O2. On exam no hypoxia, or signs of respiratory distress. Negative for nausea, vomiting, or chest pain. She has mild tachycardia. Positive for mild expiratory wheezing. Will treat for COPD. Pending cardiac work up, and PE work up as she has bilateral chronic leg swelling. CBC, cmp, ddimer, troponin, bnp, xr chest. Dexamethasone 10mg IV as well as duo neb. PT also complaining of vaginal pain, being follow by GYN. Positive for non infected ulcer, on R labial majora. Chaperon RN at the bedside.       11:15 troponin at baseline, Ddimer at baseline, negative by years criteria. Troponin at baseline, EKG negative for acute ischemic changes. No chest pain. PT improved after duo neb treatment. She is feeling much better. NC at baseline. PT feels good to be discharge. Will give bacitracin for non infected labia ulcer. Because of atelectasis on XR chest, antibiotics indicated. Will give trial of prednisone for concerns of COPD    Problems Addressed:  Atelectasis: complicated acute illness or injury  Chronic obstructive pulmonary disease with acute exacerbation: complicated acute illness or injury    Amount and/or Complexity of Data Reviewed  Labs: ordered.  Radiology:  ordered.  ECG/medicine tests: ordered.    Risk  Prescription drug management.        Final diagnoses:   Atelectasis   Chronic obstructive pulmonary disease with acute exacerbation       ED Disposition  ED Disposition       ED Disposition   Discharge    Condition   Stable    Comment   --               Dorie Segura, JOAQUÍN  [address] [place] [address] [address]  [facility] [address]  [phone]    In 2 days           Medication List        New Prescriptions      azithromycin 250 MG tablet  Commonly known as: ZITHROMAX  Take 1 tablet by mouth Daily.     predniSONE 50 MG tablet  Commonly known as: DELTASONE  Take 1 tablet by mouth Daily.               Where to Get Your Medications        These medications were sent to [id number] - [place], [place] - [address] AT [address] & [facility] - [phone]  - [phone] FX  [address] [facility] [address]      Phone: [phone]   azithromycin 250 MG tablet  predniSONE 50 MG tablet            Johnson Winter MD  12/04/24 0668

## 2024-12-09 ENCOUNTER — HOME CARE VISIT (OUTPATIENT)
Dept: HOME HEALTH SERVICES | Facility: CLINIC | Age: 74
End: 2024-12-09
Payer: MEDICARE

## 2024-12-09 VITALS
HEART RATE: 78 BPM | OXYGEN SATURATION: 98 % | RESPIRATION RATE: 20 BRPM | SYSTOLIC BLOOD PRESSURE: 132 MMHG | DIASTOLIC BLOOD PRESSURE: 58 MMHG | TEMPERATURE: 98.6 F

## 2024-12-09 PROCEDURE — G0299 HHS/HOSPICE OF RN EA 15 MIN: HCPCS

## 2024-12-10 ENCOUNTER — HOME CARE VISIT (OUTPATIENT)
Dept: HOME HEALTH SERVICES | Facility: CLINIC | Age: 74
End: 2024-12-10
Payer: MEDICARE

## 2024-12-10 PROCEDURE — G0155 HHCP-SVS OF CSW,EA 15 MIN: HCPCS

## 2024-12-27 ENCOUNTER — HOME CARE VISIT (OUTPATIENT)
Dept: HOME HEALTH SERVICES | Facility: CLINIC | Age: 74
End: 2024-12-27
Payer: MEDICARE

## 2024-12-27 PROCEDURE — G0300 HHS/HOSPICE OF LPN EA 15 MIN: HCPCS

## 2024-12-30 VITALS
OXYGEN SATURATION: 98 % | TEMPERATURE: 97.9 F | SYSTOLIC BLOOD PRESSURE: 138 MMHG | DIASTOLIC BLOOD PRESSURE: 82 MMHG | RESPIRATION RATE: 18 BRPM | HEART RATE: 87 BPM

## 2024-12-31 ENCOUNTER — HOME CARE VISIT (OUTPATIENT)
Dept: HOME HEALTH SERVICES | Facility: CLINIC | Age: 74
End: 2024-12-31
Payer: MEDICARE

## 2024-12-31 VITALS
DIASTOLIC BLOOD PRESSURE: 60 MMHG | RESPIRATION RATE: 18 BRPM | OXYGEN SATURATION: 98 % | HEART RATE: 89 BPM | SYSTOLIC BLOOD PRESSURE: 122 MMHG | TEMPERATURE: 98.3 F

## 2024-12-31 PROCEDURE — G0299 HHS/HOSPICE OF RN EA 15 MIN: HCPCS

## 2024-12-31 NOTE — Clinical Note
Please forward to ROULA PECK    Discharge Summary/Summary of Care Provided: 75 yo female referred to  for disease process education, wound care, and medication education  Patient received home health for diagnosis: Venous insufficiency (chronic) (peripheral) [I87.2]    Current level of functional ability: power wheelchair, walker  Living arrangements: home with family   Progress towards goals and/or Were all goals met? All met except wound goals  If not all goals met, barriers that prevented patient from meeting goals: pt refuses wound care   SDOH concerns (i.e. Caregiver availability, social isolation, environment, income, transportation access, food insecurity etc.)none  Follow-up appointment plans and community resources provided: in folder

## 2024-12-31 NOTE — HOME HEALTH
Discharge Summary/Summary of Care Provided: 75 yo female referred to  for disease process education, wound care, and medication education  Patient received home health for diagnosis: Venous insufficiency (chronic) (peripheral) [I87.2]  Current level of functional ability: power wheelchair, walker  Living arrangements: home with family   Progress towards goals and/or Were all goals met? All met except wound goals  If not all goals met, barriers that prevented patient from meeting goals: pt refuses wound care   SDOH concerns (i.e. Caregiver availability, social isolation, environment, income, transportation access, food insecurity etc.)none  Follow-up appointment plans and community resources provided: in folder

## 2025-03-27 ENCOUNTER — TELEPHONE (OUTPATIENT)
Dept: SURGERY | Facility: CLINIC | Age: 75
End: 2025-03-27
Payer: MEDICARE

## 2025-03-27 NOTE — TELEPHONE ENCOUNTER
Spoke with pt regarding her appointment today. PT had some appointments mixed up. Pt is RS with [facility] and pt is aware of the new date and time of appointment.     ECC  3/27/2025

## 2025-03-27 NOTE — TELEPHONE ENCOUNTER
Spoke with pt about no showed appointment today. PT stated that she called and cancelled this appointment and would RS when she found transportation.     ECC  3/27/2025

## 2025-06-12 ENCOUNTER — HOSPITAL ENCOUNTER (EMERGENCY)
Facility: HOSPITAL | Age: 75
Discharge: HOME | End: 2025-06-12
Attending: FAMILY MEDICINE
Payer: MEDICARE

## 2025-06-12 VITALS
BODY MASS INDEX: 35.88 KG/M2 | RESPIRATION RATE: 18 BRPM | SYSTOLIC BLOOD PRESSURE: 138 MMHG | HEIGHT: 63 IN | HEART RATE: 91 BPM | OXYGEN SATURATION: 97 % | DIASTOLIC BLOOD PRESSURE: 92 MMHG | WEIGHT: 202.5 LBS | TEMPERATURE: 97.9 F

## 2025-06-12 DIAGNOSIS — N90.89 LABIAL LESION: ICD-10-CM

## 2025-06-12 DIAGNOSIS — Z51.89 VISIT FOR WOUND CHECK: Primary | ICD-10-CM

## 2025-06-12 LAB
ANION GAP SERPL CALCULATED.3IONS-SCNC: 11 MMOL/L (ref 5–15)
BASOPHILS # BLD AUTO: 0.04 10*3/MM3 (ref 0–0.2)
BASOPHILS NFR BLD AUTO: 0.5 % (ref 0–1.5)
BUN SERPL-MCNC: 11.5 MG/DL (ref 8–23)
BUN/CREAT SERPL: 9.1 (ref 7–25)
CALCIUM SPEC-SCNC: 9.4 MG/DL (ref 8.6–10.5)
CHLORIDE SERPL-SCNC: 97 MMOL/L (ref 98–107)
CK SERPL-CCNC: 130 U/L (ref 20–180)
CO2 SERPL-SCNC: 28 MMOL/L (ref 22–29)
CREAT SERPL-MCNC: 1.26 MG/DL (ref 0.57–1)
CRP SERPL-MCNC: 1.88 MG/DL (ref 0–0.5)
D-LACTATE SERPL-SCNC: 2.7 MMOL/L (ref 0.5–2)
DEPRECATED RDW RBC AUTO: 47.2 FL (ref 37–54)
EGFRCR SERPLBLD CKD-EPI 2021: 44.6 ML/MIN/1.73
EOSINOPHIL # BLD AUTO: 0.32 10*3/MM3 (ref 0–0.4)
EOSINOPHIL NFR BLD AUTO: 3.8 % (ref 0.3–6.2)
ERYTHROCYTE [DISTWIDTH] IN BLOOD BY AUTOMATED COUNT: 14.6 % (ref 12.3–15.4)
ERYTHROCYTE [SEDIMENTATION RATE] IN BLOOD: 76 MM/HR (ref 0–30)
GLUCOSE SERPL-MCNC: 113 MG/DL (ref 65–99)
HCT VFR BLD AUTO: 37.6 % (ref 34–46.6)
HGB BLD-MCNC: 11.8 G/DL (ref 12–15.9)
IMM GRANULOCYTES # BLD AUTO: 0.02 10*3/MM3 (ref 0–0.05)
IMM GRANULOCYTES NFR BLD AUTO: 0.2 % (ref 0–0.5)
LYMPHOCYTES # BLD AUTO: 1.31 10*3/MM3 (ref 0.7–3.1)
LYMPHOCYTES NFR BLD AUTO: 15.7 % (ref 19.6–45.3)
MAGNESIUM SERPL-MCNC: 2.2 MG/DL (ref 1.6–2.4)
MCH RBC QN AUTO: 27.8 PG (ref 26.6–33)
MCHC RBC AUTO-ENTMCNC: 31.4 G/DL (ref 31.5–35.7)
MCV RBC AUTO: 88.7 FL (ref 79–97)
MONOCYTES # BLD AUTO: 0.43 10*3/MM3 (ref 0.1–0.9)
MONOCYTES NFR BLD AUTO: 5.1 % (ref 5–12)
NEUTROPHILS NFR BLD AUTO: 6.25 10*3/MM3 (ref 1.7–7)
NEUTROPHILS NFR BLD AUTO: 74.7 % (ref 42.7–76)
NRBC BLD AUTO-RTO: 0 /100 WBC (ref 0–0.2)
PLATELET # BLD AUTO: 249 10*3/MM3 (ref 140–450)
PMV BLD AUTO: 8.7 FL (ref 6–12)
POTASSIUM SERPL-SCNC: 4.8 MMOL/L (ref 3.5–5.2)
RBC # BLD AUTO: 4.24 10*6/MM3 (ref 3.77–5.28)
SODIUM SERPL-SCNC: 136 MMOL/L (ref 136–145)
WBC NRBC COR # BLD AUTO: 8.37 10*3/MM3 (ref 3.4–10.8)

## 2025-06-12 PROCEDURE — 83605 ASSAY OF LACTIC ACID: CPT | Performed by: FAMILY MEDICINE

## 2025-06-12 PROCEDURE — 87077 CULTURE AEROBIC IDENTIFY: CPT | Performed by: FAMILY MEDICINE

## 2025-06-12 PROCEDURE — 99283 EMERGENCY DEPT VISIT LOW MDM: CPT | Performed by: FAMILY MEDICINE

## 2025-06-12 PROCEDURE — 87070 CULTURE OTHR SPECIMN AEROBIC: CPT | Performed by: FAMILY MEDICINE

## 2025-06-12 PROCEDURE — 83735 ASSAY OF MAGNESIUM: CPT | Performed by: FAMILY MEDICINE

## 2025-06-12 PROCEDURE — 85652 RBC SED RATE AUTOMATED: CPT | Performed by: FAMILY MEDICINE

## 2025-06-12 PROCEDURE — 86140 C-REACTIVE PROTEIN: CPT | Performed by: FAMILY MEDICINE

## 2025-06-12 PROCEDURE — 87186 SC STD MICRODIL/AGAR DIL: CPT | Performed by: FAMILY MEDICINE

## 2025-06-12 PROCEDURE — 87205 SMEAR GRAM STAIN: CPT | Performed by: FAMILY MEDICINE

## 2025-06-12 PROCEDURE — 80048 BASIC METABOLIC PNL TOTAL CA: CPT | Performed by: FAMILY MEDICINE

## 2025-06-12 PROCEDURE — 36415 COLL VENOUS BLD VENIPUNCTURE: CPT

## 2025-06-12 PROCEDURE — 85025 COMPLETE CBC W/AUTO DIFF WBC: CPT | Performed by: FAMILY MEDICINE

## 2025-06-12 PROCEDURE — 82550 ASSAY OF CK (CPK): CPT | Performed by: FAMILY MEDICINE

## 2025-06-12 RX ORDER — SODIUM CHLORIDE 0.9 % (FLUSH) 0.9 %
10 SYRINGE (ML) INJECTION AS NEEDED
Status: DISCONTINUED | OUTPATIENT
Start: 2025-06-12 | End: 2025-06-12 | Stop reason: HOSPADM

## 2025-06-12 NOTE — ED PROVIDER NOTES
Subjective   History of Present Illness  Patient presents emergency room by EMS.  Patient is a poor historian.  Patient is seen by home health and she has some pressure wounds in her labia that have been going on for over a year now.  Patient states that she has no fevers.  She had some mild weeping is what the way her home health nurse described but no purulent drainage.  Patient denies any other symptoms at this time.          Review of Systems   All other systems reviewed and are negative.      Past Medical History:   Diagnosis Date    Abdominal wall abscess     Abdominal wall fistula 2018    Cellulitis     Chronic respiratory failure with hypoxia 2020    Colonic obstruction 4/3/2018    COPD (chronic obstructive pulmonary disease)     Depression     Diverticul disease small and large intestine, no perforati or abscess     Edema     GERD (gastroesophageal reflux disease)     Hyperlipidemia     Hypertension     Hypocalcemia     Hypothyroid     Obesity, Class III, BMI 40-49.9 (morbid obesity) 2020    Respiratory failure     Tobacco dependence 2020    Venous insufficiency     Vertigo        No Known Allergies    Past Surgical History:   Procedure Laterality Date    ABDOMINAL SURGERY       SECTION      COLOSTOMY      ETHMOID ARTERY LIGATION N/A 2021    Procedure: ETHMOID ARTERY LIGATION;  Surgeon: Elliot Mack Jr., MD;  Location: [facility] OR;  Service: ENT;  Laterality: N/A;    EXAM UNDER ANESTHESIA N/A 2021    Procedure: SEPTOPLASTY TURBOPLASTY ENDOSCOPIC CONTROL LEFT NOSE BLEED;  Surgeon: Elliot Mack Jr., MD;  Location: [facility] OR;  Service: ENT;  Laterality: N/A;    EXPLORATORY LAPAROTOMY N/A 2018    Procedure: LAPAROTOMY EXPLORATORY, partial colectomy, creation colostomy, incision and drainage abdominal wall abscess;  Surgeon: Elda Velazquez MD;  Location: [facility] OR;  Service: General    INTERNAL MAXILLARY ARTERY AND ETHMOID ARTERY LIGATION N/A 2021     Procedure: INTERNAL MAXILLARY ARTERY AND ETHMOID ARTERY LIGATION;  Surgeon: Elliot Mack Jr., MD;  Location:  [facility] OR;  Service: ENT;  Laterality: N/A;    SEPTOPLASTY, RESECTION INFERIOR TURBINATES Bilateral 2021    Procedure: SEPTOPLASTY, RESECTION INFERIOR TURBINATES;  Surgeon: Elliot Mack Jr., MD;  Location:  [facility] OR;  Service: ENT;  Laterality: Bilateral;       Family History   Adopted: Yes       Social History     Socioeconomic History    Marital status:    Tobacco Use    Smoking status: Former     Current packs/day: 0.00     Average packs/day: 1 pack/day for 50.0 years (50.0 ttl pk-yrs)     Types: Cigarettes     Start date: 1974     Quit date: 2024     Years since quittin.3    Smokeless tobacco: Never    Tobacco comments:     2/15/24, quit a week ago, was smoking 3 cigarettes a day    Vaping Use    Vaping status: Never Used   Substance and Sexual Activity    Alcohol use: No    Drug use: No    Sexual activity: Never           Objective   Physical Exam  Vitals and nursing note reviewed. Exam conducted with a chaperone present.   Constitutional:       Appearance: Normal appearance.   HENT:      Head: Normocephalic and atraumatic.      Mouth/Throat:      Mouth: Mucous membranes are moist.   Eyes:      Extraocular Movements: Extraocular movements intact.      Pupils: Pupils are equal, round, and reactive to light.   Cardiovascular:      Rate and Rhythm: Normal rate and regular rhythm.      Heart sounds: Normal heart sounds.   Pulmonary:      Effort: Pulmonary effort is normal.      Breath sounds: Normal breath sounds.   Abdominal:      General: Bowel sounds are normal.      Palpations: Abdomen is soft.      Tenderness: There is no abdominal tenderness.   Skin:     General: Skin is warm and dry.   Neurological:      General: No focal deficit present.      Mental Status: She is alert and oriented to person, place, and time.   Psychiatric:         Mood and Affect: Mood  normal.         Behavior: Behavior normal.         Procedures           ED Course                                                     Lab Results (last 24 hours)       Procedure Component Value Units Date/Time    CBC & Differential [[id number]]  (Abnormal) Collected: 06/12/25 1051    Specimen: Blood Updated: 06/12/25 1101    Narrative:      The following orders were created for panel order CBC & Differential.  Procedure                               Abnormality         Status                     ---------                               -----------         ------                     CBC Auto Differential[[id number]]        Abnormal            Final result                 Please view results for these tests on the individual orders.    Basic Metabolic Panel [[id number]]  (Abnormal) Collected: 06/12/25 1051    Specimen: Blood Updated: 06/12/25 1212     Glucose 113 mg/dL      BUN 11.5 mg/dL      Creatinine 1.26 mg/dL      Sodium 136 mmol/L      Potassium 4.8 mmol/L      Comment: Specimen hemolyzed.  Result may be falsely elevated.        Chloride 97 mmol/L      CO2 28.0 mmol/L      Calcium 9.4 mg/dL      BUN/Creatinine Ratio 9.1     Anion Gap 11.0 mmol/L      eGFR 44.6 mL/min/1.73     Narrative:      GFR Categories in Chronic Kidney Disease (CKD)              GFR Category          GFR (mL/min/1.73)    Interpretation  G1                    90 or greater        Normal or high (1)  G2                    60-89                Mild decrease (1)  G3a                   45-59                Mild to moderate decrease  G3b                   30-44                Moderate to severe decrease  G4                    15-29                Severe decrease  G5                    14 or less           Kidney failure    (1)In the absence of evidence of kidney disease, neither GFR category G1 or G2 fulfill the criteria for CKD.    eGFR calculation 2021 CKD-EPI creatinine equation, which does not include race as a factor    C-reactive Protein  [[id number]]  (Abnormal) Collected: 06/12/25 1051    Specimen: Blood Updated: 06/12/25 1205     C-Reactive Protein 1.88 mg/dL     Sedimentation Rate [[id number]]  (Abnormal) Collected: 06/12/25 1051    Specimen: Blood Updated: 06/12/25 1114     Sed Rate 76 mm/hr     Lactic Acid, Plasma [[id number]]  (Abnormal) Collected: 06/12/25 1051    Specimen: Blood Updated: 06/12/25 1213     Lactate 2.7 mmol/L     Magnesium [[id number]]  (Normal) Collected: 06/12/25 1051    Specimen: Blood Updated: 06/12/25 1140     Magnesium 2.2 mg/dL     CK [[id number]]  (Normal) Collected: 06/12/25 1051    Specimen: Blood Updated: 06/12/25 1205     Creatine Kinase 130 U/L     CBC Auto Differential [[id number]]  (Abnormal) Collected: 06/12/25 1051    Specimen: Blood Updated: 06/12/25 1101     WBC 8.37 10*3/mm3      RBC 4.24 10*6/mm3      Hemoglobin 11.8 g/dL      Hematocrit 37.6 %      MCV 88.7 fL      MCH 27.8 pg      MCHC 31.4 g/dL      RDW 14.6 %      RDW-SD 47.2 fl      MPV 8.7 fL      Platelets 249 10*3/mm3      Neutrophil % 74.7 %      Lymphocyte % 15.7 %      Monocyte % 5.1 %      Eosinophil % 3.8 %      Basophil % 0.5 %      Immature Grans % 0.2 %      Neutrophils, Absolute 6.25 10*3/mm3      Lymphocytes, Absolute 1.31 10*3/mm3      Monocytes, Absolute 0.43 10*3/mm3      Eosinophils, Absolute 0.32 10*3/mm3      Basophils, Absolute 0.04 10*3/mm3      Immature Grans, Absolute 0.02 10*3/mm3      nRBC 0.0 /100 WBC     Wound Culture - Swab, Labia [[id number]] Collected: 06/12/25 1110    Specimen: Swab from Labia Updated: 06/12/25 1115            Medical Decision Making  I had a discussion with the patient regarding diagnosis, diagnostic results, treatment plan, and medications.  The patient indicated understanding of these instructions.  I spent adequate time at the bedside prior to discharge necessary to discuss the aftercare instructions, giving patient education, providing explanations of the results of our evaluations/findings, and my  decision making to assure that the patient understand the plan of care.  Time was allotted to answer questions at that time and throughout the ED course.  Emphasis was placed on timely follow-up after discharge.  I also discussed the potential for the development of an acute emergent condition requiring further evaluation, return to the ER, admission, or even surgical intervention. I discussed that we found nothing during the visit today indicating the need for further ER workup at this time, admission to the hospital, or the presence of an acute unstable medical condition.  I encouraged the patient to return to the emergency department immediately for ANY concerns, worsening, new complaints, or if symptoms persist and unable to seek follow-up in a timely fashion.  The patient expressed understanding and agreement with this plan.      Problems Addressed:  Labial lesion: complicated acute illness or injury  Visit for wound check: complicated acute illness or injury    Amount and/or Complexity of Data Reviewed  Labs: ordered. Decision-making details documented in ED Course.    Risk  Prescription drug management.        Final diagnoses:   Visit for wound check   Labial lesion       ED Disposition  ED Disposition       ED Disposition   Discharge    Condition   Stable    Comment   --               Sasha Moncada, APRN  [address] [address]  [phone]    Schedule an appointment as soon as possible for a visit       Eleno Tolentino,   [address], [address]  [facility] [id number] [phone]    Schedule an appointment as soon as possible for a visit       [facility] EMERGENCY DEPARTMENT  [address] [address] [phone]    As needed, If symptoms worsen         Medication List      No changes were made to your prescriptions during this visit.            Ha Beckham MD  06/12/25 [id number]

## 2025-06-13 ENCOUNTER — RESULTS FOLLOW-UP (OUTPATIENT)
Dept: EMERGENCY DEPT | Facility: HOSPITAL | Age: 75
End: 2025-06-13
Payer: MEDICARE

## 2025-06-14 LAB
BACTERIA SPEC AEROBE CULT: ABNORMAL
GRAM STN SPEC: ABNORMAL
GRAM STN SPEC: ABNORMAL

## 2025-06-14 NOTE — TELEPHONE ENCOUNTER
Spoke with the pt about culture results and she states that her physician has called her in antibiotics and that she is to follow up with wound care and has home health as well
